# Patient Record
Sex: FEMALE | Race: WHITE | HISPANIC OR LATINO | Employment: FULL TIME | URBAN - METROPOLITAN AREA
[De-identification: names, ages, dates, MRNs, and addresses within clinical notes are randomized per-mention and may not be internally consistent; named-entity substitution may affect disease eponyms.]

---

## 2019-05-29 ENCOUNTER — HOSPITAL ENCOUNTER (EMERGENCY)
Facility: HOSPITAL | Age: 34
Discharge: HOME | End: 2019-05-29
Attending: EMERGENCY MEDICINE
Payer: COMMERCIAL

## 2019-05-29 VITALS
RESPIRATION RATE: 16 BRPM | HEIGHT: 62 IN | TEMPERATURE: 98.3 F | SYSTOLIC BLOOD PRESSURE: 133 MMHG | OXYGEN SATURATION: 97 % | WEIGHT: 144 LBS | BODY MASS INDEX: 26.5 KG/M2 | DIASTOLIC BLOOD PRESSURE: 88 MMHG | HEART RATE: 98 BPM

## 2019-05-29 DIAGNOSIS — N39.0 UTI (URINARY TRACT INFECTION): Primary | ICD-10-CM

## 2019-05-29 LAB
BACTERIA UR QL AUTO: ABNORMAL /HPF
BILIRUB UR QL STRIP: NEGATIVE
CLARITY UR: ABNORMAL
COLOR UR: ABNORMAL
GLUCOSE UR STRIP-MCNC: NEGATIVE MG/DL
HGB UR QL STRIP.AUTO: 25
KETONES UR STRIP-MCNC: ABNORMAL MG/DL
LEUKOCYTE ESTERASE UR QL STRIP: 500
MUCOUS THREADS UR QL AUTO: ABNORMAL
NITRITE UR QL STRIP: POSITIVE
NON-SQ EPI CELLS URNS QL MICRO: ABNORMAL /HPF
PH UR STRIP.AUTO: 6 [PH]
PROT UR STRIP-MCNC: ABNORMAL MG/DL
RBC #/AREA URNS AUTO: ABNORMAL /HPF
SP GR UR STRIP.AUTO: 1.02 (ref 1–1.04)
UROBILINOGEN UA: 1 MG/DL
WBC #/AREA URNS AUTO: ABNORMAL /HPF

## 2019-05-29 PROCEDURE — 81001 URINALYSIS AUTO W/SCOPE: CPT | Performed by: PHYSICIAN ASSISTANT

## 2019-05-29 PROCEDURE — 87186 SC STD MICRODIL/AGAR DIL: CPT | Performed by: PHYSICIAN ASSISTANT

## 2019-05-29 PROCEDURE — 81003 URINALYSIS AUTO W/O SCOPE: CPT | Performed by: PHYSICIAN ASSISTANT

## 2019-05-29 PROCEDURE — 99283 EMERGENCY DEPT VISIT LOW MDM: CPT | Performed by: PHYSICIAN ASSISTANT

## 2019-05-29 PROCEDURE — 87086 URINE CULTURE/COLONY COUNT: CPT | Performed by: PHYSICIAN ASSISTANT

## 2019-05-29 PROCEDURE — 99284 EMERGENCY DEPT VISIT MOD MDM: CPT

## 2019-05-29 PROCEDURE — 87077 CULTURE AEROBIC IDENTIFY: CPT | Performed by: PHYSICIAN ASSISTANT

## 2019-05-29 RX ORDER — SULFAMETHOXAZOLE AND TRIMETHOPRIM 800; 160 MG/1; MG/1
1 TABLET ORAL 2 TIMES DAILY
Qty: 14 TABLET | Refills: 0 | Status: SHIPPED | OUTPATIENT
Start: 2019-05-29 | End: 2019-06-05

## 2019-05-31 LAB
BACTERIA UR CULT: ABNORMAL
BACTERIA UR CULT: ABNORMAL

## 2019-06-07 ENCOUNTER — OFFICE VISIT (OUTPATIENT)
Dept: OBGYN CLINIC | Facility: CLINIC | Age: 34
End: 2019-06-07

## 2019-06-07 VITALS
BODY MASS INDEX: 27.42 KG/M2 | DIASTOLIC BLOOD PRESSURE: 60 MMHG | HEIGHT: 62 IN | SYSTOLIC BLOOD PRESSURE: 110 MMHG | WEIGHT: 149 LBS

## 2019-06-07 DIAGNOSIS — N89.8 VAGINA ITCHING: Primary | ICD-10-CM

## 2019-06-07 DIAGNOSIS — N89.8 VAGINAL DISCHARGE: ICD-10-CM

## 2019-06-07 DIAGNOSIS — B37.3 VAGINA, CANDIDIASIS: ICD-10-CM

## 2019-06-07 LAB — SL AMB POCT WET MOUNT: ABNORMAL

## 2019-06-07 PROCEDURE — 99203 OFFICE O/P NEW LOW 30 MIN: CPT | Performed by: NURSE PRACTITIONER

## 2019-06-07 PROCEDURE — 87210 SMEAR WET MOUNT SALINE/INK: CPT | Performed by: NURSE PRACTITIONER

## 2019-06-07 RX ORDER — AMITRIPTYLINE HYDROCHLORIDE 10 MG/1
25 TABLET, FILM COATED ORAL
COMMUNITY
End: 2020-02-06 | Stop reason: SDUPTHER

## 2019-06-07 RX ORDER — FLUCONAZOLE 150 MG/1
150 TABLET ORAL ONCE
Qty: 1 TABLET | Refills: 0 | Status: SHIPPED | OUTPATIENT
Start: 2019-06-07 | End: 2019-06-07

## 2019-06-21 ENCOUNTER — ANNUAL EXAM (OUTPATIENT)
Dept: OBGYN CLINIC | Facility: CLINIC | Age: 34
End: 2019-06-21

## 2019-06-21 VITALS
BODY MASS INDEX: 27.23 KG/M2 | DIASTOLIC BLOOD PRESSURE: 80 MMHG | HEIGHT: 62 IN | WEIGHT: 148 LBS | SYSTOLIC BLOOD PRESSURE: 120 MMHG

## 2019-06-21 DIAGNOSIS — Z11.3 SCREENING EXAMINATION FOR SEXUALLY TRANSMITTED DISEASE: ICD-10-CM

## 2019-06-21 DIAGNOSIS — Z12.4 SCREENING FOR CERVICAL CANCER: ICD-10-CM

## 2019-06-21 DIAGNOSIS — Z01.411 ENCOUNTER FOR GYNECOLOGICAL EXAMINATION WITH ABNORMAL FINDING: Primary | ICD-10-CM

## 2019-06-21 DIAGNOSIS — R10.2 PELVIC PAIN: ICD-10-CM

## 2019-06-21 DIAGNOSIS — Z12.39 SCREENING FOR BREAST CANCER: ICD-10-CM

## 2019-06-21 PROCEDURE — 99395 PREV VISIT EST AGE 18-39: CPT | Performed by: NURSE PRACTITIONER

## 2019-06-21 PROCEDURE — 87491 CHLMYD TRACH DNA AMP PROBE: CPT | Performed by: NURSE PRACTITIONER

## 2019-06-21 PROCEDURE — 87591 N.GONORRHOEAE DNA AMP PROB: CPT | Performed by: NURSE PRACTITIONER

## 2019-06-21 PROCEDURE — G0145 SCR C/V CYTO,THINLAYER,RESCR: HCPCS | Performed by: NURSE PRACTITIONER

## 2019-06-21 PROCEDURE — 87624 HPV HI-RISK TYP POOLED RSLT: CPT | Performed by: NURSE PRACTITIONER

## 2019-06-25 ENCOUNTER — TRANSCRIBE ORDERS (OUTPATIENT)
Dept: ADMINISTRATIVE | Facility: HOSPITAL | Age: 34
End: 2019-06-25

## 2019-06-25 DIAGNOSIS — R10.2 PELVIC PAIN: Primary | ICD-10-CM

## 2019-06-25 LAB
HPV HR 12 DNA CVX QL NAA+PROBE: NEGATIVE
HPV16 DNA CVX QL NAA+PROBE: NEGATIVE
HPV18 DNA CVX QL NAA+PROBE: NEGATIVE

## 2019-06-26 ENCOUNTER — HOSPITAL ENCOUNTER (OUTPATIENT)
Dept: ULTRASOUND IMAGING | Facility: HOSPITAL | Age: 34
Discharge: HOME | End: 2019-06-26
Attending: NURSE PRACTITIONER
Payer: COMMERCIAL

## 2019-06-26 DIAGNOSIS — R10.2 PELVIC PAIN: ICD-10-CM

## 2019-06-26 LAB
C TRACH DNA SPEC QL NAA+PROBE: NEGATIVE
LAB AP GYN PRIMARY INTERPRETATION: NORMAL
Lab: NORMAL
N GONORRHOEA DNA SPEC QL NAA+PROBE: NEGATIVE

## 2019-06-26 PROCEDURE — 76856 US EXAM PELVIC COMPLETE: CPT

## 2019-06-26 PROCEDURE — 76830 TRANSVAGINAL US NON-OB: CPT

## 2019-07-15 ENCOUNTER — OFFICE VISIT (OUTPATIENT)
Dept: OBGYN CLINIC | Facility: CLINIC | Age: 34
End: 2019-07-15

## 2019-07-15 VITALS
BODY MASS INDEX: 27.05 KG/M2 | SYSTOLIC BLOOD PRESSURE: 110 MMHG | WEIGHT: 147 LBS | HEIGHT: 62 IN | DIASTOLIC BLOOD PRESSURE: 60 MMHG

## 2019-07-15 DIAGNOSIS — R10.2 PELVIC PAIN: ICD-10-CM

## 2019-07-15 DIAGNOSIS — N70.11 HYDROSALPINX: Primary | ICD-10-CM

## 2019-07-15 PROCEDURE — 99212 OFFICE O/P EST SF 10 MIN: CPT | Performed by: NURSE PRACTITIONER

## 2019-07-15 RX ORDER — KETOROLAC TROMETHAMINE 10 MG/1
10 TABLET, FILM COATED ORAL EVERY 6 HOURS PRN
Qty: 20 TABLET | Refills: 0 | Status: SHIPPED | OUTPATIENT
Start: 2019-07-15 | End: 2020-03-05

## 2019-07-15 NOTE — PROGRESS NOTES
Eliza Jovel presents today to review results of pelvic ultrasound performed 6/26/19  Ultrasound report notes L hydrosalpinx  Patient reports that she continues with L sided pelvic pain off/on  Will refer her to Dr Victor M Valverde to discuss surgical options  Given Rx Toradol for pain prn  She agrees with plan

## 2019-07-20 ENCOUNTER — HOSPITAL ENCOUNTER (EMERGENCY)
Facility: HOSPITAL | Age: 34
Discharge: HOME | End: 2019-07-20
Attending: EMERGENCY MEDICINE | Admitting: EMERGENCY MEDICINE
Payer: COMMERCIAL

## 2019-07-20 ENCOUNTER — APPOINTMENT (EMERGENCY)
Dept: CT IMAGING | Facility: HOSPITAL | Age: 34
End: 2019-07-20
Payer: COMMERCIAL

## 2019-07-20 VITALS
SYSTOLIC BLOOD PRESSURE: 119 MMHG | HEIGHT: 62 IN | OXYGEN SATURATION: 99 % | BODY MASS INDEX: 27.42 KG/M2 | DIASTOLIC BLOOD PRESSURE: 81 MMHG | RESPIRATION RATE: 18 BRPM | TEMPERATURE: 98.3 F | WEIGHT: 149 LBS | HEART RATE: 85 BPM

## 2019-07-20 DIAGNOSIS — N70.11 HYDROSALPINX: Primary | ICD-10-CM

## 2019-07-20 DIAGNOSIS — N39.0 URINARY TRACT INFECTION: ICD-10-CM

## 2019-07-20 LAB
ALBUMIN SERPL BCP-MCNC: 4.3 G/DL (ref 3–5.2)
ALP SERPL-CCNC: 57 U/L (ref 43–122)
ALT SERPL W P-5'-P-CCNC: 28 U/L (ref 9–52)
ANION GAP SERPL CALCULATED.3IONS-SCNC: 10 MMOL/L (ref 5–14)
AST SERPL W P-5'-P-CCNC: 17 U/L (ref 14–36)
BACTERIA UR QL AUTO: ABNORMAL /HPF
BASOPHILS # BLD AUTO: 0.1 THOUSANDS/ΜL (ref 0–0.1)
BASOPHILS NFR BLD AUTO: 1 % (ref 0–1)
BILIRUB SERPL-MCNC: 0.3 MG/DL
BILIRUB UR QL STRIP: NEGATIVE
BUN SERPL-MCNC: 18 MG/DL (ref 5–25)
CALCIUM SERPL-MCNC: 9.3 MG/DL (ref 8.4–10.2)
CHLORIDE SERPL-SCNC: 106 MMOL/L (ref 97–108)
CLARITY UR: ABNORMAL
CO2 SERPL-SCNC: 24 MMOL/L (ref 22–30)
COLOR UR: ABNORMAL
CREAT SERPL-MCNC: 0.72 MG/DL (ref 0.6–1.2)
EOSINOPHIL # BLD AUTO: 0.2 THOUSAND/ΜL (ref 0–0.4)
EOSINOPHIL NFR BLD AUTO: 2 % (ref 0–6)
ERYTHROCYTE [DISTWIDTH] IN BLOOD BY AUTOMATED COUNT: 12.9 %
EXT PREG TEST URINE: NEGATIVE
EXT. CONTROL ED NAV: NORMAL
GFR SERPL CREATININE-BSD FRML MDRD: 110 ML/MIN/1.73SQ M
GLUCOSE SERPL-MCNC: 103 MG/DL (ref 70–99)
GLUCOSE UR STRIP-MCNC: NEGATIVE MG/DL
HCT VFR BLD AUTO: 38.3 % (ref 36–46)
HGB BLD-MCNC: 12.7 G/DL (ref 12–16)
HGB UR QL STRIP.AUTO: 25
HOLD SPECIMEN: NORMAL
KETONES UR STRIP-MCNC: NEGATIVE MG/DL
LEUKOCYTE ESTERASE UR QL STRIP: 25
LYMPHOCYTES # BLD AUTO: 2.8 THOUSANDS/ΜL (ref 0.5–4)
LYMPHOCYTES NFR BLD AUTO: 22 % (ref 25–45)
MCH RBC QN AUTO: 30.2 PG (ref 26–34)
MCHC RBC AUTO-ENTMCNC: 33.2 G/DL (ref 31–36)
MCV RBC AUTO: 91 FL (ref 80–100)
MONOCYTES # BLD AUTO: 0.8 THOUSAND/ΜL (ref 0.2–0.9)
MONOCYTES NFR BLD AUTO: 7 % (ref 1–10)
NEUTROPHILS # BLD AUTO: 8.8 THOUSANDS/ΜL (ref 1.8–7.8)
NEUTS SEG NFR BLD AUTO: 70 % (ref 45–65)
NITRITE UR QL STRIP: NEGATIVE
NON-SQ EPI CELLS URNS QL MICRO: ABNORMAL /HPF
PH UR STRIP.AUTO: 5 [PH]
PLATELET # BLD AUTO: 250 THOUSANDS/UL (ref 150–450)
PLATELET BLD QL SMEAR: ADEQUATE
PMV BLD AUTO: 10 FL (ref 8.9–12.7)
POTASSIUM SERPL-SCNC: 4 MMOL/L (ref 3.6–5)
PROT SERPL-MCNC: 7.5 G/DL (ref 5.9–8.4)
PROT UR STRIP-MCNC: ABNORMAL MG/DL
RBC # BLD AUTO: 4.22 MILLION/UL (ref 4–5.2)
RBC #/AREA URNS AUTO: ABNORMAL /HPF
RBC MORPH BLD: NORMAL
SODIUM SERPL-SCNC: 140 MMOL/L (ref 137–147)
SP GR UR STRIP.AUTO: 1.03 (ref 1–1.04)
UROBILINOGEN UA: 1 MG/DL
WBC # BLD AUTO: 12.7 THOUSAND/UL (ref 4.5–11)
WBC #/AREA URNS AUTO: ABNORMAL /HPF

## 2019-07-20 PROCEDURE — 99284 EMERGENCY DEPT VISIT MOD MDM: CPT

## 2019-07-20 PROCEDURE — 80053 COMPREHEN METABOLIC PANEL: CPT | Performed by: EMERGENCY MEDICINE

## 2019-07-20 PROCEDURE — 85025 COMPLETE CBC W/AUTO DIFF WBC: CPT | Performed by: EMERGENCY MEDICINE

## 2019-07-20 PROCEDURE — 36415 COLL VENOUS BLD VENIPUNCTURE: CPT | Performed by: EMERGENCY MEDICINE

## 2019-07-20 PROCEDURE — 74177 CT ABD & PELVIS W/CONTRAST: CPT

## 2019-07-20 PROCEDURE — 99284 EMERGENCY DEPT VISIT MOD MDM: CPT | Performed by: EMERGENCY MEDICINE

## 2019-07-20 PROCEDURE — 81003 URINALYSIS AUTO W/O SCOPE: CPT | Performed by: EMERGENCY MEDICINE

## 2019-07-20 PROCEDURE — 81001 URINALYSIS AUTO W/SCOPE: CPT | Performed by: EMERGENCY MEDICINE

## 2019-07-20 PROCEDURE — 96361 HYDRATE IV INFUSION ADD-ON: CPT

## 2019-07-20 PROCEDURE — 81025 URINE PREGNANCY TEST: CPT | Performed by: EMERGENCY MEDICINE

## 2019-07-20 PROCEDURE — 96374 THER/PROPH/DIAG INJ IV PUSH: CPT

## 2019-07-20 RX ORDER — CEPHALEXIN 500 MG/1
500 CAPSULE ORAL ONCE
Status: DISCONTINUED | OUTPATIENT
Start: 2019-07-20 | End: 2019-07-20

## 2019-07-20 RX ORDER — KETOROLAC TROMETHAMINE 30 MG/ML
15 INJECTION, SOLUTION INTRAMUSCULAR; INTRAVENOUS ONCE
Status: COMPLETED | OUTPATIENT
Start: 2019-07-20 | End: 2019-07-20

## 2019-07-20 RX ORDER — CEPHALEXIN 500 MG/1
500 CAPSULE ORAL EVERY 12 HOURS SCHEDULED
Qty: 14 CAPSULE | Refills: 0 | Status: SHIPPED | OUTPATIENT
Start: 2019-07-20 | End: 2019-07-27

## 2019-07-20 RX ORDER — ACETAMINOPHEN 325 MG/1
975 TABLET ORAL ONCE
Status: COMPLETED | OUTPATIENT
Start: 2019-07-20 | End: 2019-07-20

## 2019-07-20 RX ADMIN — IOHEXOL 100 ML: 350 INJECTION, SOLUTION INTRAVENOUS at 03:25

## 2019-07-20 RX ADMIN — KETOROLAC TROMETHAMINE 15 MG: 30 INJECTION, SOLUTION INTRAMUSCULAR; INTRAVENOUS at 02:33

## 2019-07-20 RX ADMIN — SODIUM CHLORIDE 1000 ML: 9 INJECTION, SOLUTION INTRAVENOUS at 02:33

## 2019-07-20 RX ADMIN — ACETAMINOPHEN 975 MG: 325 TABLET ORAL at 02:36

## 2019-07-20 NOTE — DISCHARGE INSTRUCTIONS
Please follow-up with your OBGYN as scheduled on Monday for further care, if symptoms worsen please return to the emergency department

## 2019-07-20 NOTE — ED NOTES
Seen by Dr Randolph Benson with results of testing reviewed and discharge instructions and plan of care discussed with patient  Patient is pain free         Rosa Orozco RN  07/20/19 0538

## 2019-07-20 NOTE — ED PROVIDER NOTES
History  Chief Complaint   Patient presents with    Abdominal Pain     left     44-year-old female past medical history who migraine, left-sided hydrosalpinx and intermittent left sided pelvic pain presents for evaluation of continued left-sided pelvic pain  Patient is currently being seen by her OBGYN, recently had a ultrasound of the pelvis for this problem which showed left-sided hydrosalpinx, she was seen on 07/15 by her OBGYN and referred to Dr Andres Franklin for surgical evaluation     Patient comes today because of continued pain despite taking her prescribed Toradol  She states that the pain has been going on for approximately 2 years but over last 3 weeks has been worsening, last 3 days she has been unable to sleep secondary to the pain  No vaginal discharge or bleeding no frequency urgency or dysuria, no nausea vomiting  No fevers or chills  Of note patient has an appointment with her surgeon on Monday  Prior to Admission Medications   Prescriptions Last Dose Informant Patient Reported? Taking?   amitriptyline (ELAVIL) 10 mg tablet 7/19/2019 at Unknown time Self Yes Yes   Sig: Take 25 mg by mouth daily at bedtime   ketorolac (TORADOL) 10 mg tablet 7/19/2019 at Unknown time  No Yes   Sig: Take 1 tablet (10 mg total) by mouth every 6 (six) hours as needed for moderate pain      Facility-Administered Medications: None       Past Medical History:   Diagnosis Date    Abnormal Pap smear of cervix     Migraine headache with aura     managed with Elavil       Past Surgical History:   Procedure Laterality Date    GYNECOLOGIC CRYOSURGERY  2015    HYSTERECTOMY  2016    LAPAROSCOPY  2018       Family History   Problem Relation Age of Onset    Cancer Paternal Grandmother         uterine    Cancer Paternal Aunt         uterine     Breast cancer Neg Hx      I have reviewed and agree with the history as documented      Social History     Tobacco Use    Smoking status: Current Every Day Smoker Packs/day: 0 25     Years: 15 00     Pack years: 3 75     Types: Cigarettes    Smokeless tobacco: Never Used   Substance Use Topics    Alcohol use: Not Currently    Drug use: Never        Review of Systems   Constitutional: Negative for appetite change and fever  HENT: Negative for rhinorrhea and sore throat  Eyes: Negative for photophobia and visual disturbance  Respiratory: Negative for cough, chest tightness and wheezing  Cardiovascular: Negative for chest pain, palpitations and leg swelling  Gastrointestinal: Positive for abdominal pain  Negative for abdominal distention, blood in stool, constipation and diarrhea  Genitourinary: Negative for dysuria, flank pain, frequency, hematuria and urgency  Pelvic pain   Musculoskeletal: Negative for back pain  Skin: Negative for rash  Neurological: Negative for dizziness, weakness and headaches  All other systems reviewed and are negative  Physical Exam  Physical Exam   Constitutional: She is oriented to person, place, and time  She appears well-developed and well-nourished  HENT:   Head: Normocephalic and atraumatic  Eyes: Pupils are equal, round, and reactive to light  EOM are normal    Neck: Normal range of motion  Neck supple  Cardiovascular: Normal rate and regular rhythm  Exam reveals no gallop and no friction rub  No murmur heard  Pulmonary/Chest: Effort normal  She has no wheezes  She has no rales  She exhibits no tenderness  Abdominal: Soft  She exhibits no distension and no mass  There is no rebound and no guarding  Left lower quadrant tenderness to palpation without rebound or guarding   Neurological: She is alert and oriented to person, place, and time  Skin: Skin is warm and dry  Psychiatric: She has a normal mood and affect  Nursing note and vitals reviewed        Vital Signs  ED Triage Vitals   Temperature Pulse Respirations Blood Pressure SpO2   07/20/19 0208 07/20/19 0208 07/20/19 0208 07/20/19 4965 07/20/19 0208   98 3 °F (36 8 °C) 91 20 118/72 99 %      Temp Source Heart Rate Source Patient Position - Orthostatic VS BP Location FiO2 (%)   07/20/19 0208 07/20/19 0208 07/20/19 0208 07/20/19 0208 --   Tympanic Monitor Sitting Left arm       Pain Score       07/20/19 0208       7           Vitals:    07/20/19 0208 07/20/19 0209 07/20/19 0331 07/20/19 0419   BP:  118/72 120/72 119/81   Pulse: 91  88 85   Patient Position - Orthostatic VS: Sitting  Sitting Sitting         Visual Acuity      ED Medications  Medications   sodium chloride 0 9 % bolus 1,000 mL (0 mL Intravenous Stopped 7/20/19 0419)   ketorolac (TORADOL) injection 15 mg (15 mg Intravenous Given 7/20/19 0233)   acetaminophen (TYLENOL) tablet 975 mg (975 mg Oral Given 7/20/19 0236)   iohexol (OMNIPAQUE) 350 MG/ML injection (SINGLE-DOSE) 100 mL (100 mL Intravenous Given 7/20/19 0325)       Diagnostic Studies  Results Reviewed     Procedure Component Value Units Date/Time    [place] draw [[id number]] Collected:  07/20/19 0225    Lab Status:  Final result Specimen:  Blood from Arm, Right Updated:  07/20/19 0401    Narrative: The following orders were created for panel order [place] draw  Procedure                               Abnormality         Status                     ---------                               -----------         ------                     Warnell Bare Top on RDFJ[[id number]]                           Final result               Gold top on GLUS[[id number]]                                 Final result               Green / Black tube on hold[[id number]]                       Final result                 Please view results for these tests on the individual orders      Urine Microscopic [[id number]]  (Abnormal) Collected:  07/20/19 0225    Lab Status:  Final result Specimen:  Urine, Clean Catch Updated:  07/20/19 0249     RBC, UA 1-2 /hpf      WBC, UA 2-4 /hpf      Epithelial Cells Occasional /hpf      Bacteria, UA Innumerable /hpf Comprehensive metabolic panel [[id number]]  (Abnormal) Collected:  07/20/19 0225    Lab Status:  Final result Specimen:  Blood from Arm, Right Updated:  07/20/19 0247     Sodium 140 mmol/L      Potassium 4 0 mmol/L      Chloride 106 mmol/L      CO2 24 mmol/L      ANION GAP 10 mmol/L      BUN 18 mg/dL      Creatinine 0 72 mg/dL      Glucose 103 mg/dL      Calcium 9 3 mg/dL      AST 17 U/L      ALT 28 U/L      Alkaline Phosphatase 57 U/L      Total Protein 7 5 g/dL      Albumin 4 3 g/dL      Total Bilirubin 0 30 mg/dL      eGFR 110 ml/min/1 73sq m     Narrative:       National Kidney Disease Foundation guidelines for Chronic Kidney Disease (CKD):     Stage 1 with normal or high GFR (GFR > 90 mL/min/1 73 square meters)    Stage 2 Mild CKD (GFR = 60-89 mL/min/1 73 square meters)    Stage 3A Moderate CKD (GFR = 45-59 mL/min/1 73 square meters)    Stage 3B Moderate CKD (GFR = 30-44 mL/min/1 73 square meters)    Stage 4 Severe CKD (GFR = 15-29 mL/min/1 73 square meters)    Stage 5 End Stage CKD (GFR <15 mL/min/1 73 square meters)  Note: GFR calculation is accurate only with a steady state creatinine    CBC and differential [[id number]]  (Abnormal) Collected:  07/20/19 0225    Lab Status:  Final result Specimen:  Blood from Arm, Right Updated:  07/20/19 0244     WBC 12 70 Thousand/uL      RBC 4 22 Million/uL      Hemoglobin 12 7 g/dL      Hematocrit 38 3 %      MCV 91 fL      MCH 30 2 pg      MCHC 33 2 g/dL      RDW 12 9 %      MPV 10 0 fL      Platelets 797 Thousands/uL      Neutrophils Relative 70 %      Lymphocytes Relative 22 %      Monocytes Relative 7 %      Eosinophils Relative 2 %      Basophils Relative 1 %      Neutrophils Absolute 8 80 Thousands/µL      Lymphocytes Absolute 2 80 Thousands/µL      Monocytes Absolute 0 80 Thousand/µL      Eosinophils Absolute 0 20 Thousand/µL      Basophils Absolute 0 10 Thousands/µL     UA w Reflex to Microscopic w Reflex to Culture [[id number]]  (Abnormal) Collected:  07/20/19 0225    Lab Status:  Final result Specimen:  Urine, Clean Catch Updated:  07/20/19 0243     Color, UA Mary     Clarity, UA Slightly Cloudy     Specific [place], UA 1 030     pH, UA 5 0     Leukocytes, UA 25 0     Nitrite, UA Negative     Protein, UA 15 (Trace) mg/dl      Glucose, UA Negative mg/dl      Ketones, UA Negative mg/dl      Bilirubin, UA Negative     Blood, UA 25 0     UROBILINOGEN UA 1 0 mg/dL     POCT pregnancy, urine [[id number]]  (Normal) Resulted:  07/20/19 0230    Lab Status:  Final result Updated:  07/20/19 0230     EXT PREG TEST UR (Ref: Negative) Negative     Control Valid                 CT abdomen pelvis with contrast   Final Result by Aimee Cleaning MD (07/20 0350)      There is a somewhat tortuous fluid-filled tubular shaped structure in the left adnexal region  The appearance suggests hydrosalpinx  OB/GYN consultation is recommended  No evidence of bowel obstruction  Normal appendix              Workstation performed: [id number]                    Procedures  Procedures       ED Course  ED Course as of Jul 20 0700   Sat Jul 20, 2019   9069 Patient has redemonstration of hydrosalpinx on CT scan, she has an appointment with      0410 Patient feeling better, will follow up with Ob/gyn on Monday                                   MDM  Number of Diagnoses or Management Options  Diagnosis management comments: 81-year-old female continued left-sided pelvic pain, will obtain lab work, urinalysis, urine pregnancy and CT of the abdomen, treat symptomatically and re-evaluate      Disposition  Final diagnoses:   Hydrosalpinx   Urinary tract infection     Time reflects when diagnosis was documented in both MDM as applicable and the Disposition within this note     Time User Action Codes Description Comment    7/20/2019  3:55 AM Ethelyn Dart Add [N70 11] Hydrosalpinx     7/20/2019  3:55 AM Ethelyn Dart Add [N39 0] Urinary tract infection       ED Disposition     ED Disposition Condition Date/Time Comment    Discharge Stable Sat Jul 20, 2019  3:55 AM Lorrie TrotterRiyascotty discharge to home/self care  Follow-up Information     Follow up With Specialties Details Why [facility] Emergency Department Emergency Medicine  If symptoms worsen [address] [address]  [address] [facility] [facility] [address], MD Family Medicine Schedule an appointment as soon as possible for a visit  As needed [address] [facility] [address]       Aneta Diaz MD Obstetrics and Gynecology, Obstetrics, Gynecology Go to  Your appointment on Monday as scheduled [address] [facility] [address]  [phone]            Discharge Medication List as of 7/20/2019  3:56 AM      START taking these medications    Details   cephalexin (KEFLEX) 500 mg capsule Take 1 capsule (500 mg total) by mouth every 12 (twelve) hours for 7 days, Starting Sat 7/20/2019, Until Sat 7/27/2019, Print         CONTINUE these medications which have NOT CHANGED    Details   amitriptyline (ELAVIL) 10 mg tablet Take 25 mg by mouth daily at bedtime, Historical Med      ketorolac (TORADOL) 10 mg tablet Take 1 tablet (10 mg total) by mouth every 6 (six) hours as needed for moderate pain, Starting Mon 7/15/2019, Normal           No discharge procedures on file      ED Provider  Electronically Signed by           Angie Arciniega MD  07/20/19 8716       Angie Arciniega MD  07/20/19 1516

## 2019-07-23 ENCOUNTER — OFFICE VISIT (OUTPATIENT)
Dept: OBGYN CLINIC | Facility: CLINIC | Age: 34
End: 2019-07-23

## 2019-07-23 VITALS
HEART RATE: 101 BPM | DIASTOLIC BLOOD PRESSURE: 72 MMHG | WEIGHT: 149.8 LBS | SYSTOLIC BLOOD PRESSURE: 105 MMHG | BODY MASS INDEX: 27.4 KG/M2

## 2019-07-23 DIAGNOSIS — N70.11 HYDROSALPINX: Primary | ICD-10-CM

## 2019-07-23 PROCEDURE — 99213 OFFICE O/P EST LOW 20 MIN: CPT | Performed by: OBSTETRICS & GYNECOLOGY

## 2019-07-23 NOTE — H&P (VIEW-ONLY)
Subjective:     Eyal Yang is a 36 y o   female who presents for surgical consultation from [facility]  She was initially evaluated on 19 for annual exam  Due to complaint of left sided pelvic pain, an ultrasound was obtained  US results significant for tortuous anechoic tubular structure extending from midline into the left adnexa to the left ovary compatible with hydrosalpinx  She returned to [facility] on 7/15 for review of ultrasound report and directed to [facility] for further surgical evaluation  Of note, she was evaluated in the ED on  for abdominal pain  CT unremarkable aside from left hydrosalpinx  She was discharged on keflex 500mg q 12hr x 7 days  Patient has an extensive but somewhat confusing past surgical history  She states that after 2 SVDs, she consented to permanent sterilization via bilateral tubal ligation in  in [facility]  Around 4 years ago, there was concern for cervical dysplasia  She underwent a hysterectomy via Pfannenstiel incision in [facility]  Bilateral fallopian tubes and ovaries were not removed  She states pathology returned as benign with no sign of dysplasia or malignancy  She then moved to [facility] where she received care at [facility]  There, she had a right salpingectomy due to a "broken tube " She is unsure exactly why she had her right fallopian tube removed  She states that they did not complete a left salpingectomy at the time  Today, patient still reports left sided lower pelvic pain  She reports use of motrin to help with her pelvic pain  She denies headache, nausea, vomiting, subjective fevers, chills, shortness of breath, chest pain, lower extremity tenderness  She is very anxious about having left fallopian tube removed  Objective:    Vitals: Blood pressure 105/72, pulse 101, weight 67 9 kg (149 lb 12 8 oz), not currently breastfeeding  Body mass index is 27 4 kg/m²      Physical Exam Constitutional: She is oriented to person, place, and time  She appears well-developed and well-nourished  Cardiovascular: Normal rate, regular rhythm and normal heart sounds  Exam reveals no gallop and no friction rub  No murmur heard  Pulmonary/Chest: Effort normal and breath sounds normal  No stridor  No respiratory distress  She has no wheezes  Abdominal: Soft  Bowel sounds are normal  She exhibits no distension and no mass  There is no tenderness  There is no rebound and no guarding  No hernia  Abdominal scars: Pfannenstiel (hysterectomy), suprapubic (BTL), umbilical, left lower quadrant, right lower quadrant (right salpingectomy)   Neurological: She is alert and oriented to person, place, and time  Assessment/Plan:    Patient has a 7 x 2 4cm tortuous left hydrosalpinx  Images were reviewed with Dr Jovani Betts  I discussed recommendations for left salpingectomy and patient would like to proceed due to her persistent left sided pelvic pain  Discussed with patient that this may not be the cause of her pelvic pain and that she may continue to have pelvic pain after surgery  Patient understands and would still like to proceed  Risks of procedure include bleeding, infection, injury to surrounding organs including bowel, bladder, nerves, vessels, need for transfusion, inability to complete the procedure due to presence of adhesions from prior surgery, problems with anesthesia, continued pain after surgery  Patient voiced understanding  Consent form signed with patient  Will jennifer Bourgeois to assist with scheduling surgery  [facility] in [place], [place]   They will fax over patient's prior OB/GYN records and operative report for her prior right salpingectomy that was completed at [facility]      Discussed with Dr Tiffany Cleaning MD  7/23/2019  3:16 PM

## 2019-07-23 NOTE — PROGRESS NOTES
Subjective:     Maik Myles is a 36 y o   female who presents for surgical consultation from [facility]  She was initially evaluated on 19 for annual exam  Due to complaint of left sided pelvic pain, an ultrasound was obtained  US results significant for tortuous anechoic tubular structure extending from midline into the left adnexa to the left ovary compatible with hydrosalpinx  She returned to [facility] on 7/15 for review of ultrasound report and directed to [facility] for further surgical evaluation  Of note, she was evaluated in the ED on  for abdominal pain  CT unremarkable aside from left hydrosalpinx  She was discharged on keflex 500mg q 12hr x 7 days  Patient has an extensive but somewhat confusing past surgical history  She states that after 2 SVDs, she consented to permanent sterilization via bilateral tubal ligation in  in [facility]  Around 4 years ago, there was concern for cervical dysplasia  She underwent a hysterectomy via Pfannenstiel incision in [facility]  Bilateral fallopian tubes and ovaries were not removed  She states pathology returned as benign with no sign of dysplasia or malignancy  She then moved to [facility] where she received care at [facility]  There, she had a right salpingectomy due to a "broken tube " She is unsure exactly why she had her right fallopian tube removed  She states that they did not complete a left salpingectomy at the time  Today, patient still reports left sided lower pelvic pain  She reports use of motrin to help with her pelvic pain  She denies headache, nausea, vomiting, subjective fevers, chills, shortness of breath, chest pain, lower extremity tenderness  She is very anxious about having left fallopian tube removed  Objective:    Vitals: Blood pressure 105/72, pulse 101, weight 67 9 kg (149 lb 12 8 oz), not currently breastfeeding  Body mass index is 27 4 kg/m²      Physical Exam Constitutional: She is oriented to person, place, and time  She appears well-developed and well-nourished  Cardiovascular: Normal rate, regular rhythm and normal heart sounds  Exam reveals no gallop and no friction rub  No murmur heard  Pulmonary/Chest: Effort normal and breath sounds normal  No stridor  No respiratory distress  She has no wheezes  Abdominal: Soft  Bowel sounds are normal  She exhibits no distension and no mass  There is no tenderness  There is no rebound and no guarding  No hernia  Abdominal scars: Pfannenstiel (hysterectomy), suprapubic (BTL), umbilical, left lower quadrant, right lower quadrant (right salpingectomy)   Neurological: She is alert and oriented to person, place, and time  Assessment/Plan:    Patient has a 7 x 2 4cm tortuous left hydrosalpinx  Images were reviewed with Dr Yoon Knott  I discussed recommendations for left salpingectomy and patient would like to proceed due to her persistent left sided pelvic pain  Discussed with patient that this may not be the cause of her pelvic pain and that she may continue to have pelvic pain after surgery  Patient understands and would still like to proceed  Risks of procedure include bleeding, infection, injury to surrounding organs including bowel, bladder, nerves, vessels, need for transfusion, inability to complete the procedure due to presence of adhesions from prior surgery, problems with anesthesia, continued pain after surgery  Patient voiced understanding  Consent form signed with patient  Will jennifer Bourgeois to assist with scheduling surgery  [facility] called [facility] in [place], [place]   They will fax over patient's prior OB/GYN records and operative report for her prior right salpingectomy that was completed at [facility]      Discussed with Dr Blessing Treviño MD  7/23/2019  3:16 PM

## 2019-08-02 ENCOUNTER — APPOINTMENT (OUTPATIENT)
Dept: LAB | Facility: CLINIC | Age: 34
End: 2019-08-02
Payer: COMMERCIAL

## 2019-08-02 ENCOUNTER — OFFICE VISIT (OUTPATIENT)
Dept: FAMILY MEDICINE CLINIC | Facility: CLINIC | Age: 34
End: 2019-08-02

## 2019-08-02 VITALS
RESPIRATION RATE: 17 BRPM | HEART RATE: 94 BPM | BODY MASS INDEX: 27.79 KG/M2 | TEMPERATURE: 97.8 F | WEIGHT: 151 LBS | OXYGEN SATURATION: 99 % | DIASTOLIC BLOOD PRESSURE: 70 MMHG | HEIGHT: 62 IN | SYSTOLIC BLOOD PRESSURE: 100 MMHG

## 2019-08-02 DIAGNOSIS — G43.109 MIGRAINE WITH AURA AND WITHOUT STATUS MIGRAINOSUS, NOT INTRACTABLE: ICD-10-CM

## 2019-08-02 DIAGNOSIS — Z00.00 HEALTHCARE MAINTENANCE: ICD-10-CM

## 2019-08-02 DIAGNOSIS — N70.11 HYDROSALPINX: ICD-10-CM

## 2019-08-02 DIAGNOSIS — K08.89 PAIN IN A TOOTH OR TEETH: ICD-10-CM

## 2019-08-02 DIAGNOSIS — H61.23 BILATERAL IMPACTED CERUMEN: ICD-10-CM

## 2019-08-02 DIAGNOSIS — Z00.00 ANNUAL PHYSICAL EXAM: Primary | ICD-10-CM

## 2019-08-02 LAB
ALBUMIN SERPL BCP-MCNC: 4 G/DL (ref 3.5–5)
ALP SERPL-CCNC: 71 U/L (ref 46–116)
ALT SERPL W P-5'-P-CCNC: 28 U/L (ref 12–78)
ANION GAP SERPL CALCULATED.3IONS-SCNC: 4 MMOL/L (ref 4–13)
AST SERPL W P-5'-P-CCNC: 13 U/L (ref 5–45)
BASOPHILS # BLD AUTO: 0.05 THOUSANDS/ΜL (ref 0–0.1)
BASOPHILS NFR BLD AUTO: 1 % (ref 0–1)
BILIRUB SERPL-MCNC: 0.23 MG/DL (ref 0.2–1)
BUN SERPL-MCNC: 15 MG/DL (ref 5–25)
CALCIUM SERPL-MCNC: 9.2 MG/DL (ref 8.3–10.1)
CHLORIDE SERPL-SCNC: 109 MMOL/L (ref 100–108)
CHOLEST SERPL-MCNC: 206 MG/DL (ref 50–200)
CO2 SERPL-SCNC: 27 MMOL/L (ref 21–32)
CREAT SERPL-MCNC: 0.87 MG/DL (ref 0.6–1.3)
EOSINOPHIL # BLD AUTO: 0.17 THOUSAND/ΜL (ref 0–0.61)
EOSINOPHIL NFR BLD AUTO: 2 % (ref 0–6)
ERYTHROCYTE [DISTWIDTH] IN BLOOD BY AUTOMATED COUNT: 12.8 % (ref 11.6–15.1)
EST. AVERAGE GLUCOSE BLD GHB EST-MCNC: 108 MG/DL
FERRITIN SERPL-MCNC: 87 NG/ML (ref 8–388)
GFR SERPL CREATININE-BSD FRML MDRD: 88 ML/MIN/1.73SQ M
GLUCOSE P FAST SERPL-MCNC: 88 MG/DL (ref 65–99)
HBA1C MFR BLD: 5.4 % (ref 4.2–6.3)
HCT VFR BLD AUTO: 40.6 % (ref 34.8–46.1)
HDLC SERPL-MCNC: 31 MG/DL (ref 40–60)
HGB BLD-MCNC: 12.9 G/DL (ref 11.5–15.4)
IMM GRANULOCYTES # BLD AUTO: 0.06 THOUSAND/UL (ref 0–0.2)
IMM GRANULOCYTES NFR BLD AUTO: 1 % (ref 0–2)
IRON SATN MFR SERPL: 27 %
IRON SERPL-MCNC: 84 UG/DL (ref 50–170)
LDLC SERPL CALC-MCNC: 142 MG/DL (ref 0–100)
LYMPHOCYTES # BLD AUTO: 2.12 THOUSANDS/ΜL (ref 0.6–4.47)
LYMPHOCYTES NFR BLD AUTO: 25 % (ref 14–44)
MCH RBC QN AUTO: 30.3 PG (ref 26.8–34.3)
MCHC RBC AUTO-ENTMCNC: 31.8 G/DL (ref 31.4–37.4)
MCV RBC AUTO: 95 FL (ref 82–98)
MONOCYTES # BLD AUTO: 0.65 THOUSAND/ΜL (ref 0.17–1.22)
MONOCYTES NFR BLD AUTO: 8 % (ref 4–12)
NEUTROPHILS # BLD AUTO: 5.61 THOUSANDS/ΜL (ref 1.85–7.62)
NEUTS SEG NFR BLD AUTO: 63 % (ref 43–75)
NONHDLC SERPL-MCNC: 175 MG/DL
NRBC BLD AUTO-RTO: 0 /100 WBCS
PLATELET # BLD AUTO: 233 THOUSANDS/UL (ref 149–390)
PMV BLD AUTO: 12.3 FL (ref 8.9–12.7)
POTASSIUM SERPL-SCNC: 4.7 MMOL/L (ref 3.5–5.3)
PROT SERPL-MCNC: 7.7 G/DL (ref 6.4–8.2)
RBC # BLD AUTO: 4.26 MILLION/UL (ref 3.81–5.12)
SODIUM SERPL-SCNC: 140 MMOL/L (ref 136–145)
TIBC SERPL-MCNC: 310 UG/DL (ref 250–450)
TRIGL SERPL-MCNC: 167 MG/DL
TSH SERPL DL<=0.05 MIU/L-ACNC: 2.03 UIU/ML (ref 0.36–3.74)
WBC # BLD AUTO: 8.66 THOUSAND/UL (ref 4.31–10.16)

## 2019-08-02 PROCEDURE — 87591 N.GONORRHOEAE DNA AMP PROB: CPT | Performed by: PHYSICIAN ASSISTANT

## 2019-08-02 PROCEDURE — 83540 ASSAY OF IRON: CPT

## 2019-08-02 PROCEDURE — 87491 CHLMYD TRACH DNA AMP PROBE: CPT | Performed by: PHYSICIAN ASSISTANT

## 2019-08-02 PROCEDURE — 82728 ASSAY OF FERRITIN: CPT

## 2019-08-02 PROCEDURE — 80053 COMPREHEN METABOLIC PANEL: CPT

## 2019-08-02 PROCEDURE — 85025 COMPLETE CBC W/AUTO DIFF WBC: CPT

## 2019-08-02 PROCEDURE — 36415 COLL VENOUS BLD VENIPUNCTURE: CPT

## 2019-08-02 PROCEDURE — 3725F SCREEN DEPRESSION PERFORMED: CPT | Performed by: PHYSICIAN ASSISTANT

## 2019-08-02 PROCEDURE — 80061 LIPID PANEL: CPT

## 2019-08-02 PROCEDURE — 84443 ASSAY THYROID STIM HORMONE: CPT

## 2019-08-02 PROCEDURE — 83036 HEMOGLOBIN GLYCOSYLATED A1C: CPT

## 2019-08-02 PROCEDURE — 99385 PREV VISIT NEW AGE 18-39: CPT | Performed by: PHYSICIAN ASSISTANT

## 2019-08-02 PROCEDURE — 83550 IRON BINDING TEST: CPT

## 2019-08-02 NOTE — PROGRESS NOTES
Subjective:     Maik Myles is a 36 y o  female who presented to the office today to establish care  - Patient is a 41-year-old female who presents today to establish care  Patient is originally from [facility]   Patient has now moved to [facility]  Patient is currently taking amitriptyline 10 mg once daily for migraines  - Per chart review, patient has had an extensive but somewhat confusing past surgical history  After having 2 SVDs, patient had bilateral tubal ligation performed in  in [facility]   Then, around 4 years ago, there was some concern for cervical dysplasia  Patient then underwent hysterectomy in [facility]   Bilateral fallopian tubes and ovaries were not removed  Patient notes the pathology returned as benign with no sign of dysplasia or malignancy  Patient then moved to [facility] where she received care  She then had a right salpingectomy due to a "broken tube"  She does not really know why she had her right fallopian tube removed  Patient has had persistent left-sided lower pelvic pain  Pelvic ultrasound revealed torturous anechoic tubular structure extending from midline into the left adnexa to the left ovary compatible with hydrosalpinx  Patient is scheduled for surgery on 2019 for left salpingectomy  Dental Regular Visits: No; would like to establish with one  Patient notes she is having some pain of her one molar  Vision Problems: Wears glasses     Hearing Loss: No; her ears often are filled with a lot of ear wax  She notes she has used drops in the past to help clean them out  She is requesting some of them today  Life Style  Healthy Diet: Moderate  Regular Exercise: No  Weight Concerns: No  Tobacco Use: yes; 1 pack lasts patient 3-4 days    Alcohol Use: No  Drug Use: No    Reproductive Health  Sexually Active: Yes  Contraception: Hysterectomy  LMP: Hysterectomy   OB History:       Breast Cancer Screening:  - Risks and benefits discussed  Patient does not yet meet the requirements to complete this screening  Osteoporosis Screening:  - Risks and benefits discussed  Patient does not yet meet the requirements to complete this screening  Colorectal Cancer Screening:   - Risks and benefits discussed  Patient does not yet meet the requirements to complete this screening  Cervical Cancer Screening:  - Risks and benefits discussed  Follows regularly with OB/GYN  Last PAP was 6/21/19 and results were normal and HPV negative  STD Testing:  - Risks and benefits discussed  Current Outpatient Medications on File Prior to Visit   Medication Sig Dispense Refill    amitriptyline (ELAVIL) 10 mg tablet Take 25 mg by mouth daily at bedtime      ketorolac (TORADOL) 10 mg tablet Take 1 tablet (10 mg total) by mouth every 6 (six) hours as needed for moderate pain (Patient not taking: Reported on 8/2/2019) 20 tablet 0     No current facility-administered medications on file prior to visit        Past Medical History:   Diagnosis Date    Abnormal Pap smear of cervix     Migraine headache with aura     managed with Elavil     Past Surgical History:   Procedure Laterality Date    GYNECOLOGIC CRYOSURGERY  2015    HYSTERECTOMY  2016    LAPAROSCOPY  2018     Social History     Socioeconomic History    Marital status: Single     Spouse name: None    Number of children: None    Years of education: None    Highest education level: None   Occupational History    None   Social Needs    Financial resource strain: None    Food insecurity:     Worry: None     Inability: None    Transportation needs:     Medical: None     Non-medical: None   Tobacco Use    Smoking status: Current Every Day Smoker     Packs/day: 0 25     Years: 15 00     Pack years: 3 75     Types: Cigarettes    Smokeless tobacco: Never Used   Substance and Sexual Activity    Alcohol use: Not Currently    Drug use: Never    Sexual activity: Yes Partners: Male     Birth control/protection: Female Sterilization     Comment: hysterectomy (2016)   Lifestyle    Physical activity:     Days per week: None     Minutes per session: None    Stress: None   Relationships    Social connections:     Talks on phone: None     Gets together: None     Attends Church service: None     Active member of club or organization: None     Attends meetings of clubs or organizations: None     Relationship status: None    Intimate partner violence:     Fear of current or ex partner: None     Emotionally abused: None     Physically abused: None     Forced sexual activity: None   Other Topics Concern    None   Social History Narrative    None     Family History   Problem Relation Age of Onset    Cancer Paternal Grandmother         uterine    Cancer Paternal Aunt         uterine     Breast cancer Neg Hx          Review of Systems   Constitutional: Negative for fatigue and fever  HENT: Negative for congestion, ear pain and sore throat  Eyes: Negative for pain  Respiratory: Negative for cough, chest tightness and shortness of breath  Cardiovascular: Negative for chest pain, palpitations and leg swelling  Gastrointestinal: Negative for constipation, diarrhea, nausea and vomiting  Genitourinary: Positive for pelvic pain (Left-sided lower pelvic pain)  Negative for difficulty urinating and dysuria  Musculoskeletal: Negative for arthralgias  Skin: Negative for rash  Neurological: Negative for dizziness and headaches  Psychiatric/Behavioral: Negative for behavioral problems  The patient is not nervous/anxious  Objective:  /70 (BP Location: Left arm, Patient Position: Sitting, Cuff Size: Adult)   Pulse 94   Temp 97 8 °F (36 6 °C) (Temporal)   Resp 17   Ht 5' 2" (1 575 m)   Wt 68 5 kg (151 lb)   SpO2 99%   BMI 27 62 kg/m²     Physical Exam   Constitutional: She is oriented to person, place, and time   She appears well-developed and well-nourished  HENT:   Head: Normocephalic and atraumatic  Right Ear: External ear normal    Left Ear: External ear normal    Nose: Nose normal    Mouth/Throat: Uvula is midline, oropharynx is clear and moist and mucous membranes are normal    Eyes: Pupils are equal, round, and reactive to light  Conjunctivae and EOM are normal    Neck: Normal range of motion  Neck supple  Cardiovascular: Normal rate, regular rhythm, normal heart sounds and intact distal pulses  No murmur heard  Pulmonary/Chest: Effort normal and breath sounds normal  She has no wheezes  Abdominal: Soft  Bowel sounds are normal  There is no tenderness  Musculoskeletal: Normal range of motion  She exhibits no edema  Neurological: She is alert and oriented to person, place, and time  Skin: Skin is warm and dry  Psychiatric: She has a normal mood and affect  Her speech is normal and behavior is normal    Nursing note and vitals reviewed  - Utilized Sponduu for translation  Assessment/Plan:   - Will order blood work - CBC, CMP, HgbA1c, lipid panel, TSH, iron studies  Bilateral impacted cerumen  - Will prescribe debrox to be used as needed  Advised patient if necessary, she can always come into office to have her ears flushed  Patient understands, but denies wanting the irrigation today  Migraine  - Stable  Continue amitriptyline 10 mg once daily at bedtime  Tooth Pain  - Provided patient with referral to dentistry  Advised to set up an appointment in the near future  Hydrosalpinx  - Continue follow-up with OB/GYN as scheduled  Patient has surgery for left salpingectomy scheduled for August 9, 2019  Return in about 1 year (around 8/2/2020), or as bneeded, for Annual physical       Diagnoses and all orders for this visit:    Annual physical exam    Pain in a tooth or teeth  -     Ambulatory referral to Dentistry;  Future    Healthcare maintenance  -     CBC and differential; Future  - Comprehensive metabolic panel; Future  -     Lipid panel; Future  -     TSH, 3rd generation with Free T4 reflex; Future  -     Hemoglobin A1C; Future  -     Ferritin; Future  -     Iron Saturation %; Future    Bilateral impacted cerumen  -     carbamide peroxide (DEBROX) 6 5 % otic solution; Administer 5 drops into both ears 2 (two) times a day    Hydrosalpinx    Migraine with aura and without status migrainosus, not intractable        All of patients questions were answered  Patient understands and agrees with the above plan       Rohit Miguel PA-C  08/02/19  [facility]

## 2019-08-05 ENCOUNTER — TELEPHONE (OUTPATIENT)
Dept: FAMILY MEDICINE CLINIC | Facility: CLINIC | Age: 34
End: 2019-08-05

## 2019-08-05 LAB
C TRACH DNA SPEC QL NAA+PROBE: NEGATIVE
N GONORRHOEA DNA SPEC QL NAA+PROBE: NEGATIVE

## 2019-08-05 RX ORDER — ACETAMINOPHEN 500 MG
500 TABLET ORAL EVERY 6 HOURS PRN
COMMUNITY
End: 2020-03-28

## 2019-08-05 NOTE — TELEPHONE ENCOUNTER
Please inform patient that her blood counts, electrolytes, kidney function, liver function, thyroid function, iron levels are all within normal limits  Her cholesterol level is mildly elevated  Please advised patient to follow low-fat diet  Hemoglobin A1c is within normal limits  Patient does not have diabetes  Thanks!

## 2019-08-05 NOTE — PRE-PROCEDURE INSTRUCTIONS
Pre-Surgery Instructions:   Medication Instructions    acetaminophen (TYLENOL) 500 mg tablet Patient was instructed by Physician and understands   amitriptyline (ELAVIL) 10 mg tablet Patient was instructed by Physician and understands   carbamide peroxide (DEBROX) 6 5 % otic solution Patient was instructed by Physician and understands   ketorolac (TORADOL) 10 mg tablet Patient was instructed by Physician and understands  Per anesthesia patient was told to stop Ketorolac and all NSAIDS and supplements one week preop and no medications are needed on morning of surgery

## 2019-08-08 ENCOUNTER — ANESTHESIA EVENT (OUTPATIENT)
Dept: PERIOP | Facility: HOSPITAL | Age: 34
End: 2019-08-08
Payer: COMMERCIAL

## 2019-08-09 ENCOUNTER — ANESTHESIA (OUTPATIENT)
Dept: PERIOP | Facility: HOSPITAL | Age: 34
End: 2019-08-09
Payer: COMMERCIAL

## 2019-08-09 ENCOUNTER — HOSPITAL ENCOUNTER (OUTPATIENT)
Facility: HOSPITAL | Age: 34
Setting detail: OUTPATIENT SURGERY
Discharge: HOME | End: 2019-08-09
Attending: OBSTETRICS & GYNECOLOGY | Admitting: OBSTETRICS & GYNECOLOGY
Payer: COMMERCIAL

## 2019-08-09 VITALS
HEART RATE: 62 BPM | SYSTOLIC BLOOD PRESSURE: 118 MMHG | BODY MASS INDEX: 27.6 KG/M2 | WEIGHT: 150 LBS | TEMPERATURE: 97.3 F | RESPIRATION RATE: 18 BRPM | DIASTOLIC BLOOD PRESSURE: 57 MMHG | HEIGHT: 62 IN | OXYGEN SATURATION: 96 %

## 2019-08-09 DIAGNOSIS — G89.18 POSTOPERATIVE PAIN: Primary | ICD-10-CM

## 2019-08-09 PROBLEM — Z98.890 STATUS POST LAPAROSCOPY: Status: ACTIVE | Noted: 2019-08-09

## 2019-08-09 PROCEDURE — 44180 LAP ENTEROLYSIS: CPT | Performed by: OBSTETRICS & GYNECOLOGY

## 2019-08-09 RX ORDER — OXYCODONE HYDROCHLORIDE 5 MG/1
5 TABLET ORAL EVERY 4 HOURS PRN
Status: DISCONTINUED | OUTPATIENT
Start: 2019-08-09 | End: 2019-08-09 | Stop reason: HOSPADM

## 2019-08-09 RX ORDER — HYDROMORPHONE HCL/PF 1 MG/ML
SYRINGE (ML) INJECTION AS NEEDED
Status: DISCONTINUED | OUTPATIENT
Start: 2019-08-09 | End: 2019-08-09 | Stop reason: SURG

## 2019-08-09 RX ORDER — SODIUM CHLORIDE 9 MG/ML
125 INJECTION, SOLUTION INTRAVENOUS CONTINUOUS
Status: DISCONTINUED | OUTPATIENT
Start: 2019-08-09 | End: 2019-08-09 | Stop reason: HOSPADM

## 2019-08-09 RX ORDER — MEPERIDINE HYDROCHLORIDE 50 MG/ML
12.5 INJECTION INTRAMUSCULAR; INTRAVENOUS; SUBCUTANEOUS ONCE AS NEEDED
Status: DISCONTINUED | OUTPATIENT
Start: 2019-08-09 | End: 2019-08-09 | Stop reason: HOSPADM

## 2019-08-09 RX ORDER — GLYCOPYRROLATE 0.2 MG/ML
INJECTION INTRAMUSCULAR; INTRAVENOUS AS NEEDED
Status: DISCONTINUED | OUTPATIENT
Start: 2019-08-09 | End: 2019-08-09 | Stop reason: SURG

## 2019-08-09 RX ORDER — DEXAMETHASONE SODIUM PHOSPHATE 4 MG/ML
INJECTION, SOLUTION INTRA-ARTICULAR; INTRALESIONAL; INTRAMUSCULAR; INTRAVENOUS; SOFT TISSUE AS NEEDED
Status: DISCONTINUED | OUTPATIENT
Start: 2019-08-09 | End: 2019-08-09 | Stop reason: SURG

## 2019-08-09 RX ORDER — PROPOFOL 10 MG/ML
INJECTION, EMULSION INTRAVENOUS AS NEEDED
Status: DISCONTINUED | OUTPATIENT
Start: 2019-08-09 | End: 2019-08-09 | Stop reason: SURG

## 2019-08-09 RX ORDER — FENTANYL CITRATE 50 UG/ML
INJECTION, SOLUTION INTRAMUSCULAR; INTRAVENOUS AS NEEDED
Status: DISCONTINUED | OUTPATIENT
Start: 2019-08-09 | End: 2019-08-09 | Stop reason: SURG

## 2019-08-09 RX ORDER — ONDANSETRON 2 MG/ML
INJECTION INTRAMUSCULAR; INTRAVENOUS AS NEEDED
Status: DISCONTINUED | OUTPATIENT
Start: 2019-08-09 | End: 2019-08-09 | Stop reason: SURG

## 2019-08-09 RX ORDER — ONDANSETRON 2 MG/ML
4 INJECTION INTRAMUSCULAR; INTRAVENOUS EVERY 6 HOURS PRN
Status: DISCONTINUED | OUTPATIENT
Start: 2019-08-09 | End: 2019-08-09 | Stop reason: HOSPADM

## 2019-08-09 RX ORDER — ACETAMINOPHEN 325 MG/1
650 TABLET ORAL EVERY 6 HOURS PRN
Status: DISCONTINUED | OUTPATIENT
Start: 2019-08-09 | End: 2019-08-09 | Stop reason: HOSPADM

## 2019-08-09 RX ORDER — MAGNESIUM HYDROXIDE 1200 MG/15ML
LIQUID ORAL AS NEEDED
Status: DISCONTINUED | OUTPATIENT
Start: 2019-08-09 | End: 2019-08-09 | Stop reason: HOSPADM

## 2019-08-09 RX ORDER — BUPIVACAINE HYDROCHLORIDE 2.5 MG/ML
INJECTION, SOLUTION EPIDURAL; INFILTRATION; INTRACAUDAL AS NEEDED
Status: DISCONTINUED | OUTPATIENT
Start: 2019-08-09 | End: 2019-08-09 | Stop reason: HOSPADM

## 2019-08-09 RX ORDER — ONDANSETRON 2 MG/ML
4 INJECTION INTRAMUSCULAR; INTRAVENOUS ONCE AS NEEDED
Status: DISCONTINUED | OUTPATIENT
Start: 2019-08-09 | End: 2019-08-09 | Stop reason: HOSPADM

## 2019-08-09 RX ORDER — MIDAZOLAM HYDROCHLORIDE 1 MG/ML
INJECTION INTRAMUSCULAR; INTRAVENOUS AS NEEDED
Status: DISCONTINUED | OUTPATIENT
Start: 2019-08-09 | End: 2019-08-09 | Stop reason: SURG

## 2019-08-09 RX ORDER — HYDROMORPHONE HCL/PF 1 MG/ML
0.5 SYRINGE (ML) INJECTION
Status: DISCONTINUED | OUTPATIENT
Start: 2019-08-09 | End: 2019-08-09 | Stop reason: HOSPADM

## 2019-08-09 RX ORDER — FENTANYL CITRATE/PF 50 MCG/ML
50 SYRINGE (ML) INJECTION
Status: DISCONTINUED | OUTPATIENT
Start: 2019-08-09 | End: 2019-08-09 | Stop reason: HOSPADM

## 2019-08-09 RX ORDER — IBUPROFEN 600 MG/1
600 TABLET ORAL EVERY 6 HOURS PRN
Status: DISCONTINUED | OUTPATIENT
Start: 2019-08-09 | End: 2019-08-09 | Stop reason: HOSPADM

## 2019-08-09 RX ORDER — SODIUM CHLORIDE 9 MG/ML
INJECTION, SOLUTION INTRAVENOUS AS NEEDED
Status: DISCONTINUED | OUTPATIENT
Start: 2019-08-09 | End: 2019-08-09 | Stop reason: HOSPADM

## 2019-08-09 RX ORDER — NEOSTIGMINE METHYLSULFATE 1 MG/ML
INJECTION INTRAVENOUS AS NEEDED
Status: DISCONTINUED | OUTPATIENT
Start: 2019-08-09 | End: 2019-08-09 | Stop reason: SURG

## 2019-08-09 RX ORDER — KETOROLAC TROMETHAMINE 30 MG/ML
INJECTION, SOLUTION INTRAMUSCULAR; INTRAVENOUS AS NEEDED
Status: DISCONTINUED | OUTPATIENT
Start: 2019-08-09 | End: 2019-08-09 | Stop reason: SURG

## 2019-08-09 RX ORDER — OXYCODONE HYDROCHLORIDE AND ACETAMINOPHEN 5; 325 MG/1; MG/1
1 TABLET ORAL EVERY 4 HOURS PRN
Qty: 10 TABLET | Refills: 0 | Status: SHIPPED | OUTPATIENT
Start: 2019-08-09 | End: 2019-08-09 | Stop reason: SDUPTHER

## 2019-08-09 RX ORDER — OXYCODONE HYDROCHLORIDE AND ACETAMINOPHEN 5; 325 MG/1; MG/1
1 TABLET ORAL EVERY 4 HOURS PRN
Qty: 10 TABLET | Refills: 0 | Status: SHIPPED | OUTPATIENT
Start: 2019-08-09 | End: 2019-08-19

## 2019-08-09 RX ADMIN — MIDAZOLAM 3 MG: 1 INJECTION INTRAMUSCULAR; INTRAVENOUS at 08:08

## 2019-08-09 RX ADMIN — NEOSTIGMINE METHYLSULFATE 5 MG: 1 INJECTION, SOLUTION INTRAVENOUS at 09:19

## 2019-08-09 RX ADMIN — FENTANYL CITRATE 50 MCG: 50 INJECTION, SOLUTION INTRAMUSCULAR; INTRAVENOUS at 08:14

## 2019-08-09 RX ADMIN — HYDROMORPHONE HYDROCHLORIDE 0.5 MG: 1 INJECTION, SOLUTION INTRAMUSCULAR; INTRAVENOUS; SUBCUTANEOUS at 08:56

## 2019-08-09 RX ADMIN — KETOROLAC TROMETHAMINE 30 MG: 30 INJECTION, SOLUTION INTRAMUSCULAR at 09:20

## 2019-08-09 RX ADMIN — FENTANYL CITRATE 50 MCG: 50 INJECTION, SOLUTION INTRAMUSCULAR; INTRAVENOUS at 10:23

## 2019-08-09 RX ADMIN — FENTANYL CITRATE 100 MCG: 50 INJECTION, SOLUTION INTRAMUSCULAR; INTRAVENOUS at 08:29

## 2019-08-09 RX ADMIN — SODIUM CHLORIDE: 0.9 INJECTION, SOLUTION INTRAVENOUS at 08:40

## 2019-08-09 RX ADMIN — SODIUM CHLORIDE 125 ML/HR: 0.9 INJECTION, SOLUTION INTRAVENOUS at 10:01

## 2019-08-09 RX ADMIN — SODIUM CHLORIDE 125 ML/HR: 0.9 INJECTION, SOLUTION INTRAVENOUS at 07:40

## 2019-08-09 RX ADMIN — PROPOFOL 200 MG: 10 INJECTION, EMULSION INTRAVENOUS at 08:14

## 2019-08-09 RX ADMIN — LIDOCAINE HYDROCHLORIDE 100 MG: 20 INJECTION, SOLUTION INTRAVENOUS at 08:14

## 2019-08-09 RX ADMIN — MIDAZOLAM 1 MG: 1 INJECTION INTRAMUSCULAR; INTRAVENOUS at 08:14

## 2019-08-09 RX ADMIN — ONDANSETRON 4 MG: 2 INJECTION INTRAMUSCULAR; INTRAVENOUS at 07:59

## 2019-08-09 RX ADMIN — GLYCOPYRROLATE 0.6 MG: 0.2 INJECTION INTRAMUSCULAR; INTRAVENOUS at 09:19

## 2019-08-09 RX ADMIN — DEXAMETHASONE SODIUM PHOSPHATE 8 MG: 4 INJECTION, SOLUTION INTRAMUSCULAR; INTRAVENOUS at 08:20

## 2019-08-09 RX ADMIN — FENTANYL CITRATE 50 MCG: 50 INJECTION, SOLUTION INTRAMUSCULAR; INTRAVENOUS at 08:20

## 2019-08-09 NOTE — INTERVAL H&P NOTE
H&P reviewed  After examining the patient I find no changes in the patients condition since the H&P had been written  Blood pressure 108/60, pulse 80, temperature (!) 97 4 °F (36 3 °C), temperature source Tympanic, resp  rate 16, height 5' 2" (1 575 m), weight 68 kg (150 lb), SpO2 98 %, not currently breastfeeding

## 2019-08-09 NOTE — DISCHARGE INSTRUCTIONS
Gynecology Discharge Instructions  1  No heavy lifting more than one full gallon of milk (about 8 lbs)  2  Nothing in the vagina for 1 week   3  No tub baths or swimming  4  You may take stairs one at a time, touching each step with both feet for the first few days, then as tolerated  5  Call the office for fever greater than 100 4, heavy vaginal bleeding or increasing pain  6  Take Colace twice daily to keep your stool soft  7  No Driving until pain free and no longer requiring narcotic pain medications  8  Activity as tolerated     Post Op Prescriptions  You were given Rx for Roxicodone 5mg  You may take over the counter Ibuprofen every 6 hours to relieve pain, especially cramping and mild pain  If you have additional moderate to severe pain you may take 1-2 tabs of roxicodone every 4-6 hours       You may also take Colace (Docusate Sodium) 100mg 2x daily  This is available over the counter and is recommended to help keep your stool soft in order to prevent straining, especially if you are taking narcotic pain medication       If you have any questions regarding your prescriptions please call your doctor        Oxicodona/Acetaminofén (Por la boca)   Se [place] para tratar el dolor de moderado a moderadamente intenso  Beka medicamento es un narcótico para el dolor  Nena(s) : Endocet, Percocet, Primlev, Xartemis XR   Existen muchas otras marcas de [place]  Beka medicamento no debe ser usado cuando:   Beka medicamento no es adecuado para todas las personas  No lo use si usted alguna vez tuvo lisy reacción alérgica al acetaminofeno o la oxicodona, o si tiene problemas respiratorios graves o [address] usar beka medicamento:   Anneliesetuan Vergara Christmorro Fudge de liberación prolongada  · Murphy médico le indicara cuanto medicamento necesita usar  No use más medicamento de lo indicado  · Lisy sobredosis puede ser George Eagles   Siga las instrucciones con cuidado para no jamilah demasiada cantidad del medicamento de [employer]  · Solución oral: Mida el líquido oral con [place], jeringa para uso oral o taza especialmente marcadas para medir medicamentos  · Trague la tableta de liberación prolongada entera  No triture, rompa o mastique  No chupe o moje la tableta antes de colocarla en raymundo boca  No administre beka medicamento a través de un tubo alimenticio  · FRM Study Course debe venir con Goldy Baker Guía del medicamento  Solicite jonathan copia con raymundo farmacéutico en emily de no tener la guía  · Si olvida jonathan dosis:  Si se le olvida jamilah jonathan dosis de FRM Study Course, omita la dosis [place] y vuelva al horario regular de dosificación  No duplique la dosis  · Guarde el medicamento en un recipiente cerrado a temperatura ambiente y alejado del calor, la humedad y la keyanna directa  Consulte con raymundo farmacéutico sobre la mejor forma de botar el medicamento que no haya usado  Medicamentos y [employer] que debe evitar:   Consulte con raymundo médico o farmacéutico antes de usar cualquier medicamento, incluyendo los que compra sin receta médica, las vitaminas y los productos herbales  · No utilice Xartemis XR si usted [place] un inhibidor de la monoaminooxidasa (IMAO) o si lo ha [employer] últimos 14 días  · Algunos medicamentos pueden afectar la función de FRM Study Course  Informe a raymundo médico si está usando cualquiera de los siguientes:   ¨ Liberal, eritromicina, ketoconazol, lamotrigina, mirtazapina, naltrexona, fenitoína, propranolol, rifampina, ritonavir, tramadol, trazodona o zidovudina  ¨ Las pastillas anticonceptivas  ¨ Diurético  ¨ Medicamento para tratar la depresión  ¨ Medicamentos con fenotiazina  ¨ Medicamento de triptán para tratar la migraña  · No consuma alcohol mientras esté   El acetaminofeno puede dañar raymundo hígado y el alcohol puede aumentar beka riesgo   Si usted consume 3 bebidas alcohólicas o más cada día, no tome acetaminofén sin consultar a raymundo médico   · Informe a raymundo médico si usted [place] cualquier cosa que le provoca sueño  Oracio Antis son medicamentos para alergia o medicamentos narcóticos para el dolor y el alcohol  Informe a raymundo médico si usted está usando buprenorfina, butorfanol, nalbufina, pentazocina, jonathan benzodiazepina o un relajante muscular  Precauciones veronica el uso de beka medicamento:   · Informe a raymundo médico si usted está embarazada o amamantando, o si tiene enfermedad renal, enfermedad hepática, enfermedad cardíaca, presión arterial baja, problemas pulmonares o respiratorios (divina asma, EPOC), problemas de tiroides, enfermedad de [place], problemas en el páncreas o la vesícula, problemas prostáticos, problemas para orinar o trastornos estomacales, o antecedentes de lesión en la hal o daño cerebral, convulsiones o adicción a las drogas o el alcohol  Infórmele al médico si usted es alérgico a la codeína  · Beka medicamento puede causar los siguientes problemas:  ¨ Mayor riesgo de sobredosis, que puede conducir a la muerte  ¨ La depresión respiratoria (problema respiratorio grave que puede ser mortal)  ¨ Problemas hepáticos  ¨ Reacciones graves en la piel  ¨ Síndrome de la serotonina (cuando se [place] con otros medicamentos)  · Beka medicamento podría causarle a usted mareos o somnolencia  No maneje ni eddie otra tarea que pueda ser peligrosa hasta que sepa cómo le afecta beka medicamento  Siéntese o acuéstese si se siente mareado  Póngase de pie con cuidado  · Beka medicamento contiene acetaminofén  Maggi las etiquetas de todos los demás medicamentos que esté usando para saber si también contienen acetaminofén, o pregunte a raymundo médico o farmacéutico  No use más de 4 gramos (4000 miligramos) en total de acetaminofeno en un día  · Beka medicamento puede convertirse en un hábito  No use más de la dosis prescrita  Llame a raymundo médico si usted siente que el medicamento no le está funcionando  · No suspenda el uso de beka medicamento súbitamente   Raymundo médico necesitará disminuir raymundo dosis poco a poco antes de suspender el medicamento por completo  · Puede que mary medicamento cause infertilidad  Hable con raymundo médico antes de usar mary medicamento si usted planea tener hijos  · Mary medicamento puede causar el estreñimiente, especialmente si usted lo [place] veronica [employer]  Pregúntele a raymundo médico si usted también debería usar un laxante para prevenir y tratar el estreñimiento  · Guarde todos los medicamentos fuera del alcance de los niños  Nunca comparta queta medicamentos con [employer]  Efectos secundarios que pueden presentarse veronica el uso de mary medicamento:   Consulte inmediatamente con el médico si nota cualquiera de estos efectos secundarios:  · Reacción alérgica: Comezón o ronchas, hinchazón del troy o las ml, hinchazón u hormigueo en la boca o garganta, opresión en el pecho, dificultad para respirar  · Ansiedad, inquietud, ritmo cardíaco rápido, fiebre, espasmos musculares, contracciones involuntarias, diarrea, david o escuchar cosas que no existen  · Ampollas, despellejamiento, sarpullido quintero en la piel  · Piel, labios o uñas azuladas  · Vincentian [place] oscura o heces pálidas, pérdida de apetito, dolor de BJURHOLM, piel u ojos amarillos  · Debilidad excesiva, respiración superficial, ritmo cardíaco irregular, convulsiones, transpiración, o piel húmeda o fría  · Confusión grave, desvanecimientos, mareos o desmayos  · Estreñimiento grave, náuseas o vómitos  · Dificultad para respirar o respiración lenta  Consulte con el médico si nota los siguientes efectos secundarios menos graves:   · Dolor de hal  · Estreñimiento leve, náuseas o vómitos  · Sueño o somnolencia leves  Consulte con el médico si nota otros efectos secundarios que luis son causados por mary medicamento  Llame a raymundo médico para consultarle Anton Gagnon puede notificar queta efectos secundarios al FDA al [phone]    © 2017 [address] Information is for End User's use only and may not be sold, redistributed or otherwise used for commercial purposes  Esta información es sólo para uso en educación  Raymundo intención no es darle un consejo médico sobre enfermedades o tratamientos  Colsulte con raymundo Triny Cables farmacéutico antes de seguir cualquier régimen médico para saber si es seguro y efectivo para usted

## 2019-08-09 NOTE — OP NOTE
OPERATIVE REPORT  PATIENT NAME: Abraham Shah    :  1985  MRN: [id number]  Pt Location: [facility] OR ROOM 03    SURGERY DATE: 2019    Surgeon(s) and Role:     * Elizabeth Bejarano MD - Primary     * Arpan Lmaas MD - [address] [place] [address] MD Aysha - Assisting    Preop Diagnosis:  Hydrosalpinx [N70 11]    Post-Op Diagnosis Codes: * Hydrosalpinx [N70 11]    Procedure(s) (LRB):  LAPAROSCOPY DIAGNOSTIC; LYSIS OF ADHESIONS (N/A)    Specimen(s):  None    Estimated Blood Loss:   10 mL    Drains:  [REMOVED] Urethral Catheter Non-latex 16 Fr  (Removed)   Number of days: 0       Anesthesia Type:   General     Operative Indications:  Hydrosalpinx [N70 11]    Operative Findings:  Normal appearing external genitalia  Surgically absent uterus, cervix, right fallopian tube  Right ovary unremarkable  Normal appearing liver and gallbladder  Extensive adhesive disease from descending colon to left pelvic side wall obscuring view of left ovary and fallopian tube  Segment of bowel adherent to anterior cul de sac from midline to right side, measuring 8cm longitudinally    Complications:   None    Procedure and Technique:    Description of Procedure    Patient is a 33yo [id number] female here for diagnostic laparoscopy, left salpingectomy, possible left oophorectomy, and all other indicated procedures  She was evaluated in office for left sided pelvic pain with ultrasound results significant for tortuous anechoic tubular structure extending from midline into left adnexa compatible with hydrosalpinx  She has an extensive past surgical history that includes bilateral tubal ligation, hysterectomy, and right salpingectomy all as separate procedures  Patient was taken to the operating room  General endotracheal anesthesia (GET) was administered and the patient was positioned on the OR table in the dorsal lithotomy position   All pressure points were padded and a ashvin hugger was placed to maintain control of core body temperature  A bimanual exam was performed  No palpable adnexal masses or fullness could be palpated  The patient was prepped and draped in the usual sterile fashion with chloroprep on the abdomen, vagina and perineum  Operative Technique    A time out was performed to confirm correct patient and correct procedure  A loaiza catheter was introduced into the bladder  Sterile gloves were then exchanged and attention was turned to the abdomen  Due to her extensive past surgical history, decision was made to enter at [facility]  Stomach was previously decompressed  A 5mm incision was made at [facility] for introduction of a 5mm trocar  Trocar was introduced under direct visualization  Pneumoperitoneum was then established to a maximum of 15mmHg  The entire abdomen and pelvis was inspected and there was no evidence of injury to bowel, bladder, vasculature, or other structures  Attention was then turned to the pelvis  Patient was placed in Trendelenburg  One additional port site was selected in the right lower abdomen approximately 2cm superior and medial to the iliac crests  A 5mm incision was made for introduction of a 5mm trocar under direct visualization at each site  A pelvic survey was completed and findings are as noted above  There was a segment of bowel adherent to the anterior cul de sac from midline to the right side, measuring 8cm longitudinally  Filmy adhesions were removed using laparoscopic scissors in a horizontal fashion  [place] graspers were then used to dissect along the plane that was created with the laparoscopic scissors  This allowed for adhesiolysis of the bowel to the lower pelvis  All areas were hemostatic  After this, another pelvic survey was performed  Due to the extensive adhesive disease from the descending colon to the left pelvic side wall, view of the left ovary and fallopian tube was obscured   At this point, we decided that the risks of injury to surrounding organs, lengthened procedure time and time under anesthesia, and likely creation of new adhesions from intraoperative adhesiolysis outweighed the benefits of doing so  Pneumoperitoneum was allowed to escape  Adequate hemostasis was visualized  The inferior trocar was removed under direct visualization  The laparoscope was withdrawn from the abdomen, followed by its trocar sleeve  Skin incisions were closed with 4-0 monocryl with histocryl  Attention was turned to the vagina  The loaiza was removed  It was noted to contain 50cc clear urine  At the conclusion of the procedure, all needle, sponge, and instrument counts were noted to be correct x2  Patient tolerated the procedure well and was transferred to PACU in stable condition prior to discharge with follow up in 1-2 weeks  Dr Saloni Gomez was present and participated in all key portions of the case        Patient Disposition:  PACU     SIGNATURE: Ellen Cabrera MD  DATE: August 9, 2019  TIME: 9:46 AM

## 2019-08-09 NOTE — ANESTHESIA POSTPROCEDURE EVALUATION
Post-Op Assessment Note    CV Status:  Stable    Pain management: adequate     Mental Status:  Alert and awake   Hydration Status:  Euvolemic   PONV Controlled:  Controlled   Airway Patency:  Patent   Post Op Vitals Reviewed: Yes      Staff: Anesthesiologist           BP      Temp     Pulse     Resp     SpO2 94 % (08/09/19 1019)

## 2019-08-09 NOTE — ANESTHESIA PREPROCEDURE EVALUATION
Review of Systems/Medical History  Patient summary reviewed  Chart reviewed  No history of anesthetic complications     Cardiovascular  Negative cardio ROS    Pulmonary  Smoker cigarette smoker  , Tobacco cessation counseling given ,        GI/Hepatic  Negative GI/hepatic ROS          Negative  ROS        Endo/Other  Negative endo/other ROS      GYN  Negative gynecology ROS          Hematology  Negative hematology ROS      Musculoskeletal  Negative musculoskeletal ROS        Neurology    Headaches,    Psychology   Negative psychology ROS              Physical Exam    Airway    Mallampati score: II  TM Distance: >3 FB  Neck ROM: full     Dental   No notable dental hx     Cardiovascular  Comment: Negative ROS, Rhythm: regular, Rate: normal,     Pulmonary  Breath sounds clear to auscultation,     Other Findings        Anesthesia Plan  ASA Score- 2     Anesthesia Type-     Plan Factors-  Patient smoked on day of surgery      Induction-     Postoperative Plan-     Informed Consent-

## 2019-08-22 ENCOUNTER — OFFICE VISIT (OUTPATIENT)
Dept: OBGYN CLINIC | Facility: CLINIC | Age: 34
End: 2019-08-22

## 2019-08-22 VITALS
HEART RATE: 100 BPM | WEIGHT: 152 LBS | BODY MASS INDEX: 27.97 KG/M2 | SYSTOLIC BLOOD PRESSURE: 113 MMHG | DIASTOLIC BLOOD PRESSURE: 73 MMHG | HEIGHT: 62 IN

## 2019-08-22 DIAGNOSIS — Z09 POSTOP CHECK: Primary | ICD-10-CM

## 2019-08-22 PROCEDURE — 99213 OFFICE O/P EST LOW 20 MIN: CPT | Performed by: OBSTETRICS & GYNECOLOGY

## 2019-08-22 NOTE — PROGRESS NOTES
Status post laparoscopy  Reviewed operative findings, including pelvic adhesive disease  Can continue OTC pain medications for ongoing pelvic pain  For routine gynecologic follow-up at [facility]  26year old P2 for postoperative follow-up  S/P diagnostic laparoscopy, lysis of adhesions  Planned salpingectomy not performed secondary to pelvic adhesive disease  Doing well postoperatively  Reports intermitent lower left pelvic pain, though states it has improves since the surgery  Reviewed operative findings, including adhesive disease likely due to prior hysterectomy  Vitals:    08/22/19 1039   BP: 113/73   Pulse: 100       Physical Exam   Constitutional: She is oriented to person, place, and time  She appears well-developed and well-nourished  No distress  Cardiovascular: Normal rate, regular rhythm, normal heart sounds and intact distal pulses  Pulmonary/Chest: Effort normal and breath sounds normal  No stridor  No respiratory distress  Abdominal: Soft  Bowel sounds are normal  She exhibits no distension  There is no tenderness  Trocar incisions well healed    Neurological: She is alert and oriented to person, place, and time  Skin: Skin is warm and dry  She is not diaphoretic  Psychiatric: She has a normal mood and affect   Her behavior is normal

## 2019-08-22 NOTE — ASSESSMENT & PLAN NOTE
Reviewed operative findings, including pelvic adhesive disease  Can continue OTC pain medications for ongoing pelvic pain  For routine gynecologic follow-up at [facility]

## 2019-08-22 NOTE — PATIENT INSTRUCTIONS
Dolor en la pelvis   LO QUE NECESITA SABER:   El dolor pélvico puede ser causado por ciertas condiciones, divina el síndrome del intestino irritable, apendicitis o estreñimiento  Jonathan infección del tracto urinario, inflamación de la próstata, calambres menstruales o piedras en el riñón también pueden provocar dolor pélvico  Usted puede tener dolor en dana o ambos lados de la pelvis  El dolor pélvico puede desarrollase si usted tiene trauma en raymundo pelvis o si está de pie por [employer]  Derrell Katy [facility]:   Medicamentos:  Es posible que usted necesite alguno de los siguientes:  · AINEs (Analgésicos antiinflamatorios no esteroides) divina el ibuprofeno, ayudan a disminuir la inflamación, el dolor y la fiebre  Mary medicamento esta disponible con o sin jonathan receta médica  Los AINEs pueden causar sangrado estomacal o problemas renales en ciertas personas  Si usted villa un medicamento anticoagulante, siempre pregúntele a raymundo médico si los DARELL son seguros para usted  Siempre ciera la etiqueta de mary medicamento y [place] instrucciones  · Un medicamento con receta para el dolor  podrían ser Aniya Markell  Pregunte cómo jamilah estos medicamentos de jonathan forma kat  · Los medicamentos anticonceptivos  podrían ayudar a disminuir el dolor en las mujeres  · [place] queta medicamentos divina se le haya indicado  Consulte con raymundo médico si usted luis que raymundo medicamento no le está ayudando o si presenta efectos secundarios  Infórmele si es alérgico a cualquier medicamento  Mantenga jonathan lista actualizada de los [employer], las vitaminas y los productos herbales que villa  Incluya los siguientes datos de los medicamentos: cantidad, frecuencia y motivo de administración  Traiga con usted la lista o los envases de la píldoras a queta citas de seguimiento  Lleve la lista de los medicamentos con usted en emily de jonathan emergencia    Llame al 911 en emily de presentar lo siguiente:   · Usted siente dolor claire en el pecho y dificultad repentina para respirar  Pregúntele a raymundo [place] vitaminas y minerales son adecuados para usted  · Usted tiene fiebre  · Usted tiene náuseas, vómitos o diarrea  · Raymundo dolor no desaparece, o empeora, aún después del [address]  · Usted tiene entumecimiento en queta piernas o dedos de los pies  · Usted tiene sangrando vaginal inusual o abundante  · Usted tiene preguntas o inquietudes acerca de raymundo condición o cuidado  Controle raymundo dolor pélvico:   · Mantenga un registro del dolor  Anote cuando tiene dolor y cuál es la intensidad  Incluya cualquier otro síntoma que sienta además del dolor  Un registro sirve para ayudar a mantener un seguimiento de los ciclos del dolor  Svetlana Plattgy registro con usted a queta citas de seguimiento  También podría ayudarle a raymundo médico a encontrar la causa del dolor  · Aprenda métodos de relajación  La respiración profunda, la meditación y las técnicas de relajación pueden ayudarlo a disminuir el dolor  Es posible que sienta más dolor si está tenso  · Trate o evite el estreñimiento  [place] líquidos divina se le haya indicado  Es probable que usted tenga que jamilah más liquido que de [facility]  Pregúntele a raymundo médico cuál es la cantidad de líquido que necesita ingerir a diario  Coma alimentos altos en fibras  Alimentos altos en [address] frutas, vegetales, panes y cereales integrales, y frijoles  Es posible que usted necesite cambiar los alimentos que consume si padece del síndrome del intestino irritable  Acuda a queta consultas de control con raymundo médico según le indicaron  Usted podría necesitar fisioterapia  Es posible que necesite david a un especialista ortopédico  Anote queta preguntas para que se acuerde de hacerlas veronica queta visitas  © 2017 [address] Information is for End User's use only and may not be sold, redistributed or otherwise used for commercial purposes   All illustrations and images included in CareNotes® are the copyrighted property of A D A M , Inc  or Fermin Guardado  Esta información es sólo para uso en educación  Raymundo intención no es darle un consejo médico sobre enfermedades o tratamientos  Colsulte con raymundo [place] farmacéutico antes de seguir cualquier régimen médico para saber si es seguro y efectivo para usted

## 2019-12-12 ENCOUNTER — OFFICE VISIT (OUTPATIENT)
Dept: OBGYN CLINIC | Facility: CLINIC | Age: 34
End: 2019-12-12

## 2019-12-12 VITALS — SYSTOLIC BLOOD PRESSURE: 114 MMHG | DIASTOLIC BLOOD PRESSURE: 78 MMHG | BODY MASS INDEX: 26.26 KG/M2 | WEIGHT: 143.6 LBS

## 2019-12-12 DIAGNOSIS — Z98.890 STATUS POST LAPAROSCOPY: ICD-10-CM

## 2019-12-12 DIAGNOSIS — R10.32 LLQ PAIN: Primary | ICD-10-CM

## 2019-12-12 PROCEDURE — 99214 OFFICE O/P EST MOD 30 MIN: CPT | Performed by: OBSTETRICS & GYNECOLOGY

## 2019-12-12 NOTE — PROGRESS NOTES
Assessment/Plan:      Diagnoses and all orders for this visit:    LLQ pain    Status post laparoscopy  - Pt with extensive surgical history as noted in HPI    - Dx lap with ANA on 8/9/19: aborted due to extensive adhesive disease that prevented visualization of left fallopian tube/ovary  Given that an attempt was already made by GYN, we explained to patient that the best course of action at this time would be for her to be evaluated by our [facility] fellows or surgical consult  Pt agreeable to plan  Case briefly discussed with Dr Mana Josue  Subjective:     Patient ID: Khoa Penn is a 36 y o  female  HPI  Pt is a 33yo [id number] with hx of hysterectomy, BTL, right salpingectomy s/p diagnostic laparoscopy with lysis of adhesions 8/9/19 for left hydrosalpinx  Pt presents today with continued complaint of LLQ  Upon chart review, it appears her surgery had to be aborted due to extensive adhesive disease that prevented visualization of left fallopian tube and ovary  Pt's case was reviewed with [facility] fellow and patient subsequently recommended to schedule follow-up with their clinic for surgical consult since surgery could not be safely completely by general GYN  Review of Systems      Objective:  Vitals:    12/12/19 1616   BP: 114/78        Physical Exam   Constitutional: She is oriented to person, place, and time  She appears well-developed and well-nourished  No distress  HENT:   Head: Normocephalic and atraumatic  Neurological: She is alert and oriented to person, place, and time  Skin: She is not diaphoretic  Psychiatric: She has a normal mood and affect  Her behavior is normal  Judgment and thought content normal    Vitals reviewed          Pt seen with Dr Ez Walls MD  OBGYN, PGY-3  12/12/2019 4:56 PM

## 2020-02-06 ENCOUNTER — OFFICE VISIT (OUTPATIENT)
Dept: FAMILY MEDICINE CLINIC | Facility: CLINIC | Age: 35
End: 2020-02-06

## 2020-02-06 ENCOUNTER — OFFICE VISIT (OUTPATIENT)
Dept: OBGYN CLINIC | Facility: CLINIC | Age: 35
End: 2020-02-06

## 2020-02-06 VITALS
SYSTOLIC BLOOD PRESSURE: 110 MMHG | OXYGEN SATURATION: 97 % | TEMPERATURE: 97 F | DIASTOLIC BLOOD PRESSURE: 70 MMHG | HEIGHT: 62 IN | HEART RATE: 100 BPM | RESPIRATION RATE: 18 BRPM | WEIGHT: 142 LBS | BODY MASS INDEX: 26.13 KG/M2

## 2020-02-06 VITALS
WEIGHT: 141.6 LBS | DIASTOLIC BLOOD PRESSURE: 60 MMHG | HEIGHT: 62 IN | SYSTOLIC BLOOD PRESSURE: 110 MMHG | BODY MASS INDEX: 26.06 KG/M2

## 2020-02-06 DIAGNOSIS — R10.2 PELVIC PAIN IN FEMALE: ICD-10-CM

## 2020-02-06 DIAGNOSIS — Z02.4 DRIVER'S PERMIT PE (PHYSICAL EXAMINATION): Primary | ICD-10-CM

## 2020-02-06 DIAGNOSIS — N70.11 HYDROSALPINX: ICD-10-CM

## 2020-02-06 DIAGNOSIS — Z23 NEED FOR INFLUENZA VACCINATION: ICD-10-CM

## 2020-02-06 DIAGNOSIS — G43.109 MIGRAINE WITH AURA AND WITHOUT STATUS MIGRAINOSUS, NOT INTRACTABLE: ICD-10-CM

## 2020-02-06 DIAGNOSIS — E66.3 OVERWEIGHT (BMI 25.0-29.9): ICD-10-CM

## 2020-02-06 DIAGNOSIS — R10.2 PELVIC PAIN: ICD-10-CM

## 2020-02-06 DIAGNOSIS — Z01.818 PREOP EXAMINATION: Primary | ICD-10-CM

## 2020-02-06 PROCEDURE — 4004F PT TOBACCO SCREEN RCVD TLK: CPT | Performed by: OBSTETRICS & GYNECOLOGY

## 2020-02-06 PROCEDURE — 90686 IIV4 VACC NO PRSV 0.5 ML IM: CPT | Performed by: PHYSICIAN ASSISTANT

## 2020-02-06 PROCEDURE — T1015 CLINIC SERVICE: HCPCS | Performed by: OBSTETRICS & GYNECOLOGY

## 2020-02-06 PROCEDURE — 99213 OFFICE O/P EST LOW 20 MIN: CPT | Performed by: PHYSICIAN ASSISTANT

## 2020-02-06 PROCEDURE — 90471 IMMUNIZATION ADMIN: CPT | Performed by: PHYSICIAN ASSISTANT

## 2020-02-06 PROCEDURE — 3008F BODY MASS INDEX DOCD: CPT | Performed by: OBSTETRICS & GYNECOLOGY

## 2020-02-06 PROCEDURE — 99204 OFFICE O/P NEW MOD 45 MIN: CPT | Performed by: OBSTETRICS & GYNECOLOGY

## 2020-02-06 PROCEDURE — 4004F PT TOBACCO SCREEN RCVD TLK: CPT | Performed by: PHYSICIAN ASSISTANT

## 2020-02-06 RX ORDER — AMITRIPTYLINE HYDROCHLORIDE 10 MG/1
25 TABLET, FILM COATED ORAL
Qty: 90 TABLET | Refills: 1 | Status: SHIPPED | OUTPATIENT
Start: 2020-02-06 | End: 2020-03-28

## 2020-02-06 NOTE — H&P (VIEW-ONLY)
New Patient and Surgical H&P- [facility]   Mable Luke 36 y o  female MRN: [id number]   Encounter: [id number]    Assessment/Plan     Assessment:  36 y o  [id number] with persistent, daily LLQ pain and L hydrosalpinx vs L ovarian cyst(s)    Plan:  1  Preop examination     2  Pelvic pain     3  Hydrosalpinx     1  Persistent LLQ pain that occurs daily  TVUS performed in the office today  [place] fluid filled structure in left hemipelvis with layering possibly c/w blood products/debris within the tube  Approx 5 x 3 cm in size  Discussed that could possibly represent ovarian cysts  Reviewed intra op photos from prior Dx lap in August, appears structure is retroperitoneal  Unable to tell if it is tube or ovary in these pictures  2  Discussed all management options including, observation with repeat sono in 3 months versus surgical removal  Patient strongly desires removal  We discussed that this may not be the only cause of her pain, and that her pain may persist following surgery  We also discussed possible removal of her left ovary if abnormal      Plan for diagnostic laparoscopy, lysis of adhesions, left salpingectomy with possible left oopherectomy  The risks, benefits, alternatives, and indications of the procedure were discussed with the patient, including but not limited to: bleeding, infection, injury to the adjacent organs such as bladder, bowel  blood vesels, nerves, and ureters  She understands that if another organ is injured, it might require another procedure to repair it and an injury may not be recognized on the date of her surgery  We discussed that other problems such as the formation of blood clots, bowel obstruction, abscess, hematoma, lymphedema, or a fistula (abnormal connection between two organs) could develop later after surgery  We discussed the risk of bowel resection and/or colostomy    We discussed that unforeseen events can occur during or after surgery such as anesthesia complications, heart attack, stroke or excessive blood loss that could lead to permanent disability or death  We reviewed risks of possible blood tranfusions, including allergic reaction, chest pain, shortness of breath, and infectious risk (HIV/hepatitis)  Alternatives to recommended procedure were also discussed along with their risks and benefits  She was given an opportunity to ask questions which were answered to her satisfaction  She understood the information presented to her and wished to proceed with the recommended procedure  Written informed consent was obtained  Plan for surgery in early March  No pre-op blood work or imaging needed  Pre-op phone call fine  History of Present Illness     HPI:  Christine Bear is a 36 y o  [id number] (s/p hysterectomy in 2016)  who presents as a referral from Dr Marie Churchill for management of her LLQ pain  She was dx with a suspected L hydrosalpinx and underwent dx 100 [facility] on 8/9/19, however the procedure was aborted due to adhesive disease  Op note reviewed, indicates surgically absent uterus, cervix, and right fallopian tube  Right ovary was normal appearing at the time  Decending colon was adhered to the left pelvic side wall and left ovary and tube was obscured due to this  Patient reports continued, daily LLQ pain that does not radiate  She uses an open hand over her left pelvis to indicate where the pain is (likely indicated visceral pain)  She reports that it comes and goes randomly, nothing making it better or worse  She reports that she is unable to be intimate with her partner b/c of the pain  No assoc n/v  No dizziness, CP, or SOB  No bladder or bowel complaints  She reports she has had a trigger point injection in her left lower abdominal wall by another provider with no improvement  TVUS (by me) - Absent uterus, normal appearing right ovary   Left hypoechoic tubular structure approx 5 cm (L) x 3 cm (W) with echogenic layering suggesting possible blood clots        Review of Systems  Constitutional: No Complaints  Eyes: No ophthalmologic complaints  Ears, Nose and Throat: No ears, nose or throat complaints  Pulmonary: No pulmonary complaints  Cardiovascular: No cardiovascular complaints  Gastrointestinal:+ LLQ pain  Genitourinary: As stated on HPI  Musculoskeletal: No musculoskeletal complaints  Neurological: No neurological complaints  Psychiatric: No psychiatric complaints  Hematologic: No hematologic complaints  Endocrine: No endocrinologic complaints    Historical Information   Past Medical History:   Diagnosis Date    Abdominal pain, left lateral     Abnormal Pap smear of cervix     Constipation     Migraine headache with aura     managed with Elavil    Wears glasses      Past Surgical History:   Procedure Laterality Date    GYNECOLOGIC CRYOSURGERY  2015    HYSTERECTOMY  2016    LAPAROSCOPY  2018    [place] LAP,DIAGNOSTIC ABDOMEN N/A 8/9/2019    Procedure: LAPAROSCOPY DIAGNOSTIC; LYSIS OF ADHESIONS;  Surgeon: Brittany Amezquita MD;  Location: [facility];  Service: Gynecology     OB/GYN History: CS x 2  Family History   Problem Relation Age of Onset    Cancer Paternal Grandmother         uterine    Cancer Paternal Aunt         uterine     Breast cancer Neg Hx      Social History   Social History     Substance and Sexual Activity   Alcohol Use Not Currently     Social History     Substance and Sexual Activity   Drug Use Never     Social History     Tobacco Use   Smoking Status Current Every Day Smoker    Years: 15 00    Types: Cigarettes   Smokeless Tobacco Never Used   Tobacco Comment    1 pack every 3 or 4 days       Meds/Allergies     Current Outpatient Medications:     acetaminophen (TYLENOL) 500 mg tablet, Take 500 mg by mouth every 6 (six) hours as needed for mild pain, Disp: , Rfl:     amitriptyline (ELAVIL) 10 mg tablet, Take 25 mg by mouth daily at bedtime, Disp: , Rfl:     carbamide peroxide (DEBROX) 6 5 % otic solution, Administer 5 drops into both ears 2 (two) times a day, Disp: 15 mL, Rfl: 0    ketorolac (TORADOL) 10 mg tablet, Take 1 tablet (10 mg total) by mouth every 6 (six) hours as needed for moderate pain, Disp: 20 tablet, Rfl: 0    Allergies   Allergen Reactions    Shellfish-Derived Products Itching and Swelling    Ibuprofen Other (See Comments)     Patient states she gets hematoma       Objective   Vitals: Blood pressure 110/60, height 5' 2" (1 575 m), weight 64 2 kg (141 lb 9 6 oz), not currently breastfeeding  Physical Exam  General: No acute distress, oriented x 3  Neck: Supple, no lymphadenopathy   Lungs: Clear to auscultation bilaterally, no wheezing, no crackles  Heart: Regular rate and rhythm, no murmurs  Abdomen: Soft,  non-distended  Prior trochar incisions well healed  + guarding in LLQ no rebound  Peristalsis present  Pelvis:Bimanual reveals absent uterus, due to voluntaryt gaurding, exam of left adnexa limited  Extremities: No edema, no Danny sign on lower extremities      Lab Results: I have personally reviewed pertinent reports  Imaging: I have personally reviewed pertinent reports          Over 50% of time spent on counseling and coordination of care

## 2020-02-06 NOTE — LETTER
February 6, 2020     Patient: Keira Springer   YOB: 1985   Date of Visit: 2/6/2020       To Whom it May Concern:    Keira Springer is under my professional care  She was seen in my office on 2/6/2020  She may return to work on 2/7/2020  If you have any questions or concerns, please don't hesitate to call           Sincerely,          Margarita Walker PA-C        CC: No Recipients

## 2020-02-06 NOTE — PROGRESS NOTES
New Patient and Surgical H&P- [facility] Wilson 36 y o  female MRN: [id number]   Encounter: [id number]    Assessment/Plan     Assessment:  36 y o  [id number] with persistent, daily LLQ pain and L hydrosalpinx vs L ovarian cyst(s)    Plan:  1  Preop examination     2  Pelvic pain     3  Hydrosalpinx     1  Persistent LLQ pain that occurs daily  TVUS performed in the office today  [place] fluid filled structure in left hemipelvis with layering possibly c/w blood products/debris within the tube  Approx 5 x 3 cm in size  Discussed that could possibly represent ovarian cysts  Reviewed intra op photos from prior Dx lap in August, appears structure is retroperitoneal  Unable to tell if it is tube or ovary in these pictures  2  Discussed all management options including, observation with repeat sono in 3 months versus surgical removal  Patient strongly desires removal  We discussed that this may not be the only cause of her pain, and that her pain may persist following surgery  We also discussed possible removal of her left ovary if abnormal      Plan for diagnostic laparoscopy, lysis of adhesions, left salpingectomy with possible left oopherectomy  The risks, benefits, alternatives, and indications of the procedure were discussed with the patient, including but not limited to: bleeding, infection, injury to the adjacent organs such as bladder, bowel  blood vesels, nerves, and ureters  She understands that if another organ is injured, it might require another procedure to repair it and an injury may not be recognized on the date of her surgery  We discussed that other problems such as the formation of blood clots, bowel obstruction, abscess, hematoma, lymphedema, or a fistula (abnormal connection between two organs) could develop later after surgery  We discussed the risk of bowel resection and/or colostomy    We discussed that unforeseen events can occur during or after surgery such as anesthesia complications, heart attack, stroke or excessive blood loss that could lead to permanent disability or death  We reviewed risks of possible blood tranfusions, including allergic reaction, chest pain, shortness of breath, and infectious risk (HIV/hepatitis)  Alternatives to recommended procedure were also discussed along with their risks and benefits  She was given an opportunity to ask questions which were answered to her satisfaction  She understood the information presented to her and wished to proceed with the recommended procedure  Written informed consent was obtained  Plan for surgery in early March  No pre-op blood work or imaging needed  Pre-op phone call fine  History of Present Illness     HPI:  Johnson Abel is a 36 y o  [id number] (s/p hysterectomy in 2016)  who presents as a referral from Dr Asia Faust for management of her LLQ pain  She was dx with a suspected L hydrosalpinx and underwent dx 100 [facility] on 8/9/19, however the procedure was aborted due to adhesive disease  Op note reviewed, indicates surgically absent uterus, cervix, and right fallopian tube  Right ovary was normal appearing at the time  Decending colon was adhered to the left pelvic side wall and left ovary and tube was obscured due to this  Patient reports continued, daily LLQ pain that does not radiate  She uses an open hand over her left pelvis to indicate where the pain is (likely indicated visceral pain)  She reports that it comes and goes randomly, nothing making it better or worse  She reports that she is unable to be intimate with her partner b/c of the pain  No assoc n/v  No dizziness, CP, or SOB  No bladder or bowel complaints  She reports she has had a trigger point injection in her left lower abdominal wall by another provider with no improvement  TVUS (by me) - Absent uterus, normal appearing right ovary   Left hypoechoic tubular structure approx 5 cm (L) x 3 cm (W) with echogenic layering suggesting possible blood clots        Review of Systems  Constitutional: No Complaints  Eyes: No ophthalmologic complaints  Ears, Nose and Throat: No ears, nose or throat complaints  Pulmonary: No pulmonary complaints  Cardiovascular: No cardiovascular complaints  Gastrointestinal:+ LLQ pain  Genitourinary: As stated on HPI  Musculoskeletal: No musculoskeletal complaints  Neurological: No neurological complaints  Psychiatric: No psychiatric complaints  Hematologic: No hematologic complaints  Endocrine: No endocrinologic complaints    Historical Information   Past Medical History:   Diagnosis Date    Abdominal pain, left lateral     Abnormal Pap smear of cervix     Constipation     Migraine headache with aura     managed with Elavil    Wears glasses      Past Surgical History:   Procedure Laterality Date    GYNECOLOGIC CRYOSURGERY  2015    HYSTERECTOMY  2016    LAPAROSCOPY  2018    [place] LAP,DIAGNOSTIC ABDOMEN N/A 8/9/2019    Procedure: LAPAROSCOPY DIAGNOSTIC; LYSIS OF ADHESIONS;  Surgeon: Monica Larson MD;  Location: [facility];  Service: Gynecology     OB/GYN History: CS x 2  Family History   Problem Relation Age of Onset    Cancer Paternal Grandmother         uterine    Cancer Paternal Aunt         uterine     Breast cancer Neg Hx      Social History   Social History     Substance and Sexual Activity   Alcohol Use Not Currently     Social History     Substance and Sexual Activity   Drug Use Never     Social History     Tobacco Use   Smoking Status Current Every Day Smoker    Years: 15 00    Types: Cigarettes   Smokeless Tobacco Never Used   Tobacco Comment    1 pack every 3 or 4 days       Meds/Allergies     Current Outpatient Medications:     acetaminophen (TYLENOL) 500 mg tablet, Take 500 mg by mouth every 6 (six) hours as needed for mild pain, Disp: , Rfl:     amitriptyline (ELAVIL) 10 mg tablet, Take 25 mg by mouth daily at bedtime, Disp: , Rfl:     carbamide peroxide (DEBROX) 6 5 % otic solution, Administer 5 drops into both ears 2 (two) times a day, Disp: 15 mL, Rfl: 0    ketorolac (TORADOL) 10 mg tablet, Take 1 tablet (10 mg total) by mouth every 6 (six) hours as needed for moderate pain, Disp: 20 tablet, Rfl: 0    Allergies   Allergen Reactions    Shellfish-Derived Products Itching and Swelling    Ibuprofen Other (See Comments)     Patient states she gets hematoma       Objective   Vitals: Blood pressure 110/60, height 5' 2" (1 575 m), weight 64 2 kg (141 lb 9 6 oz), not currently breastfeeding  Physical Exam  General: No acute distress, oriented x 3  Neck: Supple, no lymphadenopathy   Lungs: Clear to auscultation bilaterally, no wheezing, no crackles  Heart: Regular rate and rhythm, no murmurs  Abdomen: Soft,  non-distended  Prior trochar incisions well healed  + guarding in LLQ no rebound  Peristalsis present  Pelvis:Bimanual reveals absent uterus, due to voluntaryt gaurding, exam of left adnexa limited  Extremities: No edema, no Danny sign on lower extremities      Lab Results: I have personally reviewed pertinent reports  Imaging: I have personally reviewed pertinent reports          Over 50% of time spent on counseling and coordination of care

## 2020-02-06 NOTE — PROGRESS NOTES
Assessment/Plan:    Need for vaccination  - Patient is due for yearly influenza vaccination  Patient is aware and would like to have it  completed today  Will administer today  's permit physical  - Form completed, signed, copied, and will be scanned into patient's chart  Migraine  - Stable  Continue amitriptyline 25 mg once daily at bedtime  Pelvic Pain/Hydrosalpinx  - Continue follow-up with OBGYN as scheduled  Diagnoses and all orders for this visit:    's permit PE (physical examination)    Need for influenza vaccination  -     influenza vaccine, [id number], quadrivalent, 0 5 mL, preservative-free, for adult and pediatric patients 6 mos+ (AFLURIA, FLUARIX, FLULAVAL, FLUZONE)    Migraine with aura and without status migrainosus, not intractable  -     amitriptyline (ELAVIL) 10 mg tablet; Take 2 5 tablets (25 mg total) by mouth daily at bedtime    Pelvic pain in female    Hydrosalpinx    Overweight (BMI 25 0-29  9)      All of patients questions were answered  Patient understands and agrees with the above plan  Return in about 1 year (around 2/6/2021) for Annual physical     Magen Frost PA-C  02/06/20  [facility] [place]           Subjective:     Patient ID: Wali Check  is a 36 y o  female with known PMH of migraine who presents today in office for 's permit physical      - Patient is a 36 y o  female who presents today for 's dotSyntax physical     Patient is currently following with OBGYN due to persistent left lower quadrant abdominal pain  Patient has history of hysterectomy, bilateral tubal ligation, right salpingectomy status post diagnostic laparoscopy with lysis of adhesions on 08/09/2019 for left hydrosalpinx  However, the surgery had to be aborted due to extensive adhesive disease that prevented visualization of left fallopian tube and ovary    Patient is now scheduled for additional surgery in March     - Of note, patient is requesting a refill amitriptyline 25 mg once daily at bedtime for her migraines  The following portions of the patient's history were reviewed and updated as appropriate: allergies, current medications, past family history, past medical history, past social history, past surgical history and problem list         Review of Systems   Constitutional: Negative for appetite change, fatigue and fever  HENT: Negative for congestion, ear pain, rhinorrhea and sore throat  Eyes: Negative for pain  Respiratory: Negative for cough, chest tightness and shortness of breath  Cardiovascular: Negative for chest pain and palpitations  Gastrointestinal: Negative for constipation, diarrhea, nausea and vomiting  Genitourinary: Positive for pelvic pain  Negative for difficulty urinating and dysuria  Musculoskeletal: Negative for arthralgias  Skin: Negative for rash  Neurological: Negative for dizziness  Psychiatric/Behavioral: Negative for behavioral problems  The patient is not nervous/anxious  BMI Counseling: Body mass index is 25 97 kg/m²  The BMI is above normal  Nutrition recommendations include decreasing portion sizes, encouraging healthy choices of fruits and vegetables, consuming healthier snacks and increasing intake of lean protein  Exercise recommendations include moderate physical activity 150 minutes/week  No pharmacotherapy was ordered  Objective:   Vitals:    02/06/20 1446   BP: 110/70   BP Location: Left arm   Patient Position: Sitting   Cuff Size: Standard   Pulse: 100   Resp: 18   Temp: (!) 97 °F (36 1 °C)   TempSrc: Temporal   SpO2: 97%   Weight: 64 4 kg (142 lb)   Height: 5' 2" (1 575 m)         Physical Exam   Constitutional: She is oriented to person, place, and time  She appears well-developed and well-nourished  No distress  HENT:   Head: Normocephalic and atraumatic     Right Ear: External ear normal    Left Ear: External ear normal    Nose: Nose normal    Mouth/Throat: Uvula is midline, oropharynx is clear and moist and mucous membranes are normal    Eyes: Pupils are equal, round, and reactive to light  Conjunctivae and EOM are normal    Neck: Normal range of motion  Neck supple  Cardiovascular: Normal rate, regular rhythm, normal heart sounds and intact distal pulses  No murmur heard  Pulmonary/Chest: Effort normal and breath sounds normal  She has no wheezes  Abdominal: Soft  Bowel sounds are normal  There is no tenderness  Neurological: She is alert and oriented to person, place, and time  Skin: Skin is warm and dry  Psychiatric: She has a normal mood and affect  Her speech is normal and behavior is normal    Nursing note and vitals reviewed  - At this time no medical conditions which affect ability to operate a vehicle  Report back to [facility] immediately if any new conditions or limitations develop  Discussed importance of seat belt safety for  and all passengers in the car  Discussed importance of patients knowledge of car seat and booster seat use when applicable  Do not use alcohol, drugs, or substances which inhibit your ability to operate a vehicle  Do not use cell phone or other devices which can distract you while operating a vehicle    Reviewed importance of familiarizing ones self with the rules and regulations outlined in drivers manual

## 2020-02-07 PROBLEM — R10.2 PELVIC PAIN IN FEMALE: Status: ACTIVE | Noted: 2020-02-07

## 2020-02-07 PROBLEM — N83.202 LEFT OVARIAN CYST: Status: ACTIVE | Noted: 2020-02-07

## 2020-02-24 ENCOUNTER — ANESTHESIA EVENT (OUTPATIENT)
Dept: PERIOP | Facility: HOSPITAL | Age: 35
End: 2020-02-24
Payer: COMMERCIAL

## 2020-02-25 NOTE — PRE-PROCEDURE INSTRUCTIONS
Pre-Surgery Instructions:   Medication Instructions    acetaminophen (TYLENOL) 500 mg tablet Instructed patient per Anesthesia Guidelines   amitriptyline (ELAVIL) 10 mg tablet Instructed patient per Anesthesia Guidelines   carbamide peroxide (DEBROX) 6 5 % otic solution Instructed patient per Anesthesia Guidelines  Romanian preferred language/cyracom  Patient given/ instructed on use of chlorhexidine soap per hospital protocol    Patient instructed to stop all ASA, NSAIDS, vitamins and herbal supplements one week prior to surgery or per Dr Richie Peabody

## 2020-03-03 ENCOUNTER — HOSPITAL ENCOUNTER (OUTPATIENT)
Facility: HOSPITAL | Age: 35
Setting detail: OUTPATIENT SURGERY
Discharge: HOME | End: 2020-03-03
Attending: OBSTETRICS & GYNECOLOGY | Admitting: OBSTETRICS & GYNECOLOGY
Payer: COMMERCIAL

## 2020-03-03 ENCOUNTER — ANESTHESIA (OUTPATIENT)
Dept: PERIOP | Facility: HOSPITAL | Age: 35
End: 2020-03-03
Payer: COMMERCIAL

## 2020-03-03 VITALS
HEART RATE: 80 BPM | HEIGHT: 62 IN | OXYGEN SATURATION: 98 % | DIASTOLIC BLOOD PRESSURE: 65 MMHG | BODY MASS INDEX: 27.42 KG/M2 | RESPIRATION RATE: 16 BRPM | WEIGHT: 149 LBS | SYSTOLIC BLOOD PRESSURE: 116 MMHG | TEMPERATURE: 98.2 F

## 2020-03-03 LAB — GLUCOSE SERPL-MCNC: 82 MG/DL (ref 65–140)

## 2020-03-03 PROCEDURE — 49320 DIAG LAPARO SEPARATE PROC: CPT | Performed by: OBSTETRICS & GYNECOLOGY

## 2020-03-03 PROCEDURE — 82948 REAGENT STRIP/BLOOD GLUCOSE: CPT

## 2020-03-03 PROCEDURE — 99024 POSTOP FOLLOW-UP VISIT: CPT | Performed by: OBSTETRICS & GYNECOLOGY

## 2020-03-03 RX ORDER — SODIUM CHLORIDE, SODIUM LACTATE, POTASSIUM CHLORIDE, CALCIUM CHLORIDE 600; 310; 30; 20 MG/100ML; MG/100ML; MG/100ML; MG/100ML
125 INJECTION, SOLUTION INTRAVENOUS CONTINUOUS
Status: CANCELLED | OUTPATIENT
Start: 2020-03-03

## 2020-03-03 RX ORDER — MIDAZOLAM HYDROCHLORIDE 2 MG/2ML
INJECTION, SOLUTION INTRAMUSCULAR; INTRAVENOUS AS NEEDED
Status: DISCONTINUED | OUTPATIENT
Start: 2020-03-03 | End: 2020-03-03 | Stop reason: SURG

## 2020-03-03 RX ORDER — FENTANYL CITRATE/PF 50 MCG/ML
50 SYRINGE (ML) INJECTION
Status: DISCONTINUED | OUTPATIENT
Start: 2020-03-03 | End: 2020-03-03 | Stop reason: HOSPADM

## 2020-03-03 RX ORDER — OXYCODONE HYDROCHLORIDE 5 MG/1
10 TABLET ORAL EVERY 4 HOURS PRN
Status: DISCONTINUED | OUTPATIENT
Start: 2020-03-03 | End: 2020-03-03 | Stop reason: HOSPADM

## 2020-03-03 RX ORDER — ONDANSETRON 2 MG/ML
4 INJECTION INTRAMUSCULAR; INTRAVENOUS ONCE AS NEEDED
Status: COMPLETED | OUTPATIENT
Start: 2020-03-03 | End: 2020-03-03

## 2020-03-03 RX ORDER — LIDOCAINE HYDROCHLORIDE 10 MG/ML
INJECTION, SOLUTION EPIDURAL; INFILTRATION; INTRACAUDAL; PERINEURAL AS NEEDED
Status: DISCONTINUED | OUTPATIENT
Start: 2020-03-03 | End: 2020-03-03 | Stop reason: SURG

## 2020-03-03 RX ORDER — PROPOFOL 10 MG/ML
INJECTION, EMULSION INTRAVENOUS AS NEEDED
Status: DISCONTINUED | OUTPATIENT
Start: 2020-03-03 | End: 2020-03-03 | Stop reason: SURG

## 2020-03-03 RX ORDER — ACETAMINOPHEN 325 MG/1
650 TABLET ORAL EVERY 4 HOURS PRN
Status: DISCONTINUED | OUTPATIENT
Start: 2020-03-03 | End: 2020-03-03 | Stop reason: HOSPADM

## 2020-03-03 RX ORDER — HYDROMORPHONE HCL/PF 1 MG/ML
0.5 SYRINGE (ML) INJECTION
Status: DISCONTINUED | OUTPATIENT
Start: 2020-03-03 | End: 2020-03-03 | Stop reason: HOSPADM

## 2020-03-03 RX ORDER — KETOROLAC TROMETHAMINE 30 MG/ML
INJECTION, SOLUTION INTRAMUSCULAR; INTRAVENOUS AS NEEDED
Status: DISCONTINUED | OUTPATIENT
Start: 2020-03-03 | End: 2020-03-03 | Stop reason: SURG

## 2020-03-03 RX ORDER — NEOSTIGMINE METHYLSULFATE 1 MG/ML
INJECTION INTRAVENOUS AS NEEDED
Status: DISCONTINUED | OUTPATIENT
Start: 2020-03-03 | End: 2020-03-03 | Stop reason: SURG

## 2020-03-03 RX ORDER — ROCURONIUM BROMIDE 10 MG/ML
INJECTION, SOLUTION INTRAVENOUS AS NEEDED
Status: DISCONTINUED | OUTPATIENT
Start: 2020-03-03 | End: 2020-03-03 | Stop reason: SURG

## 2020-03-03 RX ORDER — MEPERIDINE HYDROCHLORIDE 50 MG/ML
12.5 INJECTION INTRAMUSCULAR; INTRAVENOUS; SUBCUTANEOUS ONCE AS NEEDED
Status: DISCONTINUED | OUTPATIENT
Start: 2020-03-03 | End: 2020-03-03 | Stop reason: HOSPADM

## 2020-03-03 RX ORDER — FENTANYL CITRATE 50 UG/ML
INJECTION, SOLUTION INTRAMUSCULAR; INTRAVENOUS AS NEEDED
Status: DISCONTINUED | OUTPATIENT
Start: 2020-03-03 | End: 2020-03-03 | Stop reason: SURG

## 2020-03-03 RX ORDER — SODIUM CHLORIDE 9 MG/ML
125 INJECTION, SOLUTION INTRAVENOUS CONTINUOUS
Status: DISCONTINUED | OUTPATIENT
Start: 2020-03-03 | End: 2020-03-03 | Stop reason: HOSPADM

## 2020-03-03 RX ORDER — DEXAMETHASONE SODIUM PHOSPHATE 10 MG/ML
INJECTION, SOLUTION INTRAMUSCULAR; INTRAVENOUS AS NEEDED
Status: DISCONTINUED | OUTPATIENT
Start: 2020-03-03 | End: 2020-03-03 | Stop reason: SURG

## 2020-03-03 RX ORDER — OXYCODONE HYDROCHLORIDE 5 MG/1
5 TABLET ORAL EVERY 4 HOURS PRN
Status: DISCONTINUED | OUTPATIENT
Start: 2020-03-03 | End: 2020-03-03 | Stop reason: HOSPADM

## 2020-03-03 RX ORDER — ONDANSETRON 2 MG/ML
4 INJECTION INTRAMUSCULAR; INTRAVENOUS EVERY 6 HOURS PRN
Status: CANCELLED | OUTPATIENT
Start: 2020-03-03

## 2020-03-03 RX ORDER — GLYCOPYRROLATE 0.2 MG/ML
INJECTION INTRAMUSCULAR; INTRAVENOUS AS NEEDED
Status: DISCONTINUED | OUTPATIENT
Start: 2020-03-03 | End: 2020-03-03 | Stop reason: SURG

## 2020-03-03 RX ORDER — ONDANSETRON 2 MG/ML
INJECTION INTRAMUSCULAR; INTRAVENOUS AS NEEDED
Status: DISCONTINUED | OUTPATIENT
Start: 2020-03-03 | End: 2020-03-03 | Stop reason: SURG

## 2020-03-03 RX ORDER — MAGNESIUM HYDROXIDE 1200 MG/15ML
LIQUID ORAL AS NEEDED
Status: DISCONTINUED | OUTPATIENT
Start: 2020-03-03 | End: 2020-03-03 | Stop reason: HOSPADM

## 2020-03-03 RX ADMIN — FENTANYL CITRATE 50 MCG: 50 INJECTION, SOLUTION INTRAMUSCULAR; INTRAVENOUS at 13:02

## 2020-03-03 RX ADMIN — KETOROLAC TROMETHAMINE 30 MG: 30 INJECTION, SOLUTION INTRAMUSCULAR at 14:11

## 2020-03-03 RX ADMIN — FENTANYL CITRATE 50 MCG: 50 INJECTION, SOLUTION INTRAMUSCULAR; INTRAVENOUS at 12:41

## 2020-03-03 RX ADMIN — FENTANYL CITRATE 50 MCG: 50 INJECTION INTRAMUSCULAR; INTRAVENOUS at 15:08

## 2020-03-03 RX ADMIN — FENTANYL CITRATE 50 MCG: 50 INJECTION, SOLUTION INTRAMUSCULAR; INTRAVENOUS at 12:59

## 2020-03-03 RX ADMIN — ONDANSETRON 4 MG: 2 INJECTION INTRAMUSCULAR; INTRAVENOUS at 15:13

## 2020-03-03 RX ADMIN — FENTANYL CITRATE 50 MCG: 50 INJECTION INTRAMUSCULAR; INTRAVENOUS at 14:53

## 2020-03-03 RX ADMIN — ONDANSETRON 4 MG: 2 INJECTION INTRAMUSCULAR; INTRAVENOUS at 14:11

## 2020-03-03 RX ADMIN — SODIUM CHLORIDE: 0.9 INJECTION, SOLUTION INTRAVENOUS at 13:15

## 2020-03-03 RX ADMIN — LIDOCAINE HYDROCHLORIDE 80 MG: 10 INJECTION, SOLUTION EPIDURAL; INFILTRATION; INTRACAUDAL; PERINEURAL at 12:41

## 2020-03-03 RX ADMIN — MIDAZOLAM 2 MG: 1 INJECTION INTRAMUSCULAR; INTRAVENOUS at 12:31

## 2020-03-03 RX ADMIN — GLYCOPYRROLATE 0.4 MG: 0.2 INJECTION INTRAMUSCULAR; INTRAVENOUS at 14:11

## 2020-03-03 RX ADMIN — ROCURONIUM BROMIDE 50 MG: 50 INJECTION, SOLUTION INTRAVENOUS at 12:41

## 2020-03-03 RX ADMIN — PROPOFOL 180 MG: 10 INJECTION, EMULSION INTRAVENOUS at 12:41

## 2020-03-03 RX ADMIN — NEOSTIGMINE METHYLSULFATE 3 MG: 1 INJECTION, SOLUTION INTRAVENOUS at 14:11

## 2020-03-03 RX ADMIN — DEXAMETHASONE SODIUM PHOSPHATE 4 MG: 10 INJECTION, SOLUTION INTRAMUSCULAR; INTRAVENOUS at 12:48

## 2020-03-03 RX ADMIN — SODIUM CHLORIDE 125 ML/HR: 0.9 INJECTION, SOLUTION INTRAVENOUS at 11:05

## 2020-03-03 NOTE — INTERVAL H&P NOTE
H&P reviewed  After examining the patient I find no changes in the patients condition since the H&P had been written      Vitals:    03/03/20 1054   BP: 120/70   Pulse: 85   Resp: 16   Temp: 98 4 °F (36 9 °C)   SpO2: 100%

## 2020-03-03 NOTE — DISCHARGE INSTRUCTIONS
Lysis of Abdominal Adhesions   WHAT YOU NEED TO KNOW:   Lysis of abdominal adhesions is surgery to remove adhesions in your abdomen  Adhesions are bands of scar tissue  Adhesions can cause organs and surrounding tissues to be twisted, pulled out of place, or stuck together  DISCHARGE INSTRUCTIONS:   Medicines:   · Pain medicine: You may be given medicine to take away or decrease pain  Do not wait until the pain is severe before you take your medicine  · Antibiotics: This medicine will help fight or prevent an infection  Take your antibiotics until they are gone, even if you feel better  · Bowel movement softeners: This medicine makes it easier for you to have a bowel movement  You may need this medicine to prevent constipation  · Take your medicine as directed  Contact your healthcare provider if you think your medicine is not helping or if you have side effects  Tell him or her if you are allergic to any medicine  Keep a list of the medicines, vitamins, and herbs you take  Include the amounts, and when and why you take them  Bring the list or the pill bottles to follow-up visits  Carry your medicine list with you in case of an emergency  Follow up with your healthcare provider as directed: You will need to return to have your wound checked and stitches or staples removed  Write down your questions so you remember to ask them during your visits  Rest:  Rest when you feel it is needed  Slowly start to do more each day  Return to your daily activities as directed  Wound care: You may need to keep a bandage on your incisions until your follow-up visit  Keep your wound clean and dry  When you are allowed to bathe, carefully wash the wound with soap and water  Dry the area and put on new, clean bandages as directed  Change your bandages when they get wet or dirty    Prevent constipation:   · Eat healthy foods:  Healthy foods include fruits, vegetables, whole-grain breads, low-fat dairy products, beans, lean meats, and fish  Ask if you need to be on a special diet  · Drink liquids as directed:  Ask your healthcare provider how much liquid to drink each day and which liquids are best for you  · Exercise:  Ask your healthcare provider about the best exercise plan for you  Contact your healthcare provider if:   · You have a fever or chills  · You have a cough, or feel weak and achy  · Your incision is red, swollen, or draining pus  · You have pain in your abdomen or shoulder area that does not go away, or gets worse  · You have questions or concerns about your condition or care  Seek care immediately or call 911 if:   · Your incision comes apart  · Blood soaks through your bandage  · Your arm or leg feels warm, tender, and painful  It may look swollen and red  · You suddenly feel lightheaded and short of breath  · You have chest pain when you take a deep breath or cough  · You cough up blood  © 2017 [facility] Information is for End User's use only and may not be sold, redistributed or otherwise used for commercial purposes  All illustrations and images included in CareNotes® are the copyrighted property of [employer]  or Fermin Guardado  The above information is an  only  It is not intended as medical advice for individual conditions or treatments  Talk to your doctor, nurse or pharmacist before following any medical regimen to see if it is safe and effective for you

## 2020-03-03 NOTE — OP NOTE
OPERATIVE REPORT  PATIENT NAME: Darling Cooper    :  1985  MRN: [id number]  Pt Location: [facility] OR ROOM 05    SURGERY DATE: 3/3/2020    Surgeon(s) and Role:     * Brittany Bonner MD - Primary     * Daren Washburn DO - Raheel Pugh MD - Assisting    Preop Diagnosis:  Pelvic pain in female [R10 2]  Left ovarian cyst [N83 202]    Post-Op Diagnosis Codes:     * Pelvic pain in female [R10 2]     * Left ovarian cyst [N83 202]    Procedure(s) (LRB):  DIAG LAP (N/A)  L  O A  (N/A)    Specimen(s):  * No specimens in log *    Estimated Blood Loss:   Minimal    Drains:  Urethral Catheter Non-latex (Active)   Number of days: 0       Anesthesia Type:   General    Operative Indications:  Pelvic pain in female [R10 2]  Left ovarian cyst [N83 202]    Operative Findings:  1  Significant pelvic adhesive disease involving left hemipelvis  Colon densely adhered to left pelvic side wall and to bladder and vaginal cuff  2  Normal appearing right ovary  No visible or palpable left ovary or fallopian tube following enterolysis and and full dissection of left pelvic side wall including all retroperitoneal structures  Presumably surgically absent  3  Cystoscopy: robust efflux noted from bilateral ureteral orifices at completion of case  No injury noted to bladder lumen  4  Normal appearing liver edge  Complications:   None    Procedure and Technique:    Appropriate preoperative antibiotics chosen per ACOG guidelines were given  Bilateral SCDs were placed in the lower extremities for DVT prevention prior to the institution of anesthesia  No bladder, ureteral, viscus, or solid organ injury were noted at the end of the procedure  The patient was identified in the holding area by the operating room staff and attending physician  She was taken to the operating room where anesthesia was instituted without complications   She was placed in the dorsal lithotomy position with the legs in [address] [place] [address] with care taken to avoid excessive flexion or extension of her lower extremities  The patient was prepped and draped in the usual sterile fashion  A Murdock catheter was inserted  Attention was turned to the umbilical incision which was infiltrated with 0 5% Marcaine without epinephrine at the base  A 5 mm skin incision was made at this location, and a 5 mm Fios optical trocar was placed under direct visualization  Intraperitoneal entry was confirmed visually as well as with a low opening pressure  No visceral or vascular injury was noted  Abdominal and pelvic survey noted the above-mentioned findings  The skin was then similarly infiltrated with local at the lower port sites bilaterally, approximately 2 cm superior and 2 cm medial to the ASIS  5 mm skin incisions were made at the sites and 5 mm trocars were placed under direct visualization without evidence of visceral or vascular injury  We then performed careful lysis of adhesions using the ligasure bipolar device to mobilize the colon off of the left pelvic sidewall  This allowed us to gain entry into the left retroperitoneum  Careful dissection was undertaken to identify retroperitoneal structures  No ovary or fallopian tube could be identified in the left pelvis, neither behind the mobilized colon, nor within the retroperitoneals space  We continued to carefully take adhesions down between the colon and the bladder and vaginal cuff  Freeing these structures and restoring normal anatomy  The Murdock catheter was then used to drain the bladder and then it was removed and a diagnostic cystoscopy was performed by instilling 200 mL of fluid into the bladder  Both ureters were effluxing normally and vigorously and there were no lacerations in the lower urinary tract  The bladder was drained  The sponge, needle and instrument count were correct x 2  The patient tolerated the procedure well   She was awakened from anesthesia and transferred to the recovery room in stable condition      Patient Disposition:  PACU     SIGNATURE: Yulisa Aguirre MD  DATE: March 3, 2020  TIME: 2:13 PM

## 2020-03-03 NOTE — H&P
Danish Beckett presents for scheduled procedure  No interval changes to her medical history  Vitals:    03/03/20 1054   BP: 120/70   Pulse: 85   Resp: 16   Temp: 98 4 °F (36 9 °C)   SpO2: 100%     Physical Exam   Constitutional: She appears well-developed and well-nourished  No distress  Cardiovascular: Normal rate, regular rhythm, normal heart sounds and intact distal pulses  Pulmonary/Chest: Effort normal and breath sounds normal  No stridor  No respiratory distress  Abdominal: Soft  Bowel sounds are normal  She exhibits no distension  There is no tenderness  Skin: She is not diaphoretic  Will proceed to operating room for scheduled surgery      Radha Cornejo DO

## 2020-03-03 NOTE — ANESTHESIA POSTPROCEDURE EVALUATION
Post-Op Assessment Note    CV Status:  Stable    Pain management: adequate     Mental Status:  Alert and awake   Hydration Status:  Euvolemic   PONV Controlled:  Controlled   Airway Patency:  Patent   Post Op Vitals Reviewed: Yes      Staff: Anesthesiologist           /62 (03/03/20 1509)    Temp 98 4 °F (36 9 °C) (03/03/20 1509)    Pulse 84 (03/03/20 1500)   Resp 18 (03/03/20 1500)    SpO2 95 % (03/03/20 1500)

## 2020-03-03 NOTE — ANESTHESIA PREPROCEDURE EVALUATION
Review of Systems/Medical History  Patient summary reviewed  Chart reviewed  No history of anesthetic complications     Cardiovascular  Negative cardio ROS    Pulmonary  Smoker cigarette smoker  , Tobacco cessation counseling given ,        GI/Hepatic  Negative GI/hepatic ROS          Negative  ROS        Endo/Other  Negative endo/other ROS      GYN  Negative gynecology ROS          Hematology  Negative hematology ROS      Musculoskeletal  Negative musculoskeletal ROS        Neurology    Headaches,    Psychology   Negative psychology ROS              Physical Exam    Airway    Mallampati score: II  TM Distance: >3 FB  Neck ROM: full     Dental   No notable dental hx     Cardiovascular  Comment: Negative ROS, Rhythm: regular, Rate: normal,     Pulmonary  Breath sounds clear to auscultation,     Other Findings        Anesthesia Plan  ASA Score- 2     Anesthesia Type- general with ASA Monitors  Additional Monitors:   Airway Plan: ETT  Plan Factors-  Patient smoked on day of surgery  Induction- intravenous  Postoperative Plan-     Informed Consent- Anesthetic plan and risks discussed with patient

## 2020-03-04 ENCOUNTER — TELEPHONE (OUTPATIENT)
Dept: OBGYN CLINIC | Facility: CLINIC | Age: 35
End: 2020-03-04

## 2020-03-04 NOTE — TELEPHONE ENCOUNTER
Patient called and reports that she had surgery on 3/3/2020  Patient is requesting pain medication  Patient reports that she has not tried Motrin for pain  Patient instructed to take motrin and to call office if no relief  Patient verbalized understanding

## 2020-03-05 ENCOUNTER — TELEPHONE (OUTPATIENT)
Dept: OBGYN CLINIC | Facility: CLINIC | Age: 35
End: 2020-03-05

## 2020-03-05 DIAGNOSIS — G89.18 POSTOPERATIVE PAIN: Primary | ICD-10-CM

## 2020-03-05 RX ORDER — KETOROLAC TROMETHAMINE 10 MG/1
10 TABLET, FILM COATED ORAL EVERY 6 HOURS PRN
Qty: 20 TABLET | Refills: 0 | Status: SHIPPED | OUTPATIENT
Start: 2020-03-05 | End: 2020-03-28

## 2020-03-05 NOTE — TELEPHONE ENCOUNTER
Patient called reporting discomfort in her left lower abdomen, noting increased pain, minimally relieved with motrin and tylenol  Reports having appetite, no nausea/vomiting, able to empty her bladder and has had a bowel movement  Rx toradol PO sent to pharmacy  Counseled to use toradol instead of motrin, can alternate with tylenol  To call if pain not improved with toradol      Lupillo Lima, DO

## 2020-03-12 ENCOUNTER — OFFICE VISIT (OUTPATIENT)
Dept: OBGYN CLINIC | Facility: CLINIC | Age: 35
End: 2020-03-12

## 2020-03-12 VITALS
SYSTOLIC BLOOD PRESSURE: 110 MMHG | DIASTOLIC BLOOD PRESSURE: 60 MMHG | BODY MASS INDEX: 25.95 KG/M2 | HEIGHT: 62 IN | WEIGHT: 141 LBS

## 2020-03-12 DIAGNOSIS — Z98.890 S/P LAPAROSCOPY: Primary | ICD-10-CM

## 2020-03-12 PROBLEM — N70.11 HYDROSALPINX: Status: RESOLVED | Noted: 2019-07-23 | Resolved: 2020-03-12

## 2020-03-12 PROBLEM — N83.202 LEFT OVARIAN CYST: Status: RESOLVED | Noted: 2020-02-07 | Resolved: 2020-03-12

## 2020-03-12 PROCEDURE — T1015 CLINIC SERVICE: HCPCS | Performed by: OBSTETRICS & GYNECOLOGY

## 2020-03-12 PROCEDURE — 4004F PT TOBACCO SCREEN RCVD TLK: CPT | Performed by: OBSTETRICS & GYNECOLOGY

## 2020-03-12 PROCEDURE — 3008F BODY MASS INDEX DOCD: CPT | Performed by: OBSTETRICS & GYNECOLOGY

## 2020-03-12 PROCEDURE — 99213 OFFICE O/P EST LOW 20 MIN: CPT | Performed by: OBSTETRICS & GYNECOLOGY

## 2020-03-12 NOTE — PATIENT INSTRUCTIONS
Lisis de adherencias abdominales   LO QUE NECESITA SABER:   La lisis de adherencias abdominales es un cirugía para extraer las adherencias del abdomen  Las adhesiones son bandas de tejido cicatricial  Las adherencias pueden hacer que los [place] y los tejidos que los rodean se retuerzan, se salgan de raymundo sitio o se peguen [id number]  INSTRUCCIONES SOBRE EL PHUONG HOSPITALARIA:   Medicamentos:   · Analgésicos:  Usted podría recibir medicamento para quitarle o reducir el dolor  No espere a que el dolor sea muy intenso para jamilah el medicamento  · Antibióticos:  Mary medicamento va a ayudar a combatir o prevenir jonathan infección  [place] queta antibióticos hasta que se los termine, aunque se sienta mejor  · Ablandadores de evacuación intestinal:  Mary medicamento le hace más fácil tener jonathan evacuación intestinal  Es posible que deba jamilah mary medicamento para prevenir el estreñimiento  · [place] queta medicamentos divina se le haya indicado  Consulte con raymundo médico si usted luis que raymundo medicamento no le está ayudando o si presenta efectos secundarios  Infórmele si es alérgico a cualquier medicamento  Mantenga jonathan lista actualizada de los [place], las vitaminas y los productos herbales que villa  Incluya los siguientes datos de los medicamentos: cantidad, frecuencia y motivo de administración  Traiga con usted la lista o los envases de la píldoras a queta citas de seguimiento  Lleve la lista de los medicamentos con usted en emily de jonathan emergencia  Acuda a queta consultas de control con raymundo médico según le indicaron  Tendrá que regresar para que le revisen la herida y [place] puntos de sutura o las grapas  Anote queta preguntas para que se acuerde de hacerlas veronica queta visitas  Descanse:  Descanse cuando usted sienta que es necesario  Empiece a hacer un poco más día a día  Regrese a queta actividades diarias divina se le haya indicado     Cuidado de la herida:  Es posible que deba cubrirse las incisiones con un vendaje hasta que concurra a murphy consulta de seguimiento  Mantenga la herida limpia y seca  Cuando le permitan bañarse, lave la herida cuidadosamente con agua y [place]  Seque el área y póngale vendajes nuevos y limpios divina le indicaron  [place] queta vendajes cuando se mojen o ensucien  Prevenga estreñimiento:   · Consuma alimentos saludables:  Los alimentos saludables incluyen frutas, verduras, pan integral, productos lácteos bajos en grasa, frijoles, tony magras y pescado  Pregunte si necesita seguir jonathan dieta especial     · [place] líquidos según queta indicaciones:  Pregunte a murphy médico sobre la cantidad de líquido que necesita jamilah todos los días y cuáles le recomienda  · Ejercicio:  Pregunte a murphy médico acerca del mejor plan de ejercicio para usted  Pregúntele a murphy [place] vitaminas y minerales son adecuados para usted  · Usted tiene fiebre o escalofríos  · Usted tiene tos o se siente débil y adolorido  · Murphy incisión está juliann, inflamada o supurando pus  · Siente dolor en el abdomen o en el área del hombro que no se va o Londell Masters  · Usted tiene preguntas o inquietudes acerca de murphy condición o cuidado  [place] atención médica de inmediato o llame al 911 si:   · La incisión se abre  · La juanito empapa el vendaje  · Murphy brazo o pierna se siente caliente, sensible y [place]  Se podría david inflamado y quintero  · De repente se siente mareado y sin aire  · Le duele el pecho cuando respira hondo o tose  · Usted expectora juanito  © 2017 [address] [employer] Information is for End User's use only and may not be sold, redistributed or otherwise used for commercial purposes  All illustrations and images included in CareNotes® are the copyrighted property of A D A M , Inc  or Fermin Guardado  Esta información es sólo para uso en educación  Murphy intención no es darle un consejo médico sobre enfermedades o tratamientos   Colsulte con murphy Kimberly Seats farmacéutico antes de seguir cualquier régimen médico para saber si es seguro y efectivo para usted

## 2020-03-12 NOTE — LETTER
March 12, 2020     Patient: Sonido Augustine   YOB: 1985   Date of Visit: 3/12/2020       To Whom it May Concern:    Sonido Augustine is under my professional care  She was seen in my office on 3/12/2020  She may return to work on Monday, 03/16/2020  If you have any questions or concerns, please don't hesitate to call           Sincerely,          [place], DO        CC: No Recipients

## 2020-03-12 NOTE — PROGRESS NOTES
Status post laparoscopy  Reviewed operative findings, including pelvic adhesive disease and no evidence of left tube or ovary  Reviewed that any new abdominal pain that were to occur would not be of gynecologic origin, encouraged to seek GI consultation at that point  Encourage hydrating cream/aquaphor for trocar site itching   Return to work note given today  RT annual exam 2020     DO Marni Garner presents for postoperative follow-up  S/p diagnostic laparoscopy, lysis of pelvic and abdominal adhesions  Doing well postoperatively  Reported severe postoperative pain for 2 days following surgery, which has now subsided  No longer taking any pain medications  Reports itching at trocar incision sites  Vitals:    03/12/20 1324   BP: 110/60       Physical Exam   Constitutional: She is oriented to person, place, and time  She appears well-developed and well-nourished  No distress  Cardiovascular: Normal rate, regular rhythm, normal heart sounds and intact distal pulses  Pulmonary/Chest: Effort normal and breath sounds normal  No stridor  No respiratory distress  Abdominal: Soft  Bowel sounds are normal  She exhibits no distension  There is no tenderness  Trocar incisions clean, dry, and intact    Neurological: She is alert and oriented to person, place, and time  Skin: Skin is warm and dry  She is not diaphoretic  Psychiatric: She has a normal mood and affect   Her behavior is normal

## 2020-03-12 NOTE — ASSESSMENT & PLAN NOTE
Reviewed operative findings, including pelvic adhesive disease and no evidence of left tube or ovary  Reviewed that any new abdominal pain that were to occur would not be of gynecologic origin, encouraged to seek GI consultation at that point  Encourage hydrating cream/aquaphor for trocar site itching   Return to work note given today  RTC annual exam 2020

## 2020-03-28 ENCOUNTER — HOSPITAL ENCOUNTER (EMERGENCY)
Facility: HOSPITAL | Age: 35
Discharge: LEFT BEFORE DISCHARGE | End: 2020-03-28
Attending: EMERGENCY MEDICINE | Admitting: EMERGENCY MEDICINE
Payer: COMMERCIAL

## 2020-03-28 VITALS
RESPIRATION RATE: 18 BRPM | OXYGEN SATURATION: 99 % | SYSTOLIC BLOOD PRESSURE: 119 MMHG | HEART RATE: 91 BPM | BODY MASS INDEX: 25.56 KG/M2 | WEIGHT: 139.77 LBS | DIASTOLIC BLOOD PRESSURE: 59 MMHG | TEMPERATURE: 98.2 F

## 2020-03-28 DIAGNOSIS — R10.9 ABDOMINAL PAIN: Primary | ICD-10-CM

## 2020-03-28 PROCEDURE — 99284 EMERGENCY DEPT VISIT MOD MDM: CPT | Performed by: EMERGENCY MEDICINE

## 2020-03-28 PROCEDURE — 99283 EMERGENCY DEPT VISIT LOW MDM: CPT

## 2020-03-28 RX ORDER — DICYCLOMINE HCL 20 MG
20 TABLET ORAL EVERY 6 HOURS PRN
Qty: 20 TABLET | Refills: 0 | Status: SHIPPED | OUTPATIENT
Start: 2020-03-28 | End: 2020-06-22

## 2020-03-28 RX ORDER — DICYCLOMINE HCL 20 MG
20 TABLET ORAL ONCE
Status: COMPLETED | OUTPATIENT
Start: 2020-03-28 | End: 2020-03-28

## 2020-03-28 RX ADMIN — DICYCLOMINE HYDROCHLORIDE 20 MG: 20 TABLET ORAL at 10:46

## 2020-03-31 ENCOUNTER — OFFICE VISIT (OUTPATIENT)
Dept: OBGYN CLINIC | Facility: CLINIC | Age: 35
End: 2020-03-31

## 2020-03-31 VITALS
DIASTOLIC BLOOD PRESSURE: 60 MMHG | HEIGHT: 62 IN | WEIGHT: 141.8 LBS | SYSTOLIC BLOOD PRESSURE: 120 MMHG | BODY MASS INDEX: 26.09 KG/M2 | HEART RATE: 89 BPM

## 2020-03-31 DIAGNOSIS — N76.0 BACTERIAL VAGINOSIS: Primary | ICD-10-CM

## 2020-03-31 DIAGNOSIS — B96.89 BACTERIAL VAGINOSIS: Primary | ICD-10-CM

## 2020-03-31 PROCEDURE — T1015 CLINIC SERVICE: HCPCS | Performed by: OBSTETRICS & GYNECOLOGY

## 2020-03-31 PROCEDURE — 99213 OFFICE O/P EST LOW 20 MIN: CPT | Performed by: OBSTETRICS & GYNECOLOGY

## 2020-03-31 PROCEDURE — 3008F BODY MASS INDEX DOCD: CPT | Performed by: OBSTETRICS & GYNECOLOGY

## 2020-03-31 PROCEDURE — 4004F PT TOBACCO SCREEN RCVD TLK: CPT | Performed by: OBSTETRICS & GYNECOLOGY

## 2020-03-31 RX ORDER — METRONIDAZOLE 500 MG/1
500 TABLET ORAL EVERY 12 HOURS SCHEDULED
Qty: 14 TABLET | Refills: 0 | Status: SHIPPED | OUTPATIENT
Start: 2020-03-31 | End: 2020-04-07

## 2020-03-31 NOTE — PROGRESS NOTES
Status post laparoscopy  Incisions well healed  No sign of hernia, reassurance provided   RTC as needed     Bacterial vaginosis  Treating empirically based on foul smelling discharge during intercourse  Rx flagyl 500 mg BID x 7 days  Advised against ETOH during treatment        DO Louann Whitley presents for postoperative problem, reporting umbilical pain at site of incision  Seen in ED 3/28, with Louann Mercer stating ED doc was concerned for hernia  Louann Mercer reports some clear discharge from umbilical port site, which has now resolved  Reports regular bowel movement, no nausea or vomiting  Also concerned about foul smelling discharge after intercourse  Vitals:    03/31/20 0923   BP: 120/60   Pulse: 89       Physical Exam   Constitutional: She appears well-developed and well-nourished  No distress  Cardiovascular: Normal rate, regular rhythm, normal heart sounds and intact distal pulses  Pulmonary/Chest: Effort normal and breath sounds normal  No stridor  No respiratory distress  Abdominal: Soft  Bowel sounds are normal  She exhibits no distension and no mass  There is no tenderness  There is no guarding  No hernia  Trocar incisions well healed    Skin: She is not diaphoretic

## 2020-03-31 NOTE — PATIENT INSTRUCTIONS
Vaginosis bacteriana   LO QUE NECESITA SABER:   La vaginosis bacteriana es jonathan infección en la vagina  Se puede desarrollar vaginitis, la cual es jonathan irritación e inflamación de la vagina  INSTRUCCIONES SOBRE EL [facility]:   Medicamentos:   · Antibióticos:  Le recetan éstos para matar la bacteria que causa la vaginosis bacteriana  Se los pueden recetar divina jonathan píldora o crema para introducir en la vagina  [employer] o use el medicamento divina se lo indicaron  · [employer]ell queta medicamentos divina se le haya indicado  Consulte con raymundo médico si usted luis que raymundo medicamento no le está ayudando o si presenta efectos secundarios  Infórmele si es alérgico a cualquier medicamento  Mantenga jonathan lista actualizada de los [place], las vitaminas y los productos herbales que villa  Incluya los siguientes datos de los medicamentos: cantidad, frecuencia y motivo de administración  Traiga con usted la lista o los envases de la píldoras a queta citas de seguimiento  Lleve la lista de los medicamentos con usted en emily de jonathan emergencia  Prevenir la vaginosis bacteriana:   · Mantenga el área vaginal limpio y seco:  Lleve ropa interior y pantimedias hechas de algodón en el área de la entrepierna  Límpiese de adelante hacia atrás después de orinar o de tener jonathan evacuación intestinal  Después de bañarse, enjuague el jabón de la ha vaginal para disminuir el riesgo de jonathan irritación  · No utilice productos que provocan irritación:  Siempre use tampones y toallas sanitarias sin aroma  No use atomizadores, ni talcos, ni tampones con aroma debido a que pueden causar irritación y aumentar raymundo riesgo de presentar vaginosis bacteriana  Los detergentes y suavizantes para la ropa también pueden causar irritación  · No se eddie duchas vaginales:  Estas pueden provocar un desequilibrio de la bacteria saludable de la vagina      · Utilice preservativos o condones de látex:  Estos ayudan a prevenir contra otras infecciones y galdino que raymundo maximiliano contraiga la infección  Pregúntele a murphy Carloyn  vitaminas y minerales son adecuados para usted  · Loretta síntomas regresan o no mejoran con el tratamiento  · Usted tiene sangrado vaginal que no es de murphy [place]  · Usted tiene preguntas o inquietudes acerca de murphy condición o cuidado  © 2017 [address] Information is for End User's use only and may not be sold, redistributed or otherwise used for commercial purposes  All illustrations and images included in CareNotes® are the copyrighted property of A D A M , Inc  or Fermin Guardado  Esta información es sólo para uso en educación  Murphy intención no es darle un consejo médico sobre enfermedades o tratamientos  Colsulte con murphy Sinclair Shelter farmacéutico antes de seguir cualquier régimen médico para saber si es seguro y efectivo para usted

## 2020-03-31 NOTE — ASSESSMENT & PLAN NOTE
Incisions well healed  Possible small draining seroma at umbilical incision  No sign of hernia, reassurance provided   RTC as needed

## 2020-03-31 NOTE — ASSESSMENT & PLAN NOTE
Treating empirically based on foul smelling discharge during intercourse  Rx flagyl 500 mg BID x 7 days  Advised against ETOH during treatment

## 2020-04-24 ENCOUNTER — TELEMEDICINE (OUTPATIENT)
Dept: OBGYN CLINIC | Facility: CLINIC | Age: 35
End: 2020-04-24

## 2020-04-24 DIAGNOSIS — N89.8 VAGINAL DISCHARGE: Primary | ICD-10-CM

## 2020-04-24 DIAGNOSIS — L29.2 VULVAR ITCHING: ICD-10-CM

## 2020-04-24 PROCEDURE — T1015 CLINIC SERVICE: HCPCS | Performed by: NURSE PRACTITIONER

## 2020-04-24 PROCEDURE — 99213 OFFICE O/P EST LOW 20 MIN: CPT | Performed by: NURSE PRACTITIONER

## 2020-04-24 RX ORDER — FLUCONAZOLE 150 MG/1
150 TABLET ORAL ONCE
Qty: 1 TABLET | Refills: 1 | Status: SHIPPED | OUTPATIENT
Start: 2020-04-24 | End: 2020-04-24

## 2020-04-29 ENCOUNTER — TELEMEDICINE (OUTPATIENT)
Dept: OBGYN CLINIC | Facility: CLINIC | Age: 35
End: 2020-04-29

## 2020-04-29 DIAGNOSIS — N89.8 VAGINA ITCHING: Primary | ICD-10-CM

## 2020-04-29 PROCEDURE — T1015 CLINIC SERVICE: HCPCS | Performed by: OBSTETRICS & GYNECOLOGY

## 2020-04-29 PROCEDURE — G2012 BRIEF CHECK IN BY MD/QHP: HCPCS | Performed by: OBSTETRICS & GYNECOLOGY

## 2020-04-29 RX ORDER — FLUCONAZOLE 150 MG/1
150 TABLET ORAL ONCE
Qty: 1 TABLET | Refills: 0 | Status: SHIPPED | OUTPATIENT
Start: 2020-04-29 | End: 2020-04-29

## 2020-04-29 RX ORDER — NYSTATIN AND TRIAMCINOLONE ACETONIDE 100000; 1 [USP'U]/G; MG/G
OINTMENT TOPICAL 2 TIMES DAILY
Qty: 30 G | Refills: 0 | Status: SHIPPED | OUTPATIENT
Start: 2020-04-29 | End: 2020-05-04 | Stop reason: ALTCHOICE

## 2020-05-04 DIAGNOSIS — N89.8 VAGINA ITCHING: Primary | ICD-10-CM

## 2020-05-04 RX ORDER — NYSTATIN 100000 U/G
OINTMENT TOPICAL 2 TIMES DAILY
Qty: 30 G | Refills: 0 | Status: SHIPPED | OUTPATIENT
Start: 2020-05-04 | End: 2020-06-22

## 2020-06-19 ENCOUNTER — TELEPHONE (OUTPATIENT)
Dept: OBGYN CLINIC | Facility: CLINIC | Age: 35
End: 2020-06-19

## 2020-06-22 ENCOUNTER — APPOINTMENT (OUTPATIENT)
Dept: LAB | Facility: CLINIC | Age: 35
End: 2020-06-22
Payer: COMMERCIAL

## 2020-06-22 ENCOUNTER — ANNUAL EXAM (OUTPATIENT)
Dept: OBGYN CLINIC | Facility: CLINIC | Age: 35
End: 2020-06-22

## 2020-06-22 VITALS
SYSTOLIC BLOOD PRESSURE: 107 MMHG | WEIGHT: 147.6 LBS | BODY MASS INDEX: 27 KG/M2 | TEMPERATURE: 96.6 F | DIASTOLIC BLOOD PRESSURE: 74 MMHG | HEART RATE: 88 BPM

## 2020-06-22 DIAGNOSIS — Z20.2 POSSIBLE EXPOSURE TO STD: ICD-10-CM

## 2020-06-22 DIAGNOSIS — F17.200 SMOKER: ICD-10-CM

## 2020-06-22 DIAGNOSIS — Z01.419 ENCOUNTER FOR GYNECOLOGICAL EXAMINATION (GENERAL) (ROUTINE) WITHOUT ABNORMAL FINDINGS: Primary | ICD-10-CM

## 2020-06-22 PROBLEM — N89.8 VAGINAL DISCHARGE: Status: RESOLVED | Noted: 2020-04-24 | Resolved: 2020-06-22

## 2020-06-22 PROBLEM — L29.2 VULVAR ITCHING: Status: RESOLVED | Noted: 2020-04-24 | Resolved: 2020-06-22

## 2020-06-22 PROBLEM — R10.2 PELVIC PAIN IN FEMALE: Status: RESOLVED | Noted: 2020-02-07 | Resolved: 2020-06-22

## 2020-06-22 LAB — HCV AB SER QL: NORMAL

## 2020-06-22 PROCEDURE — 99395 PREV VISIT EST AGE 18-39: CPT | Performed by: OBSTETRICS & GYNECOLOGY

## 2020-06-22 PROCEDURE — 87389 HIV-1 AG W/HIV-1&-2 AB AG IA: CPT

## 2020-06-22 PROCEDURE — 36415 COLL VENOUS BLD VENIPUNCTURE: CPT

## 2020-06-22 PROCEDURE — 86592 SYPHILIS TEST NON-TREP QUAL: CPT

## 2020-06-22 PROCEDURE — 86803 HEPATITIS C AB TEST: CPT

## 2020-06-22 PROCEDURE — 87491 CHLMYD TRACH DNA AMP PROBE: CPT | Performed by: OBSTETRICS & GYNECOLOGY

## 2020-06-22 PROCEDURE — 87591 N.GONORRHOEAE DNA AMP PROB: CPT | Performed by: OBSTETRICS & GYNECOLOGY

## 2020-06-23 LAB
HIV 1+2 AB+HIV1 P24 AG SERPL QL IA: NORMAL
RPR SER QL: NORMAL

## 2020-06-24 ENCOUNTER — TELEPHONE (OUTPATIENT)
Dept: OBGYN CLINIC | Facility: CLINIC | Age: 35
End: 2020-06-24

## 2020-06-24 LAB
C TRACH DNA SPEC QL NAA+PROBE: NEGATIVE
N GONORRHOEA DNA SPEC QL NAA+PROBE: NEGATIVE

## 2020-09-04 ENCOUNTER — HOSPITAL ENCOUNTER (EMERGENCY)
Facility: HOSPITAL | Age: 35
Discharge: HOME | End: 2020-09-04
Attending: EMERGENCY MEDICINE
Payer: COMMERCIAL

## 2020-09-04 ENCOUNTER — APPOINTMENT (EMERGENCY)
Dept: CT IMAGING | Facility: HOSPITAL | Age: 35
End: 2020-09-04
Payer: COMMERCIAL

## 2020-09-04 VITALS
RESPIRATION RATE: 18 BRPM | DIASTOLIC BLOOD PRESSURE: 78 MMHG | BODY MASS INDEX: 26.91 KG/M2 | TEMPERATURE: 96.5 F | OXYGEN SATURATION: 96 % | HEART RATE: 65 BPM | SYSTOLIC BLOOD PRESSURE: 110 MMHG | WEIGHT: 147.13 LBS

## 2020-09-04 DIAGNOSIS — K29.70 GASTRITIS: Primary | ICD-10-CM

## 2020-09-04 DIAGNOSIS — K80.20 GALLSTONES: ICD-10-CM

## 2020-09-04 LAB
ALBUMIN SERPL BCP-MCNC: 4.3 G/DL (ref 3–5.2)
ALP SERPL-CCNC: 53 U/L (ref 43–122)
ALT SERPL W P-5'-P-CCNC: 25 U/L (ref 9–52)
ANION GAP SERPL CALCULATED.3IONS-SCNC: 8 MMOL/L (ref 5–14)
AST SERPL W P-5'-P-CCNC: 17 U/L (ref 14–36)
ATRIAL RATE: 62 BPM
BASOPHILS # BLD AUTO: 0.1 THOUSANDS/ΜL (ref 0–0.1)
BASOPHILS NFR BLD AUTO: 1 % (ref 0–1)
BILIRUB SERPL-MCNC: 0.3 MG/DL
BILIRUB UR QL STRIP: NEGATIVE
BUN SERPL-MCNC: 14 MG/DL (ref 5–25)
CALCIUM SERPL-MCNC: 9.2 MG/DL (ref 8.4–10.2)
CHLORIDE SERPL-SCNC: 104 MMOL/L (ref 97–108)
CLARITY UR: CLEAR
CO2 SERPL-SCNC: 23 MMOL/L (ref 22–30)
COLOR UR: NORMAL
CREAT SERPL-MCNC: 0.79 MG/DL (ref 0.6–1.2)
EOSINOPHIL # BLD AUTO: 0.2 THOUSAND/ΜL (ref 0–0.4)
EOSINOPHIL NFR BLD AUTO: 2 % (ref 0–6)
ERYTHROCYTE [DISTWIDTH] IN BLOOD BY AUTOMATED COUNT: 12.6 %
GFR SERPL CREATININE-BSD FRML MDRD: 98 ML/MIN/1.73SQ M
GLUCOSE SERPL-MCNC: 102 MG/DL (ref 70–99)
GLUCOSE UR STRIP-MCNC: NEGATIVE MG/DL
HCT VFR BLD AUTO: 39 % (ref 36–46)
HGB BLD-MCNC: 13.5 G/DL (ref 12–16)
HGB UR QL STRIP.AUTO: NEGATIVE
KETONES UR STRIP-MCNC: NEGATIVE MG/DL
LEUKOCYTE ESTERASE UR QL STRIP: NEGATIVE
LIPASE SERPL-CCNC: 59 U/L (ref 23–300)
LYMPHOCYTES # BLD AUTO: 2.4 THOUSANDS/ΜL (ref 0.5–4)
LYMPHOCYTES NFR BLD AUTO: 27 % (ref 25–45)
MCH RBC QN AUTO: 31.2 PG (ref 26–34)
MCHC RBC AUTO-ENTMCNC: 34.5 G/DL (ref 31–36)
MCV RBC AUTO: 90 FL (ref 80–100)
MONOCYTES # BLD AUTO: 0.7 THOUSAND/ΜL (ref 0.2–0.9)
MONOCYTES NFR BLD AUTO: 7 % (ref 1–10)
NEUTROPHILS # BLD AUTO: 5.7 THOUSANDS/ΜL (ref 1.8–7.8)
NEUTS SEG NFR BLD AUTO: 63 % (ref 45–65)
NITRITE UR QL STRIP: NEGATIVE
P AXIS: 61 DEGREES
PH UR STRIP.AUTO: 6 [PH]
PLATELET # BLD AUTO: 230 THOUSANDS/UL (ref 150–450)
PMV BLD AUTO: 9.7 FL (ref 8.9–12.7)
POTASSIUM SERPL-SCNC: 3.9 MMOL/L (ref 3.6–5)
PR INTERVAL: 164 MS
PROT SERPL-MCNC: 7.4 G/DL (ref 5.9–8.4)
PROT UR STRIP-MCNC: NEGATIVE MG/DL
QRS AXIS: 45 DEGREES
QRSD INTERVAL: 90 MS
QT INTERVAL: 388 MS
QTC INTERVAL: 393 MS
RBC # BLD AUTO: 4.32 MILLION/UL (ref 4–5.2)
SODIUM SERPL-SCNC: 135 MMOL/L (ref 137–147)
SP GR UR STRIP.AUTO: 1.02 (ref 1–1.04)
T WAVE AXIS: 37 DEGREES
TROPONIN I SERPL-MCNC: <0.01 NG/ML (ref 0–0.03)
UROBILINOGEN UA: NEGATIVE MG/DL
VENTRICULAR RATE: 62 BPM
WBC # BLD AUTO: 9 THOUSAND/UL (ref 4.5–11)

## 2020-09-04 PROCEDURE — G1004 CDSM NDSC: HCPCS

## 2020-09-04 PROCEDURE — 93005 ELECTROCARDIOGRAM TRACING: CPT

## 2020-09-04 PROCEDURE — 96375 TX/PRO/DX INJ NEW DRUG ADDON: CPT

## 2020-09-04 PROCEDURE — 96374 THER/PROPH/DIAG INJ IV PUSH: CPT

## 2020-09-04 PROCEDURE — 99284 EMERGENCY DEPT VISIT MOD MDM: CPT

## 2020-09-04 PROCEDURE — 99285 EMERGENCY DEPT VISIT HI MDM: CPT | Performed by: PHYSICIAN ASSISTANT

## 2020-09-04 PROCEDURE — 85025 COMPLETE CBC W/AUTO DIFF WBC: CPT | Performed by: PHYSICIAN ASSISTANT

## 2020-09-04 PROCEDURE — 84484 ASSAY OF TROPONIN QUANT: CPT | Performed by: PHYSICIAN ASSISTANT

## 2020-09-04 PROCEDURE — 80053 COMPREHEN METABOLIC PANEL: CPT | Performed by: PHYSICIAN ASSISTANT

## 2020-09-04 PROCEDURE — 83690 ASSAY OF LIPASE: CPT | Performed by: PHYSICIAN ASSISTANT

## 2020-09-04 PROCEDURE — 93010 ELECTROCARDIOGRAM REPORT: CPT | Performed by: INTERNAL MEDICINE

## 2020-09-04 PROCEDURE — 36415 COLL VENOUS BLD VENIPUNCTURE: CPT | Performed by: PHYSICIAN ASSISTANT

## 2020-09-04 PROCEDURE — 74177 CT ABD & PELVIS W/CONTRAST: CPT

## 2020-09-04 RX ORDER — ONDANSETRON 2 MG/ML
4 INJECTION INTRAMUSCULAR; INTRAVENOUS ONCE
Status: COMPLETED | OUTPATIENT
Start: 2020-09-04 | End: 2020-09-04

## 2020-09-04 RX ORDER — LIDOCAINE HYDROCHLORIDE 20 MG/ML
15 SOLUTION OROPHARYNGEAL ONCE
Status: COMPLETED | OUTPATIENT
Start: 2020-09-04 | End: 2020-09-04

## 2020-09-04 RX ORDER — SUCRALFATE ORAL 1 G/10ML
1 SUSPENSION ORAL 4 TIMES DAILY
Qty: 420 ML | Refills: 0 | Status: ON HOLD | OUTPATIENT
Start: 2020-09-04 | End: 2021-04-05 | Stop reason: CLARIF

## 2020-09-04 RX ORDER — PANTOPRAZOLE SODIUM 20 MG/1
20 TABLET, DELAYED RELEASE ORAL DAILY
Qty: 20 TABLET | Refills: 0 | Status: SHIPPED | OUTPATIENT
Start: 2020-09-04 | End: 2020-11-24 | Stop reason: SDUPTHER

## 2020-09-04 RX ORDER — MAGNESIUM HYDROXIDE/ALUMINUM HYDROXICE/SIMETHICONE 120; 1200; 1200 MG/30ML; MG/30ML; MG/30ML
30 SUSPENSION ORAL ONCE
Status: COMPLETED | OUTPATIENT
Start: 2020-09-04 | End: 2020-09-04

## 2020-09-04 RX ADMIN — FAMOTIDINE 20 MG: 10 INJECTION INTRAVENOUS at 05:29

## 2020-09-04 RX ADMIN — ONDANSETRON 4 MG: 2 INJECTION INTRAMUSCULAR; INTRAVENOUS at 05:29

## 2020-09-04 RX ADMIN — LIDOCAINE HYDROCHLORIDE 15 ML: 20 SOLUTION ORAL; TOPICAL at 05:33

## 2020-09-04 RX ADMIN — IOHEXOL 100 ML: 350 INJECTION, SOLUTION INTRAVENOUS at 06:17

## 2020-09-04 RX ADMIN — ALUMINUM HYDROXIDE, MAGNESIUM HYDROXIDE, AND SIMETHICONE 30 ML: 200; 200; 20 SUSPENSION ORAL at 05:33

## 2020-09-04 NOTE — ED PROVIDER NOTES
History  Chief Complaint   Patient presents with    Heartburn     Stomach pain and heartburn, took something for it, did not help, started at 0200 this morning   Vomiting     [de-identified] old female without significant past medical history presents complaining of sudden onset epigastric pain nausea vomiting and heartburn that began at 2:00 a m  This morning  States she was unable to sleep due to the pain  Denies history of similar  Took omeprazole at home without relief  Denies any new or spicy food ingestion  Denies recent travel, antibiotic use, fevers sick contacts  States that the pain as a 9/10 and radiates around to her back  Denies any other complaints        History provided by:  Patient   used: No    Heartburn   Location:  Epigastric   Severity:  Moderate  Onset quality:  Sudden  Duration:  3 hours  Timing:  Constant  Progression:  Worsening  Chronicity:  New  Associated symptoms: abdominal pain (Epigastric ), nausea and vomiting    Associated symptoms: no chest pain, no cough, no ear pain, no fatigue, no rash, no shortness of breath and no sore throat    Vomiting   Associated symptoms: abdominal pain (Epigastric )    Associated symptoms: no arthralgias, no chills, no cough and no sore throat        None       Past Medical History:   Diagnosis Date    Abdominal pain, left lateral     Abnormal Pap smear of cervix     Anemia     "bruises easily when anemic"    Constipation     Migraine headache with aura     managed with Elavil    Uses Welsh as primary spoken language     Wears glasses        Past Surgical History:   Procedure Laterality Date    GYNECOLOGIC CRYOSURGERY  2015    HYSTERECTOMY  2016    LAPAROSCOPY  2018    ND LAP,DIAGNOSTIC ABDOMEN N/A 8/9/2019    Procedure: LAPAROSCOPY DIAGNOSTIC; LYSIS OF ADHESIONS;  Surgeon: Luis Carlos Bernal MD;  [facility];  Service: Gynecology    ND LAP,LYSIS OF ADHESIONS N/A 3/3/2020    Procedure: L O A ;  Surgeon: Rowena Orosco MD;  Location: [facility] OR;  Service: Gynecology    TUBAL LIGATION         Family History   Problem Relation Age of Onset    Cancer Paternal Grandmother         uterine    Cancer Paternal Aunt         uterine     Breast cancer Neg Hx      I have reviewed and agree with the history as documented  E-Cigarette/Vaping    E-Cigarette Use Never User      E-Cigarette/Vaping Substances     Social History     Tobacco Use    Smoking status: Current Every Day Smoker     Packs/day: 0 25     Years: 15 00     Pack years: 3 75     Types: Cigarettes    Smokeless tobacco: Never Used    Tobacco comment: 1 pack every 3 or 4 days   Substance Use Topics    Alcohol use: Not Currently    Drug use: Never       Review of Systems   Constitutional: Negative  Negative for chills and fatigue  HENT: Negative for ear pain and sore throat  Eyes: Negative for photophobia and redness  Respiratory: Negative for apnea, cough and shortness of breath  Cardiovascular: Negative for chest pain  Gastrointestinal: Positive for abdominal pain (Epigastric ), nausea and vomiting  Genitourinary: Negative for dysuria  Musculoskeletal: Negative for arthralgias, neck pain and neck stiffness  Skin: Negative for rash  Neurological: Negative for dizziness, tremors, syncope and weakness  Psychiatric/Behavioral: Negative for suicidal ideas  Physical Exam  Physical Exam  Constitutional:       General: She is not in acute distress  Appearance: She is well-developed  She is not diaphoretic  Eyes:      Pupils: Pupils are equal, round, and reactive to light  Neck:      Musculoskeletal: Normal range of motion and neck supple  Cardiovascular:      Rate and Rhythm: Normal rate and regular rhythm  Pulmonary:      Effort: Pulmonary effort is normal  No respiratory distress  Breath sounds: Normal breath sounds  Abdominal:      General: Bowel sounds are normal  There is no distension        Palpations: Abdomen is soft  Comments: Soft nondistended  Tenderness with minor guarding in the epigastrium remaining areas nontender no rebound or rigidity negative CVA tenderness bilaterally  Musculoskeletal: Normal range of motion  Skin:     General: Skin is warm and dry  Neurological:      Mental Status: She is alert and oriented to person, place, and time           Vital Signs  ED Triage Vitals [09/04/20 0515]   Temperature Pulse Respirations Blood Pressure SpO2   (!) 96 5 °F (35 8 °C) 73 16 125/79 99 %      Temp Source Heart Rate Source Patient Position - Orthostatic VS BP Location FiO2 (%)   Tympanic Monitor Sitting Left arm --      Pain Score       9           Vitals:    09/04/20 0515 09/04/20 0619   BP: 125/79 127/79   Pulse: 73 85   Patient Position - Orthostatic VS: Sitting Lying         Visual Acuity      ED Medications  Medications   famotidine (PEPCID) injection 20 mg (20 mg Intravenous Given 9/4/20 0529)   Lidocaine Viscous HCl (XYLOCAINE) 2 % mucosal solution 15 mL (15 mL Swish & Spit Given 9/4/20 0533)   aluminum-magnesium hydroxide-simethicone (MYLANTA) 200-200-20 mg/5 mL oral suspension 30 mL (30 mL Oral Given 9/4/20 0533)   ondansetron (ZOFRAN) injection 4 mg (4 mg Intravenous Given 9/4/20 0529)   iohexol (OMNIPAQUE) 350 MG/ML injection (SINGLE-DOSE) 100 mL (100 mL Intravenous Given 9/4/20 0617)       Diagnostic Studies  Results Reviewed     Procedure Component Value Units Date/Time    UA (URINE) with reflex to Scope [[id number]]  (Normal) Collected:  09/04/20 0548    Lab Status:  Final result Specimen:  Urine, Clean Catch Updated:  09/04/20 0604     Color, UA Straw     Clarity, UA Clear     Specific [place], UA 1 020     pH, UA 6 0     Leukocytes, UA Negative     Nitrite, UA Negative     Protein, UA Negative mg/dl      Glucose, UA Negative mg/dl      Ketones, UA Negative mg/dl      Bilirubin, UA Negative     Blood, UA Negative     UROBILINOGEN UA Negative mg/dL     Troponin I [[id number]]  (Normal) Collected:  09/04/20 0528    Lab Status:  Final result Specimen:  Blood from Arm, Right Updated:  09/04/20 0557     Troponin I <0 01 ng/mL     Comprehensive metabolic panel [[id number]]  (Abnormal) Collected:  09/04/20 0528    Lab Status:  Final result Specimen:  Blood from Arm, Right Updated:  09/04/20 0547     Sodium 135 mmol/L      Potassium 3 9 mmol/L      Chloride 104 mmol/L      CO2 23 mmol/L      ANION GAP 8 mmol/L      BUN 14 mg/dL      Creatinine 0 79 mg/dL      Glucose 102 mg/dL      Calcium 9 2 mg/dL      AST 17 U/L      ALT 25 U/L      Alkaline Phosphatase 53 U/L      Total Protein 7 4 g/dL      Albumin 4 3 g/dL      Total Bilirubin 0 30 mg/dL      eGFR 98 ml/min/1 73sq m     Narrative:       National Kidney Disease Foundation guidelines for Chronic Kidney Disease (CKD):     Stage 1 with normal or high GFR (GFR > 90 mL/min/1 73 square meters)    Stage 2 Mild CKD (GFR = 60-89 mL/min/1 73 square meters)    Stage 3A Moderate CKD (GFR = 45-59 mL/min/1 73 square meters)    Stage 3B Moderate CKD (GFR = 30-44 mL/min/1 73 square meters)    Stage 4 Severe CKD (GFR = 15-29 mL/min/1 73 square meters)    Stage 5 End Stage CKD (GFR <15 mL/min/1 73 square meters)  Note: GFR calculation is accurate only with a steady state creatinine    Lipase [[id number]]  (Normal) Collected:  09/04/20 0528    Lab Status:  Final result Specimen:  Blood from Arm, Right Updated:  09/04/20 0547     Lipase 59 u/L     CBC and differential [[id number]]  (Normal) Collected:  09/04/20 0528    Lab Status:  Final result Specimen:  Blood from Arm, Right Updated:  09/04/20 0536     WBC 9 00 Thousand/uL      RBC 4 32 Million/uL      Hemoglobin 13 5 g/dL      Hematocrit 39 0 %      MCV 90 fL      MCH 31 2 pg      MCHC 34 5 g/dL      RDW 12 6 %      MPV 9 7 fL      Platelets 226 Thousands/uL      Neutrophils Relative 63 %      Lymphocytes Relative 27 %      Monocytes Relative 7 %      Eosinophils Relative 2 %      Basophils Relative 1 % Neutrophils Absolute 5 70 Thousands/µL      Lymphocytes Absolute 2 40 Thousands/µL      Monocytes Absolute 0 70 Thousand/µL      Eosinophils Absolute 0 20 Thousand/µL      Basophils Absolute 0 10 Thousands/µL                  CT abdomen pelvis with contrast   Final Result by Marcos Coker MD (09/04 3274)      New tiny gallstones in slightly distended gallbladder  No pericholecystic inflammatory changes  Trace free fluid in the dependent pelvis within physiologic limits  Otherwise, no evidence of acute abdominopelvic process  Stable 2 mm nonobstructing right renal calculus  Stable left adnexal hydrosalpinx  Negative significant      Workstation performed: [id number]                    Procedures  Procedures         ED Course  ED Course as of Sep 04 0648   Fri Sep 04, 2020   0518 EKG performed at 5:26 a m  Interpreted by myself and attending at 5:31 a m  Shows very sinus rhythm with sinus arrhythmia rate 62 normal axis no ectopy T-wave inversions noted in V1 no ST segment elevation or depression     ECG 12 lead   0636 On re evaluation pt states that she is feeling much better and that pain is now a 3/10          US AUDIT      Most Recent Value   Initial Alcohol Screen: US AUDIT-C    1  How often do you have a drink containing alcohol?  0 Filed at: 09/04/2020 0512   2  How many drinks containing alcohol do you have on a typical day you are drinking? 0 Filed at: 09/04/2020 0512   3a  Male UNDER 65: How often do you have five or more drinks on one occasion? 0 Filed at: 09/04/2020 0512   3b  FEMALE Any Age, or MALE 65+: How often do you have 4 or more drinks on one occassion? 0 Filed at: 09/04/2020 4640   Audit-C Score  0 Filed at: 09/04/2020 3399                  LISBETH/DAST-10      Most Recent Value   How many times in the past year have you    Used an illegal drug or used a prescription medication for non-medical reasons?   Never Filed at: 09/04/2020 2480 MDM  Number of Diagnoses or Management Options  Gallstones: new and does not require workup  Gastritis: new and does not require workup  Diagnosis management comments: Case discussed with attending  Patient mild evidence of tiny gallstones with gallbladder distention however no cholecystitis or obstructive pattern noted  Pain relieved with oral antacids here  Symptoms thought to be related to gastritis versus biliary sludge  Patient was educated on supportive care given strict return precautions  Demonstrates understanding  Advised follow-up with her primary care doctor  Educated on diet control  Stable for discharge home  Amount and/or Complexity of Data Reviewed  Clinical lab tests: ordered and reviewed  Tests in the radiology section of CPT®: ordered and reviewed    Risk of Complications, Morbidity, and/or Mortality  Presenting problems: moderate  Diagnostic procedures: moderate  Management options: moderate    Patient Progress  Patient progress: stable        Disposition  Final diagnoses:   Gastritis   Gallstones     Time reflects when diagnosis was documented in both MDM as applicable and the Disposition within this note     Time User Action Codes Description Comment    9/4/2020  6:37 AM Iola Bloch Add [K29 70] Gastritis     9/4/2020  6:44 AM Iola Bloch Add [K80 20] Gallstones       ED Disposition     ED Disposition Condition Date/Time Comment    Discharge Stable Fri Sep 4, 2020  6:44 AM Freida Bowser discharge to home/self care              Follow-up Information     Follow up With Specialties Details Why [address] [facility] Call  As needed [address]  49  [phone]      [facility] Emergency Department Emergency Medicine Go to  If symptoms worsen [address] [address] [phone]          Patient's Medications   Discharge Prescriptions    PANTOPRAZOLE (PROTONIX) 20 MG TABLET    Take 1 tablet (20 mg total) by mouth daily       Start Date: 9/4/2020  End Date: --       Order Dose: 20 mg       Quantity: 20 tablet    Refills: 0    SUCRALFATE (CARAFATE) 1 G/10 ML SUSPENSION    Take 10 mL (1 g total) by mouth 4 (four) times a day       Start Date: 9/4/2020  End Date: --       Order Dose: 1 g       Quantity: 420 mL    Refills: 0     No discharge procedures on file      PDMP Review     None          ED Provider  Electronically Signed by           Nu Mcgregor PA-C  09/04/20 6532

## 2020-09-04 NOTE — DISCHARGE INSTRUCTIONS
Take medications as directed  Avoid spicy greasy or acidic food  Return for  new or worsening complaints   Follow up with primary care

## 2020-09-08 NOTE — ED ATTENDING ATTESTATION
9/4/2020  IGuero DO, saw and evaluated the patient  I have discussed the patient with the resident/non-physician practitioner and agree with the resident's/non-physician practitioner's findings, Plan of Care, and MDM as documented in the resident's/non-physician practitioner's note, except where noted  All available labs and Radiology studies were reviewed  I was present for key portions of any procedure(s) performed by the resident/non-physician practitioner and I was immediately available to provide assistance  At this point I agree with the current assessment done in the Emergency Department        ED Course         Critical Care Time  Procedures

## 2020-10-26 ENCOUNTER — ANESTHESIA EVENT (OUTPATIENT)
Dept: PERIOP | Facility: HOSPITAL | Age: 35
End: 2020-10-26
Payer: COMMERCIAL

## 2020-10-26 ENCOUNTER — APPOINTMENT (EMERGENCY)
Dept: ULTRASOUND IMAGING | Facility: HOSPITAL | Age: 35
End: 2020-10-26
Payer: COMMERCIAL

## 2020-10-26 ENCOUNTER — HOSPITAL ENCOUNTER (OUTPATIENT)
Facility: HOSPITAL | Age: 35
Setting detail: OBSERVATION
Discharge: HOME | End: 2020-10-27
Attending: EMERGENCY MEDICINE | Admitting: SURGERY
Payer: COMMERCIAL

## 2020-10-26 DIAGNOSIS — K81.9 CHOLECYSTITIS: Primary | ICD-10-CM

## 2020-10-26 PROBLEM — IMO0001 SMOKING: Status: ACTIVE | Noted: 2020-10-26

## 2020-10-26 PROBLEM — Z90.710 HISTORY OF HYSTERECTOMY: Status: ACTIVE | Noted: 2020-10-26

## 2020-10-26 PROBLEM — F17.200 SMOKING: Status: ACTIVE | Noted: 2020-10-26

## 2020-10-26 LAB
ALBUMIN SERPL BCP-MCNC: 4.2 G/DL (ref 3–5.2)
ALP SERPL-CCNC: 67 U/L (ref 43–122)
ALT SERPL W P-5'-P-CCNC: 52 U/L (ref 9–52)
ANION GAP SERPL CALCULATED.3IONS-SCNC: 6 MMOL/L (ref 5–14)
APTT PPP: 30 SECONDS (ref 23–37)
AST SERPL W P-5'-P-CCNC: 38 U/L (ref 14–36)
BACTERIA UR QL AUTO: ABNORMAL /HPF
BASOPHILS # BLD AUTO: 0 THOUSANDS/ΜL (ref 0–0.1)
BASOPHILS NFR BLD AUTO: 0 % (ref 0–1)
BILIRUB SERPL-MCNC: 0.5 MG/DL
BILIRUB UR QL STRIP: NEGATIVE
BUN SERPL-MCNC: 14 MG/DL (ref 5–25)
CALCIUM SERPL-MCNC: 9.4 MG/DL (ref 8.4–10.2)
CHLORIDE SERPL-SCNC: 106 MMOL/L (ref 97–108)
CLARITY UR: ABNORMAL
CO2 SERPL-SCNC: 25 MMOL/L (ref 22–30)
COLOR UR: ABNORMAL
CREAT SERPL-MCNC: 0.7 MG/DL (ref 0.6–1.2)
EOSINOPHIL # BLD AUTO: 0.2 THOUSAND/ΜL (ref 0–0.4)
EOSINOPHIL NFR BLD AUTO: 2 % (ref 0–6)
ERYTHROCYTE [DISTWIDTH] IN BLOOD BY AUTOMATED COUNT: 13.2 %
GFR SERPL CREATININE-BSD FRML MDRD: 113 ML/MIN/1.73SQ M
GLUCOSE SERPL-MCNC: 100 MG/DL (ref 65–140)
GLUCOSE SERPL-MCNC: 101 MG/DL (ref 70–99)
GLUCOSE UR STRIP-MCNC: NEGATIVE MG/DL
HCT VFR BLD AUTO: 41.3 % (ref 36–46)
HGB BLD-MCNC: 13.6 G/DL (ref 12–16)
HGB UR QL STRIP.AUTO: 25
INR PPP: 1.03 (ref 0.84–1.19)
KETONES UR STRIP-MCNC: NEGATIVE MG/DL
LEUKOCYTE ESTERASE UR QL STRIP: 25
LIPASE SERPL-CCNC: 77 U/L (ref 23–300)
LYMPHOCYTES # BLD AUTO: 3 THOUSANDS/ΜL (ref 0.5–4)
LYMPHOCYTES NFR BLD AUTO: 31 % (ref 25–45)
MCH RBC QN AUTO: 29.8 PG (ref 26–34)
MCHC RBC AUTO-ENTMCNC: 33 G/DL (ref 31–36)
MCV RBC AUTO: 90 FL (ref 80–100)
MONOCYTES # BLD AUTO: 0.8 THOUSAND/ΜL (ref 0.2–0.9)
MONOCYTES NFR BLD AUTO: 8 % (ref 1–10)
NEUTROPHILS # BLD AUTO: 5.7 THOUSANDS/ΜL (ref 1.8–7.8)
NEUTS SEG NFR BLD AUTO: 59 % (ref 45–65)
NITRITE UR QL STRIP: NEGATIVE
NON-SQ EPI CELLS URNS QL MICRO: ABNORMAL /HPF
PH UR STRIP.AUTO: 6 [PH]
PLATELET # BLD AUTO: 287 THOUSANDS/UL (ref 150–450)
PMV BLD AUTO: 10.3 FL (ref 8.9–12.7)
POTASSIUM SERPL-SCNC: 3.6 MMOL/L (ref 3.6–5)
PROT SERPL-MCNC: 7.5 G/DL (ref 5.9–8.4)
PROT UR STRIP-MCNC: NEGATIVE MG/DL
PROTHROMBIN TIME: 13.6 SECONDS (ref 11.6–14.5)
RBC # BLD AUTO: 4.58 MILLION/UL (ref 4–5.2)
RBC #/AREA URNS AUTO: ABNORMAL /HPF
SARS-COV-2 RNA RESP QL NAA+PROBE: NEGATIVE
SODIUM SERPL-SCNC: 137 MMOL/L (ref 137–147)
SP GR UR STRIP.AUTO: 1.02 (ref 1–1.04)
UROBILINOGEN UA: 1 MG/DL
WBC # BLD AUTO: 9.7 THOUSAND/UL (ref 4.5–11)
WBC #/AREA URNS AUTO: ABNORMAL /HPF

## 2020-10-26 PROCEDURE — C9113 INJ PANTOPRAZOLE SODIUM, VIA: HCPCS | Performed by: EMERGENCY MEDICINE

## 2020-10-26 PROCEDURE — 81001 URINALYSIS AUTO W/SCOPE: CPT | Performed by: EMERGENCY MEDICINE

## 2020-10-26 PROCEDURE — 96365 THER/PROPH/DIAG IV INF INIT: CPT

## 2020-10-26 PROCEDURE — 87635 SARS-COV-2 COVID-19 AMP PRB: CPT | Performed by: SURGERY

## 2020-10-26 PROCEDURE — 99285 EMERGENCY DEPT VISIT HI MDM: CPT

## 2020-10-26 PROCEDURE — 80053 COMPREHEN METABOLIC PANEL: CPT | Performed by: EMERGENCY MEDICINE

## 2020-10-26 PROCEDURE — 85730 THROMBOPLASTIN TIME PARTIAL: CPT | Performed by: EMERGENCY MEDICINE

## 2020-10-26 PROCEDURE — 76705 ECHO EXAM OF ABDOMEN: CPT | Performed by: EMERGENCY MEDICINE

## 2020-10-26 PROCEDURE — 82948 REAGENT STRIP/BLOOD GLUCOSE: CPT

## 2020-10-26 PROCEDURE — 96376 TX/PRO/DX INJ SAME DRUG ADON: CPT

## 2020-10-26 PROCEDURE — 85610 PROTHROMBIN TIME: CPT | Performed by: EMERGENCY MEDICINE

## 2020-10-26 PROCEDURE — 85025 COMPLETE CBC W/AUTO DIFF WBC: CPT | Performed by: EMERGENCY MEDICINE

## 2020-10-26 PROCEDURE — 76705 ECHO EXAM OF ABDOMEN: CPT

## 2020-10-26 PROCEDURE — 36415 COLL VENOUS BLD VENIPUNCTURE: CPT | Performed by: EMERGENCY MEDICINE

## 2020-10-26 PROCEDURE — 87077 CULTURE AEROBIC IDENTIFY: CPT | Performed by: EMERGENCY MEDICINE

## 2020-10-26 PROCEDURE — 87186 SC STD MICRODIL/AGAR DIL: CPT | Performed by: EMERGENCY MEDICINE

## 2020-10-26 PROCEDURE — 96361 HYDRATE IV INFUSION ADD-ON: CPT

## 2020-10-26 PROCEDURE — 96375 TX/PRO/DX INJ NEW DRUG ADDON: CPT

## 2020-10-26 PROCEDURE — 81003 URINALYSIS AUTO W/O SCOPE: CPT | Performed by: EMERGENCY MEDICINE

## 2020-10-26 PROCEDURE — 99285 EMERGENCY DEPT VISIT HI MDM: CPT | Performed by: EMERGENCY MEDICINE

## 2020-10-26 PROCEDURE — 87086 URINE CULTURE/COLONY COUNT: CPT | Performed by: EMERGENCY MEDICINE

## 2020-10-26 PROCEDURE — 83690 ASSAY OF LIPASE: CPT | Performed by: EMERGENCY MEDICINE

## 2020-10-26 RX ORDER — ONDANSETRON 2 MG/ML
4 INJECTION INTRAMUSCULAR; INTRAVENOUS EVERY 4 HOURS PRN
Status: DISCONTINUED | OUTPATIENT
Start: 2020-10-26 | End: 2020-10-27 | Stop reason: HOSPADM

## 2020-10-26 RX ORDER — FENTANYL CITRATE 50 UG/ML
50 INJECTION, SOLUTION INTRAMUSCULAR; INTRAVENOUS ONCE
Status: COMPLETED | OUTPATIENT
Start: 2020-10-26 | End: 2020-10-26

## 2020-10-26 RX ORDER — MAGNESIUM HYDROXIDE/ALUMINUM HYDROXICE/SIMETHICONE 120; 1200; 1200 MG/30ML; MG/30ML; MG/30ML
30 SUSPENSION ORAL ONCE
Status: COMPLETED | OUTPATIENT
Start: 2020-10-26 | End: 2020-10-26

## 2020-10-26 RX ORDER — CEFAZOLIN SODIUM 2 G/50ML
2000 SOLUTION INTRAVENOUS EVERY 8 HOURS
Status: DISCONTINUED | OUTPATIENT
Start: 2020-10-26 | End: 2020-10-27

## 2020-10-26 RX ORDER — PANTOPRAZOLE SODIUM 40 MG/1
40 INJECTION, POWDER, FOR SOLUTION INTRAVENOUS ONCE
Status: COMPLETED | OUTPATIENT
Start: 2020-10-26 | End: 2020-10-26

## 2020-10-26 RX ORDER — HEPARIN SODIUM 5000 [USP'U]/ML
5000 INJECTION, SOLUTION INTRAVENOUS; SUBCUTANEOUS EVERY 8 HOURS SCHEDULED
Status: DISCONTINUED | OUTPATIENT
Start: 2020-10-26 | End: 2020-10-27 | Stop reason: HOSPADM

## 2020-10-26 RX ORDER — SODIUM CHLORIDE, SODIUM LACTATE, POTASSIUM CHLORIDE, CALCIUM CHLORIDE 600; 310; 30; 20 MG/100ML; MG/100ML; MG/100ML; MG/100ML
125 INJECTION, SOLUTION INTRAVENOUS CONTINUOUS
Status: DISCONTINUED | OUTPATIENT
Start: 2020-10-26 | End: 2020-10-27

## 2020-10-26 RX ORDER — HYDROMORPHONE HCL/PF 1 MG/ML
0.2 SYRINGE (ML) INJECTION
Status: DISCONTINUED | OUTPATIENT
Start: 2020-10-26 | End: 2020-10-27

## 2020-10-26 RX ORDER — LIDOCAINE HYDROCHLORIDE 20 MG/ML
15 SOLUTION OROPHARYNGEAL ONCE
Status: COMPLETED | OUTPATIENT
Start: 2020-10-26 | End: 2020-10-26

## 2020-10-26 RX ORDER — HYDROMORPHONE HCL/PF 1 MG/ML
0.5 SYRINGE (ML) INJECTION
Status: DISCONTINUED | OUTPATIENT
Start: 2020-10-26 | End: 2020-10-27

## 2020-10-26 RX ORDER — ACETAMINOPHEN 325 MG/1
650 TABLET ORAL EVERY 6 HOURS PRN
Status: DISCONTINUED | OUTPATIENT
Start: 2020-10-26 | End: 2020-10-27 | Stop reason: HOSPADM

## 2020-10-26 RX ADMIN — ALUMINUM HYDROXIDE, MAGNESIUM HYDROXIDE, AND SIMETHICONE 30 ML: 200; 200; 20 SUSPENSION ORAL at 07:19

## 2020-10-26 RX ADMIN — ONDANSETRON 8 MG: 2 INJECTION INTRAMUSCULAR; INTRAVENOUS at 07:35

## 2020-10-26 RX ADMIN — FENTANYL CITRATE 50 MCG: 50 INJECTION INTRAMUSCULAR; INTRAVENOUS at 08:23

## 2020-10-26 RX ADMIN — PANTOPRAZOLE SODIUM 40 MG: 40 INJECTION, POWDER, LYOPHILIZED, FOR SOLUTION INTRAVENOUS at 07:19

## 2020-10-26 RX ADMIN — HYDROMORPHONE HYDROCHLORIDE 0.5 MG: 1 INJECTION, SOLUTION INTRAMUSCULAR; INTRAVENOUS; SUBCUTANEOUS at 14:48

## 2020-10-26 RX ADMIN — HEPARIN SODIUM 5000 UNITS: 5000 INJECTION INTRAVENOUS; SUBCUTANEOUS at 22:10

## 2020-10-26 RX ADMIN — CEFAZOLIN SODIUM 2000 MG: 2 SOLUTION INTRAVENOUS at 20:37

## 2020-10-26 RX ADMIN — HYDROMORPHONE HYDROCHLORIDE 0.2 MG: 1 INJECTION, SOLUTION INTRAMUSCULAR; INTRAVENOUS; SUBCUTANEOUS at 11:34

## 2020-10-26 RX ADMIN — HEPARIN SODIUM 5000 UNITS: 5000 INJECTION INTRAVENOUS; SUBCUTANEOUS at 14:48

## 2020-10-26 RX ADMIN — DIPHENHYDRAMINE HYDROCHLORIDE 25 MG: 25 LIQUID ORAL at 07:20

## 2020-10-26 RX ADMIN — HYDROMORPHONE HYDROCHLORIDE 0.2 MG: 1 INJECTION, SOLUTION INTRAMUSCULAR; INTRAVENOUS; SUBCUTANEOUS at 20:37

## 2020-10-26 RX ADMIN — LIDOCAINE HYDROCHLORIDE 15 ML: 20 SOLUTION ORAL; TOPICAL at 07:19

## 2020-10-26 RX ADMIN — SODIUM CHLORIDE 1000 ML: 0.9 INJECTION, SOLUTION INTRAVENOUS at 07:15

## 2020-10-26 RX ADMIN — SODIUM CHLORIDE, SODIUM LACTATE, POTASSIUM CHLORIDE, AND CALCIUM CHLORIDE 125 ML/HR: .6; .31; .03; .02 INJECTION, SOLUTION INTRAVENOUS at 20:37

## 2020-10-26 RX ADMIN — FENTANYL CITRATE 50 MCG: 50 INJECTION INTRAMUSCULAR; INTRAVENOUS at 10:44

## 2020-10-26 RX ADMIN — SODIUM CHLORIDE, SODIUM LACTATE, POTASSIUM CHLORIDE, AND CALCIUM CHLORIDE 125 ML/HR: .6; .31; .03; .02 INJECTION, SOLUTION INTRAVENOUS at 11:00

## 2020-10-27 ENCOUNTER — ANESTHESIA (OUTPATIENT)
Dept: PERIOP | Facility: HOSPITAL | Age: 35
End: 2020-10-27
Payer: COMMERCIAL

## 2020-10-27 VITALS
TEMPERATURE: 97.4 F | HEIGHT: 62 IN | RESPIRATION RATE: 18 BRPM | WEIGHT: 150.79 LBS | BODY MASS INDEX: 27.75 KG/M2 | SYSTOLIC BLOOD PRESSURE: 114 MMHG | HEART RATE: 71 BPM | OXYGEN SATURATION: 97 % | DIASTOLIC BLOOD PRESSURE: 84 MMHG

## 2020-10-27 VITALS — HEART RATE: 85 BPM

## 2020-10-27 PROBLEM — K80.20 CALCULUS OF GALLBLADDER WITHOUT CHOLECYSTITIS WITHOUT OBSTRUCTION: Status: ACTIVE | Noted: 2020-10-27

## 2020-10-27 PROCEDURE — 47562 LAPAROSCOPIC CHOLECYSTECTOMY: CPT | Performed by: PHYSICIAN ASSISTANT

## 2020-10-27 PROCEDURE — 47562 LAPAROSCOPIC CHOLECYSTECTOMY: CPT | Performed by: SURGERY

## 2020-10-27 PROCEDURE — 99218 PR INITIAL OBSERVATION CARE/DAY 30 MINUTES: CPT | Performed by: SURGERY

## 2020-10-27 PROCEDURE — 88304 TISSUE EXAM BY PATHOLOGIST: CPT | Performed by: PATHOLOGY

## 2020-10-27 PROCEDURE — NC001 PR NO CHARGE: Performed by: SURGERY

## 2020-10-27 RX ORDER — CEFAZOLIN SODIUM 2 G/50ML
SOLUTION INTRAVENOUS AS NEEDED
Status: DISCONTINUED | OUTPATIENT
Start: 2020-10-27 | End: 2020-10-27

## 2020-10-27 RX ORDER — SODIUM CHLORIDE, SODIUM LACTATE, POTASSIUM CHLORIDE, CALCIUM CHLORIDE 600; 310; 30; 20 MG/100ML; MG/100ML; MG/100ML; MG/100ML
INJECTION, SOLUTION INTRAVENOUS CONTINUOUS PRN
Status: DISCONTINUED | OUTPATIENT
Start: 2020-10-27 | End: 2020-10-27

## 2020-10-27 RX ORDER — MAGNESIUM HYDROXIDE 1200 MG/15ML
LIQUID ORAL AS NEEDED
Status: DISCONTINUED | OUTPATIENT
Start: 2020-10-27 | End: 2020-10-27 | Stop reason: HOSPADM

## 2020-10-27 RX ORDER — PROPOFOL 10 MG/ML
INJECTION, EMULSION INTRAVENOUS AS NEEDED
Status: DISCONTINUED | OUTPATIENT
Start: 2020-10-27 | End: 2020-10-27

## 2020-10-27 RX ORDER — HYDROMORPHONE HCL/PF 1 MG/ML
0.5 SYRINGE (ML) INJECTION
Status: COMPLETED | OUTPATIENT
Start: 2020-10-27 | End: 2020-10-27

## 2020-10-27 RX ORDER — ACETAMINOPHEN 325 MG/1
650 TABLET ORAL EVERY 6 HOURS PRN
Qty: 30 TABLET | Refills: 0 | Status: SHIPPED | OUTPATIENT
Start: 2020-10-27 | End: 2021-01-13

## 2020-10-27 RX ORDER — ONDANSETRON 2 MG/ML
4 INJECTION INTRAMUSCULAR; INTRAVENOUS ONCE AS NEEDED
Status: DISCONTINUED | OUTPATIENT
Start: 2020-10-27 | End: 2020-10-27 | Stop reason: HOSPADM

## 2020-10-27 RX ORDER — ROCURONIUM BROMIDE 10 MG/ML
INJECTION, SOLUTION INTRAVENOUS AS NEEDED
Status: DISCONTINUED | OUTPATIENT
Start: 2020-10-27 | End: 2020-10-27

## 2020-10-27 RX ORDER — MIDAZOLAM HYDROCHLORIDE 2 MG/2ML
INJECTION, SOLUTION INTRAMUSCULAR; INTRAVENOUS AS NEEDED
Status: DISCONTINUED | OUTPATIENT
Start: 2020-10-27 | End: 2020-10-27

## 2020-10-27 RX ORDER — OXYCODONE HYDROCHLORIDE 5 MG/1
5 TABLET ORAL EVERY 4 HOURS PRN
Status: DISCONTINUED | OUTPATIENT
Start: 2020-10-27 | End: 2020-10-27 | Stop reason: HOSPADM

## 2020-10-27 RX ORDER — DEXAMETHASONE SODIUM PHOSPHATE 4 MG/ML
INJECTION, SOLUTION INTRA-ARTICULAR; INTRALESIONAL; INTRAMUSCULAR; INTRAVENOUS; SOFT TISSUE AS NEEDED
Status: DISCONTINUED | OUTPATIENT
Start: 2020-10-27 | End: 2020-10-27

## 2020-10-27 RX ORDER — PROMETHAZINE HYDROCHLORIDE 25 MG/ML
12.5 INJECTION, SOLUTION INTRAMUSCULAR; INTRAVENOUS ONCE AS NEEDED
Status: DISCONTINUED | OUTPATIENT
Start: 2020-10-27 | End: 2020-10-27 | Stop reason: HOSPADM

## 2020-10-27 RX ORDER — OXYCODONE HYDROCHLORIDE 5 MG/1
5 TABLET ORAL EVERY 4 HOURS PRN
Qty: 20 TABLET | Refills: 0 | Status: SHIPPED | OUTPATIENT
Start: 2020-10-27 | End: 2020-11-06

## 2020-10-27 RX ORDER — FENTANYL CITRATE 50 UG/ML
INJECTION, SOLUTION INTRAMUSCULAR; INTRAVENOUS AS NEEDED
Status: DISCONTINUED | OUTPATIENT
Start: 2020-10-27 | End: 2020-10-27

## 2020-10-27 RX ORDER — BUPIVACAINE HYDROCHLORIDE 5 MG/ML
INJECTION, SOLUTION PERINEURAL AS NEEDED
Status: DISCONTINUED | OUTPATIENT
Start: 2020-10-27 | End: 2020-10-27 | Stop reason: HOSPADM

## 2020-10-27 RX ORDER — OXYCODONE HYDROCHLORIDE 10 MG/1
10 TABLET ORAL EVERY 4 HOURS PRN
Status: DISCONTINUED | OUTPATIENT
Start: 2020-10-27 | End: 2020-10-27 | Stop reason: HOSPADM

## 2020-10-27 RX ORDER — SODIUM CHLORIDE 9 MG/ML
INJECTION, SOLUTION INTRAVENOUS AS NEEDED
Status: DISCONTINUED | OUTPATIENT
Start: 2020-10-27 | End: 2020-10-27 | Stop reason: HOSPADM

## 2020-10-27 RX ORDER — ONDANSETRON 2 MG/ML
INJECTION INTRAMUSCULAR; INTRAVENOUS AS NEEDED
Status: DISCONTINUED | OUTPATIENT
Start: 2020-10-27 | End: 2020-10-27

## 2020-10-27 RX ORDER — NEOSTIGMINE METHYLSULFATE 1 MG/ML
INJECTION INTRAVENOUS AS NEEDED
Status: DISCONTINUED | OUTPATIENT
Start: 2020-10-27 | End: 2020-10-27

## 2020-10-27 RX ORDER — GLYCOPYRROLATE 0.2 MG/ML
INJECTION INTRAMUSCULAR; INTRAVENOUS AS NEEDED
Status: DISCONTINUED | OUTPATIENT
Start: 2020-10-27 | End: 2020-10-27

## 2020-10-27 RX ORDER — LIDOCAINE HYDROCHLORIDE 10 MG/ML
INJECTION, SOLUTION EPIDURAL; INFILTRATION; INTRACAUDAL; PERINEURAL AS NEEDED
Status: DISCONTINUED | OUTPATIENT
Start: 2020-10-27 | End: 2020-10-27

## 2020-10-27 RX ADMIN — HYDROMORPHONE HYDROCHLORIDE 0.5 MG: 1 INJECTION, SOLUTION INTRAMUSCULAR; INTRAVENOUS; SUBCUTANEOUS at 14:23

## 2020-10-27 RX ADMIN — HYDROMORPHONE HYDROCHLORIDE 0.5 MG: 1 INJECTION, SOLUTION INTRAMUSCULAR; INTRAVENOUS; SUBCUTANEOUS at 14:43

## 2020-10-27 RX ADMIN — FENTANYL CITRATE 50 MCG: 50 INJECTION INTRAMUSCULAR; INTRAVENOUS at 12:48

## 2020-10-27 RX ADMIN — HYDROMORPHONE HYDROCHLORIDE 0.5 MG: 1 INJECTION, SOLUTION INTRAMUSCULAR; INTRAVENOUS; SUBCUTANEOUS at 14:33

## 2020-10-27 RX ADMIN — FENTANYL CITRATE 100 MCG: 50 INJECTION INTRAMUSCULAR; INTRAVENOUS at 13:54

## 2020-10-27 RX ADMIN — SODIUM CHLORIDE, SODIUM LACTATE, POTASSIUM CHLORIDE, AND CALCIUM CHLORIDE 125 ML/HR: .6; .31; .03; .02 INJECTION, SOLUTION INTRAVENOUS at 04:13

## 2020-10-27 RX ADMIN — ACETAMINOPHEN 650 MG: 325 TABLET ORAL at 07:11

## 2020-10-27 RX ADMIN — PROPOFOL 150 MG: 10 INJECTION, EMULSION INTRAVENOUS at 12:53

## 2020-10-27 RX ADMIN — HYDROMORPHONE HYDROCHLORIDE 0.5 MG: 1 INJECTION, SOLUTION INTRAMUSCULAR; INTRAVENOUS; SUBCUTANEOUS at 14:13

## 2020-10-27 RX ADMIN — ROCURONIUM BROMIDE 40 MG: 10 INJECTION, SOLUTION INTRAVENOUS at 12:53

## 2020-10-27 RX ADMIN — HYDROMORPHONE HYDROCHLORIDE 0.2 MG: 1 INJECTION, SOLUTION INTRAMUSCULAR; INTRAVENOUS; SUBCUTANEOUS at 05:19

## 2020-10-27 RX ADMIN — CEFAZOLIN SODIUM 2000 MG: 2 SOLUTION INTRAVENOUS at 12:45

## 2020-10-27 RX ADMIN — MIDAZOLAM HYDROCHLORIDE 2 MG: 1 INJECTION, SOLUTION INTRAMUSCULAR; INTRAVENOUS at 12:48

## 2020-10-27 RX ADMIN — CEFAZOLIN SODIUM 2000 MG: 2 SOLUTION INTRAVENOUS at 04:08

## 2020-10-27 RX ADMIN — GLYCOPYRROLATE 0.4 MG: 0.2 INJECTION, SOLUTION INTRAMUSCULAR; INTRAVENOUS at 13:46

## 2020-10-27 RX ADMIN — ONDANSETRON HYDROCHLORIDE 4 MG: 2 INJECTION, SOLUTION INTRAMUSCULAR; INTRAVENOUS at 13:38

## 2020-10-27 RX ADMIN — LIDOCAINE HYDROCHLORIDE 50 MG: 10 INJECTION, SOLUTION EPIDURAL; INFILTRATION; INTRACAUDAL; PERINEURAL at 12:53

## 2020-10-27 RX ADMIN — DEXAMETHASONE SODIUM PHOSPHATE 4 MG: 4 INJECTION, SOLUTION INTRA-ARTICULAR; INTRALESIONAL; INTRAMUSCULAR; INTRAVENOUS; SOFT TISSUE at 13:00

## 2020-10-27 RX ADMIN — FENTANYL CITRATE 50 MCG: 50 INJECTION INTRAMUSCULAR; INTRAVENOUS at 12:53

## 2020-10-27 RX ADMIN — SODIUM CHLORIDE, SODIUM LACTATE, POTASSIUM CHLORIDE, AND CALCIUM CHLORIDE: .6; .31; .03; .02 INJECTION, SOLUTION INTRAVENOUS at 12:40

## 2020-10-27 RX ADMIN — NEOSTIGMINE METHYLSULFATE 3 MG: 1 INJECTION INTRAVENOUS at 13:46

## 2020-10-28 LAB — BACTERIA UR CULT: ABNORMAL

## 2020-10-29 ENCOUNTER — TRANSITIONAL CARE MANAGEMENT (OUTPATIENT)
Dept: FAMILY MEDICINE CLINIC | Facility: CLINIC | Age: 35
End: 2020-10-29

## 2020-11-10 ENCOUNTER — OFFICE VISIT (OUTPATIENT)
Dept: SURGERY | Facility: CLINIC | Age: 35
End: 2020-11-10

## 2020-11-10 VITALS — TEMPERATURE: 98.3 F

## 2020-11-10 DIAGNOSIS — Z98.890 POST-OPERATIVE STATE: Primary | ICD-10-CM

## 2020-11-10 PROCEDURE — 99024 POSTOP FOLLOW-UP VISIT: CPT | Performed by: SURGERY

## 2020-11-24 ENCOUNTER — OFFICE VISIT (OUTPATIENT)
Dept: FAMILY MEDICINE CLINIC | Facility: CLINIC | Age: 35
End: 2020-11-24

## 2020-11-24 VITALS
RESPIRATION RATE: 18 BRPM | DIASTOLIC BLOOD PRESSURE: 64 MMHG | HEART RATE: 88 BPM | SYSTOLIC BLOOD PRESSURE: 118 MMHG | TEMPERATURE: 97.5 F | OXYGEN SATURATION: 97 % | WEIGHT: 150.1 LBS | HEIGHT: 62 IN | BODY MASS INDEX: 27.62 KG/M2

## 2020-11-24 DIAGNOSIS — Z23 ENCOUNTER FOR IMMUNIZATION: ICD-10-CM

## 2020-11-24 DIAGNOSIS — K29.70 GASTRITIS: Primary | ICD-10-CM

## 2020-11-24 DIAGNOSIS — H57.13 EYE PAIN, BILATERAL: ICD-10-CM

## 2020-11-24 DIAGNOSIS — G43.109 MIGRAINE WITH AURA AND WITHOUT STATUS MIGRAINOSUS, NOT INTRACTABLE: ICD-10-CM

## 2020-11-24 PROCEDURE — 90686 IIV4 VACC NO PRSV 0.5 ML IM: CPT | Performed by: PHYSICIAN ASSISTANT

## 2020-11-24 PROCEDURE — 3008F BODY MASS INDEX DOCD: CPT | Performed by: PHYSICIAN ASSISTANT

## 2020-11-24 PROCEDURE — 4004F PT TOBACCO SCREEN RCVD TLK: CPT | Performed by: PHYSICIAN ASSISTANT

## 2020-11-24 PROCEDURE — 99213 OFFICE O/P EST LOW 20 MIN: CPT | Performed by: PHYSICIAN ASSISTANT

## 2020-11-24 PROCEDURE — 90471 IMMUNIZATION ADMIN: CPT | Performed by: PHYSICIAN ASSISTANT

## 2020-11-24 PROCEDURE — 1111F DSCHRG MED/CURRENT MED MERGE: CPT | Performed by: PHYSICIAN ASSISTANT

## 2020-11-24 RX ORDER — PANTOPRAZOLE SODIUM 20 MG/1
20 TABLET, DELAYED RELEASE ORAL DAILY
Qty: 90 TABLET | Refills: 1 | Status: SHIPPED | OUTPATIENT
Start: 2020-11-24 | End: 2021-04-23

## 2020-11-24 RX ORDER — AMITRIPTYLINE HYDROCHLORIDE 25 MG/1
25 TABLET, FILM COATED ORAL
Qty: 90 TABLET | Refills: 1 | Status: SHIPPED | OUTPATIENT
Start: 2020-11-24 | End: 2022-02-25

## 2021-01-12 ENCOUNTER — TELEPHONE (OUTPATIENT)
Dept: OBGYN CLINIC | Facility: CLINIC | Age: 36
End: 2021-01-12

## 2021-01-13 ENCOUNTER — OFFICE VISIT (OUTPATIENT)
Dept: OBGYN CLINIC | Facility: CLINIC | Age: 36
End: 2021-01-13

## 2021-01-13 VITALS
HEART RATE: 97 BPM | WEIGHT: 151.8 LBS | DIASTOLIC BLOOD PRESSURE: 83 MMHG | SYSTOLIC BLOOD PRESSURE: 136 MMHG | BODY MASS INDEX: 27.76 KG/M2

## 2021-01-13 DIAGNOSIS — B37.3 VAGINA, CANDIDIASIS: ICD-10-CM

## 2021-01-13 DIAGNOSIS — N89.8 VAGINAL DISCHARGE: ICD-10-CM

## 2021-01-13 DIAGNOSIS — N89.8 VAGINA ITCHING: Primary | ICD-10-CM

## 2021-01-13 PROBLEM — Z98.890 STATUS POST LAPAROSCOPY: Status: RESOLVED | Noted: 2019-08-09 | Resolved: 2021-01-13

## 2021-01-13 PROBLEM — IMO0001 SMOKING: Status: RESOLVED | Noted: 2020-10-26 | Resolved: 2021-01-13

## 2021-01-13 PROBLEM — N76.0 BACTERIAL VAGINOSIS: Status: RESOLVED | Noted: 2020-03-31 | Resolved: 2021-01-13

## 2021-01-13 PROBLEM — F17.200 SMOKING: Status: RESOLVED | Noted: 2020-10-26 | Resolved: 2021-01-13

## 2021-01-13 PROBLEM — Z90.710 HISTORY OF HYSTERECTOMY: Status: RESOLVED | Noted: 2020-10-26 | Resolved: 2021-01-13

## 2021-01-13 PROBLEM — B96.89 BACTERIAL VAGINOSIS: Status: RESOLVED | Noted: 2020-03-31 | Resolved: 2021-01-13

## 2021-01-13 PROBLEM — K80.20 CALCULUS OF GALLBLADDER WITHOUT CHOLECYSTITIS WITHOUT OBSTRUCTION: Status: RESOLVED | Noted: 2020-10-27 | Resolved: 2021-01-13

## 2021-01-13 LAB
BV WHIFF TEST VAG QL: NEGATIVE
CLUE CELLS SPEC QL WET PREP: NEGATIVE
SL AMB POCT WET MOUNT: ABNORMAL
T VAGINALIS VAG QL WET PREP: NEGATIVE
YEAST VAG QL WET PREP: POSITIVE

## 2021-01-13 PROCEDURE — 4004F PT TOBACCO SCREEN RCVD TLK: CPT | Performed by: NURSE PRACTITIONER

## 2021-01-13 PROCEDURE — 99213 OFFICE O/P EST LOW 20 MIN: CPT | Performed by: NURSE PRACTITIONER

## 2021-01-13 PROCEDURE — 87210 SMEAR WET MOUNT SALINE/INK: CPT | Performed by: NURSE PRACTITIONER

## 2021-01-13 RX ORDER — FLUCONAZOLE 150 MG/1
150 TABLET ORAL ONCE
Qty: 1 TABLET | Refills: 0 | Status: SHIPPED | OUTPATIENT
Start: 2021-01-13 | End: 2021-01-13

## 2021-01-13 NOTE — PROGRESS NOTES
PROBLEM GYNECOLOGICAL VISIT    Mable Luke is a 30 y o  female who presents today with complaint of vaginal discharge and itching  Her general medical history has been reviewed and she reports it as follows:    Past Medical History:   Diagnosis Date    Abnormal Pap smear of cervix     cryosurgery    Migraine headache with aura     managed with Elavil     Past Surgical History:   Procedure Laterality Date    CHOLECYSTECTOMY LAPAROSCOPIC N/A 10/27/2020    Procedure: CHOLECYSTECTOMY LAPAROSCOPIC;  Surgeon: Jennifer Marti MD;  Location: [address] [place] [address] MAIN OR;  Service: General    GYNECOLOGIC CRYOSURGERY      HYSTERECTOMY      LAPAROSCOPY  2018    UT LAP,DIAGNOSTIC ABDOMEN N/A 2019    Procedure: LAPAROSCOPY DIAGNOSTIC; LYSIS OF ADHESIONS;  Surgeon: Grisel Amaya MD;  [facility];  Service: Gynecology    UT LAP,LYSIS OF ADHESIONS N/A 3/3/2020    Procedure: L O A ;  Surgeon: Juan Bear MD;  [facility];  Service: Gynecology    TUBAL LIGATION       OB History        2    Para   2    Term   1       1    AB   0    Living   2       SAB   0    TAB   0    Ectopic   0    Multiple   0    Live Births   2               Social History     Tobacco Use    Smoking status: Current Every Day Smoker     Packs/day: 0 25     Years: 15 00     Pack years: 3 75     Types: Cigarettes    Smokeless tobacco: Never Used   Substance Use Topics    Alcohol use: Yes     Frequency: Monthly or less    Drug use: Never       Current Outpatient Medications   Medication Instructions    amitriptyline (ELAVIL) 25 mg, Oral, Daily at bedtime    fluconazole (DIFLUCAN) 150 mg, Oral, Once    pantoprazole (PROTONIX) 20 mg, Oral, Daily    sucralfate (CARAFATE) 1 g, Oral, 4 times daily       History of Present Illness:   Reports vaginal itching and discharge for past 3-5 days  Denies vaginal odor or pelvic pain  Review of Systems:  Review of Systems   Constitutional: Negative  Gastrointestinal: Negative  Genitourinary: Positive for vaginal discharge  Negative for pelvic pain  Vaginal itching       Physical Exam:  /83   Pulse 97   Wt 68 9 kg (151 lb 12 8 oz)   BMI 27 76 kg/m²   Physical Exam  Genitourinary:      Vulva normal       Vaginal discharge and erythema present  Abdominal:      Palpations: Abdomen is soft  Tenderness: There is no abdominal tenderness  Neurological:      Mental Status: She is alert  Skin:     General: Skin is warm and dry  Vitals signs reviewed  Point of Care Testing:   -Wet mount: no clue cells, no trichomonads, rare WBC   -KOH mount: + hyphae   -Whiff: negative    Assessment:   1  Vaginal candidiasis  Plan:   1  Given Rx Diflucan     2  Return to office in 6/2021 as previously scheduled for annual GYN exam

## 2021-03-04 ENCOUNTER — OFFICE VISIT (OUTPATIENT)
Dept: OBGYN CLINIC | Facility: CLINIC | Age: 36
End: 2021-03-04

## 2021-03-04 VITALS
WEIGHT: 152 LBS | HEIGHT: 62 IN | BODY MASS INDEX: 27.97 KG/M2 | DIASTOLIC BLOOD PRESSURE: 77 MMHG | HEART RATE: 85 BPM | SYSTOLIC BLOOD PRESSURE: 116 MMHG

## 2021-03-04 DIAGNOSIS — Z11.3 SCREENING FOR STD (SEXUALLY TRANSMITTED DISEASE): ICD-10-CM

## 2021-03-04 DIAGNOSIS — R30.0 BURNING WITH URINATION: ICD-10-CM

## 2021-03-04 DIAGNOSIS — R35.0 FREQUENT URINATION: Primary | ICD-10-CM

## 2021-03-04 LAB
SL AMB  POCT GLUCOSE, UA: NEGATIVE
SL AMB LEUKOCYTE ESTERASE,UA: NEGATIVE
SL AMB POCT BILIRUBIN,UA: NEGATIVE
SL AMB POCT BLOOD,UA: NEGATIVE
SL AMB POCT CLARITY,UA: NEGATIVE
SL AMB POCT COLOR,UA: NEGATIVE
SL AMB POCT KETONES,UA: NEGATIVE
SL AMB POCT NITRITE,UA: NEGATIVE
SL AMB POCT PH,UA: NEGATIVE
SL AMB POCT SPECIFIC GRAVITY,UA: 1.01
SL AMB POCT URINE PROTEIN: NEGATIVE
SL AMB POCT UROBILINOGEN: NEGATIVE

## 2021-03-04 PROCEDURE — 81002 URINALYSIS NONAUTO W/O SCOPE: CPT | Performed by: NURSE PRACTITIONER

## 2021-03-04 PROCEDURE — 99213 OFFICE O/P EST LOW 20 MIN: CPT | Performed by: NURSE PRACTITIONER

## 2021-03-04 PROCEDURE — 87086 URINE CULTURE/COLONY COUNT: CPT | Performed by: NURSE PRACTITIONER

## 2021-03-04 PROCEDURE — 87186 SC STD MICRODIL/AGAR DIL: CPT | Performed by: NURSE PRACTITIONER

## 2021-03-04 PROCEDURE — 87077 CULTURE AEROBIC IDENTIFY: CPT | Performed by: NURSE PRACTITIONER

## 2021-03-04 NOTE — PROGRESS NOTES
PROBLEM GYNECOLOGICAL VISIT    Lacy Beck is a 30 y o  female who presents today with complaint of urinary frequency and burning with urination at urethral meatus  Her general medical history has been reviewed and she reports it as follows:    Past Medical History:   Diagnosis Date    Abnormal Pap smear of cervix     cryosurgery    Migraine headache with aura     managed with Elavil     Past Surgical History:   Procedure Laterality Date    CHOLECYSTECTOMY LAPAROSCOPIC N/A 10/27/2020    Procedure: CHOLECYSTECTOMY LAPAROSCOPIC;  Surgeon: Tiara Arriaga MD;  Location: [address] [place] [address] MAIN OR;  Service: General    GYNECOLOGIC CRYOSURGERY      HYSTERECTOMY      LAPAROSCOPY      AR LAP,DIAGNOSTIC ABDOMEN N/A 2019    Procedure: LAPAROSCOPY DIAGNOSTIC; LYSIS OF ADHESIONS;  Surgeon: Rimma Naranjo MD;  [facility];  Service: Gynecology    AR LAP,LYSIS OF ADHESIONS N/A 3/3/2020    Procedure: L O A ;  Surgeon: Ronen Bennett MD;  [facility];  Service: Gynecology    TUBAL LIGATION       OB History        2    Para   2    Term   1       1    AB   0    Living   2       SAB   0    TAB   0    Ectopic   0    Multiple   0    Live Births   2               Social History     Tobacco Use    Smoking status: Current Every Day Smoker     Packs/day: 0 25     Years: 15 00     Pack years: 3 75     Types: Cigarettes    Smokeless tobacco: Never Used   Substance Use Topics    Alcohol use: Yes     Frequency: Monthly or less    Drug use: Never       Current Outpatient Medications   Medication Instructions    amitriptyline (ELAVIL) 25 mg, Oral, Daily at bedtime    pantoprazole (PROTONIX) 20 mg, Oral, Daily    sucralfate (CARAFATE) 1 g, Oral, 4 times daily       History of Present Illness:   Reports started this morning with urinary frequency and burning at urethral meatus with urination  Denies vulvar/vaginal itching/burning  Denies vaginal discharge    Denies fever or backache  She also desires screening for GC/CT STD's  Review of Systems:  Review of Systems   Constitutional: Negative  Gastrointestinal: Negative  Genitourinary: Positive for frequency  Negative for dysuria, menstrual problem, pelvic pain, urgency, vaginal discharge and vaginal pain  Physical Exam:  /77 (BP Location: Left arm, Patient Position: Sitting, Cuff Size: Standard)   Pulse 85   Ht 5' 2" (1 575 m)   Wt 68 9 kg (152 lb)   BMI 27 80 kg/m²   Physical Exam  Constitutional:       General: She is not in acute distress  Genitourinary:      Vulva, urethra, bladder and vagina normal    Abdominal:      Palpations: Abdomen is soft  Tenderness: There is no abdominal tenderness  Neurological:      Mental Status: She is alert  Skin:     General: Skin is warm and dry  Vitals signs reviewed  Point of Care Testing:   -urinalysis: negative    Assessment:   1  Burning with urination  2  Urinary frequency  Plan:   1  Cultures ordered: urine, GC/CT  2  Advised push PO fluids and cranberry juice for next few days  3  Return to office prn worsening of symptoms  Reviewed with patient that test results are available [facility] immediately, but that they will not necessarily be reviewed by me immediately  Explained that I will review results at my earliest opportunity and contact patient appropriately

## 2021-03-05 PROCEDURE — 87491 CHLMYD TRACH DNA AMP PROBE: CPT | Performed by: NURSE PRACTITIONER

## 2021-03-05 PROCEDURE — 87591 N.GONORRHOEAE DNA AMP PROB: CPT | Performed by: NURSE PRACTITIONER

## 2021-03-06 LAB
C TRACH DNA SPEC QL NAA+PROBE: NEGATIVE
N GONORRHOEA DNA SPEC QL NAA+PROBE: NEGATIVE

## 2021-03-07 LAB — BACTERIA UR CULT: ABNORMAL

## 2021-03-08 ENCOUNTER — TELEPHONE (OUTPATIENT)
Dept: OBGYN CLINIC | Facility: CLINIC | Age: 36
End: 2021-03-08

## 2021-03-08 DIAGNOSIS — N39.0 URINARY TRACT INFECTION WITHOUT HEMATURIA, SITE UNSPECIFIED: Primary | ICD-10-CM

## 2021-03-08 RX ORDER — NITROFURANTOIN 25; 75 MG/1; MG/1
100 CAPSULE ORAL 2 TIMES DAILY
Qty: 10 CAPSULE | Refills: 0 | Status: SHIPPED | OUTPATIENT
Start: 2021-03-08 | End: 2021-03-13

## 2021-03-08 NOTE — TELEPHONE ENCOUNTER
Please advise patient that her urine culture showed some bacteria  Not enough to be called an infection, but since she is symptomatic, I am going to treat her with an antibiotic  Rx has been sent to her pharmacy for Su Sawant

## 2021-03-24 ENCOUNTER — HOSPITAL ENCOUNTER (EMERGENCY)
Facility: HOSPITAL | Age: 36
Discharge: HOME | End: 2021-03-25
Attending: EMERGENCY MEDICINE
Payer: COMMERCIAL

## 2021-03-24 VITALS
OXYGEN SATURATION: 99 % | SYSTOLIC BLOOD PRESSURE: 117 MMHG | DIASTOLIC BLOOD PRESSURE: 79 MMHG | HEART RATE: 115 BPM | TEMPERATURE: 99.5 F | WEIGHT: 154.32 LBS | RESPIRATION RATE: 19 BRPM | BODY MASS INDEX: 28.23 KG/M2

## 2021-03-24 DIAGNOSIS — K29.70 GASTRITIS: Primary | ICD-10-CM

## 2021-03-24 PROCEDURE — 96374 THER/PROPH/DIAG INJ IV PUSH: CPT

## 2021-03-24 PROCEDURE — 99284 EMERGENCY DEPT VISIT MOD MDM: CPT | Performed by: PHYSICIAN ASSISTANT

## 2021-03-24 PROCEDURE — 85025 COMPLETE CBC W/AUTO DIFF WBC: CPT | Performed by: PHYSICIAN ASSISTANT

## 2021-03-24 PROCEDURE — 83690 ASSAY OF LIPASE: CPT | Performed by: PHYSICIAN ASSISTANT

## 2021-03-24 PROCEDURE — 96361 HYDRATE IV INFUSION ADD-ON: CPT

## 2021-03-24 PROCEDURE — 81025 URINE PREGNANCY TEST: CPT | Performed by: PHYSICIAN ASSISTANT

## 2021-03-24 PROCEDURE — 99284 EMERGENCY DEPT VISIT MOD MDM: CPT

## 2021-03-24 PROCEDURE — 80053 COMPREHEN METABOLIC PANEL: CPT | Performed by: PHYSICIAN ASSISTANT

## 2021-03-24 PROCEDURE — 36415 COLL VENOUS BLD VENIPUNCTURE: CPT | Performed by: PHYSICIAN ASSISTANT

## 2021-03-24 RX ORDER — MAGNESIUM HYDROXIDE/ALUMINUM HYDROXICE/SIMETHICONE 120; 1200; 1200 MG/30ML; MG/30ML; MG/30ML
30 SUSPENSION ORAL ONCE
Status: COMPLETED | OUTPATIENT
Start: 2021-03-24 | End: 2021-03-24

## 2021-03-24 RX ORDER — LIDOCAINE HYDROCHLORIDE 20 MG/ML
15 SOLUTION OROPHARYNGEAL ONCE
Status: COMPLETED | OUTPATIENT
Start: 2021-03-24 | End: 2021-03-24

## 2021-03-24 RX ORDER — ONDANSETRON 2 MG/ML
4 INJECTION INTRAMUSCULAR; INTRAVENOUS ONCE
Status: COMPLETED | OUTPATIENT
Start: 2021-03-24 | End: 2021-03-24

## 2021-03-24 RX ADMIN — ONDANSETRON 4 MG: 2 INJECTION INTRAMUSCULAR; INTRAVENOUS at 23:42

## 2021-03-24 RX ADMIN — ALUMINUM HYDROXIDE, MAGNESIUM HYDROXIDE, AND SIMETHICONE 30 ML: 200; 200; 20 SUSPENSION ORAL at 23:42

## 2021-03-24 RX ADMIN — SODIUM CHLORIDE 1000 ML: 0.9 INJECTION, SOLUTION INTRAVENOUS at 23:42

## 2021-03-24 RX ADMIN — LIDOCAINE HYDROCHLORIDE 15 ML: 20 SOLUTION ORAL; TOPICAL at 23:42

## 2021-03-25 LAB
ALBUMIN SERPL BCP-MCNC: 4.5 G/DL (ref 3–5.2)
ALP SERPL-CCNC: 64 U/L (ref 43–122)
ALT SERPL W P-5'-P-CCNC: 31 U/L
ANION GAP SERPL CALCULATED.3IONS-SCNC: 8 MMOL/L (ref 5–14)
AST SERPL W P-5'-P-CCNC: 31 U/L (ref 14–36)
BACTERIA UR QL AUTO: ABNORMAL /HPF
BASOPHILS # BLD AUTO: 0.1 THOUSANDS/ΜL (ref 0–0.1)
BASOPHILS NFR BLD AUTO: 0 % (ref 0–1)
BILIRUB SERPL-MCNC: 0.5 MG/DL
BILIRUB UR QL STRIP: NEGATIVE
BUN SERPL-MCNC: 15 MG/DL (ref 5–25)
CALCIUM SERPL-MCNC: 9.2 MG/DL (ref 8.4–10.2)
CHLORIDE SERPL-SCNC: 104 MMOL/L (ref 97–108)
CLARITY UR: CLEAR
CO2 SERPL-SCNC: 27 MMOL/L (ref 22–30)
COLOR UR: ABNORMAL
CREAT SERPL-MCNC: 0.8 MG/DL (ref 0.6–1.2)
EOSINOPHIL # BLD AUTO: 0.2 THOUSAND/ΜL (ref 0–0.4)
EOSINOPHIL NFR BLD AUTO: 2 % (ref 0–6)
ERYTHROCYTE [DISTWIDTH] IN BLOOD BY AUTOMATED COUNT: 12.9 %
EXT PREG TEST URINE: NEGATIVE
EXT. CONTROL ED NAV: NORMAL
GFR SERPL CREATININE-BSD FRML MDRD: 96 ML/MIN/1.73SQ M
GLUCOSE SERPL-MCNC: 118 MG/DL (ref 70–99)
GLUCOSE UR STRIP-MCNC: NEGATIVE MG/DL
HCT VFR BLD AUTO: 40.9 % (ref 36–46)
HGB BLD-MCNC: 13.3 G/DL (ref 12–16)
HGB UR QL STRIP.AUTO: 10
KETONES UR STRIP-MCNC: NEGATIVE MG/DL
LEUKOCYTE ESTERASE UR QL STRIP: 25
LIPASE SERPL-CCNC: 82 U/L (ref 23–300)
LYMPHOCYTES # BLD AUTO: 2.2 THOUSANDS/ΜL (ref 0.5–4)
LYMPHOCYTES NFR BLD AUTO: 19 % (ref 25–45)
MCH RBC QN AUTO: 29.7 PG (ref 26–34)
MCHC RBC AUTO-ENTMCNC: 32.6 G/DL (ref 31–36)
MCV RBC AUTO: 91 FL (ref 80–100)
MONOCYTES # BLD AUTO: 0.8 THOUSAND/ΜL (ref 0.2–0.9)
MONOCYTES NFR BLD AUTO: 7 % (ref 1–10)
MUCOUS THREADS UR QL AUTO: ABNORMAL
NEUTROPHILS # BLD AUTO: 8.3 THOUSANDS/ΜL (ref 1.8–7.8)
NEUTS SEG NFR BLD AUTO: 72 % (ref 45–65)
NITRITE UR QL STRIP: NEGATIVE
NON-SQ EPI CELLS URNS QL MICRO: ABNORMAL /HPF
PH UR STRIP.AUTO: 6.5 [PH]
PLATELET # BLD AUTO: 250 THOUSANDS/UL (ref 150–450)
PMV BLD AUTO: 10.2 FL (ref 8.9–12.7)
POTASSIUM SERPL-SCNC: 4 MMOL/L (ref 3.6–5)
PROT SERPL-MCNC: 7.9 G/DL (ref 5.9–8.4)
PROT UR STRIP-MCNC: NEGATIVE MG/DL
RBC # BLD AUTO: 4.48 MILLION/UL (ref 4–5.2)
RBC #/AREA URNS AUTO: ABNORMAL /HPF
SODIUM SERPL-SCNC: 139 MMOL/L (ref 137–147)
SP GR UR STRIP.AUTO: 1.01 (ref 1–1.04)
UROBILINOGEN UA: 1 MG/DL
WBC # BLD AUTO: 11.6 THOUSAND/UL (ref 4.5–11)
WBC #/AREA URNS AUTO: ABNORMAL /HPF

## 2021-03-25 PROCEDURE — 81003 URINALYSIS AUTO W/O SCOPE: CPT | Performed by: PHYSICIAN ASSISTANT

## 2021-03-25 PROCEDURE — 81001 URINALYSIS AUTO W/SCOPE: CPT | Performed by: PHYSICIAN ASSISTANT

## 2021-03-25 RX ORDER — SUCRALFATE 1 G/1
1 TABLET ORAL 4 TIMES DAILY
Qty: 21 TABLET | Refills: 0 | Status: SHIPPED | OUTPATIENT
Start: 2021-03-25 | End: 2021-04-23

## 2021-03-25 RX ORDER — PANTOPRAZOLE SODIUM 20 MG/1
40 TABLET, DELAYED RELEASE ORAL DAILY
Qty: 14 TABLET | Refills: 0 | Status: ON HOLD | OUTPATIENT
Start: 2021-03-25 | End: 2021-04-05 | Stop reason: CLARIF

## 2021-03-25 NOTE — ED PROVIDER NOTES
History  Chief Complaint   Patient presents with    Abdominal Pain     pt c/o epigastric pain with nausea x 3 days  Patient presents for an evaluation of burning epigastric pain ongoing for the last 3 days  She does have a history of gastritis and cholecystectomy in October of 2020  Denies any vomiting but does report ongoing nausea  Denies any chest pain, shortness of breath, urinary symptoms, diarrhea  She has been taking her Protonix with little relief  Pain does not radiate from epigastric area  Pain worse with lying down  No fevers, chills, cough  No other symptoms or complaints  Prior to Admission Medications   Prescriptions Last Dose Informant Patient Reported?  Taking?   amitriptyline (ELAVIL) 25 mg tablet 3/24/2021 at Unknown time  No Yes   Sig: Take 1 tablet (25 mg total) by mouth daily at bedtime   pantoprazole (PROTONIX) 20 mg tablet 3/24/2021 at Unknown time  No Yes   Sig: Take 1 tablet (20 mg total) by mouth daily   sucralfate (CARAFATE) 1 g/10 mL suspension 3/24/2021 at Unknown time  No Yes   Sig: Take 10 mL (1 g total) by mouth 4 (four) times a day      Facility-Administered Medications: None       Past Medical History:   Diagnosis Date    Abnormal Pap smear of cervix 2015    cryosurgery    Migraine headache with aura     managed with Elavil       Past Surgical History:   Procedure Laterality Date    CHOLECYSTECTOMY LAPAROSCOPIC N/A 10/27/2020    Procedure: CHOLECYSTECTOMY LAPAROSCOPIC;  Surgeon: Ollie Banda MD;  Location: [facility] MAIN OR;  Service: General    GYNECOLOGIC CRYOSURGERY  2015    HYSTERECTOMY  2016    LAPAROSCOPY  2018    MA LAP,DIAGNOSTIC ABDOMEN N/A 8/9/2019    Procedure: LAPAROSCOPY DIAGNOSTIC; LYSIS OF ADHESIONS;  Surgeon: Anya Melendez MD;  [facility];  Service: Gynecology    MA LAP,LYSIS OF ADHESIONS N/A 3/3/2020    Procedure: L O A ;  Surgeon: Margarita Santos MD;  [facility];  Service: Gynecology    TUBAL LIGATION Family History   Problem Relation Age of Onset    Cancer Paternal Grandmother         uterine    Cancer Paternal Aunt         uterine     Breast cancer Neg Hx      I have reviewed and agree with the history as documented  E-Cigarette/Vaping    E-Cigarette Use Never User      E-Cigarette/Vaping Substances     Social History     Tobacco Use    Smoking status: Current Every Day Smoker     Packs/day: 0 25     Years: 15 00     Pack years: 3 75     Types: Cigarettes    Smokeless tobacco: Never Used   Substance Use Topics    Alcohol use: Yes     Frequency: Monthly or less    Drug use: Never       Review of Systems   Constitutional: Negative for chills and fever  HENT: Negative for congestion, ear pain and sore throat  Eyes: Negative for pain  Respiratory: Negative for cough and shortness of breath  Cardiovascular: Negative for chest pain  Gastrointestinal: Positive for abdominal pain and nausea  Negative for vomiting  Genitourinary: Negative for dysuria  Musculoskeletal: Negative for back pain  Skin: Negative for rash  Neurological: Negative for dizziness and numbness  Psychiatric/Behavioral: Negative for suicidal ideas  All other systems reviewed and are negative  Physical Exam  Physical Exam  Vitals signs reviewed  Constitutional:       Appearance: She is well-developed  HENT:      Head: Normocephalic and atraumatic  Right Ear: External ear normal       Left Ear: External ear normal       Nose: Nose normal    Neck:      Musculoskeletal: Normal range of motion and neck supple  Cardiovascular:      Rate and Rhythm: Normal rate and regular rhythm  Heart sounds: Normal heart sounds  Pulmonary:      Effort: Pulmonary effort is normal       Breath sounds: Normal breath sounds  Abdominal:      General: A surgical scar is present  Bowel sounds are normal       Palpations: Abdomen is soft  Tenderness: There is abdominal tenderness in the epigastric area   There is no right CVA tenderness, left CVA tenderness or guarding  Musculoskeletal: Normal range of motion  Skin:     General: Skin is warm and dry  Capillary Refill: Capillary refill takes less than 2 seconds  Neurological:      Mental Status: She is alert and oriented to person, place, and time     Psychiatric:         Behavior: Behavior normal          Vital Signs  ED Triage Vitals [03/24/21 2321]   Temperature Pulse Respirations Blood Pressure SpO2   99 5 °F (37 5 °C) (!) 115 19 117/79 99 %      Temp Source Heart Rate Source Patient Position - Orthostatic VS BP Location FiO2 (%)   Tympanic Monitor Sitting Left arm --      Pain Score       7           Vitals:    03/24/21 2321   BP: 117/79   Pulse: (!) 115   Patient Position - Orthostatic VS: Sitting         Visual Acuity      ED Medications  Medications   sodium chloride 0 9 % bolus 1,000 mL (0 mL Intravenous Stopped 3/25/21 0042)   ondansetron (ZOFRAN) injection 4 mg (4 mg Intravenous Given 3/24/21 2342)   aluminum-magnesium hydroxide-simethicone (MYLANTA) oral suspension 30 mL (30 mL Oral Given 3/24/21 2342)   Lidocaine Viscous HCl (XYLOCAINE) 2 % mucosal solution 15 mL (15 mL Swish & Spit Given 3/24/21 2342)       Diagnostic Studies  Results Reviewed     Procedure Component Value Units Date/Time    POCT pregnancy, urine [[id number]]  (Normal) Resulted: 03/25/21 0000    Lab Status: Edited Result - FINAL Updated: 03/25/21 0044     EXT PREG TEST UR (Ref: Negative) negative     Control valid    Urine Microscopic [[id number]]  (Abnormal) Collected: 03/25/21 0004    Lab Status: Final result Specimen: Urine, Clean Catch Updated: 03/25/21 0015     RBC, UA 1-2 /hpf      WBC, UA 2-4 /hpf      Epithelial Cells Moderate /hpf      Bacteria, UA Moderate /hpf      MUCUS THREADS Occasional    UA w Reflex to Microscopic w Reflex to Culture [[id number]]  (Abnormal) Collected: 03/25/21 0004    Lab Status: Final result Specimen: Urine, Clean Catch Updated: 03/25/21 0013 Color, UA Mary     Clarity, UA Clear     Specific Gravity, UA 1 015     pH, UA 6 5     Leukocytes, UA 25 0     Nitrite, UA Negative     Protein, UA Negative mg/dl      Glucose, UA Negative mg/dl      Ketones, UA Negative mg/dl      Bilirubin, UA Negative     Blood, UA 10 0     UROBILINOGEN UA 1 0 mg/dL     Lipase [[id number]]  (Normal) Collected: 03/24/21 2342    Lab Status: Final result Specimen: Blood from Arm, Left Updated: 03/25/21 0012     Lipase 82 u/L     Comprehensive metabolic panel [[id number]]  (Abnormal) Collected: 03/24/21 2342    Lab Status: Final result Specimen: Blood from Arm, Left Updated: 03/25/21 0002     Sodium 139 mmol/L      Potassium 4 0 mmol/L      Chloride 104 mmol/L      CO2 27 mmol/L      ANION GAP 8 mmol/L      BUN 15 mg/dL      Creatinine 0 80 mg/dL      Glucose 118 mg/dL      Calcium 9 2 mg/dL      AST 31 U/L      ALT 31 U/L      Alkaline Phosphatase 64 U/L      Total Protein 7 9 g/dL      Albumin 4 5 g/dL      Total Bilirubin 0 50 mg/dL      eGFR 96 ml/min/1 73sq m     Narrative:      [facility] guidelines for Chronic Kidney Disease (CKD):     Stage 1 with normal or high GFR (GFR > 90 mL/min/1 73 square meters)    Stage 2 Mild CKD (GFR = 60-89 mL/min/1 73 square meters)    Stage 3A Moderate CKD (GFR = 45-59 mL/min/1 73 square meters)    Stage 3B Moderate CKD (GFR = 30-44 mL/min/1 73 square meters)    Stage 4 Severe CKD (GFR = 15-29 mL/min/1 73 square meters)    Stage 5 End Stage CKD (GFR <15 mL/min/1 73 square meters)  Note: GFR calculation is accurate only with a steady state creatinine    CBC and differential [[id number]]  (Abnormal) Collected: 03/24/21 2342    Lab Status: Final result Specimen: Blood from Arm, Left Updated: 03/25/21 0002     WBC 11 60 Thousand/uL      RBC 4 48 Million/uL      Hemoglobin 13 3 g/dL      Hematocrit 40 9 %      MCV 91 fL      MCH 29 7 pg      MCHC 32 6 g/dL      RDW 12 9 %      MPV 10 2 fL      Platelets 945 Thousands/uL Neutrophils Relative 72 %      Lymphocytes Relative 19 %      Monocytes Relative 7 %      Eosinophils Relative 2 %      Basophils Relative 0 %      Neutrophils Absolute 8 30 Thousands/µL      Lymphocytes Absolute 2 20 Thousands/µL      Monocytes Absolute 0 80 Thousand/µL      Eosinophils Absolute 0 20 Thousand/µL      Basophils Absolute 0 10 Thousands/µL                  No orders to display              Procedures  Procedures         ED Course                             SBIRT 22yo+      Most Recent Value   SBIRT (24 yo +)   In order to provide better care to our patients, we are screening all of our patients for alcohol and drug use  Would it be okay to ask you these screening questions? Yes Filed at: 03/24/2021 2331   Initial Alcohol Screen: US AUDIT-C    1  How often do you have a drink containing alcohol?  0 Filed at: 03/24/2021 2331   2  How many drinks containing alcohol do you have on a typical day you are drinking? 0 Filed at: 03/24/2021 2331   3b  FEMALE Any Age, or MALE 65+: How often do you have 4 or more drinks on one occassion? 0 Filed at: 03/24/2021 2331   Audit-C Score  0 Filed at: 03/24/2021 2331   LISBETH: How many times in the past year have you    Used an illegal drug or used a prescription medication for non-medical reasons? Never Filed at: 03/24/2021 2331                    MDM  Number of Diagnoses or Management Options  Gastritis:   Diagnosis management comments: Symptoms now resolved  Likely gastritis  Will add carafate to her protonix regiment  Instructed to follow up with PCP  Also given referral for GI to follow up with  Patient agreeable with plan  Patient verbalizes understanding and agrees with plan  The management plan was discussed in detail with the patient at bedside and all questions were answered  Prior to discharge, I provided both verbal and written instructions  I discussed with the patient the signs and symptoms for which to return to the emergency department    All questions were answered and patient was comfortable with the plan of care and discharged to home  The patient agrees to return to the Emergency Department for concerns and/or progression of illness  Disposition  Final diagnoses:   Gastritis     Time reflects when diagnosis was documented in both MDM as applicable and the Disposition within this note     Time User Action Codes Description Comment    3/25/2021 12:27 AM Sara Rothman Add [K29 70] Gastritis       ED Disposition     ED Disposition Condition Date/Time Comment    Discharge Stable Thu Mar 25, 2021 12:27 AM Kaseyjorge Lujan discharge to home/self care  Follow-up Information     Follow up With Specialties Details Why Contact Info Additional Information    [facility]   [address]  [address] [facility] [id number] [facility] Call in 2 days  [address] [facility] [address]  Ruby Fleming [address] [facility], [address], [address] [place] [address], [place], [place], [address] [phone]          Discharge Medication List as of 3/25/2021 12:28 AM      START taking these medications    Details   ! ! pantoprazole (PROTONIX) 20 mg tablet Take 2 tablets (40 mg total) by mouth daily, Starting Thu 3/25/2021, Normal      sucralfate (CARAFATE) 1 g tablet Take 1 tablet (1 g total) by mouth 4 (four) times a day, Starting Thu 3/25/2021, Normal       !! - Potential duplicate medications found  Please discuss with provider  CONTINUE these medications which have NOT CHANGED    Details   amitriptyline (ELAVIL) 25 mg tablet Take 1 tablet (25 mg total) by mouth daily at bedtime, Starting Tue 11/24/2020, Normal      !!  pantoprazole (PROTONIX) 20 mg tablet Take 1 tablet (20 mg total) by mouth daily, Starting Tue 11/24/2020, Normal      sucralfate (CARAFATE) 1 g/10 mL suspension Take 10 mL (1 g total) by mouth 4 (four) times a day, Starting Fri 9/4/2020, Normal       !! - Potential duplicate medications found  Please discuss with provider  No discharge procedures on file      PDMP Review     None          ED Provider  Electronically Signed by           Tiera Ferrer PA-C  03/25/21 0045

## 2021-03-25 NOTE — DISCHARGE INSTRUCTIONS
Please follow up with your family doctor  If you do not have one, I have provided you with a referral  I have also provided you with a referral for GI  Use medication as prescribed  Please return to the ER with any worsening symptoms

## 2021-03-26 ENCOUNTER — APPOINTMENT (EMERGENCY)
Dept: RADIOLOGY | Facility: HOSPITAL | Age: 36
End: 2021-03-26
Payer: COMMERCIAL

## 2021-03-26 ENCOUNTER — HOSPITAL ENCOUNTER (EMERGENCY)
Facility: HOSPITAL | Age: 36
Discharge: HOME | End: 2021-03-27
Attending: EMERGENCY MEDICINE | Admitting: EMERGENCY MEDICINE
Payer: COMMERCIAL

## 2021-03-26 DIAGNOSIS — K29.70 GASTRITIS: ICD-10-CM

## 2021-03-26 DIAGNOSIS — R10.13 EPIGASTRIC PAIN: Primary | ICD-10-CM

## 2021-03-26 LAB
ALBUMIN SERPL BCP-MCNC: 3.6 G/DL (ref 3.5–5)
ALP SERPL-CCNC: 69 U/L (ref 46–116)
ALT SERPL W P-5'-P-CCNC: 38 U/L (ref 12–78)
ANION GAP SERPL CALCULATED.3IONS-SCNC: 8 MMOL/L (ref 4–13)
AST SERPL W P-5'-P-CCNC: 14 U/L (ref 5–45)
BASOPHILS # BLD AUTO: 0.05 THOUSANDS/ΜL (ref 0–0.1)
BASOPHILS NFR BLD AUTO: 1 % (ref 0–1)
BILIRUB SERPL-MCNC: 0.21 MG/DL (ref 0.2–1)
BUN SERPL-MCNC: 12 MG/DL (ref 5–25)
CALCIUM SERPL-MCNC: 8.5 MG/DL (ref 8.3–10.1)
CHLORIDE SERPL-SCNC: 106 MMOL/L (ref 100–108)
CO2 SERPL-SCNC: 26 MMOL/L (ref 21–32)
CREAT SERPL-MCNC: 0.91 MG/DL (ref 0.6–1.3)
EOSINOPHIL # BLD AUTO: 0.14 THOUSAND/ΜL (ref 0–0.61)
EOSINOPHIL NFR BLD AUTO: 2 % (ref 0–6)
ERYTHROCYTE [DISTWIDTH] IN BLOOD BY AUTOMATED COUNT: 12.3 % (ref 11.6–15.1)
GFR SERPL CREATININE-BSD FRML MDRD: 83 ML/MIN/1.73SQ M
GLUCOSE SERPL-MCNC: 115 MG/DL (ref 65–140)
HCT VFR BLD AUTO: 38.3 % (ref 34.8–46.1)
HGB BLD-MCNC: 12.7 G/DL (ref 11.5–15.4)
IMM GRANULOCYTES # BLD AUTO: 0.05 THOUSAND/UL (ref 0–0.2)
IMM GRANULOCYTES NFR BLD AUTO: 1 % (ref 0–2)
LIPASE SERPL-CCNC: 87 U/L (ref 73–393)
LYMPHOCYTES # BLD AUTO: 2.21 THOUSANDS/ΜL (ref 0.6–4.47)
LYMPHOCYTES NFR BLD AUTO: 24 % (ref 14–44)
MCH RBC QN AUTO: 30.5 PG (ref 26.8–34.3)
MCHC RBC AUTO-ENTMCNC: 33.2 G/DL (ref 31.4–37.4)
MCV RBC AUTO: 92 FL (ref 82–98)
MONOCYTES # BLD AUTO: 0.63 THOUSAND/ΜL (ref 0.17–1.22)
MONOCYTES NFR BLD AUTO: 7 % (ref 4–12)
NEUTROPHILS # BLD AUTO: 6 THOUSANDS/ΜL (ref 1.85–7.62)
NEUTS SEG NFR BLD AUTO: 65 % (ref 43–75)
NRBC BLD AUTO-RTO: 0 /100 WBCS
PLATELET # BLD AUTO: 231 THOUSANDS/UL (ref 149–390)
PMV BLD AUTO: 11.4 FL (ref 8.9–12.7)
POTASSIUM SERPL-SCNC: 3.8 MMOL/L (ref 3.5–5.3)
PROT SERPL-MCNC: 7 G/DL (ref 6.4–8.2)
RBC # BLD AUTO: 4.16 MILLION/UL (ref 3.81–5.12)
SODIUM SERPL-SCNC: 140 MMOL/L (ref 136–145)
WBC # BLD AUTO: 9.08 THOUSAND/UL (ref 4.31–10.16)

## 2021-03-26 PROCEDURE — 96361 HYDRATE IV INFUSION ADD-ON: CPT

## 2021-03-26 PROCEDURE — 83690 ASSAY OF LIPASE: CPT | Performed by: EMERGENCY MEDICINE

## 2021-03-26 PROCEDURE — 36415 COLL VENOUS BLD VENIPUNCTURE: CPT | Performed by: EMERGENCY MEDICINE

## 2021-03-26 PROCEDURE — C9113 INJ PANTOPRAZOLE SODIUM, VIA: HCPCS | Performed by: EMERGENCY MEDICINE

## 2021-03-26 PROCEDURE — 96375 TX/PRO/DX INJ NEW DRUG ADDON: CPT

## 2021-03-26 PROCEDURE — 96374 THER/PROPH/DIAG INJ IV PUSH: CPT

## 2021-03-26 PROCEDURE — 85025 COMPLETE CBC W/AUTO DIFF WBC: CPT | Performed by: EMERGENCY MEDICINE

## 2021-03-26 PROCEDURE — 80053 COMPREHEN METABOLIC PANEL: CPT | Performed by: EMERGENCY MEDICINE

## 2021-03-26 PROCEDURE — 99285 EMERGENCY DEPT VISIT HI MDM: CPT | Performed by: EMERGENCY MEDICINE

## 2021-03-26 PROCEDURE — 99284 EMERGENCY DEPT VISIT MOD MDM: CPT

## 2021-03-26 PROCEDURE — 71045 X-RAY EXAM CHEST 1 VIEW: CPT

## 2021-03-26 RX ORDER — PANTOPRAZOLE SODIUM 40 MG/1
40 INJECTION, POWDER, FOR SOLUTION INTRAVENOUS ONCE
Status: COMPLETED | OUTPATIENT
Start: 2021-03-26 | End: 2021-03-26

## 2021-03-26 RX ORDER — DICYCLOMINE HCL 20 MG
20 TABLET ORAL 2 TIMES DAILY
Qty: 10 TABLET | Refills: 0 | Status: ON HOLD | OUTPATIENT
Start: 2021-03-26 | End: 2021-04-05 | Stop reason: CLARIF

## 2021-03-26 RX ORDER — METOCLOPRAMIDE 10 MG/1
10 TABLET ORAL 3 TIMES DAILY PRN
Qty: 15 TABLET | Refills: 0 | Status: SHIPPED | OUTPATIENT
Start: 2021-03-26 | End: 2021-06-23

## 2021-03-26 RX ORDER — MAGNESIUM HYDROXIDE/ALUMINUM HYDROXICE/SIMETHICONE 120; 1200; 1200 MG/30ML; MG/30ML; MG/30ML
30 SUSPENSION ORAL ONCE
Status: COMPLETED | OUTPATIENT
Start: 2021-03-26 | End: 2021-03-26

## 2021-03-26 RX ORDER — LIDOCAINE HYDROCHLORIDE 20 MG/ML
15 SOLUTION OROPHARYNGEAL ONCE
Status: COMPLETED | OUTPATIENT
Start: 2021-03-26 | End: 2021-03-26

## 2021-03-26 RX ADMIN — SODIUM CHLORIDE 1000 ML: 0.9 INJECTION, SOLUTION INTRAVENOUS at 23:20

## 2021-03-26 RX ADMIN — MORPHINE SULFATE 2 MG: 2 INJECTION, SOLUTION INTRAMUSCULAR; INTRAVENOUS at 23:22

## 2021-03-26 RX ADMIN — PANTOPRAZOLE SODIUM 40 MG: 40 INJECTION, POWDER, FOR SOLUTION INTRAVENOUS at 23:20

## 2021-03-26 RX ADMIN — ALUMINUM HYDROXIDE, MAGNESIUM HYDROXIDE, AND SIMETHICONE 30 ML: 200; 200; 20 SUSPENSION ORAL at 23:22

## 2021-03-26 RX ADMIN — LIDOCAINE HYDROCHLORIDE 15 ML: 20 SOLUTION ORAL; TOPICAL at 23:23

## 2021-03-27 VITALS
BODY MASS INDEX: 28.06 KG/M2 | HEART RATE: 90 BPM | DIASTOLIC BLOOD PRESSURE: 80 MMHG | OXYGEN SATURATION: 99 % | TEMPERATURE: 99 F | RESPIRATION RATE: 18 BRPM | SYSTOLIC BLOOD PRESSURE: 128 MMHG | WEIGHT: 153.44 LBS

## 2021-03-27 PROCEDURE — 99284 EMERGENCY DEPT VISIT MOD MDM: CPT | Performed by: EMERGENCY MEDICINE

## 2021-03-27 NOTE — ED PROVIDER NOTES
12:32 AM  Patient signed out to me earlier  Labs are normal   Symptoms controlled  Will discharge home  Prescriptions and instructions already done by previous physician       Es Singh DO  03/27/21 0044

## 2021-03-27 NOTE — ED PROVIDER NOTES
History  Chief Complaint   Patient presents with    Abdominal Pain     abdominal pain and reflux x5 days  +nausea +vomiting  states was given medications but not working  denies urinary symptoms  History provided by:  Patient   used: No    Abdominal Pain  Pain location:  Epigastric  Pain quality: aching    Pain radiates to:  Does not radiate  Pain severity:  Moderate  Onset quality:  Gradual  Duration:  3 days  Timing:  Intermittent  Progression:  Waxing and waning  Chronicity:  New  Context: not suspicious food intake    Context comment:  Epigastric pain for past 3 days, seen at [facility] ER, treated for Gastritis, comes with recurrent sympotms  Relieved by:  Nothing  Worsened by:  Nothing  Ineffective treatments:  None tried  Associated symptoms: no chest pain, no chills, no cough, no diarrhea, no dysuria, no fever, no nausea, no shortness of breath, no sore throat and no vomiting    Risk factors comment:  Hx of Hysterectomy, Cholecystectomy      Prior to Admission Medications   Prescriptions Last Dose Informant Patient Reported?  Taking?   amitriptyline (ELAVIL) 25 mg tablet   No No   Sig: Take 1 tablet (25 mg total) by mouth daily at bedtime   pantoprazole (PROTONIX) 20 mg tablet   No No   Sig: Take 1 tablet (20 mg total) by mouth daily   pantoprazole (PROTONIX) 20 mg tablet   No No   Sig: Take 2 tablets (40 mg total) by mouth daily   sucralfate (CARAFATE) 1 g tablet   No No   Sig: Take 1 tablet (1 g total) by mouth 4 (four) times a day   sucralfate (CARAFATE) 1 g/10 mL suspension   No No   Sig: Take 10 mL (1 g total) by mouth 4 (four) times a day      Facility-Administered Medications: None       Past Medical History:   Diagnosis Date    Abnormal Pap smear of cervix 2015    cryosurgery    Migraine headache with aura     managed with Elavil       Past Surgical History:   Procedure Laterality Date    CHOLECYSTECTOMY LAPAROSCOPIC N/A 10/27/2020    Procedure: CHOLECYSTECTOMY LAPAROSCOPIC; Surgeon: Gordon Arevalo MD;  Location: [facility] MAIN OR;  Service: General    GYNECOLOGIC CRYOSURGERY  2015    HYSTERECTOMY  2016    LAPAROSCOPY  2018    FL LAP,DIAGNOSTIC ABDOMEN N/A 8/9/2019    Procedure: LAPAROSCOPY DIAGNOSTIC; LYSIS OF ADHESIONS;  Surgeon: Nadeem Miranda MD;  [facility];  Service: Gynecology    FL LAP,LYSIS OF ADHESIONS N/A 3/3/2020    Procedure: L O A ;  Surgeon: Micheal Camp MD;  [facility];  Service: Gynecology    TUBAL LIGATION         Family History   Problem Relation Age of Onset    Cancer Paternal Grandmother         uterine    Cancer Paternal Aunt         uterine     Breast cancer Neg Hx      I have reviewed and agree with the history as documented  E-Cigarette/Vaping    E-Cigarette Use Never User      E-Cigarette/Vaping Substances     Social History     Tobacco Use    Smoking status: Current Every Day Smoker     Packs/day: 0 25     Years: 15 00     Pack years: 3 75     Types: Cigarettes    Smokeless tobacco: Never Used   Substance Use Topics    Alcohol use: Yes     Frequency: Monthly or less    Drug use: Never       Review of Systems   Constitutional: Negative for chills and fever  HENT: Negative for facial swelling, sore throat and trouble swallowing  Eyes: Negative for pain and visual disturbance  Respiratory: Negative for cough and shortness of breath  Cardiovascular: Negative for chest pain and leg swelling  Gastrointestinal: Positive for abdominal pain  Negative for diarrhea, nausea and vomiting  Genitourinary: Negative for dysuria and flank pain  Musculoskeletal: Negative for back pain, neck pain and neck stiffness  Skin: Negative for pallor and rash  Allergic/Immunologic: Negative for environmental allergies and immunocompromised state  Neurological: Negative for dizziness and headaches  Hematological: Negative for adenopathy  Does not bruise/bleed easily     Psychiatric/Behavioral: Negative for agitation and behavioral problems  All other systems reviewed and are negative  Physical Exam  Physical Exam  Vitals signs and nursing note reviewed  Constitutional:       General: She is not in acute distress  Appearance: She is well-developed  HENT:      Head: Normocephalic and atraumatic  Mouth/Throat:      Mouth: Mucous membranes are moist    Eyes:      Extraocular Movements: Extraocular movements intact  Neck:      Musculoskeletal: Normal range of motion and neck supple  Cardiovascular:      Rate and Rhythm: Normal rate and regular rhythm  Pulmonary:      Effort: Pulmonary effort is normal    Abdominal:      General: There is no distension  Palpations: Abdomen is soft  Tenderness: There is abdominal tenderness in the epigastric area  There is no guarding or rebound  Musculoskeletal: Normal range of motion  Skin:     General: Skin is warm and dry  Neurological:      General: No focal deficit present  Mental Status: She is alert and oriented to person, place, and time     Psychiatric:         Mood and Affect: Mood normal          Behavior: Behavior normal          Vital Signs  ED Triage Vitals   Temperature Pulse Respirations Blood Pressure SpO2   03/26/21 2302 03/26/21 2302 03/26/21 2302 03/26/21 2302 03/26/21 2302   99 °F (37 2 °C) 91 18 133/70 99 %      Temp Source Heart Rate Source Patient Position - Orthostatic VS BP Location FiO2 (%)   03/26/21 2302 03/26/21 2302 03/26/21 2302 03/26/21 2302 --   Oral Monitor Lying Right arm       Pain Score       03/26/21 2322       9           Vitals:    03/26/21 2302   BP: 133/70   Pulse: 91   Patient Position - Orthostatic VS: Lying         Visual Acuity      ED Medications  Medications   sodium chloride 0 9 % bolus 1,000 mL (1,000 mL Intravenous New Bag 3/26/21 2320)   morphine injection 2 mg (2 mg Intravenous Given 3/26/21 2322)   pantoprazole (PROTONIX) injection 40 mg (40 mg Intravenous Given 3/26/21 2320)   aluminum-magnesium hydroxide-simethicone (MYLANTA) oral suspension 30 mL (30 mL Oral Given 3/26/21 2322)   Lidocaine Viscous HCl (XYLOCAINE) 2 % mucosal solution 15 mL (15 mL Swish & Swallow Given 3/26/21 2323)       Diagnostic Studies  Results Reviewed     Procedure Component Value Units Date/Time    CBC and differential [[id number]] Collected: 03/26/21 2324    Lab Status: Final result Specimen: Blood from Arm, Right Updated: 03/26/21 2339     WBC 9 08 Thousand/uL      RBC 4 16 Million/uL      Hemoglobin 12 7 g/dL      Hematocrit 38 3 %      MCV 92 fL      MCH 30 5 pg      MCHC 33 2 g/dL      RDW 12 3 %      MPV 11 4 fL      Platelets 241 Thousands/uL      nRBC 0 /100 WBCs      Neutrophils Relative 65 %      Immat GRANS % 1 %      Lymphocytes Relative 24 %      Monocytes Relative 7 %      Eosinophils Relative 2 %      Basophils Relative 1 %      Neutrophils Absolute 6 00 Thousands/µL      Immature Grans Absolute 0 05 Thousand/uL      Lymphocytes Absolute 2 21 Thousands/µL      Monocytes Absolute 0 63 Thousand/µL      Eosinophils Absolute 0 14 Thousand/µL      Basophils Absolute 0 05 Thousands/µL     Comprehensive metabolic panel [[id number]] Collected: 03/26/21 2324    Lab Status: In process Specimen: Blood from Arm, Right Updated: 03/26/21 2336    Lipase [[id number]] Collected: 03/26/21 2324    Lab Status: In process Specimen: Blood from Arm, Right Updated: 03/26/21 2336                 XR chest 1 view portable    (Results Pending)              Procedures  Procedures         ED Course  ED Course as of Mar 26 2348   Fri Mar 26, 2021   2327 XR reviewed, no air under diaphragm noted  2344 Patient signed out to Dr Devante Peterson for follow-up of lab results and re-evaluation  If patient's condition is improved, patient would be considered stable for discharge, to continue her meds prescribed from last ER visit plus Reglan and Bentyl  Follow-up for GI appointment as scheduled                                  SBIRT 20yo+      Most Recent Value   SBIRT (24 yo +)   In order to provide better care to our patients, we are screening all of our patients for alcohol and drug use  Would it be okay to ask you these screening questions? Yes Filed at: 03/26/2021 2330   Initial Alcohol Screen: US AUDIT-C    1  How often do you have a drink containing alcohol?  0 Filed at: 03/26/2021 2330   2  How many drinks containing alcohol do you have on a typical day you are drinking? 0 Filed at: 03/26/2021 2330   3a  Male UNDER 65: How often do you have five or more drinks on one occasion? 0 Filed at: 03/26/2021 2330   3b  FEMALE Any Age, or MALE 65+: How often do you have 4 or more drinks on one occassion? 0 Filed at: 03/26/2021 2330   Audit-C Score  0 Filed at: 03/26/2021 2330   LISBETH: How many times in the past year have you    Used an illegal drug or used a prescription medication for non-medical reasons? Never Filed at: 03/26/2021 2330                    MDM  Number of Diagnoses or Management Options  Epigastric pain: new and requires workup  Gastritis:   Diagnosis management comments: Patient is a 36-year-old female, comes in with persistent, recurrent chest pain, patient was seen at [facility] ER 3 days back for similar symptoms, was treated for gastritis, given prescription for Carafate along with PPI, patient states she did fill the prescription, however she continues to have the similar symptoms, denies fever, vomiting, constipation, chest pain or dyspnea; denies marijuana use  On exam, patient appears uncomfortable due to pain, abdomen is soft, nondistended, tender in epigastrium, no guarding or rigidity  Impression:  Persistent gastritis, will check labs, give IV fluids, pain meds, Protonix IV, Maalox/lidocaine; will check upright chest x-ray to rule out air under diaphragm; doubt bowel obstruction as no active N/V, no distension, moved bowels today         Amount and/or Complexity of Data Reviewed  Clinical lab tests: ordered and reviewed  Tests in the radiology section of CPT®: ordered and reviewed  Tests in the medicine section of CPT®: reviewed and ordered  Independent visualization of images, tracings, or specimens: yes        Disposition  Final diagnoses:   Epigastric pain   Gastritis - probable     Time reflects when diagnosis was documented in both MDM as applicable and the Disposition within this note     Time User Action Codes Description Comment    3/26/2021 11:18 PM Elray Moles Add [R10 13] Epigastric pain     3/26/2021 11:39 PM Elray Moles Add [K29 70] Gastritis     3/26/2021 11:39 PM Alam, [address] [K29 70] Gastritis probable      ED Disposition     None      Follow-up Information     Follow up With Specialties Details Why Contact Info    [facility]   [address]  [facility]          PLEASE TAKE MEDICATIONS AS PRESCRIBED TO YOU FROM TODAY'S AND PREVIOUS ER VISIT  FOLLOW UP YOUR GI APPOINTMENT  Patient's Medications   Discharge Prescriptions    DICYCLOMINE (BENTYL) 20 MG TABLET    Take 1 tablet (20 mg total) by mouth 2 (two) times a day for 5 days       Start Date: 3/26/2021 End Date: 3/31/2021       Order Dose: 20 mg       Quantity: 10 tablet    Refills: 0    METOCLOPRAMIDE (REGLAN) 10 MG TABLET    Take 1 tablet (10 mg total) by mouth 3 (three) times a day as needed (Nausea/Vomiting)       Start Date: 3/26/2021 End Date: --       Order Dose: 10 mg       Quantity: 15 tablet    Refills: 0     No discharge procedures on file      PDMP Review     None          ED Provider  Electronically Signed by           Andres Cerda MD  03/26/21 9433

## 2021-04-04 ENCOUNTER — APPOINTMENT (EMERGENCY)
Dept: CT IMAGING | Facility: HOSPITAL | Age: 36
End: 2021-04-04
Payer: COMMERCIAL

## 2021-04-04 ENCOUNTER — HOSPITAL ENCOUNTER (EMERGENCY)
Facility: HOSPITAL | Age: 36
Discharge: HOME | End: 2021-04-05
Attending: EMERGENCY MEDICINE
Payer: COMMERCIAL

## 2021-04-04 VITALS
HEART RATE: 73 BPM | DIASTOLIC BLOOD PRESSURE: 78 MMHG | OXYGEN SATURATION: 100 % | WEIGHT: 157.31 LBS | SYSTOLIC BLOOD PRESSURE: 122 MMHG | TEMPERATURE: 97.5 F | RESPIRATION RATE: 18 BRPM | BODY MASS INDEX: 28.77 KG/M2

## 2021-04-04 DIAGNOSIS — R10.11 RIGHT UPPER QUADRANT ABDOMINAL PAIN: Primary | ICD-10-CM

## 2021-04-04 LAB
ALBUMIN SERPL BCP-MCNC: 4.4 G/DL (ref 3–5.2)
ALP SERPL-CCNC: 78 U/L (ref 43–122)
ALT SERPL W P-5'-P-CCNC: 32 U/L
ANION GAP SERPL CALCULATED.3IONS-SCNC: 9 MMOL/L (ref 5–14)
AST SERPL W P-5'-P-CCNC: 31 U/L (ref 14–36)
BACTERIA UR QL AUTO: ABNORMAL /HPF
BASOPHILS # BLD AUTO: 0.1 THOUSANDS/ΜL (ref 0–0.1)
BASOPHILS NFR BLD AUTO: 1 % (ref 0–1)
BILIRUB SERPL-MCNC: 0.3 MG/DL
BILIRUB UR QL STRIP: NEGATIVE
BUN SERPL-MCNC: 15 MG/DL (ref 5–25)
CALCIUM SERPL-MCNC: 9.5 MG/DL (ref 8.4–10.2)
CHLORIDE SERPL-SCNC: 105 MMOL/L (ref 97–108)
CLARITY UR: CLEAR
CO2 SERPL-SCNC: 26 MMOL/L (ref 22–30)
COLOR UR: ABNORMAL
CREAT SERPL-MCNC: 0.89 MG/DL (ref 0.6–1.2)
D DIMER PPP FEU-MCNC: 0.4 UG/ML FEU
EOSINOPHIL # BLD AUTO: 0.1 THOUSAND/ΜL (ref 0–0.4)
EOSINOPHIL NFR BLD AUTO: 1 % (ref 0–6)
ERYTHROCYTE [DISTWIDTH] IN BLOOD BY AUTOMATED COUNT: 12.5 %
GFR SERPL CREATININE-BSD FRML MDRD: 85 ML/MIN/1.73SQ M
GLUCOSE SERPL-MCNC: 99 MG/DL (ref 70–99)
GLUCOSE UR STRIP-MCNC: NEGATIVE MG/DL
HCT VFR BLD AUTO: 39.3 % (ref 36–46)
HGB BLD-MCNC: 13.3 G/DL (ref 12–16)
HGB UR QL STRIP.AUTO: 25
INR PPP: 1.06 (ref 0.84–1.19)
KETONES UR STRIP-MCNC: NEGATIVE MG/DL
LACTATE SERPL-SCNC: 1.9 MMOL/L (ref 0.7–2)
LACTATE SERPL-SCNC: 2.5 MMOL/L (ref 0.7–2)
LEUKOCYTE ESTERASE UR QL STRIP: NEGATIVE
LIPASE SERPL-CCNC: 67 U/L (ref 23–300)
LYMPHOCYTES # BLD AUTO: 2.1 THOUSANDS/ΜL (ref 0.5–4)
LYMPHOCYTES NFR BLD AUTO: 17 % (ref 25–45)
MCH RBC QN AUTO: 30.3 PG (ref 26–34)
MCHC RBC AUTO-ENTMCNC: 33.8 G/DL (ref 31–36)
MCV RBC AUTO: 90 FL (ref 80–100)
MONOCYTES # BLD AUTO: 0.7 THOUSAND/ΜL (ref 0.2–0.9)
MONOCYTES NFR BLD AUTO: 6 % (ref 1–10)
MUCOUS THREADS UR QL AUTO: ABNORMAL
NEUTROPHILS # BLD AUTO: 9.4 THOUSANDS/ΜL (ref 1.8–7.8)
NEUTS SEG NFR BLD AUTO: 76 % (ref 45–65)
NITRITE UR QL STRIP: NEGATIVE
NON-SQ EPI CELLS URNS QL MICRO: ABNORMAL /HPF
PH UR STRIP.AUTO: 5 [PH]
PLATELET # BLD AUTO: 317 THOUSANDS/UL (ref 150–450)
PMV BLD AUTO: 10 FL (ref 8.9–12.7)
POTASSIUM SERPL-SCNC: 3.6 MMOL/L (ref 3.6–5)
PROT SERPL-MCNC: 8 G/DL (ref 5.9–8.4)
PROT UR STRIP-MCNC: ABNORMAL MG/DL
PROTHROMBIN TIME: 13.9 SECONDS (ref 11.6–14.5)
RBC # BLD AUTO: 4.37 MILLION/UL (ref 4–5.2)
RBC #/AREA URNS AUTO: ABNORMAL /HPF
SODIUM SERPL-SCNC: 140 MMOL/L (ref 137–147)
SP GR UR STRIP.AUTO: 1.02 (ref 1–1.04)
TROPONIN I SERPL-MCNC: <0.01 NG/ML (ref 0–0.03)
UROBILINOGEN UA: NEGATIVE MG/DL
WBC # BLD AUTO: 12.4 THOUSAND/UL (ref 4.5–11)
WBC #/AREA URNS AUTO: ABNORMAL /HPF

## 2021-04-04 PROCEDURE — 36415 COLL VENOUS BLD VENIPUNCTURE: CPT | Performed by: PHYSICIAN ASSISTANT

## 2021-04-04 PROCEDURE — 83605 ASSAY OF LACTIC ACID: CPT | Performed by: PHYSICIAN ASSISTANT

## 2021-04-04 PROCEDURE — 96361 HYDRATE IV INFUSION ADD-ON: CPT

## 2021-04-04 PROCEDURE — 93005 ELECTROCARDIOGRAM TRACING: CPT

## 2021-04-04 PROCEDURE — 80053 COMPREHEN METABOLIC PANEL: CPT | Performed by: PHYSICIAN ASSISTANT

## 2021-04-04 PROCEDURE — 85379 FIBRIN DEGRADATION QUANT: CPT | Performed by: PHYSICIAN ASSISTANT

## 2021-04-04 PROCEDURE — 96374 THER/PROPH/DIAG INJ IV PUSH: CPT

## 2021-04-04 PROCEDURE — 85610 PROTHROMBIN TIME: CPT | Performed by: PHYSICIAN ASSISTANT

## 2021-04-04 PROCEDURE — 81001 URINALYSIS AUTO W/SCOPE: CPT | Performed by: PHYSICIAN ASSISTANT

## 2021-04-04 PROCEDURE — 99284 EMERGENCY DEPT VISIT MOD MDM: CPT | Performed by: PHYSICIAN ASSISTANT

## 2021-04-04 PROCEDURE — 84484 ASSAY OF TROPONIN QUANT: CPT | Performed by: PHYSICIAN ASSISTANT

## 2021-04-04 PROCEDURE — 85025 COMPLETE CBC W/AUTO DIFF WBC: CPT | Performed by: PHYSICIAN ASSISTANT

## 2021-04-04 PROCEDURE — 83690 ASSAY OF LIPASE: CPT | Performed by: PHYSICIAN ASSISTANT

## 2021-04-04 PROCEDURE — 99285 EMERGENCY DEPT VISIT HI MDM: CPT

## 2021-04-04 PROCEDURE — 74177 CT ABD & PELVIS W/CONTRAST: CPT

## 2021-04-04 PROCEDURE — 96375 TX/PRO/DX INJ NEW DRUG ADDON: CPT

## 2021-04-04 RX ORDER — KETOROLAC TROMETHAMINE 30 MG/ML
15 INJECTION, SOLUTION INTRAMUSCULAR; INTRAVENOUS ONCE
Status: COMPLETED | OUTPATIENT
Start: 2021-04-04 | End: 2021-04-04

## 2021-04-04 RX ORDER — ONDANSETRON 2 MG/ML
4 INJECTION INTRAMUSCULAR; INTRAVENOUS ONCE
Status: COMPLETED | OUTPATIENT
Start: 2021-04-04 | End: 2021-04-04

## 2021-04-04 RX ORDER — MORPHINE SULFATE 4 MG/ML
4 INJECTION, SOLUTION INTRAMUSCULAR; INTRAVENOUS ONCE
Status: COMPLETED | OUTPATIENT
Start: 2021-04-04 | End: 2021-04-04

## 2021-04-04 RX ADMIN — IOHEXOL 100 ML: 350 INJECTION, SOLUTION INTRAVENOUS at 22:10

## 2021-04-04 RX ADMIN — ONDANSETRON 4 MG: 2 INJECTION INTRAMUSCULAR; INTRAVENOUS at 21:03

## 2021-04-04 RX ADMIN — MORPHINE SULFATE 4 MG: 4 INJECTION INTRAVENOUS at 22:44

## 2021-04-04 RX ADMIN — SODIUM CHLORIDE 1000 ML: 0.9 INJECTION, SOLUTION INTRAVENOUS at 21:33

## 2021-04-04 RX ADMIN — KETOROLAC TROMETHAMINE 15 MG: 30 INJECTION, SOLUTION INTRAMUSCULAR at 21:03

## 2021-04-05 ENCOUNTER — APPOINTMENT (EMERGENCY)
Dept: ULTRASOUND IMAGING | Facility: HOSPITAL | Age: 36
End: 2021-04-05
Payer: COMMERCIAL

## 2021-04-05 ENCOUNTER — APPOINTMENT (EMERGENCY)
Dept: CT IMAGING | Facility: HOSPITAL | Age: 36
End: 2021-04-05
Payer: COMMERCIAL

## 2021-04-05 ENCOUNTER — HOSPITAL ENCOUNTER (INPATIENT)
Facility: HOSPITAL | Age: 36
LOS: 2 days | Discharge: HOME | DRG: 284 | End: 2021-04-07
Attending: FAMILY MEDICINE | Admitting: FAMILY MEDICINE
Payer: COMMERCIAL

## 2021-04-05 ENCOUNTER — HOSPITAL ENCOUNTER (EMERGENCY)
Facility: HOSPITAL | Age: 36
End: 2021-04-05
Attending: EMERGENCY MEDICINE
Payer: COMMERCIAL

## 2021-04-05 VITALS
HEART RATE: 67 BPM | SYSTOLIC BLOOD PRESSURE: 104 MMHG | BODY MASS INDEX: 27.78 KG/M2 | OXYGEN SATURATION: 100 % | TEMPERATURE: 97.7 F | WEIGHT: 151.9 LBS | RESPIRATION RATE: 16 BRPM | DIASTOLIC BLOOD PRESSURE: 66 MMHG

## 2021-04-05 DIAGNOSIS — R11.2 INTRACTABLE NAUSEA AND VOMITING: ICD-10-CM

## 2021-04-05 DIAGNOSIS — R11.2 NAUSEA AND VOMITING: ICD-10-CM

## 2021-04-05 DIAGNOSIS — R17 TOTAL BILIRUBIN, ELEVATED: ICD-10-CM

## 2021-04-05 DIAGNOSIS — R10.11 RIGHT UPPER QUADRANT PAIN: Primary | ICD-10-CM

## 2021-04-05 DIAGNOSIS — R79.89 ELEVATED LFTS: Primary | ICD-10-CM

## 2021-04-05 DIAGNOSIS — K83.8 COMMON BILE DUCT DILATATION: ICD-10-CM

## 2021-04-05 DIAGNOSIS — K83.8 COMMON BILE DUCT DILATION: ICD-10-CM

## 2021-04-05 DIAGNOSIS — R74.8 ELEVATED LIVER ENZYMES: ICD-10-CM

## 2021-04-05 PROBLEM — Z72.0 TOBACCO ABUSE: Status: ACTIVE | Noted: 2020-10-26

## 2021-04-05 LAB
ALBUMIN SERPL BCP-MCNC: 4.1 G/DL (ref 3–5.2)
ALP SERPL-CCNC: 110 U/L (ref 43–122)
ALT SERPL W P-5'-P-CCNC: 584 U/L
ANION GAP SERPL CALCULATED.3IONS-SCNC: 6 MMOL/L (ref 5–14)
APAP SERPL-MCNC: <10 UG/ML (ref 10–20)
AST SERPL W P-5'-P-CCNC: 766 U/L (ref 14–36)
ATRIAL RATE: 57 BPM
ATRIAL RATE: 75 BPM
BASOPHILS # BLD AUTO: 0 THOUSANDS/ΜL (ref 0–0.1)
BASOPHILS NFR BLD AUTO: 0 % (ref 0–1)
BILIRUB SERPL-MCNC: 2.8 MG/DL
BUN SERPL-MCNC: 11 MG/DL (ref 5–25)
CALCIUM SERPL-MCNC: 9 MG/DL (ref 8.4–10.2)
CHLORIDE SERPL-SCNC: 105 MMOL/L (ref 97–108)
CO2 SERPL-SCNC: 29 MMOL/L (ref 22–30)
CREAT SERPL-MCNC: 0.85 MG/DL (ref 0.6–1.2)
EOSINOPHIL # BLD AUTO: 0 THOUSAND/ΜL (ref 0–0.4)
EOSINOPHIL NFR BLD AUTO: 0 % (ref 0–6)
ERYTHROCYTE [DISTWIDTH] IN BLOOD BY AUTOMATED COUNT: 12.4 %
GFR SERPL CREATININE-BSD FRML MDRD: 89 ML/MIN/1.73SQ M
GLUCOSE SERPL-MCNC: 127 MG/DL (ref 70–99)
HAV IGM SER QL: NORMAL
HBV CORE IGM SER QL: NORMAL
HBV SURFACE AG SER QL: NORMAL
HCT VFR BLD AUTO: 37.5 % (ref 36–46)
HCV AB SER QL: NORMAL
HGB BLD-MCNC: 12.6 G/DL (ref 12–16)
LIPASE SERPL-CCNC: 59 U/L (ref 23–300)
LYMPHOCYTES # BLD AUTO: 0.6 THOUSANDS/ΜL (ref 0.5–4)
LYMPHOCYTES NFR BLD AUTO: 6 % (ref 25–45)
MCH RBC QN AUTO: 30 PG (ref 26–34)
MCHC RBC AUTO-ENTMCNC: 33.6 G/DL (ref 31–36)
MCV RBC AUTO: 90 FL (ref 80–100)
MONOCYTES # BLD AUTO: 0.7 THOUSAND/ΜL (ref 0.2–0.9)
MONOCYTES NFR BLD AUTO: 7 % (ref 1–10)
NEUTROPHILS # BLD AUTO: 9.1 THOUSANDS/ΜL (ref 1.8–7.8)
NEUTS SEG NFR BLD AUTO: 87 % (ref 45–65)
P AXIS: 57 DEGREES
P AXIS: 63 DEGREES
PLATELET # BLD AUTO: 251 THOUSANDS/UL (ref 150–450)
PMV BLD AUTO: 10 FL (ref 8.9–12.7)
POTASSIUM SERPL-SCNC: 3.8 MMOL/L (ref 3.6–5)
PR INTERVAL: 164 MS
PR INTERVAL: 166 MS
PROT SERPL-MCNC: 7.6 G/DL (ref 5.9–8.4)
QRS AXIS: 37 DEGREES
QRS AXIS: 43 DEGREES
QRSD INTERVAL: 88 MS
QRSD INTERVAL: 92 MS
QT INTERVAL: 380 MS
QT INTERVAL: 420 MS
QTC INTERVAL: 408 MS
QTC INTERVAL: 424 MS
RBC # BLD AUTO: 4.19 MILLION/UL (ref 4–5.2)
SODIUM SERPL-SCNC: 140 MMOL/L (ref 137–147)
T WAVE AXIS: 35 DEGREES
T WAVE AXIS: 38 DEGREES
TROPONIN I SERPL-MCNC: <0.01 NG/ML (ref 0–0.03)
VENTRICULAR RATE: 57 BPM
VENTRICULAR RATE: 75 BPM
WBC # BLD AUTO: 10.6 THOUSAND/UL (ref 4.5–11)

## 2021-04-05 PROCEDURE — 83690 ASSAY OF LIPASE: CPT | Performed by: PHYSICIAN ASSISTANT

## 2021-04-05 PROCEDURE — 80143 DRUG ASSAY ACETAMINOPHEN: CPT | Performed by: PHYSICIAN ASSISTANT

## 2021-04-05 PROCEDURE — 93010 ELECTROCARDIOGRAM REPORT: CPT | Performed by: INTERNAL MEDICINE

## 2021-04-05 PROCEDURE — 80074 ACUTE HEPATITIS PANEL: CPT | Performed by: PHYSICIAN ASSISTANT

## 2021-04-05 PROCEDURE — 84484 ASSAY OF TROPONIN QUANT: CPT | Performed by: PHYSICIAN ASSISTANT

## 2021-04-05 PROCEDURE — 85025 COMPLETE CBC W/AUTO DIFF WBC: CPT | Performed by: PHYSICIAN ASSISTANT

## 2021-04-05 PROCEDURE — 99285 EMERGENCY DEPT VISIT HI MDM: CPT

## 2021-04-05 PROCEDURE — G1004 CDSM NDSC: HCPCS

## 2021-04-05 PROCEDURE — 80053 COMPREHEN METABOLIC PANEL: CPT | Performed by: PHYSICIAN ASSISTANT

## 2021-04-05 PROCEDURE — 99223 1ST HOSP IP/OBS HIGH 75: CPT | Performed by: HOSPITALIST

## 2021-04-05 PROCEDURE — 93005 ELECTROCARDIOGRAM TRACING: CPT

## 2021-04-05 PROCEDURE — 99285 EMERGENCY DEPT VISIT HI MDM: CPT | Performed by: PHYSICIAN ASSISTANT

## 2021-04-05 PROCEDURE — 74177 CT ABD & PELVIS W/CONTRAST: CPT

## 2021-04-05 PROCEDURE — 96376 TX/PRO/DX INJ SAME DRUG ADON: CPT

## 2021-04-05 PROCEDURE — 36415 COLL VENOUS BLD VENIPUNCTURE: CPT | Performed by: PHYSICIAN ASSISTANT

## 2021-04-05 PROCEDURE — 96375 TX/PRO/DX INJ NEW DRUG ADDON: CPT

## 2021-04-05 PROCEDURE — 96374 THER/PROPH/DIAG INJ IV PUSH: CPT

## 2021-04-05 RX ORDER — SUCRALFATE 1 G/1
1 TABLET ORAL 4 TIMES DAILY
Qty: 20 TABLET | Refills: 0 | Status: ON HOLD | OUTPATIENT
Start: 2021-04-05 | End: 2021-04-05 | Stop reason: CLARIF

## 2021-04-05 RX ORDER — DICYCLOMINE HCL 20 MG
20 TABLET ORAL 2 TIMES DAILY
Status: DISCONTINUED | OUTPATIENT
Start: 2021-04-05 | End: 2021-04-07 | Stop reason: HOSPADM

## 2021-04-05 RX ORDER — ONDANSETRON 2 MG/ML
4 INJECTION INTRAMUSCULAR; INTRAVENOUS EVERY 6 HOURS PRN
Status: DISCONTINUED | OUTPATIENT
Start: 2021-04-05 | End: 2021-04-07 | Stop reason: HOSPADM

## 2021-04-05 RX ORDER — METOCLOPRAMIDE 10 MG/1
10 TABLET ORAL EVERY 6 HOURS
Qty: 30 TABLET | Refills: 0 | Status: ON HOLD | OUTPATIENT
Start: 2021-04-05 | End: 2021-04-05 | Stop reason: CLARIF

## 2021-04-05 RX ORDER — SODIUM CHLORIDE 9 MG/ML
100 INJECTION, SOLUTION INTRAVENOUS CONTINUOUS
Status: DISCONTINUED | OUTPATIENT
Start: 2021-04-05 | End: 2021-04-07 | Stop reason: HOSPADM

## 2021-04-05 RX ORDER — SENNOSIDES 8.6 MG
1 TABLET ORAL
Status: DISCONTINUED | OUTPATIENT
Start: 2021-04-05 | End: 2021-04-07 | Stop reason: HOSPADM

## 2021-04-05 RX ORDER — PANTOPRAZOLE SODIUM 20 MG/1
20 TABLET, DELAYED RELEASE ORAL
Status: DISCONTINUED | OUTPATIENT
Start: 2021-04-06 | End: 2021-04-07 | Stop reason: HOSPADM

## 2021-04-05 RX ORDER — ACETAMINOPHEN 325 MG/1
650 TABLET ORAL EVERY 6 HOURS PRN
Status: DISCONTINUED | OUTPATIENT
Start: 2021-04-05 | End: 2021-04-07 | Stop reason: HOSPADM

## 2021-04-05 RX ORDER — PANTOPRAZOLE SODIUM 20 MG/1
20 TABLET, DELAYED RELEASE ORAL DAILY
Qty: 20 TABLET | Refills: 0 | Status: ON HOLD | OUTPATIENT
Start: 2021-04-05 | End: 2021-04-05 | Stop reason: CLARIF

## 2021-04-05 RX ORDER — MORPHINE SULFATE 4 MG/ML
4 INJECTION, SOLUTION INTRAMUSCULAR; INTRAVENOUS ONCE
Status: COMPLETED | OUTPATIENT
Start: 2021-04-05 | End: 2021-04-05

## 2021-04-05 RX ORDER — ONDANSETRON 2 MG/ML
4 INJECTION INTRAMUSCULAR; INTRAVENOUS ONCE
Status: COMPLETED | OUTPATIENT
Start: 2021-04-05 | End: 2021-04-05

## 2021-04-05 RX ORDER — CALCIUM CARBONATE 200(500)MG
1000 TABLET,CHEWABLE ORAL 3 TIMES DAILY PRN
Status: DISCONTINUED | OUTPATIENT
Start: 2021-04-05 | End: 2021-04-07 | Stop reason: HOSPADM

## 2021-04-05 RX ORDER — OXYCODONE HYDROCHLORIDE 5 MG/1
5 TABLET ORAL EVERY 4 HOURS PRN
Status: DISCONTINUED | OUTPATIENT
Start: 2021-04-05 | End: 2021-04-07 | Stop reason: HOSPADM

## 2021-04-05 RX ORDER — NICOTINE 21 MG/24HR
1 PATCH, TRANSDERMAL 24 HOURS TRANSDERMAL DAILY
Status: DISCONTINUED | OUTPATIENT
Start: 2021-04-06 | End: 2021-04-07 | Stop reason: HOSPADM

## 2021-04-05 RX ORDER — DICYCLOMINE HCL 20 MG
20 TABLET ORAL 2 TIMES DAILY
Qty: 20 TABLET | Refills: 0 | Status: SHIPPED | OUTPATIENT
Start: 2021-04-05 | End: 2021-06-23

## 2021-04-05 RX ORDER — AMITRIPTYLINE HYDROCHLORIDE 25 MG/1
25 TABLET, FILM COATED ORAL
Status: DISCONTINUED | OUTPATIENT
Start: 2021-04-05 | End: 2021-04-07 | Stop reason: HOSPADM

## 2021-04-05 RX ADMIN — IOHEXOL 100 ML: 350 INJECTION, SOLUTION INTRAVENOUS at 14:51

## 2021-04-05 RX ADMIN — ACETAMINOPHEN 650 MG: 325 TABLET, FILM COATED ORAL at 22:07

## 2021-04-05 RX ADMIN — AMITRIPTYLINE HYDROCHLORIDE 25 MG: 25 TABLET, FILM COATED ORAL at 21:57

## 2021-04-05 RX ADMIN — MORPHINE SULFATE 4 MG: 4 INJECTION INTRAVENOUS at 15:59

## 2021-04-05 RX ADMIN — DICYCLOMINE HYDROCHLORIDE 20 MG: 20 TABLET ORAL at 21:57

## 2021-04-05 RX ADMIN — SODIUM CHLORIDE 100 ML/HR: 0.9 INJECTION, SOLUTION INTRAVENOUS at 22:11

## 2021-04-05 RX ADMIN — ONDANSETRON 4 MG: 2 INJECTION INTRAMUSCULAR; INTRAVENOUS at 13:27

## 2021-04-05 RX ADMIN — MORPHINE SULFATE 4 MG: 4 INJECTION INTRAVENOUS at 13:27

## 2021-04-05 NOTE — H&P
[address]  H&P- Army Naas 1985, 29 y o  female MRN: [id number]  Unit/Bed#: E5 -01 Encounter: [id number]  Primary Care Provider: Aleksandra Estes PA-C   Date and time admitted to hospital: 4/5/2021  6:32 PM    * Common bile duct dilation  Assessment & Plan  · Patient presented to ED with complaint of abdominal pain  · Initially had normal CT scan and normal LFTs  · Upon repeat evaluation patient was found to have elevated LFTs and repeat CT scan showing prominence of biliary tree with questionable mild inflammation of wall of extrahepatic biliary duct which could indicate mild cholangitis, no visible obstructing stone or mass, ERCP may be helpful for more thorough diagnosis  · Transfer was requested to [facility] for GI consultation  · Does have history of cholecystectomy in October 2020  · Will make NPO after midnight  · IV fluid hydration    Intractable nausea and vomiting  Assessment & Plan  · Patient notes intractable nausea and vomiting with abdominal pain for the past few days  · IV fluid hydration  · P r n  Antiemetics  · P r n  Analgesics  · GI consult  · NPO after midnight    Elevated LFTs  Assessment & Plan  Recent Labs     04/04/21  2103 04/05/21  1325   AST 31 766*   ALT 32 584*   ALKPHOS 78 110   TBILI 0 30 2 80*   · Patient found to have significant increase in LFTs from yesterday to today  · GI consultation  · Repeat CMP in a m  Tobacco abuse  Assessment & Plan  · Nicotine patch while inpatient  · Tobacco cessation education    Migraine headache with aura  Assessment & Plan  · Continue Elavil    VTE Prophylaxis: low risk     Code Status:  Full code  POLST: POLST form is not discussed and not completed at this time  Discussion with family:  None    Anticipated Length of Stay:  Patient will be admitted on an Inpatient basis with an anticipated length of stay of  more than 2 midnights     Justification for Hospital Stay:  Intractable nausea and vomiting with increased LFTs and biliary duct dilation    Total Time for Visit, including Counseling / Coordination of Care: 1 hour  Greater than 50% of this total time spent on direct patient counseling and coordination of care  Chief Complaint:   Abdominal pain    History of Present Illness:    Jan Kraus is a 29 y o  female with past medical history of migraines who presents with abdominal pain  Patient notes 2 weeks of intractable abdominal pain  Notes the pain is intermittent  Currently rates her pain a 6/10  Patient describes pain as sharp   Patient notes associated bloating  Does report associated nausea and vomiting  Notes she has not been able to tolerate normal diet for the past 2 days  Does report history of cholecystectomy in October 2020  Denies any associated fevers, chills, chest pain or shortness of breath  Review of Systems:    Review of Systems   Constitutional: Negative for chills and fever  HENT: Negative for trouble swallowing  Eyes: Negative for visual disturbance  Respiratory: Negative for cough and shortness of breath  Cardiovascular: Negative for chest pain and leg swelling  Gastrointestinal: Positive for abdominal pain, nausea and vomiting  Genitourinary: Negative for difficulty urinating  Musculoskeletal: Positive for back pain  Negative for arthralgias  Skin: Negative for rash  Allergic/Immunologic: Negative for immunocompromised state  Neurological: Negative for dizziness and weakness  Psychiatric/Behavioral: Negative for confusion         Past Medical and Surgical History:     Past Medical History:   Diagnosis Date    Abnormal Pap smear of cervix 2015    cryosurgery    Migraine headache with aura     managed with Elavil       Past Surgical History:   Procedure Laterality Date    CHOLECYSTECTOMY LAPAROSCOPIC N/A 10/27/2020    Procedure: CHOLECYSTECTOMY LAPAROSCOPIC;  Surgeon: Jono Santizo MD;  Location: [facility] OR;  Service: General    GYNECOLOGIC CRYOSURGERY  2015    HYSTERECTOMY  2016    LAPAROSCOPY  2018    MT LAP,DIAGNOSTIC ABDOMEN N/A 8/9/2019    Procedure: LAPAROSCOPY DIAGNOSTIC; LYSIS OF ADHESIONS;  Surgeon: Ben Peña MD;  [facility];  Service: Gynecology    MT LAP,LYSIS OF ADHESIONS N/A 3/3/2020    Procedure: L O A ;  Surgeon: Mateo Smith MD;  [facility];  Service: Gynecology    TUBAL LIGATION         Meds/Allergies:    Prior to Admission medications    Medication Sig Start Date End Date Taking?  Authorizing Provider   amitriptyline (ELAVIL) 25 mg tablet Take 1 tablet (25 mg total) by mouth daily at bedtime 11/24/20   Margarita Walker PA-C   dicyclomine (BENTYL) 20 mg tablet Take 1 tablet (20 mg total) by mouth 2 (two) times a day for 5 days 3/26/21 3/31/21  Diamante Jain MD   dicyclomine (BENTYL) 20 mg tablet Take 1 tablet (20 mg total) by mouth 2 (two) times a day 4/5/21   Yandy Pizano PA-C   metoclopramide (REGLAN) 10 mg tablet Take 1 tablet (10 mg total) by mouth 3 (three) times a day as needed (Nausea/Vomiting) 3/26/21   Diamante Jain MD   metoclopramide (REGLAN) 10 mg tablet Take 1 tablet (10 mg total) by mouth every 6 (six) hours 4/5/21   Yandy Pizano PA-C   pantoprazole (PROTONIX) 20 mg tablet Take 1 tablet (20 mg total) by mouth daily 11/24/20   Margarita Walker PA-C   pantoprazole (PROTONIX) 20 mg tablet Take 2 tablets (40 mg total) by mouth daily 3/25/21   Leah Rothman PA-C   pantoprazole (PROTONIX) 20 mg tablet Take 1 tablet (20 mg total) by mouth daily 4/5/21   Yandy Pizano PA-C   sucralfate (CARAFATE) 1 g tablet Take 1 tablet (1 g total) by mouth 4 (four) times a day 3/25/21   Leah Rothman PA-C   sucralfate (CARAFATE) 1 g tablet Take 1 tablet (1 g total) by mouth 4 (four) times a day 4/5/21   Yandy Pizano PA-C   sucralfate (CARAFATE) 1 g/10 mL suspension Take 10 mL (1 g total) by mouth 4 (four) times a day 9/4/20   Yandy Pizano PA-C I have reviewed home medications with patient personally  Allergies: Allergies   Allergen Reactions    Shellfish-Derived Products - Food Allergy Itching and Swelling     "seafood "    Ibuprofen Other (See Comments)     Patient states she gets hematoma       Social History:     Marital Status: Single   Occupation:  Unknown  Patient Pre-hospital Living Situation:  Home  Patient Pre-hospital Level of Mobility:  Ambulatory  Patient Pre-hospital Diet Restrictions:  None  Substance Use History:   Social History     Substance and Sexual Activity   Alcohol Use Not Currently    Frequency: Monthly or less     Social History     Tobacco Use   Smoking Status Current Every Day Smoker    Packs/day: 0 50    Years: 15 00    Pack years: 7 50    Types: Cigarettes   Smokeless Tobacco Never Used     Social History     Substance and Sexual Activity   Drug Use Never       Family History:    Family History   Problem Relation Age of Onset    Cancer Paternal Grandmother         uterine    Cancer Paternal Aunt         uterine     Breast cancer Neg Hx        Physical Exam:     Vitals:        Physical Exam  Vitals signs reviewed  Constitutional:       General: She is not in acute distress  HENT:      Head: Normocephalic and atraumatic  Eyes:      General: No scleral icterus  Conjunctiva/sclera: Conjunctivae normal    Neck:      Musculoskeletal: Neck supple  Cardiovascular:      Rate and Rhythm: Normal rate and regular rhythm  Heart sounds: No murmur  Pulmonary:      Effort: Pulmonary effort is normal  No respiratory distress  Breath sounds: Normal breath sounds  No wheezing  Abdominal:      General: Bowel sounds are normal  There is no distension  Palpations: Abdomen is soft  Tenderness: There is abdominal tenderness (RUQ)  Musculoskeletal:      Right lower leg: No edema  Left lower leg: No edema  Skin:     General: Skin is warm and dry     Neurological:      Mental Status: She is alert and oriented to person, place, and time  Psychiatric:         Mood and Affect: Mood normal          Behavior: Behavior normal            Additional Data:     Lab Results: I have personally reviewed pertinent reports  Results from last 7 days   Lab Units 04/05/21  1325   WBC Thousand/uL 10 60   HEMOGLOBIN g/dL 12 6   HEMATOCRIT % 37 5   PLATELETS Thousands/uL 251   NEUTROS PCT % 87*   LYMPHS PCT % 6*   MONOS PCT % 7   EOS PCT % 0     Results from last 7 days   Lab Units 04/05/21  1325   SODIUM mmol/L 140   POTASSIUM mmol/L 3 8   CHLORIDE mmol/L 105   CO2 mmol/L 29   BUN mg/dL 11   CREATININE mg/dL 0 85   ANION GAP mmol/L 6   CALCIUM mg/dL 9 0   ALBUMIN g/dL 4 1   TOTAL BILIRUBIN mg/dL 2 80*   ALK PHOS U/L 110   ALT U/L 584*   AST U/L 766*   GLUCOSE RANDOM mg/dL 127*     Results from last 7 days   Lab Units 04/04/21  2103   INR  1 06             Results from last 7 days   Lab Units 04/04/21  2249 04/04/21  2108   LACTIC ACID mmol/L 1 9 2 5*       Imaging: I have personally reviewed pertinent reports  No orders to display       EKG, Pathology, and Other Studies Reviewed on Admission:   · EKG:  Sinus bradycardia    Allscripts / Epic Records Reviewed: Yes     ** Please Note: This note has been constructed using a voice recognition system   **

## 2021-04-05 NOTE — ED PROVIDER NOTES
History  Chief Complaint   Patient presents with    Abdominal Pain     seen for the same yesterday     28-year-old female with past medical history migraines, status post cholecystectomy presenting for evaluation of right upper quadrant epigastric pain  Patient was seen last night in the emergency department for the same complaint  At that time patient had mildly elevated lactic acid which was resolved with IVF  CT abd/pelvis completed which showed 'Mild biliary ductal dilatation, probably secondary to postcholecystectomy state  Thickening of the cystic duct is noted which could represent infectious or inflammatory process (axial image 22, series 2 and coronal image 52, series 3)  Partially distended bladder  Mild to moderate circumferential bladder wall thickening noted, probably exaggerated by underdistention  Superimposed cystitis is considered in the appropriate clinical setting ' pt reports at the time she was d/c yesterday she was pain free but pain started again upon waking this morning  No meds taken prior to arrival  Unable to eat and drink today without vomiting  No diarrhea  Prior to Admission Medications   Prescriptions Last Dose Informant Patient Reported?  Taking?   amitriptyline (ELAVIL) 25 mg tablet   No No   Sig: Take 1 tablet (25 mg total) by mouth daily at bedtime   dicyclomine (BENTYL) 20 mg tablet   No No   Sig: Take 1 tablet (20 mg total) by mouth 2 (two) times a day for 5 days   dicyclomine (BENTYL) 20 mg tablet   No No   Sig: Take 1 tablet (20 mg total) by mouth 2 (two) times a day   metoclopramide (REGLAN) 10 mg tablet   No No   Sig: Take 1 tablet (10 mg total) by mouth 3 (three) times a day as needed (Nausea/Vomiting)   metoclopramide (REGLAN) 10 mg tablet   No No   Sig: Take 1 tablet (10 mg total) by mouth every 6 (six) hours   pantoprazole (PROTONIX) 20 mg tablet   No No   Sig: Take 1 tablet (20 mg total) by mouth daily   pantoprazole (PROTONIX) 20 mg tablet   No No Sig: Take 2 tablets (40 mg total) by mouth daily   pantoprazole (PROTONIX) 20 mg tablet   No No   Sig: Take 1 tablet (20 mg total) by mouth daily   sucralfate (CARAFATE) 1 g tablet   No No   Sig: Take 1 tablet (1 g total) by mouth 4 (four) times a day   sucralfate (CARAFATE) 1 g tablet   No No   Sig: Take 1 tablet (1 g total) by mouth 4 (four) times a day   sucralfate (CARAFATE) 1 g/10 mL suspension   No No   Sig: Take 10 mL (1 g total) by mouth 4 (four) times a day      Facility-Administered Medications: None       Past Medical History:   Diagnosis Date    Abnormal Pap smear of cervix 2015    cryosurgery    Migraine headache with aura     managed with Elavil       Past Surgical History:   Procedure Laterality Date    CHOLECYSTECTOMY LAPAROSCOPIC N/A 10/27/2020    Procedure: CHOLECYSTECTOMY LAPAROSCOPIC;  Surgeon: Maykel Thomas MD;  Location: [address] [place] [address] MAIN OR;  Service: General    GYNECOLOGIC CRYOSURGERY  2015    HYSTERECTOMY  2016    LAPAROSCOPY  2018    MI LAP,DIAGNOSTIC ABDOMEN N/A 8/9/2019    Procedure: LAPAROSCOPY DIAGNOSTIC; LYSIS OF ADHESIONS;  Surgeon: Bianca eLvy MD;  [facility];  Service: Gynecology    MI LAP,LYSIS OF ADHESIONS N/A 3/3/2020    Procedure: L O A ;  Surgeon: Avila Berger MD;  [facility];  Service: Gynecology    TUBAL LIGATION         Family History   Problem Relation Age of Onset    Cancer Paternal Grandmother         uterine    Cancer Paternal Aunt         uterine     Breast cancer Neg Hx      I have reviewed and agree with the history as documented      E-Cigarette/Vaping    E-Cigarette Use Never User      E-Cigarette/Vaping Substances     Social History     Tobacco Use    Smoking status: Current Every Day Smoker     Packs/day: 0 50     Years: 15 00     Pack years: 7 50     Types: Cigarettes    Smokeless tobacco: Never Used   Substance Use Topics    Alcohol use: Not Currently     Frequency: Monthly or less    Drug use: Never Review of Systems   All other systems reviewed and are negative  Physical Exam  Physical Exam  Vitals signs and nursing note reviewed  Constitutional:       General: She is not in acute distress  Appearance: She is well-developed  She is not ill-appearing, toxic-appearing or diaphoretic  Comments: Appears uncomfortable   HENT:      Head: Normocephalic and atraumatic  Eyes:      Conjunctiva/sclera: Conjunctivae normal       Comments: EOM grossly intact   Neck:      Musculoskeletal: Normal range of motion and neck supple  Vascular: No JVD  Cardiovascular:      Rate and Rhythm: Normal rate  Pulmonary:      Effort: Pulmonary effort is normal    Abdominal:      General: Abdomen is protuberant  There is no distension  There are no signs of injury  Palpations: Abdomen is soft  Tenderness: There is abdominal tenderness in the right upper quadrant, epigastric area and left upper quadrant  Musculoskeletal:      Comments: FROM, steady gait, cap refill brisk, strength and sensation grossly intact throughout   Skin:     General: Skin is warm and dry  Capillary Refill: Capillary refill takes less than 2 seconds  Neurological:      Mental Status: She is alert and oriented to person, place, and time     Psychiatric:         Behavior: Behavior normal          Vital Signs  ED Triage Vitals [04/05/21 1237]   Temperature Pulse Respirations Blood Pressure SpO2   97 7 °F (36 5 °C) 70 14 121/67 100 %      Temp Source Heart Rate Source Patient Position - Orthostatic VS BP Location FiO2 (%)   Tympanic Monitor Sitting Left arm --      Pain Score       Worst Possible Pain           Vitals:    04/05/21 1237 04/05/21 1537   BP: 121/67 104/66   Pulse: 70 67   Patient Position - Orthostatic VS: Sitting Lying         Visual Acuity      ED Medications  Medications   morphine (PF) 4 mg/mL injection 4 mg (4 mg Intravenous Given 4/5/21 1327)   ondansetron (ZOFRAN) injection 4 mg (4 mg Intravenous Given 4/5/21 1327)   iohexol (OMNIPAQUE) 350 MG/ML injection (SINGLE-DOSE) 100 mL (100 mL Intravenous Given 4/5/21 1451)   morphine (PF) 4 mg/mL injection 4 mg (4 mg Intravenous Given 4/5/21 1839)       Diagnostic Studies  Results Reviewed     Procedure Component Value Units Date/Time    Hepatitis panel, acute [[id number]] Collected: 04/05/21 1420    Lab Status:  In process Specimen: Blood from Arm, Left Updated: 04/05/21 1432    Acetaminophen level-"If concentration is detectable, please discuss with medical  on call " [[id number]]  (Abnormal) Collected: 04/05/21 1325    Lab Status: Final result Specimen: Blood from Arm, Left Updated: 04/05/21 1427     Acetaminophen Level <10 ug/mL     Troponin I [[id number]]  (Normal) Collected: 04/05/21 1325    Lab Status: Final result Specimen: Blood from Arm, Left Updated: 04/05/21 1409     Troponin I <0 01 ng/mL     Comprehensive metabolic panel [[id number]]  (Abnormal) Collected: 04/05/21 1325    Lab Status: Final result Specimen: Blood from Arm, Left Updated: 04/05/21 1409     Sodium 140 mmol/L      Potassium 3 8 mmol/L      Chloride 105 mmol/L      CO2 29 mmol/L      ANION GAP 6 mmol/L      BUN 11 mg/dL      Creatinine 0 85 mg/dL      Glucose 127 mg/dL      Calcium 9 0 mg/dL       U/L       U/L      Alkaline Phosphatase 110 U/L      Total Protein 7 6 g/dL      Albumin 4 1 g/dL      Total Bilirubin 2 80 mg/dL      eGFR 89 ml/min/1 73sq m     Narrative:      Jesus guidelines for Chronic Kidney Disease (CKD):     Stage 1 with normal or high GFR (GFR > 90 mL/min/1 73 square meters)    Stage 2 Mild CKD (GFR = 60-89 mL/min/1 73 square meters)    Stage 3A Moderate CKD (GFR = 45-59 mL/min/1 73 square meters)    Stage 3B Moderate CKD (GFR = 30-44 mL/min/1 73 square meters)    Stage 4 Severe CKD (GFR = 15-29 mL/min/1 73 square meters)    Stage 5 End Stage CKD (GFR <15 mL/min/1 73 square meters)  Note: GFR calculation is accurate only with a steady state creatinine    Lipase [[id number]]  (Normal) Collected: 04/05/21 1325    Lab Status: Final result Specimen: Blood from Arm, Left Updated: 04/05/21 1355     Lipase 59 u/L     CBC and differential [[id number]]  (Abnormal) Collected: 04/05/21 1325    Lab Status: Final result Specimen: Blood from Arm, Left Updated: 04/05/21 1351     WBC 10 60 Thousand/uL      RBC 4 19 Million/uL      Hemoglobin 12 6 g/dL      Hematocrit 37 5 %      MCV 90 fL      MCH 30 0 pg      MCHC 33 6 g/dL      RDW 12 4 %      MPV 10 0 fL      Platelets 308 Thousands/uL      Neutrophils Relative 87 %      Lymphocytes Relative 6 %      Monocytes Relative 7 %      Eosinophils Relative 0 %      Basophils Relative 0 %      Neutrophils Absolute 9 10 Thousands/µL      Lymphocytes Absolute 0 60 Thousands/µL      Monocytes Absolute 0 70 Thousand/µL      Eosinophils Absolute 0 00 Thousand/µL      Basophils Absolute 0 00 Thousands/µL     UA w Reflex to Microscopic w Reflex to Culture [[id number]]     Lab Status: No result Specimen: Urine, Other     POCT pregnancy, urine [[id number]]     Lab Status: No result                  CT abdomen pelvis with contrast   Final Result by Justine Monteiro MD (04/05 1511)      Overall, no significant change from yesterday's study  There remains some prominence of the biliary tree with questionable mild inflammation of the wall of the extrahepatic bile ducts which could indicate a mild cholangitis  No visible obstructing    stone or mass to the level of the ampulla    If there is clinical suspicion of stricture and/or other pathology, ERCP might be helpful for more thorough diagnosis and perhaps treatment            Workstation performed: [id number]                    Procedures  ECG 12 Lead Documentation Only    Date/Time: 4/5/2021 2:17 PM  Performed by: Marito Martinez PA-C  Authorized by: Marito Martinez PA-C     Indications / Diagnosis:  Epigastric pain  ECG reviewed by me, the ED Provider: yes    Patient location:  ED  Interpretation:     Interpretation: normal    Rate:     ECG rate:  57    ECG rate assessment: bradycardic    Rhythm:     Rhythm: sinus bradycardia    Ectopy:     Ectopy: none    QRS:     QRS axis:  Normal  Conduction:     Conduction: normal    ST segments:     ST segments:  Normal  T waves:     T waves: normal    Comments:                   ED Course  ED Course as of Apr 05 1623   Mon Apr 05, 2021   1415 Tbili was 0 3 last night at 9pm   TOTAL BILIRUBIN(!): 2 80   1415 AST was 31 9pm last night   AST(!): 766   1416 ALT 32 last night at 9pm   ALT(!): 584   1518 Will reach out to gen surg        1526 Gen surg advised to contact GI for ERCP      [facility] from PACS      4810 Spoke with pt regarding need for transfer, she is agreeable      [address] [place] with [facility] at [address] accepted pt                                                MDM  Number of Diagnoses or Management Options  Common bile duct dilatation:   Elevated liver enzymes:   Nausea and vomiting:   Right upper quadrant pain:   Total bilirubin, elevated:   Diagnosis management comments: 30 yo F presenting for evaluation of continued RUQ and epigastric pain ongoing from ED visit last night at 9pm, pt had markedly elevated LFTs and t bili, repeat ct scan was then completed, showed continued inflammation and dilatation of CBD to 9mm, spoke with GI for need for ERCP, will transfer to [facility], pt agreeable    Portions of the record may have been created with voice recognition software  Occasional wrong word or "sound a like" substitutions may have occurred due to the inherent limitations of voice recognition software  Read the chart carefully and recognize, using context, where substitutions have occurred          Disposition  Final diagnoses:   Right upper quadrant pain   Common bile duct dilatation   Nausea and vomiting   Elevated liver enzymes   Total bilirubin, elevated     Time reflects when diagnosis was documented in both MDM as applicable and the Disposition within this note     Time User Action Codes Description Comment    4/5/2021  3:51 PM Bhavya Congress Add [R10 11] Right upper quadrant pain     4/5/2021  3:51 PM Bhavya Congress Add [K83 8] Common bile duct dilatation     4/5/2021  3:51 PM Christina Foots C Add [R11 2] Nausea and vomiting     4/5/2021  3:56 PM Bhavya Congress Add [R74 8] Elevated liver enzymes     4/5/2021  3:56 PM Bhavya Congress Add [R17] Total bilirubin, elevated       ED Disposition     ED Disposition Condition Date/Time Comment    Transfer to Another Facility-In Network  Mon Apr 5, 2021  3:51 PM Maria C Brown should be transferred out to [facility]          MD Documentation      Most Recent Value   Patient Condition  The patient has been stabilized such that within reasonable medical probability, no material deterioration of the patient condition or the condition of the unborn child(stephan) is likely to result from the transfer   Reason for Transfer  Level of Care needed not available at this facility   Benefits of Transfer  Specialized equipment and/or services available at the receiving facility (Include comment)________________________   Risks of Transfer  Potential for delay in receiving treatment, Potential deterioration of medical condition, Loss of IV, Increased discomfort during transfer, Possible worsening of condition or death during transfer   Accepting Physician  [address] [place] [address] Name, [address]   [facility]    (Name & Tel number)  Augie Rolle MD   Provider Certification  General risk, such as traffic hazards, adverse weather conditions, rough terrain or turbulence, possible failure of equipment (including vehicle or aircraft), or consequences of actions of persons outside the control of the transport personnel, Unanticipated needs of medical equipment and personnel during transport, Risk of worsening condition, The possibility of a transport vehicle being unavailable, Consent was not obtained as patient is committed to psychiatric facility and transfer is mandated      RN Documentation      Most [address] [facility] [address] Name, [address]   [facility]    (Name & Tel number)  [place]      Follow-up Information    None         Patient's Medications   Discharge Prescriptions    No medications on file     No discharge procedures on file      PDMP Review     None          ED Provider  Electronically Signed by           Reva Fall PA-C  04/05/21 8441

## 2021-04-05 NOTE — ASSESSMENT & PLAN NOTE
· Patient notes intractable nausea and vomiting with abdominal pain for the past few days  · IV fluid hydration  · P r n  Antiemetics  · P r n   Analgesics  · GI consult  · NPO after midnight

## 2021-04-05 NOTE — ASSESSMENT & PLAN NOTE
· Patient presented to ED with complaint of abdominal pain  · Initially had normal CT scan and normal LFTs  · Upon repeat evaluation patient was found to have elevated LFTs and repeat CT scan showing prominence of biliary tree with questionable mild inflammation of wall of extrahepatic biliary duct which could indicate mild cholangitis, no visible obstructing stone or mass, ERCP may be helpful for more thorough diagnosis  · Transfer was requested to [facility] for GI consultation  · Does have history of cholecystectomy in October 2020  · Will make NPO after midnight  · IV fluid hydration

## 2021-04-05 NOTE — DISCHARGE INSTRUCTIONS
Take medications as directed  Avoid spicy greasy acidic foods  Follow a bland diet  Follow-up with your gastroenterologist your appointment    Return for worsening complaints

## 2021-04-05 NOTE — ED PROVIDER NOTES
History  Chief Complaint   Patient presents with    Abdominal Pain     Pt reports having weeks of RUQ abd pain that she has been seen for several times  Pt has a history of gall stones  Pt pain is reportedly getting worse  71-year-old female with history of prior cholecystectomy in October 2020 presents today complaining of 2 weeks of worsening right upper quadrant/R lower chest pain  Patient does not feel short of breath but states that pain is worse when she breathes  Tried taking medication without relief  Complains of nausea multiple episodes of vomiting  Tells me that her pain got worse today and is now a 20/10 on the pain scale  Denies changes in bowel or bladder habits  Has not been able to tolerate liquid or solid p o  Intake in 24 hours  Denies any other complaints      History provided by:  Patient   used: No    Abdominal Pain  Pain location:  RUQ  Pain quality: sharp and stabbing    Pain radiates to:  Does not radiate  Pain severity:  Severe  Onset quality:  Gradual  Duration:  2 weeks  Timing:  Constant  Progression:  Worsening  Associated symptoms: nausea and vomiting    Associated symptoms: no chest pain, no chills, no cough, no dysuria, no fatigue, no shortness of breath and no sore throat        Prior to Admission Medications   Prescriptions Last Dose Informant Patient Reported?  Taking?   amitriptyline (ELAVIL) 25 mg tablet   No No   Sig: Take 1 tablet (25 mg total) by mouth daily at bedtime   dicyclomine (BENTYL) 20 mg tablet   No No   Sig: Take 1 tablet (20 mg total) by mouth 2 (two) times a day for 5 days   metoclopramide (REGLAN) 10 mg tablet   No No   Sig: Take 1 tablet (10 mg total) by mouth 3 (three) times a day as needed (Nausea/Vomiting)   pantoprazole (PROTONIX) 20 mg tablet   No No   Sig: Take 1 tablet (20 mg total) by mouth daily   pantoprazole (PROTONIX) 20 mg tablet   No No   Sig: Take 2 tablets (40 mg total) by mouth daily   sucralfate (CARAFATE) 1 g tablet   No No   Sig: Take 1 tablet (1 g total) by mouth 4 (four) times a day   sucralfate (CARAFATE) 1 g/10 mL suspension   No No   Sig: Take 10 mL (1 g total) by mouth 4 (four) times a day      Facility-Administered Medications: None       Past Medical History:   Diagnosis Date    Abnormal Pap smear of cervix 2015    cryosurgery    Migraine headache with aura     managed with Elavil       Past Surgical History:   Procedure Laterality Date    CHOLECYSTECTOMY LAPAROSCOPIC N/A 10/27/2020    Procedure: CHOLECYSTECTOMY LAPAROSCOPIC;  Surgeon: Gorge Mills MD;  Location: [address] [place] [address] MAIN OR;  Service: General    GYNECOLOGIC CRYOSURGERY  2015    HYSTERECTOMY  2016    LAPAROSCOPY  2018    MA LAP,DIAGNOSTIC ABDOMEN N/A 8/9/2019    Procedure: LAPAROSCOPY DIAGNOSTIC; LYSIS OF ADHESIONS;  Surgeon: Shakir Hodgson MD;  Location: [facility];  Service: Gynecology    MA LAP,LYSIS OF ADHESIONS N/A 3/3/2020    Procedure: L O A ;  Surgeon: Karen Verdin MD;  Location: [facility];  Service: Gynecology    TUBAL LIGATION         Family History   Problem Relation Age of Onset    Cancer Paternal Grandmother         uterine    Cancer Paternal Aunt         uterine     Breast cancer Neg Hx      I have reviewed and agree with the history as documented  E-Cigarette/Vaping    E-Cigarette Use Never User      E-Cigarette/Vaping Substances     Social History     Tobacco Use    Smoking status: Current Every Day Smoker     Packs/day: 0 25     Years: 15 00     Pack years: 3 75     Types: Cigarettes    Smokeless tobacco: Never Used   Substance Use Topics    Alcohol use: Yes     Frequency: Monthly or less    Drug use: Never       Review of Systems   Constitutional: Negative  Negative for chills and fatigue  HENT: Negative for ear pain and sore throat  Eyes: Negative for photophobia and redness  Respiratory: Negative for apnea, cough and shortness of breath  Cardiovascular: Negative for chest pain  Gastrointestinal: Positive for abdominal pain (RUQ ), nausea and vomiting  Genitourinary: Negative for dysuria  Musculoskeletal: Negative for arthralgias, neck pain and neck stiffness  Skin: Negative for rash  Neurological: Negative for dizziness, tremors, syncope and weakness  Psychiatric/Behavioral: Negative for suicidal ideas  Physical Exam  Physical Exam  Constitutional:       General: She is not in acute distress  Appearance: She is well-developed  She is not diaphoretic  Eyes:      Pupils: Pupils are equal, round, and reactive to light  Neck:      Musculoskeletal: Normal range of motion and neck supple  Cardiovascular:      Rate and Rhythm: Normal rate and regular rhythm  Pulmonary:      Effort: Pulmonary effort is normal  No respiratory distress  Breath sounds: Normal breath sounds  Abdominal:      General: Bowel sounds are normal  There is no distension  Palpations: Abdomen is soft  Comments: Soft nondistended, no ecchymosis  Significant right upper quadrant tenderness with some guarding rebound or rigidity no peritoneal signs  Negative CVA tenderness bilaterally  Prior surgical scars noted  Musculoskeletal: Normal range of motion  Skin:     General: Skin is warm and dry  Neurological:      Mental Status: She is alert and oriented to person, place, and time           Vital Signs  ED Triage Vitals [04/04/21 2049]   Temperature Pulse Respirations Blood Pressure SpO2   97 5 °F (36 4 °C) 79 18 126/72 100 %      Temp Source Heart Rate Source Patient Position - Orthostatic VS BP Location FiO2 (%)   Tympanic Monitor Sitting Left arm --      Pain Score       Worst Possible Pain           Vitals:    04/04/21 2049 04/04/21 2320   BP: 126/72 122/78   Pulse: 79 73   Patient Position - Orthostatic VS: Sitting Lying         Visual Acuity      ED Medications  Medications   ondansetron (ZOFRAN) injection 4 mg (4 mg Intravenous Given 4/4/21 2103)   ketorolac (TORADOL) injection 15 mg (15 mg Intravenous Given 4/4/21 2103)   sodium chloride 0 9 % bolus 1,000 mL (0 mL Intravenous Stopped 4/4/21 2233)   iohexol (OMNIPAQUE) 350 MG/ML injection (SINGLE-DOSE) 100 mL (100 mL Intravenous Given 4/4/21 2210)   morphine (PF) 4 mg/mL injection 4 mg (4 mg Intravenous Given 4/4/21 2244)       Diagnostic Studies  Results Reviewed     Procedure Component Value Units Date/Time    Lactic acid 2 Hours [[id number]]  (Normal) Collected: 04/04/21 2249    Lab Status: Final result Specimen: Blood from Arm, Right Updated: 04/04/21 2310     LACTIC ACID 1 9 mmol/L     Narrative:      Result may be elevated if tourniquet was used during collection  D-dimer, quantitative [[id number]]  (Normal) Collected: 04/04/21 2103    Lab Status: Final result Specimen: Blood from Arm, Right Updated: 04/04/21 2140     D-Dimer, Quant 0 40 ug/ml FEU     Urine Microscopic [[id number]]  (Abnormal) Collected: 04/04/21 2112    Lab Status: Final result Specimen: Urine, Clean Catch Updated: 04/04/21 2139     RBC, UA 2-4 /hpf      WBC, UA 0-1 /hpf      Epithelial Cells Moderate /hpf      Bacteria, UA Moderate /hpf      MUCUS THREADS Moderate    Protime-INR [[id number]]  (Normal) Collected: 04/04/21 2103    Lab Status: Final result Specimen: Blood from Arm, Right Updated: 04/04/21 2138     Protime 13 9 seconds      INR 1 06    Troponin I [[id number]]  (Normal) Collected: 04/04/21 2103    Lab Status: Final result Specimen: Blood from Arm, Right Updated: 04/04/21 2138     Troponin I <0 01 ng/mL     Lactic acid, plasma [[id number]]  (Abnormal) Collected: 04/04/21 2108    Lab Status: Final result Specimen: Blood from Arm, Right Updated: 04/04/21 2133     LACTIC ACID 2 5 mmol/L     Narrative:      Result may be elevated if tourniquet was used during collection      Lipase [[id number]]  (Normal) Collected: 04/04/21 2103    Lab Status: Final result Specimen: Blood from Arm, Right Updated: 04/04/21 2127     Lipase 67 u/L     Comprehensive metabolic panel [[id number]]  (Normal) Collected: 04/04/21 2103    Lab Status: Final result Specimen: Blood from Arm, Right Updated: 04/04/21 2126     Sodium 140 mmol/L      Potassium 3 6 mmol/L      Chloride 105 mmol/L      CO2 26 mmol/L      ANION GAP 9 mmol/L      BUN 15 mg/dL      Creatinine 0 89 mg/dL      Glucose 99 mg/dL      Calcium 9 5 mg/dL      AST 31 U/L      ALT 32 U/L      Alkaline Phosphatase 78 U/L      Total Protein 8 0 g/dL      Albumin 4 4 g/dL      Total Bilirubin 0 30 mg/dL      eGFR 85 ml/min/1 73sq m     Narrative:      [place] guidelines for Chronic Kidney Disease (CKD):     Stage 1 with normal or high GFR (GFR > 90 mL/min/1 73 square meters)    Stage 2 Mild CKD (GFR = 60-89 mL/min/1 73 square meters)    Stage 3A Moderate CKD (GFR = 45-59 mL/min/1 73 square meters)    Stage 3B Moderate CKD (GFR = 30-44 mL/min/1 73 square meters)    Stage 4 Severe CKD (GFR = 15-29 mL/min/1 73 square meters)    Stage 5 End Stage CKD (GFR <15 mL/min/1 73 square meters)  Note: GFR calculation is accurate only with a steady state creatinine    UA (URINE) with reflex to Scope [[id number]]  (Abnormal) Collected: 04/04/21 2112    Lab Status: Final result Specimen: Urine, Clean Catch Updated: 04/04/21 2125     Color, UA Mary     Clarity, UA Clear     Specific Gravity, UA 1 025     pH, UA 5 0     Leukocytes, UA Negative     Nitrite, UA Negative     Protein, UA 15 (Trace) mg/dl      Glucose, UA Negative mg/dl      Ketones, UA Negative mg/dl      Bilirubin, UA Negative     Blood, UA 25 0     UROBILINOGEN UA Negative mg/dL     CBC and differential [[id number]]  (Abnormal) Collected: 04/04/21 2103    Lab Status: Final result Specimen: Blood from Arm, Right Updated: 04/04/21 2119     WBC 12 40 Thousand/uL      RBC 4 37 Million/uL      Hemoglobin 13 3 g/dL      Hematocrit 39 3 %      MCV 90 fL      MCH 30 3 pg      MCHC 33 8 g/dL      RDW 12 5 %      MPV 10 0 fL      Platelets 941 Thousands/uL Neutrophils Relative 76 %      Lymphocytes Relative 17 %      Monocytes Relative 6 %      Eosinophils Relative 1 %      Basophils Relative 1 %      Neutrophils Absolute 9 40 Thousands/µL      Lymphocytes Absolute 2 10 Thousands/µL      Monocytes Absolute 0 70 Thousand/µL      Eosinophils Absolute 0 10 Thousand/µL      Basophils Absolute 0 10 Thousands/µL                  CT abdomen pelvis with contrast   Final Result by Ronald Conley DO (04/04 4021)      Mild biliary ductal dilatation, probably secondary to postcholecystectomy state  Thickening of the cystic duct is noted which could represent infectious or inflammatory process (axial image 22, series 2 and coronal image 52, series 3)  Partially distended bladder  Mild to moderate circumferential bladder wall thickening noted, probably exaggerated by underdistention  Superimposed cystitis is considered in the appropriate clinical setting  Uterus not well seen, suspect prior hysterectomy  Complex cystic lesion in the left adnexa redemonstrated, suspect left-sided hydrosalpinx, similar to the prior  Other findings as above        Workstation performed: [id number]                    Procedures  ECG 12 Lead Documentation Only    Date/Time: 4/4/2021 9:06 PM  Performed by: Bernard Sanders PA-C  Authorized by: Bernard Sanders PA-C     Indications / Diagnosis:  Epigastric pain   ECG reviewed by me, the ED Provider: yes    Patient location:  ED  Interpretation:     Interpretation: normal    Rate:     ECG rate:  75    ECG rate assessment: normal    Rhythm:     Rhythm: sinus rhythm    Ectopy:     Ectopy: none    QRS:     QRS axis:  Normal    QRS intervals:  Normal  Conduction:     Conduction: normal    ST segments:     ST segments:  Normal  T waves:     T waves: normal               ED Course                             SBIRT 22yo+      Most Recent Value   SBIRT (22 yo +)   In order to provide better care to our patients, we are screening all of our patients for alcohol and drug use  Would it be okay to ask you these screening questions? Unable to answer at this time Filed at: 04/04/2021 2111                    MDM  Number of Diagnoses or Management Options  Right upper quadrant abdominal pain: new and does not require workup  Diagnosis management comments: Patient significant symptomatic improvement medications given in the emergency department  Benign laboratory evaluation  CT findings discussed with surgery on call who states that patient can go home if pain is able be controlled or can be admitted for pain management needed  Long shared decision making discussion had  between myself patient who is requesting outpatient management at this time  Has an appointment with her gastroenterologist week  Was given strict return precautions and dietary management and demonstrates understanding  Stable for discharge  Amount and/or Complexity of Data Reviewed  Clinical lab tests: ordered and reviewed  Tests in the radiology section of CPT®: ordered and reviewed  Discuss the patient with other providers: yes (Dr Abeba Evans )    Risk of Complications, Morbidity, and/or Mortality  Presenting problems: moderate  Diagnostic procedures: moderate  Management options: moderate    Patient Progress  Patient progress: stable      Disposition  Final diagnoses:   Right upper quadrant abdominal pain     Time reflects when diagnosis was documented in both MDM as applicable and the Disposition within this note     Time User Action Codes Description Comment    4/5/2021 12:08 AM Karo Martinez Add [R10 11] Right upper quadrant abdominal pain       ED Disposition     ED Disposition Condition Date/Time Comment    Discharge Stable Mon Apr 5, 2021 12:07 AM Yolie Marsh discharge to home/self care              Follow-up Information     [facility]  As needed [phone] [facility]            Patient's Medications   Discharge Prescriptions    DICYCLOMINE (BENTYL) 20 MG TABLET    Take 1 tablet (20 mg total) by mouth 2 (two) times a day       Start Date: 4/5/2021  End Date: --       Order Dose: 20 mg       Quantity: 20 tablet    Refills: 0    METOCLOPRAMIDE (REGLAN) 10 MG TABLET    Take 1 tablet (10 mg total) by mouth every 6 (six) hours       Start Date: 4/5/2021  End Date: --       Order Dose: 10 mg       Quantity: 30 tablet    Refills: 0    PANTOPRAZOLE (PROTONIX) 20 MG TABLET    Take 1 tablet (20 mg total) by mouth daily       Start Date: 4/5/2021  End Date: --       Order Dose: 20 mg       Quantity: 20 tablet    Refills: 0    SUCRALFATE (CARAFATE) 1 G TABLET    Take 1 tablet (1 g total) by mouth 4 (four) times a day       Start Date: 4/5/2021  End Date: --       Order Dose: 1 g       Quantity: 20 tablet    Refills: 0     No discharge procedures on file      PDMP Review     None          ED Provider  Electronically Signed by           Gamaliel Obrien PA-C  04/05/21 3767

## 2021-04-05 NOTE — EMTALA/ACUTE CARE TRANSFER
[facility] EMERGENCY DEPARTMENT  [address] [facility] [address]  [phone]  Dept: [phone]      EMTALA TRANSFER CONSENT    NAME Andrade Patterson                                         1985                              MRN [id number]    I have been informed of my rights regarding examination, treatment, and transfer   by Dr Sheree Blanchard DO    Benefits: Specialized equipment and/or services available at the receiving facility (Include comment)________________________    Risks: Potential for delay in receiving treatment, Potential deterioration of medical condition, Loss of IV, Increased discomfort during transfer, Possible worsening of condition or death during transfer      Consent for Transfer:  I acknowledge that my medical condition has been evaluated and explained to me by the emergency department physician or other qualified medical person and/or my attending physician, who has recommended that I be transferred to the service of  Accepting Physician: Enmanuel Loyola at [address] [place] [address] Name, [address] : [address]  The above potential benefits of such transfer, the potential risks associated with such transfer, and the probable risks of not being transferred have been explained to me, and I fully understand them  The doctor has explained that, in my case, the benefits of transfer outweigh the risks  I agree to be transferred  I authorize the performance of emergency medical procedures and treatments upon me in both transit and upon arrival at the receiving facility  Additionally, I authorize the release of any and all medical records to the receiving facility and request they be transported with me, if possible  I understand that the safest mode of transportation during a medical emergency is an ambulance and that the Hospital advocates the use of this mode of transport   Risks of traveling to the receiving facility by car, including absence of medical control, life sustaining equipment, such as oxygen, and medical personnel has been explained to me and I fully understand them  (ROMELIA CORRECT BOX BELOW)  [  ]  I consent to the stated transfer and to be transported by ambulance/helicopter  [  ]  I consent to the stated transfer, but refuse transportation by ambulance and accept full responsibility for my transportation by car  I understand the risks of non-ambulance transfers and I exonerate the Hospital and its staff from any deterioration in my condition that results from this refusal     X___________________________________________    DATE  21  TIME________  Signature of patient or legally responsible individual signing on patient behalf           RELATIONSHIP TO PATIENT_________________________          Provider Certification    NAME tSas Holguin                                        [facility] 1985                              MRN [id number]    A medical screening exam was performed on the above named patient  Based on the examination:    Condition Necessitating Transfer The primary encounter diagnosis was Right upper quadrant pain  Diagnoses of Common bile duct dilatation and Nausea and vomiting were also pertinent to this visit      Patient Condition: The patient has been stabilized such that within reasonable medical probability, no material deterioration of the patient condition or the condition of the unborn child(stephan) is likely to result from the transfer    Reason for Transfer: Level of Care needed not available at this facility    Transfer Requirements: [facility] [address]   · Space available and qualified personnel available for treatment as acknowledged by Elmira Parrish  · Agreed to accept transfer and to provide appropriate medical treatment as acknowledged by       [place]  · Appropriate medical records of the examination and treatment of the patient are provided at the time of transfer   [address] _______  · Transfer will be performed by qualified personnel from    and appropriate transfer equipment as required, including the use of necessary and appropriate life support measures  Provider Certification: I have examined the patient and explained the following risks and benefits of being transferred/refusing transfer to the patient/family:  General risk, such as traffic hazards, adverse weather conditions, rough terrain or turbulence, possible failure of equipment (including vehicle or aircraft), or consequences of actions of persons outside the control of the transport personnel, Unanticipated needs of medical equipment and personnel during transport, Risk of worsening condition, The possibility of a transport vehicle being unavailable, Consent was not obtained as patient is committed to psychiatric facility and transfer is mandated      Based on these reasonable risks and benefits to the patient and/or the unborn child(stephan), and based upon the information available at the time of the patients examination, I certify that the medical benefits reasonably to be expected from the provision of appropriate medical treatments at another medical facility outweigh the increasing risks, if any, to the individuals medical condition, and in the case of labor to the unborn child, from effecting the transfer      X____________________________________________ DATE 04/05/21        TIME_______      ORIGINAL - SEND TO MEDICAL RECORDS   COPY - SEND WITH PATIENT DURING TRANSFER

## 2021-04-06 ENCOUNTER — APPOINTMENT (INPATIENT)
Dept: RADIOLOGY | Facility: HOSPITAL | Age: 36
DRG: 284 | End: 2021-04-06
Payer: COMMERCIAL

## 2021-04-06 ENCOUNTER — APPOINTMENT (INPATIENT)
Dept: GASTROENTEROLOGY | Facility: HOSPITAL | Age: 36
DRG: 284 | End: 2021-04-06
Payer: COMMERCIAL

## 2021-04-06 ENCOUNTER — ANESTHESIA EVENT (INPATIENT)
Dept: GASTROENTEROLOGY | Facility: HOSPITAL | Age: 36
DRG: 284 | End: 2021-04-06
Payer: COMMERCIAL

## 2021-04-06 ENCOUNTER — ANESTHESIA (INPATIENT)
Dept: GASTROENTEROLOGY | Facility: HOSPITAL | Age: 36
DRG: 284 | End: 2021-04-06
Payer: COMMERCIAL

## 2021-04-06 PROBLEM — K21.9 GASTROESOPHAGEAL REFLUX DISEASE: Status: ACTIVE | Noted: 2021-04-06

## 2021-04-06 LAB
ALBUMIN SERPL BCP-MCNC: 3.1 G/DL (ref 3.5–5)
ALP SERPL-CCNC: 148 U/L (ref 46–116)
ALT SERPL W P-5'-P-CCNC: 567 U/L (ref 12–78)
ANION GAP SERPL CALCULATED.3IONS-SCNC: 8 MMOL/L (ref 4–13)
AST SERPL W P-5'-P-CCNC: 351 U/L (ref 5–45)
BASOPHILS # BLD AUTO: 0.04 THOUSANDS/ΜL (ref 0–0.1)
BASOPHILS NFR BLD AUTO: 1 % (ref 0–1)
BILIRUB SERPL-MCNC: 4.25 MG/DL (ref 0.2–1)
BUN SERPL-MCNC: 10 MG/DL (ref 5–25)
CALCIUM ALBUM COR SERPL-MCNC: 9 MG/DL (ref 8.3–10.1)
CALCIUM SERPL-MCNC: 8.3 MG/DL (ref 8.3–10.1)
CHLORIDE SERPL-SCNC: 107 MMOL/L (ref 100–108)
CO2 SERPL-SCNC: 26 MMOL/L (ref 21–32)
CREAT SERPL-MCNC: 0.84 MG/DL (ref 0.6–1.3)
EOSINOPHIL # BLD AUTO: 0.17 THOUSAND/ΜL (ref 0–0.61)
EOSINOPHIL NFR BLD AUTO: 2 % (ref 0–6)
ERYTHROCYTE [DISTWIDTH] IN BLOOD BY AUTOMATED COUNT: 12.2 % (ref 11.6–15.1)
EXT PREGNANCY TEST URINE: NEGATIVE
EXT. CONTROL: NORMAL
GFR SERPL CREATININE-BSD FRML MDRD: 90 ML/MIN/1.73SQ M
GLUCOSE SERPL-MCNC: 88 MG/DL (ref 65–140)
GLUCOSE SERPL-MCNC: 97 MG/DL (ref 65–140)
HCT VFR BLD AUTO: 35.2 % (ref 34.8–46.1)
HGB BLD-MCNC: 11.6 G/DL (ref 11.5–15.4)
IMM GRANULOCYTES # BLD AUTO: 0.03 THOUSAND/UL (ref 0–0.2)
IMM GRANULOCYTES NFR BLD AUTO: 0 % (ref 0–2)
LYMPHOCYTES # BLD AUTO: 0.93 THOUSANDS/ΜL (ref 0.6–4.47)
LYMPHOCYTES NFR BLD AUTO: 11 % (ref 14–44)
MCH RBC QN AUTO: 30.6 PG (ref 26.8–34.3)
MCHC RBC AUTO-ENTMCNC: 33 G/DL (ref 31.4–37.4)
MCV RBC AUTO: 93 FL (ref 82–98)
MONOCYTES # BLD AUTO: 0.76 THOUSAND/ΜL (ref 0.17–1.22)
MONOCYTES NFR BLD AUTO: 9 % (ref 4–12)
NEUTROPHILS # BLD AUTO: 6.86 THOUSANDS/ΜL (ref 1.85–7.62)
NEUTS SEG NFR BLD AUTO: 77 % (ref 43–75)
NRBC BLD AUTO-RTO: 0 /100 WBCS
PLATELET # BLD AUTO: 207 THOUSANDS/UL (ref 149–390)
PMV BLD AUTO: 12.3 FL (ref 8.9–12.7)
POTASSIUM SERPL-SCNC: 3.5 MMOL/L (ref 3.5–5.3)
PROT SERPL-MCNC: 6.2 G/DL (ref 6.4–8.2)
RBC # BLD AUTO: 3.79 MILLION/UL (ref 3.81–5.12)
SODIUM SERPL-SCNC: 141 MMOL/L (ref 136–145)
WBC # BLD AUTO: 8.79 THOUSAND/UL (ref 4.31–10.16)

## 2021-04-06 PROCEDURE — 82948 REAGENT STRIP/BLOOD GLUCOSE: CPT

## 2021-04-06 PROCEDURE — 0FC98ZZ EXTIRPATION OF MATTER FROM COMMON BILE DUCT, VIA NATURAL OR ARTIFICIAL OPENING ENDOSCOPIC: ICD-10-PCS | Performed by: INTERNAL MEDICINE

## 2021-04-06 PROCEDURE — 0F798ZZ DILATION OF COMMON BILE DUCT, VIA NATURAL OR ARTIFICIAL OPENING ENDOSCOPIC: ICD-10-PCS | Performed by: INTERNAL MEDICINE

## 2021-04-06 PROCEDURE — 43262 ENDO CHOLANGIOPANCREATOGRAPH: CPT | Performed by: INTERNAL MEDICINE

## 2021-04-06 PROCEDURE — 81025 URINE PREGNANCY TEST: CPT | Performed by: ANESTHESIOLOGY

## 2021-04-06 PROCEDURE — BF101ZZ FLUOROSCOPY OF BILE DUCTS USING LOW OSMOLAR CONTRAST: ICD-10-PCS | Performed by: INTERNAL MEDICINE

## 2021-04-06 PROCEDURE — 99232 SBSQ HOSP IP/OBS MODERATE 35: CPT | Performed by: INTERNAL MEDICINE

## 2021-04-06 PROCEDURE — 80053 COMPREHEN METABOLIC PANEL: CPT | Performed by: PHYSICIAN ASSISTANT

## 2021-04-06 PROCEDURE — 99223 1ST HOSP IP/OBS HIGH 75: CPT | Performed by: INTERNAL MEDICINE

## 2021-04-06 PROCEDURE — 85025 COMPLETE CBC W/AUTO DIFF WBC: CPT | Performed by: PHYSICIAN ASSISTANT

## 2021-04-06 PROCEDURE — C1769 GUIDE WIRE: HCPCS

## 2021-04-06 PROCEDURE — 74330 X-RAY BILE/PANC ENDOSCOPY: CPT

## 2021-04-06 PROCEDURE — C1726 CATH, BAL DIL, NON-VASCULAR: HCPCS

## 2021-04-06 PROCEDURE — 43264 ERCP REMOVE DUCT CALCULI: CPT | Performed by: INTERNAL MEDICINE

## 2021-04-06 RX ORDER — MIDAZOLAM HYDROCHLORIDE 2 MG/2ML
INJECTION, SOLUTION INTRAMUSCULAR; INTRAVENOUS AS NEEDED
Status: DISCONTINUED | OUTPATIENT
Start: 2021-04-06 | End: 2021-04-06

## 2021-04-06 RX ORDER — ONDANSETRON 2 MG/ML
INJECTION INTRAMUSCULAR; INTRAVENOUS AS NEEDED
Status: DISCONTINUED | OUTPATIENT
Start: 2021-04-06 | End: 2021-04-06

## 2021-04-06 RX ORDER — PROPOFOL 10 MG/ML
INJECTION, EMULSION INTRAVENOUS AS NEEDED
Status: DISCONTINUED | OUTPATIENT
Start: 2021-04-06 | End: 2021-04-06

## 2021-04-06 RX ORDER — METOCLOPRAMIDE HYDROCHLORIDE 5 MG/ML
10 INJECTION INTRAMUSCULAR; INTRAVENOUS ONCE
Status: DISCONTINUED | OUTPATIENT
Start: 2021-04-06 | End: 2021-04-07 | Stop reason: HOSPADM

## 2021-04-06 RX ORDER — LIDOCAINE HYDROCHLORIDE 10 MG/ML
INJECTION, SOLUTION EPIDURAL; INFILTRATION; INTRACAUDAL; PERINEURAL AS NEEDED
Status: DISCONTINUED | OUTPATIENT
Start: 2021-04-06 | End: 2021-04-06

## 2021-04-06 RX ORDER — SUCCINYLCHOLINE/SOD CL,ISO/PF 100 MG/5ML
SYRINGE (ML) INTRAVENOUS AS NEEDED
Status: DISCONTINUED | OUTPATIENT
Start: 2021-04-06 | End: 2021-04-06

## 2021-04-06 RX ORDER — EPHEDRINE SULFATE 50 MG/ML
INJECTION INTRAVENOUS AS NEEDED
Status: DISCONTINUED | OUTPATIENT
Start: 2021-04-06 | End: 2021-04-06

## 2021-04-06 RX ORDER — FENTANYL CITRATE 50 UG/ML
INJECTION, SOLUTION INTRAMUSCULAR; INTRAVENOUS AS NEEDED
Status: DISCONTINUED | OUTPATIENT
Start: 2021-04-06 | End: 2021-04-06

## 2021-04-06 RX ADMIN — ACETAMINOPHEN 650 MG: 325 TABLET, FILM COATED ORAL at 15:52

## 2021-04-06 RX ADMIN — PROPOFOL 200 MG: 10 INJECTION, EMULSION INTRAVENOUS at 18:37

## 2021-04-06 RX ADMIN — OXYCODONE HYDROCHLORIDE 5 MG: 5 TABLET ORAL at 22:41

## 2021-04-06 RX ADMIN — PANTOPRAZOLE SODIUM 20 MG: 20 TABLET, DELAYED RELEASE ORAL at 06:58

## 2021-04-06 RX ADMIN — EPHEDRINE SULFATE 5 MG: 50 INJECTION, SOLUTION INTRAVENOUS at 19:00

## 2021-04-06 RX ADMIN — SODIUM CHLORIDE 100 ML/HR: 0.9 INJECTION, SOLUTION INTRAVENOUS at 08:48

## 2021-04-06 RX ADMIN — DICYCLOMINE HYDROCHLORIDE 20 MG: 20 TABLET ORAL at 08:44

## 2021-04-06 RX ADMIN — INDOMETHACIN 100 MG: 50 SUPPOSITORY RECTAL at 18:45

## 2021-04-06 RX ADMIN — Medication 100 MG: at 18:37

## 2021-04-06 RX ADMIN — MIDAZOLAM 2 MG: 1 INJECTION INTRAMUSCULAR; INTRAVENOUS at 18:34

## 2021-04-06 RX ADMIN — IOHEXOL 50 ML: 240 INJECTION, SOLUTION INTRATHECAL; INTRAVASCULAR; INTRAVENOUS; ORAL at 18:50

## 2021-04-06 RX ADMIN — FENTANYL CITRATE 100 MCG: 50 INJECTION INTRAMUSCULAR; INTRAVENOUS at 18:34

## 2021-04-06 RX ADMIN — LIDOCAINE HYDROCHLORIDE 50 MG: 10 INJECTION, SOLUTION EPIDURAL; INFILTRATION; INTRACAUDAL; PERINEURAL at 18:37

## 2021-04-06 RX ADMIN — ONDANSETRON 4 MG: 2 INJECTION INTRAMUSCULAR; INTRAVENOUS at 18:50

## 2021-04-06 RX ADMIN — DICYCLOMINE HYDROCHLORIDE 20 MG: 20 TABLET ORAL at 22:41

## 2021-04-06 RX ADMIN — AMITRIPTYLINE HYDROCHLORIDE 25 MG: 25 TABLET, FILM COATED ORAL at 22:42

## 2021-04-06 RX ADMIN — OXYCODONE HYDROCHLORIDE 5 MG: 5 TABLET ORAL at 05:47

## 2021-04-06 NOTE — ANESTHESIA PREPROCEDURE EVALUATION
Procedure:  ERCP    Relevant Problems   GI/HEPATIC   (+) Gastroesophageal reflux disease      PULMONARY   (+) Smoking        Physical Exam    Airway    Mallampati score: II  TM Distance: >3 FB  Neck ROM: full     Dental       Cardiovascular  Rhythm: regular, Rate: normal, Cardiovascular exam normal    Pulmonary  Pulmonary exam normal Breath sounds clear to auscultation,     Other Findings        Anesthesia Plan  ASA Score- 2     Anesthesia Type- general with ASA Monitors  Additional Monitors:   Airway Plan: ETT  Plan Factors-Exercise tolerance (METS): >4 METS  Chart reviewed  Existing labs reviewed  Patient is a current smoker  Patient not instructed to abstain from smoking on day of procedure  Patient did not smoke on day of surgery  Obstructive sleep apnea risk education given perioperatively  Induction- intravenous  Postoperative Plan-     Informed Consent- Anesthetic plan and risks discussed with patient

## 2021-04-06 NOTE — PROGRESS NOTES
[address]  Progress Note - [place] 1985, 29 y o  female MRN: [id number]  Unit/Bed#: E5 -01 Encounter: [id number]  Primary Care Provider: Bonnee Favre, PA-C   Date and time admitted to hospital: 4/5/2021  6:32 PM    * Common bile duct dilation  Assessment & Plan  This is a 43-year-old female with history of cholecystectomy in October 2020 presented with severe right upper quadrant pain found to have elevated LFTs and bilirubinemia  ·  CT scan showing prominence of biliary tree with questionable mild inflammation of wall of extrahepatic biliary duct which could indicate mild cholangitis, no visible obstructing stone or mass  · GI consultation appreciated plan for ERCP today  · Continue IV fluid  · Will hold on antibiotics at this point unless patient develops fever or white blood cell count elevates at which point blood cultures will also be sent    Elevated LFTs  Assessment & Plan  Recent Labs     04/04/21  2103 04/05/21  1325 04/06/21  0537   AST 31 766* 351*   ALT 32 584* 567*   ALKPHOS 78 110 148*   TBILI 0 30 2 80* 4 25*   · Patient found to have significant increase in LFTs from yesterday to today  · GI consultation appreciated plan for ERCP  · Repeat CMP in a m  Smoking  Assessment & Plan  · Nicotine patch while inpatient  · Tobacco cessation education    Migraine headache with aura  Assessment & Plan  · Continue Elavil            VTE Pharmacologic Prophylaxis:   Pharmacologic:  Low risk, early ambulation    Patient Centered Rounds: I have performed bedside rounds with nursing staff today  Time Spent for Care: 20 minutes  More than 50% of total time spent on counseling and coordination of care as described above      Current Length of Stay: 1 day(s)    Current Patient Status: Inpatient   Certification Statement: The patient will continue to require additional inpatient hospital stay due to Elevated LFTs, right upper quadrant abdominal pain    Discharge Plan / Estimated Discharge Date: TBD    Code Status: Level 1 - Full Code      Subjective:   Patient seen and examined at bedside, denies any abdominal pain, complaining of a headache    Objective:     Vitals:   Temp (24hrs), Av 7 °F (36 5 °C), Min:97 4 °F (36 3 °C), Max:98 2 °F (36 8 °C)    Temp:  [97 4 °F (36 3 °C)-98 2 °F (36 8 °C)] 98 2 °F (36 8 °C)  HR:  [59-72] 72  Resp:  [16-18] 16  BP: ()/(55-74) 117/74  SpO2:  [97 %-100 %] 100 %  There is no height or weight on file to calculate BMI  Input and Output Summary (last 24 hours): Intake/Output Summary (Last 24 hours) at 2021 1851  Last data filed at 2021 1401  Gross per 24 hour   Intake 1800 ml   Output --   Net 1800 ml       Physical Exam:    Constitutional: Patient is oriented to person, place and time, no acute distress  HEENT:  Normocephalic, atraumatic, mild scleral icterus  Cardiovascular: Normal S1S2, RRR, No murmurs/rubs/gallops appreciated  Pulmonary:  Bilateral air entry, No rhonchi/rales/wheezing appreciated  Abdominal: Soft, Bowel sounds present, Non-tender, Non-distended  Extremities:  No cyanosis, clubbing or edema  Neurological: Cranial nerves II-XII grossly intact, sensation intact, otherwise no focal neurological symptoms  Skin:  Jaundice    Additional Data:     Labs:    Results from last 7 days   Lab Units 21  0513   WBC Thousand/uL 8 79   HEMOGLOBIN g/dL 11 6   HEMATOCRIT % 35 2   PLATELETS Thousands/uL 207   NEUTROS PCT % 77*   LYMPHS PCT % 11*   MONOS PCT % 9   EOS PCT % 2     Results from last 7 days   Lab Units 21  0537   POTASSIUM mmol/L 3 5   CHLORIDE mmol/L 107   CO2 mmol/L 26   BUN mg/dL 10   CREATININE mg/dL 0 84   CALCIUM mg/dL 8 3   ALK PHOS U/L 148*   ALT U/L 567*   AST U/L 351*     Results from last 7 days   Lab Units 21  2103   INR  1 06        I Have Reviewed All Lab Data Listed Above          Recent Cultures (last 7 days):           Last 24 Hours Medication List:   Current Facility-Administered Medications   Medication Dose Route Frequency Provider Last Rate    acetaminophen  650 mg Oral Q6H PRN Marques Sapp PA-C      amitriptyline  25 mg Oral HS Dang Magaña PA-C      calcium carbonate  1,000 mg Oral TID PRN Marques Sapp PA-C      dicyclomine  20 mg Oral BID Marques Sapp PA-C      indomethacin   Rectal Code/Trauma/Sedation Yayo Mtz MD      morphine injection  2 mg Intravenous Q3H PRN Marques Sapp PA-C      nicotine  1 patch Transdermal Daily Dang Magaña PA-C      ondansetron  4 mg Intravenous Q6H PRN Marques Sapp PA-C      oxyCODONE  5 mg Oral Q4H PRN Marques Sapp PA-C      pantoprazole  20 mg Oral Daily Before Breakfast Dang Magaña PA-C      senna  1 tablet Oral HS PRN Marques Sapp PA-C      sodium chloride  100 mL/hr Intravenous Continuous Dang Magaña PA-C 100 mL/hr (04/06/21 0848)     Facility-Administered Medications Ordered in Other Encounters   Medication Dose Route Frequency Provider Last Rate    fentanyl citrate (PF)   Intravenous PRN Linell Solum, CRNA      lidocaine (PF)    PRN Linell Solum, CRNA      midazolam   Intravenous PRN Linell Solum, CRNA      ondansetron    PRN Linell Solum, CRNA      propofol   Intravenous PRN Linell Solum, CRNA      Succinylcholine Chloride   Intravenous PRN Linell Solum, CRNA          Today, Patient Was Seen By: Kassandra Pickett MD

## 2021-04-06 NOTE — PLAN OF CARE
Problem: PAIN - ADULT  Goal: Verbalizes/displays adequate comfort level or baseline comfort level  Description: Interventions:  - Encourage patient to monitor pain and request assistance  - Assess pain using appropriate pain scale  - Administer analgesics based on type and severity of pain and evaluate response  - Implement non-pharmacological measures as appropriate and evaluate response  - Consider cultural and social influences on pain and pain management  - Notify physician/advanced practitioner if interventions unsuccessful or patient reports new pain  Outcome: Progressing     Problem: INFECTION - ADULT  Goal: Absence or prevention of progression during hospitalization  Description: INTERVENTIONS:  - Assess and monitor for signs and symptoms of infection  - Monitor lab/diagnostic results  - Monitor all insertion sites, i e  indwelling lines, tubes, and drains  - Monitor endotracheal if appropriate and nasal secretions for changes in amount and color  - [place] appropriate cooling/warming therapies per order  - Administer medications as ordered  - Instruct and encourage patient and family to use good hand hygiene technique  - Identify and instruct in appropriate isolation precautions for identified infection/condition  Outcome: Progressing  Goal: Absence of fever/infection during neutropenic period  Description: INTERVENTIONS:  - Monitor WBC    Outcome: Progressing     Problem: SAFETY ADULT  Goal: Patient will remain free of falls  Description: INTERVENTIONS:  - Assess patient frequently for physical needs  -  Identify cognitive and physical deficits and behaviors that affect risk of falls    -  [place] fall precautions as indicated by assessment   - Educate patient/family on patient safety including physical limitations  - Instruct patient to call for assistance with activity based on assessment  - Modify environment to reduce risk of injury  - Consider OT/PT consult to assist with strengthening/mobility  Outcome: Progressing  Goal: Maintain or return to baseline ADL function  Description: INTERVENTIONS:  -  Assess patient's ability to carry out ADLs; assess patient's baseline for ADL function and identify physical deficits which impact ability to perform ADLs (bathing, care of mouth/teeth, toileting, grooming, dressing, etc )  - Assess/evaluate cause of self-care deficits   - Assess range of motion  - Assess patient's mobility; develop plan if impaired  - Assess patient's need for assistive devices and provide as appropriate  - Encourage maximum independence but intervene and supervise when necessary  - Involve family in performance of ADLs  - Assess for home care needs following discharge   - Consider OT consult to assist with ADL evaluation and planning for discharge  - Provide patient education as appropriate  Outcome: Progressing  Goal: Maintain or return mobility status to optimal level  Description: INTERVENTIONS:  - Assess patient's baseline mobility status (ambulation, transfers, stairs, etc )    - Identify cognitive and physical deficits and behaviors that affect mobility  - Identify mobility aids required to assist with transfers and/or ambulation (gait belt, sit-to-stand, lift, walker, cane, etc )  - [place] fall precautions as indicated by assessment  - Record patient progress and toleration of activity level on Mobility SBAR; progress patient to next Phase/Stage  - Instruct patient to call for assistance with activity based on assessment  - Consider rehabilitation consult to assist with strengthening/weightbearing, etc   Outcome: Progressing     Problem: DISCHARGE PLANNING  Goal: Discharge to home or other facility with appropriate resources  Description: INTERVENTIONS:  - Identify barriers to discharge w/patient and caregiver  - Arrange for needed discharge resources and transportation as appropriate  - Identify discharge learning needs (meds, wound care, etc )  - Arrange for interpretive services to assist at discharge as needed  - Refer to Case Management Department for coordinating discharge planning if the patient needs post-hospital services based on physician/advanced practitioner order or complex needs related to functional status, cognitive ability, or social support system  Outcome: Progressing     Problem: Knowledge Deficit  Goal: Patient/family/caregiver demonstrates understanding of disease process, treatment plan, medications, and discharge instructions  Description: Complete learning assessment and assess knowledge base    Interventions:  - Provide teaching at level of understanding  - Provide teaching via preferred learning methods  Outcome: Progressing     Problem: GASTROINTESTINAL - ADULT  Goal: Minimal or absence of nausea and/or vomiting  Description: INTERVENTIONS:  - Administer IV fluids if ordered to ensure adequate hydration  - Maintain NPO status until nausea and vomiting are resolved  - Nasogastric tube if ordered  - Administer ordered antiemetic medications as needed  - Provide nonpharmacologic comfort measures as appropriate  - Advance diet as tolerated, if ordered  - Consider nutrition services referral to assist patient with adequate nutrition and appropriate food choices  Outcome: Progressing  Goal: Maintains or returns to baseline bowel function  Description: INTERVENTIONS:  - Assess bowel function  - Encourage oral fluids to ensure adequate hydration  - Administer IV fluids if ordered to ensure adequate hydration  - Administer ordered medications as needed  - Encourage mobilization and activity  - Consider nutritional services referral to assist patient with adequate nutrition and appropriate food choices  Outcome: Progressing  Goal: Maintains adequate nutritional intake  Description: INTERVENTIONS:  - Monitor percentage of each meal consumed  - Identify factors contributing to decreased intake, treat as appropriate  - Assist with meals as needed  - Monitor I&O, weight, and lab values if indicated  - Obtain nutrition services referral as needed  Outcome: Progressing

## 2021-04-06 NOTE — ASSESSMENT & PLAN NOTE
Recent Labs     04/04/21  2103 04/05/21  1325 04/06/21  0537   AST 31 766* 351*   ALT 32 584* 567*   ALKPHOS 78 110 148*   TBILI 0 30 2 80* 4 25*   · Patient found to have significant increase in LFTs from yesterday to today  · GI consultation appreciated plan for ERCP  · Repeat CMP in a m

## 2021-04-06 NOTE — ASSESSMENT & PLAN NOTE
This is a 45-year-old female with history of cholecystectomy in October 2020 presented with severe right upper quadrant pain found to have elevated LFTs and bilirubinemia      ·  CT scan showing prominence of biliary tree with questionable mild inflammation of wall of extrahepatic biliary duct which could indicate mild cholangitis, no visible obstructing stone or mass  · GI consultation appreciated plan for ERCP today  · Continue IV fluid  · Will hold on antibiotics at this point unless patient develops fever or white blood cell count elevates at which point blood cultures will also be sent

## 2021-04-06 NOTE — CONSULTS
Consultation - [facility]  Jazmine Handley 29 y o  female MRN: [id number]  Unit/Bed#: E5 -01 Encounter: [id number]        Inpatient consult to gastroenterology  Consult performed by: Ary Paula PA-C  Consult ordered by: Marilynn Montanez PA-C          Reason for Consult / Principal Problem:  Elevated liver enzymes, nausea vomiting, dilated bile duct    HPI:  She is a 28-year-old female with a history of cholecystectomy in October 2020 presenting for evaluation of abdominal pain, nausea, and vomiting  She is a limited historian  She states the symptoms have been ongoing for the past 2 weeks  She has been to the emergency room 4 times  She reports acute right upper quadrant pain associated with intractable nausea and vomiting  Her last episode of vomiting was yesterday  The last time she ate a meal was on Sunday  She denies fevers and chills  She has never had these symptoms before  She drinks alcohol rarely  She is a current tobacco smoker  She has never had an EGD before  REVIEW OF SYSTEMS:    CONSTITUTIONAL: Denies any fever, chills  Good appetite, and no recent weight loss  HEENT: No earache or tinnitus  Denies hearing loss or visual disturbances  CARDIOVASCULAR: No chest pain or palpitations  RESPIRATORY: Denies any cough, hemoptysis, shortness of breath or dyspnea on exertion  GASTROINTESTINAL: As noted in the History of Present Illness  GENITOURINARY: No problems with urination  Denies any hematuria or dysuria  NEUROLOGIC: No dizziness or vertigo, denies headaches  MUSCULOSKELETAL: Denies any muscle or joint pain  SKIN: Denies skin rashes or itching  ENDOCRINE: Denies excessive thirst  Denies intolerance to heat or cold  PSYCHOSOCIAL: Denies depression or anxiety  Denies any recent memory loss         Historical Information   Past Medical History:   Diagnosis Date    Abnormal Pap smear of cervix 2015    cryosurgery    Migraine headache with aura     managed with Elavil     Past Surgical History:   Procedure Laterality Date    CHOLECYSTECTOMY LAPAROSCOPIC N/A 10/27/2020    Procedure: CHOLECYSTECTOMY LAPAROSCOPIC;  Surgeon: Nuha Renee MD;  Location: [address] [place] [address] MAIN OR;  Service: General    GYNECOLOGIC CRYOSURGERY  2015    HYSTERECTOMY  2016    LAPAROSCOPY  2018    [place] LAP,DIAGNOSTIC ABDOMEN N/A 8/9/2019    Procedure: LAPAROSCOPY DIAGNOSTIC; LYSIS OF ADHESIONS;  Surgeon: Mihaela Daigle MD;  Location: [facility];  Service: Gynecology    [place] LAP,LYSIS OF ADHESIONS N/A 3/3/2020    Procedure: L O A ;  Surgeon: Cecily Winter MD;  Location: [facility];  Service: Gynecology    TUBAL LIGATION       Social History   Social History     Substance and Sexual Activity   Alcohol Use Not Currently    Frequency: Monthly or less     Social History     Substance and Sexual Activity   Drug Use Never     Social History     Tobacco Use   Smoking Status Current Every Day Smoker    Packs/day: 0 50    Years: 15 00    Pack years: 7 50    Types: Cigarettes   Smokeless Tobacco Never Used     Family History   Problem Relation Age of Onset    Cancer Paternal Grandmother         uterine    Cancer Paternal Aunt         uterine     Breast cancer Neg Hx        Meds/Allergies     Medications Prior to Admission   Medication    amitriptyline (ELAVIL) 25 mg tablet    dicyclomine (BENTYL) 20 mg tablet    metoclopramide (REGLAN) 10 mg tablet    pantoprazole (PROTONIX) 20 mg tablet    sucralfate (CARAFATE) 1 g tablet     Current Facility-Administered Medications   Medication Dose Route Frequency    acetaminophen (TYLENOL) tablet 650 mg  650 mg Oral Q6H PRN    amitriptyline (ELAVIL) tablet 25 mg  25 mg Oral HS    calcium carbonate (TUMS) chewable tablet 1,000 mg  1,000 mg Oral TID PRN    dicyclomine (BENTYL) tablet 20 mg  20 mg Oral BID    morphine injection 2 mg  2 mg Intravenous Q3H PRN    nicotine (NICODERM CQ) 14 mg/24hr TD 24 hr patch 1 patch  1 patch Transdermal Daily    ondansetron (ZOFRAN) injection 4 mg  4 mg Intravenous Q6H PRN    oxyCODONE (ROXICODONE) IR tablet 5 mg  5 mg Oral Q4H PRN    pantoprazole (PROTONIX) EC tablet 20 mg  20 mg Oral Daily Before Breakfast    senna (SENOKOT) tablet 8 6 mg  1 tablet Oral HS PRN    sodium chloride 0 9 % infusion  100 mL/hr Intravenous Continuous       Allergies   Allergen Reactions    Shellfish-Derived Products - Food Allergy Itching and Swelling     "seafood "    Ibuprofen Other (See Comments)     Patient states she gets hematoma           Objective     Blood pressure 105/68, pulse 62, temperature 97 7 °F (36 5 °C), temperature source Oral, resp  rate 18, SpO2 97 %, not currently breastfeeding  Intake/Output Summary (Last 24 hours) at 4/6/2021 1127  Last data filed at 4/5/2021 2211  Gross per 24 hour   Intake 1000 ml   Output --   Net 1000 ml         PHYSICAL EXAM:      General Appearance:   Alert, cooperative, no distress, appears stated age    HEENT:   Normocephalic, atraumatic  + mild scleral icterus   Neck:  Supple, symmetrical, trachea midline, no adenopathy   Lungs:   Clear to auscultation bilaterally   Heart[de-identified]   S1 and S2 normal; regular rate and rhythm   Abdomen:   Nondistended  Normal bowel sounds  Soft  Moderate tenderness in right upper quadrant with guarding     Genitalia:   Deferred    Rectal:   Deferred    Extremities:  No cyanosis, clubbing or edema    Pulses:  2+ and symmetric all extremities    Skin:  Jaundice    Lymph nodes:  No palpable cervical lymphadenopathy        Lab Results:   Results from last 7 days   Lab Units 04/06/21  0513   WBC Thousand/uL 8 79   HEMOGLOBIN g/dL 11 6   HEMATOCRIT % 35 2   PLATELETS Thousands/uL 207   NEUTROS PCT % 77*   LYMPHS PCT % 11*   MONOS PCT % 9   EOS PCT % 2     Results from last 7 days   Lab Units 04/06/21  0537   POTASSIUM mmol/L 3 5   CHLORIDE mmol/L 107   CO2 mmol/L 26   BUN mg/dL 10   CREATININE mg/dL 0 84   CALCIUM mg/dL 8 3   ALK PHOS U/L 148* ALT U/L 567*   AST U/L 351*     Results from last 7 days   Lab Units 04/04/21  2103   INR  1 06     Results from last 7 days   Lab Units 04/05/21  1325   LIPASE u/L 59       Imaging Studies: I have personally reviewed pertinent imaging studies  Ct Abdomen Pelvis With Contrast    Result Date: 4/5/2021  Impression: Overall, no significant change from yesterday's study  There remains some prominence of the biliary tree with questionable mild inflammation of the wall of the extrahepatic bile ducts which could indicate a mild cholangitis  No visible obstructing stone or mass to the level of the ampulla  If there is clinical suspicion of stricture and/or other pathology, ERCP might be helpful for more thorough diagnosis and perhaps treatment Workstation performed: [id number]       ASSESSMENT and PLAN:      Discussed plan with SLIM    80-year-old female with a history of cholecystectomy in October 2020 presenting for evaluation of RUQ pain, nausea, and vomiting found to have elevated liver enzymes and dilated bile duct on CT scan concerning for choledocholithiasis  1) right upper quadrant pain  2) nausea and vomiting  3) elevated liver enzymes  4) dilated common bile duct    She reports persistent right upper quadrant pain, nausea, and vomiting for the past 2 weeks  She has been to the ER 4 times on 3/24, 3/26, 4/4, and 4/5  Initially, liver enzymes were within normal limits however yesterday they bumped , , alk-phos 110, total bili 2 80  Today alk-phos and bilirubin rising 148 and 4 25, respectively  Acute hepatitis panel negative  WBC count has improved from 12 40 to 8 79  CT abdomen pelvis with IV contrast x 2 shows common bile duct dilation up to 9 mm with thickening of the wall of the bile duct, possibly indicating mild cholangitis  No obstructing stone or mass identified   She is afebrile and nontoxic appearing on exam  She does have significant right upper quadrant tenderness with guarding     -Keep NPO and plan for ERCP today due to concern for choledocholithiasis  -Hold off on antibiotics for now  -Monitor liver enzymes  -Monitor WBC count and temperature    Patient was seen and examined by Dr Mely Nicholson  All orielly medical decisions were made by Dr Mely Nicholson  Thank you for allowing us to participate in the care of this present patient  We will follow-up with you closely

## 2021-04-06 NOTE — UTILIZATION REVIEW
Notification of Inpatient Admission/Inpatient Authorization Request   This is a Notification of Inpatient Admission for [address]  Be advised that this patient was admitted to our facility under Inpatient Status  Contact Carmen David at [phone] for additional admission information  [facility] UR DEPT  DEDICATED -[phone]  Patient Name:   Pardeep Forde   YOB: 1985       [address]   [address]:   [facility]  Tax ID: [id number]  NPI: [id number] Attending Provider/NPI:  Phone:  Address: Angie Soni, Zaida Villafuerte [[id number]]  [phone]  Same as the facility   Place of Service Code: 24 Place of Service Name:  [address] [place] [address]   Start Date: 4/5/21 1832 Discharge Date & Time: No discharge date for patient encounter  Type of Admission: Inpatient Status Discharge Disposition   (if discharged): [address] [place] [address]   Patient Diagnoses: Right upper quadrant pain [R10 11]     Orders: Admission Orders (From admission, onward)     Ordered        04/05/21 1900  Inpatient Admission  Once                    Assigned Utilization Review Contact: Merari Ruiz  Utilization   Network Utilization Review Department  Phone: [phone]; Fax [phone]  Email: [email or web address]   ATTENTION PAYERS: Please call the assigned Utilization  directly with any questions or concerns ALL voicemails in the department are confidential  Send all requests for admission clinical reviews, approved or denied determinations and any other requests to dedicated fax number belonging to the campus where the patient is receiving treatment

## 2021-04-06 NOTE — UTILIZATION REVIEW
Initial Clinical Review    Admission: Date/Time/Statement:   Admission Orders (From admission, onward)     Ordered        04/05/21 1900  Inpatient Admission  Once                   Orders Placed This Encounter   Procedures    Inpatient Admission     Standing Status:   Standing     Number of Occurrences:   1     Order Specific Question:   Level of Care     Answer:   Med Surg [16]     Order Specific Question:   Estimated length of stay     Answer:   More than 2 Midnights     Order Specific Question:   Certification     Answer:   I certify that inpatient services are medically necessary for this patient for a duration of greater than two midnights  See H&P and MD Progress Notes for additional information about the patient's course of treatment  Assessment/Plan:     71-year-old female,  Presented to [facility] ER on 4/5  H/o tello 10/2020   Reports 2 wks of worsening RUQ / R lower chest pain  Denies SOB  OTC Meds w/o relief  Has not been able to tolerate any po intake for past 24 hrs  Pt had presented to ER previous evening (4/4) for same: CTAP showed  "mild biliary ductal dilatation , probably 2/2 post tello state  Possible cystitis   "  Dc home  Reports she awoke w/same pain/nausea   this morning  Today in ER:  CTAP showed possible cholangitis ,  Elevated Bili, AST And ALT  TRANSFERRED TO [facility] for   IP  Admission,  Ms level of care for diagnostic workup and management of symptoms  (abd pain, N/V)    Will make NPO,  Consult GI,  Provide IVF, IV pain/nausea control  Repeat cmp in AM   May need ERCP     4/7  Successful ERCP with sphincterotomy, sphincteroplasty and stone extraction  Blayne Arroyo Performed balloon dilation in the common bile duct orifice  The dilator was expanded from 8 mm starting size  Dilation held for 90 secs     Completely removed stones with balloon in multiple sweeps in the common bile duct  A medium sized cholesterol stone was removed          Vital Signs:   04/06/21 0709  97 7 °F (36 5 °C)  62  18  105/68  97 %  --  Lying   04/05/21 2300  97 6 °F (36 4 °C)  59  18  90/55  97 %  None (Room air)  Lying     04/05/21 68 9 kg (151 lb 14 4 oz)       Pertinent Labs/Diagnostic Test Results:   4/5  EKG -  Sinus bradycardia   4/05  CTAP -    Overall, no significant change from yesterday's study  There remains some prominence of the biliary tree with questionable mild inflammation of the wall of the extrahepatic bile ducts which could indicate a mild cholangitis  No visible obstructing    stone or mass to the level of the ampulla  If there is clinical suspicion of stricture and/or other pathology, ERCP might be helpful for more thorough diagnosis and perhaps treatment         4/04  CTAP - Mild biliary ductal dilatation, probably secondary to postcholecystectomy state   Thickening of the cystic duct is noted which could represent infectious or inflammatory process (axial image 22, series 2 and coronal image 52, series 3)       Partially distended bladder   Mild to moderate circumferential bladder wall thickening noted, probably exaggerated by underdistention   Superimposed cystitis is considered in the appropriate clinical setting      Uterus not well seen, suspect prior hysterectomy   Complex cystic lesion in the left adnexa redemonstrated, suspect left-sided hydrosalpinx, similar to the prior        Results from last 7 days   Lab Units 04/06/21  0513 04/05/21  1325 04/04/21  2103   WBC Thousand/uL 8 79 10 60 12 40*   HEMOGLOBIN g/dL 11 6 12 6 13 3   HEMATOCRIT % 35 2 37 5 39 3   PLATELETS Thousands/uL 207 251 317   NEUTROS ABS Thousands/µL 6 86 9 10* 9 40*         Results from last 7 days   Lab Units 04/06/21  0537 04/05/21  1325 04/04/21  2103   SODIUM mmol/L 141 140 140   POTASSIUM mmol/L 3 5 3 8 3 6   CHLORIDE mmol/L 107 105 105   CO2 mmol/L 26 29 26   ANION GAP mmol/L 8 6 9   BUN mg/dL 10 11 15   CREATININE mg/dL 0 84 0 85 0 89   EGFR ml/min/1 73sq m 90 89 85   CALCIUM mg/dL 8 3 9 0 9 5     Results from last 7 days   Lab Units 04/06/21  0537 04/05/21  1325 04/04/21  2103   AST U/L 351* 766* 31   ALT U/L 567* 584* 32   ALK PHOS U/L 148* 110 78   TOTAL PROTEIN g/dL 6 2* 7 6 8 0   ALBUMIN g/dL 3 1* 4 1 4 4   TOTAL BILIRUBIN mg/dL 4 25* 2 80* 0 30     Results from last 7 days   Lab Units 04/06/21  0712   POC GLUCOSE mg/dl 88     Results from last 7 days   Lab Units 04/06/21  0537 04/05/21  1325 04/04/21  2103   GLUCOSE RANDOM mg/dL 97 127* 99     Results from last 7 days   Lab Units 04/05/21  1325 04/04/21  2103   TROPONIN I ng/mL <0 01 <0 01     Results from last 7 days   Lab Units 04/04/21  2103   D-DIMER QUANTITATIVE ug/ml FEU 0 40     Results from last 7 days   Lab Units 04/04/21  2103   PROTIME seconds 13 9   INR  1 06     Results from last 7 days   Lab Units 04/04/21  2249 04/04/21  2108   LACTIC ACID mmol/L 1 9 2 5*     Results from last 7 days   Lab Units 04/05/21  1420   HEP B S AG  Non-reactive   HEP C AB  Non-reactive   HEP B C IGM  Non-reactive     Results from last 7 days   Lab Units 04/05/21  1325 04/04/21  2103   LIPASE u/L 59 67     Results from last 7 days   Lab Units 04/04/21  2112   CLARITY UA  Clear   COLOR UA  Mary*   SPEC GRAV UA  1 025   PH UA  5 0   GLUCOSE UA mg/dl Negative   KETONES UA mg/dl Negative   BLOOD UA  25 0*   PROTEIN UA mg/dl 15 (Trace)*   NITRITE UA  Negative   BILIRUBIN UA  Negative   UROBILINOGEN UA mg/dL Negative   LEUKOCYTES UA  Negative   WBC UA /hpf 0-1   RBC UA /hpf 2-4   BACTERIA UA /hpf Moderate*   EPITHELIAL CELLS WET PREP /hpf Moderate*   MUCUS THREADS  Moderate*     Results from last 7 days   Lab Units 04/05/21  1325   ACETAMINOPHEN LVL ug/mL <10*     Past Medical History:   Diagnosis Date    Abnormal Pap smear of cervix 2015    cryosurgery    Migraine headache with aura     managed with Elavil     Present on Admission:   Migraine headache with aura      Admitting Diagnosis: Right upper quadrant pain [R10 11]  Age/Sex: 29 y o  female  Admission Orders:   Npo, consult GI,  Schedule ERCP    Scheduled Medications:  amitriptyline, 25 mg, Oral, HS  dicyclomine, 20 mg, Oral, BID  nicotine, 1 patch, Transdermal, Daily  pantoprazole, 20 mg, Oral, Daily Before Breakfast      Continuous IV Infusions:  sodium chloride, 100 mL/hr, Intravenous, Continuous      PRN Meds:  acetaminophen, 650 mg, Oral, Q6H PRN   Given 4/5  @  2200  calcium carbonate, 1,000 mg, Oral, TID PRN  morphine injection, 2 mg, Intravenous, Q3H PRN  ondansetron, 4 mg, Intravenous, Q6H PRN  oxyCODONE, 5 mg, Oral, Q4H PRN   Given 4/6  @  0600 and 2241   senna, 1 tablet, Oral, HS PRN      Network Utilization Review Department  ATTENTION: Please call with any questions or concerns to [phone] and carefully listen to the prompts so that you are directed to the right person  All voicemails are confidential   Mio Benitez all requests for admission clinical reviews, approved or denied determinations and any other requests to dedicated fax number below belonging to the campus where the patient is receiving treatment   List of dedicated fax numbers for the Facilities:  [address] DENIALS (Administrative/Medical Necessity) [phone]   [address] (Maternity/NICU/Pediatrics) [phone]   [address] [place] [address] ([place]) [id number] [place] [address] [address] [place] [address] [phone]

## 2021-04-07 VITALS
TEMPERATURE: 98.2 F | SYSTOLIC BLOOD PRESSURE: 119 MMHG | RESPIRATION RATE: 18 BRPM | DIASTOLIC BLOOD PRESSURE: 67 MMHG | HEART RATE: 75 BPM | OXYGEN SATURATION: 94 %

## 2021-04-07 LAB
ALBUMIN SERPL BCP-MCNC: 2.8 G/DL (ref 3.5–5)
ALP SERPL-CCNC: 166 U/L (ref 46–116)
ALT SERPL W P-5'-P-CCNC: 383 U/L (ref 12–78)
ANION GAP SERPL CALCULATED.3IONS-SCNC: 9 MMOL/L (ref 4–13)
AST SERPL W P-5'-P-CCNC: 141 U/L (ref 5–45)
BASOPHILS # BLD AUTO: 0.02 THOUSANDS/ΜL (ref 0–0.1)
BASOPHILS NFR BLD AUTO: 0 % (ref 0–1)
BILIRUB SERPL-MCNC: 3.02 MG/DL (ref 0.2–1)
BUN SERPL-MCNC: 7 MG/DL (ref 5–25)
CALCIUM ALBUM COR SERPL-MCNC: 9.6 MG/DL (ref 8.3–10.1)
CALCIUM SERPL-MCNC: 8.6 MG/DL (ref 8.3–10.1)
CHLORIDE SERPL-SCNC: 106 MMOL/L (ref 100–108)
CO2 SERPL-SCNC: 25 MMOL/L (ref 21–32)
CREAT SERPL-MCNC: 0.81 MG/DL (ref 0.6–1.3)
EOSINOPHIL # BLD AUTO: 0.15 THOUSAND/ΜL (ref 0–0.61)
EOSINOPHIL NFR BLD AUTO: 3 % (ref 0–6)
ERYTHROCYTE [DISTWIDTH] IN BLOOD BY AUTOMATED COUNT: 11.9 % (ref 11.6–15.1)
GFR SERPL CREATININE-BSD FRML MDRD: 94 ML/MIN/1.73SQ M
GLUCOSE SERPL-MCNC: 102 MG/DL (ref 65–140)
HCT VFR BLD AUTO: 33.5 % (ref 34.8–46.1)
HGB BLD-MCNC: 11.1 G/DL (ref 11.5–15.4)
IMM GRANULOCYTES # BLD AUTO: 0.01 THOUSAND/UL (ref 0–0.2)
IMM GRANULOCYTES NFR BLD AUTO: 0 % (ref 0–2)
LYMPHOCYTES # BLD AUTO: 1.23 THOUSANDS/ΜL (ref 0.6–4.47)
LYMPHOCYTES NFR BLD AUTO: 24 % (ref 14–44)
MCH RBC QN AUTO: 30.6 PG (ref 26.8–34.3)
MCHC RBC AUTO-ENTMCNC: 33.1 G/DL (ref 31.4–37.4)
MCV RBC AUTO: 92 FL (ref 82–98)
MONOCYTES # BLD AUTO: 0.43 THOUSAND/ΜL (ref 0.17–1.22)
MONOCYTES NFR BLD AUTO: 8 % (ref 4–12)
NEUTROPHILS # BLD AUTO: 3.34 THOUSANDS/ΜL (ref 1.85–7.62)
NEUTS SEG NFR BLD AUTO: 65 % (ref 43–75)
NRBC BLD AUTO-RTO: 0 /100 WBCS
PLATELET # BLD AUTO: 192 THOUSANDS/UL (ref 149–390)
PMV BLD AUTO: 11.8 FL (ref 8.9–12.7)
POTASSIUM SERPL-SCNC: 3.6 MMOL/L (ref 3.5–5.3)
PROT SERPL-MCNC: 5.9 G/DL (ref 6.4–8.2)
RBC # BLD AUTO: 3.63 MILLION/UL (ref 3.81–5.12)
SODIUM SERPL-SCNC: 140 MMOL/L (ref 136–145)
WBC # BLD AUTO: 5.18 THOUSAND/UL (ref 4.31–10.16)

## 2021-04-07 PROCEDURE — 99239 HOSP IP/OBS DSCHRG MGMT >30: CPT | Performed by: INTERNAL MEDICINE

## 2021-04-07 PROCEDURE — 99232 SBSQ HOSP IP/OBS MODERATE 35: CPT | Performed by: PHYSICIAN ASSISTANT

## 2021-04-07 PROCEDURE — 85025 COMPLETE CBC W/AUTO DIFF WBC: CPT | Performed by: INTERNAL MEDICINE

## 2021-04-07 PROCEDURE — 80053 COMPREHEN METABOLIC PANEL: CPT | Performed by: INTERNAL MEDICINE

## 2021-04-07 RX ADMIN — ONDANSETRON 4 MG: 2 INJECTION INTRAMUSCULAR; INTRAVENOUS at 10:36

## 2021-04-07 RX ADMIN — PANTOPRAZOLE SODIUM 20 MG: 20 TABLET, DELAYED RELEASE ORAL at 05:44

## 2021-04-07 NOTE — DISCHARGE SUMMARY
[facility]  Discharge- Karma Joseph 1985, 29 y o  female MRN: [id number]  Unit/Bed#: E5 [facility]6-01 Encounter: [id number]  Primary Care Provider: Norm To PA-C   Date and time admitted to hospital: 4/5/2021  6:32 PM    * Common bile duct dilation  Assessment & Plan  This is a 72-year-old female with history of cholecystectomy in October 2020 presented with severe right upper quadrant pain found to have elevated LFTs and bilirubinemia  ·  CT scan showing prominence of biliary tree with questionable mild inflammation of wall of extrahepatic biliary duct which could indicate mild cholangitis, no visible obstructing stone or mass  · Status post ERCP with choledocholithiasis and stone extraction  · Bilirubin trending down, patient does not have any more abdominal pain cleared from GI standpoint for discharge home today    Elevated LFTs  Assessment & Plan  Recent Labs     04/05/21  1325 04/06/21  0537 04/07/21  0502   * 351* 141*   * 567* 383*   ALKPHOS 110 148* 166*   TBILI 2 80* 4 25* 3 02*   · Trending down      Smoking  Assessment & Plan  · Nicotine patch while inpatient  · Tobacco cessation education    Migraine headache with aura  Assessment & Plan  · Continue Elavil        Transition of Care Discharge Summary - [place] [address] Internal Medicine    Patient Information: Karma Joseph 29 y o  female MRN: [id number]  Unit/Bed#: E5 -01 Encounter: [id number]    Discharging Physician / Practitioner: Nora Cormier MD  PCP: Norm To PA-C  Admission Date: 4/5/2021  Discharge Date: 04/07/21    Disposition:      Other: home      Reason for Admission:  Abdominal pain    Discharge Diagnoses:     Principal Problem:    Common bile duct dilation  Active Problems:    Migraine headache with aura    Smoking    Elevated LFTs    Intractable nausea and vomiting    Gastroesophageal reflux disease  Resolved Problems:    * No resolved hospital problems  *      Consultations During Hospital Stay:  · IP CONSULT TO GASTROENTEROLOGY      Procedures Performed:     · ERCP    Medication Adjustments and Discharge Medications:  · Medication Dosing Tapers - Please refer to Discharge Medication List for details on any medication dosing tapers (if applicable to patient)  · Discharge Medication List: See after visit summary for reconciled discharge medications  Wound Care Recommendations:  When applicable, please see wound care section of After Visit Summary  Diet Recommendations at Discharge:  Diet -        Diet Orders   (From admission, onward)             Start     Ordered    04/06/21 1952  Diet Regular; Regular House; Lo Fat  Diet effective now     Question Answer Comment   Diet Type Regular    Regular Regular House    Other Restriction(s): Lo Fat    RD to adjust diet per protocol? Yes        04/06/21 1951              Fluid Restriction - No Fluid Restriction at Discharge  Significant Findings / Test Results:     CT abdomen pelvis:Mild biliary ductal dilatation, probably secondary to postcholecystectomy state  Thickening of the cystic duct is noted which could represent infectious or inflammatory process (axial image 22, series 2 and coronal image 52, series 3)     Partially distended bladder  Mild to moderate circumferential bladder wall thickening noted, probably exaggerated by underdistention  Superimposed cystitis is considered in the appropriate clinical setting        Uterus not well seen, suspect prior hysterectomy  Complex cystic lesion in the left adnexa redemonstrated, suspect left-sided hydrosalpinx, similar to the prior      · Other findings as above  CT abdomen and pelvis:Overall, no significant change from yesterday's study  There remains some prominence of the biliary tree with questionable mild inflammation of the wall of the extrahepatic bile ducts which could indicate a mild cholangitis    No visible obstructing   stone or mass to the level of the ampulla  If there is clinical suspicion of stricture and/or other pathology, ERCP might be helpful for more thorough diagnosis and perhaps treatment         Hospital Course:     Jazmine Handley is a 29 y o  female patient who originally presented to the hospital on 4/5/2021 due to abdominal pain  Patient has history of migraines  Imaging revealed elevated LFTs biliary tree prominence, mild cholangitis, GI consulted patient underwent ERCP with stone extraction  Patient is medically stable, bilirubin trended down, no abdominal pain she is okay from GI standpoint for discharge home today with outpatient follow-up  Please see above problem list for further details  Condition at Discharge: good     Discharge Day Visit / Exam:     Subjective:  Patient seen and examined at bedside, states she feels better, tolerated lunch    Vitals: Blood Pressure: 119/67 (04/07/21 1500)  Pulse: 75 (04/07/21 1500)  Temperature: 98 2 °F (36 8 °C) (04/07/21 1500)  Temp Source: Temporal (04/07/21 1500)  Respirations: 18 (04/07/21 1500)  SpO2: 94 % (04/07/21 1500)    Physical Exam:    Constitutional: Patient is oriented to person, place and time, no acute distress  HEENT:  Normocephalic, atraumatic  Cardiovascular: Normal S1S2, RRR, No murmurs/rubs/gallops appreciated  Pulmonary:  Bilateral air entry, No rhonchi/rales/wheezing appreciated  Abdominal: Soft, Bowel sounds present, Non-tender, Non-distended  Extremities:  No cyanosis, clubbing or edema  Neurological: Cranial nerves II-XII grossly intact, sensation intact, otherwise no focal neurological symptoms  Discharge instructions/Information to patient and family:   See after visit summary section titled Discharge Instructions for information provided to patient and family  Planned Readmission: no     Discharge Statement:  I spent 35 minutes discharging the patient  This time was spent on the day of discharge   I had direct contact with the patient on the day of discharge  Greater than 50% of the total time was spent examining patient, answering all patient questions, arranging and discussing plan of care with patient as well as directly providing post-discharge instructions  Additional time then spent on discharge activities      ** Please Note: This note has been constructed using a voice recognition system **

## 2021-04-07 NOTE — PLAN OF CARE
Problem: PAIN - ADULT  Goal: Verbalizes/displays adequate comfort level or baseline comfort level  Description: Interventions:  - Encourage patient to monitor pain and request assistance  - Assess pain using appropriate pain scale  - Administer analgesics based on type and severity of pain and evaluate response  - Implement non-pharmacological measures as appropriate and evaluate response  - Consider cultural and social influences on pain and pain management  - Notify physician/advanced practitioner if interventions unsuccessful or patient reports new pain  Outcome: Progressing     Problem: INFECTION - ADULT  Goal: Absence or prevention of progression during hospitalization  Description: INTERVENTIONS:  - Assess and monitor for signs and symptoms of infection  - Monitor lab/diagnostic results  - Monitor all insertion sites, i e  indwelling lines, tubes, and drains  - Monitor endotracheal if appropriate and nasal secretions for changes in amount and color  - [place] appropriate cooling/warming therapies per order  - Administer medications as ordered  - Instruct and encourage patient and family to use good hand hygiene technique  - Identify and instruct in appropriate isolation precautions for identified infection/condition  Outcome: Progressing  Goal: Absence of fever/infection during neutropenic period  Description: INTERVENTIONS:  - Monitor WBC    Outcome: Progressing     Problem: SAFETY ADULT  Goal: Patient will remain free of falls  Description: INTERVENTIONS:  - Assess patient frequently for physical needs  -  Identify cognitive and physical deficits and behaviors that affect risk of falls    -  [place] fall precautions as indicated by assessment   - Educate patient/family on patient safety including physical limitations  - Instruct patient to call for assistance with activity based on assessment  - Modify environment to reduce risk of injury  - Consider OT/PT consult to assist with strengthening/mobility  Outcome: Progressing  Goal: Maintain or return to baseline ADL function  Description: INTERVENTIONS:  -  Assess patient's ability to carry out ADLs; assess patient's baseline for ADL function and identify physical deficits which impact ability to perform ADLs (bathing, care of mouth/teeth, toileting, grooming, dressing, etc )  - Assess/evaluate cause of self-care deficits   - Assess range of motion  - Assess patient's mobility; develop plan if impaired  - Assess patient's need for assistive devices and provide as appropriate  - Encourage maximum independence but intervene and supervise when necessary  - Involve family in performance of ADLs  - Assess for home care needs following discharge   - Consider OT consult to assist with ADL evaluation and planning for discharge  - Provide patient education as appropriate  Outcome: Progressing  Goal: Maintain or return mobility status to optimal level  Description: INTERVENTIONS:  - Assess patient's baseline mobility status (ambulation, transfers, stairs, etc )    - Identify cognitive and physical deficits and behaviors that affect mobility  - Identify mobility aids required to assist with transfers and/or ambulation (gait belt, sit-to-stand, lift, walker, cane, etc )  - [place] fall precautions as indicated by assessment  - Record patient progress and toleration of activity level on Mobility SBAR; progress patient to next Phase/Stage  - Instruct patient to call for assistance with activity based on assessment  - Consider rehabilitation consult to assist with strengthening/weightbearing, etc   Outcome: Progressing     Problem: DISCHARGE PLANNING  Goal: Discharge to home or other facility with appropriate resources  Description: INTERVENTIONS:  - Identify barriers to discharge w/patient and caregiver  - Arrange for needed discharge resources and transportation as appropriate  - Identify discharge learning needs (meds, wound care, etc )  - Arrange for interpretive services to assist at discharge as needed  - Refer to Case Management Department for coordinating discharge planning if the patient needs post-hospital services based on physician/advanced practitioner order or complex needs related to functional status, cognitive ability, or social support system  Outcome: Progressing     Problem: Knowledge Deficit  Goal: Patient/family/caregiver demonstrates understanding of disease process, treatment plan, medications, and discharge instructions  Description: Complete learning assessment and assess knowledge base    Interventions:  - Provide teaching at level of understanding  - Provide teaching via preferred learning methods  Outcome: Progressing     Problem: GASTROINTESTINAL - ADULT  Goal: Minimal or absence of nausea and/or vomiting  Description: INTERVENTIONS:  - Administer IV fluids if ordered to ensure adequate hydration  - Maintain NPO status until nausea and vomiting are resolved  - Nasogastric tube if ordered  - Administer ordered antiemetic medications as needed  - Provide nonpharmacologic comfort measures as appropriate  - Advance diet as tolerated, if ordered  - Consider nutrition services referral to assist patient with adequate nutrition and appropriate food choices  Outcome: Progressing  Goal: Maintains or returns to baseline bowel function  Description: INTERVENTIONS:  - Assess bowel function  - Encourage oral fluids to ensure adequate hydration  - Administer IV fluids if ordered to ensure adequate hydration  - Administer ordered medications as needed  - Encourage mobilization and activity  - Consider nutritional services referral to assist patient with adequate nutrition and appropriate food choices  Outcome: Progressing  Goal: Maintains adequate nutritional intake  Description: INTERVENTIONS:  - Monitor percentage of each meal consumed  - Identify factors contributing to decreased intake, treat as appropriate  - Assist with meals as needed  - Monitor I&O, weight, and lab values if indicated  - Obtain nutrition services referral as needed  Outcome: Progressing     Problem: Potential for Falls  Goal: Patient will remain free of falls  Description: INTERVENTIONS:  - Assess patient frequently for physical needs  -  Identify cognitive and physical deficits and behaviors that affect risk of falls    -  [place] fall precautions as indicated by assessment   - Educate patient/family on patient safety including physical limitations  - Instruct patient to call for assistance with activity based on assessment  - Modify environment to reduce risk of injury  - Consider OT/PT consult to assist with strengthening/mobility  Outcome: Progressing

## 2021-04-07 NOTE — ASSESSMENT & PLAN NOTE
This is a 58-year-old female with history of cholecystectomy in October 2020 presented with severe right upper quadrant pain found to have elevated LFTs and bilirubinemia      ·  CT scan showing prominence of biliary tree with questionable mild inflammation of wall of extrahepatic biliary duct which could indicate mild cholangitis, no visible obstructing stone or mass  · GI consultation appreciated plan for ERCP today  · Continue IV fluid  · Will hold on antibiotics at this point unless patient develops fever or white blood cell count elevates at which point blood cultures will also be sent

## 2021-04-07 NOTE — PROGRESS NOTES
Progress Note - Nohemi Lazcano 29 y o  female MRN: [id number]    Unit/Bed#: E5 -01 Encounter: [id number]    Subjective:     Patient seen and examined at bedside  She reports improvement in pain today  She is tolerating diet  No nausea or vomiting  Objective:     Vitals: Blood pressure 121/80, pulse 88, temperature 98 °F (36 7 °C), temperature source Temporal, resp  rate 18, SpO2 99 %, not currently breastfeeding  ,There is no height or weight on file to calculate BMI  Intake/Output Summary (Last 24 hours) at 4/7/2021 1443  Last data filed at 4/7/2021 1435  Gross per 24 hour   Intake 500 ml   Output --   Net 500 ml       Physical Exam:     General Appearance: Alert, appears stated age and cooperative  Lungs: Clear to auscultation bilaterally, no rales or rhonchi, no labored breathing/accessory muscle use  Heart: Regular rate and rhythm, S1, S2 normal, no murmur, click, rub or gallop  Abdomen: Non-distended  Normal bowel sounds  Soft  Mild epigastric tenderness to palpation  No rebound or guarding  Extremities: No cyanosis, edema    Invasive Devices     Peripheral Intravenous Line            Peripheral IV 04/05/21 Left Antecubital 2 days                Lab Results:  Results from last 7 days   Lab Units 04/07/21  0502   WBC Thousand/uL 5 18   HEMOGLOBIN g/dL 11 1*   HEMATOCRIT % 33 5*   PLATELETS Thousands/uL 192   NEUTROS PCT % 65   LYMPHS PCT % 24   MONOS PCT % 8   EOS PCT % 3     Results from last 7 days   Lab Units 04/07/21  0502   POTASSIUM mmol/L 3 6   CHLORIDE mmol/L 106   CO2 mmol/L 25   BUN mg/dL 7   CREATININE mg/dL 0 81   CALCIUM mg/dL 8 6   ALK PHOS U/L 166*   ALT U/L 383*   AST U/L 141*     Results from last 7 days   Lab Units 04/04/21  2103   INR  1 06     Results from last 7 days   Lab Units 04/05/21  1325   LIPASE u/L 59       Imaging Studies: I have personally reviewed pertinent imaging studies      Fl Ercp Biliary And Pancreatic    Result Date: 4/7/2021  Impression: Fluoroscopic guidance provided for procedure guidance  Please refer to the separate procedure notes for additional details  Workstation performed: [id number]     Ct Abdomen Pelvis With Contrast    Result Date: 4/5/2021  Impression: Overall, no significant change from yesterday's study  There remains some prominence of the biliary tree with questionable mild inflammation of the wall of the extrahepatic bile ducts which could indicate a mild cholangitis  No visible obstructing stone or mass to the level of the ampulla  If there is clinical suspicion of stricture and/or other pathology, ERCP might be helpful for more thorough diagnosis and perhaps treatment Workstation performed: [id number]       Assessment and Plan:    Discussed plan with SLIM     43-year-old female with a history of cholecystectomy in October 2020 presenting for evaluation of RUQ pain, nausea, and vomiting found to have choledocholithiasis s/p ERCP      1) Choledocholithiasis  2) Right upper quadrant pain  3) Nausea and vomiting    She was admitted with RUQ pain N/V for 2 weeks found to have elevated liver enzymes and dilated CBD on imaging  She underwent ERCP yesterday with sphincterotomy and stone removal  Liver enzymes improving total bilirubin 3 02 down from 4 25  No leukocytosis or fever   She has less tenderness on physical exam      -She is stable for discharge from GI standpoint  -Continue regular diet as tolerated  -I entered order for repeat liver enzymes in 1 week  -Follow-up with GI as needed

## 2021-04-07 NOTE — PLAN OF CARE
Problem: PAIN - ADULT  Goal: Verbalizes/displays adequate comfort level or baseline comfort level  Description: Interventions:  - Encourage patient to monitor pain and request assistance  - Assess pain using appropriate pain scale  - Administer analgesics based on type and severity of pain and evaluate response  - Implement non-pharmacological measures as appropriate and evaluate response  - Consider cultural and social influences on pain and pain management  - Notify physician/advanced practitioner if interventions unsuccessful or patient reports new pain  4/7/2021 0756 by June Vincent RN  Outcome: Progressing  4/7/2021 0756 by June Vincent RN  Outcome: Progressing     Problem: INFECTION - ADULT  Goal: Absence or prevention of progression during hospitalization  Description: INTERVENTIONS:  - Assess and monitor for signs and symptoms of infection  - Monitor lab/diagnostic results  - Monitor all insertion sites, i e  indwelling lines, tubes, and drains  - Monitor endotracheal if appropriate and nasal secretions for changes in amount and color  - [place] appropriate cooling/warming therapies per order  - Administer medications as ordered  - Instruct and encourage patient and family to use good hand hygiene technique  - Identify and instruct in appropriate isolation precautions for identified infection/condition  4/7/2021 0756 by June Vincent RN  Outcome: Progressing  4/7/2021 0756 by June Vincent RN  Outcome: Progressing  Goal: Absence of fever/infection during neutropenic period  Description: INTERVENTIONS:  - Monitor WBC    4/7/2021 0756 by June Vincent RN  Outcome: Progressing  4/7/2021 0756 by June Vincent RN  Outcome: Progressing     Problem: SAFETY ADULT  Goal: Patient will remain free of falls  Description: INTERVENTIONS:  - Assess patient frequently for physical needs  -  Identify cognitive and physical deficits and behaviors that affect risk of falls    -  [place] fall precautions as indicated by assessment   - Educate patient/family on patient safety including physical limitations  - Instruct patient to call for assistance with activity based on assessment  - Modify environment to reduce risk of injury  - Consider OT/PT consult to assist with strengthening/mobility  4/7/2021 0756 by Mark Oliva RN  Outcome: Progressing  4/7/2021 0756 by Mark Oliva RN  Outcome: Progressing  Goal: Maintain or return to baseline ADL function  Description: INTERVENTIONS:  -  Assess patient's ability to carry out ADLs; assess patient's baseline for ADL function and identify physical deficits which impact ability to perform ADLs (bathing, care of mouth/teeth, toileting, grooming, dressing, etc )  - Assess/evaluate cause of self-care deficits   - Assess range of motion  - Assess patient's mobility; develop plan if impaired  - Assess patient's need for assistive devices and provide as appropriate  - Encourage maximum independence but intervene and supervise when necessary  - Involve family in performance of ADLs  - Assess for home care needs following discharge   - Consider OT consult to assist with ADL evaluation and planning for discharge  - Provide patient education as appropriate  4/7/2021 0756 by Mark Oliva RN  Outcome: Progressing  4/7/2021 0756 by Mark Oliva RN  Outcome: Progressing  Goal: Maintain or return mobility status to optimal level  Description: INTERVENTIONS:  - Assess patient's baseline mobility status (ambulation, transfers, stairs, etc )    - Identify cognitive and physical deficits and behaviors that affect mobility  - Identify mobility aids required to assist with transfers and/or ambulation (gait belt, sit-to-stand, lift, walker, cane, etc )  - [place] fall precautions as indicated by assessment  - Record patient progress and toleration of activity level on Mobility SBAR; progress patient to next Phase/Stage  - Instruct patient to call for assistance with activity based on assessment  - Consider rehabilitation consult to assist with strengthening/weightbearing, etc   4/7/2021 0756 by Bryan Rodgers RN  Outcome: Progressing  4/7/2021 0756 by Bryan Rodgers RN  Outcome: Progressing     Problem: DISCHARGE PLANNING  Goal: Discharge to home or other facility with appropriate resources  Description: INTERVENTIONS:  - Identify barriers to discharge w/patient and caregiver  - Arrange for needed discharge resources and transportation as appropriate  - Identify discharge learning needs (meds, wound care, etc )  - Arrange for interpretive services to assist at discharge as needed  - Refer to Case Management Department for coordinating discharge planning if the patient needs post-hospital services based on physician/advanced practitioner order or complex needs related to functional status, cognitive ability, or social support system  4/7/2021 0756 by Bryan Rodgers RN  Outcome: Progressing  4/7/2021 0756 by Bryan Rodgers RN  Outcome: Progressing     Problem: Knowledge Deficit  Goal: Patient/family/caregiver demonstrates understanding of disease process, treatment plan, medications, and discharge instructions  Description: Complete learning assessment and assess knowledge base    Interventions:  - Provide teaching at level of understanding  - Provide teaching via preferred learning methods  4/7/2021 0756 by Bryan Rodgers RN  Outcome: Progressing  4/7/2021 0756 by Bryan Rodgers RN  Outcome: Progressing     Problem: GASTROINTESTINAL - ADULT  Goal: Minimal or absence of nausea and/or vomiting  Description: INTERVENTIONS:  - Administer IV fluids if ordered to ensure adequate hydration  - Maintain NPO status until nausea and vomiting are resolved  - Nasogastric tube if ordered  - Administer ordered antiemetic medications as needed  - Provide nonpharmacologic comfort measures as appropriate  - Advance diet as tolerated, if ordered  - Consider nutrition services referral to assist patient with adequate nutrition and appropriate food choices  4/7/2021 0756 by Evaristo Hart RN  Outcome: Progressing  4/7/2021 0756 by Evaristo Hart RN  Outcome: Progressing  Goal: Maintains or returns to baseline bowel function  Description: INTERVENTIONS:  - Assess bowel function  - Encourage oral fluids to ensure adequate hydration  - Administer IV fluids if ordered to ensure adequate hydration  - Administer ordered medications as needed  - Encourage mobilization and activity  - Consider nutritional services referral to assist patient with adequate nutrition and appropriate food choices  4/7/2021 0756 by Evaristo Hart RN  Outcome: Progressing  4/7/2021 0756 by Evaristo Hart RN  Outcome: Progressing  Goal: Maintains adequate nutritional intake  Description: INTERVENTIONS:  - Monitor percentage of each meal consumed  - Identify factors contributing to decreased intake, treat as appropriate  - Assist with meals as needed  - Monitor I&O, weight, and lab values if indicated  - Obtain nutrition services referral as needed  4/7/2021 0756 by Evaristo Hart RN  Outcome: Progressing  4/7/2021 0756 by Evaristo Hart RN  Outcome: Progressing     Problem: Potential for Falls  Goal: Patient will remain free of falls  Description: INTERVENTIONS:  - Assess patient frequently for physical needs  -  Identify cognitive and physical deficits and behaviors that affect risk of falls    -  [place] fall precautions as indicated by assessment   - Educate patient/family on patient safety including physical limitations  - Instruct patient to call for assistance with activity based on assessment  - Modify environment to reduce risk of injury  - Consider OT/PT consult to assist with strengthening/mobility  4/7/2021 0756 by Evaristo Hart RN  Outcome: Progressing  4/7/2021 0756 by Evaristo Hart RN  Outcome: Progressing

## 2021-04-07 NOTE — ASSESSMENT & PLAN NOTE
Recent Labs     04/05/21  1325 04/06/21  0537 04/07/21  0502   * 351* 141*   * 567* 383*   ALKPHOS 110 148* 166*   TBILI 2 80* 4 25* 3 02*   · Patient found to have significant increase in LFTs from yesterday to today  · GI consultation appreciated plan for ERCP  · Repeat CMP in a m

## 2021-04-14 ENCOUNTER — APPOINTMENT (OUTPATIENT)
Dept: LAB | Facility: CLINIC | Age: 36
End: 2021-04-14
Payer: COMMERCIAL

## 2021-04-14 DIAGNOSIS — K83.8 COMMON BILE DUCT DILATION: ICD-10-CM

## 2021-04-14 LAB
ALBUMIN SERPL BCP-MCNC: 4 G/DL (ref 3.5–5)
ALP SERPL-CCNC: 127 U/L (ref 46–116)
ALT SERPL W P-5'-P-CCNC: 208 U/L (ref 12–78)
AST SERPL W P-5'-P-CCNC: 29 U/L (ref 5–45)
BILIRUB DIRECT SERPL-MCNC: 0.33 MG/DL (ref 0–0.2)
BILIRUB SERPL-MCNC: 0.61 MG/DL (ref 0.2–1)
PROT SERPL-MCNC: 7.8 G/DL (ref 6.4–8.2)

## 2021-04-14 PROCEDURE — 80076 HEPATIC FUNCTION PANEL: CPT

## 2021-04-14 PROCEDURE — 36415 COLL VENOUS BLD VENIPUNCTURE: CPT

## 2021-04-23 ENCOUNTER — OFFICE VISIT (OUTPATIENT)
Dept: GASTROENTEROLOGY | Facility: CLINIC | Age: 36
End: 2021-04-23
Payer: COMMERCIAL

## 2021-04-23 ENCOUNTER — APPOINTMENT (OUTPATIENT)
Dept: LAB | Facility: CLINIC | Age: 36
End: 2021-04-23
Payer: COMMERCIAL

## 2021-04-23 VITALS
HEART RATE: 96 BPM | DIASTOLIC BLOOD PRESSURE: 60 MMHG | TEMPERATURE: 97.6 F | HEIGHT: 62 IN | WEIGHT: 151 LBS | BODY MASS INDEX: 27.79 KG/M2 | SYSTOLIC BLOOD PRESSURE: 120 MMHG

## 2021-04-23 DIAGNOSIS — K80.50 CHOLEDOCHOLITHIASIS: Primary | ICD-10-CM

## 2021-04-23 DIAGNOSIS — R11.2 INTRACTABLE NAUSEA AND VOMITING: ICD-10-CM

## 2021-04-23 DIAGNOSIS — K29.70 GASTRITIS: ICD-10-CM

## 2021-04-23 DIAGNOSIS — K21.9 GASTROESOPHAGEAL REFLUX DISEASE WITHOUT ESOPHAGITIS: ICD-10-CM

## 2021-04-23 DIAGNOSIS — K80.50 CHOLEDOCHOLITHIASIS: ICD-10-CM

## 2021-04-23 DIAGNOSIS — R79.89 ELEVATED LFTS: ICD-10-CM

## 2021-04-23 DIAGNOSIS — R10.13 ABDOMINAL PAIN, EPIGASTRIC: ICD-10-CM

## 2021-04-23 DIAGNOSIS — K83.8 COMMON BILE DUCT DILATION: ICD-10-CM

## 2021-04-23 LAB
ALBUMIN SERPL BCP-MCNC: 4 G/DL (ref 3.5–5)
ALP SERPL-CCNC: 92 U/L (ref 46–116)
ALT SERPL W P-5'-P-CCNC: 63 U/L (ref 12–78)
AST SERPL W P-5'-P-CCNC: 24 U/L (ref 5–45)
BILIRUB DIRECT SERPL-MCNC: 0.27 MG/DL (ref 0–0.2)
BILIRUB SERPL-MCNC: 0.55 MG/DL (ref 0.2–1)
HAV IGM SER QL: NORMAL
HBV CORE IGM SER QL: NORMAL
HBV SURFACE AG SER QL: NORMAL
HCV AB SER QL: NORMAL
LIPASE SERPL-CCNC: 113 U/L (ref 73–393)
PROT SERPL-MCNC: 7.5 G/DL (ref 6.4–8.2)

## 2021-04-23 PROCEDURE — 80074 ACUTE HEPATITIS PANEL: CPT

## 2021-04-23 PROCEDURE — 80076 HEPATIC FUNCTION PANEL: CPT

## 2021-04-23 PROCEDURE — 4004F PT TOBACCO SCREEN RCVD TLK: CPT | Performed by: INTERNAL MEDICINE

## 2021-04-23 PROCEDURE — 83690 ASSAY OF LIPASE: CPT

## 2021-04-23 PROCEDURE — 99214 OFFICE O/P EST MOD 30 MIN: CPT | Performed by: INTERNAL MEDICINE

## 2021-04-23 PROCEDURE — 36415 COLL VENOUS BLD VENIPUNCTURE: CPT

## 2021-04-23 PROCEDURE — 3008F BODY MASS INDEX DOCD: CPT | Performed by: INTERNAL MEDICINE

## 2021-04-23 RX ORDER — PANTOPRAZOLE SODIUM 20 MG/1
20 TABLET, DELAYED RELEASE ORAL 2 TIMES DAILY
Qty: 180 TABLET | Refills: 5 | Status: SHIPPED | OUTPATIENT
Start: 2021-04-23 | End: 2022-03-17 | Stop reason: SDUPTHER

## 2021-04-23 NOTE — PROGRESS NOTES
[facility] - Outpatient Consultation  Cuca Garcia 29 y o  female MRN: [id number]  Encounter: [id number]          ASSESSMENT AND PLAN:      1  Gastritis  - pantoprazole (PROTONIX) 20 mg tablet; Take 1 tablet (20 mg total) by mouth 2 (two) times a day  Dispense: 180 tablet; Refill: 5    2  Choledocholithiasis  - Hepatic function panel; Future    3  Elevated LFTs  - Hepatitis panel, acute; Future    4  Common bile duct dilation  5  Intractable nausea and vomiting  6  Gastroesophageal reflux disease without esophagitis  7  Abdominal pain, epigastric  - Lipase; Future    Her symptoms have completely resolved  She can continue pantoprazole for now  We can slowly wean off for the next several months when she follows up in 6 months  Will repeat LFTs to make sure that central down to normal   Will also check acute hepatitis panel     -follow-up 6 months to 1 year for management of history of gastritis and to wean off PPI therapy    ______________________________________________________________________    HPI:      She is a 27-year-old female with previous history of cholecystectomy 1 year ago was recently at admitted to the hospital for choledocholithiasis  She underwent emergent ERCP with sphincterotomy balloon dilation and balloon sweep  She presented at that time with elevation LFTs with mixed pattern  LFTs peaked to T bili of 4, alk-phos of 166, AST of 141, ALT of 383  She has had alleviation of abdominal discomfort after ERCP and removal of gallstones  Prior to hospitalization she had chronic abdominal pain which was thought to be secondary to gastritis  Symptoms of nausea, abdominal pain have completely resolved  LFTs on discharge where T bili 0 61, AST of 29, and ALT of 208  No previous history of hepatitis  Denies any family history of colorectal cancer  REVIEW OF SYSTEMS:    CONSTITUTIONAL: Denies any fever, chills, rigors, and weight loss    HEENT: No earache or tinnitus  Denies hearing loss or visual disturbances  CARDIOVASCULAR: No chest pain or palpitations  RESPIRATORY: Denies any cough, hemoptysis, shortness of breath or dyspnea on exertion  GASTROINTESTINAL: As noted in the History of Present Illness  GENITOURINARY: No problems with urination  Denies any hematuria or dysuria  NEUROLOGIC: No dizziness or vertigo, denies headaches  MUSCULOSKELETAL: Denies any muscle or joint pain  SKIN: Denies skin rashes or itching  ENDOCRINE: Denies excessive thirst  Denies intolerance to heat or cold  PSYCHOSOCIAL: Denies depression or anxiety  Denies any recent memory loss         Historical Information   Past Medical History:   Diagnosis Date    Abnormal Pap smear of cervix 2015    cryosurgery    Migraine headache with aura     managed with Elavil     Past Surgical History:   Procedure Laterality Date    CHOLECYSTECTOMY LAPAROSCOPIC N/A 10/27/2020    Procedure: CHOLECYSTECTOMY LAPAROSCOPIC;  Surgeon: Claudette Simmer, MD;  Location: [facility] MAIN OR;  Service: General    GYNECOLOGIC CRYOSURGERY  2015    HYSTERECTOMY  2016    LAPAROSCOPY  2018    WA LAP,DIAGNOSTIC ABDOMEN N/A 8/9/2019    Procedure: LAPAROSCOPY DIAGNOSTIC; LYSIS OF ADHESIONS;  Surgeon: Chantell Lebron MD;  Location: [facility];  Service: Gynecology    [place] LAP,LYSIS OF ADHESIONS N/A 3/3/2020    Procedure: L O A ;  Surgeon: Candi Carlin MD;  Location: [facility];  Service: Gynecology    TUBAL LIGATION       Social History   Social History     Substance and Sexual Activity   Alcohol Use Not Currently    Frequency: Monthly or less     Social History     Substance and Sexual Activity   Drug Use Never     Social History     Tobacco Use   Smoking Status Current Every Day Smoker    Packs/day: 0 50    Years: 15 00    Pack years: 7 50    Types: Cigarettes   Smokeless Tobacco Never Used     Family History   Problem Relation Age of Onset    Cancer Paternal Grandmother         uterine    Cancer Paternal Aunt         uterine     Breast cancer Neg Hx        Meds/Allergies       Current Outpatient Medications:     amitriptyline (ELAVIL) 25 mg tablet    dicyclomine (BENTYL) 20 mg tablet    metoclopramide (REGLAN) 10 mg tablet    pantoprazole (PROTONIX) 20 mg tablet    Allergies   Allergen Reactions    Shellfish-Derived Products - Food Allergy Itching and Swelling     "seafood "    Ibuprofen Other (See Comments)     Patient states she gets hematoma           Objective     Blood pressure 120/60, pulse 96, temperature 97 6 °F (36 4 °C), temperature source Tympanic, height 5' 2" (1 575 m), weight 68 5 kg (151 lb), not currently breastfeeding  Body mass index is 27 62 kg/m²  PHYSICAL EXAM:      General Appearance:   Alert, cooperative, no distress   HEENT:   Normocephalic, atraumatic, anicteric      Neck:  Supple, symmetrical, trachea midline   Lungs:   Clear to auscultation bilaterally; no rales, rhonchi or wheezing; respirations unlabored    Heart[de-identified]   Regular rate and rhythm; no murmur, rub, or gallop     Abdomen:   Soft, non-tender, non-distended; normal bowel sounds; no masses, no organomegaly    Genitalia:   Deferred    Rectal:   Deferred    Extremities:  No cyanosis, clubbing or edema    Pulses:  2+ and symmetric    Skin:  No jaundice, rashes, or lesions    Lymph nodes:  No palpable cervical lymphadenopathy        Lab Results:   Appointment on 04/23/2021   Component Date Value    Total Bilirubin 04/23/2021 0 55     Bilirubin, Direct 04/23/2021 0 27*    Alkaline Phosphatase 04/23/2021 92     AST 04/23/2021 24     ALT 04/23/2021 63     Total Protein 04/23/2021 7 5     Albumin 04/23/2021 4 0     Lipase 04/23/2021 113     Hepatitis B Surface Ag 04/23/2021 Non-reactive     Hep A IgM 04/23/2021 Non-reactive     Hepatitis C Ab 04/23/2021 Non-reactive     Hep B C IgM 04/23/2021 Non-reactive          Radiology Results:   Xr Chest 1 View Portable    Result Date: 3/27/2021  Narrative: CHEST INDICATION:   Epigastric pain, Upright view to r/o air under Diaphram  COMPARISON:  None EXAM PERFORMED/VIEWS:  XR CHEST PORTABLE FINDINGS: Cardiomediastinal silhouette appears unremarkable  The lungs are clear  No pneumothorax or pleural effusion  Osseous structures appear within normal limits for patient age  Impression: No acute cardiopulmonary disease  Workstation performed: [id number]     Fl Ercp Biliary And Pancreatic    Result Date: 4/7/2021  Narrative: C-ARM - INDICATION: ercp  Procedure guidance  COMPARISON:  None TECHNIQUE: FLUOROSCOPY TIME:   1 minute 22 seconds 11 FLUOROSCOPIC IMAGES FINDINGS: Fluoroscopic guidance provided for procedure guidance  Osseous and soft tissue detail limited by technique  Impression: Fluoroscopic guidance provided for procedure guidance  Please refer to the separate procedure notes for additional details  Workstation performed: [id number]     Ct Abdomen Pelvis With Contrast    Result Date: 4/5/2021  Narrative: CT ABDOMEN AND PELVIS WITH IV CONTRAST INDICATION:   Abdominal pain, acute, nonlocalized RUQ pain, elevated LFTs  COMPARISON:  Study from yesterday and from 9/4/2020 TECHNIQUE:  CT examination of the abdomen and pelvis was performed  Axial, sagittal, and coronal 2D reformatted images were created from the source data and submitted for interpretation  Radiation dose length product (DLP) for this visit:  576 mGy-cm   This examination, like all CT scans performed in the [facility], was performed utilizing techniques to minimize radiation dose exposure, including the use of iterative reconstruction and automated exposure control  IV Contrast:  100 mL of iohexol (OMNIPAQUE) Enteric Contrast:  Enteric contrast was not administered   FINDINGS: ABDOMEN LOWER CHEST:  No clinically significant abnormality identified in the visualized lower chest  LIVER/BILIARY TREE:  Again seen is some biliary prominence both within the liver and in the extrahepatic distribution as well  The common bile duct measures up to a maximum 9 mm diameter  Thickening of the wall which was seen on the prior study seems similar or perhaps slightly less prominent but there does seem to be possible mild enhancement  Again, this could indicate a mild cholangitis  No obstructing stone or mass identified  GALLBLADDER:  Gallbladder is surgically absent  SPLEEN:  Unremarkable  PANCREAS:  Unremarkable  ADRENAL GLANDS:  Unremarkable  KIDNEYS/URETERS:  Unremarkable  No hydronephrosis  STOMACH AND BOWEL:  Unremarkable  APPENDIX:  No findings to suggest appendicitis  ABDOMINOPELVIC CAVITY:  There is a wisp of pelvic free fluid  No free air  No lymphadenopathy VESSELS:  Unremarkable for patient's age  PELVIS REPRODUCTIVE ORGANS:  Stable appearance of fluid attenuation structure left adnexal area which again may represent hydrosalpinx  No change from yesterday's study  URINARY BLADDER:  The bladder is filled with contrast enhanced urine  No wall thickening or inflammation or filling defect seen ABDOMINAL WALL/INGUINAL REGIONS:  Unremarkable  OSSEOUS STRUCTURES:  No acute fracture or destructive osseous lesion  Impression: Overall, no significant change from yesterday's study  There remains some prominence of the biliary tree with questionable mild inflammation of the wall of the extrahepatic bile ducts which could indicate a mild cholangitis  No visible obstructing stone or mass to the level of the ampulla  If there is clinical suspicion of stricture and/or other pathology, ERCP might be helpful for more thorough diagnosis and perhaps treatment Workstation performed: [id number]     Ct Abdomen Pelvis With Contrast    Result Date: 4/4/2021  Narrative: CT ABDOMEN AND PELVIS WITH IV CONTRAST INDICATION:   RUQ pain  COMPARISON:  CT abdomen and pelvis dated 9/4/2020, gallbladder ultrasound dated 10/26/2020 TECHNIQUE:  CT examination of the abdomen and pelvis was performed   Axial, sagittal, and coronal 2D reformatted images were created from the source data and submitted for interpretation  Radiation dose length product (DLP) for this visit:  441 mGy-cm   This examination, like all CT scans performed in the [facility], was performed utilizing techniques to minimize radiation dose exposure, including the use of iterative reconstruction and automated exposure control  IV Contrast:  100 mL of iohexol (OMNIPAQUE) Enteric Contrast:  Enteric contrast was not administered  FINDINGS: ABDOMEN LOWER CHEST:  No clinically significant abnormality identified in the visualized lower chest  LIVER/BILIARY TREE:  Mild biliary ductal dilatation, probably secondary to postcholecystectomy state  Thickening of the cystic duct is noted which could represent infectious or inflammatory process (axial image 22, series 2 and coronal image 52, series 3)  Liver otherwise appears grossly unremarkable  GALLBLADDER:  Gallbladder is surgically absent  SPLEEN:  Unremarkable  PANCREAS:  Unremarkable  ADRENAL GLANDS:  Unremarkable  KIDNEYS/URETERS:  Unremarkable  No hydronephrosis  STOMACH AND BOWEL:  Grossly unremarkable APPENDIX:  Normal caliber ABDOMINOPELVIC CAVITY:  No ascites  No pneumoperitoneum  No lymphadenopathy  VESSELS:  Unremarkable for patient's age  PELVIS REPRODUCTIVE ORGANS:  Uterus not well seen, suspect prior hysterectomy  Complex cystic lesion in the left adnexa redemonstrated, suspect left-sided hydrosalpinx, similar to the prior  URINARY BLADDER:  Partially distended bladder  Mild to moderate circumferential bladder wall thickening noted, probably exaggerated by underdistention  Superimposed cystitis is considered in the appropriate clinical setting  ABDOMINAL WALL/INGUINAL REGIONS:  Unremarkable  OSSEOUS STRUCTURES:  No acute fracture or destructive osseous lesion  Impression: Mild biliary ductal dilatation, probably secondary to postcholecystectomy state    Thickening of the cystic duct is noted which could represent infectious or inflammatory process (axial image 22, series 2 and coronal image 52, series 3)  Partially distended bladder  Mild to moderate circumferential bladder wall thickening noted, probably exaggerated by underdistention  Superimposed cystitis is considered in the appropriate clinical setting  Uterus not well seen, suspect prior hysterectomy  Complex cystic lesion in the left adnexa redemonstrated, suspect left-sided hydrosalpinx, similar to the prior  Other findings as above   Workstation performed: [id number]

## 2021-06-23 ENCOUNTER — APPOINTMENT (OUTPATIENT)
Dept: LAB | Facility: CLINIC | Age: 36
End: 2021-06-23
Payer: COMMERCIAL

## 2021-06-23 ENCOUNTER — ANNUAL EXAM (OUTPATIENT)
Dept: OBGYN CLINIC | Facility: CLINIC | Age: 36
End: 2021-06-23

## 2021-06-23 VITALS
HEART RATE: 74 BPM | DIASTOLIC BLOOD PRESSURE: 72 MMHG | BODY MASS INDEX: 28.06 KG/M2 | WEIGHT: 153.4 LBS | SYSTOLIC BLOOD PRESSURE: 110 MMHG

## 2021-06-23 DIAGNOSIS — Z20.2 POSSIBLE EXPOSURE TO STD: ICD-10-CM

## 2021-06-23 DIAGNOSIS — Z01.419 ENCOUNTER FOR GYNECOLOGICAL EXAMINATION (GENERAL) (ROUTINE) WITHOUT ABNORMAL FINDINGS: Primary | ICD-10-CM

## 2021-06-23 LAB — HCV AB SER QL: NORMAL

## 2021-06-23 PROCEDURE — 36415 COLL VENOUS BLD VENIPUNCTURE: CPT

## 2021-06-23 PROCEDURE — 99395 PREV VISIT EST AGE 18-39: CPT | Performed by: OBSTETRICS & GYNECOLOGY

## 2021-06-23 PROCEDURE — 87389 HIV-1 AG W/HIV-1&-2 AB AG IA: CPT

## 2021-06-23 PROCEDURE — T1015 CLINIC SERVICE: HCPCS | Performed by: OBSTETRICS & GYNECOLOGY

## 2021-06-23 PROCEDURE — 87491 CHLMYD TRACH DNA AMP PROBE: CPT | Performed by: OBSTETRICS & GYNECOLOGY

## 2021-06-23 PROCEDURE — 86592 SYPHILIS TEST NON-TREP QUAL: CPT

## 2021-06-23 PROCEDURE — 86803 HEPATITIS C AB TEST: CPT

## 2021-06-23 PROCEDURE — 87591 N.GONORRHOEAE DNA AMP PROB: CPT | Performed by: OBSTETRICS & GYNECOLOGY

## 2021-06-23 NOTE — PROGRESS NOTES
Assessment/Plan:     No problem-specific Assessment & Plan notes found for this encounter  Diagnoses and all orders for this visit:    Encounter for gynecological examination (general) (routine) without abnormal findings    Possible exposure to STD  -     HIV 1/2 ANTIGEN/ANTIBODY (4TH GENERATION) W REFLEX [facility]; Future  -     RPR; Future  -     Hepatitis C antibody; Future  -     Chlamydia/GC amplified DNA by PCR    Depression Screening Follow-up Plan: Patient's depression screening was positive with a PHQ-2 score of 0  Their PHQ-9 score was 0  Clinically patient does not have depression  No treatment is required  RTO in one year or as needed  Subjective:      Patient ID: Jacquelyn Brownlee is a 29 y o  female who presents for annual exam   Her last pap was 06/21/19 and was negative with negative HPV  She desires STD testing  She is s/p hysterectomy and does not need a pap  She offers no complaints today  HPI    The following portions of the patient's history were reviewed and updated as appropriate: allergies, current medications, past family history, past medical history, past social history, past surgical history and problem list     Review of Systems      Objective:      /72   Pulse 74   Wt 69 6 kg (153 lb 6 4 oz)   BMI 28 06 kg/m²          Physical Exam  Vitals and nursing note reviewed  Exam conducted with a chaperone present  Constitutional:       General: She is not in acute distress  Appearance: She is well-developed  HENT:      Head: Normocephalic  Neck:      Thyroid: No thyromegaly  Cardiovascular:      Rate and Rhythm: Normal rate and regular rhythm  Heart sounds: Normal heart sounds  No murmur heard  Pulmonary:      Effort: Pulmonary effort is normal  No respiratory distress  Breath sounds: Normal breath sounds  No wheezing or rales  Chest:      Chest wall: No tenderness        Breasts:         Right: No swelling, bleeding, inverted nipple, mass, nipple discharge, skin change or tenderness  Left: No swelling, bleeding, inverted nipple, mass, nipple discharge, skin change or tenderness  Abdominal:      General: Bowel sounds are normal  There is no distension  Palpations: Abdomen is soft  There is no mass  Tenderness: There is no abdominal tenderness  There is no guarding or rebound  Genitourinary:     Labia:         Right: No rash, tenderness, lesion or injury  Left: No rash, tenderness, lesion or injury  Vagina: Normal       Cervix: Normal       Uterus: Normal        Adnexa:         Right: No mass, tenderness or fullness  Left: No mass, tenderness or fullness  Rectum: No external hemorrhoid  Skin:     General: Skin is warm and dry  Neurological:      Mental Status: She is alert and oriented to person, place, and time  Psychiatric:         Behavior: Behavior normal          Thought Content:  Thought content normal          Judgment: Judgment normal

## 2021-06-24 LAB
HIV 1+2 AB+HIV1 P24 AG SERPL QL IA: NORMAL
RPR SER QL: NORMAL

## 2021-06-25 ENCOUNTER — TELEPHONE (OUTPATIENT)
Dept: OBGYN CLINIC | Facility: CLINIC | Age: 36
End: 2021-06-25

## 2021-06-25 LAB
C TRACH DNA SPEC QL NAA+PROBE: NEGATIVE
N GONORRHOEA DNA SPEC QL NAA+PROBE: NEGATIVE

## 2021-06-25 NOTE — TELEPHONE ENCOUNTER
----- Message from Rajat Kramer MD sent at 6/25/2021  8:13 AM EDT -----  Please inform negative STD testing      TY

## 2021-07-30 ENCOUNTER — OFFICE VISIT (OUTPATIENT)
Dept: FAMILY MEDICINE CLINIC | Facility: CLINIC | Age: 36
End: 2021-07-30

## 2021-07-30 VITALS
BODY MASS INDEX: 27.98 KG/M2 | OXYGEN SATURATION: 97 % | SYSTOLIC BLOOD PRESSURE: 110 MMHG | RESPIRATION RATE: 17 BRPM | TEMPERATURE: 97.7 F | WEIGHT: 153 LBS | DIASTOLIC BLOOD PRESSURE: 80 MMHG | HEART RATE: 106 BPM

## 2021-07-30 DIAGNOSIS — Z72.0 TOBACCO USE: ICD-10-CM

## 2021-07-30 DIAGNOSIS — H60.391 OTHER INFECTIVE ACUTE OTITIS EXTERNA OF RIGHT EAR: ICD-10-CM

## 2021-07-30 DIAGNOSIS — E66.3 OVERWEIGHT: ICD-10-CM

## 2021-07-30 DIAGNOSIS — M25.562 CHRONIC PAIN OF LEFT KNEE: Primary | ICD-10-CM

## 2021-07-30 DIAGNOSIS — G89.29 CHRONIC PAIN OF LEFT KNEE: Primary | ICD-10-CM

## 2021-07-30 PROBLEM — IMO0001 SMOKING: Status: RESOLVED | Noted: 2020-10-26 | Resolved: 2021-07-30

## 2021-07-30 PROBLEM — F17.200 SMOKING: Status: RESOLVED | Noted: 2020-10-26 | Resolved: 2021-07-30

## 2021-07-30 PROCEDURE — T1015 CLINIC SERVICE: HCPCS | Performed by: PHYSICIAN ASSISTANT

## 2021-07-30 PROCEDURE — 99213 OFFICE O/P EST LOW 20 MIN: CPT | Performed by: PHYSICIAN ASSISTANT

## 2021-07-30 RX ORDER — NEOMYCIN SULFATE, POLYMYXIN B SULFATE AND HYDROCORTISONE 10; 3.5; 1 MG/ML; MG/ML; [USP'U]/ML
4 SUSPENSION/ DROPS AURICULAR (OTIC) EVERY 8 HOURS SCHEDULED
Qty: 10 ML | Refills: 0 | Status: SHIPPED | OUTPATIENT
Start: 2021-07-30 | End: 2022-02-25

## 2021-07-30 RX ORDER — NICOTINE 21 MG/24HR
1 PATCH, TRANSDERMAL 24 HOURS TRANSDERMAL EVERY 24 HOURS
Qty: 28 PATCH | Refills: 2 | Status: SHIPPED | OUTPATIENT
Start: 2021-07-30 | End: 2022-07-25 | Stop reason: SDUPTHER

## 2021-07-30 NOTE — PATIENT INSTRUCTIONS
Please complete left knee xray at your earliest convenience  Thanks! Ejercicios para la rodilla   CUIDADO AMBULATORIO:   Lo que necesita saber acerca de los ejercicios para la rodilla: Los ejercicios para la rodilla ayudan a fortalecer los músculos alrededor de raymundo rodilla  Unos músculos abel pueden ayudar a reducir el dolor y disminuir el riesgo de sufrir jonathan lesión en el futuro  Los ejercicios para la rodilla también pueden ayudarlo a recuperarse después de Catha Gabdavid Trisha Hero  · Empiece despacio  Estos son Marvelyn Quarto básicos  Pregúntele a raymundo médico si usted necesita acudir con un fisioterapeuta para que le indique ejercicios más avanzado  A medida que se sienta más claire, es posible que pueda realizar más series de cada ejercicio o añadir pesas  · Deténgase si siente dolor  Es normal que sienta cierta molestia al principio  Practicar los ejercicios con regularidad ayudará a disminuir raymundo incomodidad con el paso del [place]  · Realice los [address] jose  Jake esto para [address] se mantengan abel  · Entre en calor antes de hacer los ejercicios para la rodilla  Use jonathan bicicleta estacionaria o camine veronica 5 a 10 minutos para que queta músculos entren en calor  Cómo realizar estiramientos de la rodilla de forma kat: Siempre ejecute los ejercicios de calentamiento antes de hacer cualquier ejercicio de acondicionamiento  Jake estos ejercicios de estiramiento jonathan vez más después de hacer los ejercicios de fortalecimiento  Realice estos ejercicios de estiramiento entre 4 o 5 días a la semana o según se lo indicaron  · [employer], calentamiento para la pantorrilla: De frente a jonathan pared, coloque ambas ml abiertas contra la pared, o agárrese del espaldar de jonathan silla para mantener el equilibrio  Mantenga las rodillas ligeramente dobladas  Dé un gran paso para atrás con jonathan pierna  Deje raymundo otra pierna directamente debajo de uslemuel [address]  contra el piso y presione con queta caderas hacia adelante  Sostenga el estiramiento por 30 segundos  Cambie de pierna  Repita beka ejercicio 2 o 3 veces con cada pierna  · Estiramiento de los cuádriceps de pie: [employer], coloque jonathan mano contra jonathan pared, o agárrese del espaldar de jonathan silla para mantener el equilibrio  Cargue el peso de raymundo cuerpo en jonathan pierna, flexione la rodilla de la otra pierna y tómese del tobillo  Acerque el tobillo a queta nalgas  Sostenga el estiramiento de 30 a 60 segundos  Cambie de pierna  Repita beka ejercicio 2 o 3 veces con cada pierna  · Sentado, estiramiento del tendón de la corva, parte posterior del muslo: Siéntese en jonathan superficie plana en el piso con las dos piernas enfrente de usted  No flexione queta dedos de los pies ni los ponga en puntas  Coloque las grant de queta ml en el piso y deslice queta ml hacia adelante hasta que usted sienta jonathan resistencia o un estiramiento leve  Debe mantener la espalda derecha  Sostenga el estiramiento por 30 segundos  Repita 2 o 3 veces  Cómo realizar ejercicios de fortalecimiento de la rodilla de manera kat: Realice estos ejercicios 4 o 5 días a la semana o según le indicaron  · Medias sentadillas de pie: Párese con los pies a la distancia de queta hombros  Lleve raymundo espalda contra jonathan pared o agárrese del espaldar de jonathan silla para mantener el equilibrio, si es necesario  [id number] y baje unas 10 pulgadas lentamente, divina si fuera a sentarse en jonathan silla  [place] de raymundo cuerpo debería encontrarse mayormente sobre queta talones  Sostenga las sentadillas por 5 segundos, luego regresa a la posición inicial  Realice 3 series de 10 sentadillas para fortalecer los glúteos y los muslos  · De pie flexión de los músculos isquiotibiales: De frente a jonathan pared, coloque ambas ml abiertas contra la pared, o agárrese del espaldar de jonathan silla para mantener el equilibrio   Cargue el peso de raymundo cuerpo en jonathan pierna, levante otra pierna y lleve raymundo talón tan cerca de los glúteos divina pueda  Sostenga por 5 segundos y baje raymundo pierna  Realice 2 series de 10 flexiones con cada pierna  Mary ejercicio fortalece el músculo de la parte posterior de raymundo muslo  · De pie levantamiento de las pantorillas o gémelos: De frente a jonathan pared, coloque ambas ml abiertas contra la pared, o agárrese del espaldar de jonathan silla para mantener el equilibrio  Póngase de pie derecho, y no se eddie hacia adelante  Coloque todo raymundo peso sobre jonathan sha pierna levantando el otro pie del suelo  Levante el talón del pie que está en el piso tan alto divina pueda y [place]  Realice 2 series de 10 levantamientos de pantorilla con cada pierna para fortalecer los músculos de la pantorilla  · Enderezar la pierna y levantarla: Acuéstese boca abajo con las piernas estiradas  Cruce queta brazos enfrente de usted y descanse la frente sobre queta brazos cruzados  Apriete el músculo de raymundo pierna y levántela tanto divina pueda  Sostenga por 5 segundos, y luego baje raymundo pierna  Realice 2 series de 10 levantamientos con cada pierna para fortalecer queta glúteos  · Sentado, levantamiento de pierna: Siéntese en jonathan silla  Despacio enderece y levante jonathan pierna  Contraiga el músculo de raymundo muslo y sostenga por 5 segundos  Relaje y regrese raymundo pie al piso  Realice 2 series de 10 levantamientos con cada pierna  Cathay le ayuda a fortalecer el músculo anterior de raymundo muslo  Comuníquese con raymundo médico si:  · Usted tiene un nuevo dolor o el dolor TAMMY  · Usted tiene preguntas o inquietudes acerca de raymundo condición o cuidado  © [employer] 2021 Information is for End User's use only and may not be sold, redistributed or otherwise used for commercial purposes  All illustrations and images included in CareNotes® are the copyrighted property of Sapna MCKENZIE  or [address] [place] [address] es sólo para uso en educación   Raymundo intención no es darle un consejo médico sobre enfermedades o tratamientos  Colsulte con raymundo Agustin Praveen farmacéutico antes de seguir cualquier régimen médico para saber si es seguro y efectivo para usted  Dolor de rodilla   LO QUE NECESITA SABER:   El dolor de rodilla puede empezar de forma repentina, o ser un problema a Verdene Sequin  Puede sentir dolor en la parte lateral, delantera o trasera de la rodilla  Puede tener la rodilla rígida e hinchada  Puede oír sonidos de chasquido o sentir que la rodilla cede o se le bloquea al andar  Puede sentir dolor cuando se sienta, se pone de pie, camina o sube y baja escaleras  El dolor de rodilla puede estar provocado por condiciones divina obesidad, inflamación, esguinces o desgarros de los ligamentos o los tendones  INSTRUCCIONES SOBRE EL PHUONG HOSPITALARIA:   Regrese a la aleida de emergencias si:  · El dolor empeora, [address]  · No puede flexionar o enderezar la pierna completamente  · La hinchazón alrededor de la rodilla no disminuye a pesar del tratamiento  · La rodilla le duele y se nota caliente al tacto  Comuníquese con raymundo médico si:  · Usted tiene preguntas o inquietudes acerca de raymundo condición o cuidado  Medicamentos: Es posible que usted necesite alguno de los siguientes:  · [place], pueden disminuir la inflamación y el dolor o la fiebre  Mary medicamento está disponible con o sin jonathan receta médica  Los DARELL pueden causar sangrado estomacal o problemas renales en ciertas personas  Si usted villa un medicamento anticoagulante, siempre pregúntele a raymundo médico si los DARELL son seguros para usted  Siempre maggi la etiqueta de mary medicamento y [place] instrucciones  · Acetaminofén virgilio el dolor y baja la fiebre  Está disponible sin receta médica  Pregunte la cantidad y la frecuencia con que debe tomarlos  [facility]   Maggi las etiquetas de todos los demás medicamentos que esté usando para saber si también contienen acetaminofén, o pregunte a raymundo médico o farmacéutico  El acetaminofén puede causar daño en el hígado cuando no se villa de forma correcta  No use más de 4 gramos (4000 miligramos) en total de acetaminofeno en un día  · Puede administrarse podrían administrarse  Pregunte al médico cómo debe jamilah beka medicamento de forma kat  Algunos medicamentos recetados para el dolor contienen acetaminofén  No tome otros medicamentos que contengan acetaminofén sin consultarlo con raymundo médico  Demasiado acetaminofeno puede causar daño al hígado  Los medicamentos recetados para el dolor podrían causar estreñimiento  Pregunte a raymundo médico divina prevenir o tratar estreñimiento  · Herreid queta medicamentos divina se le haya indicado  Consulte con raymundo médico si usted luis que raymundo medicamento no le está ayudando o si presenta efectos secundarios  Infórmele si es alérgico a cualquier medicamento  Mantenga jonathan lista actualizada de los [place], las vitaminas y los productos herbales que villa  Incluya los siguientes datos de los medicamentos: cantidad, frecuencia y motivo de administración  Traiga con usted la lista o los envases de las píldoras a queta citas de seguimiento  Lleve la lista de los medicamentos con usted en emily de jonathan emergencia  Lo que puede hacer para Shama-Deepthi síntomas:  · Repose la rodilla para que se pueda curar  Limite las actividades que aumenten el dolor  Jake ejercicios de bajo impacto, divina caminar o nadar  · Aplique hielo para ayudar a disminuir la inflamación y el dolor  Use jonathan compresa de hielo o ponga hielo triturado en jonathan bolsa de plástico  Eula Tania con jonathan toalla antes de aplicarla sobre la herida  Aplique hielo veronica 15 a 20 minutos por hora o según indicaciones  · Aplique compresión para ayudar a reducir la inflamación  Use jonathan férula o venda solamente si sigue las instrucciones del emily  · Eleve la rodilla para ayudar a disminuir el dolor y la inflamación  Eleve la rodilla mientras esté sentado o acostado   Apoye la pierna sobre almohadones para mantener la rodilla por encima del corazón  · Evite que la rodilla se mueva, divina se le haya indicado  Raymundo médico podría ponerle un yeso o férula  Usted podría necesitar de un yeso en raymundo pierna para estabilizar raymundo rodilla  El yeso en la pierna puede ajustarse para aumentar raymundo rango de movimiento a medida que raymundo Leanor Ramon  Lo que puede hacer para prevenir el dolor de rodilla:  · Mantenga un peso saludable  El exceso de peso aumenta raymundo riesgo de sufrir dolor de [address]  Consulte con raymundo médico cuánto debería pesar  Él puede ayudarlo a crear un plan de pérdida de peso seguro si usted tiene sobrepeso  · Lajoyce Laura o entrene correctamente  Utilice los aparatos adecuados para los deportes  Use zapatos que brinden un buen soporte  Verifique raymundo postura a menudo mientras hace ejercicio, juega deportes o entrena para un evento  [place] puede ayudar a prevenir el estrés y la tensión en las rodillas  Descanse entre sesiones para no esforzar excesivamente las rodillas  Acuda en 24 horas a jonathan varsha de seguimiento con raymundo médico o divina se le indique: Simmie Higinio necesite tratamientos de seguimiento, divina inyecciones de esteroides para reducir el dolor  Anote queta preguntas para que se acuerde de hacerlas veronica queta visitas  © Copyright [employer] 2021 Information is for End User's use only and may not be sold, redistributed or otherwise used for commercial purposes  All illustrations and images included in CareNotes® are the copyrighted property of [employer]  or [address] [place] [address] es sólo para uso en educación  Raymundo intención no es darle un consejo médico sobre enfermedades o tratamientos  Colsulte con raymundo Dewain Racer farmacéutico antes de seguir cualquier régimen médico para saber si es seguro y efectivo para usted

## 2021-07-30 NOTE — PROGRESS NOTES
Assessment/Plan:    Tobacco use  - Currently smoking about 1 pack per day  Patient would like to try nicotine patches  Will provide prescription today  Chronic pain of left knee  - Patient has completed physical therapy multiple times, in [place], [place] and in [facility] but pain has been persistent  - Will obtain x-ray of left knee for further evaluation     - Will order MRI left knee without contrast to rule out meniscal tear  - Advised to take Tylenol or ibuprofen as needed for pain  - Advised to apply ice/ heating pad as needed to the affected area  Otitis externa of right ear   - Will prescribe Cortisporin otic drops, to use 3 times daily for 7 days     - Advised to contact the office if symptoms persist or worsen  Diagnoses and all orders for this visit:    Chronic pain of left knee  -     XR knee 3 vw left non injury; Future  -     MRI knee left  wo contrast; Future    Other infective acute otitis externa of right ear  -     neomycin-polymyxin-hydrocortisone (CORTISPORIN) 0 35%-10,000 units/mL-1% otic suspension; Administer 4 drops to the right ear every 8 (eight) hours    Tobacco use  -     nicotine (NICODERM CQ) 21 mg/24 hr TD 24 hr patch; Place 1 patch on the skin every 24 hours    Overweight          All of patients questions were answered  Patient understands and agrees with the above plan  Return for Next scheduled follow up in 2-3 weeks for annual physical/'s physical      Kathryn Hoover  07/30/21  Emily Anderson          Subjective:     Patient ID: Ashley Minors  is a 29 y o  female  has a past medical history of Abnormal Pap smear of cervix and Migraine headache with aura  who presents today in office for   Right ear pain and left knee pain  - Patient is a 29 y o  female who presents today for  Right ear pain and left knee pain  Patient notes she has been experiencing right ear pain for the past 3 weeks    Patient notes the pain comes and goes and is worse if she is touching her ear  Patient denies any discharge from ear or decreased hearing  Patient denies any fevers, chills, headache, shortness of breath, dizziness, nasal congestion, sore throat  - Patient notes she has history of chronic pain of her left knee, but recently it has been worse  Patient notes the pain started about 5 years ago  Patient notes she was told previously that she would require surgery due to torn meniscus, but patient did not wish to proceed with surgery  Patient notes she completed physical therapy and [facility] and and [place], [place]  Patient notes the knee will often get red, swollen, and very painful  Patient notes if she puts ice on the area it makes it worse but heat makes it better  Patient notes she has tried multiple creams and rubs but nothing helps  - Patient notes she is currently smoking about 1 pack per day and she would like to quit smoking  Patient notes she has tried nicotine patches in the past and they were helpful  She would like to try them again  The following portions of the patient's history were reviewed and updated as appropriate: allergies, current medications, past family history, past medical history, past social history, past surgical history and problem list         Review of Systems   Constitutional: Negative for chills and fever  HENT: Positive for ear pain  Negative for congestion, ear discharge, hearing loss, rhinorrhea and sore throat  Eyes: Negative for pain  Respiratory: Negative for cough and shortness of breath  Cardiovascular: Negative for chest pain and palpitations  Gastrointestinal: Negative for abdominal pain, nausea and vomiting  Genitourinary: Negative for difficulty urinating and dysuria  Musculoskeletal: Positive for arthralgias and gait problem  Skin: Negative for rash  Neurological: Negative for dizziness and headaches  Psychiatric/Behavioral: The patient is not nervous/anxious  BMI Counseling: Body mass index is 27 98 kg/m²  The BMI is above normal  Nutrition recommendations include decreasing portion sizes, encouraging healthy choices of fruits and vegetables and consuming healthier snacks  Exercise recommendations include moderate physical activity 150 minutes/week  No pharmacotherapy was ordered  Objective:   Vitals:    07/30/21 1515   BP: 110/80   BP Location: Left arm   Patient Position: Sitting   Cuff Size: Adult   Pulse: (!) 106   Resp: 17   Temp: 97 7 °F (36 5 °C)   TempSrc: Temporal   SpO2: 97%   Weight: 69 4 kg (153 lb)         Physical Exam  Vitals and nursing note reviewed  Constitutional:       Appearance: She is well-developed  HENT:      Head: Normocephalic and atraumatic  Right Ear: Tympanic membrane and external ear normal       Left Ear: Tympanic membrane and external ear normal       Ears:      Comments: Swelling and erythema noted of right ear canal  Tenderness elicited upon entering otoscope into ear  Nose: Nose normal       Mouth/Throat:      Pharynx: Uvula midline  Eyes:      Conjunctiva/sclera: Conjunctivae normal    Cardiovascular:      Rate and Rhythm: Normal rate and regular rhythm  Heart sounds: Normal heart sounds  Pulmonary:      Effort: Pulmonary effort is normal       Breath sounds: Normal breath sounds  No wheezing  Musculoskeletal:      Cervical back: Normal range of motion and neck supple  Left knee: Swelling present  No erythema  Decreased range of motion  Tenderness present  Skin:     General: Skin is warm and dry  Neurological:      Mental Status: She is alert and oriented to person, place, and time  Psychiatric:         Behavior: Behavior normal            - Utilized OSSIAN[employer] for translation

## 2021-07-31 NOTE — ASSESSMENT & PLAN NOTE
- Patient has completed physical therapy multiple times, in [place], [place] and in [facility] but pain has been persistent  - Will obtain x-ray of left knee for further evaluation     - Will order MRI left knee without contrast to rule out meniscal tear  - Advised to take Tylenol or ibuprofen as needed for pain  - Advised to apply ice/ heating pad as needed to the affected area

## 2021-07-31 NOTE — ASSESSMENT & PLAN NOTE
- Currently smoking about 1 pack per day  Patient would like to try nicotine patches  Will provide prescription today

## 2021-08-02 ENCOUNTER — HOSPITAL ENCOUNTER (OUTPATIENT)
Dept: RADIOLOGY | Facility: HOSPITAL | Age: 36
Discharge: HOME | End: 2021-08-02
Payer: COMMERCIAL

## 2021-08-02 DIAGNOSIS — G89.29 CHRONIC PAIN OF LEFT KNEE: ICD-10-CM

## 2021-08-02 DIAGNOSIS — M25.562 CHRONIC PAIN OF LEFT KNEE: ICD-10-CM

## 2021-08-02 PROCEDURE — 73562 X-RAY EXAM OF KNEE 3: CPT

## 2021-09-08 ENCOUNTER — APPOINTMENT (EMERGENCY)
Dept: RADIOLOGY | Facility: HOSPITAL | Age: 36
End: 2021-09-08
Payer: COMMERCIAL

## 2021-09-08 ENCOUNTER — HOSPITAL ENCOUNTER (EMERGENCY)
Facility: HOSPITAL | Age: 36
Discharge: HOME | End: 2021-09-08
Attending: EMERGENCY MEDICINE
Payer: COMMERCIAL

## 2021-09-08 VITALS
WEIGHT: 152.56 LBS | RESPIRATION RATE: 18 BRPM | BODY MASS INDEX: 27.9 KG/M2 | TEMPERATURE: 96 F | OXYGEN SATURATION: 98 % | HEART RATE: 88 BPM | DIASTOLIC BLOOD PRESSURE: 67 MMHG | SYSTOLIC BLOOD PRESSURE: 129 MMHG

## 2021-09-08 DIAGNOSIS — M72.2 PLANTAR FASCIITIS, LEFT: Primary | ICD-10-CM

## 2021-09-08 PROCEDURE — 73630 X-RAY EXAM OF FOOT: CPT

## 2021-09-08 PROCEDURE — 99283 EMERGENCY DEPT VISIT LOW MDM: CPT

## 2021-09-08 PROCEDURE — 99284 EMERGENCY DEPT VISIT MOD MDM: CPT | Performed by: PHYSICIAN ASSISTANT

## 2021-09-08 RX ORDER — METHYLPREDNISOLONE 4 MG/1
TABLET ORAL
Qty: 21 TABLET | Refills: 0 | Status: SHIPPED | OUTPATIENT
Start: 2021-09-08 | End: 2022-02-25

## 2021-09-08 NOTE — Clinical Note
Abraham Shah was seen and treated in our emergency department on 9/8/2021  Diagnosis: Plantar fasciitis left    Marni    She may return on this date: 09/11/2021         If you have any questions or concerns, please don't hesitate to call        Sandra Fernandez PA-C    ______________________________           _______________          _______________  Hospital Representative                              Date                                Time

## 2021-09-08 NOTE — ED PROVIDER NOTES
History  Chief Complaint   Patient presents with    Foot Pain     4-5 days of left foot pain  no injury known  This is a 28-year-old female patient presents with 4-5 day history of left foot pain without any known injury  She does walk a lot  Complains of pain right in the arch of her foot base of her heel  Worse when she gets up in the morning and walks seems improved as the day goes on but always has this sharp pain in the area  Has tried nothing over-the-counter is eating Doritos and drinking soda nontoxic in no acute distress  No fever chills headache blurred vision double vision cough congestion sore throat nausea vomiting diarrhea abdominal pain no chest pain or shortness of breath no urinary symptoms     Differential diagnosis includes not limited to plantar fasciitis, foot sprain, stress fracture less likely          Prior to Admission Medications   Prescriptions Last Dose Informant Patient Reported?  Taking?   amitriptyline (ELAVIL) 25 mg tablet  Self No No   Sig: Take 1 tablet (25 mg total) by mouth daily at bedtime   neomycin-polymyxin-hydrocortisone (CORTISPORIN) 0 35%-10,000 units/mL-1% otic suspension   No No   Sig: Administer 4 drops to the right ear every 8 (eight) hours   nicotine (NICODERM CQ) 21 mg/24 hr TD 24 hr patch   No No   Sig: Place 1 patch on the skin every 24 hours   pantoprazole (PROTONIX) 20 mg tablet   No No   Sig: Take 1 tablet (20 mg total) by mouth 2 (two) times a day      Facility-Administered Medications: None       Past Medical History:   Diagnosis Date    Abnormal Pap smear of cervix 2015    cryosurgery    Migraine headache with aura     managed with Elavil       Past Surgical History:   Procedure Laterality Date    CHOLECYSTECTOMY LAPAROSCOPIC N/A 10/27/2020    Procedure: CHOLECYSTECTOMY LAPAROSCOPIC;  Surgeon: Charito Day MD;  Location: [address] [place] [address];  Service: General    GYNECOLOGIC CRYOSURGERY  2015    HYSTERECTOMY  2016    LAPAROSCOPY  2018    PA LAP,DIAGNOSTIC ABDOMEN N/A 8/9/2019    Procedure: LAPAROSCOPY DIAGNOSTIC; LYSIS OF ADHESIONS;  Surgeon: Alverto Lujan MD;  [facility];  Service: Gynecology    MS LAP,LYSIS OF ADHESIONS N/A 3/3/2020    Procedure: L O A ;  Surgeon: Ariana Dacosta MD;  [facility];  Service: Gynecology    TUBAL LIGATION         Family History   Problem Relation Age of Onset    Cancer Paternal Grandmother         uterine    Cancer Paternal Aunt         uterine     Breast cancer Neg Hx      I have reviewed and agree with the history as documented  E-Cigarette/Vaping    E-Cigarette Use Never User      E-Cigarette/Vaping Substances     Social History     Tobacco Use    Smoking status: Current Every Day Smoker     Packs/day: 0 50     Years: 15 00     Pack years: 7 50     Types: Cigarettes    Smokeless tobacco: Never Used   Vaping Use    Vaping Use: Never used   Substance Use Topics    Alcohol use: Not Currently    Drug use: Never       Review of Systems   Constitutional: Negative for fatigue and fever  HENT: Negative for congestion and hearing loss  Eyes: Negative for photophobia and pain  Respiratory: Negative for cough and chest tightness  Cardiovascular: Negative for chest pain and leg swelling  Gastrointestinal: Negative for abdominal pain, diarrhea and nausea  Endocrine: Negative for polydipsia and polyphagia  Genitourinary: Negative for dysuria and frequency  Musculoskeletal: Positive for gait problem  Negative for arthralgias  Skin: Negative for pallor and rash  Allergic/Immunologic: Negative for environmental allergies and food allergies  Neurological: Negative for dizziness and headaches  Psychiatric/Behavioral: Negative for agitation and confusion  Physical Exam  Physical Exam  Vitals and nursing note reviewed  Constitutional:       General: She is not in acute distress  Appearance: She is not ill-appearing, toxic-appearing or diaphoretic  HENT:      Head: Normocephalic and atraumatic  Right Ear: Tympanic membrane, ear canal and external ear normal       Left Ear: Tympanic membrane, ear canal and external ear normal       Nose: Nose normal  No congestion or rhinorrhea  Mouth/Throat:      Mouth: Mucous membranes are dry  Pharynx: Oropharynx is clear  No oropharyngeal exudate or posterior oropharyngeal erythema  Eyes:      Extraocular Movements: Extraocular movements intact  Conjunctiva/sclera: Conjunctivae normal       Pupils: Pupils are equal, round, and reactive to light  Cardiovascular:      Rate and Rhythm: Normal rate and regular rhythm  Pulmonary:      Effort: Pulmonary effort is normal  No respiratory distress  Breath sounds: Normal breath sounds  Abdominal:      General: Bowel sounds are normal       Palpations: Abdomen is soft  Tenderness: There is no abdominal tenderness  Musculoskeletal:         General: Normal range of motion  Cervical back: Normal range of motion and neck supple  No rigidity or tenderness  Right lower leg: No edema  Left lower leg: No edema  Feet:    Lymphadenopathy:      Cervical: No cervical adenopathy  Skin:     General: Skin is warm and dry  Capillary Refill: Capillary refill takes less than 2 seconds  Findings: No rash  Neurological:      General: No focal deficit present  Mental Status: She is oriented to person, place, and time  Mental status is at baseline     Psychiatric:         Mood and Affect: Mood normal          Behavior: Behavior normal          Vital Signs  ED Triage Vitals [09/08/21 0856]   Temperature Pulse Respirations Blood Pressure SpO2   (!) 96 °F (35 6 °C) 88 18 129/67 98 %      Temp Source Heart Rate Source Patient Position - Orthostatic VS BP Location FiO2 (%)   Tympanic Monitor Sitting Left arm --      Pain Score       Worst Possible Pain           Vitals:    09/08/21 0856   BP: 129/67   Pulse: 88   Patient Position - Orthostatic VS: Sitting         Visual Acuity      ED Medications  Medications - No data to display    Diagnostic Studies  Results Reviewed     None                 XR foot 3+ views LEFT   Final Result by Abebe Wall MD (09/08 1007)      No acute osseous abnormality  Workstation performed: [id number]                    Procedures  Procedures         ED Course                             SBIRT 22yo+      Most Recent Value   SBIRT (22 yo +)   In order to provide better care to our patients, we are screening all of our patients for alcohol and drug use  Would it be okay to ask you these screening questions? No Filed at: 09/08/2021 0903                    MDM    Disposition  Final diagnoses:   Plantar fasciitis, left     Time reflects when diagnosis was documented in both MDM as applicable and the Disposition within this note     Time User Action Codes Description Comment    9/8/2021  9:59 AM Arnaldo Fernandes [M72 2] Plantar fasciitis, left       ED Disposition     ED Disposition Condition Date/Time Comment    Discharge Stable Wed Sep 8, 2021  9:59 AM Lorrie Jones discharge to home/self care              Follow-up Information     Follow up With Specialties Details Why Contact Info Additional Information    [address] [place] [address] Podiatry [facility] Schedule an appointment as soon as possible for a visit   [address] [address] [address]  [address] [place] [address] [address], [address], [place], [place], [address] [phone]          Discharge Medication List as of 9/8/2021 10:00 AM      START taking these medications    Details   methylPREDNISolone 4 MG tablet therapy pack Use as directed on package, Normal         CONTINUE these medications which have NOT CHANGED    Details   amitriptyline (ELAVIL) 25 mg tablet Take 1 tablet (25 mg total) by mouth daily at bedtime, Starting Tue 11/24/2020, Normal      neomycin-polymyxin-hydrocortisone (CORTISPORIN) 0 35%-10,000 units/mL-1% otic suspension Administer 4 drops to the right ear every 8 (eight) hours, Starting Fri 7/30/2021, Normal      nicotine (NICODERM CQ) 21 mg/24 hr TD 24 hr patch Place 1 patch on the skin every 24 hours, Starting Fri 7/30/2021, Normal      pantoprazole (PROTONIX) 20 mg tablet Take 1 tablet (20 mg total) by mouth 2 (two) times a day, Starting Fri 4/23/2021, Until Thu 7/22/2021, Normal           No discharge procedures on file      PDMP Review     None          ED Provider  Electronically Signed by           Luz Villegas PA-C  09/09/21 0037

## 2021-09-10 ENCOUNTER — OFFICE VISIT (OUTPATIENT)
Dept: FAMILY MEDICINE CLINIC | Facility: CLINIC | Age: 36
End: 2021-09-10

## 2021-09-10 VITALS
HEART RATE: 85 BPM | WEIGHT: 152 LBS | BODY MASS INDEX: 27.97 KG/M2 | OXYGEN SATURATION: 98 % | HEIGHT: 62 IN | SYSTOLIC BLOOD PRESSURE: 108 MMHG | RESPIRATION RATE: 18 BRPM | TEMPERATURE: 97.5 F | DIASTOLIC BLOOD PRESSURE: 70 MMHG

## 2021-09-10 DIAGNOSIS — G43.109 MIGRAINE WITH AURA AND WITHOUT STATUS MIGRAINOSUS, NOT INTRACTABLE: ICD-10-CM

## 2021-09-10 DIAGNOSIS — Z02.4 DRIVER'S PERMIT PE (PHYSICAL EXAMINATION): ICD-10-CM

## 2021-09-10 DIAGNOSIS — M72.2 PLANTAR FASCIITIS OF LEFT FOOT: ICD-10-CM

## 2021-09-10 DIAGNOSIS — Z00.00 ANNUAL PHYSICAL EXAM: Primary | ICD-10-CM

## 2021-09-10 PROCEDURE — 99395 PREV VISIT EST AGE 18-39: CPT | Performed by: PHYSICIAN ASSISTANT

## 2021-09-10 RX ORDER — SUMATRIPTAN 50 MG/1
50 TABLET, FILM COATED ORAL ONCE AS NEEDED
Qty: 9 TABLET | Refills: 0 | Status: SHIPPED | OUTPATIENT
Start: 2021-09-10 | End: 2022-01-10 | Stop reason: SDUPTHER

## 2021-09-10 NOTE — PATIENT INSTRUCTIONS
Migraña   LO QUE NECESITA SABER:   [place] es un dolor de hal intenso  El dolor puede ser tan severo que interfiere con loretta actividades cotidianas  [place] puede durar desde pocas horas hasta varios días  La causa exacta de la migraña no es conocida  INSTRUCCIONES SOBRE EL [facility]UONG HOSPITALARIA:   Llame al número local de emergencias (911 en [address] [facility]) o pídale a alguien que llame si:  · Usted siente que se va a desmayar, se siente confundido o sufre jonathan convulsión  Regrese a la aleida de emergencias si:  · Usted tiene dolor de hal que parece ser diferente o mucho peor que raymundo migraña habitual     · Usted tiene un dolor de hal severo con fiebre o rigidez en el dipak  · Usted tiene nuevos problemas con el habla, la visión, el equilibrio o el [address]  Llame a raymundo médico o neurólogo si:  · Raymundo migraña interfiere con loretta actividades cotidianas  · Loretta medicamentos o tratamientos shena de funcionar  · Usted tiene preguntas o inquietudes acerca de raymundo condición o cuidado  Medicamentos: Algunos medicamentos pueden administrarse solamente mientras está en el servicio de urgencias  También es posible que más adelante necesite medicamentos para controlar las migrañas u otros problemas de vivian que puedan causar  [place] el medicamento tan pronto divina sienta que le comienza [place], o según le hayan indicado  · Medicamentos para la migraña se utilizan para ayudar a evitar o detener jonathan [place]  · [place], pueden disminuir la inflamación y el dolor o la fiebre  Mary medicamento está disponible con o sin jonathan receta médica  Los DARELL pueden causar sangrado estomacal o problemas renales en ciertas personas  Si usted villa un medicamento anticoagulante, siempre pregúntele a raymundo médico si los DARELL son seguros para usted  Siempre ciera la etiqueta de mary medicamento y [place] instrucciones  · Acetaminofén virgilio el dolor y baja la fiebre  Está disponible sin receta médica   Pregunte la cantidad y la frecuencia con que debe tomarlos  [address]  Maggi las etiquetas de todos los demás medicamentos que esté usando para saber si también contienen acetaminofén, o pregunte a raymundo médico o farmacéutico  El acetaminofén puede causar daño en el hígado cuando no se villa de forma correcta  No use más de 4 gramos (4000 miligramos) en total de acetaminofeno en un día  · Puede administrarse podrían administrarse  Pregunte al médico cómo debe jamilah mary medicamento de forma kat  Algunos medicamentos recetados para el dolor contienen acetaminofén  No tome otros medicamentos que contengan acetaminofén sin consultarlo con raymundo médico  Demasiado acetaminofeno puede causar daño al hígado  Los medicamentos recetados para el dolor podrían causar estreñimiento  Pregunte a raymundo médico divina prevenir o tratar estreñimiento  · Los medicamentos contra las náuseas pueden darse para calmar raymundo estómago y ayudarle a prevenir los vómitos  Mary medicamento también puede aliviar el dolor  · Los esteroides pueden administrarse para evitar que la migraña vuelva a aparecer de inmediato  · [place] queta medicamentos divina se le haya indicado  Consulte con raymundo médico si usted luis que raymundo medicamento no le está ayudando o si presenta efectos secundarios  Infórmele si es alérgico a cualquier medicamento  Mantenga jonathan lista actualizada de los [place], las vitaminas y los productos herbales que villa  Incluya los siguientes datos de los medicamentos: cantidad, frecuencia y motivo de administración  Traiga con usted la lista o los envases de las píldoras a queta citas de seguimiento  Lleve la lista de los medicamentos con usted en emily de jonathan emergencia  El manejo de queta síntomas:  · Repose en jonathan habitación oscura y [place] ayudará a disminuir el dolor  El dormir también podría ayudarlo a aliviar raymundo dolor  · Aplique hielo para reducir el dolor   Use jonathan compresa de hielo o ponga hielo triturado en jonathan bolsa de plástico  Clayton Christofer el paquete de hielo con Geralynn Yang toalla y colóqueselo en la hal  Aplique hielo veronica 15 a 20 minutos cada hora  · Aplique calor para disminuir el dolor y los espasmos musculares  Utilice jonathan toalla pequeña empapada con [place], jonathan almohada térmica o tome un baño de lisette con agua tibia  Aplique la compresa caliente sobre el área por 20 a 30 minutos cada 2 horas  Usted puede alternar el calor y el hielo  · Mantenga un registro de las migrañas  Escriba cuándo comienzan y terminan queta migrañas  [place] y [place] haciendo cuando comenzó [place]  Registre lo que comió y lo que tomó las 24 horas antes de que comenzara raymundo migraña  Mantenga un registro de lo que hizo para tratar raymundo migraña y si funcionó  Dimitri Montilla registro de las migrañas con usted a las citas con raymundo médico     Los factores desencadenantes comunes de la migraña incluyen los siguientes:  · El estrés, fatiga de los ojos, dormir demasiado o no dormir lo suficiente    · Cambios hormonales en las mujeres debido a las píldoras anticonceptivas, embarazo, menopausia o veronica la menstruación    · Omitir comidas, pasar mucho tiempo sin comer o no jamilah suficientes líquidos    · Ciertos alimentos o bebidas, divina el chocolate, queso gale, alcohol o bebidas que contienen cafeína    · Alimentos que contienen gluten, nitratos, glutamato monosódico o endulzantes artificiales    · [employer], luces brillantes o luces parpadeantes, ruidos abel, humo u olores abel    · [employer], humedad o cambios en el clima    Evite otra migraña:  · Evite el dolor de hal por uso excesivo de medicamentos  [place] los analgésicos solamente según le hayan indicado  Un medicamento puede estar limitado a jonathan cierta cantidad cada mes  El médico puede ayudarlo a crear un plan para que reciba jonathan cantidad kat cada mes  · No fume  La nicotina y otras sustancias químicas en los cigarrillos y puros pueden desencadenar jonathan [place]  Pida información a raymundo médico si usted actualmente fuma y necesita ayuda para dejar de fumar  Los cigarrillos electrónicos o el tabaco sin humo igualmente contienen nicotina  Consulte con raymundo médico antes de [employer]  · No consuma alcohol  El alcohol puede provocar migraña  También puede impedir que [employer] medicamentos para la migraña  · Sea físicamente activo  La actividad física, divina el ejercicio, puede ayudar a "[employer]  Consulte con raymundo médico acerca del mejor plan de actividad para usted  Trate de hacer al menos 30 minutos de actividad física la mayoría de [place]  · Controle el estrés  El estrés podría provocar migraña  Aprenda nuevas maneras de relajarse divina la respiración profunda  · Establezca un horario para dormir  Acuéstese y levántese a la misma hora cada día  No melissa televisión inmediatamente antes de acostarse  · Consuma alimentos saludables y variados  Incluya alimentos saludables divina fruta, verduras, panes integrales, productos lácteos bajos en grasa, frijoles, carne magra y pescado  No consuma alimentos o bebidas que puedan desencadenar queta migrañas  · [place] más líquidos para evitar la deshidratación  Raymundo médico le indicará cuánto líquido debería beber a diario y qué líquidos son los mejores para usted  Acuda a queta consultas de control con raymundo médico o neurólogo según le indicaron: Lleve raymundo registro de migrañas con usted  Anote queta preguntas para que se acuerde de hacerlas veronica queta visitas  © Copyright [employer] 2021 Information is for End User's use only and may not be sold, redistributed or otherwise used for commercial purposes  All illustrations and images included in CareNotes® are the copyrighted property of [employer]  or [address] [place] [address] es sólo para uso en educación  Raymundo intención no es darle un consejo médico sobre enfermedades o tratamientos   Colsulte con raymundo Dannie Ahle farmacéutico antes de seguir cualquier régimen médico para saber si es seguro y efectivo para usted

## 2021-09-10 NOTE — ASSESSMENT & PLAN NOTE
- Continue Elavil 25 mg daily bedtime   - Will prescribe Imitrex 50 mg, to be used as needed at onset of migraine

## 2021-09-10 NOTE — PROGRESS NOTES
[address] [facility]    NAME: Jatin Singh  AGE: 29 y o  SEX: female  : 1985     DATE: 9/10/2021     Assessment and Plan:     Problem List Items Addressed This Visit        Cardiovascular and Mediastinum    Migraine headache with aura     - Continue Elavil 25 mg daily bedtime   - Will prescribe Imitrex 50 mg, to be used as needed at onset of migraine  Relevant Medications    SUMAtriptan (IMITREX) 50 mg tablet       Musculoskeletal and Integument    Plantar fasciitis of left foot     - Continue methylprednisolone therapy pack as prescribed  - Advised to follow-up with Podiatry as scheduled to establish care on 2021  Other Visit Diagnoses     Annual physical exam    -  Primary    's permit PE (physical examination)              Immunizations and preventive care screenings were discussed with patient today  Appropriate education was printed on patient's after visit summary  Counseling:  Alcohol/drug use: discussed moderation in alcohol intake, the recommendations for healthy alcohol use, and avoidance of illicit drug use  Dental Health: discussed importance of regular tooth brushing, flossing, and dental visits  Injury prevention: discussed safety/seat belts, safety helmets, smoke detectors, carbon dioxide detectors, and smoking near bedding or upholstery  Sexual health: discussed sexually transmitted diseases, partner selection, use of condoms, avoidance of unintended pregnancy, and contraceptive alternatives  · Exercise: the importance of regular exercise/physical activity was discussed  Recommend exercise 3-5 times per week for at least 30 minutes  Return as needed       Chief Complaint:     Chief Complaint   Patient presents with    Physical Exam     DMV      History of Present Illness:     Adult Annual Physical   Patient here for a comprehensive physical exam/ 's permit physical   Patient presented to [facility] ED on 09/08/2021 due to left foot pain for few days  Left foot x-ray revealed no acute osseous abnormality  Patient was diagnosed with plantar fasciitis and advised to follow-up with Podiatry  Patient was prescribed methylprednisolone Dosepak  Today, patient notes her pain has improved moderately since starting the medication  Patient notes she is scheduled with Podiatry on 09/17/2021  Migraine: Currently taking amitriptyline 25 mg daily at bedtime  Patient notes currently she is experiencing a migraine  Patient notes she sometimes will have associated nausea and photophobia  Patient notes it is often located in the left temporal area  Patient notes she has tried taking Tylenol with little relief  Patient notes she is experiencing migraines a few times a month  Diet and Physical Activity  · Diet/Nutrition: well balanced diet  · Exercise: no formal exercise  General Health  · Sleep: sleeps well  · Hearing: normal - bilateral   · Vision: Wears reading glasses  · Dental: regular dental visits  /GYN Health  · Contraceptive method: Hysterectomy  · LMP: Hysterectomy  · Last PAP in June 2019  Results were normal, HPV negative  Review of Systems:     Review of Systems   Constitutional: Negative for chills and fever  HENT: Negative for congestion, hearing loss, rhinorrhea and sore throat  Eyes: Positive for photophobia  Negative for pain  Respiratory: Negative for cough and shortness of breath  Cardiovascular: Negative for chest pain and palpitations  Gastrointestinal: Positive for nausea  Negative for abdominal pain and vomiting  Genitourinary: Negative for difficulty urinating and dysuria  Musculoskeletal: Positive for arthralgias (left foot pain)  Negative for back pain  Skin: Negative for rash  Neurological: Positive for headaches  Negative for dizziness     Psychiatric/Behavioral: The patient is not nervous/anxious         Past Medical History:     Past Medical History:   Diagnosis Date    Abnormal Pap smear of cervix 2015    cryosurgery    Migraine headache with aura     managed with Elavil      Past Surgical History:     Past Surgical History:   Procedure Laterality Date    CHOLECYSTECTOMY LAPAROSCOPIC N/A 10/27/2020    Procedure: CHOLECYSTECTOMY LAPAROSCOPIC;  Surgeon: Bing Thakur MD;  Location: [address] [place] [address] MAIN OR;  Service: General    GYNECOLOGIC CRYOSURGERY  2015    HYSTERECTOMY  2016    LAPAROSCOPY  2018    TN LAP,DIAGNOSTIC ABDOMEN N/A 8/9/2019    Procedure: LAPAROSCOPY DIAGNOSTIC; LYSIS OF ADHESIONS;  Surgeon: Blessing Berry MD;  [facility];  Service: Gynecology    TN LAP,LYSIS OF ADHESIONS N/A 3/3/2020    Procedure: L O A ;  Surgeon: Venice Mcmahon MD;  [facility];  Service: Gynecology    TUBAL LIGATION        Social History:     Social History     Socioeconomic History    Marital status: Single     Spouse name: None    Number of children: None    Years of education: None    Highest education level: None   Occupational History    None   Tobacco Use    Smoking status: Current Every Day Smoker     Packs/day: 0 50     Years: 15 00     Pack years: 7 50     Types: Cigarettes    Smokeless tobacco: Never Used   Vaping Use    Vaping Use: Never used   Substance and Sexual Activity    Alcohol use: Not Currently    Drug use: Never    Sexual activity: Yes     Partners: Male     Birth control/protection: Female Sterilization   Other Topics Concern    None   Social History Narrative    None     Social Determinants of Health     Financial Resource Strain: Low Risk     Difficulty of Paying Living Expenses: Not very hard   Food Insecurity: Food Insecurity Present    Worried About Running Out of Food in the Last Year: Sometimes true    Kwame of Food in the Last Year: Sometimes true   Transportation Needs: No Transportation Needs    Lack of Transportation (Medical): No    Lack of Transportation (Non-Medical): No   Physical Activity: Insufficiently Active    Days of Exercise per Week: 5 days    Minutes of Exercise per Session: 20 min   Stress: No Stress Concern Present    Feeling of Stress : Only a little   Social Connections: Moderately Isolated    Frequency of Communication with Friends and Family: More than three times a week    Frequency of Social Gatherings with Friends and Family: Twice a week    Attends Rastafarian Services: Never    Active Member of Clubs or Organizations: No    Attends Club or Organization Meetings: Never    Marital Status: Living with partner   Intimate Partner Violence:     Fear of Current or Ex-Partner:     Emotionally Abused:     Physically Abused:     Sexually Abused:       Family History:     Family History   Problem Relation Age of Onset    Cancer Paternal Grandmother         uterine    Cancer Paternal Aunt         uterine     Breast cancer Neg Hx       Current Medications:     Current Outpatient Medications   Medication Sig Dispense Refill    amitriptyline (ELAVIL) 25 mg tablet Take 1 tablet (25 mg total) by mouth daily at bedtime 90 tablet 1    nicotine (NICODERM CQ) 21 mg/24 hr TD 24 hr patch Place 1 patch on the skin every 24 hours 28 patch 2    methylPREDNISolone 4 MG tablet therapy pack Use as directed on package 21 tablet 0    neomycin-polymyxin-hydrocortisone (CORTISPORIN) 0 35%-10,000 units/mL-1% otic suspension Administer 4 drops to the right ear every 8 (eight) hours 10 mL 0    pantoprazole (PROTONIX) 20 mg tablet Take 1 tablet (20 mg total) by mouth 2 (two) times a day 180 tablet 5    SUMAtriptan (IMITREX) 50 mg tablet Take 1 tablet (50 mg total) by mouth once as needed for migraine for up to 1 dose 9 tablet 0     No current facility-administered medications for this visit  Allergies:      Allergies   Allergen Reactions    Shellfish-Derived Products - Food Allergy Itching and Swelling     "seafood "    Ibuprofen Other (See Comments)     Patient states she gets hematoma      Physical Exam:     /70 (BP Location: Left arm, Patient Position: Sitting, Cuff Size: Adult)   Pulse 85   Temp 97 5 °F (36 4 °C) (Temporal)   Resp 18   Ht 5' 2" (1 575 m)   Wt 68 9 kg (152 lb)   SpO2 98%   BMI 27 80 kg/m²     Physical Exam  Vitals and nursing note reviewed  Constitutional:       Appearance: She is well-developed  HENT:      Head: Normocephalic and atraumatic  Right Ear: Tympanic membrane and external ear normal       Left Ear: Tympanic membrane and external ear normal       Nose: Nose normal       Mouth/Throat:      Pharynx: Uvula midline  Eyes:      Extraocular Movements: Extraocular movements intact  Conjunctiva/sclera: Conjunctivae normal       Pupils: Pupils are equal, round, and reactive to light  Cardiovascular:      Rate and Rhythm: Normal rate and regular rhythm  Heart sounds: Normal heart sounds  Pulmonary:      Effort: Pulmonary effort is normal       Breath sounds: Normal breath sounds  No wheezing  Abdominal:      General: Bowel sounds are normal       Palpations: Abdomen is soft  Tenderness: There is no abdominal tenderness  Musculoskeletal:         General: Normal range of motion  Cervical back: Normal range of motion and neck supple  Skin:     General: Skin is warm and dry  Neurological:      Mental Status: She is alert and oriented to person, place, and time  Psychiatric:         Mood and Affect: Mood normal          Speech: Speech normal          Behavior: Behavior normal          - Utilized [employer] phones for translation  At this time no medical conditions which affect ability to operate a vehicle  Report back to [address] [place] [address] immediately if any new conditions or limitations develop  Discussed importance of seat belt safety for  and all passengers in the car    Discussed importance of patients knowledge of car seat and booster seat use when applicable  Do not use alcohol, drugs, or substances which inhibit your ability to operate a vehicle  Do not use cell phone or other devices which can distract you while operating a vehicle    Reviewed importance of familiarizing ones self with the rules and regulations outlined in drivers manual       MICHAEL Wray

## 2021-09-10 NOTE — ASSESSMENT & PLAN NOTE
- Continue methylprednisolone therapy pack as prescribed  - Advised to follow-up with Podiatry as scheduled to establish care on 09/17/2021

## 2021-09-17 ENCOUNTER — OFFICE VISIT (OUTPATIENT)
Dept: PODIATRY | Facility: CLINIC | Age: 36
End: 2021-09-17
Payer: COMMERCIAL

## 2021-09-17 VITALS
HEART RATE: 82 BPM | DIASTOLIC BLOOD PRESSURE: 67 MMHG | WEIGHT: 153.6 LBS | BODY MASS INDEX: 28.26 KG/M2 | SYSTOLIC BLOOD PRESSURE: 106 MMHG | HEIGHT: 62 IN

## 2021-09-17 DIAGNOSIS — G57.52 TARSAL TUNNEL SYNDROME, LEFT: ICD-10-CM

## 2021-09-17 DIAGNOSIS — M72.2 PLANTAR FASCIITIS: Primary | ICD-10-CM

## 2021-09-17 DIAGNOSIS — M67.00 SHORT ACHILLES TENDON, UNSPECIFIED LATERALITY: ICD-10-CM

## 2021-09-17 DIAGNOSIS — M79.672 LEFT FOOT PAIN: ICD-10-CM

## 2021-09-17 PROCEDURE — 99203 OFFICE O/P NEW LOW 30 MIN: CPT | Performed by: PODIATRIST

## 2021-09-17 NOTE — PROGRESS NOTES
PATIENT:  Piedad Rao  1985       ASSESSMENT:     1  Plantar fasciitis  Ambulatory referral to Physical Therapy   2  Tarsal tunnel syndrome, left  Ambulatory referral to Physical Therapy   3  Short Achilles tendon, unspecified laterality  Ambulatory referral to Physical Therapy   4  Left foot pain           PLAN:  1  Patient was counseled and educated on the condition and the diagnosis  2  Reviewed ED note  X-ray was personally reviewed  The radiological findings were discussed with the patient  3  The exam and symptoms are consistent with plantar fasciitis  The diagnosis, treatment options and prognosis were discussed with the patient  4  Referred her to PT/OT  Instructed supportive care, home exercise, icing, and proper footwear/ arch support  5  Possible injection depending on the progress  6  Monitor Tinel sign for possible Tarsal tunnel syndrome  It could be secondary to plantar fasciitis  7  Patient will return in 4 weeks for re-evaluation  Subjective:     HPI  The patient presents with chief complaint of left heel pain for 1-2 weeks  She went to ED last week  She was put on medication and her pain is little better  X-ray was also taken  The symptoms presents around left plantar heel with sharp sensation  It may radiates to her leg  Pain level is about 6-7 out of 10  The patient has more pain in the morning and after sitting for a while  Pain also increases with walking and standing  The patient does not recall any injury  The patient tried OTC meds, and different shoes without relieving the pain  Denied any swelling  No associated numbness or paresthesia  No significant weakness           The following portions of the patient's history were reviewed and updated as appropriate: allergies, current medications, past family history, past medical history, past social history, past surgical history and problem list   All pertinent labs and images were reviewed  Past Medical History  Past Medical History:   Diagnosis Date    Abnormal Pap smear of cervix 2015    cryosurgery    Migraine headache with aura     managed with Elavil       Past Surgical History  Past Surgical History:   Procedure Laterality Date    CHOLECYSTECTOMY LAPAROSCOPIC N/A 10/27/2020    Procedure: CHOLECYSTECTOMY LAPAROSCOPIC;  Surgeon: Ion Ames MD;  Location: [address] [place] [address] MAIN OR;  Service: General    GYNECOLOGIC CRYOSURGERY  2015    HYSTERECTOMY  2016    LAPAROSCOPY  2018    NE LAP,DIAGNOSTIC ABDOMEN N/A 8/9/2019    Procedure: LAPAROSCOPY DIAGNOSTIC; LYSIS OF ADHESIONS;  Surgeon: Elizabeth Bejarano MD;  [facility];  Service: Gynecology    NE LAP,LYSIS OF ADHESIONS N/A 3/3/2020    Procedure: L O A ;  Surgeon: Glendy Caputo MD;  [facility];  Service: Gynecology    TUBAL LIGATION          Allergies:  Shellfish-derived products - food allergy and Ibuprofen    Medications:  Current Outpatient Medications   Medication Sig Dispense Refill    methylPREDNISolone 4 MG tablet therapy pack Use as directed on package 21 tablet 0    nicotine (NICODERM CQ) 21 mg/24 hr TD 24 hr patch Place 1 patch on the skin every 24 hours 28 patch 2    pantoprazole (PROTONIX) 20 mg tablet Take 1 tablet (20 mg total) by mouth 2 (two) times a day 180 tablet 5    SUMAtriptan (IMITREX) 50 mg tablet Take 1 tablet (50 mg total) by mouth once as needed for migraine for up to 1 dose 9 tablet 0    amitriptyline (ELAVIL) 25 mg tablet Take 1 tablet (25 mg total) by mouth daily at bedtime (Patient not taking: Reported on 9/17/2021) 90 tablet 1    neomycin-polymyxin-hydrocortisone (CORTISPORIN) 0 35%-10,000 units/mL-1% otic suspension Administer 4 drops to the right ear every 8 (eight) hours (Patient not taking: Reported on 9/17/2021) 10 mL 0     No current facility-administered medications for this visit         Social History:  Social History     Socioeconomic History    Marital status: Single     Spouse name: None    Number of children: None    Years of education: None    Highest education level: None   Occupational History    None   Tobacco Use    Smoking status: Current Every Day Smoker     Packs/day: 0 50     Years: 15 00     Pack years: 7 50     Types: Cigarettes    Smokeless tobacco: Never Used   Vaping Use    Vaping Use: Never used   Substance and Sexual Activity    Alcohol use: Not Currently    Drug use: Never    Sexual activity: Yes     Partners: Male     Birth control/protection: Female Sterilization   Other Topics Concern    None   Social History Narrative    None     Social Determinants of Health     Financial Resource Strain: Low Risk     Difficulty of Paying Living Expenses: Not very hard   Food Insecurity: Food Insecurity Present    Worried About Running Out of Food in the Last Year: Sometimes true    Kwame of Food in the Last Year: Sometimes true   Transportation Needs: No Transportation Needs    Lack of Transportation (Medical): No    Lack of Transportation (Non-Medical): No   Physical Activity: Insufficiently Active    Days of Exercise per Week: 5 days    Minutes of Exercise per Session: 20 min   Stress: No Stress Concern Present    Feeling of Stress : Only a little   Social Connections: Moderately Isolated    Frequency of Communication with Friends and Family: More than three times a week    Frequency of Social Gatherings with Friends and Family: Twice a week    Attends Orthodox Services: Never    Active Member of Clubs or Organizations: No    Attends Club or Organization Meetings: Never    Marital Status: Living with partner   Intimate Partner Violence:     Fear of Current or Ex-Partner:     Emotionally Abused:     Physically Abused:     Sexually Abused:           Review of Systems   Constitutional: Negative for appetite change, chills and fever  HENT: Negative for sore throat  Respiratory: Negative for cough and shortness of breath  Cardiovascular: Negative for chest pain and leg swelling  Gastrointestinal: Negative for diarrhea, nausea and vomiting  Musculoskeletal: Negative for joint swelling  Skin: Negative for rash and wound  Allergic/Immunologic: Negative for immunocompromised state  Neurological: Negative for weakness and numbness  Hematological: Negative  Psychiatric/Behavioral: Negative for behavioral problems and confusion  Objective:      /67   Pulse 82   Ht 5' 2" (1 575 m) Comment: verbal  Wt 69 7 kg (153 lb 9 6 oz)   BMI 28 09 kg/m²          Physical Exam  Vitals reviewed  Constitutional:       General: She is not in acute distress  Appearance: Normal appearance  She is not ill-appearing or toxic-appearing  HENT:      Head: Normocephalic and atraumatic  Eyes:      Extraocular Movements: Extraocular movements intact  Cardiovascular:      Rate and Rhythm: Normal rate and regular rhythm  Pulses: Normal pulses  Dorsalis pedis pulses are 2+ on the right side and 2+ on the left side  Posterior tibial pulses are 2+ on the right side and 2+ on the left side  Pulmonary:      Effort: Pulmonary effort is normal  No respiratory distress  Musculoskeletal:         General: Tenderness present  No swelling, deformity or signs of injury  Cervical back: Normal range of motion and neck supple  Right lower leg: No edema  Left lower leg: No edema  Right foot: No foot drop  Left foot: No foot drop  Comments: Focal pain left plantar heel  Tight achilles tendon with equinus, left worse than right  No acute edema  No pain on lateral compression left heel  Tinel sign noted left tarsal tunnel  Skin:     General: Skin is warm  Capillary Refill: Capillary refill takes less than 2 seconds  Coloration: Skin is not cyanotic or mottled  Findings: No abscess, ecchymosis, erythema, rash or wound  Nails: There is no clubbing  Neurological:      General: No focal deficit present  Mental Status: She is alert and oriented to person, place, and time  Cranial Nerves: No cranial nerve deficit  Sensory: No sensory deficit  Motor: No weakness  Coordination: Coordination normal    Psychiatric:         Mood and Affect: Mood normal          Behavior: Behavior normal          Thought Content:  Thought content normal          Judgment: Judgment normal

## 2021-09-17 NOTE — PATIENT INSTRUCTIONS
Plantar Fasciitis Exercises   AMBULATORY CARE:   What you need to know about plantar fasciitis exercises:  Plantar fasciitis exercises help stretch your plantar fascia, calf muscles, and Achilles tendon  They also help strengthen the muscles that support your heel and foot  Exercises and stretching can help prevent plantar fasciitis from getting worse or coming back  Contact your healthcare provider if:   · Your pain and swelling increase  · You develop new knee, hip, or back pain  · You have questions or concerns about your condition or care  How to do plantar fasciitis exercises:  Ask your healthcare provider when to start these exercises and how often to do them  · Slant board stretch:  Stand on a slanted board with your toes higher than your heel  Press your heel into the board  Keep your knee slightly bent  Hold this position for 1 minute  Repeat 5 times  · Heel stretch:  Stand up straight with your hands on a wall  Place your injured leg slightly behind your other leg  Keep your heels flat on the floor, lean forward, and bend both knees  Hold for 30 seconds  · Calf stretch:  Stand up straight with your hands on a wall  Step forward so that your uninjured foot is in front of your injured foot  Keep your front leg bent and your back leg straight  Gently lean forward until you feel your calf stretch  Hold for 30 seconds and then relax  · Seated plantar fascia stretch:  Sit on a firm surface, such as the floor or a mat  Extend your legs out in front of you  Raise your injured foot a few inches off the ground  Keep your leg straight  Grab the toes of your injured foot and pull them toward you  With your other hand, feel your plantar fascia  You should feel it press outward  Hold for 30 seconds  If you cannot reach your toes, loop a towel or tie around your foot  Gently pull on the towel or tie and flex your toes toward you  · Heel raises:  Stand on the injured leg   Raise your other leg off the ground  Hold onto a railing or wall for balance  Slowly rise up on the toes of your injured leg  Hold for 5 seconds  Slowly lower your heel to the ground  · Toe curls:  Place a towel on the floor  Put your foot flat on the towel  Grab the towel with your toes by curling them around the towel  Lift the towel up with your toes  · Toe taps:  Sit down and place your foot flat on the floor  Keep your heel on the floor  Point all your toes up toward the ceiling  While the 4 smaller toes are pointed up, bend your big toe down and tap it on the ground  Do 10 to 50 taps  Point all 5 toes up toward the ceiling again  This time keep your big toe pointed up and tap the 4 smaller toes on the ground  Do 10 to 50 taps each time  Follow up with your healthcare provider as directed:  Write down your questions so you remember to ask them during your visits  © Copyright [employer] 2021 Information is for End User's use only and may not be sold, redistributed or otherwise used for commercial purposes  All illustrations and images included in CareNotes® are the copyrighted property of [employer]  or Alexey Agosto   The above information is an  only  It is not intended as medical advice for individual conditions or treatments  Talk to your doctor, nurse or pharmacist before following any medical regimen to see if it is safe and effective for you

## 2021-09-24 ENCOUNTER — TELEPHONE (OUTPATIENT)
Dept: FAMILY MEDICINE CLINIC | Facility: CLINIC | Age: 36
End: 2021-09-24

## 2021-09-28 ENCOUNTER — OFFICE VISIT (OUTPATIENT)
Dept: OBGYN CLINIC | Facility: CLINIC | Age: 36
End: 2021-09-28

## 2021-09-28 VITALS
DIASTOLIC BLOOD PRESSURE: 77 MMHG | BODY MASS INDEX: 28.34 KG/M2 | HEART RATE: 102 BPM | HEIGHT: 62 IN | SYSTOLIC BLOOD PRESSURE: 120 MMHG | WEIGHT: 154 LBS

## 2021-09-28 DIAGNOSIS — N89.8 VAGINA ITCHING: Primary | ICD-10-CM

## 2021-09-28 LAB
BV WHIFF TEST VAG QL: NORMAL
CLUE CELLS SPEC QL WET PREP: NORMAL
PH SMN: 4.5 [PH]
SL AMB POCT WET MOUNT: NORMAL
T VAGINALIS VAG QL WET PREP: NORMAL
YEAST VAG QL WET PREP: NORMAL

## 2021-09-28 PROCEDURE — 87210 SMEAR WET MOUNT SALINE/INK: CPT | Performed by: OBSTETRICS & GYNECOLOGY

## 2021-09-28 PROCEDURE — 99213 OFFICE O/P EST LOW 20 MIN: CPT | Performed by: OBSTETRICS & GYNECOLOGY

## 2021-09-28 RX ORDER — FLUCONAZOLE 150 MG/1
150 TABLET ORAL ONCE
Qty: 1 TABLET | Refills: 0 | Status: SHIPPED | OUTPATIENT
Start: 2021-09-28 | End: 2021-09-28

## 2021-09-28 NOTE — PROGRESS NOTES
Assessment/Plan:     No problem-specific Assessment & Plan notes found for this encounter  Diagnoses and all orders for this visit:    Vagina itching  -     fluconazole (DIFLUCAN) 150 mg tablet; Take 1 tablet (150 mg total) by mouth once for 1 dose  -     hydrocortisone 2 5 % ointment; Apply topically 2 (two) times a day  -     POCT wet mount    RTO prn or annually  Subjective:      Patient ID: Tyra Gold is a 29 y o  female who presents for external vaginal itching at the introitus  It started last night  She denies vaginal discharge or odor  HPI    The following portions of the patient's history were reviewed and updated as appropriate: allergies, current medications, past family history, past medical history, past social history, past surgical history and problem list     Review of Systems      Objective:      /77   Pulse 102   Ht 5' 2" (1 575 m)   Wt 69 9 kg (154 lb)   BMI 28 17 kg/m²          Physical Exam  Vitals and nursing note reviewed  Exam conducted with a chaperone present  Constitutional:       Appearance: Normal appearance  Pulmonary:      Effort: Pulmonary effort is normal    Genitourinary:     Labia:         Right: No rash, tenderness, lesion or injury  Left: No rash, tenderness, lesion or injury  Vagina: No signs of injury and foreign body  Erythema present  No vaginal discharge, tenderness, bleeding, lesions or prolapsed vaginal walls  Neurological:      General: No focal deficit present  Mental Status: She is alert and oriented to person, place, and time     Psychiatric:         Mood and Affect: Mood normal          Behavior: Behavior normal

## 2021-09-30 ENCOUNTER — TELEPHONE (OUTPATIENT)
Dept: FAMILY MEDICINE CLINIC | Facility: CLINIC | Age: 36
End: 2021-09-30

## 2021-11-09 ENCOUNTER — HOSPITAL ENCOUNTER (EMERGENCY)
Facility: HOSPITAL | Age: 36
Discharge: HOME | End: 2021-11-09
Attending: EMERGENCY MEDICINE | Admitting: EMERGENCY MEDICINE
Payer: COMMERCIAL

## 2021-11-09 VITALS
DIASTOLIC BLOOD PRESSURE: 74 MMHG | RESPIRATION RATE: 16 BRPM | OXYGEN SATURATION: 99 % | WEIGHT: 153.88 LBS | HEART RATE: 81 BPM | TEMPERATURE: 98.3 F | BODY MASS INDEX: 28.15 KG/M2 | SYSTOLIC BLOOD PRESSURE: 128 MMHG

## 2021-11-09 DIAGNOSIS — R51.9 HEADACHE: Primary | ICD-10-CM

## 2021-11-09 PROCEDURE — 99284 EMERGENCY DEPT VISIT MOD MDM: CPT | Performed by: EMERGENCY MEDICINE

## 2021-11-09 PROCEDURE — 96375 TX/PRO/DX INJ NEW DRUG ADDON: CPT

## 2021-11-09 PROCEDURE — 96374 THER/PROPH/DIAG INJ IV PUSH: CPT

## 2021-11-09 PROCEDURE — 96361 HYDRATE IV INFUSION ADD-ON: CPT

## 2021-11-09 PROCEDURE — 99283 EMERGENCY DEPT VISIT LOW MDM: CPT

## 2021-11-09 RX ORDER — DIPHENHYDRAMINE HYDROCHLORIDE 50 MG/ML
25 INJECTION INTRAMUSCULAR; INTRAVENOUS ONCE
Status: COMPLETED | OUTPATIENT
Start: 2021-11-09 | End: 2021-11-09

## 2021-11-09 RX ORDER — ACETAMINOPHEN 325 MG/1
975 TABLET ORAL ONCE
Status: COMPLETED | OUTPATIENT
Start: 2021-11-09 | End: 2021-11-09

## 2021-11-09 RX ORDER — METOCLOPRAMIDE HYDROCHLORIDE 5 MG/ML
10 INJECTION INTRAMUSCULAR; INTRAVENOUS ONCE
Status: COMPLETED | OUTPATIENT
Start: 2021-11-09 | End: 2021-11-09

## 2021-11-09 RX ADMIN — ACETAMINOPHEN 975 MG: 325 TABLET ORAL at 19:49

## 2021-11-09 RX ADMIN — DIPHENHYDRAMINE HYDROCHLORIDE 25 MG: 50 INJECTION, SOLUTION INTRAMUSCULAR; INTRAVENOUS at 19:50

## 2021-11-09 RX ADMIN — METOCLOPRAMIDE 10 MG: 5 INJECTION, SOLUTION INTRAMUSCULAR; INTRAVENOUS at 19:50

## 2021-11-09 RX ADMIN — SODIUM CHLORIDE 1000 ML: 0.9 INJECTION, SOLUTION INTRAVENOUS at 19:50

## 2022-01-10 DIAGNOSIS — G43.109 MIGRAINE WITH AURA AND WITHOUT STATUS MIGRAINOSUS, NOT INTRACTABLE: ICD-10-CM

## 2022-01-10 RX ORDER — SUMATRIPTAN 50 MG/1
50 TABLET, FILM COATED ORAL ONCE AS NEEDED
Qty: 9 TABLET | Refills: 0 | Status: SHIPPED | OUTPATIENT
Start: 2022-01-10 | End: 2022-03-17 | Stop reason: SDUPTHER

## 2022-02-25 ENCOUNTER — HOSPITAL ENCOUNTER (EMERGENCY)
Facility: HOSPITAL | Age: 37
Discharge: HOME | End: 2022-02-26
Attending: EMERGENCY MEDICINE | Admitting: EMERGENCY MEDICINE
Payer: COMMERCIAL

## 2022-02-25 VITALS
DIASTOLIC BLOOD PRESSURE: 78 MMHG | BODY MASS INDEX: 28.95 KG/M2 | RESPIRATION RATE: 17 BRPM | SYSTOLIC BLOOD PRESSURE: 127 MMHG | OXYGEN SATURATION: 99 % | WEIGHT: 158.3 LBS | HEART RATE: 88 BPM | TEMPERATURE: 98 F

## 2022-02-25 DIAGNOSIS — G43.909 MIGRAINE: Primary | ICD-10-CM

## 2022-02-25 PROCEDURE — 96361 HYDRATE IV INFUSION ADD-ON: CPT

## 2022-02-25 PROCEDURE — 96375 TX/PRO/DX INJ NEW DRUG ADDON: CPT

## 2022-02-25 PROCEDURE — 99283 EMERGENCY DEPT VISIT LOW MDM: CPT

## 2022-02-25 PROCEDURE — 96374 THER/PROPH/DIAG INJ IV PUSH: CPT

## 2022-02-25 PROCEDURE — 99284 EMERGENCY DEPT VISIT MOD MDM: CPT | Performed by: EMERGENCY MEDICINE

## 2022-02-25 RX ORDER — ACETAMINOPHEN 325 MG/1
975 TABLET ORAL ONCE
Status: COMPLETED | OUTPATIENT
Start: 2022-02-25 | End: 2022-02-25

## 2022-02-25 RX ORDER — DIPHENHYDRAMINE HYDROCHLORIDE 50 MG/ML
25 INJECTION INTRAMUSCULAR; INTRAVENOUS ONCE
Status: COMPLETED | OUTPATIENT
Start: 2022-02-25 | End: 2022-02-25

## 2022-02-25 RX ORDER — METOCLOPRAMIDE HYDROCHLORIDE 5 MG/ML
10 INJECTION INTRAMUSCULAR; INTRAVENOUS ONCE
Status: COMPLETED | OUTPATIENT
Start: 2022-02-25 | End: 2022-02-25

## 2022-02-25 RX ADMIN — SODIUM CHLORIDE 1000 ML: 9 INJECTION, SOLUTION INTRAVENOUS at 22:41

## 2022-02-25 RX ADMIN — ACETAMINOPHEN 975 MG: 325 TABLET ORAL at 22:37

## 2022-02-25 RX ADMIN — DIPHENHYDRAMINE HYDROCHLORIDE 25 MG: 50 INJECTION, SOLUTION INTRAMUSCULAR; INTRAVENOUS at 22:39

## 2022-02-25 RX ADMIN — METOCLOPRAMIDE 10 MG: 5 INJECTION, SOLUTION INTRAMUSCULAR; INTRAVENOUS at 22:41

## 2022-02-26 NOTE — ED PROVIDER NOTES
History  Chief Complaint   Patient presents with    Migraine     3 days with frontal and temporal migraine, taking rx meds and not working  Patient is a 73-year-old female with a history of migraines who presents with a 3 day history of frontal headache  Sharp constant  State will switch from side to side  No relief with meds at home  +Nausea  +photophobia  Not different from previous migraines  Last ER visit 11/21  Allergic to motrin, bruises  Prior to Admission Medications   Prescriptions Last Dose Informant Patient Reported? Taking?    SUMAtriptan (IMITREX) 50 mg tablet   No No   Sig: Take 1 tablet (50 mg total) by mouth once as needed for migraine for up to 1 dose   hydrocortisone 2 5 % ointment   No No   Sig: Apply topically 2 (two) times a day   nicotine (NICODERM CQ) 21 mg/24 hr TD 24 hr patch   No No   Sig: Place 1 patch on the skin every 24 hours   pantoprazole (PROTONIX) 20 mg tablet   No No   Sig: Take 1 tablet (20 mg total) by mouth 2 (two) times a day      Facility-Administered Medications: None       Past Medical History:   Diagnosis Date    Abnormal Pap smear of cervix 2015    cryosurgery    Migraine headache with aura     managed with Elavil       Past Surgical History:   Procedure Laterality Date    CHOLECYSTECTOMY LAPAROSCOPIC N/A 10/27/2020    Procedure: CHOLECYSTECTOMY LAPAROSCOPIC;  Surgeon: Evelia Barthel, MD;  Location: [address] [place] [address] MAIN OR;  Service: General    GYNECOLOGIC CRYOSURGERY  2015    HYSTERECTOMY  2016    LAPAROSCOPY  2018    NV LAP,DIAGNOSTIC ABDOMEN N/A 8/9/2019    Procedure: LAPAROSCOPY DIAGNOSTIC; LYSIS OF ADHESIONS;  Surgeon: Clinton Tinoco MD;  Location[facility];  Service: Gynecology    NV LAP,LYSIS OF ADHESIONS N/A 3/3/2020    Procedure: L O A ;  Surgeon: Miracle Trivedi MD;  Location[facility];  Service: Gynecology    TUBAL LIGATION         Family History   Problem Relation Age of Onset    Cancer Paternal Grandmother uterine    Cancer Paternal Aunt         uterine     Breast cancer Neg Hx      I have reviewed and agree with the history as documented  E-Cigarette/Vaping    E-Cigarette Use Never User      E-Cigarette/Vaping Substances     Social History     Tobacco Use    Smoking status: Current Every Day Smoker     Packs/day: 0 50     Years: 15 00     Pack years: 7 50     Types: Cigarettes    Smokeless tobacco: Never Used   Vaping Use    Vaping Use: Never used   Substance Use Topics    Alcohol use: Not Currently    Drug use: Never       Review of Systems   Constitutional: Negative  HENT: Negative  Eyes: Positive for photophobia  Respiratory: Negative  Cardiovascular: Negative  Gastrointestinal: Positive for nausea  Endocrine: Negative  Genitourinary: Negative  Musculoskeletal: Negative  Skin: Negative  Allergic/Immunologic: Negative  Neurological: Positive for headaches  Hematological: Negative  Psychiatric/Behavioral: Negative  All other systems reviewed and are negative  Physical Exam  Physical Exam  Vitals and nursing note reviewed  Constitutional:       Appearance: She is normal weight  HENT:      Head: Normocephalic and atraumatic  Right Ear: Tympanic membrane, ear canal and external ear normal       Left Ear: Tympanic membrane, ear canal and external ear normal       Nose: Nose normal       Mouth/Throat:      Mouth: Mucous membranes are moist       Pharynx: Oropharynx is clear  Eyes:      Extraocular Movements: Extraocular movements intact  Conjunctiva/sclera: Conjunctivae normal       Pupils: Pupils are equal, round, and reactive to light  Cardiovascular:      Rate and Rhythm: Normal rate and regular rhythm  Pulses: Normal pulses  Heart sounds: Normal heart sounds  Pulmonary:      Effort: Pulmonary effort is normal       Breath sounds: Normal breath sounds     Abdominal:      General: Bowel sounds are normal       Palpations: Abdomen is soft    Musculoskeletal:         General: Normal range of motion  Cervical back: Normal range of motion and neck supple  Skin:     General: Skin is warm and dry  Capillary Refill: Capillary refill takes less than 2 seconds  Neurological:      General: No focal deficit present  Mental Status: She is alert and oriented to person, place, and time  Cranial Nerves: No cranial nerve deficit  Sensory: No sensory deficit  Motor: No weakness  Coordination: Coordination normal       Gait: Gait normal    Psychiatric:         Mood and Affect: Mood normal          Behavior: Behavior normal          Vital Signs  ED Triage Vitals [02/25/22 2232]   Temperature Pulse Respirations Blood Pressure SpO2   98 °F (36 7 °C) 88 17 127/78 99 %      Temp Source Heart Rate Source Patient Position - Orthostatic VS BP Location FiO2 (%)   Oral Monitor Lying Left arm --      Pain Score       10 - Worst Possible Pain           Vitals:    02/25/22 2232   BP: 127/78   Pulse: 88   Patient Position - Orthostatic VS: Lying         Visual Acuity      ED Medications  Medications   sodium chloride 0 9 % bolus 1,000 mL (has no administration in time range)   metoclopramide (REGLAN) injection 10 mg (has no administration in time range)   diphenhydrAMINE (BENADRYL) injection 25 mg (has no administration in time range)   acetaminophen (TYLENOL) tablet 975 mg (has no administration in time range)       Diagnostic Studies  Results Reviewed     None                 No orders to display              Procedures  Procedures         ED Course                                             MDM    Disposition  Final diagnoses:   None     ED Disposition     None      Follow-up Information    None         Patient's Medications   Discharge Prescriptions    No medications on file       No discharge procedures on file      PDMP Review     None          ED Provider  Electronically Signed by           Brigid Catalan MD  02/27/22 7478

## 2022-03-17 ENCOUNTER — OFFICE VISIT (OUTPATIENT)
Dept: FAMILY MEDICINE CLINIC | Facility: CLINIC | Age: 37
End: 2022-03-17

## 2022-03-17 ENCOUNTER — APPOINTMENT (OUTPATIENT)
Dept: LAB | Facility: CLINIC | Age: 37
End: 2022-03-17
Payer: COMMERCIAL

## 2022-03-17 VITALS
DIASTOLIC BLOOD PRESSURE: 78 MMHG | RESPIRATION RATE: 16 BRPM | TEMPERATURE: 97.7 F | WEIGHT: 156.1 LBS | HEART RATE: 90 BPM | OXYGEN SATURATION: 98 % | SYSTOLIC BLOOD PRESSURE: 118 MMHG | HEIGHT: 62 IN | BODY MASS INDEX: 28.73 KG/M2

## 2022-03-17 DIAGNOSIS — R09.89 POOR CIRCULATION OF EXTREMITY: ICD-10-CM

## 2022-03-17 DIAGNOSIS — T14.8XXA BRUISING: ICD-10-CM

## 2022-03-17 DIAGNOSIS — G43.109 MIGRAINE WITH AURA AND WITHOUT STATUS MIGRAINOSUS, NOT INTRACTABLE: ICD-10-CM

## 2022-03-17 DIAGNOSIS — T14.8XXA BRUISING: Primary | ICD-10-CM

## 2022-03-17 DIAGNOSIS — K21.9 GASTROESOPHAGEAL REFLUX DISEASE WITHOUT ESOPHAGITIS: ICD-10-CM

## 2022-03-17 DIAGNOSIS — Z02.4 DRIVER'S PERMIT PE (PHYSICAL EXAMINATION): ICD-10-CM

## 2022-03-17 DIAGNOSIS — E66.3 OVERWEIGHT: ICD-10-CM

## 2022-03-17 LAB
ALBUMIN SERPL BCP-MCNC: 4 G/DL (ref 3.5–5)
ALP SERPL-CCNC: 64 U/L (ref 46–116)
ALT SERPL W P-5'-P-CCNC: 32 U/L (ref 12–78)
ANION GAP SERPL CALCULATED.3IONS-SCNC: 5 MMOL/L (ref 4–13)
AST SERPL W P-5'-P-CCNC: 19 U/L (ref 5–45)
BASOPHILS # BLD AUTO: 0.07 THOUSANDS/ΜL (ref 0–0.1)
BASOPHILS NFR BLD AUTO: 1 % (ref 0–1)
BILIRUB SERPL-MCNC: 0.37 MG/DL (ref 0.2–1)
BUN SERPL-MCNC: 13 MG/DL (ref 5–25)
CALCIUM SERPL-MCNC: 9.6 MG/DL (ref 8.3–10.1)
CHLORIDE SERPL-SCNC: 110 MMOL/L (ref 100–108)
CO2 SERPL-SCNC: 24 MMOL/L (ref 21–32)
CREAT SERPL-MCNC: 0.87 MG/DL (ref 0.6–1.3)
EOSINOPHIL # BLD AUTO: 0.12 THOUSAND/ΜL (ref 0–0.61)
EOSINOPHIL NFR BLD AUTO: 1 % (ref 0–6)
ERYTHROCYTE [DISTWIDTH] IN BLOOD BY AUTOMATED COUNT: 12.6 % (ref 11.6–15.1)
GFR SERPL CREATININE-BSD FRML MDRD: 86 ML/MIN/1.73SQ M
GLUCOSE P FAST SERPL-MCNC: 93 MG/DL (ref 65–99)
HCT VFR BLD AUTO: 39.2 % (ref 34.8–46.1)
HGB BLD-MCNC: 13.2 G/DL (ref 11.5–15.4)
IMM GRANULOCYTES # BLD AUTO: 0.05 THOUSAND/UL (ref 0–0.2)
IMM GRANULOCYTES NFR BLD AUTO: 1 % (ref 0–2)
LYMPHOCYTES # BLD AUTO: 2.36 THOUSANDS/ΜL (ref 0.6–4.47)
LYMPHOCYTES NFR BLD AUTO: 25 % (ref 14–44)
MCH RBC QN AUTO: 30.5 PG (ref 26.8–34.3)
MCHC RBC AUTO-ENTMCNC: 33.7 G/DL (ref 31.4–37.4)
MCV RBC AUTO: 91 FL (ref 82–98)
MONOCYTES # BLD AUTO: 0.83 THOUSAND/ΜL (ref 0.17–1.22)
MONOCYTES NFR BLD AUTO: 9 % (ref 4–12)
NEUTROPHILS # BLD AUTO: 5.99 THOUSANDS/ΜL (ref 1.85–7.62)
NEUTS SEG NFR BLD AUTO: 63 % (ref 43–75)
NRBC BLD AUTO-RTO: 0 /100 WBCS
PLATELET # BLD AUTO: 291 THOUSANDS/UL (ref 149–390)
PMV BLD AUTO: 11.6 FL (ref 8.9–12.7)
POTASSIUM SERPL-SCNC: 4.3 MMOL/L (ref 3.5–5.3)
PROT SERPL-MCNC: 7.4 G/DL (ref 6.4–8.2)
RBC # BLD AUTO: 4.33 MILLION/UL (ref 3.81–5.12)
SODIUM SERPL-SCNC: 139 MMOL/L (ref 136–145)
WBC # BLD AUTO: 9.42 THOUSAND/UL (ref 4.31–10.16)

## 2022-03-17 PROCEDURE — 85025 COMPLETE CBC W/AUTO DIFF WBC: CPT

## 2022-03-17 PROCEDURE — 36415 COLL VENOUS BLD VENIPUNCTURE: CPT

## 2022-03-17 PROCEDURE — 80053 COMPREHEN METABOLIC PANEL: CPT

## 2022-03-17 PROCEDURE — 99214 OFFICE O/P EST MOD 30 MIN: CPT | Performed by: PHYSICIAN ASSISTANT

## 2022-03-17 RX ORDER — SUMATRIPTAN 50 MG/1
50 TABLET, FILM COATED ORAL ONCE AS NEEDED
Qty: 9 TABLET | Refills: 3 | Status: SHIPPED | OUTPATIENT
Start: 2022-03-17 | End: 2022-04-13 | Stop reason: SDUPTHER

## 2022-03-17 RX ORDER — PANTOPRAZOLE SODIUM 20 MG/1
20 TABLET, DELAYED RELEASE ORAL DAILY
Qty: 90 TABLET | Refills: 1 | Status: SHIPPED | OUTPATIENT
Start: 2022-03-17 | End: 2022-05-16 | Stop reason: SDUPTHER

## 2022-03-17 NOTE — PROGRESS NOTES
Assessment/Plan:    Migraine headache with aura  - Much improved  - Continue imitrex 50 mg as needed at onset of migraine  Gastroesophageal reflux disease  - Stable  Continue Protonix 20 mg daily    Poor circulation of extremity   - Will order compression stockings, to be worn during working hours and to remove during sleep  - Advised to elevate legs as much as possible  Diagnoses and all orders for this visit:    Bruising  -     CBC and differential; Future  -     Comprehensive metabolic panel; Future    Poor circulation of extremity  -     Compression Stocking    Migraine with aura and without status migrainosus, not intractable  -     SUMAtriptan (IMITREX) 50 mg tablet; Take 1 tablet (50 mg total) by mouth once as needed for migraine for up to 1 dose    's permit PE (physical examination)    Gastroesophageal reflux disease without esophagitis  -     pantoprazole (PROTONIX) 20 mg tablet; Take 1 tablet (20 mg total) by mouth daily    Overweight          All of patients questions were answered  Patient understands and agrees with the above plan  Return as needed  Livan Poe PA-C  03/17/22  [facility]          Subjective:     Patient ID: Tony Muller  is a 29 y o  female with known PHM of migraines, GERD, tobacco use who presents today in office for poor circulation in feet  - Patient is a 29 y o  female who presents today for poor circulation in feet  Patient notes she is standing on her feet for work for about 10 hours straight  Patient notes by the end of her shift, which she takes off her shoes, her toes are white  Patient is requesting a prescription for compression stockings  Patient denies any lower leg swelling, pain, itchiness  Patient notes her mom has history of varicose veins and she believes she is starting to have some as well  Also, patient notes she has noticed some bruising of her upper body    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, but she would like to check her hemoglobin levels to make sure they are stable  Patient notes her migraines have been stable and have much improved since starting Imitrex as needed  Patient notes her migraines occur about 2-3 times a month,  Which is significantly less than previously  Patient notes her reflux has been stable as well and takes Protonix 20 mg daily  Patient is also requesting to have a 's physical completed today  The following portions of the patient's history were reviewed and updated as appropriate: allergies, current medications, past family history, past medical history, past social history, past surgical history and problem list         Review of Systems   Constitutional: Negative for chills and fever  HENT: Negative for congestion, ear pain and sore throat  Eyes: Negative for pain  Respiratory: Negative for cough, chest tightness and shortness of breath  Cardiovascular: Negative for chest pain, palpitations and leg swelling  Gastrointestinal: Negative for abdominal pain, constipation, diarrhea, nausea and vomiting  Genitourinary: Negative for difficulty urinating and dysuria  Musculoskeletal: Negative for arthralgias  Poor circulation of feet   Skin: Negative for rash  Neurological: Negative for dizziness, numbness and headaches  Psychiatric/Behavioral: The patient is not nervous/anxious  BMI Counseling: Body mass index is 28 55 kg/m²  The BMI is above normal  Nutrition recommendations include encouraging healthy choices of fruits and vegetables  Exercise recommendations include moderate physical activity 150 minutes/week  No pharmacotherapy was ordered  Rationale for BMI follow-up plan is due to patient being overweight or obese  Depression Screening and Follow-up Plan: Patient was screened for depression during today's encounter  They screened negative with a PHQ-2 score of 0            Objective:   Vitals:    03/17/22 0850 BP: 118/78   BP Location: Right arm   Patient Position: Sitting   Cuff Size: Standard   Pulse: 90   Resp: 16   Temp: 97 7 °F (36 5 °C)   TempSrc: Temporal   SpO2: 98%   Weight: 70 8 kg (156 lb 1 6 oz)   Height: 5' 2" (1 575 m)         Physical Exam  Vitals and nursing note reviewed  Constitutional:       General: She is not in acute distress  Appearance: She is well-developed  HENT:      Head: Normocephalic and atraumatic  Right Ear: External ear normal       Left Ear: External ear normal       Nose: Nose normal    Eyes:      Conjunctiva/sclera: Conjunctivae normal    Cardiovascular:      Rate and Rhythm: Normal rate and regular rhythm  Pulses: Normal pulses  Heart sounds: Normal heart sounds  Pulmonary:      Effort: Pulmonary effort is normal  No respiratory distress  Breath sounds: Normal breath sounds  No wheezing  Musculoskeletal:         General: No swelling  Cervical back: Normal range of motion and neck supple  Skin:     General: Skin is warm and dry  Capillary Refill: Capillary refill takes less than 2 seconds  Coloration: Skin is not pale  Findings: Bruising present  Neurological:      Mental Status: She is alert and oriented to person, place, and time  Psychiatric:         Behavior: Behavior normal            - Utilized [employer] services,  #[id number], [facility], for translation  Visual Acuity Screening    Right eye Left eye Both eyes   Without correction:      With correction: 20/20 20/20 20/20       At this time no medical conditions which affect ability to operate a vehicle  Report back to [address] [place] [address] immediately if any new conditions or limitations develop  Discussed importance of seat belt safety for  and all passengers in the car  Discussed importance of patients knowledge of car seat and booster seat use when applicable  Do not use alcohol, drugs, or substances which inhibit your ability to operate a vehicle    Do not use cell phone or other devices which can distract you while operating a vehicle    Reviewed importance of familiarizing ones self with the rules and regulations outlined in drivers manual       PHQ-2/9 Depression Screening    Little interest or pleasure in doing things: 0 - not at all  Feeling down, depressed, or hopeless: 0 - not at all  PHQ-2 Score: 0  PHQ-2 Interpretation: Negative depression screen

## 2022-03-17 NOTE — PATIENT INSTRUCTIONS
Várices   LO QUE NECESITA SABER:   Las várices son venas que se dilatan, tuercen e inflaman  Las venas varicosas comúnmente aparecen en la parte trasera de loretta pantorrillas, rodillas y muslos  Las várices se [place] [address] no funcionan adecuadamente  [place] causa que la juanito se acumule y aumente la presión en las venas de loretta piernas  El aumento de presión causa que loretta venas se estiren, engrandezcan, hinchen y se tuerzan  INSTRUCCIONES SOBRE EL [facility]ITALARIA:   Regrese a la aleida de emergencias si:   · Usted tiene jonathan herida que no flora o está infectada  · Usted tiene jonathan lesión que agrietó murphy piel y provocó que las venas varicosas sangraran  · [place] pierna está inflamada y dura  · Usted tiene dolor en murphy pierna que no desaparece o que empeora  · Usted nota que loretta piernas o pies se están poniendo azulados o ennegrecidos  · Murphy pierna se siente cálida, sensible y [place]  Se podría david inflamado y quintero  Pregúntele a murphy [place] vitaminas y minerales son adecuados para usted  · Loretta síntomas empeoran o le impiden hacer loretta actividades diarias  · Usted tiene jonathan lesión que ha provocado que loretta venas varicosas sangren por debajo de murphy piel  · Usted tiene sarpullido en murphy pierna  · Loretta síntomas le impiden realizar loretta actividades diarias  · Usted tiene preguntas o inquietudes acerca de murphy condición o cuidado  Medicamentos:   · Un medicamento con receta para el dolor  podrían ser Bibi Yanez  Pregunte cómo jamilah estos medicamentos de jonathan forma kat  · [place] loretta medicamentos divina se le haya indicado  Consulte con murphy médico si usted luis que murphy medicamento no le está ayudando o si presenta efectos secundarios  Infórmele si es alérgico a cualquier medicamento  Mantenga jonathan lista actualizada de los [place], las vitaminas y los productos herbales que villa   Incluya los siguientes datos de los medicamentos: cantidad, frecuencia y [place] de administración  Traiga con usted la lista o los envases de la píldoras a queta citas de seguimiento  Lleve la lista de los medicamentos con usted en emily de jonathan emergencia  Acuda a queta consultas de control con raymundo médico según le indicaron  Anote queta preguntas para que se acuerde de hacerlas veronica queta visitas  [address] várices:   · Use medias de compresión  Las medias son ajustadas y ejercen presión a queta piernas  Mejoran el flujo sanguíneo y [facility] a prevenir coágulos sanguíneos  · Eleve queta piernas  Manténgalas a un nivel por encima de raymundo corazón por 15 a 30 minutos varias veces al día  [place] ayuda a que la juanito fluya de regreso hacia raymundo corazón  · No se siente o esté de pie por largos periodos de tiempo  [place] puede causar que la juanito se acumule en queta piernas y que queta síntomas empeoren  [place] por varios minutos cada hora para que la juanito en queta piernas se [place]  · No use ropa o zapatos ajustados  No use zapatos de tacones altos  No use ropa que [place] alrededor de [facility]  · Jake suficiente ejercicio  Consulte con raymundo médico acerca de cuál es el mejor régimen de ejercicio para usted  El ejercicio podría reducir raymundo presión sanguínea y podría mejorar raymundo vivian  [place]oble o gire queta tobillos varias veces cada hora  [place] ayudará a que la juanito fluya hacia raymundo corazón  · Mantenga un peso saludable  Raymundo corazón funciona más forzosamente cuando usted tiene sobrepeso  [place] puede hacer que las venas varicosas empeoren  Consulte con raymundo médico cuánto debería pesar  Pida que le ayude a crear un plan para bajar de peso si usted tiene sobrepeso  · No fume  La nic[place] y otras sustancias químicas que contienen los cigarrillos y cigarros pueden dañar los vasos sanguíneos  Pida información a raymundo médico si usted actualmente fuma y necesita ayuda para dejar de fumar  Los cigarrillos electrónicos o tabaco sin humo todavía contienen nicotina   Consulte con raymundo médico antes de [employer] estos productos  © 2017 tayt [employer]  Information is for End User's use only and may not be sold, redistributed or otherwise used for commercial purposes  All illustrations and images included in CareNotes® are the copyrighted property of A D A M , Inc  or Fermin Guardado  Esta información es sólo para uso en educación  Murphy intención no es darle un consejo médico sobre enfermedades o tratamientos  Colsulte con murphy Chanel Singh farmacéutico antes de seguir cualquier régimen médico para saber si es seguro y efectivo para usted

## 2022-03-18 ENCOUNTER — TELEPHONE (OUTPATIENT)
Dept: FAMILY MEDICINE CLINIC | Facility: CLINIC | Age: 37
End: 2022-03-18

## 2022-03-18 NOTE — TELEPHONE ENCOUNTER
Please let pt know these are not covered by insurance  She can either buy them OTC at [facility], [employer], or [employer] is pretty cheap (5 pairs for about 10$ or so)  OR    I can fax the order to [facility]  She'll have to go in for a fitting and I am unsure of what the cost will be

## 2022-04-05 ENCOUNTER — HOSPITAL ENCOUNTER (EMERGENCY)
Facility: HOSPITAL | Age: 37
Discharge: HOME | End: 2022-04-05
Attending: EMERGENCY MEDICINE | Admitting: EMERGENCY MEDICINE
Payer: COMMERCIAL

## 2022-04-05 VITALS
DIASTOLIC BLOOD PRESSURE: 75 MMHG | WEIGHT: 158.95 LBS | BODY MASS INDEX: 29.07 KG/M2 | TEMPERATURE: 96.4 F | HEART RATE: 72 BPM | RESPIRATION RATE: 20 BRPM | SYSTOLIC BLOOD PRESSURE: 114 MMHG | OXYGEN SATURATION: 99 %

## 2022-04-05 DIAGNOSIS — R25.1 SHAKING: Primary | ICD-10-CM

## 2022-04-05 DIAGNOSIS — R07.89 ATYPICAL CHEST PAIN: ICD-10-CM

## 2022-04-05 DIAGNOSIS — F41.9 ANXIETY: ICD-10-CM

## 2022-04-05 DIAGNOSIS — R42 DIZZINESS: ICD-10-CM

## 2022-04-05 LAB
ALBUMIN SERPL BCP-MCNC: 2.9 G/DL (ref 3–5.2)
ALP SERPL-CCNC: 32 U/L (ref 43–122)
ALT SERPL W P-5'-P-CCNC: 35 U/L
ANION GAP SERPL CALCULATED.3IONS-SCNC: 2 MMOL/L (ref 5–14)
AST SERPL W P-5'-P-CCNC: 36 U/L (ref 14–36)
ATRIAL RATE: 85 BPM
BASOPHILS # BLD AUTO: 0.04 THOUSANDS/ΜL (ref 0–0.1)
BASOPHILS NFR BLD AUTO: 1 % (ref 0–1)
BILIRUB SERPL-MCNC: 1.08 MG/DL
BUN SERPL-MCNC: 11 MG/DL (ref 5–25)
CALCIUM ALBUM COR SERPL-MCNC: 7.3 MG/DL (ref 8.3–10.1)
CALCIUM SERPL-MCNC: 6.4 MG/DL (ref 8.4–10.2)
CARDIAC TROPONIN I PNL SERPL HS: <2 NG/L
CHLORIDE SERPL-SCNC: 116 MMOL/L (ref 97–108)
CO2 SERPL-SCNC: 19 MMOL/L (ref 22–30)
CREAT SERPL-MCNC: 0.52 MG/DL (ref 0.6–1.2)
D DIMER PPP FEU-MCNC: 0.34 UG/ML FEU
EOSINOPHIL # BLD AUTO: 0.12 THOUSAND/ΜL (ref 0–0.61)
EOSINOPHIL NFR BLD AUTO: 2 % (ref 0–6)
ERYTHROCYTE [DISTWIDTH] IN BLOOD BY AUTOMATED COUNT: 12.4 % (ref 11.6–15.1)
EXT PREG TEST URINE: NEGATIVE
EXT. CONTROL ED NAV: NORMAL
GFR SERPL CREATININE-BSD FRML MDRD: 123 ML/MIN/1.73SQ M
GLUCOSE SERPL-MCNC: 72 MG/DL (ref 70–99)
HCT VFR BLD AUTO: 41.4 % (ref 34.8–46.1)
HGB BLD-MCNC: 13.7 G/DL (ref 11.5–15.4)
IMM GRANULOCYTES # BLD AUTO: 0.02 THOUSAND/UL (ref 0–0.2)
IMM GRANULOCYTES NFR BLD AUTO: 0 % (ref 0–2)
LYMPHOCYTES # BLD AUTO: 1.68 THOUSANDS/ΜL (ref 0.6–4.47)
LYMPHOCYTES NFR BLD AUTO: 23 % (ref 14–44)
MCH RBC QN AUTO: 30.4 PG (ref 26.8–34.3)
MCHC RBC AUTO-ENTMCNC: 33.1 G/DL (ref 31.4–37.4)
MCV RBC AUTO: 92 FL (ref 82–98)
MONOCYTES # BLD AUTO: 0.58 THOUSAND/ΜL (ref 0.17–1.22)
MONOCYTES NFR BLD AUTO: 8 % (ref 4–12)
NEUTROPHILS # BLD AUTO: 4.99 THOUSANDS/ΜL (ref 1.85–7.62)
NEUTS SEG NFR BLD AUTO: 66 % (ref 43–75)
NRBC BLD AUTO-RTO: 0 /100 WBCS
P AXIS: 63 DEGREES
PLATELET # BLD AUTO: 259 THOUSANDS/UL (ref 149–390)
PMV BLD AUTO: 11.1 FL (ref 8.9–12.7)
POTASSIUM SERPL-SCNC: 4.7 MMOL/L (ref 3.6–5)
PR INTERVAL: 156 MS
PROT SERPL-MCNC: 5.7 G/DL (ref 5.9–8.4)
QRS AXIS: 51 DEGREES
QRSD INTERVAL: 80 MS
QT INTERVAL: 352 MS
QTC INTERVAL: 418 MS
RBC # BLD AUTO: 4.51 MILLION/UL (ref 3.81–5.12)
SODIUM SERPL-SCNC: 137 MMOL/L (ref 137–147)
T WAVE AXIS: 37 DEGREES
TSH SERPL DL<=0.05 MIU/L-ACNC: 0.09 UIU/ML (ref 0.45–4.5)
VENTRICULAR RATE: 85 BPM
WBC # BLD AUTO: 7.43 THOUSAND/UL (ref 4.31–10.16)

## 2022-04-05 PROCEDURE — 99285 EMERGENCY DEPT VISIT HI MDM: CPT | Performed by: EMERGENCY MEDICINE

## 2022-04-05 PROCEDURE — 96374 THER/PROPH/DIAG INJ IV PUSH: CPT

## 2022-04-05 PROCEDURE — 96361 HYDRATE IV INFUSION ADD-ON: CPT

## 2022-04-05 PROCEDURE — 80053 COMPREHEN METABOLIC PANEL: CPT | Performed by: EMERGENCY MEDICINE

## 2022-04-05 PROCEDURE — 93005 ELECTROCARDIOGRAM TRACING: CPT

## 2022-04-05 PROCEDURE — 85025 COMPLETE CBC W/AUTO DIFF WBC: CPT | Performed by: EMERGENCY MEDICINE

## 2022-04-05 PROCEDURE — 84443 ASSAY THYROID STIM HORMONE: CPT | Performed by: EMERGENCY MEDICINE

## 2022-04-05 PROCEDURE — 93010 ELECTROCARDIOGRAM REPORT: CPT | Performed by: INTERNAL MEDICINE

## 2022-04-05 PROCEDURE — 85379 FIBRIN DEGRADATION QUANT: CPT | Performed by: EMERGENCY MEDICINE

## 2022-04-05 PROCEDURE — 36415 COLL VENOUS BLD VENIPUNCTURE: CPT | Performed by: EMERGENCY MEDICINE

## 2022-04-05 PROCEDURE — 81025 URINE PREGNANCY TEST: CPT | Performed by: EMERGENCY MEDICINE

## 2022-04-05 PROCEDURE — 99284 EMERGENCY DEPT VISIT MOD MDM: CPT

## 2022-04-05 PROCEDURE — 84484 ASSAY OF TROPONIN QUANT: CPT | Performed by: EMERGENCY MEDICINE

## 2022-04-05 PROCEDURE — 96375 TX/PRO/DX INJ NEW DRUG ADDON: CPT

## 2022-04-05 RX ORDER — METOCLOPRAMIDE HYDROCHLORIDE 5 MG/ML
10 INJECTION INTRAMUSCULAR; INTRAVENOUS ONCE
Status: COMPLETED | OUTPATIENT
Start: 2022-04-05 | End: 2022-04-05

## 2022-04-05 RX ORDER — LORAZEPAM 2 MG/ML
1 INJECTION INTRAMUSCULAR ONCE
Status: COMPLETED | OUTPATIENT
Start: 2022-04-05 | End: 2022-04-05

## 2022-04-05 RX ORDER — ACETAMINOPHEN 325 MG/1
975 TABLET ORAL ONCE
Status: COMPLETED | OUTPATIENT
Start: 2022-04-05 | End: 2022-04-05

## 2022-04-05 RX ORDER — ONDANSETRON 2 MG/ML
4 INJECTION INTRAMUSCULAR; INTRAVENOUS ONCE
Status: DISCONTINUED | OUTPATIENT
Start: 2022-04-05 | End: 2022-04-05

## 2022-04-05 RX ORDER — DIPHENHYDRAMINE HYDROCHLORIDE 50 MG/ML
25 INJECTION INTRAMUSCULAR; INTRAVENOUS ONCE
Status: COMPLETED | OUTPATIENT
Start: 2022-04-05 | End: 2022-04-05

## 2022-04-05 RX ADMIN — ACETAMINOPHEN 325MG 975 MG: 325 TABLET ORAL at 18:16

## 2022-04-05 RX ADMIN — METOCLOPRAMIDE 10 MG: 5 INJECTION, SOLUTION INTRAMUSCULAR; INTRAVENOUS at 18:17

## 2022-04-05 RX ADMIN — DIPHENHYDRAMINE HYDROCHLORIDE 25 MG: 50 INJECTION, SOLUTION INTRAMUSCULAR; INTRAVENOUS at 18:17

## 2022-04-05 RX ADMIN — LORAZEPAM 1 MG: 2 INJECTION INTRAMUSCULAR; INTRAVENOUS at 18:17

## 2022-04-05 RX ADMIN — SODIUM CHLORIDE 1000 ML: 0.9 INJECTION, SOLUTION INTRAVENOUS at 17:59

## 2022-04-05 NOTE — ED PROVIDER NOTES
History  Chief Complaint   Patient presents with   Darek Bereket     states she has been feeling shaky, dizzy, nauseous, having palpitations and has been nervous since this morning     39 yo female with a history of migraine headaches and GERD presents to the ED with multiple vague complaints including "feeling shaky", dizziness, nausea, palpitations, anxiety, and chest pain since waking this morning  The patient says she felt "normal" yesterday but then woke up with all these symptoms  (+) Poorly localized anterior chest "tightness", nonradiating and nonexertional  No associated shortness of breath  She denies LE swelling/pain  No cough or hemoptysis  Dizziness is described as "like I might pass out"  No vomiting  No abdominal pain or diarrhea  (+) Vague frontal "throbbing" headache  No visual changes  No neck pain or stiffness  She denies fevers/chills  No identifiable sick contacts  No other specific complaints  Prior to Admission Medications   Prescriptions Last Dose Informant Patient Reported? Taking?    SUMAtriptan (IMITREX) 50 mg tablet   No Yes   Sig: Take 1 tablet (50 mg total) by mouth once as needed for migraine for up to 1 dose   nicotine (NICODERM CQ) 21 mg/24 hr TD 24 hr patch   No No   Sig: Place 1 patch on the skin every 24 hours   pantoprazole (PROTONIX) 20 mg tablet   No Yes   Sig: Take 1 tablet (20 mg total) by mouth daily      Facility-Administered Medications: None       Past Medical History:   Diagnosis Date    Abnormal Pap smear of cervix 2015    cryosurgery    Migraine headache with aura     managed with Elavil       Past Surgical History:   Procedure Laterality Date    CHOLECYSTECTOMY LAPAROSCOPIC N/A 10/27/2020    Procedure: CHOLECYSTECTOMY LAPAROSCOPIC;  Surgeon: Tiara Arriaga MD;  Location: [facility] MAIN OR;  Service: General    GYNECOLOGIC CRYOSURGERY  2015    HYSTERECTOMY  2016    LAPAROSCOPY  2018    CO LAP,DIAGNOSTIC ABDOMEN N/A 8/9/2019    Procedure: LAPAROSCOPY DIAGNOSTIC; LYSIS OF ADHESIONS;  Surgeon: Mustapha Manning MD;  Location: [facility];  Service: Gynecology    IL LAP,LYSIS OF ADHESIONS N/A 3/3/2020    Procedure: L O A ;  Surgeon: Dakota Jones MD;  Location: [facility];  Service: Gynecology    TUBAL LIGATION         Family History   Problem Relation Age of Onset    Cancer Paternal Grandmother         uterine    Cancer Paternal Aunt         uterine     Breast cancer Neg Hx      I have reviewed and agree with the history as documented  E-Cigarette/Vaping    E-Cigarette Use Never User      E-Cigarette/Vaping Substances     Social History     Tobacco Use    Smoking status: Current Every Day Smoker     Packs/day: 0 50     Years: 15 00     Pack years: 7 50     Types: Cigarettes    Smokeless tobacco: Never Used   Vaping Use    Vaping Use: Never used   Substance Use Topics    Alcohol use: Not Currently    Drug use: Never       Review of Systems   Constitutional: Negative for chills and fever  HENT: Negative for sore throat  Eyes: Negative for visual disturbance  Respiratory: Positive for chest tightness  Negative for shortness of breath and wheezing  Cardiovascular: Positive for chest pain and palpitations  Negative for leg swelling  Gastrointestinal: Negative for abdominal pain, diarrhea and vomiting  Endocrine: Negative for cold intolerance and heat intolerance  Genitourinary: Negative for dysuria and frequency  Musculoskeletal: Negative for back pain and neck pain  Skin: Negative for rash  Allergic/Immunologic: Negative for immunocompromised state  Neurological: Positive for dizziness, tremors, light-headedness and headaches  Negative for weakness and numbness  Hematological: Negative for adenopathy  Psychiatric/Behavioral: Negative for self-injury and suicidal ideas  The patient is nervous/anxious  Physical Exam  Physical Exam  Constitutional:       General: She is not in acute distress       Appearance: She is well-developed  HENT:      Head: Normocephalic and atraumatic  Eyes:      Pupils: Pupils are equal, round, and reactive to light  Cardiovascular:      Rate and Rhythm: Normal rate and regular rhythm  Pulmonary:      Effort: Pulmonary effort is normal       Breath sounds: Normal breath sounds  Abdominal:      General: There is no distension  Palpations: Abdomen is soft  Tenderness: There is no abdominal tenderness  Musculoskeletal:         General: Normal range of motion  Cervical back: Normal range of motion and neck supple  Skin:     General: Skin is warm and dry  Neurological:      Mental Status: She is alert and oriented to person, place, and time  Psychiatric:         Attention and Perception: Attention normal          Mood and Affect: Mood is anxious  Speech: Speech normal          Behavior: Behavior is cooperative           Vital Signs  ED Triage Vitals   Temperature Pulse Respirations Blood Pressure SpO2   04/05/22 1802 04/05/22 1802 04/05/22 1802 04/05/22 1802 04/05/22 1802   (!) 96 4 °F (35 8 °C) 98 20 125/74 99 %      Temp Source Heart Rate Source Patient Position - Orthostatic VS BP Location FiO2 (%)   04/05/22 1802 04/05/22 1802 04/05/22 1815 04/05/22 1815 --   Tympanic Monitor Lying Left arm       Pain Score       --                  Vitals:    04/05/22 1802 04/05/22 1815 04/05/22 1830   BP: 125/74 116/68 114/75   Pulse: 98 87 72   Patient Position - Orthostatic VS:  Lying          Visual Acuity      ED Medications  Medications   sodium chloride 0 9 % bolus 1,000 mL (0 mL Intravenous Stopped 4/5/22 2052)   LORazepam (ATIVAN) injection 1 mg (1 mg Intravenous Given 4/5/22 1817)   metoclopramide (REGLAN) injection 10 mg (10 mg Intravenous Given 4/5/22 1817)   diphenhydrAMINE (BENADRYL) injection 25 mg (25 mg Intravenous Given 4/5/22 1817)   acetaminophen (TYLENOL) tablet 975 mg (975 mg Oral Given 4/5/22 1816)       Diagnostic Studies  Results Reviewed     Procedure Component Value Units Date/Time    TSH [[id number]]  (Abnormal) Collected: 04/05/22 1853    Lab Status: Final result Specimen: Blood from Arm, Right Updated: 04/05/22 1947     TSH 3RD GENERATON 0 093 uIU/mL     Narrative:      Patients undergoing fluorescein dye angiography may retain small amounts of fluorescein in the body for 48-72 hours post procedure  Samples containing fluorescein can produce falsely depressed TSH values  If the patient had this procedure,a specimen should be resubmitted post fluorescein clearance        Comprehensive metabolic panel [[id number]]  (Abnormal) Collected: 04/05/22 1853    Lab Status: Final result Specimen: Blood from Arm, Right Updated: 04/05/22 1922     Sodium 137 mmol/L      Potassium 4 7 mmol/L      Chloride 116 mmol/L      CO2 19 mmol/L      ANION GAP 2 mmol/L      BUN 11 mg/dL      Creatinine 0 52 mg/dL      Glucose 72 mg/dL      Calcium 6 4 mg/dL      Corrected Calcium 7 3 mg/dL      AST 36 U/L      ALT 35 U/L      Alkaline Phosphatase 32 U/L      Total Protein 5 7 g/dL      Albumin 2 9 g/dL      Total Bilirubin 1 08 mg/dL      eGFR 123 ml/min/1 73sq m     Narrative:      Hemolysis  National Kidney Disease Foundation guidelines for Chronic Kidney Disease (CKD):     Stage 1 with normal or high GFR (GFR > 90 mL/min/1 73 square meters)    Stage 2 Mild CKD (GFR = 60-89 mL/min/1 73 square meters)    Stage 3A Moderate CKD (GFR = 45-59 mL/min/1 73 square meters)    Stage 3B Moderate CKD (GFR = 30-44 mL/min/1 73 square meters)    Stage 4 Severe CKD (GFR = 15-29 mL/min/1 73 square meters)    Stage 5 End Stage CKD (GFR <15 mL/min/1 73 square meters)  Note: GFR calculation is accurate only with a steady state creatinine    HS Troponin 0hr (reflex protocol) [[id number]]  (Normal) Collected: 04/05/22 1759    Lab Status: Final result Specimen: Blood from Arm, Right Updated: 04/05/22 1834     hs TnI 0hr <2 ng/L     D-Dimer [[id number]]  (Normal) Collected: 04/05/22 1759    Lab Status: Final result Specimen: Blood from Arm, Right Updated: 04/05/22 1820     D-Dimer, Quant 0 34 ug/ml FEU     POCT pregnancy, urine [[id number]]  (Normal) Resulted: 04/05/22 1815    Lab Status: Final result Updated: 04/05/22 1818     EXT PREG TEST UR (Ref: Negative) negative     Control valid    CBC and differential [[id number]] Collected: 04/05/22 1759    Lab Status: Final result Specimen: Blood from Arm, Right Updated: 04/05/22 1807     WBC 7 43 Thousand/uL      RBC 4 51 Million/uL      Hemoglobin 13 7 g/dL      Hematocrit 41 4 %      MCV 92 fL      MCH 30 4 pg      MCHC 33 1 g/dL      RDW 12 4 %      MPV 11 1 fL      Platelets 065 Thousands/uL      nRBC 0 /100 WBCs      Neutrophils Relative 66 %      Immat GRANS % 0 %      Lymphocytes Relative 23 %      Monocytes Relative 8 %      Eosinophils Relative 2 %      Basophils Relative 1 %      Neutrophils Absolute 4 99 Thousands/µL      Immature Grans Absolute 0 02 Thousand/uL      Lymphocytes Absolute 1 68 Thousands/µL      Monocytes Absolute 0 58 Thousand/µL      Eosinophils Absolute 0 12 Thousand/µL      Basophils Absolute 0 04 Thousands/µL                  No orders to display              Procedures  ECG 12 Lead Documentation Only    Date/Time: 4/5/2022 5:47 PM  Performed by: Shelby Lujan MD  Authorized by: Shelby Lujan MD     Indications / Diagnosis:  Palpitations  ECG reviewed by me, the ED Provider: yes    Patient location:  ED  Interpretation:     Interpretation: normal    Rate:     ECG rate:  85 bpm    ECG rate assessment: normal    Rhythm:     Rhythm: sinus rhythm    Ectopy:     Ectopy: none    QRS:     QRS axis:  Normal    QRS intervals:  Normal  Conduction:     Conduction: normal    ST segments:     ST segments:  Normal  T waves:     T waves: normal               ED Course                               SBIRT 22yo+      Most Recent Value   SBIRT (24 yo +)    In order to provide better care to our patients, we are screening all of our patients for alcohol and drug use  Would it be okay to ask you these screening questions? No Filed at: 04/05/2022 1744                    [facility]  Number of Diagnoses or Management Options  Anxiety  Atypical chest pain  Dizziness  Shaking  Diagnosis management comments: The patient is obviously anxious but otherwise well appearing with stable vital signs and a benign physical exam  Unclear etiology of her multiple complaints  Will check EKG, basic labs, troponin, d dimer, and hCG  IVFs, Reglan, and Ativan administered  Will continue to monitor in the ED     20:32 The patient says she feels "so much better" and is requesting discharge  Workup remarkable for a decreased TSH and several minor electrolyte disturbances  Results discussed with the patient  Plan for follow up with her family doctor later this week for reassessment and further workup  She is agreeable to this plan  Strict return precautions provided         Amount and/or Complexity of Data Reviewed  Clinical lab tests: ordered and reviewed  Tests in the radiology section of CPT®: ordered and reviewed  Tests in the medicine section of CPT®: ordered and reviewed    Risk of Complications, Morbidity, and/or Mortality  Presenting problems: moderate  Diagnostic procedures: moderate  Management options: moderate    Patient Progress  Patient progress: improved      Disposition  Final diagnoses:   Shaking   Anxiety   Dizziness   Atypical chest pain     Time reflects when diagnosis was documented in both MDM as applicable and the Disposition within this note     Time User Action Codes Description Comment    4/5/2022  8:34 PM Marletta Grates Add [R25 1] Shaking     4/5/2022  8:34 PM Marletta Grates Add [F41 9] Anxiety     4/5/2022  8:34 PM Marletta Grates Add [R42] Dizziness     4/5/2022  8:34 PM Marletta Grates Add [R07 89] Atypical chest pain       ED Disposition     ED Disposition Condition Date/Time Comment    Discharge Stable Tue Apr 5, 2022  8:32 PM Manuel Luther discharge to home/self care  Follow-up Information     Follow up With Specialties Details Why Contact [facility] Schedule an appointment as soon as possible for a visit   [address]  [address] [facility]  [place] [address]  [phone]            Discharge Medication List as of 4/5/2022  8:35 PM      CONTINUE these medications which have NOT CHANGED    Details   pantoprazole (PROTONIX) 20 mg tablet Take 1 tablet (20 mg total) by mouth daily, Starting Thu 3/17/2022, Until Wed 6/15/2022, Normal      SUMAtriptan (IMITREX) 50 mg tablet Take 1 tablet (50 mg total) by mouth once as needed for migraine for up to 1 dose, Starting Thu 3/17/2022, Normal      nicotine (NICODERM CQ) 21 mg/24 hr TD 24 hr patch Place 1 patch on the skin every 24 hours, Starting Fri 7/30/2021, Normal             No discharge procedures on file      PDMP Review     None          ED Provider  Electronically Signed by           Sabrina Fernandez MD  04/05/22 0274

## 2022-04-05 NOTE — Clinical Note
Denita Bello was seen and treated in our emergency department on 4/5/2022  Diagnosis:     Louann Mercer  may return to school on return date  She may return on this date: 04/06/2022         If you have any questions or concerns, please don't hesitate to call        Felipe Harrington RN    ______________________________           _______________          _______________  Hospital Representative                              Date                                Time

## 2022-04-06 NOTE — DISCHARGE INSTRUCTIONS
Your TSH (thyroid stimulating hormone) was low today  Please follow up with your doctor later this week to discuss this finding  You may need additional testing or medications

## 2022-04-08 ENCOUNTER — OFFICE VISIT (OUTPATIENT)
Dept: FAMILY MEDICINE CLINIC | Facility: CLINIC | Age: 37
End: 2022-04-08

## 2022-04-08 ENCOUNTER — APPOINTMENT (OUTPATIENT)
Dept: LAB | Facility: CLINIC | Age: 37
End: 2022-04-08
Payer: COMMERCIAL

## 2022-04-08 VITALS
HEART RATE: 95 BPM | BODY MASS INDEX: 28.58 KG/M2 | TEMPERATURE: 97.5 F | DIASTOLIC BLOOD PRESSURE: 70 MMHG | WEIGHT: 155.3 LBS | RESPIRATION RATE: 16 BRPM | OXYGEN SATURATION: 97 % | SYSTOLIC BLOOD PRESSURE: 98 MMHG | HEIGHT: 62 IN

## 2022-04-08 DIAGNOSIS — E05.90 HYPERTHYROIDISM: Primary | ICD-10-CM

## 2022-04-08 DIAGNOSIS — E83.51 HYPOCALCEMIA: ICD-10-CM

## 2022-04-08 DIAGNOSIS — E05.90 HYPERTHYROIDISM: ICD-10-CM

## 2022-04-08 LAB
25(OH)D3 SERPL-MCNC: 12.4 NG/ML (ref 30–100)
ANION GAP SERPL CALCULATED.3IONS-SCNC: 3 MMOL/L (ref 4–13)
BUN SERPL-MCNC: 14 MG/DL (ref 5–25)
CALCIUM SERPL-MCNC: 9.1 MG/DL (ref 8.3–10.1)
CHLORIDE SERPL-SCNC: 110 MMOL/L (ref 100–108)
CO2 SERPL-SCNC: 26 MMOL/L (ref 21–32)
CREAT SERPL-MCNC: 0.89 MG/DL (ref 0.6–1.3)
GFR SERPL CREATININE-BSD FRML MDRD: 83 ML/MIN/1.73SQ M
GLUCOSE P FAST SERPL-MCNC: 96 MG/DL (ref 65–99)
POTASSIUM SERPL-SCNC: 3.9 MMOL/L (ref 3.5–5.3)
SODIUM SERPL-SCNC: 139 MMOL/L (ref 136–145)
T3FREE SERPL-MCNC: 2.5 PG/ML (ref 2.3–4.2)
TSH SERPL DL<=0.05 MIU/L-ACNC: 1.47 UIU/ML (ref 0.45–4.5)

## 2022-04-08 PROCEDURE — 82306 VITAMIN D 25 HYDROXY: CPT

## 2022-04-08 PROCEDURE — 86376 MICROSOMAL ANTIBODY EACH: CPT

## 2022-04-08 PROCEDURE — 84443 ASSAY THYROID STIM HORMONE: CPT

## 2022-04-08 PROCEDURE — 36415 COLL VENOUS BLD VENIPUNCTURE: CPT

## 2022-04-08 PROCEDURE — 80048 BASIC METABOLIC PNL TOTAL CA: CPT

## 2022-04-08 PROCEDURE — 99214 OFFICE O/P EST MOD 30 MIN: CPT | Performed by: NURSE PRACTITIONER

## 2022-04-08 PROCEDURE — 84481 FREE ASSAY (FT-3): CPT

## 2022-04-08 NOTE — PATIENT INSTRUCTIONS
Hyperthyroidism   WHAT YOU NEED TO KNOW:   Hyperthyroidism is a condition that develops when your thyroid hormone levels are high  Thyroid hormones help control body temperature, heart rate, growth, and weight  DISCHARGE INSTRUCTIONS:   Call 911 for any of the following:   · You have sudden chest pain or shortness of breath  · You have a seizure  · Your heart is beating faster than usual     · You feel like you are going to faint  Contact your healthcare provider if:   · You have a fever  · You feel nervous and restless  · You have chills, a cough, or feel weak and achy  · You run out of medicine or have stopped taking it  · You have questions or concerns about your condition or care  Medicines:   · Antithyroid medicines  decrease thyroid hormone levels and your symptoms  · Take your medicine as directed  Contact your healthcare provider if you think your medicine is not helping or if you have side effects  Tell him or her if you are allergic to any medicine  Keep a list of the medicines, vitamins, and herbs you take  Include the amounts, and when and why you take them  Bring the list or the pill bottles to follow-up visits  Carry your medicine list with you in case of an emergency  Rest as directed: You many need to avoid stressful and heavy physical activities  Slowly start to do more each day  Return to your daily activities as directed  Nutrition:  You may need to eat more to give your body the extra energy it needs  Foods high in protein will help prevent weight loss  Ask your healthcare provider which foods are best for you  Follow up with your healthcare provider as directed: You may need to return for more blood tests to check your thyroid hormone level  This will show if you are getting the right amount of medicine   Do not stop taking your medicines without talking to your healthcare provider first  Write down your questions so you remember to ask them during your visits  © Copyright [employer] 2022 Information is for End User's use only and may not be sold, redistributed or otherwise used for commercial purposes  All illustrations and images included in CareNotes® are the copyrighted property of A D A M , Inc  or Alexey Jimenez  The above information is an  only  It is not intended as medical advice for individual conditions or treatments  Talk to your doctor, nurse or pharmacist before following any medical regimen to see if it is safe and effective for you

## 2022-04-08 NOTE — PROGRESS NOTES
Assessment/Plan:    Yair Cardenas was seen today for follow-up  Diagnoses and all orders for this visit:    Hyperthyroidism  -     TSH, 3rd generation with Free T4 reflex; Future  -     T3, free; Future  -     Anti-microsomal antibody; Future  -     Ambulatory Referral to Endocrinology; Future  -     Basic metabolic panel; Future  -     US thyroid; Future  -     Vitamin D 25 hydroxy; Future    Hypocalcemia  -     Vitamin D 25 hydroxy; Future    defer starting beta blocker for sx given soft BP     Return in about 4 weeks (around 5/6/2022) for hyperthyroid w/ PCP   Patient Instructions     Hyperthyroidism   WHAT YOU NEED TO KNOW:   Hyperthyroidism is a condition that develops when your thyroid hormone levels are high  Thyroid hormones help control body temperature, heart rate, growth, and weight  DISCHARGE INSTRUCTIONS:   Call 911 for any of the following:   · You have sudden chest pain or shortness of breath  · You have a seizure  · Your heart is beating faster than usual     · You feel like you are going to faint  Contact your healthcare provider if:   · You have a fever  · You feel nervous and restless  · You have chills, a cough, or feel weak and achy  · You run out of medicine or have stopped taking it  · You have questions or concerns about your condition or care  Medicines:   · Antithyroid medicines  decrease thyroid hormone levels and your symptoms  · Take your medicine as directed  Contact your healthcare provider if you think your medicine is not helping or if you have side effects  Tell him or her if you are allergic to any medicine  Keep a list of the medicines, vitamins, and herbs you take  Include the amounts, and when and why you take them  Bring the list or the pill bottles to follow-up visits  Carry your medicine list with you in case of an emergency  Rest as directed: You many need to avoid stressful and heavy physical activities   Slowly start to do more each day  Return to your daily activities as directed  Nutrition:  You may need to eat more to give your body the extra energy it needs  Foods high in protein will help prevent weight loss  Ask your healthcare provider which foods are best for you  Follow up with your healthcare provider as directed: You may need to return for more blood tests to check your thyroid hormone level  This will show if you are getting the right amount of medicine  Do not stop taking your medicines without talking to your healthcare provider first  Write down your questions so you remember to ask them during your visits  © Copyright [employer] 2022 Information is for End User's use only and may not be sold, redistributed or otherwise used for commercial purposes  All illustrations and images included in CareNotes® are the copyrighted property of A D A M , Inc  or [facility] Shen Agosto   The above information is an  only  It is not intended as medical advice for individual conditions or treatments  Talk to your doctor, nurse or pharmacist before following any medical regimen to see if it is safe and effective for you  Subjective:     Cat Merida is a 40 y o  female who  has a past medical history of Abnormal Pap smear of cervix and Migraine headache with aura  who presented to the office today for ED follow up  Was seen in the ED on 4/5/2022 due to feeling shaky, dizzy  Had labs that showed decreased TSH  Today reports shaky hands and sore throat       The following portions of the patient's history were reviewed and updated as appropriate: allergies, current medications, past family history, past medical history, past social history, past surgical history and problem list     Current Outpatient Medications on File Prior to Visit   Medication Sig Dispense Refill    nicotine (NICODERM CQ) 21 mg/24 hr TD 24 hr patch Place 1 patch on the skin every 24 hours 28 patch 2    pantoprazole (PROTONIX) 20 mg tablet Take 1 tablet (20 mg total) by mouth daily 90 tablet 1    SUMAtriptan (IMITREX) 50 mg tablet Take 1 tablet (50 mg total) by mouth once as needed for migraine for up to 1 dose 9 tablet 3     No current facility-administered medications on file prior to visit  Review of Systems   Constitutional: Negative for chills and fever  HENT: Positive for sore throat  Negative for ear pain  Eyes: Negative for pain and visual disturbance  Respiratory: Negative for cough and shortness of breath  Cardiovascular: Negative for chest pain and palpitations  Gastrointestinal: Negative for abdominal pain and vomiting  Genitourinary: Negative for dysuria and hematuria  Musculoskeletal: Negative for arthralgias and back pain  Skin: Negative for color change and rash  Neurological: Positive for tremors  Negative for seizures and syncope  All other systems reviewed and are negative  Objective:    BP 98/70 (BP Location: Left arm, Patient Position: Sitting, Cuff Size: Standard)   Pulse 95   Temp 97 5 °F (36 4 °C) (Temporal)   Resp 16   Ht 5' 2" (1 575 m)   Wt 70 4 kg (155 lb 4 8 oz)   SpO2 97%   BMI 28 40 kg/m²     Physical Exam  Vitals and nursing note reviewed  Constitutional:       General: She is not in acute distress  Appearance: She is well-developed  She is not diaphoretic  HENT:      Head: Normocephalic and atraumatic  Right Ear: External ear normal       Left Ear: External ear normal    Eyes:      General:         Right eye: No discharge  Left eye: No discharge  Conjunctiva/sclera: Conjunctivae normal       Pupils: Pupils are equal, round, and reactive to light  Cardiovascular:      Rate and Rhythm: Normal rate and regular rhythm  Heart sounds: Normal heart sounds  Pulmonary:      Effort: Pulmonary effort is normal  No respiratory distress  Breath sounds: Normal breath sounds  No wheezing     Abdominal:      General: Bowel sounds are normal  There is no distension  Palpations: Abdomen is soft  Tenderness: There is no abdominal tenderness  There is no guarding or rebound  Musculoskeletal:         General: No deformity  Normal range of motion  Cervical back: Normal range of motion and neck supple  Lymphadenopathy:      Cervical: No cervical adenopathy  Skin:     General: Skin is warm and dry  Capillary Refill: Capillary refill takes less than 2 seconds  Findings: No rash  Neurological:      Mental Status: She is alert and oriented to person, place, and time  Sensory: No sensory deficit        Coordination: Coordination normal       Deep Tendon Reflexes: Reflexes normal    Psychiatric:         Behavior: Behavior normal          UMER Patel  04/08/22  12:39 PM

## 2022-04-08 NOTE — LETTER
April 8, 2022     Patient: Cat Merida   YOB: 1985   Date of Visit: 4/8/2022       To Whom it May Concern:    Cat Merida was seen in my clinic on 4/8/2022  She may return to work on 4/11/2022  If you have any questions or concerns, please don't hesitate to call           Sincerely,          UMER Calderon        CC: No Recipients

## 2022-04-09 LAB — THYROPEROXIDASE AB SERPL-ACNC: 10 IU/ML (ref 0–34)

## 2022-04-10 ENCOUNTER — HOSPITAL ENCOUNTER (OUTPATIENT)
Dept: ULTRASOUND IMAGING | Facility: HOSPITAL | Age: 37
Discharge: HOME | End: 2022-04-10
Payer: COMMERCIAL

## 2022-04-10 DIAGNOSIS — E05.90 HYPERTHYROIDISM: ICD-10-CM

## 2022-04-10 PROCEDURE — 76536 US EXAM OF HEAD AND NECK: CPT

## 2022-04-11 ENCOUNTER — HOSPITAL ENCOUNTER (EMERGENCY)
Facility: HOSPITAL | Age: 37
Discharge: HOME | End: 2022-04-11
Attending: EMERGENCY MEDICINE | Admitting: EMERGENCY MEDICINE
Payer: COMMERCIAL

## 2022-04-11 VITALS
HEART RATE: 104 BPM | WEIGHT: 158.95 LBS | TEMPERATURE: 98.2 F | BODY MASS INDEX: 29.07 KG/M2 | DIASTOLIC BLOOD PRESSURE: 65 MMHG | OXYGEN SATURATION: 98 % | SYSTOLIC BLOOD PRESSURE: 106 MMHG | RESPIRATION RATE: 16 BRPM

## 2022-04-11 DIAGNOSIS — B34.9 VIRAL SYNDROME: Primary | ICD-10-CM

## 2022-04-11 LAB
ATRIAL RATE: 91 BPM
GLUCOSE SERPL-MCNC: 101 MG/DL (ref 65–140)
P AXIS: 56 DEGREES
PR INTERVAL: 164 MS
QRS AXIS: 51 DEGREES
QRSD INTERVAL: 80 MS
QT INTERVAL: 352 MS
QTC INTERVAL: 432 MS
T WAVE AXIS: 31 DEGREES
VENTRICULAR RATE: 91 BPM

## 2022-04-11 PROCEDURE — U0003 INFECTIOUS AGENT DETECTION BY NUCLEIC ACID (DNA OR RNA); SEVERE ACUTE RESPIRATORY SYNDROME CORONAVIRUS 2 (SARS-COV-2) (CORONAVIRUS DISEASE [COVID-19]), AMPLIFIED PROBE TECHNIQUE, MAKING USE OF HIGH THROUGHPUT TECHNOLOGIES AS DESCRIBED BY CMS-2020-01-R: HCPCS | Performed by: EMERGENCY MEDICINE

## 2022-04-11 PROCEDURE — 93010 ELECTROCARDIOGRAM REPORT: CPT | Performed by: INTERNAL MEDICINE

## 2022-04-11 PROCEDURE — U0005 INFEC AGEN DETEC AMPLI PROBE: HCPCS | Performed by: EMERGENCY MEDICINE

## 2022-04-11 PROCEDURE — 99283 EMERGENCY DEPT VISIT LOW MDM: CPT

## 2022-04-11 PROCEDURE — 99285 EMERGENCY DEPT VISIT HI MDM: CPT | Performed by: EMERGENCY MEDICINE

## 2022-04-11 PROCEDURE — 93005 ELECTROCARDIOGRAM TRACING: CPT

## 2022-04-11 PROCEDURE — 82948 REAGENT STRIP/BLOOD GLUCOSE: CPT

## 2022-04-11 RX ORDER — PSEUDOEPHEDRINE HYDROCHLORIDE 30 MG/1
30 TABLET ORAL EVERY 8 HOURS PRN
Qty: 20 TABLET | Refills: 0 | Status: SHIPPED | OUTPATIENT
Start: 2022-04-11

## 2022-04-11 NOTE — ED PROVIDER NOTES
History  Chief Complaint   Patient presents with    Headache     Sinsus congestion and sinsus headache since last Monday; states has been dizzy this whole week; has had cough, runny nose and ear pain with chills  Work is requesting hospital based covid testing  States took one at home that was negative  Patient is a 73-year-old female who presents with a 1 week history of URI/sinus issues  Took a home COVID test, negative  Work is not accepting results, requesting hospital based result  Patient with congestion, myalgia, headache and intermittent dizziness  accucheck in triage wnl  No n/v/d  No cough  Prior to Admission Medications   Prescriptions Last Dose Informant Patient Reported? Taking?    SUMAtriptan (IMITREX) 50 mg tablet   No No   Sig: Take 1 tablet (50 mg total) by mouth once as needed for migraine for up to 1 dose   ergocalciferol (VITAMIN D2) 50,000 units   No No   Sig: Take 1 capsule (50,000 Units total) by mouth once a week   nicotine (NICODERM CQ) 21 mg/24 hr TD 24 hr patch   No No   Sig: Place 1 patch on the skin every 24 hours   pantoprazole (PROTONIX) 20 mg tablet   No No   Sig: Take 1 tablet (20 mg total) by mouth daily      Facility-Administered Medications: None       Past Medical History:   Diagnosis Date    Abnormal Pap smear of cervix 2015    cryosurgery    Migraine headache with aura     managed with Elavil       Past Surgical History:   Procedure Laterality Date    CHOLECYSTECTOMY LAPAROSCOPIC N/A 10/27/2020    Procedure: CHOLECYSTECTOMY LAPAROSCOPIC;  Surgeon: Vickie Downey MD;  Location: [address] [place] [address] OR;  Service: General    GYNECOLOGIC CRYOSURGERY  2015    HYSTERECTOMY  2016    LAPAROSCOPY  2018    [place] LAP,DIAGNOSTIC ABDOMEN N/A 8/9/2019    Procedure: LAPAROSCOPY DIAGNOSTIC; LYSIS OF ADHESIONS;  Surgeon: Nany Benitez MD;  Location: [facility] OR;  Service: Gynecology    [place] LAP,LYSIS OF ADHESIONS N/A 3/3/2020    Procedure: L O A ;  Surgeon: Ritchie Benz MD Brandi;  Location: [facility] OR;  Service: Gynecology    TUBAL LIGATION         Family History   Problem Relation Age of Onset    Cancer Paternal Grandmother         uterine    Cancer Paternal Aunt         uterine     Breast cancer Neg Hx      I have reviewed and agree with the history as documented  E-Cigarette/Vaping    E-Cigarette Use Never User      E-Cigarette/Vaping Substances     Social History     Tobacco Use    Smoking status: Current Every Day Smoker     Packs/day: 0 50     Years: 15 00     Pack years: 7 50     Types: Cigarettes    Smokeless tobacco: Never Used   Vaping Use    Vaping Use: Never used   Substance Use Topics    Alcohol use: Not Currently    Drug use: Never       Review of Systems   Constitutional: Positive for appetite change  Negative for fever  HENT: Positive for congestion  Eyes: Negative  Respiratory: Negative  Cardiovascular: Negative  Negative for chest pain  Gastrointestinal: Negative  Endocrine: Negative  Genitourinary: Negative  Musculoskeletal: Positive for myalgias  Skin: Negative  Allergic/Immunologic: Negative  Neurological: Positive for dizziness  Hematological: Negative  Psychiatric/Behavioral: Negative  All other systems reviewed and are negative  Physical Exam  Physical Exam  Vitals and nursing note reviewed  Constitutional:       Appearance: Normal appearance  She is normal weight  HENT:      Head: Normocephalic and atraumatic  Right Ear: Tympanic membrane, ear canal and external ear normal       Left Ear: Tympanic membrane, ear canal and external ear normal       Nose: Congestion and rhinorrhea present  Mouth/Throat:      Mouth: Mucous membranes are moist    Eyes:      Conjunctiva/sclera: Conjunctivae normal       Pupils: Pupils are equal, round, and reactive to light  Cardiovascular:      Rate and Rhythm: Normal rate and regular rhythm  Pulses: Normal pulses        Heart sounds: Normal heart sounds  Pulmonary:      Effort: Pulmonary effort is normal       Breath sounds: Normal breath sounds  Abdominal:      General: Bowel sounds are normal       Palpations: Abdomen is soft  Musculoskeletal:         General: Normal range of motion  Cervical back: Normal range of motion  Skin:     General: Skin is warm and dry  Capillary Refill: Capillary refill takes less than 2 seconds  Neurological:      General: No focal deficit present  Mental Status: She is alert and oriented to person, place, and time  Psychiatric:         Mood and Affect: Mood normal          Behavior: Behavior normal          Vital Signs  ED Triage Vitals [04/11/22 1716]   Temperature Pulse Respirations Blood Pressure SpO2   98 2 °F (36 8 °C) 104 16 106/65 98 %      Temp Source Heart Rate Source Patient Position - Orthostatic VS BP Location FiO2 (%)   Oral Monitor Sitting Left arm --      Pain Score       6           Vitals:    04/11/22 1716   BP: 106/65   Pulse: 104   Patient Position - Orthostatic VS: Sitting         Visual Acuity      ED Medications  Medications - No data to display    Diagnostic Studies  Results Reviewed     Procedure Component Value Units Date/Time    COVID only [[id number]] Collected: 04/11/22 1740    Lab Status:  In process Specimen: Nares from Nasopharyngeal Swab Updated: 04/11/22 1743    Fingerstick Glucose (POCT) [[id number]]  (Normal) Collected: 04/11/22 1722    Lab Status: Final result Updated: 04/11/22 1723     POC Glucose 101 mg/dl                  No orders to display              Procedures  Procedures         ED Course                                             MDM  Number of Diagnoses or Management Options     Amount and/or Complexity of Data Reviewed  Clinical lab tests: ordered  Tests in the medicine section of CPT®: ordered and reviewed  Obtain history from someone other than the patient: yes  Review and summarize past medical records: yes        Disposition  Final diagnoses: Viral syndrome     Time reflects when diagnosis was documented in both MDM as applicable and the Disposition within this note     Time User Action Codes Description Comment    4/11/2022  5:44 PM Adrienne Ghotra Add [B34 9] Viral syndrome       ED Disposition     ED Disposition Condition Date/Time Comment    Discharge Stable Mon Apr 11, 2022  5:43 PM Swapnil Garcia discharge to home/self care  Follow-up Information     Follow up With Specialties Details Why [address] [place] [address]             Patient's Medications   Discharge Prescriptions    PSEUDOEPHEDRINE (SUDAFED) 30 MG TABLET    Take 1 tablet (30 mg total) by mouth every 8 (eight) hours as needed for congestion       Start Date: 4/11/2022 End Date: --       Order Dose: 30 mg       Quantity: 20 tablet    Refills: 0       No discharge procedures on file      PDMP Review     None          ED Provider  Electronically Signed by           Kp Orozco MD  04/11/22 6816

## 2022-04-11 NOTE — ED PROCEDURE NOTE
PROCEDURE  ECG 12 Lead Documentation Only    Date/Time: 4/11/2022 5:48 PM  Performed by: Dae Mcpherson MD  Authorized by: Dae Mcpherson MD     Indications / Diagnosis:  Dizziness  ECG reviewed by me, the ED Provider: yes    Patient location:  ED  Previous ECG:     Comparison to cardiac monitor: Yes    Interpretation:     Interpretation: normal    Rate:     ECG rate:  91    ECG rate assessment: normal    Rhythm:     Rhythm: sinus rhythm    Ectopy:     Ectopy: none    QRS:     QRS axis:  Normal    QRS intervals:  Normal  Conduction:     Conduction: normal    ST segments:     ST segments:  Normal  T waves:     T waves: normal           Dae Mcpherson MD  04/11/22 1748

## 2022-04-12 LAB — SARS-COV-2 RNA RESP QL NAA+PROBE: NEGATIVE

## 2022-04-13 DIAGNOSIS — G43.109 MIGRAINE WITH AURA AND WITHOUT STATUS MIGRAINOSUS, NOT INTRACTABLE: ICD-10-CM

## 2022-04-13 RX ORDER — SUMATRIPTAN 50 MG/1
50 TABLET, FILM COATED ORAL ONCE AS NEEDED
Qty: 9 TABLET | Refills: 0 | Status: SHIPPED | OUTPATIENT
Start: 2022-04-13 | End: 2022-06-05 | Stop reason: SDUPTHER

## 2022-04-22 ENCOUNTER — TELEPHONE (OUTPATIENT)
Dept: PSYCHIATRY | Facility: CLINIC | Age: 37
End: 2022-04-22

## 2022-04-27 DIAGNOSIS — E55.9 VITAMIN D DEFICIENCY: ICD-10-CM

## 2022-04-27 RX ORDER — ERGOCALCIFEROL 1.25 MG/1
50000 CAPSULE ORAL WEEKLY
Qty: 16 CAPSULE | Refills: 0 | Status: SHIPPED | OUTPATIENT
Start: 2022-04-27

## 2022-05-16 DIAGNOSIS — K21.9 GASTROESOPHAGEAL REFLUX DISEASE WITHOUT ESOPHAGITIS: ICD-10-CM

## 2022-05-18 RX ORDER — PANTOPRAZOLE SODIUM 20 MG/1
20 TABLET, DELAYED RELEASE ORAL DAILY
Qty: 90 TABLET | Refills: 0 | Status: SHIPPED | OUTPATIENT
Start: 2022-05-18 | End: 2022-08-16

## 2022-05-24 ENCOUNTER — OFFICE VISIT (OUTPATIENT)
Dept: OBGYN CLINIC | Facility: CLINIC | Age: 37
End: 2022-05-24

## 2022-05-24 VITALS
HEART RATE: 93 BPM | HEIGHT: 62 IN | BODY MASS INDEX: 28.63 KG/M2 | DIASTOLIC BLOOD PRESSURE: 76 MMHG | WEIGHT: 155.6 LBS | SYSTOLIC BLOOD PRESSURE: 111 MMHG

## 2022-05-24 DIAGNOSIS — Z20.2 POSSIBLE EXPOSURE TO STD: ICD-10-CM

## 2022-05-24 DIAGNOSIS — N89.8 VAGINA ITCHING: Primary | ICD-10-CM

## 2022-05-24 PROCEDURE — 87591 N.GONORRHOEAE DNA AMP PROB: CPT | Performed by: OBSTETRICS & GYNECOLOGY

## 2022-05-24 PROCEDURE — 99213 OFFICE O/P EST LOW 20 MIN: CPT | Performed by: OBSTETRICS & GYNECOLOGY

## 2022-05-24 PROCEDURE — 87491 CHLMYD TRACH DNA AMP PROBE: CPT | Performed by: OBSTETRICS & GYNECOLOGY

## 2022-05-24 RX ORDER — FLUCONAZOLE 150 MG/1
150 TABLET ORAL ONCE
Qty: 1 TABLET | Refills: 1 | Status: SHIPPED | OUTPATIENT
Start: 2022-05-24 | End: 2022-05-24

## 2022-05-24 NOTE — PROGRESS NOTES
Assessment/Plan:     No problem-specific Assessment & Plan notes found for this encounter  Diagnoses and all orders for this visit:    Vagina itching  -     fluconazole (DIFLUCAN) 150 mg tablet; Take 1 tablet (150 mg total) by mouth once for 1 dose    Possible exposure to STD  -     Chlamydia/GC amplified DNA by PCR    RTO prn or for annual           Subjective:      Patient ID: Jona Cabrera is a 40 y o  female with chief complaint of vaginal pruritis that started 2-3 days ago  She denies pruritis at the moment  She denies change in vaginal odor, discharge, and bleeding  STD testing last year was negative  Patient has not used new products, including lotions and detergents  She has not tried any topical treatments to alleviate the pruritis  Patient currently has one male sex partner who does not report genital itching and discharge  No protection is used during sexual activity  Patient is currently unable to have sex because of the pruritis  She is interested in STD testing  HPI    The following portions of the patient's history were reviewed and updated as appropriate: allergies, current medications, past family history, past medical history, past social history, past surgical history and problem list     Review of Systems      Objective:      /76   Pulse 93   Ht 5' 2" (1 575 m)   Wt 70 6 kg (155 lb 9 6 oz)   BMI 28 46 kg/m²          Physical Exam  Vitals and nursing note reviewed  Constitutional:       Appearance: Normal appearance  Pulmonary:      Effort: Pulmonary effort is normal    Genitourinary:     Vagina: Normal       Comments: Slight erythema  Neurological:      General: No focal deficit present  Mental Status: She is alert and oriented to person, place, and time     Psychiatric:         Mood and Affect: Mood normal          Behavior: Behavior normal

## 2022-05-27 LAB
C TRACH DNA SPEC QL NAA+PROBE: NEGATIVE
N GONORRHOEA DNA SPEC QL NAA+PROBE: NEGATIVE

## 2022-06-01 ENCOUNTER — HOSPITAL ENCOUNTER (EMERGENCY)
Facility: HOSPITAL | Age: 37
Discharge: HOME | End: 2022-06-01
Attending: EMERGENCY MEDICINE
Payer: COMMERCIAL

## 2022-06-01 VITALS
OXYGEN SATURATION: 98 % | RESPIRATION RATE: 18 BRPM | BODY MASS INDEX: 28.35 KG/M2 | SYSTOLIC BLOOD PRESSURE: 130 MMHG | DIASTOLIC BLOOD PRESSURE: 63 MMHG | TEMPERATURE: 99.7 F | HEART RATE: 124 BPM | WEIGHT: 154.98 LBS

## 2022-06-01 DIAGNOSIS — J06.9 VIRAL URI WITH COUGH: Primary | ICD-10-CM

## 2022-06-01 PROCEDURE — 96372 THER/PROPH/DIAG INJ SC/IM: CPT

## 2022-06-01 PROCEDURE — 87636 SARSCOV2 & INF A&B AMP PRB: CPT | Performed by: PHYSICIAN ASSISTANT

## 2022-06-01 PROCEDURE — 99283 EMERGENCY DEPT VISIT LOW MDM: CPT

## 2022-06-01 PROCEDURE — 99284 EMERGENCY DEPT VISIT MOD MDM: CPT | Performed by: PHYSICIAN ASSISTANT

## 2022-06-01 RX ORDER — ACETAMINOPHEN 500 MG
500 TABLET ORAL EVERY 6 HOURS PRN
Qty: 30 TABLET | Refills: 0 | Status: SHIPPED | OUTPATIENT
Start: 2022-06-01 | End: 2022-06-02 | Stop reason: SDUPTHER

## 2022-06-01 RX ORDER — KETOROLAC TROMETHAMINE 30 MG/ML
15 INJECTION, SOLUTION INTRAMUSCULAR; INTRAVENOUS ONCE
Status: COMPLETED | OUTPATIENT
Start: 2022-06-01 | End: 2022-06-01

## 2022-06-01 RX ORDER — FLUTICASONE PROPIONATE 50 MCG
1 SPRAY, SUSPENSION (ML) NASAL DAILY
Qty: 16 G | Refills: 0 | Status: SHIPPED | OUTPATIENT
Start: 2022-06-01

## 2022-06-01 RX ORDER — GUAIFENESIN/DEXTROMETHORPHAN 100-10MG/5
5 SYRUP ORAL 3 TIMES DAILY PRN
Qty: 118 ML | Refills: 0 | Status: SHIPPED | OUTPATIENT
Start: 2022-06-01 | End: 2022-06-11

## 2022-06-01 RX ORDER — ONDANSETRON 4 MG/1
4 TABLET, ORALLY DISINTEGRATING ORAL ONCE
Status: COMPLETED | OUTPATIENT
Start: 2022-06-01 | End: 2022-06-01

## 2022-06-01 RX ORDER — ONDANSETRON 4 MG/1
4 TABLET, ORALLY DISINTEGRATING ORAL EVERY 8 HOURS PRN
Qty: 20 TABLET | Refills: 0 | Status: SHIPPED | OUTPATIENT
Start: 2022-06-01 | End: 2022-06-11

## 2022-06-01 RX ORDER — ALBUTEROL SULFATE 90 UG/1
2 AEROSOL, METERED RESPIRATORY (INHALATION) EVERY 4 HOURS PRN
Qty: 6.7 G | Refills: 0 | Status: SHIPPED | OUTPATIENT
Start: 2022-06-01

## 2022-06-01 RX ADMIN — KETOROLAC TROMETHAMINE 15 MG: 30 INJECTION, SOLUTION INTRAMUSCULAR; INTRAVENOUS at 20:29

## 2022-06-01 RX ADMIN — ONDANSETRON 4 MG: 4 TABLET, ORALLY DISINTEGRATING ORAL at 20:29

## 2022-06-02 ENCOUNTER — TELEMEDICINE (OUTPATIENT)
Dept: FAMILY MEDICINE CLINIC | Facility: CLINIC | Age: 37
End: 2022-06-02

## 2022-06-02 VITALS — RESPIRATION RATE: 22 BRPM | HEART RATE: 89 BPM | TEMPERATURE: 97.6 F

## 2022-06-02 DIAGNOSIS — U07.1 COVID: Primary | ICD-10-CM

## 2022-06-02 LAB
FLUAV RNA RESP QL NAA+PROBE: NEGATIVE
FLUBV RNA RESP QL NAA+PROBE: NEGATIVE
SARS-COV-2 RNA RESP QL NAA+PROBE: POSITIVE

## 2022-06-02 PROCEDURE — 99213 OFFICE O/P EST LOW 20 MIN: CPT | Performed by: INTERNAL MEDICINE

## 2022-06-02 RX ORDER — MECLIZINE HCL 12.5 MG/1
12.5 TABLET ORAL 3 TIMES DAILY PRN
Qty: 30 TABLET | Refills: 0 | Status: SHIPPED | OUTPATIENT
Start: 2022-06-02

## 2022-06-02 RX ORDER — ACETAMINOPHEN 500 MG
1000 TABLET ORAL EVERY 8 HOURS PRN
Qty: 30 TABLET | Refills: 0 | Status: SHIPPED | OUTPATIENT
Start: 2022-06-02

## 2022-06-02 RX ORDER — ACETAMINOPHEN 500 MG
1000 TABLET ORAL EVERY 8 HOURS PRN
Qty: 30 TABLET | Refills: 0 | Status: SHIPPED | OUTPATIENT
Start: 2022-06-02 | End: 2022-06-02

## 2022-06-02 RX ORDER — MECLIZINE HCL 12.5 MG/1
12.5 TABLET ORAL 3 TIMES DAILY PRN
Qty: 30 TABLET | Refills: 0 | Status: SHIPPED | OUTPATIENT
Start: 2022-06-02 | End: 2022-06-02

## 2022-06-02 NOTE — ED PROVIDER NOTES
History  Chief Complaint   Patient presents with   Jennifer Latus Like Symptoms     Patient having fever, body aches,SOB, headache, vomiting since yesterday     Patient is a 78-year-old female reports no significant past medical conditions and presents today for evaluation of coughing, congestion, fevers, chills, body aches, nausea, vomiting  Patient denies any abdominal pain  Patient reports no dyspnea, palpitations  Patient reports she is vaccinated for COVID in the flu however would like a test today  Patient reports she has only taken Tylenol to alleviate her symptoms and reports no significant improvement home  History provided by:  Patient   used: No    Flu Symptoms  Presenting symptoms: cough, fatigue, fever, nausea, rhinorrhea and vomiting    Presenting symptoms: no shortness of breath and no sore throat    Fatigue:     Severity:  Moderate    Timing:  Constant    Progression:  Unchanged  Severity:  Mild  Onset quality:  Gradual  Progression:  Worsening  Chronicity:  New  Relieved by:  None tried  Worsened by:  Nothing  Associated symptoms: chills and nasal congestion    Associated symptoms: no ear pain        Prior to Admission Medications   Prescriptions Last Dose Informant Patient Reported? Taking? SUMAtriptan (IMITREX) 50 mg tablet   No No   Sig: Take 1 tablet (50 mg total) by mouth once as needed for migraine for up to 1 dose   ergocalciferol (VITAMIN D2) 50,000 units   No No   Sig: Take 1 capsule (50,000 Units total) by mouth once a week   Patient not taking: Reported on 5/24/2022   nicotine (NICODERM CQ) 21 mg/24 hr TD 24 hr patch   No No   Sig: Place 1 patch on the skin every 24 hours   pantoprazole (PROTONIX) 20 mg tablet   No No   Sig: Take 1 tablet (20 mg total) by mouth in the morning     pseudoephedrine (SUDAFED) 30 mg tablet   No No   Sig: Take 1 tablet (30 mg total) by mouth every 8 (eight) hours as needed for congestion   Patient not taking: Reported on 5/24/2022 Facility-Administered Medications: None       Past Medical History:   Diagnosis Date    Abnormal Pap smear of cervix 2015    cryosurgery    Migraine headache with aura     managed with Elavil       Past Surgical History:   Procedure Laterality Date    CHOLECYSTECTOMY LAPAROSCOPIC N/A 10/27/2020    Procedure: CHOLECYSTECTOMY LAPAROSCOPIC;  Surgeon: Tiara Arriaga MD;  Location: [address] [place] [address] MAIN OR;  Service: General    GYNECOLOGIC CRYOSURGERY  2015    HYSTERECTOMY  2016    LAPAROSCOPY  2018    [place] LAP,DIAGNOSTIC ABDOMEN N/A 8/9/2019    Procedure: LAPAROSCOPY DIAGNOSTIC; LYSIS OF ADHESIONS;  Surgeon: Rimma Naranjo MD;  [facility];  Service: Gynecology    [place] LAP,LYSIS OF ADHESIONS N/A 3/3/2020    Procedure: L O A ;  Surgeon: Rnoen Bennett MD;  [facility];  Service: Gynecology    TUBAL LIGATION         Family History   Problem Relation Age of Onset    Cancer Paternal Grandmother         uterine    Cancer Paternal Aunt         uterine     Breast cancer Neg Hx      I have reviewed and agree with the history as documented  E-Cigarette/Vaping    E-Cigarette Use Never User      E-Cigarette/Vaping Substances     Social History     Tobacco Use    Smoking status: Current Every Day Smoker     Packs/day: 0 50     Years: 15 00     Pack years: 7 50     Types: Cigarettes    Smokeless tobacco: Never Used   Vaping Use    Vaping Use: Never used   Substance Use Topics    Alcohol use: Not Currently    Drug use: Never       Review of Systems   Constitutional: Positive for chills, fatigue and fever  HENT: Positive for congestion and rhinorrhea  Negative for ear pain and sore throat  Eyes: Negative for redness  Respiratory: Positive for cough  Negative for chest tightness and shortness of breath  Cardiovascular: Negative for chest pain and palpitations  Gastrointestinal: Positive for nausea and vomiting  Negative for abdominal pain     Genitourinary: Negative for dysuria and hematuria  Musculoskeletal: Negative  Skin: Negative for rash  Neurological: Negative for dizziness, syncope, light-headedness and numbness  Physical Exam  Physical Exam  Vitals and nursing note reviewed  Constitutional:       Appearance: She is well-developed  HENT:      Head: Normocephalic  Eyes:      General: No scleral icterus  Cardiovascular:      Rate and Rhythm: Normal rate and regular rhythm  Pulmonary:      Effort: Pulmonary effort is normal       Breath sounds: Normal breath sounds  No stridor  Abdominal:      General: There is no distension  Palpations: Abdomen is soft  Tenderness: There is no abdominal tenderness  Musculoskeletal:         General: Normal range of motion  Skin:     General: Skin is warm and dry  Capillary Refill: Capillary refill takes less than 2 seconds  Neurological:      Mental Status: She is alert and oriented to person, place, and time  Vital Signs  ED Triage Vitals   Temperature Pulse Respirations Blood Pressure SpO2   06/01/22 1959 06/01/22 1959 06/01/22 1959 06/01/22 1959 06/01/22 1959   99 7 °F (37 6 °C) (!) 124 18 130/63 98 %      Temp Source Heart Rate Source Patient Position - Orthostatic VS BP Location FiO2 (%)   06/01/22 1959 06/01/22 1959 06/01/22 1959 06/01/22 1959 --   Tympanic Monitor Sitting Left arm       Pain Score       06/01/22 2029       10 - Worst Possible Pain           Vitals:    06/01/22 1959   BP: 130/63   Pulse: (!) 124   Patient Position - Orthostatic VS: Sitting         Visual Acuity      ED Medications  Medications   ketorolac (TORADOL) injection 15 mg (15 mg Intramuscular Given 6/1/22 2029)   ondansetron (ZOFRAN-ODT) dispersible tablet 4 mg (4 mg Oral Given 6/1/22 2029)       Diagnostic Studies  Results Reviewed     Procedure Component Value Units Date/Time    COVID/FLU [[id number]] Collected: 06/01/22 2029    Lab Status:  In process Specimen: Nares from Nose Updated: 06/01/22 2042                 No orders to display              Procedures  Procedures         ED Course                               SBIRT 20yo+    Flowsheet Row Most Recent Value   SBIRT (23 yo +)    In order to provide better care to our patients, we are screening all of our patients for alcohol and drug use  Would it be okay to ask you these screening questions? Yes Filed at: 06/01/2022 2014   Initial Alcohol Screen: US AUDIT-C     1  How often do you have a drink containing alcohol? 0 Filed at: 06/01/2022 2014   2  How many drinks containing alcohol do you have on a typical day you are drinking? 0 Filed at: 06/01/2022 2014   3b  FEMALE Any Age, or MALE 65+: How often do you have 4 or more drinks on one occassion? 0 Filed at: 06/01/2022 2014   Audit-C Score 0 Filed at: 06/01/2022 2014   LISBETH: How many times in the past year have you    Used an illegal drug or used a prescription medication for non-medical reasons? Never Filed at: 06/01/2022 2014                    [facility] [place]  Number of Diagnoses or Management Options  Diagnosis management comments: All imaging and/or lab testing discussed with patient, strict return to ED precautions discussed  Patient recommended to follow up promptly with appropriate outpatient provider  Patient and/or family members verbalizes understanding and agrees with plan  Patient is stable for discharge      Portions of the record may have been created with voice recognition software  Occasional wrong word or "sound a like" substitutions may have occurred due to the inherent limitations of voice recognition software  Read the chart carefully and recognize, using context, where substitutions have occurred          Disposition  Final diagnoses:   Viral URI with cough     Time reflects when diagnosis was documented in both MDM as applicable and the Disposition within this note     Time User Action Codes Description Comment    6/1/2022  8:45 PM Deonna Dowd Add [J06 9] Viral URI with cough       ED Disposition     ED Disposition   Discharge    Condition   Good    Date/Time   Wed Jun 1, 2022  8:45 PM    [address] discharge to home/self care  Follow-up Information     Follow up With Specialties Details Why Leigh Ann Holly PA-C Family Medicine Schedule an appointment as soon as possible for a visit in 2 days As needed [address]  [address] [place] [address] [phone]            Patient's Medications   Discharge Prescriptions    ACETAMINOPHEN (TYLENOL) 500 MG TABLET    Take 1 tablet (500 mg total) by mouth every 6 (six) hours as needed for mild pain       Start Date: 6/1/2022  End Date: --       Order Dose: 500 mg       Quantity: 30 tablet    Refills: 0    ALBUTEROL (PROVENTIL HFA,VENTOLIN HFA) 90 MCG/ACT INHALER    Inhale 2 puffs every 4 (four) hours as needed (coughing)       Start Date: 6/1/2022  End Date: --       Order Dose: 2 puffs       Quantity: 6 7 g    Refills: 0    DEXTROMETHORPHAN-GUAIFENESIN (ROBITUSSIN DM)  MG/5 ML SYRUP    Take 5 mL by mouth 3 (three) times a day as needed (cough) for up to 10 days       Start Date: 6/1/2022  End Date: 6/11/2022       Order Dose: 5 mL       Quantity: 118 mL    Refills: 0    FLUTICASONE (FLONASE) 50 MCG/ACT NASAL SPRAY    1 spray into each nostril daily       Start Date: 6/1/2022  End Date: --       Order Dose: 1 spray       Quantity: 16 g    Refills: 0    MENTHOL-CETYLPYRIDINIUM (CEPACOL) 3 MG LOZENGE    Take 1 lozenge (3 mg total) by mouth as needed for sore throat       Start Date: 6/1/2022  End Date: --       Order Dose: 3 mg       Quantity: 30 lozenge    Refills: 0    ONDANSETRON (ZOFRAN-ODT) 4 MG DISINTEGRATING TABLET    Take 1 tablet (4 mg total) by mouth every 8 (eight) hours as needed (nausea) for up to 10 days       Start Date: 6/1/2022  End Date: 6/11/2022       Order Dose: 4 mg       Quantity: 20 tablet    Refills: 0       No discharge procedures on file      PDMP Review     None          ED Provider  Electronically Signed by           Priya Kaur PA-C  06/01/22 2055

## 2022-06-02 NOTE — PATIENT INSTRUCTIONS
Tylenol 1000 mg every 8 hours as needed for body aches and/or fever     Síndrome viral   LO QUE NECESITA SABER:   El síndrome viral es un término usado para los síntomas de jonathan infección causada por un virus  Los virus son propagados fácilmente de [place] persona a otra a través del aire y [place] los objetos que se comparten  INSTRUCCIONES SOBRE EL PHUONG HOSPITALARIA:   Llame al [employer] de emergencias (911 en los [place]), o pídale a alguien que llame si:  Usted sufre jonathan convulsión  No es posible despertarlo  Usted tiene dolor torácico y dificultad para respirar  Regrese a la aleida de emergencias si:  Usted tiene rigidez en el dipak, dolor de hal intenso y sensibilidad a la keyanna  Usted se siente débil, mareado o confundido  Usted familia de orinar u orina menos de lo habitual     Usted tose juanito o jonathan mucosidad espesa amarilla o tres  Usted tiene dolor abdominal severo o raymundo abdomen está más buzz de lo habitual     Llame a raymundo médico si:  Loretta síntomas no mejoran con el tratamiento o empeoran después de 3 días  Usted tiene salpullido o dolor de oído  Usted siente ardor al [place]  Usted tiene preguntas o inquietudes acerca de raymundo condición o cuidado  Medicamentos: Es posible que usted necesite alguno de los siguientes:  Acetaminofén virgilio el dolor y baja la fiebre  Está disponible sin receta médica  Pregunte la cantidad y la frecuencia con que debe tomarlos  [address]  Maggi las etiquetas de todos los demás medicamentos que esté usando para saber si también contienen acetaminofén, o pregunte a raymundo médico o farmacéutico  El acetaminofén puede causar daño en el hígado cuando no se villa de forma correcta  No use más de 4 gramos (4000 miligramos) en total de acetaminofeno en un día  Los [place], divina el ibuprofeno, [facility] a disminuir la inflamación, el dolor y la [place]  Los DARELL pueden causar sangrado estomacal o problemas renales en ciertas personas   Si usted villa un medicamento anticoagulante, siempre pregúntele a raymundo médico si los DARELL son seguros para usted  Siempre ciera la etiqueta de beka medicamento y [place] instrucciones  El medicamento para el resfriado ayuda a disminuir la inflamación, a controlar la tos y a aliviar la congestión del pecho o nasal     El rociador nasal de agua salina ayuda a disminuir la congestión nasal     [place] queta medicamentos divina se le haya indicado  Consulte con raymundo médico si usted luis que raymundo medicamento no le está ayudando o si presenta efectos secundarios  Infórmele si es alérgico a algún medicamento  Mantenga jonathan lista actualizada de los [place], las vitaminas y los productos herbales que villa  Incluya los siguientes datos de los medicamentos: cantidad, frecuencia y motivo de administración  Traiga con usted la lista o los envases de las píldoras a queta citas de seguimiento  Lleve la lista de los medicamentos con usted en emily de jonathan emergencia  El manejo de queta síntomas: [place] líquidos divina se le indique para evitar jonathan deshidratación  Pregunte cuánto líquido debe jamilah cada día y cuáles líquidos son los más adecuados para usted  Pregunte si usted debería jamilah jonathan solución de rehidratación oral (SRO)  [address] cantidades exactas de agua, sal y azúcar que usted necesita para reemplazar los líquidos corporales  [place] podría ayudarlo a evitar la deshidratación a causa del vómito y de la diarrea  No tome líquidos con cafeína  Los líquidos con cafeína pueden empeorar la deshidratación  Descanse lo suficiente para ayudar a que raymundo cuerpo sane  [place] siestas veronica el día  Pregunte a raymundo médico cuándo puede regresar a raymundo trabajo y a queta actividades cotidianas  Use un humidificador de acrolina fría para respirar más fácilmente  Pregunte a raymundo médico cómo usar un humidificador de vapor frío  Coma miel o use pastillas para la tos para el dolor de garganta  Los caramelos para la tos están disponibles sin receta médica   [address] indicaciones para jamilah los caramelos para la tos  No fume ni esté cerca a alguien que esté fumando  La nicotina y otras sustancias químicas que contienen los cigarrillos y cigarros pueden dañar los pulmones  El fumar también puede retrasar la sanación  Pida información a raymundo médico si usted actualmente fuma y necesita ayuda para dejar de fumar  Los cigarrillos electrónicos o el tabaco sin humo igualmente contienen nicotina  Consulte con raymundo médico antes de [employer]  Prevenga la propagación de gérmenes:      Lávese las ml frecuentemente  Lávese las ml varias veces al día  Lávese después de usar el baño, después de cambiar pañales y antes de preparar la comida o comer  Use siempre agua y [place]  Frótese las ml enjabonadas, [facility] dedos  Lávese el frente y el dorso de las [place], y [place] dedos  Use los dedos de jonathan mano para restregar debajo de las uñas de la [place]  Lávese veronica al menos 20 segundos  Enjuague con agua corriente caliente veronica varios segundos  Luego séquese las ml con jonathan toalla limpia o jonathan toalla de papel  Puede usar un desinfectante para ml que contenga alcohol, si no hay agua y jabón disponibles  No se toque los ojos, la nariz o la boca sin antes [employer]  Cúbrase al toser o estornudar  Use un pañuelo que cubra la boca y la Ron  Arroje el pañuelo a la basura de inmediato  Use el ángulo del brazo si no tiene un pañuelo disponible  Lávese las ml con agua y [place] o use un desinfectante de ml  Manténgase alejado de los demás mientras esté enfermo  Evite las multitudes lo más que pueda  Pregunte sobre las vacunas que pudiera necesitar  Hable con raymundo médico sobre raymundo historial de vacunación  Raymundo médico le indicará qué vacunas necesita y cuándo recibirlas  Vacúnese contra la influenza (gripe) tan pronto divina se recomiende cada año  La vacuna antigripal se ofrece a partir de septiembre u octubre   Los virus de la gripe cambian, por lo que es importante vacunarse contra la gripe cada año  Vacúnese contra la neumonía si se recomienda  Esta vacuna generalmente se recomienda cada 5 años  Raymundo médico le indicará cuándo recibir esta vacuna, de ser necesaria  Acuda a la consulta de control con raymundo médico según las indicaciones: Anote queta preguntas para que se acuerde de hacerlas veronica queta visitas  © Copyright [employer] 2022 Information is for End User's use only and may not be sold, redistributed or otherwise used for commercial purposes  All illustrations and images included in CareNotes® are the copyrighted property of A D A M , [employer]  or [address] [place] [address] es sólo para uso en educación  Raymundo intención no es darle un consejo médico sobre enfermedades o tratamientos  Colsulte con raymundo Gin Kluver farmacéutico antes de seguir cualquier régimen médico para saber si es seguro y efectivo para usted

## 2022-06-02 NOTE — PROGRESS NOTES
COVID-19 Outpatient Progress Note    Assessment/Plan:    Problem List Items Addressed This Visit    None     Visit Diagnoses     COVID    -  Primary    Flu like symptoms, discussed supportive measures and ER precautions  Isolation through 6/6 and masking through 6/11    Relevant Medications    acetaminophen (TYLENOL) 500 mg tablet    meclizine (ANTIVERT) 12 5 MG tablet         Disposition:     Patient has COVID-19 infection  Based off CDC guidelines, they were recommended to isolate for 5 days from the date of the positive test  If they remain asymptomatic, isolation may be ended followed by 5 days of wearing a mask when around othes to minimize risk of infecting others  If they have a fever, continue to stay home until fever resolves for at least 24 hours  Discussed symptom directed medication options with patient  I have spent 15 minutes directly with the patient  Greater than 50% of this time was spent in counseling/coordination of care regarding: diagnostic results, prognosis, risks and benefits of treatment options, instructions for management, patient and family education, importance of treatment compliance, risk factor reductions and impressions  Encounter provider Manuel Harrington MD    Provider located at [address] [address] [phone]    Recent Visits  No visits were found meeting these conditions  Showing recent visits within past 7 days and meeting all other requirements  Today's Visits  Date Type Provider Dept   06/02/22 Ventura Menezes MD  Genna Monica   Showing today's visits and meeting all other requirements  Future Appointments  No visits were found meeting these conditions  Showing future appointments within next 150 days and meeting all other requirements     This virtual check-in was done via [address] [place] [address] Drive and patient was informed that this is a secure, HIPAA-compliant platform   She agrees to proceed  Patient agrees to participate in a virtual check in via telephone or video visit instead of presenting to the office to address urgent/immediate medical needs  Patient is aware this is a billable service  After connecting through [facility], the patient was identified by name and date of birth  Gretel Bamberger was informed that this was a telemedicine visit and that the exam was being conducted confidentially over secure lines  My office door was closed  No one else was in the room  Gretel Bamberger acknowledged consent and understanding of privacy and security of the telemedicine visit  I informed the patient that I have reviewed her record in Epic and presented the opportunity for her to ask any questions regarding the visit today  The patient agreed to participate  Verification of patient location:  Patient is located in the following state in which I hold an active license: [place]    Subjective:   Gretel Bamberger is a 40 y o  female who has been screened for COVID-19  Symptom change since last report: improving  Patient's symptoms include fever (101F yesterday), myalgias and headache  Patient denies chills, fatigue, malaise, congestion, rhinorrhea, sore throat, anosmia, loss of taste, cough, shortness of breath, chest tightness, abdominal pain, nausea, vomiting and diarrhea  - Date of symptom onset: 6/1/2022  - Date of positive COVID-19 test: 6/1/2022  Type of test: PCR  COVID-19 vaccination status: Fully vaccinated (primary series)    Abhilash Temple has been staying home and has isolated themselves in her home  She is taking care to not share personal items and is cleaning all surfaces that are touched often, like counters, tabletops, and doorknobs using household cleaning sprays or wipes  She is wearing a mask when she leaves her room       Lab Results   Component Value Date    SARSCOV2 Positive (A) 06/01/2022     Past Medical History:   Diagnosis Date    Abnormal Pap smear of cervix 2015    cryosurgery    Migraine headache with aura     managed with Elavil     Past Surgical History:   Procedure Laterality Date    CHOLECYSTECTOMY LAPAROSCOPIC N/A 10/27/2020    Procedure: CHOLECYSTECTOMY LAPAROSCOPIC;  Surgeon: Vickie Downey MD;  [facility] MA[facility];  Service: General    GYNECOLOGIC CRYOSURGERY  2015    HYSTERECTOMY  2016    LAPAROSCOPY  2018    NH LAP,DIAGNOSTIC ABDOMEN N/A 8/9/2019    Procedure: LAPAROSCOPY DIAGNOSTIC; LYSIS OF ADHESIONS;  Surgeon: Nany Benitez MD;  Location: [facility];  Service: Gynecology    NH LAP,LYSIS OF ADHESIONS N/A 3/3/2020    Procedure: L O A ;  Surgeon: Prudencio Feliciano MD;  Location: [facility];  Service: Gynecology    TUBAL LIGATION       Current Outpatient Medications   Medication Sig Dispense Refill    acetaminophen (TYLENOL) 500 mg tablet Take 2 tablets (1,000 mg total) by mouth every 8 (eight) hours as needed for mild pain or moderate pain 30 tablet 0    meclizine (ANTIVERT) 12 5 MG tablet Take 1 tablet (12 5 mg total) by mouth 3 (three) times a day as needed for dizziness 30 tablet 0    albuterol (PROVENTIL HFA,VENTOLIN HFA) 90 mcg/act inhaler Inhale 2 puffs every 4 (four) hours as needed (coughing) 6 7 g 0    dextromethorphan-guaiFENesin (ROBITUSSIN DM)  mg/5 mL syrup Take 5 mL by mouth 3 (three) times a day as needed (cough) for up to 10 days 118 mL 0    ergocalciferol (VITAMIN D2) 50,000 units Take 1 capsule (50,000 Units total) by mouth once a week (Patient not taking: No sig reported) 16 capsule 0    fluticasone (FLONASE) 50 mcg/act nasal spray 1 spray into each nostril daily 16 g 0    menthol-cetylpyridinium (CEPACOL) 3 MG lozenge Take 1 lozenge (3 mg total) by mouth as needed for sore throat 30 lozenge 0    nicotine (NICODERM CQ) 21 mg/24 hr TD 24 hr patch Place 1 patch on the skin every 24 hours 28 patch 2    ondansetron (ZOFRAN-ODT) 4 mg disintegrating tablet Take 1 tablet (4 mg total) by mouth every 8 (eight) hours as needed (nausea) for up to 10 days 20 tablet 0    pantoprazole (PROTONIX) 20 mg tablet Take 1 tablet (20 mg total) by mouth in the morning  90 tablet 0    pseudoephedrine (SUDAFED) 30 mg tablet Take 1 tablet (30 mg total) by mouth every 8 (eight) hours as needed for congestion (Patient not taking: No sig reported) 20 tablet 0    SUMAtriptan (IMITREX) 50 mg tablet Take 1 tablet (50 mg total) by mouth once as needed for migraine for up to 1 dose 9 tablet 0     No current facility-administered medications for this visit  Allergies   Allergen Reactions    Shellfish-Derived Products - Food Allergy Itching and Swelling     "seafood "    Ibuprofen Other (See Comments)     Patient states she gets hematoma     Review of Systems   Constitutional: Positive for fever (101F yesterday)  Negative for chills and fatigue  HENT: Negative for congestion, rhinorrhea and sore throat  Eyes: Negative for visual disturbance  Respiratory: Negative for cough, chest tightness and shortness of breath  Cardiovascular: Negative for chest pain and leg swelling  Gastrointestinal: Negative for abdominal pain, diarrhea, nausea and vomiting  Genitourinary: Negative for difficulty urinating  Musculoskeletal: Positive for myalgias  Neurological: Positive for headaches  Psychiatric/Behavioral: Negative for sleep disturbance  Objective:    Vitals:    06/02/22 1320   Pulse: 89   Resp: 22   Temp: 97 6 °F (36 4 °C)   TempSrc: Temporal     Physical Exam  Constitutional:       General: She is not in acute distress  Appearance: Normal appearance  She is obese  She is not ill-appearing  Comments: Laying comfortably in bed  Able to speak in full sentences  HENT:      Right Ear: External ear normal       Left Ear: External ear normal    Eyes:      Extraocular Movements: Extraocular movements intact  Pulmonary:      Effort: Pulmonary effort is normal  No respiratory distress  Skin:     Findings: No erythema or rash  Neurological:      Mental Status: She is alert  Psychiatric:         Mood and Affect: Mood normal          Behavior: Behavior normal        VIRTUAL VISIT DISCLAIMER    Anand Bojorquez verbally agrees to participate in [employer]  Pt is aware that [employer] could be limited without vital signs or the ability to perform a full hands-on physical Nidhi Thomas understands she or the provider may request at any time to terminate the video visit and request the patient to seek care or treatment in person      Moises Tejada MD  06/02/22  1:46 PM

## 2022-06-02 NOTE — RESULT ENCOUNTER NOTE
Positive for covid  Patient reviewed results on [place]    Sent message via [facility]

## 2022-06-05 DIAGNOSIS — G43.109 MIGRAINE WITH AURA AND WITHOUT STATUS MIGRAINOSUS, NOT INTRACTABLE: ICD-10-CM

## 2022-06-06 RX ORDER — SUMATRIPTAN 50 MG/1
50 TABLET, FILM COATED ORAL ONCE AS NEEDED
Qty: 9 TABLET | Refills: 0 | Status: SHIPPED | OUTPATIENT
Start: 2022-06-06 | End: 2022-07-11 | Stop reason: SDUPTHER

## 2022-06-07 ENCOUNTER — TELEPHONE (OUTPATIENT)
Dept: FAMILY MEDICINE CLINIC | Facility: CLINIC | Age: 37
End: 2022-06-07

## 2022-06-07 NOTE — TELEPHONE ENCOUNTER
Pt called the office with questions about the Covid 19, I called back the pt and states she still have headaches, and vomiting, Pt states she took the medication to  Migraines and still have headaches, pt wants to know its normal

## 2022-06-10 NOTE — TELEPHONE ENCOUNTER
Please advised patient headaches are a common symptom with COVID-19  Would recommend alternating between Tylenol and ibuprofen to help relieve migraines  Advised to stay well hydrated and to rest   Advised to contact the office or report ED if symptoms persist, worsen, or new symptoms arise such as shortness of breath  Thanks!

## 2022-06-27 ENCOUNTER — APPOINTMENT (OUTPATIENT)
Dept: LAB | Facility: CLINIC | Age: 37
End: 2022-06-27
Payer: COMMERCIAL

## 2022-06-27 ENCOUNTER — ANNUAL EXAM (OUTPATIENT)
Dept: OBGYN CLINIC | Facility: CLINIC | Age: 37
End: 2022-06-27

## 2022-06-27 VITALS
SYSTOLIC BLOOD PRESSURE: 107 MMHG | BODY MASS INDEX: 28.61 KG/M2 | HEART RATE: 93 BPM | WEIGHT: 156.4 LBS | DIASTOLIC BLOOD PRESSURE: 74 MMHG

## 2022-06-27 DIAGNOSIS — Z20.2 POSSIBLE EXPOSURE TO STD: ICD-10-CM

## 2022-06-27 DIAGNOSIS — Z01.419 ENCOUNTER FOR GYNECOLOGICAL EXAMINATION (GENERAL) (ROUTINE) WITHOUT ABNORMAL FINDINGS: Primary | ICD-10-CM

## 2022-06-27 PROCEDURE — 99395 PREV VISIT EST AGE 18-39: CPT | Performed by: OBSTETRICS & GYNECOLOGY

## 2022-06-27 PROCEDURE — 87491 CHLMYD TRACH DNA AMP PROBE: CPT | Performed by: OBSTETRICS & GYNECOLOGY

## 2022-06-27 PROCEDURE — 87389 HIV-1 AG W/HIV-1&-2 AB AG IA: CPT

## 2022-06-27 PROCEDURE — 86803 HEPATITIS C AB TEST: CPT

## 2022-06-27 PROCEDURE — 36415 COLL VENOUS BLD VENIPUNCTURE: CPT

## 2022-06-27 PROCEDURE — 86592 SYPHILIS TEST NON-TREP QUAL: CPT

## 2022-06-27 PROCEDURE — 87591 N.GONORRHOEAE DNA AMP PROB: CPT | Performed by: OBSTETRICS & GYNECOLOGY

## 2022-06-27 NOTE — PROGRESS NOTES
Assessment/Plan:     No problem-specific Assessment & Plan notes found for this encounter  Diagnoses and all orders for this visit:    Encounter for gynecological examination (general) (routine) without abnormal findings    Possible exposure to STD  -     HIV 1/2 ANTIGEN/ANTIBODY (4TH GENERATION) W REFLEX [facility]; Future  -     RPR; Future  -     Hepatitis C antibody; Future  -     Cancel: Chlamydia/GC amplified DNA by PCR  -     Cancel: Chlamydia/GC amplified DNA by PCR  -     Chlamydia/GC amplified DNA by PCR      Depression Screening Follow-up Plan: Patient's depression screening was positive with a PHQ-2 score of 0  Their PHQ-9 score was 0  Clinically patient does not have depression  No treatment is required  Subjective:  Upper sorbian interpretor Usha Gonzalez [id number]     Patient ID: Devon Padron is a 40 y o  female who presents for annual exam   Her last pap was 06/21/19 and was negative with negative  She is s/p hysterectomy in [facility]   She would like STD testing  HPI    The following portions of the patient's history were reviewed and updated as appropriate: allergies, current medications, past family history, past medical history, past social history, past surgical history and problem list     Review of Systems      Objective:      /74   Pulse 93   Wt 70 9 kg (156 lb 6 4 oz)   BMI 28 61 kg/m²          Physical Exam  Vitals and nursing note reviewed  Constitutional:       General: She is not in acute distress  Appearance: She is well-developed  HENT:      Head: Normocephalic  Neck:      Thyroid: No thyromegaly  Cardiovascular:      Rate and Rhythm: Normal rate and regular rhythm  Heart sounds: Normal heart sounds  No murmur heard  Pulmonary:      Effort: Pulmonary effort is normal  No respiratory distress  Breath sounds: Normal breath sounds  No wheezing or rales  Chest:      Chest wall: No tenderness     Breasts:      Right: No swelling, bleeding, inverted nipple, mass, nipple discharge, skin change or tenderness  Left: No swelling, bleeding, inverted nipple, mass, nipple discharge, skin change or tenderness  Abdominal:      General: Bowel sounds are normal  There is no distension  Palpations: Abdomen is soft  There is no mass  Tenderness: There is no abdominal tenderness  There is no guarding or rebound  Genitourinary:     Labia:         Right: No rash, tenderness, lesion or injury  Left: No rash, tenderness, lesion or injury  Vagina: Normal       Uterus: Absent  Adnexa:         Right: No mass, tenderness or fullness  Left: No mass, tenderness or fullness  Rectum: No external hemorrhoid  Skin:     General: Skin is warm and dry  Neurological:      Mental Status: She is alert and oriented to person, place, and time  Psychiatric:         Behavior: Behavior normal          Thought Content:  Thought content normal          Judgment: Judgment normal

## 2022-06-28 LAB
HCV AB SER QL: NORMAL
HIV 1+2 AB+HIV1 P24 AG SERPL QL IA: NORMAL
RPR SER QL: NORMAL

## 2022-06-29 LAB
C TRACH DNA SPEC QL NAA+PROBE: NEGATIVE
N GONORRHOEA DNA SPEC QL NAA+PROBE: NEGATIVE

## 2022-07-11 DIAGNOSIS — G43.109 MIGRAINE WITH AURA AND WITHOUT STATUS MIGRAINOSUS, NOT INTRACTABLE: ICD-10-CM

## 2022-07-11 RX ORDER — SUMATRIPTAN 50 MG/1
50 TABLET, FILM COATED ORAL ONCE AS NEEDED
Qty: 9 TABLET | Refills: 0 | Status: SHIPPED | OUTPATIENT
Start: 2022-07-11 | End: 2022-08-17 | Stop reason: SDUPTHER

## 2022-07-25 DIAGNOSIS — Z72.0 TOBACCO USE: ICD-10-CM

## 2022-07-28 RX ORDER — NICOTINE 21 MG/24HR
1 PATCH, TRANSDERMAL 24 HOURS TRANSDERMAL EVERY 24 HOURS
Qty: 28 PATCH | Refills: 0 | Status: SHIPPED | OUTPATIENT
Start: 2022-07-28

## 2022-08-17 DIAGNOSIS — G43.109 MIGRAINE WITH AURA AND WITHOUT STATUS MIGRAINOSUS, NOT INTRACTABLE: ICD-10-CM

## 2022-08-17 RX ORDER — SUMATRIPTAN 50 MG/1
50 TABLET, FILM COATED ORAL ONCE AS NEEDED
Qty: 9 TABLET | Refills: 0 | Status: SHIPPED | OUTPATIENT
Start: 2022-08-17 | End: 2022-09-15 | Stop reason: SDUPTHER

## 2022-09-15 DIAGNOSIS — G43.109 MIGRAINE WITH AURA AND WITHOUT STATUS MIGRAINOSUS, NOT INTRACTABLE: ICD-10-CM

## 2022-09-16 RX ORDER — SUMATRIPTAN 50 MG/1
50 TABLET, FILM COATED ORAL ONCE AS NEEDED
Qty: 9 TABLET | Refills: 0 | Status: SHIPPED | OUTPATIENT
Start: 2022-09-16 | End: 2022-10-04 | Stop reason: SDUPTHER

## 2022-10-04 DIAGNOSIS — G43.109 MIGRAINE WITH AURA AND WITHOUT STATUS MIGRAINOSUS, NOT INTRACTABLE: ICD-10-CM

## 2022-10-05 RX ORDER — SUMATRIPTAN 50 MG/1
50 TABLET, FILM COATED ORAL ONCE AS NEEDED
Qty: 9 TABLET | Refills: 0 | Status: SHIPPED | OUTPATIENT
Start: 2022-10-05

## 2022-10-05 NOTE — TELEPHONE ENCOUNTER
Please contact pt for a follow up visit for migraine med refills  Will defer request to PCP on file

## 2022-10-24 ENCOUNTER — HOSPITAL ENCOUNTER (EMERGENCY)
Facility: HOSPITAL | Age: 37
Discharge: HOME | End: 2022-10-24
Attending: EMERGENCY MEDICINE
Payer: COMMERCIAL

## 2022-10-24 VITALS
DIASTOLIC BLOOD PRESSURE: 80 MMHG | RESPIRATION RATE: 20 BRPM | WEIGHT: 150 LBS | BODY MASS INDEX: 27.44 KG/M2 | SYSTOLIC BLOOD PRESSURE: 128 MMHG | TEMPERATURE: 98.4 F | HEART RATE: 91 BPM | OXYGEN SATURATION: 97 %

## 2022-10-24 DIAGNOSIS — G43.909 MIGRAINE: Primary | ICD-10-CM

## 2022-10-24 RX ORDER — DIPHENHYDRAMINE HYDROCHLORIDE 50 MG/ML
25 INJECTION INTRAMUSCULAR; INTRAVENOUS ONCE
Status: COMPLETED | OUTPATIENT
Start: 2022-10-24 | End: 2022-10-24

## 2022-10-24 RX ORDER — KETOROLAC TROMETHAMINE 30 MG/ML
30 INJECTION, SOLUTION INTRAMUSCULAR; INTRAVENOUS ONCE
Status: COMPLETED | OUTPATIENT
Start: 2022-10-24 | End: 2022-10-24

## 2022-10-24 RX ORDER — METOCLOPRAMIDE HYDROCHLORIDE 5 MG/ML
10 INJECTION INTRAMUSCULAR; INTRAVENOUS ONCE
Status: COMPLETED | OUTPATIENT
Start: 2022-10-24 | End: 2022-10-24

## 2022-10-24 RX ADMIN — KETOROLAC TROMETHAMINE 30 MG: 30 INJECTION, SOLUTION INTRAMUSCULAR; INTRAVENOUS at 20:33

## 2022-10-24 RX ADMIN — METOCLOPRAMIDE 10 MG: 5 INJECTION, SOLUTION INTRAMUSCULAR; INTRAVENOUS at 20:33

## 2022-10-24 RX ADMIN — SODIUM CHLORIDE 1000 ML: 0.9 INJECTION, SOLUTION INTRAVENOUS at 20:34

## 2022-10-24 RX ADMIN — DIPHENHYDRAMINE HYDROCHLORIDE 25 MG: 50 INJECTION, SOLUTION INTRAMUSCULAR; INTRAVENOUS at 20:33

## 2022-10-25 NOTE — ED PROVIDER NOTES
Patient History  Chief Complaint   Patient presents with   • Headache     2 days, states nausea and vomiting  Took meds(unsure of name) with no relief  Patient is a 67-year-old female with a history of migraines who presents with a 2 day h/o right sided headache behind eye  Like similar migraines  Took meds at home without relief  Cannot remember name, but med list has imitrex  +photophobia  +nausea/vomiting  No numbness/tingling  No HT  Prior to Admission Medications   Prescriptions Last Dose Informant Patient Reported? Taking? SUMAtriptan (IMITREX) 50 mg tablet   No No   Sig: Take 1 tablet (50 mg total) by mouth once as needed for migraine for up to 1 dose   acetaminophen (TYLENOL) 500 mg tablet   No No   Sig: Take 2 tablets (1,000 mg total) by mouth every 8 (eight) hours as needed for mild pain or moderate pain   albuterol (PROVENTIL HFA,VENTOLIN HFA) 90 mcg/act inhaler   No No   Sig: Inhale 2 puffs every 4 (four) hours as needed (coughing)   Patient not taking: Reported on 2022   ergocalciferol (VITAMIN D2) 50,000 units   No No   Sig: Take 1 capsule (50,000 Units total) by mouth once a week   fluticasone (FLONASE) 50 mcg/act nasal spray   No No   Si spray into each nostril daily   meclizine (ANTIVERT) 12 5 MG tablet   No No   Sig: Take 1 tablet (12 5 mg total) by mouth 3 (three) times a day as needed for dizziness   Patient not taking: Reported on 2022   menthol-cetylpyridinium (CEPACOL) 3 MG lozenge   No No   Sig: Take 1 lozenge (3 mg total) by mouth as needed for sore throat   nicotine (NICODERM CQ) 21 mg/24 hr TD 24 hr patch   No No   Sig: Place 1 patch on the skin every 24 hours   ondansetron (ZOFRAN-ODT) 4 mg disintegrating tablet   No No   Sig: Take 1 tablet (4 mg total) by mouth every 8 (eight) hours as needed (nausea) for up to 10 days   pantoprazole (PROTONIX) 20 mg tablet   No No   Sig: Take 1 tablet (20 mg total) by mouth in the morning     pseudoephedrine (SUDAFED) 30 mg tablet   No No   Sig: Take 1 tablet (30 mg total) by mouth every 8 (eight) hours as needed for congestion   Patient not taking: No sig reported      Facility-Administered Medications: None       Past Medical History:   Diagnosis Date   • Abnormal Pap smear of cervix 2015    cryosurgery   • Migraine headache with aura     managed with Elavil       Past Surgical History:   Procedure Laterality Date   • CHOLECYSTECTOMY LAPAROSCOPIC N/A 10/27/2020    Procedure: CHOLECYSTECTOMY LAPAROSCOPIC;  Surgeon: Marysol Sanches MD;  Location: [address] [place] [address] MAIN OR;  Service: General   • GYNECOLOGIC CRYOSURGERY  2015   • HYSTERECTOMY  2016   • LAPAROSCOPY  2018   • [place] LAP,DIAGNOSTIC ABDOMEN N/A 8/9/2019    Procedure: LAPAROSCOPY DIAGNOSTIC; LYSIS OF ADHESIONS;  Surgeon: Tania Torres MD;  Location: [facility];  Service: Gynecology   • [place] LAP,LYSIS OF ADHESIONS N/A 3/3/2020    Procedure: L O A ;  Surgeon: Saravanan Perez MD;  Location: [facility];  Service: Gynecology   • TUBAL LIGATION         Family History   Problem Relation Age of Onset   • Cancer Paternal Grandmother         uterine   • Cancer Paternal Aunt         uterine    • Breast cancer Neg Hx    • Colon cancer Neg Hx    • Ovarian cancer Neg Hx      I have reviewed and agree with the history as documented  E-Cigarette/Vaping   • E-Cigarette Use Never User      E-Cigarette/Vaping Substances     Social History     Tobacco Use   • Smoking status: Current Every Day Smoker     Packs/day: 0 50     Years: 15 00     Pack years: 7 50     Types: Cigarettes   • Smokeless tobacco: Never Used   Vaping Use   • Vaping Use: Never used   Substance Use Topics   • Alcohol use: Not Currently   • Drug use: Never       Review of Systems   Constitutional: Negative  HENT: Negative  Eyes: Positive for photophobia  Respiratory: Negative  Cardiovascular: Negative  Gastrointestinal: Positive for nausea and vomiting  Endocrine: Negative      Genitourinary: Negative  Musculoskeletal: Negative  Skin: Negative  Allergic/Immunologic: Negative  Neurological: Positive for headaches  Hematological: Negative  Psychiatric/Behavioral: Negative  All other systems reviewed and are negative  Physical Exam  Physical Exam  Vitals and nursing note reviewed  Constitutional:       Appearance: Normal appearance  She is normal weight  HENT:      Head: Normocephalic and atraumatic  Nose: Nose normal       Mouth/Throat:      Mouth: Mucous membranes are moist       Pharynx: Oropharynx is clear  Eyes:      Extraocular Movements: Extraocular movements intact  Comments: Photophobia on exam   Cardiovascular:      Rate and Rhythm: Normal rate and regular rhythm  Pulses: Normal pulses  Heart sounds: Normal heart sounds  Pulmonary:      Effort: Pulmonary effort is normal       Breath sounds: Normal breath sounds  Abdominal:      General: Bowel sounds are normal       Palpations: Abdomen is soft  Musculoskeletal:         General: Normal range of motion  Cervical back: Normal range of motion and neck supple  Skin:     General: Skin is warm and dry  Capillary Refill: Capillary refill takes less than 2 seconds  Neurological:      General: No focal deficit present  Mental Status: She is alert and oriented to person, place, and time  Cranial Nerves: No cranial nerve deficit  Sensory: No sensory deficit  Motor: No weakness  Coordination: Coordination normal       Gait: Gait normal    Psychiatric:         Mood and Affect: Mood normal          Behavior: Behavior normal          Vital Signs  ED Triage Vitals   Temp Pulse Resp BP SpO2   -- -- -- -- --      Temp src Heart Rate Source Patient Position - Orthostatic VS BP Location FiO2 (%)   -- -- -- -- --      Pain Score       --           There were no vitals filed for this visit        Visual Acuity      ED Medications  Medications   diphenhydrAMINE (BENADRYL) injection 25 mg (has no administration in time range)   metoclopramide (REGLAN) injection 10 mg (has no administration in time range)   sodium chloride 0 9 % bolus 1,000 mL (has no administration in time range)   ketorolac (TORADOL) injection 30 mg (has no administration in time range)       Diagnostic Studies  Results Reviewed     None                 No orders to display              Procedures  Procedures         ED Course                                             MDM      Disposition  Final diagnoses:   None     ED Disposition     None      Follow-up Information    None         Patient's Medications   Discharge Prescriptions    No medications on file       No discharge procedures on file      PDMP Review     None          ED Provider  Electronically Signed by           Aishwarya Reyes MD  10/29/22 3518

## 2022-11-14 DIAGNOSIS — G43.109 MIGRAINE WITH AURA AND WITHOUT STATUS MIGRAINOSUS, NOT INTRACTABLE: ICD-10-CM

## 2022-11-15 RX ORDER — SUMATRIPTAN 50 MG/1
50 TABLET, FILM COATED ORAL ONCE AS NEEDED
Qty: 9 TABLET | Refills: 0 | Status: SHIPPED | OUTPATIENT
Start: 2022-11-15

## 2022-12-27 ENCOUNTER — OFFICE VISIT (OUTPATIENT)
Dept: FAMILY MEDICINE CLINIC | Facility: CLINIC | Age: 37
End: 2022-12-27

## 2022-12-27 ENCOUNTER — HOSPITAL ENCOUNTER (EMERGENCY)
Facility: HOSPITAL | Age: 37
Discharge: HOME | End: 2022-12-27
Attending: EMERGENCY MEDICINE

## 2022-12-27 VITALS
RESPIRATION RATE: 16 BRPM | WEIGHT: 157.3 LBS | TEMPERATURE: 98.6 F | SYSTOLIC BLOOD PRESSURE: 141 MMHG | BODY MASS INDEX: 28.77 KG/M2 | DIASTOLIC BLOOD PRESSURE: 74 MMHG | HEART RATE: 95 BPM | OXYGEN SATURATION: 100 %

## 2022-12-27 VITALS
SYSTOLIC BLOOD PRESSURE: 120 MMHG | BODY MASS INDEX: 28.35 KG/M2 | OXYGEN SATURATION: 97 % | RESPIRATION RATE: 16 BRPM | WEIGHT: 155 LBS | HEART RATE: 133 BPM | DIASTOLIC BLOOD PRESSURE: 76 MMHG | TEMPERATURE: 98.7 F

## 2022-12-27 DIAGNOSIS — T31.0 BURN (ANY DEGREE) INVOLVING LESS THAN 10% OF BODY SURFACE: Primary | ICD-10-CM

## 2022-12-27 DIAGNOSIS — T23.202A PARTIAL THICKNESS BURN OF LEFT HAND INCLUDING FINGERS, INITIAL ENCOUNTER: ICD-10-CM

## 2022-12-27 DIAGNOSIS — M54.42 CHRONIC BILATERAL LOW BACK PAIN WITH LEFT-SIDED SCIATICA: Primary | ICD-10-CM

## 2022-12-27 DIAGNOSIS — G43.109 MIGRAINE WITH AURA AND WITHOUT STATUS MIGRAINOSUS, NOT INTRACTABLE: ICD-10-CM

## 2022-12-27 DIAGNOSIS — G89.29 CHRONIC BILATERAL LOW BACK PAIN WITH LEFT-SIDED SCIATICA: Primary | ICD-10-CM

## 2022-12-27 DIAGNOSIS — T23.232A PARTIAL THICKNESS BURN OF LEFT HAND INCLUDING FINGERS, INITIAL ENCOUNTER: ICD-10-CM

## 2022-12-27 RX ORDER — TIZANIDINE 4 MG/1
4 TABLET ORAL 3 TIMES DAILY
Qty: 90 TABLET | Refills: 0 | Status: SHIPPED | OUTPATIENT
Start: 2022-12-27

## 2022-12-27 RX ORDER — MORPHINE SULFATE 4 MG/ML
4 INJECTION, SOLUTION INTRAMUSCULAR; INTRAVENOUS ONCE
Status: COMPLETED | OUTPATIENT
Start: 2022-12-27 | End: 2022-12-27

## 2022-12-27 RX ORDER — OXYCODONE HYDROCHLORIDE AND ACETAMINOPHEN 5; 325 MG/1; MG/1
1 TABLET ORAL ONCE
Status: COMPLETED | OUTPATIENT
Start: 2022-12-27 | End: 2022-12-27

## 2022-12-27 RX ORDER — SUMATRIPTAN 50 MG/1
50 TABLET, FILM COATED ORAL ONCE AS NEEDED
Qty: 9 TABLET | Refills: 0 | Status: SHIPPED | OUTPATIENT
Start: 2022-12-27 | End: 2023-01-05 | Stop reason: SDUPTHER

## 2022-12-27 RX ORDER — OXYCODONE HYDROCHLORIDE AND ACETAMINOPHEN 5; 325 MG/1; MG/1
1 TABLET ORAL EVERY 4 HOURS PRN
Qty: 12 TABLET | Refills: 0 | Status: SHIPPED | OUTPATIENT
Start: 2022-12-27 | End: 2023-01-06

## 2022-12-27 RX ADMIN — TETANUS TOXOID, REDUCED DIPHTHERIA TOXOID AND ACELLULAR PERTUSSIS VACCINE, ADSORBED 0.5 ML: 5; 2.5; 8; 8; 2.5 SUSPENSION INTRAMUSCULAR at 21:03

## 2022-12-27 RX ADMIN — MORPHINE SULFATE 4 MG: 4 INJECTION INTRAVENOUS at 21:57

## 2022-12-27 RX ADMIN — OXYCODONE HYDROCHLORIDE AND ACETAMINOPHEN 1 TABLET: 5; 325 TABLET ORAL at 21:03

## 2022-12-27 RX ADMIN — SILVER SULFADIAZINE 1 APPLICATION: 10 CREAM TOPICAL at 21:03

## 2022-12-27 NOTE — PROGRESS NOTES
Name: Chi Roldan      : 1985      MRN: [id number]  Encounter Provider: Ihsan Hawkins MD  Encounter Date: 2022   Encounter department: [id number] [place] [address]     1  Chronic bilateral low back pain with left-sided sciatica  -     tiZANidine (ZANAFLEX) 4 mg tablet; Take 1 tablet (4 mg total) by mouth 3 (three) times a day    2  Migraine with aura and without status migrainosus, not intractable  -     SUMAtriptan (IMITREX) 50 mg tablet; Take 1 tablet (50 mg total) by mouth once as needed for migraine for up to 1 dose         Subjective      Back Pain  The problem occurs constantly  The pain is present in the lumbar spine  The quality of the pain is described as aching  The pain is at a severity of 7/10  The symptoms are aggravated by bending  Pertinent negatives include no abdominal pain, bladder incontinence, bowel incontinence, chest pain, dysuria, fever, headaches, leg pain, numbness, paresis, paresthesias, pelvic pain, perianal numbness, tingling, weakness or weight loss  Risk factors include lack of exercise, obesity and sedentary lifestyle  She has tried analgesics for the symptoms  Review of Systems   Constitutional: Negative for fever and weight loss  Cardiovascular: Negative for chest pain  Gastrointestinal: Negative for abdominal pain and bowel incontinence  Genitourinary: Negative for bladder incontinence, dysuria and pelvic pain  Musculoskeletal: Positive for back pain  Neurological: Negative for tingling, weakness, numbness, headaches and paresthesias  All other systems reviewed and are negative        Current Outpatient Medications on File Prior to Visit   Medication Sig   • acetaminophen (TYLENOL) 500 mg tablet Take 2 tablets (1,000 mg total) by mouth every 8 (eight) hours as needed for mild pain or moderate pain   • albuterol (PROVENTIL HFA,VENTOLIN HFA) 90 mcg/act inhaler Inhale 2 puffs every 4 (four) hours as needed (coughing) (Patient not taking: Reported on 6/27/2022)   • ergocalciferol (VITAMIN D2) 50,000 units Take 1 capsule (50,000 Units total) by mouth once a week   • fluticasone (FLONASE) 50 mcg/act nasal spray 1 spray into each nostril daily   • meclizine (ANTIVERT) 12 5 MG tablet Take 1 tablet (12 5 mg total) by mouth 3 (three) times a day as needed for dizziness (Patient not taking: Reported on 6/27/2022)   • menthol-cetylpyridinium (CEPACOL) 3 MG lozenge Take 1 lozenge (3 mg total) by mouth as needed for sore throat   • nicotine (NICODERM CQ) 21 mg/24 hr TD 24 hr patch Place 1 patch on the skin every 24 hours   • ondansetron (ZOFRAN-ODT) 4 mg disintegrating tablet Take 1 tablet (4 mg total) by mouth every 8 (eight) hours as needed (nausea) for up to 10 days   • pantoprazole (PROTONIX) 20 mg tablet Take 1 tablet (20 mg total) by mouth in the morning  • pseudoephedrine (SUDAFED) 30 mg tablet Take 1 tablet (30 mg total) by mouth every 8 (eight) hours as needed for congestion (Patient not taking: No sig reported)   • [DISCONTINUED] SUMAtriptan (IMITREX) 50 mg tablet Take 1 tablet (50 mg total) by mouth once as needed for migraine for up to 1 dose       Objective     /76 (BP Location: Right arm, Patient Position: Sitting, Cuff Size: Standard)   Pulse (!) 133   Temp 98 7 °F (37 1 °C) (Temporal)   Resp 16   Wt 70 3 kg (155 lb)   SpO2 97%   Breastfeeding No   BMI 28 35 kg/m²     Physical Exam  Vitals and nursing note reviewed  Constitutional:       Appearance: She is well-developed  HENT:      Head: Normocephalic  Right Ear: External ear normal       Left Ear: External ear normal       Nose: Nose normal    Eyes:      Conjunctiva/sclera: Conjunctivae normal       Pupils: Pupils are equal, round, and reactive to light  Neck:      Thyroid: No thyromegaly  Cardiovascular:      Rate and Rhythm: Normal rate and regular rhythm  Heart sounds: Normal heart sounds     Pulmonary:      Effort: Pulmonary effort is normal       Breath sounds: Normal breath sounds  Abdominal:      Palpations: Abdomen is soft  Tenderness: There is no abdominal tenderness  There is no guarding or rebound  Musculoskeletal:         General: Tenderness present  Cervical back: Normal, normal range of motion and neck supple  Thoracic back: Normal       Lumbar back: Spasms and tenderness present  Decreased range of motion  Positive left straight leg raise test    Skin:     General: Skin is dry  Neurological:      Mental Status: She is alert and oriented to person, place, and time  Deep Tendon Reflexes: Reflexes are normal and symmetric         Lianet Arita MD

## 2022-12-28 NOTE — ED PROVIDER NOTES
History  Chief Complaint   Patient presents with   • Burn     Patient reports burning hand after left hand coming into contact with aluminum baking mcguire  Patient presents for an evaluation of a burn to mckeon aspect of L hand occurring just PTA  Patient accidentally grabbed a hot pan  Now with pain to first 3 fingers  No numbness/ weakness  Not UTD on tetanus  ROM decreased due to pain  Blistering is starting to appear most notably on 2nd finger  No tenderness to dorsal aspect of hand  No pain or injury elsewhere  Prior to Admission Medications   Prescriptions Last Dose Informant Patient Reported? Taking?    SUMAtriptan (IMITREX) 50 mg tablet   No No   Sig: Take 1 tablet (50 mg total) by mouth once as needed for migraine for up to 1 dose   acetaminophen (TYLENOL) 500 mg tablet   No No   Sig: Take 2 tablets (1,000 mg total) by mouth every 8 (eight) hours as needed for mild pain or moderate pain   albuterol (PROVENTIL HFA,VENTOLIN HFA) 90 mcg/act inhaler   No No   Sig: Inhale 2 puffs every 4 (four) hours as needed (coughing)   Patient not taking: Reported on 2022   ergocalciferol (VITAMIN D2) 50,000 units   No No   Sig: Take 1 capsule (50,000 Units total) by mouth once a week   fluticasone (FLONASE) 50 mcg/act nasal spray   No No   Si spray into each nostril daily   meclizine (ANTIVERT) 12 5 MG tablet   No No   Sig: Take 1 tablet (12 5 mg total) by mouth 3 (three) times a day as needed for dizziness   Patient not taking: Reported on 2022   menthol-cetylpyridinium (CEPACOL) 3 MG lozenge   No No   Sig: Take 1 lozenge (3 mg total) by mouth as needed for sore throat   nicotine (NICODERM CQ) 21 mg/24 hr TD 24 hr patch   No No   Sig: Place 1 patch on the skin every 24 hours   ondansetron (ZOFRAN-ODT) 4 mg disintegrating tablet   No No   Sig: Take 1 tablet (4 mg total) by mouth every 8 (eight) hours as needed (nausea) for up to 10 days   pantoprazole (PROTONIX) 20 mg tablet   No No   Sig: Take 1 tablet (20 mg total) by mouth in the morning  pseudoephedrine (SUDAFED) 30 mg tablet   No No   Sig: Take 1 tablet (30 mg total) by mouth every 8 (eight) hours as needed for congestion   Patient not taking: No sig reported   tiZANidine (ZANAFLEX) 4 mg tablet   No No   Sig: Take 1 tablet (4 mg total) by mouth 3 (three) times a day      Facility-Administered Medications: None       Past Medical History:   Diagnosis Date   • Abnormal Pap smear of cervix 2015    cryosurgery   • Migraine headache with aura     managed with Elavil       Past Surgical History:   Procedure Laterality Date   • CHOLECYSTECTOMY LAPAROSCOPIC N/A 10/27/2020    Procedure: CHOLECYSTECTOMY LAPAROSCOPIC;  Surgeon: Lynette Moritz, MD;  Location: [address] [place] [address];  Service: General   • GYNECOLOGIC CRYOSURGERY  2015   • HYSTERECTOMY  2016   • LAPAROSCOPY  2018   • [place] LAPAROSCOPY W/LYSIS OF ADHESIONS N/A 3/3/2020    Procedure: L O A ;  Surgeon: Miroslava Dejesus MD;  Location: [facility] OR;  Service: Gynecology   • [place] LAPS ABD PRTM&OMENTUM DX W/WO SPEC BR/WA SPX N/A 8/9/2019    Procedure: LAPAROSCOPY DIAGNOSTIC; LYSIS OF ADHESIONS;  Surgeon: Hermann Genao MD;  Location: [facility] OR;  Service: Gynecology   • TUBAL LIGATION         Family History   Problem Relation Age of Onset   • Cancer Paternal Grandmother         uterine   • Cancer Paternal Aunt         uterine    • Breast cancer Neg Hx    • Colon cancer Neg Hx    • Ovarian cancer Neg Hx      I have reviewed and agree with the history as documented  E-Cigarette/Vaping   • E-Cigarette Use Never User      E-Cigarette/Vaping Substances     Social History     Tobacco Use   • Smoking status: Every Day     Packs/day: 0 50     Years: 15 00     Pack years: 7 50     Types: Cigarettes   • Smokeless tobacco: Never   Vaping Use   • Vaping Use: Never used   Substance Use Topics   • Alcohol use: Not Currently   • Drug use: Never       Review of Systems   Constitutional: Negative for chills and fever  HENT: Negative for congestion, ear pain and sore throat  Eyes: Negative for pain  Respiratory: Negative for cough and shortness of breath  Cardiovascular: Negative for chest pain  Gastrointestinal: Negative for abdominal pain, nausea and vomiting  Genitourinary: Negative for dysuria  Musculoskeletal: Positive for arthralgias and myalgias  Negative for back pain  Skin: Positive for wound  Negative for rash  burn   Neurological: Negative for dizziness and numbness  Psychiatric/Behavioral: Negative for suicidal ideas  All other systems reviewed and are negative  Physical Exam  Physical Exam  Vitals reviewed  Constitutional:       General: She is in acute distress  Appearance: She is well-developed  HENT:      Head: Normocephalic and atraumatic  Right Ear: External ear normal       Left Ear: External ear normal       Nose: Nose normal    Cardiovascular:      Rate and Rhythm: Normal rate and regular rhythm  Heart sounds: Normal heart sounds  Pulmonary:      Effort: Pulmonary effort is normal       Breath sounds: Normal breath sounds  Abdominal:      General: Bowel sounds are normal       Palpations: Abdomen is soft  Tenderness: There is no abdominal tenderness  Musculoskeletal:         General: Normal range of motion  Cervical back: Normal range of motion and neck supple  Comments: Please see photo below  Tenderness extending from 1st to 3rd finger with blistering starting to appear most notably on 2nd and 3rd fingers  ROM decreased  Sensation intact  Skin:     General: Skin is warm and dry  Capillary Refill: Capillary refill takes less than 2 seconds  Neurological:      Mental Status: She is alert and oriented to person, place, and time     Psychiatric:         Behavior: Behavior normal                Vital Signs  ED Triage Vitals   Temperature Pulse Respirations Blood Pressure SpO2   12/27/22 2048 12/27/22 2046 12/27/22 2046 12/27/22 2046 12/27/22 2046   98 6 °F (37 °C) 95 16 141/74 100 %      Temp Source Heart Rate Source Patient Position - Orthostatic VS BP Location FiO2 (%)   12/27/22 2048 12/27/22 2046 12/27/22 2046 12/27/22 2046 --   Oral Monitor Sitting Right arm       Pain Score       12/27/22 2046       10 - Worst Possible Pain           Vitals:    12/27/22 2046   BP: 141/74   Pulse: 95   Patient Position - Orthostatic VS: Sitting         Visual Acuity      ED Medications  Medications   tetanus-diphtheria-acellular pertussis (BOOSTRIX) IM injection 0 5 mL (0 5 mL Intramuscular Given 12/27/22 2103)   silver sulfadiazine (SILVADENE,SSD) 1 % cream (1 application Topical Given 12/27/22 2103)   oxyCODONE-acetaminophen (PERCOCET) 5-325 mg per tablet 1 tablet (1 tablet Oral Given 12/27/22 2103)   morphine injection 4 mg (4 mg Intramuscular Given 12/27/22 2157)       Diagnostic Studies  Results Reviewed     None                 No orders to display              Procedures  Procedures         ED Course  ED Course as of 12/27/22 2304   Tue Dec 27, 2022   2139 Referral order placed for burn consult                                             MDM  Number of Diagnoses or Management Options  Burn (any degree) involving less than 10% of body surface  Partial thickness burn of left hand including fingers, initial encounter  Diagnosis management comments: Discussed case with Dr Miesha Fletcher, burn, who recommends wrapping hand in Xeroform, nonstick dressing, and gauze and f/u with burn unit tomorrow  Discussed with patient and  at bedside who understand and are in agreement with plan   Will DC      Disposition  Final diagnoses:   Burn (any degree) involving less than 10% of body surface   Partial thickness burn of left hand including fingers, initial encounter     Time reflects when diagnosis was documented in both MDM as applicable and the Disposition within this note     Time User Action Codes Description Comment    12/27/2022 10:55 PM Miguelito Rothman Add [T31 0] Burn (any degree) involving less than 10% of body surface     12/27/2022 10:55 PM Dylan Rothman Knee Add [T23 202A,  T23 232A] Partial thickness burn of left hand including fingers, initial encounter       ED Disposition     ED Disposition   Discharge    Condition   Stable    Date/Time   Tue Dec 27, 2022 10:55 PM    Comment   Eri Concepcion discharge to home/self care  Follow-up Information    None         Patient's Medications   Discharge Prescriptions    OXYCODONE-ACETAMINOPHEN (PERCOCET) 5-325 MG PER TABLET    Take 1 tablet by mouth every 4 (four) hours as needed for moderate pain for up to 10 days Max Daily Amount: 6 tablets       Start Date: 12/27/2022End Date: 1/6/2023       Order Dose: 1 tablet       Quantity: 12 tablet    Refills: 0    SILVER SULFADIAZINE (SILVADENE,SSD) 1 % CREAM    Apply topically daily       Start Date: 12/27/2022End Date: --       Order Dose: --       Quantity: 50 g    Refills: 0       No discharge procedures on file      PDMP Review     None          ED Provider  Electronically Signed by           Pam Guajardo PA-C  12/27/22 6579

## 2022-12-28 NOTE — DISCHARGE INSTRUCTIONS
Please schedule an appointment with [facility] tomorrow morning  They are aware of your case and will see you in the office  [phone]  Do not drive, operate heavy machinery, consume alcohol under the influence of Percocet

## 2023-01-05 DIAGNOSIS — K21.9 GASTROESOPHAGEAL REFLUX DISEASE WITHOUT ESOPHAGITIS: ICD-10-CM

## 2023-01-05 DIAGNOSIS — G43.109 MIGRAINE WITH AURA AND WITHOUT STATUS MIGRAINOSUS, NOT INTRACTABLE: ICD-10-CM

## 2023-01-05 DIAGNOSIS — Z72.0 TOBACCO USE: ICD-10-CM

## 2023-01-05 RX ORDER — SUMATRIPTAN 50 MG/1
50 TABLET, FILM COATED ORAL ONCE AS NEEDED
Qty: 9 TABLET | Refills: 0 | Status: SHIPPED | OUTPATIENT
Start: 2023-01-05

## 2023-01-05 RX ORDER — PANTOPRAZOLE SODIUM 20 MG/1
20 TABLET, DELAYED RELEASE ORAL DAILY
Qty: 90 TABLET | Refills: 0 | Status: SHIPPED | OUTPATIENT
Start: 2023-01-05 | End: 2023-04-05

## 2023-01-05 RX ORDER — NICOTINE 21 MG/24HR
1 PATCH, TRANSDERMAL 24 HOURS TRANSDERMAL EVERY 24 HOURS
Qty: 28 PATCH | Refills: 0 | Status: SHIPPED | OUTPATIENT
Start: 2023-01-05

## 2023-01-24 ENCOUNTER — OFFICE VISIT (OUTPATIENT)
Dept: FAMILY MEDICINE CLINIC | Facility: CLINIC | Age: 38
End: 2023-01-24

## 2023-01-24 ENCOUNTER — HOSPITAL ENCOUNTER (OUTPATIENT)
Dept: RADIOLOGY | Facility: HOSPITAL | Age: 38
Discharge: HOME | End: 2023-01-24

## 2023-01-24 VITALS
OXYGEN SATURATION: 99 % | BODY MASS INDEX: 28.35 KG/M2 | TEMPERATURE: 98.3 F | HEART RATE: 86 BPM | SYSTOLIC BLOOD PRESSURE: 108 MMHG | DIASTOLIC BLOOD PRESSURE: 60 MMHG | WEIGHT: 155 LBS | RESPIRATION RATE: 18 BRPM

## 2023-01-24 DIAGNOSIS — M54.42 CHRONIC BILATERAL LOW BACK PAIN WITH LEFT-SIDED SCIATICA: ICD-10-CM

## 2023-01-24 DIAGNOSIS — G89.29 CHRONIC PAIN OF LEFT KNEE: ICD-10-CM

## 2023-01-24 DIAGNOSIS — G89.29 CHRONIC BILATERAL LOW BACK PAIN WITH LEFT-SIDED SCIATICA: ICD-10-CM

## 2023-01-24 DIAGNOSIS — M25.562 CHRONIC PAIN OF LEFT KNEE: ICD-10-CM

## 2023-01-24 DIAGNOSIS — E66.3 OVERWEIGHT: ICD-10-CM

## 2023-01-24 DIAGNOSIS — Z13.31 DEPRESSION SCREEN: ICD-10-CM

## 2023-01-24 DIAGNOSIS — M25.562 CHRONIC PAIN OF LEFT KNEE: Primary | ICD-10-CM

## 2023-01-24 DIAGNOSIS — G89.29 CHRONIC PAIN OF LEFT KNEE: Primary | ICD-10-CM

## 2023-01-24 RX ORDER — TIZANIDINE 4 MG/1
4 TABLET ORAL 2 TIMES DAILY PRN
Qty: 60 TABLET | Refills: 1 | Status: SHIPPED | OUTPATIENT
Start: 2023-01-24
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
## 2023-01-26 ENCOUNTER — HOSPITAL ENCOUNTER (EMERGENCY)
Facility: HOSPITAL | Age: 38
Discharge: HOME | End: 2023-01-26
Attending: EMERGENCY MEDICINE

## 2023-01-26 VITALS
SYSTOLIC BLOOD PRESSURE: 122 MMHG | WEIGHT: 157 LBS | DIASTOLIC BLOOD PRESSURE: 81 MMHG | RESPIRATION RATE: 18 BRPM | HEART RATE: 73 BPM | TEMPERATURE: 97.9 F | BODY MASS INDEX: 28.72 KG/M2 | OXYGEN SATURATION: 97 %

## 2023-01-26 DIAGNOSIS — G43.909 MIGRAINE WITHOUT STATUS MIGRAINOSUS, NOT INTRACTABLE, UNSPECIFIED MIGRAINE TYPE: Primary | ICD-10-CM

## 2023-01-26 RX ORDER — DIPHENHYDRAMINE HYDROCHLORIDE 50 MG/ML
25 INJECTION INTRAMUSCULAR; INTRAVENOUS ONCE
Status: COMPLETED | OUTPATIENT
Start: 2023-01-26 | End: 2023-01-26

## 2023-01-26 RX ORDER — METOCLOPRAMIDE HYDROCHLORIDE 5 MG/ML
10 INJECTION INTRAMUSCULAR; INTRAVENOUS ONCE
Status: COMPLETED | OUTPATIENT
Start: 2023-01-26 | End: 2023-01-26

## 2023-01-26 RX ORDER — ACETAMINOPHEN, ASPIRIN AND CAFFEINE 250; 250; 65 MG/1; MG/1; MG/1
1 TABLET, FILM COATED ORAL DAILY
Qty: 30 TABLET | Refills: 0 | Status: SHIPPED | OUTPATIENT
Start: 2023-01-26

## 2023-01-26 RX ORDER — KETOROLAC TROMETHAMINE 30 MG/ML
15 INJECTION, SOLUTION INTRAMUSCULAR; INTRAVENOUS ONCE
Status: COMPLETED | OUTPATIENT
Start: 2023-01-26 | End: 2023-01-26

## 2023-01-26 RX ORDER — ONDANSETRON 4 MG/1
4 TABLET, ORALLY DISINTEGRATING ORAL EVERY 6 HOURS PRN
Qty: 20 TABLET | Refills: 0 | Status: SHIPPED | OUTPATIENT
Start: 2023-01-26

## 2023-01-26 RX ADMIN — KETOROLAC TROMETHAMINE 15 MG: 30 INJECTION, SOLUTION INTRAMUSCULAR; INTRAVENOUS at 03:22

## 2023-01-26 RX ADMIN — DIPHENHYDRAMINE HYDROCHLORIDE 25 MG: 50 INJECTION, SOLUTION INTRAMUSCULAR; INTRAVENOUS at 03:13

## 2023-01-26 RX ADMIN — METOCLOPRAMIDE 10 MG: 5 INJECTION, SOLUTION INTRAMUSCULAR; INTRAVENOUS at 03:24

## 2023-01-26 RX ADMIN — SODIUM CHLORIDE 1000 ML: 0.9 INJECTION, SOLUTION INTRAVENOUS at 03:11

## 2023-01-26 NOTE — Clinical Note
Kaitlin Jennings was seen and treated in our emergency department on 1/26/2023  No restrictions            Diagnosis:     Saad Méndez  may return to work on return date  She may return on this date: 01/27/2023         If you have any questions or concerns, please don't hesitate to call        Georgie Givens PA-C    ______________________________           _______________          _______________  Hospital Representative                              Date                                Time

## 2023-01-26 NOTE — ED PROVIDER NOTES
History  Chief Complaint   Patient presents with   • Headache - Recurrent or Known Dx Migraines     Reports nausea, vomiting, photosensitivity  62-year-old female with history of migraine headaches presents complaining of a migraine headache  Patient states it feels similar to prior migraines  Patient states that had gradual onset that began yesterday with her typical aura  Patient complaining of right-sided headache with nausea and photophobia  Denies any other complaints  History provided by:  Patient   used: No        Prior to Admission Medications   Prescriptions Last Dose Informant Patient Reported? Taking?    SUMAtriptan (IMITREX) 50 mg tablet   No No   Sig: Take 1 tablet (50 mg total) by mouth once as needed for migraine for up to 1 dose   acetaminophen (TYLENOL) 500 mg tablet   No No   Sig: Take 2 tablets (1,000 mg total) by mouth every 8 (eight) hours as needed for mild pain or moderate pain   albuterol (PROVENTIL HFA,VENTOLIN HFA) 90 mcg/act inhaler   No No   Sig: Inhale 2 puffs every 4 (four) hours as needed (coughing)   Patient not taking: Reported on 2022   ergocalciferol (VITAMIN D2) 50,000 units   No No   Sig: Take 1 capsule (50,000 Units total) by mouth once a week   fluticasone (FLONASE) 50 mcg/act nasal spray   No No   Si spray into each nostril daily   meclizine (ANTIVERT) 12 5 MG tablet   No No   Sig: Take 1 tablet (12 5 mg total) by mouth 3 (three) times a day as needed for dizziness   Patient not taking: Reported on 2022   menthol-cetylpyridinium (CEPACOL) 3 MG lozenge   No No   Sig: Take 1 lozenge (3 mg total) by mouth as needed for sore throat   nicotine (NICODERM CQ) 21 mg/24 hr TD 24 hr patch   No No   Sig: Place 1 patch on the skin every 24 hours   ondansetron (ZOFRAN-ODT) 4 mg disintegrating tablet   No No   Sig: Take 1 tablet (4 mg total) by mouth every 8 (eight) hours as needed (nausea) for up to 10 days   pantoprazole (PROTONIX) 20 mg tablet   No No   Sig: Take 1 tablet (20 mg total) by mouth daily   pseudoephedrine (SUDAFED) 30 mg tablet   No No   Sig: Take 1 tablet (30 mg total) by mouth every 8 (eight) hours as needed for congestion   Patient not taking: No sig reported   silver sulfadiazine (SILVADENE,SSD) 1 % cream   No No   Sig: Apply topically daily   tiZANidine (ZANAFLEX) 4 mg tablet   No No   Sig: Take 1 tablet (4 mg total) by mouth 2 (two) times a day as needed for muscle spasms      Facility-Administered Medications: None       Past Medical History:   Diagnosis Date   • Abnormal Pap smear of cervix 2015    cryosurgery   • Migraine headache with aura     managed with Elavil       Past Surgical History:   Procedure Laterality Date   • CHOLECYSTECTOMY LAPAROSCOPIC N/A 10/27/2020    Procedure: CHOLECYSTECTOMY LAPAROSCOPIC;  Surgeon: Prasanth Llanes MD;  Location: [address] [place] [address];  Service: General   • GYNECOLOGIC CRYOSURGERY  2015   • HYSTERECTOMY  2016   • LAPAROSCOPY  2018   • AK LAPAROSCOPY W/LYSIS OF ADHESIONS N/A 3/3/2020    Procedure: L O A ;  Surgeon: Rachid Sharma MD;  Location: [facility] OR;  Service: Gynecology   • AK LAPS ABD PRTM&OMENTUM DX W/WO SPEC BR/WA SPX N/A 8/9/2019    Procedure: LAPAROSCOPY DIAGNOSTIC; LYSIS OF ADHESIONS;  Surgeon: Carlota Oleary MD;  Location: [facility];  Service: Gynecology   • TUBAL LIGATION         Family History   Problem Relation Age of Onset   • Cancer Paternal Grandmother         uterine   • Cancer Paternal Aunt         uterine    • Breast cancer Neg Hx    • Colon cancer Neg Hx    • Ovarian cancer Neg Hx      I have reviewed and agree with the history as documented      E-Cigarette/Vaping   • E-Cigarette Use Never User      E-Cigarette/Vaping Substances     Social History     Tobacco Use   • Smoking status: Every Day     Packs/day: 0 50     Years: 15 00     Pack years: 7 50     Types: Cigarettes   • Smokeless tobacco: Never   Vaping Use   • Vaping Use: Never used   Substance Use Topics   • Alcohol use: Not Currently   • Drug use: Never       Review of Systems   Constitutional: Negative  Negative for chills and fatigue  HENT: Negative for ear pain and sore throat  Eyes: Positive for photophobia  Negative for redness  Respiratory: Negative for apnea, cough and shortness of breath  Cardiovascular: Negative for chest pain  Gastrointestinal: Positive for nausea  Negative for abdominal pain and vomiting  Genitourinary: Negative for dysuria  Musculoskeletal: Negative for arthralgias, neck pain and neck stiffness  Skin: Negative for rash  Neurological: Positive for headaches  Negative for dizziness, tremors, syncope and weakness  Psychiatric/Behavioral: Negative for suicidal ideas  Physical Exam  Physical Exam  Constitutional:       General: She is not in acute distress  Appearance: She is well-developed  She is not diaphoretic  Comments: Holding head   Eyes:      Pupils: Pupils are equal, round, and reactive to light  Cardiovascular:      Rate and Rhythm: Normal rate and regular rhythm  Pulmonary:      Effort: Pulmonary effort is normal  No respiratory distress  Breath sounds: Normal breath sounds  Abdominal:      General: Bowel sounds are normal  There is no distension  Palpations: Abdomen is soft  Musculoskeletal:         General: Normal range of motion  Cervical back: Normal range of motion and neck supple  Skin:     General: Skin is warm and dry  Neurological:      General: No focal deficit present  Mental Status: She is alert and oriented to person, place, and time  Cranial Nerves: No cranial nerve deficit  Sensory: No sensory deficit  Motor: No weakness        Gait: Gait normal          Vital Signs  ED Triage Vitals [01/26/23 0245]   Temperature Pulse Respirations Blood Pressure SpO2   97 9 °F (36 6 °C) 73 18 122/81 97 %      Temp Source Heart Rate Source Patient Position - Orthostatic VS BP Location FiO2 (%) Oral Monitor Lying Left arm --      Pain Score       10 - Worst Possible Pain           Vitals:    01/26/23 0245   BP: 122/81   Pulse: 73   Patient Position - Orthostatic VS: Lying         Visual Acuity      ED Medications  Medications   sodium chloride 0 9 % bolus 1,000 mL (1,000 mL Intravenous New Bag 1/26/23 0311)   ketorolac (TORADOL) injection 15 mg (15 mg Intravenous Given 1/26/23 0322)   metoclopramide (REGLAN) injection 10 mg (10 mg Intravenous Given 1/26/23 0324)   diphenhydrAMINE (BENADRYL) injection 25 mg (25 mg Intravenous Given 1/26/23 0313)       Diagnostic Studies  Results Reviewed     None                 No orders to display              Procedures  Procedures         ED Course                               SBIRT 20yo+    Flowsheet Row Most Recent Value   SBIRT (25 yo +)    In order to provide better care to our patients, we are screening all of our patients for alcohol and drug use  Would it be okay to ask you these screening questions? Yes Filed at: 01/26/2023 0310   Initial Alcohol Screen: US AUDIT-C     1  How often do you have a drink containing alcohol? 0 Filed at: 01/26/2023 0310   2  How many drinks containing alcohol do you have on a typical day you are drinking? 0 Filed at: 01/26/2023 0310   3b  FEMALE Any Age, or MALE 65+: How often do you have 4 or more drinks on one occassion? 0 Filed at: 01/26/2023 0310   Audit-C Score 0 Filed at: 01/26/2023 0491   LISBETH: How many times in the past year have you    Used an illegal drug or used a prescription medication for non-medical reasons? Never Filed at: 01/26/2023 0310                    Medical Decision Making  Patient presented with history and physical exam consistent with migraine headache  Patient had history of similar  Differential includes migraine headache versus tension headache  Patient had complete symptomatic resolution with medications given  No clear role for imaging at this time given history of similar episodes  Educated on supportive care, ambulate other department  Discharge home  Risk  Prescription drug management  Disposition  Final diagnoses:   Migraine without status migrainosus, not intractable, unspecified migraine type     Time reflects when diagnosis was documented in both MDM as applicable and the Disposition within this note     Time User Action Codes Description Comment    1/26/2023  3:56 AM Lakesha Valdez Add [G43 [id number]] Migraine without status migrainosus, not intractable, unspecified migraine type       ED Disposition     ED Disposition   Discharge    Condition   Stable    Date/Time   Thu Jan 26, 2023  3:56 AM    Comment   Lorrie Jones discharge to home/self care  Follow-up Information     Follow up With Specialties Details Why Contact Info Additional Information    [facility] Call  As needed [address]  [address] [facility]  Jozef Harley   [phone]       [facility] Emergency Department Emergency Medicine Go to  If symptoms worsen [address] [address]  [facility] Emergency Department          Patient's Medications   Discharge Prescriptions    ASPIRIN-ACETAMINOPHEN-CAFFEINE (EXCEDRIN MIGRAINE) 250-250-65 MG PER TABLET    Take 1 tablet by mouth in the morning       Start Date: 1/26/2023 End Date: --       Order Dose: 1 tablet       Quantity: 30 tablet    Refills: 0    ONDANSETRON (ZOFRAN ODT) 4 MG DISINTEGRATING TABLET    Take 1 tablet (4 mg total) by mouth every 6 (six) hours as needed for nausea or vomiting       Start Date: 1/26/2023 End Date: --       Order Dose: 4 mg       Quantity: 20 tablet    Refills: 0       No discharge procedures on file      PDMP Review     None          ED Provider  Electronically Signed by           Micheline Fuller PA-C  01/26/23 5248

## 2023-01-29 PROBLEM — G89.29 CHRONIC BILATERAL LOW BACK PAIN WITH LEFT-SIDED SCIATICA: Status: ACTIVE | Noted: 2023-01-29

## 2023-01-29 PROBLEM — M54.42 CHRONIC BILATERAL LOW BACK PAIN WITH LEFT-SIDED SCIATICA: Status: ACTIVE | Noted: 2023-01-29

## 2023-01-29 NOTE — ASSESSMENT & PLAN NOTE
- Will obtain x-ray lumbar spine for further evaluation  - Advised to apply ice/heating pad/topical therapies as needed to affected area  - Will prescribe tizanidine 4 mg, to be taken twice daily as needed  - Continue alternating ibuprofen/Tylenol as needed for pain  - If symptoms persist, would recommend referral to physical therapy

## 2023-01-29 NOTE — ASSESSMENT & PLAN NOTE
- Patient had previously completed physical therapy multiple times, in [place], [place] and in [facility] but pain has been persistent  - Will obtain x-ray of left knee for further evaluation     - Will order MRI left knee without contrast to rule out meniscal tear  - Advised to take Tylenol or ibuprofen as needed for pain  - Advised to apply ice/ heating pad as needed to the affected area  - Patient notes she has previously received injection of her knee  Patient would be interested in receiving this again

## 2023-02-02 ENCOUNTER — TELEPHONE (OUTPATIENT)
Dept: FAMILY MEDICINE CLINIC | Facility: CLINIC | Age: 38
End: 2023-02-02

## 2023-02-02 DIAGNOSIS — G89.29 CHRONIC PAIN OF LEFT KNEE: Primary | ICD-10-CM

## 2023-02-02 DIAGNOSIS — M25.562 CHRONIC PAIN OF LEFT KNEE: Primary | ICD-10-CM

## 2023-02-02 NOTE — TELEPHONE ENCOUNTER
PATIENT CAME IN OFFICE REQUESTING A REFERRAL FOR MRI FOR HER LEFT KNEE  SHE STATES PROVIDER WAS GOING TO GIVE HER ONE SHE CALLED [facility] AND THEY TOLD HER THERE WAS NOTHING ON HER CHART     PLEASE ADVISE

## 2023-02-16 DIAGNOSIS — K21.9 GASTROESOPHAGEAL REFLUX DISEASE WITHOUT ESOPHAGITIS: ICD-10-CM

## 2023-02-16 DIAGNOSIS — G43.109 MIGRAINE WITH AURA AND WITHOUT STATUS MIGRAINOSUS, NOT INTRACTABLE: ICD-10-CM

## 2023-02-16 RX ORDER — PANTOPRAZOLE SODIUM 20 MG/1
20 TABLET, DELAYED RELEASE ORAL DAILY
Qty: 90 TABLET | Refills: 0 | Status: SHIPPED | OUTPATIENT
Start: 2023-02-16 | End: 2023-05-17

## 2023-02-16 RX ORDER — SUMATRIPTAN 50 MG/1
50 TABLET, FILM COATED ORAL ONCE AS NEEDED
Qty: 9 TABLET | Refills: 0 | Status: SHIPPED | OUTPATIENT
Start: 2023-02-16

## 2023-02-21 ENCOUNTER — OFFICE VISIT (OUTPATIENT)
Dept: FAMILY MEDICINE CLINIC | Facility: CLINIC | Age: 38
End: 2023-02-21

## 2023-02-21 VITALS
RESPIRATION RATE: 18 BRPM | HEIGHT: 62 IN | BODY MASS INDEX: 28.71 KG/M2 | DIASTOLIC BLOOD PRESSURE: 68 MMHG | OXYGEN SATURATION: 97 % | TEMPERATURE: 97 F | WEIGHT: 156 LBS | HEART RATE: 86 BPM | SYSTOLIC BLOOD PRESSURE: 102 MMHG

## 2023-02-21 DIAGNOSIS — H60.543 ACUTE ECZEMATOID OTITIS EXTERNA OF BOTH EARS: ICD-10-CM

## 2023-02-21 DIAGNOSIS — G89.29 CHRONIC BILATERAL LOW BACK PAIN WITH LEFT-SIDED SCIATICA: ICD-10-CM

## 2023-02-21 DIAGNOSIS — Z72.0 TOBACCO USE: ICD-10-CM

## 2023-02-21 DIAGNOSIS — M25.562 CHRONIC PAIN OF LEFT KNEE: Primary | ICD-10-CM

## 2023-02-21 DIAGNOSIS — K59.00 CONSTIPATION, UNSPECIFIED CONSTIPATION TYPE: ICD-10-CM

## 2023-02-21 DIAGNOSIS — G89.29 CHRONIC PAIN OF LEFT KNEE: Primary | ICD-10-CM

## 2023-02-21 DIAGNOSIS — M54.42 CHRONIC BILATERAL LOW BACK PAIN WITH LEFT-SIDED SCIATICA: ICD-10-CM

## 2023-02-21 RX ORDER — TIZANIDINE 4 MG/1
4 TABLET ORAL 2 TIMES DAILY PRN
Qty: 60 TABLET | Refills: 1 | Status: SHIPPED | OUTPATIENT
Start: 2023-02-21

## 2023-02-21 RX ORDER — DOCUSATE SODIUM 100 MG/1
100 CAPSULE, LIQUID FILLED ORAL 2 TIMES DAILY PRN
Qty: 60 CAPSULE | Refills: 1 | Status: SHIPPED | OUTPATIENT
Start: 2023-02-21

## 2023-02-21 RX ORDER — NEOMYCIN SULFATE, POLYMYXIN B SULFATE AND HYDROCORTISONE 10; 3.5; 1 MG/ML; MG/ML; [USP'U]/ML
4 SUSPENSION/ DROPS AURICULAR (OTIC) 2 TIMES DAILY
Qty: 10 ML | Refills: 0 | Status: SHIPPED | OUTPATIENT
Start: 2023-02-21

## 2023-02-21 RX ORDER — NICOTINE 21 MG/24HR
1 PATCH, TRANSDERMAL 24 HOURS TRANSDERMAL EVERY 24 HOURS
Qty: 28 PATCH | Refills: 2 | Status: SHIPPED | OUTPATIENT
Start: 2023-02-21

## 2023-02-21 NOTE — ASSESSMENT & PLAN NOTE
- Currently smoking about half pack per day  - Patient notes nicotine patches 21 mg has been helping to decrease smoking, but is causing a lot of headaches  - Will decrease dose of nicotine patches from 21 mg to 14 mg

## 2023-02-21 NOTE — ASSESSMENT & PLAN NOTE
- Patient had previously completed physical therapy multiple times, in [place], [place] and in [facility] but pain has been persistent  -Reviewed recent left knee x-ray which was unremarkable  - MRI left knee without contrast was ordered to rule out meniscal tear, however it was not approved by insurance   -Highly recommended for patient to try physical therapy again, but she declines  - Will refer to orthopedics for further evaluation and management  - Advised to take Tylenol or ibuprofen as needed for pain  - Advised to apply ice/ heating pad as needed to the affected area  - Patient notes she has previously received injection of her knee  Patient would be interested in receiving this again

## 2023-02-21 NOTE — ASSESSMENT & PLAN NOTE
- Miralax and chuck have been effective, per patient  Will trial colace 100 mg BID PRN    - Discussed pathophysiology of constipation with patient  - Increase fluid intake, primarily water  Increase daily dietary fiber (dark leafy green vegetables, apples with skin, whole grain breads)  - May use OTC fiber source to supplement diet (Metamucil, Citrucel, FiberCon, Benefiber)  - Increase daily activity which can help stimulate bowel movements  - Avoid OTC laxatives and colon cleanses preparations which can worsen constipation

## 2023-02-21 NOTE — PROGRESS NOTES
Assessment/Plan:    Constipation  - Miralax and chuck have been effective, per patient  Will trial colace 100 mg BID PRN    - Discussed pathophysiology of constipation with patient  - Increase fluid intake, primarily water  Increase daily dietary fiber (dark leafy green vegetables, apples with skin, whole grain breads)  - May use OTC fiber source to supplement diet (Metamucil, Citrucel, FiberCon, Benefiber)  - Increase daily activity which can help stimulate bowel movements  - Avoid OTC laxatives and colon cleanses preparations which can worsen constipation  Tobacco use  - Currently smoking about half pack per day  - Patient notes nicotine patches 21 mg has been helping to decrease smoking, but is causing a lot of headaches  - Will decrease dose of nicotine patches from 21 mg to 14 mg  Chronic bilateral low back pain with left-sided sciatica  - Reviewed recent lumbar spine x-ray which shows mild scoliosis, otherwise unremarkable study   - Advised to apply ice/heating pad/topical therapies as needed to affected area  - Continue tizanidine 4 mg, to be taken twice daily as needed  - Continue alternating ibuprofen/Tylenol as needed for pain  - If symptoms persist, would recommend referral to physical therapy  Chronic pain of left knee  - Patient had previously completed physical therapy multiple times, in [place], [place] and in [facility] but pain has been persistent  -Reviewed recent left knee x-ray which was unremarkable  - MRI left knee without contrast was ordered to rule out meniscal tear, however it was not approved by insurance   -Highly recommended for patient to try physical therapy again, but she declines  - Will refer to orthopedics for further evaluation and management  - Advised to take Tylenol or ibuprofen as needed for pain  - Advised to apply ice/ heating pad as needed to the affected area  - Patient notes she has previously received injection of her knee    Patient would Fever      HPI:  77 y/o male with PMHx of HTN, BPH, DM, Hypothyroid, and OA was sent to the ED from [facility] due to recurrent fevers (T-max 102) and back pain of 1 day duration. Patient was recently discharge from [facility] for thoracic laminectomy and fusion.  Patient said he has been doing well since dc until yesterday when he started having recurrent fevers and back pain. Patient has no   trauma/fall, neck pain, HA, numbness, tingling, bowel/urinary incontinence, chills, chest pain, shortness of breath, palpitation, cough, nausea/vomiting, abdominal pain. (12 Dec 2018 05:20)      Interval History:  Patient was seen and examined at bedside around 8:30 am. Doing well. No episode of stool incontinence today. As per him, he feels urge to go but is unable to hold. Denies saddles anesthesia.    Denies chest pain, palpitations, shortness of breath, headache, dizziness, visual symptoms, nausea, vomiting or abdominal pain.    ROS:  As per interval history otherwise unremarkable.    PHYSICAL EXAM:  Vital Signs   T(C): 37.1 (09 Jan 2019 11:20), Max: 37.6 (08 Jan 2019 21:09)  T(F): 98.7 (09 Jan 2019 11:20), Max: 99.7 (08 Jan 2019 21:09)  HR: 89 (09 Jan 2019 11:20) (89 - 100)  BP: 104/63 (09 Jan 2019 11:20) (104/63 - 199/63)  RR: 20 (09 Jan 2019 11:20) (20 - 20)  SpO2: 94% (09 Jan 2019 11:20) (94% - 95%)  General: Elderly male lying in bed comfortably. No acute distress  HEENT: PERRLA. EOMI. Clear conjunctivae. Moist mucus membrane  Neck: Supple. No JVD. No Thyromegaly   Chest: CTA bilaterally - no wheezing, rales or rhonchi.   Heart: Normal S1 & S2. RRR. No murmur.   Abdomen: Soft. Non-tender. Non-distended. + BS  Back: Dressing on surgical wound - dry.  Surrounding area clear.   Ext: No pedal edema. No calf tenderness   Neuro: Active and alert. No focal deficit. No speech disorder  Skin: Warm and Dry  Psychiatry: Normal mood and affect    MEDICATIONS  (STANDING):  amLODIPine   Tablet 10 milliGRAM(s) Oral daily  atorvastatin 40 milliGRAM(s) Oral at bedtime  DAPTOmycin IVPB 600 milliGRAM(s) IV Intermittent every 24 hours  dextrose 50% Injectable 12.5 Gram(s) IV Push once  doxazosin 2 milliGRAM(s) Oral at bedtime  enoxaparin Injectable 40 milliGRAM(s) SubCutaneous every 24 hours  ertapenem  IVPB 1000 milliGRAM(s) IV Intermittent every 24 hours  gabapentin 300 milliGRAM(s) Oral three times a day  insulin lispro (HumaLOG) corrective regimen sliding scale   SubCutaneous three times a day before meals  levothyroxine 150 MICROGram(s) Oral daily  metoprolol tartrate 50 milliGRAM(s) Oral two times a day  pantoprazole    Tablet 40 milliGRAM(s) Oral before breakfast  saccharomyces boulardii 250 milliGRAM(s) Oral two times a day  sodium chloride 1 Gram(s) Oral three times a day  tamsulosin 0.8 milliGRAM(s) Oral at bedtime    MEDICATIONS  (PRN):  acetaminophen   Tablet .. 650 milliGRAM(s) Oral every 6 hours PRN Temp greater or equal to 38C (100.4F), Mild Pain (1 - 3)  glucagon  Injectable 1 milliGRAM(s) IntraMuscular once PRN Glucose LESS THAN 70 milligrams/deciliter  hydrALAZINE Injectable 10 milliGRAM(s) IV Push every 6 hours PRN If SBP > 160  methocarbamol 500 milliGRAM(s) Oral every 6 hours PRN Back Spasms  morphine  - Injectable 2 milliGRAM(s) IV Push every 6 hours PRN breakthrough pain not controlled with current medication  ondansetron Injectable 4 milliGRAM(s) IV Push every 6 hours PRN Nausea  oxyCODONE    IR 5 milliGRAM(s) Oral every 4 hours PRN Moderate Pain (4 - 6)  oxyCODONE    IR 10 milliGRAM(s) Oral every 4 hours PRN Severe Pain (7 - 10)    LABS:  Reviewed    RADIOLOGY & ADDITIONAL STUDIES:  Reviewed be interested in receiving this again  Diagnoses and all orders for this visit:    Chronic pain of left knee  -     Ambulatory Referral to Orthopedic Surgery; Future    Chronic bilateral low back pain with left-sided sciatica  -     tiZANidine (ZANAFLEX) 4 mg tablet; Take 1 tablet (4 mg total) by mouth 2 (two) times a day as needed for muscle spasms    Constipation, unspecified constipation type  -     docusate sodium (COLACE) 100 mg capsule; Take 1 capsule (100 mg total) by mouth 2 (two) times a day as needed for constipation    Tobacco use  -     nicotine (NICODERM CQ) 14 mg/24hr TD 24 hr patch; Place 1 patch on the skin over 24 hours every 24 hours    Acute eczematoid otitis externa of both ears  -     neomycin-polymyxin-hydrocortisone (CORTISPORIN) 0 35%-10,000 units/mL-1% otic suspension; Administer 4 drops into both ears 2 (two) times a day        All of patients questions were answered  Patient understands and agrees with the above plan  Return in about 4 weeks (around 3/21/2023) for Next scheduled follow up knee pain  Dot MICHAEL Reyes  02/21/23  [address] [place]          Subjective:     Patient ID: Maria C Perazaer  is a 40 y o  female with known PMH of migraines, diabetes, GERD who presents today in office for left knee pain follow up  - Patient is a 40 y o  female who presents today for left knee pain follow up  Patient notes she continues with left knee pain  Patient notes in the past she was told in [facility] that she has a meniscal tear and was recommended to undergo surgery, but she declined  -Patient notes recently she has been experiencing constipation  Patient notes she has tried MiraLAX and senna with little relief  Patient notes she does not drink much water  Patient notes she has been using the nicotine patches and they have been helping to decrease smoking, but she is having a lot of headaches so she stopped using them        The following portions of the patient's history were reviewed and updated as appropriate: allergies, current medications, past family history, past medical history, past social history, past surgical history and problem list         Review of Systems   Constitutional: Negative for chills and fever  HENT: Negative for congestion, ear pain and sore throat  Eyes: Negative for pain  Respiratory: Negative for cough, chest tightness and shortness of breath  Cardiovascular: Negative for chest pain and palpitations  Gastrointestinal: Negative for abdominal pain, constipation, diarrhea, nausea and vomiting  Genitourinary: Negative for difficulty urinating and dysuria  Musculoskeletal: Positive for arthralgias, back pain and gait problem  Skin: Negative for rash  Neurological: Negative for dizziness, numbness and headaches  Objective:   Vitals:    02/21/23 0800   BP: 102/68   BP Location: Left arm   Patient Position: Sitting   Cuff Size: Standard   Pulse: 86   Resp: 18   Temp: (!) 97 °F (36 1 °C)   TempSrc: Temporal   SpO2: 97%   Weight: 70 8 kg (156 lb)   Height: 5' 2" (1 575 m)         Physical Exam  Vitals and nursing note reviewed  Constitutional:       General: She is not in acute distress  Appearance: She is well-developed  HENT:      Head: Normocephalic and atraumatic  Right Ear: External ear normal       Left Ear: External ear normal       Nose: Nose normal    Eyes:      Conjunctiva/sclera: Conjunctivae normal    Cardiovascular:      Rate and Rhythm: Normal rate and regular rhythm  Pulses: Normal pulses  Heart sounds: Normal heart sounds  Pulmonary:      Effort: Pulmonary effort is normal  No respiratory distress  Breath sounds: Normal breath sounds  No wheezing  Musculoskeletal:         General: Normal range of motion  Cervical back: Normal range of motion and neck supple  Lumbar back: Tenderness present  No swelling  Normal range of motion   Negative right straight leg raise test and negative left straight leg raise test       Left knee: Swelling present  Normal range of motion  Tenderness present over the medial joint line  MCL laxity present  Abnormal meniscus  Skin:     General: Skin is warm and dry  Neurological:      Mental Status: She is alert and oriented to person, place, and time  Psychiatric:         Behavior: Behavior normal            - [facility] for translation

## 2023-02-21 NOTE — ASSESSMENT & PLAN NOTE
- Reviewed recent lumbar spine x-ray which shows mild scoliosis, otherwise unremarkable study   - Advised to apply ice/heating pad/topical therapies as needed to affected area  - Continue tizanidine 4 mg, to be taken twice daily as needed  - Continue alternating ibuprofen/Tylenol as needed for pain  - If symptoms persist, would recommend referral to physical therapy

## 2023-02-24 ENCOUNTER — HOSPITAL ENCOUNTER (EMERGENCY)
Facility: HOSPITAL | Age: 38
Discharge: HOME | End: 2023-02-24
Attending: EMERGENCY MEDICINE

## 2023-02-24 ENCOUNTER — APPOINTMENT (EMERGENCY)
Dept: CT IMAGING | Facility: HOSPITAL | Age: 38
End: 2023-02-24

## 2023-02-24 VITALS
BODY MASS INDEX: 28.57 KG/M2 | SYSTOLIC BLOOD PRESSURE: 157 MMHG | OXYGEN SATURATION: 100 % | HEART RATE: 90 BPM | TEMPERATURE: 98.7 F | DIASTOLIC BLOOD PRESSURE: 76 MMHG | RESPIRATION RATE: 19 BRPM | WEIGHT: 156.2 LBS

## 2023-02-24 DIAGNOSIS — G43.909 MIGRAINE HEADACHE: Primary | ICD-10-CM

## 2023-02-24 RX ORDER — DIPHENHYDRAMINE HYDROCHLORIDE 50 MG/ML
25 INJECTION INTRAMUSCULAR; INTRAVENOUS ONCE
Status: COMPLETED | OUTPATIENT
Start: 2023-02-24 | End: 2023-02-24

## 2023-02-24 RX ORDER — METOCLOPRAMIDE HYDROCHLORIDE 5 MG/ML
10 INJECTION INTRAMUSCULAR; INTRAVENOUS ONCE
Status: COMPLETED | OUTPATIENT
Start: 2023-02-24 | End: 2023-02-24

## 2023-02-24 RX ADMIN — METOCLOPRAMIDE 10 MG: 5 INJECTION, SOLUTION INTRAMUSCULAR; INTRAVENOUS at 18:48

## 2023-02-24 RX ADMIN — DIPHENHYDRAMINE HYDROCHLORIDE 25 MG: 50 INJECTION, SOLUTION INTRAMUSCULAR; INTRAVENOUS at 18:48

## 2023-02-24 NOTE — ED PROVIDER NOTES
History  Chief Complaint   Patient presents with   • Migraine     States history of migraines  Have been coming more frequently  States meds prescribed by pcp is not helping  C/O same symptoms of N/V headache behind right eye and dizziness  Pt with right sided migraine headache starting yesterday like past migraines but more severe gradual onset      Migraine  Location:  Frontal and right   Quality:  Sharp  Severity:  Moderate  Onset quality:  Gradual  Duration:  2 days  Timing:  Constant  Progression:  Unchanged  Chronicity:  Recurrent  Context:  Home  Relieved by:  None  Worsened by:  None  Ineffective treatments:  None  Associated symptoms: headaches, nausea and vomiting        Prior to Admission Medications   Prescriptions Last Dose Informant Patient Reported? Taking?    SUMAtriptan (IMITREX) 50 mg tablet 2023  No Yes   Sig: Take 1 tablet (50 mg total) by mouth once as needed for migraine for up to 1 dose   acetaminophen (TYLENOL) 500 mg tablet More than a month  No No   Sig: Take 2 tablets (1,000 mg total) by mouth every 8 (eight) hours as needed for mild pain or moderate pain   albuterol (PROVENTIL HFA,VENTOLIN HFA) 90 mcg/act inhaler Not Taking  No No   Sig: Inhale 2 puffs every 4 (four) hours as needed (coughing)   Patient not taking: Reported on 2022   aspirin-acetaminophen-caffeine (EXCEDRIN MIGRAINE) 250-250-65 MG per tablet 2023  No Yes   Sig: Take 1 tablet by mouth in the morning   docusate sodium (COLACE) 100 mg capsule 2023  No Yes   Sig: Take 1 capsule (100 mg total) by mouth 2 (two) times a day as needed for constipation   ergocalciferol (VITAMIN D2) 50,000 units Past Week  No Yes   Sig: Take 1 capsule (50,000 Units total) by mouth once a week   fluticasone (FLONASE) 50 mcg/act nasal spray Not Taking  No No   Si spray into each nostril daily   Patient not taking: Reported on 2023   meclizine (ANTIVERT) 12 5 MG tablet   No No   Sig: Take 1 tablet (12 5 mg total) by mouth 3 (three) times a day as needed for dizziness   Patient not taking: Reported on 6/27/2022   menthol-cetylpyridinium (CEPACOL) 3 MG lozenge Past Week  No Yes   Sig: Take 1 lozenge (3 mg total) by mouth as needed for sore throat   neomycin-polymyxin-hydrocortisone (CORTISPORIN) 0 35%-10,000 units/mL-1% otic suspension   No No   Sig: Administer 4 drops into both ears 2 (two) times a day   nicotine (NICODERM CQ) 14 mg/24hr TD 24 hr patch 2/23/2023  No Yes   Sig: Place 1 patch on the skin over 24 hours every 24 hours   ondansetron (ZOFRAN-ODT) 4 mg disintegrating tablet   No No   Sig: Take 1 tablet (4 mg total) by mouth every 8 (eight) hours as needed (nausea) for up to 10 days   ondansetron (Zofran ODT) 4 mg disintegrating tablet Past Week  No Yes   Sig: Take 1 tablet (4 mg total) by mouth every 6 (six) hours as needed for nausea or vomiting   pantoprazole (PROTONIX) 20 mg tablet 2/23/2023  No Yes   Sig: Take 1 tablet (20 mg total) by mouth daily   pseudoephedrine (SUDAFED) 30 mg tablet More than a month  No No   Sig: Take 1 tablet (30 mg total) by mouth every 8 (eight) hours as needed for congestion   Patient not taking: Reported on 5/24/2022   silver sulfadiazine (SILVADENE,SSD) 1 % cream Not Taking  No No   Sig: Apply topically daily   Patient not taking: Reported on 2/24/2023   tiZANidine (ZANAFLEX) 4 mg tablet 2/23/2023  No Yes   Sig: Take 1 tablet (4 mg total) by mouth 2 (two) times a day as needed for muscle spasms      Facility-Administered Medications: None       Past Medical History:   Diagnosis Date   • Abnormal Pap smear of cervix 2015    cryosurgery   • Migraine headache with aura     managed with Elavil       Past Surgical History:   Procedure Laterality Date   • CHOLECYSTECTOMY LAPAROSCOPIC N/A 10/27/2020    Procedure: CHOLECYSTECTOMY LAPAROSCOPIC;  Surgeon: Marly Clark MD;  Location: [facility] MAIN OR;  Service: General   • GYNECOLOGIC CRYOSURGERY  2015   • HYSTERECTOMY  2016   • LAPAROSCOPY  2018 • IN LAPAROSCOPY W/LYSIS OF ADHESIONS N/A 3/3/2020    Procedure: L O A ;  Surgeon: Daiana Mae MD;  Location: [facility] Main OR;  Service: Gynecology   • IN LAPS ABD PRTM&OMENTUM DX W/WO SPEC BR/WA SPX N/A 8/9/2019    Procedure: LAPAROSCOPY DIAGNOSTIC; LYSIS OF ADHESIONS;  Surgeon: Kalyn Minaya MD;  Location: [facility] Main OR;  Service: Gynecology   • TUBAL LIGATION         Family History   Problem Relation Age of Onset   • Cancer Paternal Grandmother         uterine   • Cancer Paternal Aunt         uterine    • Breast cancer Neg Hx    • Colon cancer Neg Hx    • Ovarian cancer Neg Hx      I have reviewed and agree with the history as documented  E-Cigarette/Vaping   • E-Cigarette Use Never User      E-Cigarette/Vaping Substances     Social History     Tobacco Use   • Smoking status: Every Day     Packs/day: 0 50     Years: 15 00     Pack years: 7 50     Types: Cigarettes   • Smokeless tobacco: Never   Vaping Use   • Vaping Use: Never used   Substance Use Topics   • Alcohol use: Not Currently   • Drug use: Never       Review of Systems   Constitutional: Negative  HENT: Negative  Eyes: Negative  Respiratory: Negative  Cardiovascular: Negative  Gastrointestinal: Positive for nausea and vomiting  Endocrine: Negative  Genitourinary: Negative  Musculoskeletal: Negative  Skin: Negative  Allergic/Immunologic: Negative  Neurological: Positive for headaches  Hematological: Negative  All other systems reviewed and are negative  Physical Exam  Physical Exam  Vitals and nursing note reviewed  Constitutional:       Appearance: Normal appearance  She is normal weight  Comments: Nausea and vomiting normal for migraines     750pm  Pt is pain free and wants to leave    HENT:      Head: Normocephalic and atraumatic        Right Ear: Tympanic membrane, ear canal and external ear normal       Left Ear: Tympanic membrane, ear canal and external ear normal       Nose: Nose normal       Mouth/Throat:      Mouth: Mucous membranes are moist       Pharynx: Oropharynx is clear  Eyes:      Extraocular Movements: Extraocular movements intact  Conjunctiva/sclera: Conjunctivae normal       Pupils: Pupils are equal, round, and reactive to light  Cardiovascular:      Rate and Rhythm: Normal rate and regular rhythm  Pulses: Normal pulses  Heart sounds: Normal heart sounds  Pulmonary:      Effort: Pulmonary effort is normal       Breath sounds: Normal breath sounds  Abdominal:      General: Abdomen is flat  Bowel sounds are normal       Palpations: Abdomen is soft  Musculoskeletal:         General: Normal range of motion  Cervical back: Normal range of motion and neck supple  Skin:     General: Skin is warm  Capillary Refill: Capillary refill takes less than 2 seconds  Neurological:      General: No focal deficit present  Mental Status: She is alert and oriented to person, place, and time  Psychiatric:         Mood and Affect: Mood normal          Behavior: Behavior normal          Vital Signs  ED Triage Vitals [02/24/23 1755]   Temperature Pulse Respirations Blood Pressure SpO2   98 7 °F (37 1 °C) 90 19 157/76 100 %      Temp Source Heart Rate Source Patient Position - Orthostatic VS BP Location FiO2 (%)   Oral Monitor Sitting Left arm --      Pain Score       10 - Worst Possible Pain           Vitals:    02/24/23 1755   BP: 157/76   Pulse: 90   Patient Position - Orthostatic VS: Sitting         Visual Acuity      ED Medications  Medications   metoclopramide (REGLAN) injection 10 mg (10 mg Intravenous Given 2/24/23 1848)   diphenhydrAMINE (BENADRYL) injection 25 mg (25 mg Intravenous Given 2/24/23 1848)       Diagnostic Studies  Results Reviewed     None                 CT head without contrast   Final Result by Jesenia Collins MD (02/24 1930)      No acute intracranial abnormality                    Workstation performed: [id number] Procedures  Procedures         ED Course                               SBIRT 20yo+    Flowsheet Row Most Recent Value   SBIRT (23 yo +)    In order to provide better care to our patients, we are screening all of our patients for alcohol and drug use  Would it be okay to ask you these screening questions? Yes Filed at: 02/24/2023 1815   Initial Alcohol Screen: US AUDIT-C     1  How often do you have a drink containing alcohol? 0 Filed at: 02/24/2023 1815   2  How many drinks containing alcohol do you have on a typical day you are drinking? 0 Filed at: 02/24/2023 1815   3b  FEMALE Any Age, or MALE 65+: How often do you have 4 or more drinks on one occassion? 0 Filed at: 02/24/2023 1815   Audit-C Score 0 Filed at: 02/24/2023 1815   LISBETH: How many times in the past year have you    Used an illegal drug or used a prescription medication for non-medical reasons? Never Filed at: 02/24/2023 1815                    MDM    Disposition  Final diagnoses:   Migraine headache     Time reflects when diagnosis was documented in both MDM as applicable and the Disposition within this note     Time User Action Codes Description Comment    2/24/2023  7:56 PM Trine Dancer  Add [G43 909] Migraine headache       ED Disposition     ED Disposition   Discharge    Condition   Stable    Date/Time   Fri Feb 24, 2023  7:56 PM    Comment   Lorrie Jones discharge to home/self care  Follow-up Information     Follow up With Specialties Details Why [address]  [phone]            Patient's Medications   Discharge Prescriptions    No medications on file       No discharge procedures on file      PDMP Review     None          ED Provider  Electronically Signed by           Desmond Yeh PA-C  02/24/23 2000

## 2023-02-25 NOTE — ED ATTENDING ATTESTATION
I was the attending physician on duty at the time the patient visited the emergency department  The patient was evaluated by the Advanced Practitioner  I was personally available for consultation; however the patient’s care, medical decisions and disposition fell within the Advanced Practitioner’s scope of practice to independently manage the patient, unless otherwise noted      Anatoly Montejo MD

## 2023-03-01 ENCOUNTER — HOSPITAL ENCOUNTER (OUTPATIENT)
Dept: MRI IMAGING | Facility: HOSPITAL | Age: 38
Discharge: HOME | End: 2023-03-01

## 2023-03-01 DIAGNOSIS — M25.562 CHRONIC PAIN OF LEFT KNEE: ICD-10-CM

## 2023-03-01 DIAGNOSIS — G89.29 CHRONIC PAIN OF LEFT KNEE: ICD-10-CM

## 2023-03-21 ENCOUNTER — OFFICE VISIT (OUTPATIENT)
Dept: FAMILY MEDICINE CLINIC | Facility: CLINIC | Age: 38
End: 2023-03-21

## 2023-03-21 VITALS
TEMPERATURE: 97.8 F | HEART RATE: 94 BPM | SYSTOLIC BLOOD PRESSURE: 116 MMHG | DIASTOLIC BLOOD PRESSURE: 78 MMHG | RESPIRATION RATE: 17 BRPM | OXYGEN SATURATION: 98 % | WEIGHT: 158 LBS | BODY MASS INDEX: 28.9 KG/M2

## 2023-03-21 DIAGNOSIS — G43.109 MIGRAINE WITH AURA AND WITHOUT STATUS MIGRAINOSUS, NOT INTRACTABLE: ICD-10-CM

## 2023-03-21 DIAGNOSIS — G89.29 CHRONIC PAIN OF LEFT KNEE: Primary | ICD-10-CM

## 2023-03-21 DIAGNOSIS — G89.29 CHRONIC BILATERAL LOW BACK PAIN WITH LEFT-SIDED SCIATICA: ICD-10-CM

## 2023-03-21 DIAGNOSIS — M54.42 CHRONIC BILATERAL LOW BACK PAIN WITH LEFT-SIDED SCIATICA: ICD-10-CM

## 2023-03-21 DIAGNOSIS — M25.562 CHRONIC PAIN OF LEFT KNEE: Primary | ICD-10-CM

## 2023-03-21 RX ORDER — SENNOSIDES 8.6 MG
1300 CAPSULE ORAL EVERY 8 HOURS PRN
Qty: 60 TABLET | Refills: 1 | Status: SHIPPED | OUTPATIENT
Start: 2023-03-21

## 2023-03-21 RX ORDER — SUMATRIPTAN 50 MG/1
50 TABLET, FILM COATED ORAL ONCE AS NEEDED
Qty: 9 TABLET | Refills: 1 | Status: SHIPPED | OUTPATIENT
Start: 2023-03-21

## 2023-03-21 RX ORDER — TIZANIDINE 4 MG/1
4 TABLET ORAL 2 TIMES DAILY PRN
Qty: 60 TABLET | Refills: 1 | Status: SHIPPED | OUTPATIENT
Start: 2023-03-21

## 2023-03-21 NOTE — PROGRESS NOTES
Assessment/Plan:    Chronic pain of left knee  - Reviewed MRI left knee without contrast (3/1/23) which showed: No meniscal tear or other internal derangement  -Reviewed recent left knee x-ray which was unremarkable  - Patient had previously completed physical therapy multiple times, in [place], [place] and in [facility] but pain has been persistent   -Highly recommended for patient to try physical therapy again, but she declines  - Will refer to orthopedics for further evaluation and management  - Advised to take Tylenol or ibuprofen as needed for pain  - Advised to apply ice/ heating pad as needed to the affected area  - Patient notes she has previously received injection of her knee  Patient would be interested in receiving this again  Chronic bilateral low back pain with left-sided sciatica  - Reviewed recent lumbar spine x-ray which shows mild scoliosis, otherwise unremarkable study   - Advised to apply ice/heating pad/topical therapies as needed to affected area  - Continue tizanidine 4 mg, to be taken twice daily as needed  - Continue alternating ibuprofen/Tylenol as needed for pain  - If symptoms persist, would recommend referral to physical therapy  Migraine headache with aura  - Reviewed CT head without contrast (2/24/23) which showed no acute intracranial abnormality    - Continue imitrex 50 mg as needed at onset of migraine    - If migraines become more frequent, will consider restarting elavil for migraine prevention  Diagnoses and all orders for this visit:    Chronic pain of left knee  -     acetaminophen (TYLENOL) 650 mg CR tablet; Take 2 tablets (1,300 mg total) by mouth every 8 (eight) hours as needed for mild pain    Chronic bilateral low back pain with left-sided sciatica  -     tiZANidine (ZANAFLEX) 4 mg tablet;  Take 1 tablet (4 mg total) by mouth 2 (two) times a day as needed for muscle spasms    Migraine with aura and without status migrainosus, not intractable  - SUMAtriptan (IMITREX) 50 mg tablet; Take 1 tablet (50 mg total) by mouth once as needed for migraine for up to 1 dose        All of patients questions were answered  Patient understands and agrees with the above plan  Return if symptoms worsen or fail to improve  Desmpool Patel, MICHAEL  03/21/23  [address] [place]          Subjective:     Patient ID: Ishan Acevedo  is a 40 y o  female with known PMH of migraines, diabetes, GERD who presents today in office for left knee pain follow up  - Patient is a 40 y o  female who presents today for left knee pain follow up  Patient notes her back and knee pain has been about the same  Patient denies any new concerns today  The following portions of the patient's history were reviewed and updated as appropriate: allergies, current medications, past family history, past medical history, past social history, past surgical history and problem list         Review of Systems   Constitutional: Negative for chills and fever  HENT: Negative for congestion, ear pain and sore throat  Eyes: Negative for pain  Respiratory: Negative for cough, chest tightness and shortness of breath  Cardiovascular: Negative for chest pain and palpitations  Gastrointestinal: Negative for abdominal pain, constipation, diarrhea, nausea and vomiting  Genitourinary: Negative for difficulty urinating and dysuria  Musculoskeletal: Positive for arthralgias, back pain and gait problem  Skin: Negative for rash  Neurological: Negative for dizziness, numbness and headaches  Objective:   Vitals:    03/21/23 0841   BP: 116/78   BP Location: Left arm   Patient Position: Sitting   Cuff Size: Standard   Pulse: 94   Resp: 17   Temp: 97 8 °F (36 6 °C)   TempSrc: Temporal   SpO2: 98%   Weight: 71 7 kg (158 lb)         Physical Exam  Vitals and nursing note reviewed  Constitutional:       General: She is not in acute distress  Appearance: She is well-developed  HENT:      Head: Normocephalic and atraumatic  Right Ear: External ear normal       Left Ear: External ear normal       Nose: Nose normal    Eyes:      Conjunctiva/sclera: Conjunctivae normal    Cardiovascular:      Rate and Rhythm: Normal rate and regular rhythm  Pulses: Normal pulses  Heart sounds: Normal heart sounds  Pulmonary:      Effort: Pulmonary effort is normal  No respiratory distress  Breath sounds: Normal breath sounds  No wheezing  Musculoskeletal:         General: Normal range of motion  Cervical back: Normal range of motion and neck supple  Lumbar back: Tenderness present  No swelling  Normal range of motion  Negative right straight leg raise test and negative left straight leg raise test       Left knee: Normal range of motion  Tenderness present over the medial joint line  Skin:     General: Skin is warm and dry  Neurological:      Mental Status: She is alert and oriented to person, place, and time  Psychiatric:         Behavior: Behavior normal            - Utilized [facility] services for translation

## 2023-03-21 NOTE — ASSESSMENT & PLAN NOTE
- Reviewed CT head without contrast (2/24/23) which showed no acute intracranial abnormality    - Continue imitrex 50 mg as needed at onset of migraine    - If migraines become more frequent, will consider restarting elavil for migraine prevention

## 2023-03-21 NOTE — ASSESSMENT & PLAN NOTE
- Reviewed MRI left knee without contrast (3/1/23) which showed: No meniscal tear or other internal derangement  -Reviewed recent left knee x-ray which was unremarkable  - Patient had previously completed physical therapy multiple times, in [place], [place] and in [facility] but pain has been persistent   -Highly recommended for patient to try physical therapy again, but she declines  - Will refer to orthopedics for further evaluation and management  - Advised to take Tylenol or ibuprofen as needed for pain  - Advised to apply ice/ heating pad as needed to the affected area  - Patient notes she has previously received injection of her knee  Patient would be interested in receiving this again

## 2023-03-23 ENCOUNTER — OFFICE VISIT (OUTPATIENT)
Dept: OBGYN CLINIC | Facility: CLINIC | Age: 38
End: 2023-03-23

## 2023-03-23 VITALS
SYSTOLIC BLOOD PRESSURE: 120 MMHG | BODY MASS INDEX: 29.08 KG/M2 | DIASTOLIC BLOOD PRESSURE: 77 MMHG | WEIGHT: 158 LBS | HEIGHT: 62 IN

## 2023-03-23 DIAGNOSIS — M22.2X2 PATELLOFEMORAL DISORDER OF LEFT KNEE: Primary | ICD-10-CM

## 2023-03-23 RX ORDER — METHYLPREDNISOLONE ACETATE 40 MG/ML
1 INJECTION, SUSPENSION INTRA-ARTICULAR; INTRALESIONAL; INTRAMUSCULAR; SOFT TISSUE
Status: COMPLETED | OUTPATIENT
Start: 2023-03-23 | End: 2023-03-23

## 2023-03-23 RX ORDER — MELOXICAM 15 MG/1
15 TABLET ORAL DAILY
Qty: 30 TABLET | Refills: 0 | Status: SHIPPED | OUTPATIENT
Start: 2023-03-23

## 2023-03-23 RX ORDER — BUPIVACAINE HYDROCHLORIDE 2.5 MG/ML
2 INJECTION, SOLUTION INFILTRATION; PERINEURAL
Status: COMPLETED | OUTPATIENT
Start: 2023-03-23 | End: 2023-03-23

## 2023-03-23 RX ADMIN — METHYLPREDNISOLONE ACETATE 1 ML: 40 INJECTION, SUSPENSION INTRA-ARTICULAR; INTRALESIONAL; INTRAMUSCULAR; SOFT TISSUE at 08:59

## 2023-03-23 RX ADMIN — BUPIVACAINE HYDROCHLORIDE 2 ML: 2.5 INJECTION, SOLUTION INFILTRATION; PERINEURAL at 08:59
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
## 2023-03-27 ENCOUNTER — OFFICE VISIT (OUTPATIENT)
Dept: OBGYN CLINIC | Facility: CLINIC | Age: 38
End: 2023-03-27

## 2023-03-27 VITALS
SYSTOLIC BLOOD PRESSURE: 120 MMHG | HEIGHT: 62 IN | BODY MASS INDEX: 29.08 KG/M2 | DIASTOLIC BLOOD PRESSURE: 80 MMHG | WEIGHT: 158 LBS

## 2023-03-27 DIAGNOSIS — M22.2X2 PATELLOFEMORAL DISORDER OF LEFT KNEE: Primary | ICD-10-CM

## 2023-03-27 NOTE — PROGRESS NOTES
"Patient Name:  Allyn Weldon  MRN:  [id number]    Assessment & Plan     Left knee patellofemoral dysfunction  1  Patient notes no improvement after undergoing corticosteroid injection into the left knee on 3/23/2023   2  Recommend formal physical therapy  Referral placed  3  Continue meloxicam   4  Bartolo pull knee sleeve provided today  5  Follow-up in 6 weeks with primary care sports medicine  Chief Complaint     Follow-up left knee pain    History of the Present Illness     Allyn Weldon is a 40 y o  female who returns to the office today for follow-up regarding her left knee  She was last seen on 3/23/2023  At that time she received a corticosteroid injection into the left knee  She noted no improvement after going the injection  She still notes persistent pain localized to the anterior aspect of the knee  Pain is worse with prolonged standing and walking as well as prolonged sitting  Pain is also worse with walking up and down steps  She denies any weakness or instability  No numbness or tingling  No fevers or chills  She has not performed any physical therapy for over 4 years  Physical Exam     /80   Ht 5' 2\" (1 575 m)   Wt 71 7 kg (158 lb)   BMI 28 90 kg/m²     Left knee: No gross deformity  Skin intact  No erythema ecchymosis or swelling  No effusion  No joint line tenderness  There is tenderness around the border of the patella and patellar tendon  Full range of motion with pain at terminal flexion  There is slight crepitation patellofemoral compartment with passive range of motion  Knee is stable to varus and valgus stress without pain  Stable Lachman test   Negative posterior drawer test   Negative Ramya's test   Positive patellar grind test     Eyes: Anicteric sclerae  ENT: Trachea midline  Lungs: Normal respiratory effort  CV: Capillary refill is less than 2 seconds  Skin: Intact without erythema  Lymph: No palpable lymphadenopathy    Neuro: " "Sensation is grossly intact to light touch  Psych: Mood and affect are appropriate  Data Review     I have personally reviewed pertinent films in PACS, and my interpretation follows:    MRI left knee 3/1/2023: No evidence of meniscal tear or internal derangement  No evidence of any significant degenerative changes  No fracture  Unremarkable MRI      Past Medical History:   Diagnosis Date   • Abnormal Pap smear of cervix 2015    cryosurgery   • Migraine headache with aura     managed with Elavil       Past Surgical History:   Procedure Laterality Date   • CHOLECYSTECTOMY LAPAROSCOPIC N/A 10/27/2020    Procedure: CHOLECYSTECTOMY LAPAROSCOPIC;  Surgeon: Lisandro Daniels MD;  Location: [address] [place] [address] OR;  Service: General   • GYNECOLOGIC CRYOSURGERY  2015   • HYSTERECTOMY  2016   • LAPAROSCOPY  2018   • [place] LAPAROSCOPY W/LYSIS OF ADHESIONS N/A 3/3/2020    Procedure: L O A ;  Surgeon: Marge Calles MD;  [facility];  Service: Gynecology   • [place] LAPS ABD PRTM&OMENTUM DX W/WO SPEC BR/[facility] N/A 8/9/2019    Procedure: LAPAROSCOPY DIAGNOSTIC; LYSIS OF ADHESIONS;  Surgeon: Lennox Mohair, MD;  Location[facility];  Service: Gynecology   • TUBAL LIGATION         Allergies   Allergen Reactions   • Shellfish-Derived Products - Food Allergy Itching and Swelling     \"seafood \"   • Ibuprofen Other (See Comments)     Patient states she gets hematoma       Current Outpatient Medications on File Prior to Visit   Medication Sig Dispense Refill   • acetaminophen (TYLENOL) 500 mg tablet Take 2 tablets (1,000 mg total) by mouth every 8 (eight) hours as needed for mild pain or moderate pain 30 tablet 0   • acetaminophen (TYLENOL) 650 mg CR tablet Take 2 tablets (1,300 mg total) by mouth every 8 (eight) hours as needed for mild pain 60 tablet 1   • albuterol (PROVENTIL HFA,VENTOLIN HFA) 90 mcg/act inhaler Inhale 2 puffs every 4 (four) hours as needed (coughing) (Patient not taking: Reported on 6/27/2022) 6 7 g 0   • " aspirin-acetaminophen-caffeine (EXCEDRIN MIGRAINE) 250-250-65 MG per tablet Take 1 tablet by mouth in the morning 30 tablet 0   • docusate sodium (COLACE) 100 mg capsule Take 1 capsule (100 mg total) by mouth 2 (two) times a day as needed for constipation 60 capsule 1   • ergocalciferol (VITAMIN D2) 50,000 units Take 1 capsule (50,000 Units total) by mouth once a week 16 capsule 0   • fluticasone (FLONASE) 50 mcg/act nasal spray 1 spray into each nostril daily (Patient not taking: Reported on 2/24/2023) 16 g 0   • meclizine (ANTIVERT) 12 5 MG tablet Take 1 tablet (12 5 mg total) by mouth 3 (three) times a day as needed for dizziness (Patient not taking: Reported on 6/27/2022) 30 tablet 0   • meloxicam (Mobic) 15 mg tablet Take 1 tablet (15 mg total) by mouth daily 30 tablet 0   • menthol-cetylpyridinium (CEPACOL) 3 MG lozenge Take 1 lozenge (3 mg total) by mouth as needed for sore throat 30 lozenge 0   • neomycin-polymyxin-hydrocortisone (CORTISPORIN) 0 35%-10,000 units/mL-1% otic suspension Administer 4 drops into both ears 2 (two) times a day 10 mL 0   • nicotine (NICODERM CQ) 14 mg/24hr TD 24 hr patch Place 1 patch on the skin over 24 hours every 24 hours 28 patch 2   • ondansetron (Zofran ODT) 4 mg disintegrating tablet Take 1 tablet (4 mg total) by mouth every 6 (six) hours as needed for nausea or vomiting 20 tablet 0   • ondansetron (ZOFRAN-ODT) 4 mg disintegrating tablet Take 1 tablet (4 mg total) by mouth every 8 (eight) hours as needed (nausea) for up to 10 days 20 tablet 0   • pantoprazole (PROTONIX) 20 mg tablet Take 1 tablet (20 mg total) by mouth daily 90 tablet 0   • pseudoephedrine (SUDAFED) 30 mg tablet Take 1 tablet (30 mg total) by mouth every 8 (eight) hours as needed for congestion (Patient not taking: Reported on 5/24/2022) 20 tablet 0   • silver sulfadiazine (SILVADENE,SSD) 1 % cream Apply topically daily (Patient not taking: Reported on 2/24/2023) 50 g 0   • SUMAtriptan (IMITREX) 50 mg tablet Take 1 tablet (50 mg total) by mouth once as needed for migraine for up to 1 dose 9 tablet 1   • tiZANidine (ZANAFLEX) 4 mg tablet Take 1 tablet (4 mg total) by mouth 2 (two) times a day as needed for muscle spasms 60 tablet 1     No current facility-administered medications on file prior to visit  Social History     Tobacco Use   • Smoking status: Every Day     Packs/day: 0 50     Years: 15 00     Pack years: 7 50     Types: Cigarettes   • Smokeless tobacco: Never   Vaping Use   • Vaping Use: Never used   Substance Use Topics   • Alcohol use: Not Currently   • Drug use: Never       Family History   Problem Relation Age of Onset   • Cancer Paternal Grandmother         uterine   • Cancer Paternal Aunt         uterine    • Breast cancer Neg Hx    • Colon cancer Neg Hx    • Ovarian cancer Neg Hx        Review of Systems     As stated in the HPI  All other systems reviewed and are negative

## 2023-03-31 ENCOUNTER — EVALUATION (OUTPATIENT)
Dept: PHYSICAL THERAPY | Facility: MEDICAL CENTER | Age: 38
End: 2023-03-31

## 2023-03-31 DIAGNOSIS — M22.2X2 PATELLOFEMORAL DISORDER OF LEFT KNEE: Primary | ICD-10-CM
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
## 2023-04-04 ENCOUNTER — OFFICE VISIT (OUTPATIENT)
Dept: PHYSICAL THERAPY | Facility: MEDICAL CENTER | Age: 38
End: 2023-04-04

## 2023-04-04 DIAGNOSIS — M22.2X2 PATELLOFEMORAL DISORDER OF LEFT KNEE: Primary | ICD-10-CM

## 2023-04-04 NOTE — PROGRESS NOTES
"Daily Note     Today's date: 2023  Patient name: Yordan Chaidez  : 1985  MRN: [id number]  Referring provider: Dyllan Vazquez*  Dx:   Encounter Diagnosis     ICD-10-CM    1  Patellofemoral disorder of left knee  M22 2X2                      Subjective: Patient reports that she is continuing to have pain in her patellar tendon region  Objective: See treatment diary below      Assessment: Initiated knee flexibility and quad/hip girdle strengthening program as outlined below  Mod cueing provided to prevent compensation from trunk during sidelying ABD/ADD and prone hip EXT  EXT lag reduced during SLR after addition of towel roll for tactile cueing  Patient was educated to perform all exercises in a pain-free range  Patient tolerated treatment well and would benefit from continued PT to address impairments to maximize function  Plan: Continue per plan of care        Precautions: hx of migraines, GERD    Yoruba only- needs       HEP: QS, strap gastroc stretch  Manuals 3/31 4/4                                                               Neuro Re-Ed                                                                                                        Ther Ex             Rec bike  5'           QS 5\"x20 HEP 5\"x30           Strap gastroc stretch 30\"x4 HEP 30\"x4           Long sit HS stretch NV 30\"x4           4-way SLR NV 2x5 FLX, x10 ABD/ADD/EXT           Supine clams NV 5\"x20 RTB           Supine hip ADD isometrics NV 5\"x20           Bridges NV x10           Prone quad stretch  30\"x3           Standing hip ABD             HEP education and instruction x8'            Ther Activity                                       Gait Training                                       Modalities                                            "

## 2023-04-14 ENCOUNTER — APPOINTMENT (OUTPATIENT)
Dept: PHYSICAL THERAPY | Facility: MEDICAL CENTER | Age: 38
End: 2023-04-14

## 2023-04-22 ENCOUNTER — HOSPITAL ENCOUNTER (EMERGENCY)
Facility: HOSPITAL | Age: 38
Discharge: HOME | End: 2023-04-22
Attending: EMERGENCY MEDICINE

## 2023-04-22 VITALS
RESPIRATION RATE: 20 BRPM | HEART RATE: 84 BPM | WEIGHT: 155.6 LBS | SYSTOLIC BLOOD PRESSURE: 138 MMHG | OXYGEN SATURATION: 100 % | BODY MASS INDEX: 28.46 KG/M2 | TEMPERATURE: 97.7 F | DIASTOLIC BLOOD PRESSURE: 80 MMHG

## 2023-04-22 DIAGNOSIS — G43.909 MIGRAINE: Primary | ICD-10-CM

## 2023-04-22 RX ORDER — DIPHENHYDRAMINE HYDROCHLORIDE 50 MG/ML
25 INJECTION INTRAMUSCULAR; INTRAVENOUS ONCE
Status: COMPLETED | OUTPATIENT
Start: 2023-04-22 | End: 2023-04-22

## 2023-04-22 RX ORDER — METOCLOPRAMIDE HYDROCHLORIDE 5 MG/ML
10 INJECTION INTRAMUSCULAR; INTRAVENOUS ONCE
Status: COMPLETED | OUTPATIENT
Start: 2023-04-22 | End: 2023-04-22

## 2023-04-22 RX ORDER — ACETAMINOPHEN 325 MG/1
650 TABLET ORAL ONCE
Status: COMPLETED | OUTPATIENT
Start: 2023-04-22 | End: 2023-04-22

## 2023-04-22 RX ADMIN — ACETAMINOPHEN 650 MG: 325 TABLET ORAL at 22:20

## 2023-04-22 RX ADMIN — METOCLOPRAMIDE 10 MG: 5 INJECTION, SOLUTION INTRAMUSCULAR; INTRAVENOUS at 22:21

## 2023-04-22 RX ADMIN — DIPHENHYDRAMINE HYDROCHLORIDE 25 MG: 50 INJECTION, SOLUTION INTRAMUSCULAR; INTRAVENOUS at 22:24

## 2023-04-22 NOTE — Clinical Note
Bayron Daily was seen and treated in our emergency department on 4/22/2023  Diagnosis:     Juancho Mims  may return to work on return date  She may return on this date: 04/24/2023         If you have any questions or concerns, please don't hesitate to call        Alondra Morejon MD    ______________________________           _______________          _______________  Hospital Representative                              Date                                Time

## 2023-04-22 NOTE — Clinical Note
Katja Shell was seen and treated in our emergency department on 4/22/2023  Diagnosis:     Glendy Iirzarry  may return to work on return date  She may return on this date: 04/24/2023         If you have any questions or concerns, please don't hesitate to call        Kelechi Wilkerson MD    ______________________________           _______________          _______________  Hospital Representative                              Date                                Time

## 2023-04-23 NOTE — ED PROVIDER NOTES
History  Chief Complaint   Patient presents with   • Headache - Recurrent or Known Dx Migraines     Pt presents to the ED with c/o a migraine for the past 7 hrs  Pt described the pain as burning and pressure and she feels like her ears are going to pop  States that she took meds 3 hrs ago for the pain  Endorses visual and audio sensitivity   41-year-old female with history of migraines presenting the emergency department with 7 hours of migraine  Took sumatriptan without relief  Has not taken any other medicines  Photophobia phonophobia  Nausea  No vomiting  No diarrhea  No chest pain or shortness of breath  Prior to Admission Medications   Prescriptions Last Dose Informant Patient Reported? Taking?    SUMAtriptan (IMITREX) 50 mg tablet   No No   Sig: Take 1 tablet (50 mg total) by mouth once as needed for migraine for up to 1 dose   acetaminophen (TYLENOL) 500 mg tablet   No No   Sig: Take 2 tablets (1,000 mg total) by mouth every 8 (eight) hours as needed for mild pain or moderate pain   Patient not taking: Reported on 2023   acetaminophen (TYLENOL) 650 mg CR tablet   No No   Sig: Take 2 tablets (1,300 mg total) by mouth every 8 (eight) hours as needed for mild pain   Patient not taking: Reported on 2023   albuterol (PROVENTIL HFA,VENTOLIN HFA) 90 mcg/act inhaler   No No   Sig: Inhale 2 puffs every 4 (four) hours as needed (coughing)   Patient not taking: Reported on 2022   aspirin-acetaminophen-caffeine (EXCEDRIN MIGRAINE) 250-250-65 MG per tablet   No No   Sig: Take 1 tablet by mouth in the morning   docusate sodium (COLACE) 100 mg capsule   No No   Sig: Take 1 capsule (100 mg total) by mouth 2 (two) times a day as needed for constipation   ergocalciferol (VITAMIN D2) 50,000 units   No No   Sig: Take 1 capsule (50,000 Units total) by mouth once a week   fluticasone (FLONASE) 50 mcg/act nasal spray   No No   Si spray into each nostril daily   Patient not taking: Reported on 2/24/2023   meclizine (ANTIVERT) 12 5 MG tablet   No No   Sig: Take 1 tablet (12 5 mg total) by mouth 3 (three) times a day as needed for dizziness   Patient not taking: Reported on 6/27/2022   meloxicam (Mobic) 15 mg tablet   No No   Sig: Take 1 tablet (15 mg total) by mouth daily   menthol-cetylpyridinium (CEPACOL) 3 MG lozenge   No No   Sig: Take 1 lozenge (3 mg total) by mouth as needed for sore throat   neomycin-polymyxin-hydrocortisone (CORTISPORIN) 0 35%-10,000 units/mL-1% otic suspension   No No   Sig: Administer 4 drops into both ears 2 (two) times a day   nicotine (NICODERM CQ) 14 mg/24hr TD 24 hr patch   No No   Sig: Place 1 patch on the skin over 24 hours every 24 hours   ondansetron (ZOFRAN-ODT) 4 mg disintegrating tablet   No No   Sig: Take 1 tablet (4 mg total) by mouth every 8 (eight) hours as needed (nausea) for up to 10 days   ondansetron (Zofran ODT) 4 mg disintegrating tablet   No No   Sig: Take 1 tablet (4 mg total) by mouth every 6 (six) hours as needed for nausea or vomiting   pantoprazole (PROTONIX) 20 mg tablet   No No   Sig: Take 1 tablet (20 mg total) by mouth daily   pseudoephedrine (SUDAFED) 30 mg tablet   No No   Sig: Take 1 tablet (30 mg total) by mouth every 8 (eight) hours as needed for congestion   Patient not taking: Reported on 5/24/2022   silver sulfadiazine (SILVADENE,SSD) 1 % cream   No No   Sig: Apply topically daily   Patient not taking: Reported on 2/24/2023   tiZANidine (ZANAFLEX) 4 mg tablet   No No   Sig: Take 1 tablet (4 mg total) by mouth 2 (two) times a day as needed for muscle spasms      Facility-Administered Medications: None       Past Medical History:   Diagnosis Date   • Abnormal Pap smear of cervix 2015    cryosurgery   • Migraine headache with aura     managed with Elavil       Past Surgical History:   Procedure Laterality Date   • CHOLECYSTECTOMY LAPAROSCOPIC N/A 10/27/2020    Procedure: CHOLECYSTECTOMY LAPAROSCOPIC;  Surgeon: Kait Torrez MD;  Location: [facility];  Service: General   • GYNECOLOGIC CRYOSURGERY  2015   • HYSTERECTOMY  2016   • LAPAROSCOPY  2018   • GA LAPAROSCOPY W/LYSIS OF ADHESIONS N/A 3/3/2020    Procedure: L O A ;  Surgeon: Sarah Banks MD;  Location: [facility];  Service: Gynecology   • GA LAPS ABD PRTM&OMENTUM DX W/WO SPEC BR/ [facility] N/A 8/9/2019    Procedure: LAPAROSCOPY DIAGNOSTIC; LYSIS OF ADHESIONS;  Surgeon: Paresh Cespedes MD;  Location: [facility];  Service: Gynecology   • TUBAL LIGATION         Family History   Problem Relation Age of Onset   • Cancer Paternal Grandmother         uterine   • Cancer Paternal Aunt         uterine    • Breast cancer Neg Hx    • Colon cancer Neg Hx    • Ovarian cancer Neg Hx      I have reviewed and agree with the history as documented  E-Cigarette/Vaping   • E-Cigarette Use Never User      E-Cigarette/Vaping Substances     Social History     Tobacco Use   • Smoking status: Every Day     Packs/day: 0 50     Years: 15 00     Pack years: 7 50     Types: Cigarettes   • Smokeless tobacco: Never   Vaping Use   • Vaping Use: Never used   Substance Use Topics   • Alcohol use: Not Currently   • Drug use: Never       Review of Systems   Constitutional: Negative for chills and fever  HENT: Negative for ear pain and sore throat  Eyes: Negative for pain and visual disturbance  Respiratory: Negative for cough and shortness of breath  Cardiovascular: Negative for chest pain and palpitations  Gastrointestinal: Negative for abdominal pain and vomiting  Genitourinary: Negative for dysuria and hematuria  Musculoskeletal: Negative for arthralgias and back pain  Skin: Negative for color change and rash  Neurological: Positive for headaches  Negative for seizures and syncope  All other systems reviewed and are negative  Physical Exam  Physical Exam  Vitals and nursing note reviewed  Constitutional:       General: She is not in acute distress  Appearance: She is well-developed  HENT:      Head: Normocephalic and atraumatic  Eyes:      Conjunctiva/sclera: Conjunctivae normal    Cardiovascular:      Rate and Rhythm: Normal rate and regular rhythm  Heart sounds: No murmur heard  Pulmonary:      Effort: Pulmonary effort is normal  No respiratory distress  Breath sounds: Normal breath sounds  Abdominal:      Palpations: Abdomen is soft  Tenderness: There is no abdominal tenderness  Musculoskeletal:         General: No swelling  Cervical back: Neck supple  Skin:     General: Skin is warm and dry  Capillary Refill: Capillary refill takes less than 2 seconds  Neurological:      Mental Status: She is alert  Psychiatric:         Mood and Affect: Mood normal          Vital Signs  ED Triage Vitals [04/22/23 2204]   Temperature Pulse Respirations Blood Pressure SpO2   97 7 °F (36 5 °C) 84 20 138/80 100 %      Temp Source Heart Rate Source Patient Position - Orthostatic VS BP Location FiO2 (%)   Oral Monitor Lying Left arm --      Pain Score       10 - Worst Possible Pain           Vitals:    04/22/23 2204   BP: 138/80   Pulse: 84   Patient Position - Orthostatic VS: Lying         Visual Acuity      ED Medications  Medications   metoclopramide (REGLAN) injection 10 mg (10 mg Intramuscular Given 4/22/23 2221)   diphenhydrAMINE (BENADRYL) injection 25 mg (25 mg Intramuscular Given 4/22/23 2224)   acetaminophen (TYLENOL) tablet 650 mg (650 mg Oral Given 4/22/23 2220)       Diagnostic Studies  Results Reviewed     None                 No orders to display              Procedures  Procedures         ED Course                                             Medical Decision Making  50-year-old female with history of migraines, having a headache gets similar to her previous headaches  Headache improved with Reglan Benadryl IM injection  Patient requesting discharge, neurology referral placed  Risk  OTC drugs  Prescription drug management            Disposition  Final diagnoses:   Migraine     Time reflects when diagnosis was documented in both MDM as applicable and the Disposition within this note     Time User Action Codes Description Comment    4/22/2023 10:36 PM Jasmine Espitia Add [G43 [id number]] Migraine       ED Disposition     ED Disposition   Discharge    Condition   Stable    Date/Time   Sat Apr 22, 2023 10:36 PM    Comment   Rahul Flores discharge to home/self care                 Follow-up Information     Follow up With Specialties Details Why [facility], [address] [place] [address]  [facility]  [place] [address]  [phone]            Discharge Medication List as of 4/22/2023 10:39 PM      CONTINUE these medications which have NOT CHANGED    Details   acetaminophen (TYLENOL) 500 mg tablet Take 2 tablets (1,000 mg total) by mouth every 8 (eight) hours as needed for mild pain or moderate pain, Starting Thu 6/2/2022, Normal      acetaminophen (TYLENOL) 650 mg CR tablet Take 2 tablets (1,300 mg total) by mouth every 8 (eight) hours as needed for mild pain, Starting Tue 3/21/2023, Normal      albuterol (PROVENTIL HFA,VENTOLIN HFA) 90 mcg/act inhaler Inhale 2 puffs every 4 (four) hours as needed (coughing), Starting Wed 6/1/2022, Normal      aspirin-acetaminophen-caffeine (EXCEDRIN MIGRAINE) 250-250-65 MG per tablet Take 1 tablet by mouth in the morning, Starting Thu 1/26/2023, Normal      docusate sodium (COLACE) 100 mg capsule Take 1 capsule (100 mg total) by mouth 2 (two) times a day as needed for constipation, Starting Tue 2/21/2023, Normal      ergocalciferol (VITAMIN D2) 50,000 units Take 1 capsule (50,000 Units total) by mouth once a week, Starting Wed 4/27/2022, Normal      fluticasone (FLONASE) 50 mcg/act nasal spray 1 spray into each nostril daily, Starting Wed 6/1/2022, Normal      meclizine (ANTIVERT) 12 5 MG tablet Take 1 tablet (12 5 mg total) by mouth 3 (three) times a day as needed for dizziness, Starting Thu 6/2/2022, Normal      meloxicam (Mobic) 15 mg tablet Take 1 tablet (15 mg total) by mouth daily, Starting Thu 3/23/2023, Normal      menthol-cetylpyridinium (CEPACOL) 3 MG lozenge Take 1 lozenge (3 mg total) by mouth as needed for sore throat, Starting Wed 6/1/2022, Normal      neomycin-polymyxin-hydrocortisone (CORTISPORIN) 0 35%-10,000 units/mL-1% otic suspension Administer 4 drops into both ears 2 (two) times a day, Starting Tue 2/21/2023, Normal      nicotine (NICODERM CQ) 14 mg/24hr TD 24 hr patch Place 1 patch on the skin over 24 hours every 24 hours, Starting Tue 2/21/2023, Normal      ondansetron (Zofran ODT) 4 mg disintegrating tablet Take 1 tablet (4 mg total) by mouth every 6 (six) hours as needed for nausea or vomiting, Starting Thu 1/26/2023, Normal      pantoprazole (PROTONIX) 20 mg tablet Take 1 tablet (20 mg total) by mouth daily, Starting Thu 2/16/2023, Until Wed 5/17/2023, Normal      pseudoephedrine (SUDAFED) 30 mg tablet Take 1 tablet (30 mg total) by mouth every 8 (eight) hours as needed for congestion, Starting Mon 4/11/2022, Normal      silver sulfadiazine (SILVADENE,SSD) 1 % cream Apply topically daily, Starting Tue 12/27/2022, Normal      SUMAtriptan (IMITREX) 50 mg tablet Take 1 tablet (50 mg total) by mouth once as needed for migraine for up to 1 dose, Starting Tue 3/21/2023, Normal      tiZANidine (ZANAFLEX) 4 mg tablet Take 1 tablet (4 mg total) by mouth 2 (two) times a day as needed for muscle spasms, Starting Tue 3/21/2023, Normal                 PDMP Review     None          ED Provider  Electronically Signed by           Lilli Hanson MD  04/23/23 4188

## 2023-04-24 ENCOUNTER — OFFICE VISIT (OUTPATIENT)
Dept: OBGYN CLINIC | Facility: CLINIC | Age: 38
End: 2023-04-24

## 2023-04-24 VITALS
HEIGHT: 62 IN | BODY MASS INDEX: 28.46 KG/M2 | HEART RATE: 91 BPM | DIASTOLIC BLOOD PRESSURE: 79 MMHG | SYSTOLIC BLOOD PRESSURE: 122 MMHG

## 2023-04-24 DIAGNOSIS — N70.11 HYDROSALPINX: Primary | ICD-10-CM

## 2023-04-24 DIAGNOSIS — N83.202 CYST OF LEFT OVARY: ICD-10-CM

## 2023-04-24 RX ORDER — OXYCODONE HYDROCHLORIDE 5 MG/1
5 TABLET ORAL EVERY 6 HOURS PRN
Qty: 20 TABLET | Refills: 0 | Status: SHIPPED | OUTPATIENT
Start: 2023-04-24 | End: 2023-04-29

## 2023-04-24 NOTE — ASSESSMENT & PLAN NOTE
Continues to have left lower quadrant abdominal pain  Will recommend for surgical evaluation at this time  In the interim, we will treat with oxy and acetaminophen  Red flag symptoms for ED visit given to patient

## 2023-04-24 NOTE — PROGRESS NOTES
Assessment/Plan:    1  Hydrosalpinx  Assessment & Plan:  Continues to have left lower quadrant abdominal pain  Will recommend for surgical evaluation at this time  In the interim, we will treat with oxy and acetaminophen  Red flag symptoms for ED visit given to patient  2  Cyst of left ovary  -     oxyCODONE (Roxicodone) 5 immediate release tablet; Take 1 tablet (5 mg total) by mouth every 6 (six) hours as needed for moderate pain for up to 5 days Max Daily Amount: 20 mg       Subjective:      Patient ID: Kalli Lyons is a 41 y o  female  Past medical history notable for left lower quadrant abdominal pain with been going on since 4/11  [facility] and it was recommended that she obtain a left lower quadrant abdominal ultrasound  The test illustrated:    1  Mildly complex cyst in the left ovary measuring 3 2 cm  Consider reassessment in 2-3 months  2   Tubular fluid appearing structure in the left adnexa could represent hydrosalpinx  She continues to have left lower quadrant abdominal pain  She states that it is not relieved with Tylenol  She is unable to take ibuprofen  She denies any vaginal discharge  She denies any blood in her poop  She denies any fevers or chills  The following portions of the patient's history were reviewed and updated as appropriate: allergies, current medications, past family history, past medical history, past social history, past surgical history, and problem list     Review of Systems   Constitutional: Negative for chills and fever  HENT: Negative for ear pain and sore throat  Eyes: Negative for pain and visual disturbance  Respiratory: Negative for cough and shortness of breath  Cardiovascular: Negative for chest pain and palpitations  Gastrointestinal: Positive for abdominal pain (Left lower)  Negative for vomiting  Genitourinary: Negative for dysuria and hematuria  Musculoskeletal: Negative for arthralgias and back pain  "  Skin: Negative for color change and rash  Neurological: Negative for seizures and syncope  All other systems reviewed and are negative  Objective:      /79   Pulse 91   Ht 5' 2\" (1 575 m)   BMI 28 46 kg/m²          Physical Exam  Constitutional:       General: She is not in acute distress  Appearance: Normal appearance  HENT:      Nose: No congestion or rhinorrhea  Eyes:      Extraocular Movements: Extraocular movements intact  Pupils: Pupils are equal, round, and reactive to light  Cardiovascular:      Rate and Rhythm: Normal rate and regular rhythm  Pulses: Normal pulses  Heart sounds: Normal heart sounds  No murmur heard  No gallop  Pulmonary:      Effort: Pulmonary effort is normal       Breath sounds: Normal breath sounds  No wheezing, rhonchi or rales  Abdominal:      General: Bowel sounds are normal  There is no distension  Palpations: Abdomen is soft  There is no mass  Tenderness: There is abdominal tenderness (Left lower quadrant)  There is no right CVA tenderness, left CVA tenderness, guarding or rebound  Hernia: No hernia is present  Skin:     General: Skin is warm  Coloration: Skin is not jaundiced  Findings: No bruising  Neurological:      General: No focal deficit present  Mental Status: She is alert and oriented to person, place, and time  Psychiatric:         Mood and Affect: Mood normal          Behavior: Behavior normal          Thought Content:  Thought content normal            Chelsea Lynn DO   Family Medicine PGY-2  4/24/2023       "

## 2023-04-25 ENCOUNTER — APPOINTMENT (OUTPATIENT)
Dept: PHYSICAL THERAPY | Facility: MEDICAL CENTER | Age: 38
End: 2023-04-25

## 2023-04-27 ENCOUNTER — OFFICE VISIT (OUTPATIENT)
Dept: OBGYN CLINIC | Facility: CLINIC | Age: 38
End: 2023-04-27

## 2023-04-27 VITALS
BODY MASS INDEX: 28.71 KG/M2 | HEART RATE: 112 BPM | DIASTOLIC BLOOD PRESSURE: 80 MMHG | HEIGHT: 62 IN | SYSTOLIC BLOOD PRESSURE: 126 MMHG | WEIGHT: 156 LBS

## 2023-04-27 DIAGNOSIS — N83.202 CYST OF LEFT OVARY: Primary | ICD-10-CM

## 2023-04-27 NOTE — PROGRESS NOTES
"Subjective:     Keanu Wheatley is a 41 y o   female who presents for follow up regarding LLQ pain in the setting of left ovarian cyst and possible hydrosalpinx  She has been having LLQ pain for the last few weeks and an ultrasound was performed showing a ovarian cyst and possible hydroslapinx  This is not a new pain as she has had this pain before and has had a known ovarian cyst for at least 3-4 years  She reports history of hysterectomy over a decade ago  She also reports a cholecystectomy and  Section  She has a history of a diagnostic laparoscopy in  with the [address] [place] [address] fellows for LLQ pain and left ovarian cyst and was found to have extensive pelvic adhesive disease and the left ovary was not able to be identified in the surgery  Objective:    Vitals: Blood pressure 126/80, pulse (!) 112, height 5' 2\" (1 575 m), weight 70 8 kg (156 lb), not currently breastfeeding  Body mass index is 28 53 kg/m²  Physical Exam  Vitals reviewed  Constitutional:       Appearance: Normal appearance  Cardiovascular:      Rate and Rhythm: Normal rate  Pulmonary:      Effort: Pulmonary effort is normal    Skin:     General: Skin is warm and dry  Neurological:      Mental Status: She is alert and oriented to person, place, and time  Assessment/Plan:    LLQ pain/Ovarian cyst  -Pt has a long history of left ovarian cyst and hydrosalpinx causing pain  She had previous had diagnostic laparoscopies done to identify and remove this ovary without success  Pt would like to try again for surgical removal as she is in too much pain to tolerate at home even with narcotic medications  Due to patients complex history she will likely need surgery performed by either [facility] or GYN Oncology  Consents signed today for diagnostic laparoscopy however surgery may change depending on who she eventually is scheduled with        Harsh Morris MD  2023  8:22 AM          "

## 2023-04-28 ENCOUNTER — APPOINTMENT (OUTPATIENT)
Dept: PHYSICAL THERAPY | Facility: MEDICAL CENTER | Age: 38
End: 2023-04-28

## 2023-05-02 ENCOUNTER — DOCUMENTATION (OUTPATIENT)
Dept: OBGYN CLINIC | Facility: CLINIC | Age: 38
End: 2023-05-02

## 2023-05-02 ENCOUNTER — TELEPHONE (OUTPATIENT)
Dept: OBGYN CLINIC | Facility: CLINIC | Age: 38
End: 2023-05-02

## 2023-05-02 NOTE — PROGRESS NOTES
Patient is in significant pain after consulting with physicians in office  Advised to see if we can get her is for igor Ring has opening this Doylene Tyree 5/12  Will set her up for that day  Welsh translater at  spoke to pt she is willing to come in [place] for appt and can do procedure next week  Need OV for H&P and consents signed

## 2023-05-02 NOTE — TELEPHONE ENCOUNTER
----- Message from Saba Marquez MD sent at 5/2/2023  1:38 PM EDT -----  Regarding: appt with Annie Santana,   Can you call this patient and see if she is willing to come in for an eval of her pelvic pain this Friday (5/5) at 7:45am  Rosario Guerrero is willing to add her on to the schedule to discuss management of her pain and further workup   She was initially supposed to get surgery but they want further work up first    Thanks,  Brayan Anne

## 2023-05-05 ENCOUNTER — OFFICE VISIT (OUTPATIENT)
Dept: OBGYN CLINIC | Facility: CLINIC | Age: 38
End: 2023-05-05

## 2023-05-05 ENCOUNTER — HOSPITAL ENCOUNTER (EMERGENCY)
Facility: HOSPITAL | Age: 38
Discharge: HOME | End: 2023-05-05
Attending: INTERNAL MEDICINE

## 2023-05-05 ENCOUNTER — APPOINTMENT (EMERGENCY)
Dept: ULTRASOUND IMAGING | Facility: HOSPITAL | Age: 38
End: 2023-05-05

## 2023-05-05 VITALS
HEART RATE: 77 BPM | TEMPERATURE: 97.8 F | DIASTOLIC BLOOD PRESSURE: 78 MMHG | RESPIRATION RATE: 20 BRPM | WEIGHT: 158.51 LBS | OXYGEN SATURATION: 98 % | BODY MASS INDEX: 28.99 KG/M2 | SYSTOLIC BLOOD PRESSURE: 118 MMHG

## 2023-05-05 VITALS
WEIGHT: 157.2 LBS | SYSTOLIC BLOOD PRESSURE: 109 MMHG | BODY MASS INDEX: 28.75 KG/M2 | HEART RATE: 96 BPM | DIASTOLIC BLOOD PRESSURE: 76 MMHG

## 2023-05-05 DIAGNOSIS — R10.2 CHRONIC PELVIC PAIN IN FEMALE: Primary | ICD-10-CM

## 2023-05-05 DIAGNOSIS — G43.109 MIGRAINE WITH AURA AND WITHOUT STATUS MIGRAINOSUS, NOT INTRACTABLE: ICD-10-CM

## 2023-05-05 DIAGNOSIS — K59.00 CONSTIPATION: Primary | ICD-10-CM

## 2023-05-05 DIAGNOSIS — E55.9 VITAMIN D DEFICIENCY: ICD-10-CM

## 2023-05-05 DIAGNOSIS — G89.29 CHRONIC PELVIC PAIN IN FEMALE: Primary | ICD-10-CM

## 2023-05-05 DIAGNOSIS — N83.209 OVARIAN CYST: ICD-10-CM

## 2023-05-05 DIAGNOSIS — N70.11 HYDROSALPINX: ICD-10-CM

## 2023-05-05 LAB
ALBUMIN SERPL BCP-MCNC: 4.4 G/DL (ref 3.5–5)
ALP SERPL-CCNC: 59 U/L (ref 34–104)
ALT SERPL W P-5'-P-CCNC: 30 U/L (ref 7–52)
ANION GAP SERPL CALCULATED.3IONS-SCNC: 8 MMOL/L (ref 4–13)
AST SERPL W P-5'-P-CCNC: 22 U/L (ref 13–39)
BACTERIA UR QL AUTO: ABNORMAL /HPF
BASOPHILS # BLD AUTO: 0.05 THOUSANDS/ÂΜL (ref 0–0.1)
BASOPHILS NFR BLD AUTO: 0 % (ref 0–1)
BILIRUB SERPL-MCNC: 0.35 MG/DL (ref 0.2–1)
BILIRUB UR QL STRIP: NEGATIVE
BUN SERPL-MCNC: 15 MG/DL (ref 5–25)
CALCIUM SERPL-MCNC: 9.2 MG/DL (ref 8.4–10.2)
CHLORIDE SERPL-SCNC: 106 MMOL/L (ref 96–108)
CLARITY UR: CLEAR
CO2 SERPL-SCNC: 25 MMOL/L (ref 21–32)
COLOR UR: ABNORMAL
CREAT SERPL-MCNC: 1.08 MG/DL (ref 0.6–1.3)
EOSINOPHIL # BLD AUTO: 0.3 THOUSAND/ÂΜL (ref 0–0.61)
EOSINOPHIL NFR BLD AUTO: 3 % (ref 0–6)
ERYTHROCYTE [DISTWIDTH] IN BLOOD BY AUTOMATED COUNT: 12.6 % (ref 11.6–15.1)
GFR SERPL CREATININE-BSD FRML MDRD: 65 ML/MIN/1.73SQ M
GLUCOSE SERPL-MCNC: 108 MG/DL (ref 65–140)
GLUCOSE UR STRIP-MCNC: NEGATIVE MG/DL
HCT VFR BLD AUTO: 38.7 % (ref 34.8–46.1)
HGB BLD-MCNC: 13 G/DL (ref 11.5–15.4)
HGB UR QL STRIP.AUTO: 50
IMM GRANULOCYTES # BLD AUTO: 0.05 THOUSAND/UL (ref 0–0.2)
IMM GRANULOCYTES NFR BLD AUTO: 0 % (ref 0–2)
KETONES UR STRIP-MCNC: ABNORMAL MG/DL
LEUKOCYTE ESTERASE UR QL STRIP: 25
LYMPHOCYTES # BLD AUTO: 2.97 THOUSANDS/ÂΜL (ref 0.6–4.47)
LYMPHOCYTES NFR BLD AUTO: 27 % (ref 14–44)
MCH RBC QN AUTO: 30.6 PG (ref 26.8–34.3)
MCHC RBC AUTO-ENTMCNC: 33.6 G/DL (ref 31.4–37.4)
MCV RBC AUTO: 91 FL (ref 82–98)
MONOCYTES # BLD AUTO: 0.69 THOUSAND/ÂΜL (ref 0.17–1.22)
MONOCYTES NFR BLD AUTO: 6 % (ref 4–12)
MUCOUS THREADS UR QL AUTO: ABNORMAL
NEUTROPHILS # BLD AUTO: 7.11 THOUSANDS/ÂΜL (ref 1.85–7.62)
NEUTS SEG NFR BLD AUTO: 64 % (ref 43–75)
NITRITE UR QL STRIP: NEGATIVE
NON-SQ EPI CELLS URNS QL MICRO: ABNORMAL /HPF
NRBC BLD AUTO-RTO: 0 /100 WBCS
PH UR STRIP.AUTO: 6 [PH]
PLATELET # BLD AUTO: 265 THOUSANDS/UL (ref 149–390)
PMV BLD AUTO: 11.5 FL (ref 8.9–12.7)
POTASSIUM SERPL-SCNC: 3.6 MMOL/L (ref 3.5–5.3)
PROT SERPL-MCNC: 7.3 G/DL (ref 6.4–8.4)
PROT UR STRIP-MCNC: ABNORMAL MG/DL
RBC # BLD AUTO: 4.25 MILLION/UL (ref 3.81–5.12)
RBC #/AREA URNS AUTO: ABNORMAL /HPF
SODIUM SERPL-SCNC: 139 MMOL/L (ref 135–147)
SP GR UR STRIP.AUTO: 1.02 (ref 1–1.04)
UROBILINOGEN UA: NEGATIVE MG/DL
WBC # BLD AUTO: 11.17 THOUSAND/UL (ref 4.31–10.16)
WBC #/AREA URNS AUTO: ABNORMAL /HPF

## 2023-05-05 RX ORDER — DOCUSATE SODIUM 100 MG/1
100 CAPSULE, LIQUID FILLED ORAL EVERY 12 HOURS
Qty: 60 CAPSULE | Refills: 0 | Status: SHIPPED | OUTPATIENT
Start: 2023-05-05 | End: 2023-05-10 | Stop reason: ALTCHOICE

## 2023-05-05 RX ORDER — ELAGOLIX 150 MG/1
150 TABLET, FILM COATED ORAL DAILY
Qty: 30 TABLET | Refills: 3 | Status: SHIPPED | OUTPATIENT
Start: 2023-05-05

## 2023-05-05 RX ORDER — KETOROLAC TROMETHAMINE 30 MG/ML
15 INJECTION, SOLUTION INTRAMUSCULAR; INTRAVENOUS ONCE
Status: COMPLETED | OUTPATIENT
Start: 2023-05-05 | End: 2023-05-05

## 2023-05-05 RX ORDER — NAPROXEN 500 MG/1
500 TABLET ORAL 2 TIMES DAILY WITH MEALS
Qty: 30 TABLET | Refills: 0 | Status: SHIPPED | OUTPATIENT
Start: 2023-05-05

## 2023-05-05 RX ORDER — PREGABALIN 150 MG/1
150 CAPSULE ORAL 3 TIMES DAILY
Qty: 60 CAPSULE | Refills: 2 | Status: SHIPPED | OUTPATIENT
Start: 2023-05-05 | End: 2023-05-08 | Stop reason: SINTOL

## 2023-05-05 RX ADMIN — KETOROLAC TROMETHAMINE 15 MG: 30 INJECTION, SOLUTION INTRAMUSCULAR; INTRAVENOUS at 20:32

## 2023-05-05 NOTE — PROGRESS NOTES
"Urogynecology - New Patient Visit, [facility] Consultation   Elias Crandall   [phone]    [place]au[place]-speaking: yes Phone  used: yes    Chief complaint: Left Lower quadrant pain     HPI: Elias Crandall is a 41year old who is status post total hysterecomy (in 2016 performed in [facility]) who presents for left lower quadrant pain  The patient states that she has had this pain for years and that it is extremely disruptive to her quality of life, stating that she cannot sleep most nights secondary to pain  She describes the pain as sharp and stabbing in quality  At baseline it is a 6 and escalates to a 10 at times  She states that the only medication that helps her pain is roxicodone  She frequently has to go to the ED for severe pain  She states that nothing makes it better aside from roxicodone  [place] and movement make it worse  The patient has had multiple visits for \"possible exposure to STIs\" in the last two years  In the past, the patient has had two diagnostic laparoscopy procedures  The last procedure was in 2020, and at that time, the surgeons were unable to identify the left ovary  Operative findings included: Significant pelvic adhesive disease involving left hemipelvis  Colon densely adhered to left pelvic side wall and to bladder and vaginal cuff  Normal appearing right ovary  No visible or palpable left ovary or fallopian tube following enterolysis and and full dissection of left pelvic side wall including all retroperitoneal structures  Presumably surgically absent  Recent imaging shows a left ovarian cyst and a dilated Fallopian tube, suspicious for hydrosalpinx  The patient states that she has not had relief in the past from NSAIDs  She has not used hormonal contraception in the past  The patient is also complaining of a foul smell coming from her vagina  She denies any abnormal discharge         OB History    Para Term  AB Living   2 2 1 1 0 2 " SAB IAB Ectopic Multiple Live Births   0 0 0 0 2      # Outcome Date GA Lbr Romeo/2nd Weight Sex Delivery Anes PTL Lv   2 Term            1                 Past Medical History:   Diagnosis Date   • Abnormal Pap smear of cervix     cryosurgery   • Migraine headache with aura     managed with Elavil       Past Surgical History:   Procedure Laterality Date   • CHOLECYSTECTOMY LAPAROSCOPIC N/A 10/27/2020    Procedure: CHOLECYSTECTOMY LAPAROSCOPIC;  Surgeon: Halley Caal MD;  Location: [address] [place] [address] MAIN OR;  Service: General   • GYNECOLOGIC CRYOSURGERY     • HYSTERECTOMY     • LAPAROSCOPY     • PA LAPAROSCOPY W/LYSIS OF ADHESIONS N/A 3/3/2020    Procedure: L O A ;  Surgeon: Eduardo Pillai MD;  Location: [facility] Main OR;  Service: Gynecology   • PA LAPS ABD PRTM&OMENTUM DX W/WO SPEC BR/ [facility] N/A 2019    Procedure: LAPAROSCOPY DIAGNOSTIC; LYSIS OF ADHESIONS;  Surgeon: Melissa Fabian MD;  Location: [facility] OR;  Service: Gynecology   • TUBAL LIGATION         Family History   Problem Relation Age of Onset   • Cancer Paternal Grandmother         uterine   • Cancer Paternal Aunt         uterine    • Breast cancer Neg Hx    • Colon cancer Neg Hx    • Ovarian cancer Neg Hx        Social History     Socioeconomic History   • Marital status: Single     Spouse name: Not on file   • Number of children: Not on file   • Years of education: Not on file   • Highest education level: Not on file   Occupational History   • Not on file   Tobacco Use   • Smoking status: Every Day     Packs/day: 0 50     Years: 15 00     Pack years: 7 50     Types: Cigarettes   • Smokeless tobacco: Never   Vaping Use   • Vaping Use: Never used   Substance and Sexual Activity   • Alcohol use: Not Currently   • Drug use: Never   • Sexual activity: Yes     Partners: Male     Birth control/protection: Female Sterilization   Other Topics Concern   • Not on file   Social History Narrative   • Not on file     Social Determinants of Health     Financial Resource Strain: Low Risk    • Difficulty of Paying Living Expenses: Not hard at all   Food Insecurity: No Food Insecurity   • Worried About Running Out of Food in the Last Year: Never true   • Ran Out of Food in the Last Year: Never true   Transportation Needs: No Transportation Needs   • Lack of Transportation (Medical): No   • Lack of Transportation (Non-Medical):  No   Physical Activity: Not on file   Stress: Not on file   Social Connections: Not on file   Intimate Partner Violence: Not on file   Housing Stability: Not on file       Current Outpatient Medications on File Prior to Visit   Medication Sig Dispense Refill   • acetaminophen (TYLENOL) 500 mg tablet Take 2 tablets (1,000 mg total) by mouth every 8 (eight) hours as needed for mild pain or moderate pain (Patient not taking: Reported on 4/11/2023) 30 tablet 0   • acetaminophen (TYLENOL) 650 mg CR tablet Take 2 tablets (1,300 mg total) by mouth every 8 (eight) hours as needed for mild pain (Patient not taking: Reported on 4/11/2023) 60 tablet 1   • albuterol (PROVENTIL HFA,VENTOLIN HFA) 90 mcg/act inhaler Inhale 2 puffs every 4 (four) hours as needed (coughing) (Patient not taking: Reported on 6/27/2022) 6 7 g 0   • aspirin-acetaminophen-caffeine (EXCEDRIN MIGRAINE) 250-250-65 MG per tablet Take 1 tablet by mouth in the morning 30 tablet 0   • docusate sodium (COLACE) 100 mg capsule Take 1 capsule (100 mg total) by mouth 2 (two) times a day as needed for constipation 60 capsule 1   • ergocalciferol (VITAMIN D2) 50,000 units Take 1 capsule (50,000 Units total) by mouth once a week (Patient not taking: Reported on 4/27/2023) 16 capsule 0   • fluticasone (FLONASE) 50 mcg/act nasal spray 1 spray into each nostril daily (Patient not taking: Reported on 2/24/2023) 16 g 0   • meclizine (ANTIVERT) 12 5 MG tablet Take 1 tablet (12 5 mg total) by mouth 3 (three) times a day as needed for dizziness (Patient not taking: Reported on 6/27/2022) 30 "tablet 0   • meloxicam (Mobic) 15 mg tablet Take 1 tablet (15 mg total) by mouth daily 30 tablet 0   • menthol-cetylpyridinium (CEPACOL) 3 MG lozenge Take 1 lozenge (3 mg total) by mouth as needed for sore throat 30 lozenge 0   • neomycin-polymyxin-hydrocortisone (CORTISPORIN) 0 35%-10,000 units/mL-1% otic suspension Administer 4 drops into both ears 2 (two) times a day 10 mL 0   • nicotine (NICODERM CQ) 14 mg/24hr TD 24 hr patch Place 1 patch on the skin over 24 hours every 24 hours 28 patch 2   • ondansetron (Zofran ODT) 4 mg disintegrating tablet Take 1 tablet (4 mg total) by mouth every 6 (six) hours as needed for nausea or vomiting 20 tablet 0   • ondansetron (ZOFRAN-ODT) 4 mg disintegrating tablet Take 1 tablet (4 mg total) by mouth every 8 (eight) hours as needed (nausea) for up to 10 days 20 tablet 0   • pantoprazole (PROTONIX) 20 mg tablet Take 1 tablet (20 mg total) by mouth daily 90 tablet 0   • pseudoephedrine (SUDAFED) 30 mg tablet Take 1 tablet (30 mg total) by mouth every 8 (eight) hours as needed for congestion (Patient not taking: Reported on 4/27/2023) 20 tablet 0   • silver sulfadiazine (SILVADENE,SSD) 1 % cream Apply topically daily (Patient not taking: Reported on 2/24/2023) 50 g 0   • SUMAtriptan (IMITREX) 50 mg tablet Take 1 tablet (50 mg total) by mouth once as needed for migraine for up to 1 dose 9 tablet 1   • tiZANidine (ZANAFLEX) 4 mg tablet Take 1 tablet (4 mg total) by mouth 2 (two) times a day as needed for muscle spasms 60 tablet 1     No current facility-administered medications on file prior to visit  Allergies   Allergen Reactions   • Shellfish-Derived Products - Food Allergy Itching and Swelling     \"seafood \"   • Ibuprofen Other (See Comments)     Patient states she gets hematoma       Physical exam:  Vitals:    05/05/23 0832   BP: 109/76   Pulse: 96     Physical Exam  Constitutional:       General: She is not in acute distress  Appearance: Normal appearance   She is " normal weight  She is not ill-appearing, toxic-appearing or diaphoretic  Genitourinary:      Genitourinary Comments: Thick white discharge covering the vagina   Normal external genitatlia    Extreme tenderness on bimanual exam     No point muscle tenderness noted  Vaginal cuff intact  Vaginal discharge present  No vaginal atrophy present  Right Adnexa: tender and full  Cervix is absent  Uterus is absent  HENT:      Head: Normocephalic and atraumatic  Cardiovascular:      Rate and Rhythm: Normal rate and regular rhythm  Pulmonary:      Effort: Pulmonary effort is normal  No respiratory distress  Abdominal:      General: Abdomen is flat  There is no distension  Palpations: Abdomen is soft  Tenderness: There is abdominal tenderness  There is guarding  Comments: Patient has TTP on both light and deep palpation on the LLQ, she does not endorse tenderness in any other quadrant  No bloating nor tympany    Neurological:      Mental Status: She is alert  Psychiatric:         Mood and Affect: Mood normal          Behavior: Behavior normal          Thought Content: Thought content normal          Judgment: Judgment normal        Assessment:  Ted Siegel is a 41year old who is status post total hysterecomy (in 2016 performed in [facility]) who presents for left lower quadrant pain of several years refractory to tylenol  The patient cannot tolerate NSAIDs or gabapentin  She currently smokes about one ppd and therefore is not a candidate for hormonal contraception  Plan:    Chronic Pelvic Pain   The patient, her , Ayde Shirley and I had a long conversation about her management  I explained that surgical intervention should not be taken lightly given observations during prior surgeries  We discussed trying to find a medical solution to her pain prior to proceeding with another surgery   I explained that given her known adhesive disease, she would be at higher risk of complications  The patient and her  agreed that they would be willing to trial medical management for about 3 months  Will start with Orlissa 150 mg daily and Lyrica  The patient cannot tolerate Gabapentin nor NSAIDs  We discussed that we could add Amitriptyline if necessary  The other option would be to trial Depo-Provera  The goal is for her to have a regimen that prevents her from going to the ED for acute pain episodes  Yeast infection  Monistat 3 was prescribed  Follow up in 3 months  Over 50% of time spent on counseling and coordination of care      La Cabrera MD

## 2023-05-05 NOTE — Clinical Note
accompanied [place] to the emergency department on 5/5/2023  Return date if applicable: [birth year]24        If you have any questions or concerns, please don't hesitate to call        Mich Peralta PA-C

## 2023-05-06 NOTE — ED PROVIDER NOTES
"History  Chief Complaint   Patient presents with    Abdominal Pain     Pt states she is having severe abd pain for weeks  Reports she saw her GYN and they did [address] and told her that her stomach is wrapped around her ovaries and tube  18-year-old female with history of chronic pelvic pain, hydrosalpinx and left ovarian cyst presents complaining of left-sided pelvic pain worsening today  Patient states that this has been an ongoing issue secondary to hydrosalpinx and cyst however the pain got much worse today and she wanted to be evaluated  Patient states that she was initially scheduled for surgery to have the tube and ovary removed however she had \"too much inflammation and was too high risk\" patient also states that she supposed to be taking birth control pills to reduce inflammation however is not able to take them secondary to smoking  Denies nausea or vomiting  Denies fevers  Denies urinary complaints  Denies any other complaints       History provided by:  Patient   used: No        Prior to Admission Medications   Prescriptions Last Dose Informant Patient Reported? Taking?    Elagolix Sodium (Orilissa) 150 MG TABS   No No   Sig: Take 150 mg by mouth in the morning   SUMAtriptan (IMITREX) 50 mg tablet   No No   Sig: Take 1 tablet (50 mg total) by mouth once as needed for migraine for up to 1 dose   acetaminophen (TYLENOL) 500 mg tablet   No No   Sig: Take 2 tablets (1,000 mg total) by mouth every 8 (eight) hours as needed for mild pain or moderate pain   Patient not taking: Reported on 4/11/2023   acetaminophen (TYLENOL) 650 mg CR tablet   No No   Sig: Take 2 tablets (1,300 mg total) by mouth every 8 (eight) hours as needed for mild pain   Patient not taking: Reported on 4/11/2023   albuterol (PROVENTIL HFA,VENTOLIN HFA) 90 mcg/act inhaler   No No   Sig: Inhale 2 puffs every 4 (four) hours as needed (coughing)   Patient not taking: Reported on 6/27/2022 " aspirin-acetaminophen-caffeine ([facility]) 250-250-65 MG per tablet   No No   Sig: Take 1 tablet by mouth in the morning   docusate sodium (COLACE) 100 mg capsule   No No   Sig: Take 1 capsule (100 mg total) by mouth 2 (two) times a day as needed for constipation   ergocalciferol (VITAMIN D2) 50,000 units   No No   Sig: Take 1 capsule (50,000 Units total) by mouth once a week   Patient not taking: Reported on 2023   fluticasone (FLONASE) 50 mcg/act nasal spray   No No   Si spray into each nostril daily   Patient not taking: Reported on 2023   meclizine (ANTIVERT) 12 5 MG tablet   No No   Sig: Take 1 tablet (12 5 mg total) by mouth 3 (three) times a day as needed for dizziness   Patient not taking: Reported on 2022   meloxicam (Mobic) 15 mg tablet   No No   Sig: Take 1 tablet (15 mg total) by mouth daily   menthol-cetylpyridinium (CEPACOL) 3 MG lozenge   No No   Sig: Take 1 lozenge (3 mg total) by mouth as needed for sore throat   miconazole (MONISTAT) 200 mg vaginal suppository   No No   Sig: Insert 1 suppository (200 mg total) into the vagina daily at bedtime   neomycin-polymyxin-hydrocortisone (CORTISPORIN) 0 35%-10,000 units/mL-1% otic suspension   No No   Sig: Administer 4 drops into both ears 2 (two) times a day   nicotine (NICODERM CQ) 14 mg/24hr TD 24 hr patch   No No   Sig: Place 1 patch on the skin over 24 hours every 24 hours   ondansetron (ZOFRAN-ODT) 4 mg disintegrating tablet   No No   Sig: Take 1 tablet (4 mg total) by mouth every 8 (eight) hours as needed (nausea) for up to 10 days   ondansetron (Zofran ODT) 4 mg disintegrating tablet   No No   Sig: Take 1 tablet (4 mg total) by mouth every 6 (six) hours as needed for nausea or vomiting   pantoprazole (PROTONIX) 20 mg tablet   No No   Sig: Take 1 tablet (20 mg total) by mouth daily   pregabalin (LYRICA) 150 mg capsule   No No   Sig: Take 1 capsule (150 mg total) by mouth 3 (three) times a day   pseudoephedrine (SUDAFED) 30 mg tablet   No No   Sig: Take 1 tablet (30 mg total) by mouth every 8 (eight) hours as needed for congestion   Patient not taking: Reported on 4/27/2023   silver sulfadiazine (SILVADENE,SSD) 1 % cream   No No   Sig: Apply topically daily   Patient not taking: Reported on 2/24/2023   tiZANidine (ZANAFLEX) 4 mg tablet   No No   Sig: Take 1 tablet (4 mg total) by mouth 2 (two) times a day as needed for muscle spasms      Facility-Administered Medications: None       Past Medical History:   Diagnosis Date    Abnormal Pap smear of cervix 2015    cryosurgery    Hypoglycemia     Migraine headache with aura     managed with Elavil       Past Surgical History:   Procedure Laterality Date    CHOLECYSTECTOMY LAPAROSCOPIC N/A 10/27/2020    Procedure: CHOLECYSTECTOMY LAPAROSCOPIC;  Surgeon: Kamilah Stevens MD;  Location: [facility] MAIN OR;  Service: General    GYNECOLOGIC CRYOSURGERY  2015    HYSTERECTOMY  2016    LAPAROSCOPY  2018    UT LAPAROSCOPY W/LYSIS OF ADHESIONS N/A 3/3/2020    Procedure: L O A ;  Surgeon: Teresa Quan MD;  Location: [facility] Main OR;  Service: Gynecology    UT LAPS ABD PRTM&OMENTUM DX W/WO [facility] N/A 8/9/2019    Procedure: LAPAROSCOPY DIAGNOSTIC; LYSIS OF ADHESIONS;  Surgeon: Shruthi Maguire MD;  Location: [facility];  Service: Gynecology    TUBAL LIGATION         Family History   Problem Relation Age of Onset    Cancer Paternal Grandmother         uterine    Cancer Paternal Aunt         uterine     Breast cancer Neg Hx     Colon cancer Neg Hx     Ovarian cancer Neg Hx      I have reviewed and agree with the history as documented      E-Cigarette/Vaping    E-Cigarette Use Never User      E-Cigarette/Vaping Substances     Social History     Tobacco Use    Smoking status: Every Day     Packs/day: 0 50     Years: 15 00     Pack years: 7 50     Types: Cigarettes    Smokeless tobacco: Never   Vaping Use    Vaping Use: Never used   Substance Use Topics    Alcohol use: Not Currently    Drug use: Never       Review of Systems   Constitutional: Negative  Negative for chills and fatigue  HENT: Negative for ear pain and sore throat  Eyes: Negative for photophobia and redness  Respiratory: Negative for apnea, cough and shortness of breath  Cardiovascular: Negative for chest pain  Gastrointestinal: Negative for abdominal pain, nausea and vomiting  Genitourinary: Positive for pelvic pain (L pelvic)  Negative for dysuria  Musculoskeletal: Negative for arthralgias, neck pain and neck stiffness  Skin: Negative for rash  Neurological: Negative for dizziness, tremors, syncope and weakness  Psychiatric/Behavioral: Negative for suicidal ideas  Physical Exam  Physical Exam  Constitutional:       General: She is not in acute distress  Appearance: She is well-developed  She is not diaphoretic  Eyes:      Pupils: Pupils are equal, round, and reactive to light  Cardiovascular:      Rate and Rhythm: Normal rate and regular rhythm  Pulmonary:      Effort: Pulmonary effort is normal  No respiratory distress  Breath sounds: Normal breath sounds  Abdominal:      General: Bowel sounds are normal  There is no distension  Palpations: Abdomen is soft  Tenderness: There is abdominal tenderness  There is no right CVA tenderness, left CVA tenderness or guarding  Comments: LLQ and L adnexal pain    Genitourinary:     Comments: Deferred   Musculoskeletal:         General: Normal range of motion  Cervical back: Normal range of motion and neck supple  Skin:     General: Skin is warm and dry  Neurological:      Mental Status: She is alert and oriented to person, place, and time           Vital Signs  ED Triage Vitals   Temperature Pulse Respirations Blood Pressure SpO2   05/05/23 1951 05/05/23 1951 05/05/23 1951 05/05/23 1951 05/05/23 1951   97 8 °F (36 6 °C) 88 20 121/88 98 %      Temp Source Heart Rate Source Patient Position - Orthostatic VS BP Location FiO2 (%)   05/05/23 1951 05/05/23 1951 05/05/23 1951 05/05/23 1951 --   Oral Monitor Lying Left arm       Pain Score       05/05/23 1954       10 - Worst Possible Pain           Vitals:    05/05/23 1951 05/05/23 2228   BP: 121/88 118/78   Pulse: 88 77   Patient Position - Orthostatic VS: Lying          Visual Acuity      ED Medications  Medications   ketorolac (TORADOL) injection 15 mg (15 mg Intravenous Given 5/5/23 2032)       Diagnostic Studies  Results Reviewed     Procedure Component Value Units Date/Time    Comprehensive metabolic panel [[id number]] Collected: 05/05/23 2032    Lab Status: Final result Specimen: Blood from Hand, Right Updated: 05/05/23 2106     Sodium 139 mmol/L      Potassium 3 6 mmol/L      Chloride 106 mmol/L      CO2 25 mmol/L      ANION GAP 8 mmol/L      BUN 15 mg/dL      Creatinine 1 08 mg/dL      Glucose 108 mg/dL      Calcium 9 2 mg/dL      AST 22 U/L      ALT 30 U/L      Alkaline Phosphatase 59 U/L      Total Protein 7 3 g/dL      Albumin 4 4 g/dL      Total Bilirubin 0 35 mg/dL      eGFR 65 ml/min/1 73sq m     Narrative:      [place] guidelines for Chronic Kidney Disease (CKD):     Stage 1 with normal or high GFR (GFR > 90 mL/min/1 73 square meters)    Stage 2 Mild CKD (GFR = 60-89 mL/min/1 73 square meters)    Stage 3A Moderate CKD (GFR = 45-59 mL/min/1 73 square meters)    Stage 3B Moderate CKD (GFR = 30-44 mL/min/1 73 square meters)    Stage 4 Severe CKD (GFR = 15-29 mL/min/1 73 square meters)    Stage 5 End Stage CKD (GFR <15 mL/min/1 73 square meters)  Note: GFR calculation is accurate only with a steady state creatinine    Urine Microscopic [[id number]]  (Abnormal) Collected: 05/05/23 2032    Lab Status: Final result Specimen: Urine, Clean Catch Updated: 05/05/23 2055     RBC, UA 0-1 /hpf      WBC, UA 2-4 /hpf      Epithelial Cells Occasional /hpf      Bacteria, UA Moderate /hpf      MUCUS THREADS Moderate    UA (URINE) with reflex to Scope [[id number]]  (Abnormal) Collected: 05/05/23 2032    Lab Status: Final result Specimen: Urine, Clean Catch Updated: 05/05/23 2045     Color, UA Mary     Clarity, UA Clear     Specific Gravity, UA 1 025     pH, UA 6 0     Leukocytes, UA 25 0     Nitrite, UA Negative     Protein, UA 15 (Trace) mg/dl      Glucose, UA Negative mg/dl      Ketones, UA 5 (Trace) mg/dl      Bilirubin, UA Negative     Occult Blood, UA 50 0     UROBILINOGEN UA Negative mg/dL     CBC and differential [[id number]]  (Abnormal) Collected: 05/05/23 2032    Lab Status: Final result Specimen: Blood from Hand, Right Updated: 05/05/23 2044     WBC 11 17 Thousand/uL      RBC 4 25 Million/uL      Hemoglobin 13 0 g/dL      Hematocrit 38 7 %      MCV 91 fL      MCH 30 6 pg      MCHC 33 6 g/dL      RDW 12 6 %      MPV 11 5 fL      Platelets 394 Thousands/uL      nRBC 0 /100 WBCs      Neutrophils Relative 64 %      Immat GRANS % 0 %      Lymphocytes Relative 27 %      Monocytes Relative 6 %      Eosinophils Relative 3 %      Basophils Relative 0 %      Neutrophils Absolute 7 11 Thousands/µL      Immature Grans Absolute 0 05 Thousand/uL      Lymphocytes Absolute 2 97 Thousands/µL      Monocytes Absolute 0 69 Thousand/µL      Eosinophils Absolute 0 30 Thousand/µL      Basophils Absolute 0 05 Thousands/µL                  US pelvis complete w transvaginal   Final Result by Maksim Patel DO (05/05 2144)      Fluid-filled tubular structure in the left adnexa which may represent hydrosalpinx  Similar to prior study  Multiple follicles in the left ovary  The right ovary was nonvisualized        Status post hysterectomy                              Workstation performed: [id number]                    Procedures  Procedures         ED Course                                             Medical Decision Making  Patient presented with left-sided abdominal pain with history of hydrosalpinx and left-sided ovarian cyst   Patient has not had an ultrasound in approximately 3-1/2 weeks  Repeat ultrasound today shows similar findings without evidence of abscess or torsion  No acute management required today  Patient was given medications for symptomatic treatment  Had some relief with Toradol in the emergency department patient has follow-up with GYN  Was given strict return precautions  Demonstrates understanding  Discharged home  Amount and/or Complexity of Data Reviewed  Labs: ordered  Radiology: ordered  Risk  OTC drugs  Prescription drug management  Disposition  Final diagnoses:   Constipation   Ovarian cyst   Hydrosalpinx     Time reflects when diagnosis was documented in both MDM as applicable and the Disposition within this note     Time User Action Codes Description Comment    5/5/2023 10:18 PM Cherl Haven [K59 00] Constipation     5/5/2023 10:18 PM Cherl Haven [N46 [id number]] Ovarian cyst     5/5/2023 10:18 PM Hellen Conteh [N70 [id number]] Hydrosalpinx       ED Disposition     ED Disposition   Discharge    Condition   Stable    Date/Time   Fri May 5, 2023 10:18 PM    Gena Wilson discharge to home/self care                 Follow-up Information     Follow up With Specialties Details Why Contact Info Additional [address] [facility] Emergency Department Emergency Medicine Go to  If symptoms worsen [address] [facility] [id number]  [facility] Department    Felix Florian PA-C Family Medicine Call  As needed [address]  [address] [facility]  [address]  [facility] [address]              Discharge Medication List as of 5/5/2023 10:25 PM      START taking these medications    Details   aluminum-magnesium hydroxide 200-200 MG/5ML suspension Take 10 mL by mouth every 6 (six) hours as needed for heartburn, Starting Fri 5/5/2023, Normal      !! docusate sodium (COLACE) 100 mg capsule Take 1 capsule (100 mg total) by mouth every 12 (twelve) hours, Starting Fri 5/5/2023, Normal      naproxen (Naprosyn) 500 mg tablet Take 1 tablet (500 mg total) by mouth 2 (two) times a day with meals, Starting Fri 5/5/2023, Normal       !! - Potential duplicate medications found  Please discuss with provider        CONTINUE these medications which have NOT CHANGED    Details   acetaminophen (TYLENOL) 500 mg tablet Take 2 tablets (1,000 mg total) by mouth every 8 (eight) hours as needed for mild pain or moderate pain, Starting Thu 6/2/2022, Normal      acetaminophen (TYLENOL) 650 mg CR tablet Take 2 tablets (1,300 mg total) by mouth every 8 (eight) hours as needed for mild pain, Starting Tue 3/21/2023, Normal      albuterol (PROVENTIL HFA,VENTOLIN HFA) 90 mcg/act inhaler Inhale 2 puffs every 4 (four) hours as needed (coughing), Starting Wed 6/1/2022, Normal      aspirin-acetaminophen-caffeine (EXCEDRIN MIGRAINE) 250-250-65 MG per tablet Take 1 tablet by mouth in the morning, Starting Thu 1/26/2023, Normal      !! docusate sodium (COLACE) 100 mg capsule Take 1 capsule (100 mg total) by mouth 2 (two) times a day as needed for constipation, Starting Tue 2/21/2023, Normal      Elagolix Sodium (Orilissa) 150 MG TABS Take 150 mg by mouth in the morning, Starting Fri 5/5/2023, Normal      ergocalciferol (VITAMIN D2) 50,000 units Take 1 capsule (50,000 Units total) by mouth once a week, Starting Wed 4/27/2022, Normal      fluticasone (FLONASE) 50 mcg/act nasal spray 1 spray into each nostril daily, Starting Wed 6/1/2022, Normal      meclizine (ANTIVERT) 12 5 MG tablet Take 1 tablet (12 5 mg total) by mouth 3 (three) times a day as needed for dizziness, Starting Thu 6/2/2022, Normal      meloxicam (Mobic) 15 mg tablet Take 1 tablet (15 mg total) by mouth daily, Starting Thu 3/23/2023, Normal      menthol-cetylpyridinium (CEPACOL) 3 MG lozenge Take 1 lozenge (3 mg total) by mouth as needed for sore throat, Starting Wed 6/1/2022, Normal      miconazole (MONISTAT) 200 mg vaginal suppository Insert 1 suppository (200 mg total) into the vagina daily at bedtime, Starting Fri 5/5/2023, Normal      neomycin-polymyxin-hydrocortisone (CORTISPORIN) 0 35%-10,000 units/mL-1% otic suspension Administer 4 drops into both ears 2 (two) times a day, Starting Tue 2/21/2023, Normal      nicotine (NICODERM CQ) 14 mg/24hr TD 24 hr patch Place 1 patch on the skin over 24 hours every 24 hours, Starting Tue 2/21/2023, Normal      ondansetron (Zofran ODT) 4 mg disintegrating tablet Take 1 tablet (4 mg total) by mouth every 6 (six) hours as needed for nausea or vomiting, Starting Thu 1/26/2023, Normal      pantoprazole (PROTONIX) 20 mg tablet Take 1 tablet (20 mg total) by mouth daily, Starting Thu 2/16/2023, Until Wed 5/17/2023, Normal      pregabalin (LYRICA) 150 mg capsule Take 1 capsule (150 mg total) by mouth 3 (three) times a day, Starting Fri 5/5/2023, Normal      pseudoephedrine (SUDAFED) 30 mg tablet Take 1 tablet (30 mg total) by mouth every 8 (eight) hours as needed for congestion, Starting Mon 4/11/2022, Normal      silver sulfadiazine (SILVADENE,SSD) 1 % cream Apply topically daily, Starting Tue 12/27/2022, Normal      SUMAtriptan (IMITREX) 50 mg tablet Take 1 tablet (50 mg total) by mouth once as needed for migraine for up to 1 dose, Starting Tue 3/21/2023, Normal      tiZANidine (ZANAFLEX) 4 mg tablet Take 1 tablet (4 mg total) by mouth 2 (two) times a day as needed for muscle spasms, Starting Tue 3/21/2023, Normal       !! - Potential duplicate medications found  Please discuss with provider  No discharge procedures on file      PDMP Review     None          ED Provider  Electronically Signed by           Neelima El PA-C  05/05/23 0452

## 2023-05-08 ENCOUNTER — TELEPHONE (OUTPATIENT)
Dept: OBGYN CLINIC | Facility: CLINIC | Age: 38
End: 2023-05-08

## 2023-05-08 RX ORDER — AMITRIPTYLINE HYDROCHLORIDE 10 MG/1
10 TABLET, FILM COATED ORAL
Qty: 30 TABLET | Refills: 3
Start: 2023-05-08

## 2023-05-08 RX ORDER — ERGOCALCIFEROL 1.25 MG/1
50000 CAPSULE ORAL WEEKLY
Qty: 16 CAPSULE | Refills: 0 | Status: SHIPPED | OUTPATIENT
Start: 2023-05-08

## 2023-05-08 RX ORDER — SUMATRIPTAN 50 MG/1
50 TABLET, FILM COATED ORAL ONCE AS NEEDED
Qty: 9 TABLET | Refills: 0 | Status: SHIPPED | OUTPATIENT
Start: 2023-05-08

## 2023-05-09 ENCOUNTER — TELEPHONE (OUTPATIENT)
Dept: OBGYN CLINIC | Facility: CLINIC | Age: 38
End: 2023-05-09

## 2023-05-09 ENCOUNTER — HOSPITAL ENCOUNTER (EMERGENCY)
Facility: HOSPITAL | Age: 38
Discharge: HOME | End: 2023-05-10
Attending: EMERGENCY MEDICINE

## 2023-05-09 VITALS
DIASTOLIC BLOOD PRESSURE: 73 MMHG | OXYGEN SATURATION: 100 % | RESPIRATION RATE: 18 BRPM | BODY MASS INDEX: 28.99 KG/M2 | WEIGHT: 158.51 LBS | SYSTOLIC BLOOD PRESSURE: 118 MMHG | TEMPERATURE: 97.9 F | HEART RATE: 95 BPM

## 2023-05-09 DIAGNOSIS — R10.2 CHRONIC PELVIC PAIN IN FEMALE: Primary | ICD-10-CM

## 2023-05-09 DIAGNOSIS — G89.29 CHRONIC PELVIC PAIN IN FEMALE: Primary | ICD-10-CM

## 2023-05-09 LAB
BILIRUB UR QL STRIP: NEGATIVE
CLARITY UR: CLEAR
COLOR UR: YELLOW
GLUCOSE UR STRIP-MCNC: NEGATIVE MG/DL
HGB UR QL STRIP.AUTO: ABNORMAL
KETONES UR STRIP-MCNC: NEGATIVE MG/DL
LEUKOCYTE ESTERASE UR QL STRIP: NEGATIVE
NITRITE UR QL STRIP: NEGATIVE
PH UR STRIP.AUTO: 5 [PH] (ref 4.5–8)
PROT UR STRIP-MCNC: NEGATIVE MG/DL
SP GR UR STRIP.AUTO: >=1.03 (ref 1–1.03)
UROBILINOGEN UR QL STRIP.AUTO: 0.2 E.U./DL

## 2023-05-09 RX ORDER — KETOROLAC TROMETHAMINE 30 MG/ML
30 INJECTION, SOLUTION INTRAMUSCULAR; INTRAVENOUS ONCE
Status: DISCONTINUED | OUTPATIENT
Start: 2023-05-09 | End: 2023-05-10 | Stop reason: HOSPADM

## 2023-05-09 RX ORDER — ACETAMINOPHEN 325 MG/1
650 TABLET ORAL ONCE
Status: COMPLETED | OUTPATIENT
Start: 2023-05-09 | End: 2023-05-09

## 2023-05-09 RX ORDER — SIMETHICONE 80 MG
80 TABLET,CHEWABLE ORAL ONCE
Status: COMPLETED | OUTPATIENT
Start: 2023-05-09 | End: 2023-05-09

## 2023-05-09 RX ADMIN — SIMETHICONE 80 MG: 80 TABLET, CHEWABLE ORAL at 23:47

## 2023-05-09 RX ADMIN — ACETAMINOPHEN 325MG 650 MG: 325 TABLET ORAL at 23:48

## 2023-05-10 ENCOUNTER — TELEPHONE (OUTPATIENT)
Dept: OBGYN CLINIC | Facility: CLINIC | Age: 38
End: 2023-05-10

## 2023-05-10 ENCOUNTER — OFFICE VISIT (OUTPATIENT)
Dept: FAMILY MEDICINE CLINIC | Facility: CLINIC | Age: 38
End: 2023-05-10

## 2023-05-10 VITALS
OXYGEN SATURATION: 99 % | RESPIRATION RATE: 17 BRPM | WEIGHT: 155.8 LBS | DIASTOLIC BLOOD PRESSURE: 92 MMHG | SYSTOLIC BLOOD PRESSURE: 148 MMHG | HEART RATE: 88 BPM | HEIGHT: 62 IN | BODY MASS INDEX: 28.67 KG/M2 | TEMPERATURE: 98 F

## 2023-05-10 DIAGNOSIS — R30.0 DYSURIA: ICD-10-CM

## 2023-05-10 DIAGNOSIS — R10.32 CONTINUOUS LLQ ABDOMINAL PAIN: Primary | ICD-10-CM

## 2023-05-10 DIAGNOSIS — K59.00 CONSTIPATION, UNSPECIFIED CONSTIPATION TYPE: ICD-10-CM

## 2023-05-10 LAB
BACTERIA UR QL AUTO: ABNORMAL /HPF
C TRACH DNA SPEC QL NAA+PROBE: NEGATIVE
CAOX CRY URNS QL MICRO: ABNORMAL /HPF
MUCOUS THREADS UR QL AUTO: ABNORMAL
N GONORRHOEA DNA SPEC QL NAA+PROBE: NEGATIVE
NON-SQ EPI CELLS URNS QL MICRO: ABNORMAL /HPF
RBC #/AREA URNS AUTO: ABNORMAL /HPF
WBC #/AREA URNS AUTO: ABNORMAL /HPF

## 2023-05-10 RX ORDER — CEPHALEXIN 500 MG/1
500 CAPSULE ORAL EVERY 12 HOURS SCHEDULED
Qty: 14 CAPSULE | Refills: 0 | Status: SHIPPED | OUTPATIENT
Start: 2023-05-10 | End: 2023-05-17

## 2023-05-10 RX ORDER — SIMETHICONE 125 MG
125 CAPSULE ORAL EVERY 6 HOURS PRN
Qty: 28 CAPSULE | Refills: 0 | Status: SHIPPED | OUTPATIENT
Start: 2023-05-10

## 2023-05-10 RX ORDER — DICYCLOMINE HCL 20 MG
20 TABLET ORAL EVERY 6 HOURS
Qty: 120 TABLET | Refills: 0 | Status: SHIPPED | OUTPATIENT
Start: 2023-05-10

## 2023-05-10 RX ORDER — DICYCLOMINE HCL 20 MG
20 TABLET ORAL EVERY 6 HOURS
Qty: 120 TABLET | Refills: 0 | Status: SHIPPED | OUTPATIENT
Start: 2023-05-10 | End: 2023-05-10

## 2023-05-10 RX ORDER — CEPHALEXIN 500 MG/1
500 CAPSULE ORAL EVERY 12 HOURS SCHEDULED
Qty: 14 CAPSULE | Refills: 0 | Status: SHIPPED | OUTPATIENT
Start: 2023-05-10 | End: 2023-05-10

## 2023-05-10 NOTE — ED PROVIDER NOTES
"History  Chief Complaint   Patient presents with   • Abdominal Pain     States LLQ abd pain for 3 weeks  Was seen for same, prescribed meds with no relief  Complaining of nausea and dizziness  Alexey Banda is a 41year old who is status post total hysterecomy (in 2016 performed in [facility]) who presents for left lower quadrant pain  The patient states that she has had this pain for years, but has been worse x 2 weeks  She describes the pain as sharp and stabbing in quality  The pain feels the same as it always has, but it worse  She was seen in the ED for this pain on 5/5, as well as for constipation  She took Miralax and an OTC laxative with success  She does report lightheadedness, and nausea secondary to pain  She also notes vaginal discharge x several days  The last procedure was in March of 2020, and at that time, the surgeons were unable to identify the left ovary  Operative findings included: \"Significant pelvic adhesive disease involving left hemipelvis  Colon densely adhered to left pelvic side wall and to bladder and vaginal cuff  Normal appearing right ovary  No visible or palpable left ovary or fallopian tube following enterolysis and and full dissection of left pelvic side wall including all retroperitoneal structures  \"     Recent imaging shows a left ovarian cyst and a dilated Fallopian tube, suspicious for hydrosalpinx  The patient states that she has not had relief in the past from NSAIDs  She reports that she was instructed to go on birth control for this pain, but was unstable due as she is a smoker  Prior to Admission Medications   Prescriptions Last Dose Informant Patient Reported? Taking?    Elagolix Sodium (Orilissa) 150 MG TABS   No No   Sig: Take 150 mg by mouth in the morning   SUMAtriptan (IMITREX) 50 mg tablet   No No   Sig: Take 1 tablet (50 mg total) by mouth once as needed for migraine for up to 1 dose   acetaminophen (TYLENOL) 500 mg tablet   No No   Sig: " Take 2 tablets (1,000 mg total) by mouth every 8 (eight) hours as needed for mild pain or moderate pain   Patient not taking: Reported on 2023   acetaminophen (TYLENOL) 650 mg CR tablet   No No   Sig: Take 2 tablets (1,300 mg total) by mouth every 8 (eight) hours as needed for mild pain   Patient not taking: Reported on 2023   albuterol (PROVENTIL HFA,VENTOLIN HFA) 90 mcg/act inhaler   No No   Sig: Inhale 2 puffs every 4 (four) hours as needed (coughing)   Patient not taking: Reported on 2022   aluminum-magnesium hydroxide 200-200 MG/5ML suspension   No No   Sig: Take 10 mL by mouth every 6 (six) hours as needed for heartburn   amitriptyline (ELAVIL) 10 mg tablet   No No   Sig: Take 1 tablet (10 mg total) by mouth daily at bedtime   aspirin-acetaminophen-caffeine (EXCEDRIN MIGRAINE) 250-250-65 MG per tablet   No No   Sig: Take 1 tablet by mouth in the morning   docusate sodium (COLACE) 100 mg capsule   No No   Sig: Take 1 capsule (100 mg total) by mouth 2 (two) times a day as needed for constipation   docusate sodium (COLACE) 100 mg capsule   No No   Sig: Take 1 capsule (100 mg total) by mouth every 12 (twelve) hours   ergocalciferol (VITAMIN D2) 50,000 units   No No   Sig: Take 1 capsule (50,000 Units total) by mouth once a week   fluticasone (FLONASE) 50 mcg/act nasal spray   No No   Si spray into each nostril daily   Patient not taking: Reported on 2023   meclizine (ANTIVERT) 12 5 MG tablet   No No   Sig: Take 1 tablet (12 5 mg total) by mouth 3 (three) times a day as needed for dizziness   Patient not taking: Reported on 2022   meloxicam (Mobic) 15 mg tablet   No No   Sig: Take 1 tablet (15 mg total) by mouth daily   menthol-cetylpyridinium (CEPACOL) 3 MG lozenge   No No   Sig: Take 1 lozenge (3 mg total) by mouth as needed for sore throat   miconazole (MONISTAT) 200 mg vaginal suppository   No No   Sig: Insert 1 suppository (200 mg total) into the vagina daily at bedtime   naproxen (Naprosyn) 500 mg tablet   No No   Sig: Take 1 tablet (500 mg total) by mouth 2 (two) times a day with meals   neomycin-polymyxin-hydrocortisone (CORTISPORIN) 0 35%-10,000 units/mL-1% otic suspension   No No   Sig: Administer 4 drops into both ears 2 (two) times a day   nicotine (NICODERM CQ) 14 mg/24hr TD 24 hr patch   No No   Sig: Place 1 patch on the skin over 24 hours every 24 hours   ondansetron (ZOFRAN-ODT) 4 mg disintegrating tablet   No No   Sig: Take 1 tablet (4 mg total) by mouth every 8 (eight) hours as needed (nausea) for up to 10 days   ondansetron (Zofran ODT) 4 mg disintegrating tablet   No No   Sig: Take 1 tablet (4 mg total) by mouth every 6 (six) hours as needed for nausea or vomiting   pantoprazole (PROTONIX) 20 mg tablet   No No   Sig: Take 1 tablet (20 mg total) by mouth daily   pseudoephedrine (SUDAFED) 30 mg tablet   No No   Sig: Take 1 tablet (30 mg total) by mouth every 8 (eight) hours as needed for congestion   Patient not taking: Reported on 4/27/2023   silver sulfadiazine (SILVADENE,SSD) 1 % cream   No No   Sig: Apply topically daily   Patient not taking: Reported on 2/24/2023   tiZANidine (ZANAFLEX) 4 mg tablet   No No   Sig: Take 1 tablet (4 mg total) by mouth 2 (two) times a day as needed for muscle spasms      Facility-Administered Medications: None       Past Medical History:   Diagnosis Date   • Abnormal Pap smear of cervix 2015    cryosurgery   • Hypoglycemia    • Migraine headache with aura     managed with Elavil       Past Surgical History:   Procedure Laterality Date   • CHOLECYSTECTOMY LAPAROSCOPIC N/A 10/27/2020    Procedure: CHOLECYSTECTOMY LAPAROSCOPIC;  Surgeon: Maile Melton MD;  Location: [facility] MAIN OR;  Service: General   • GYNECOLOGIC CRYOSURGERY  2015   • HYSTERECTOMY  2016   • LAPAROSCOPY  2018   • MI LAPAROSCOPY W/LYSIS OF ADHESIONS N/A 3/3/2020    Procedure: L O A ;  Surgeon: Luis Rico MD;  Location: [facility] Main OR;  Service: Gynecology   • MI LAPS ABD PRTM&OMENTUM DX W/WO SPEC BR/WA SPX N/A 8/9/2019    Procedure: LAPAROSCOPY DIAGNOSTIC; LYSIS OF ADHESIONS;  Surgeon: Melissa Fabian MD;  Location: [facility] OR;  Service: Gynecology   • TUBAL LIGATION         Family History   Problem Relation Age of Onset   • Cancer Paternal Grandmother         uterine   • Cancer Paternal Aunt         uterine    • Breast cancer Neg Hx    • Colon cancer Neg Hx    • Ovarian cancer Neg Hx      I have reviewed and agree with the history as documented  E-Cigarette/Vaping   • E-Cigarette Use Never User      E-Cigarette/Vaping Substances     Social History     Tobacco Use   • Smoking status: Every Day     Packs/day: 0 50     Years: 15 00     Pack years: 7 50     Types: Cigarettes   • Smokeless tobacco: Never   Vaping Use   • Vaping Use: Never used   Substance Use Topics   • Alcohol use: Not Currently   • Drug use: Never       Review of Systems   Constitutional: Negative for chills and fever  HENT: Negative for congestion and rhinorrhea  Respiratory: Negative for cough and shortness of breath  Cardiovascular: Negative for chest pain and leg swelling  Gastrointestinal: Positive for abdominal pain and nausea  Negative for constipation, diarrhea and vomiting  Genitourinary: Negative for dysuria and flank pain  Musculoskeletal: Negative for arthralgias and myalgias  Skin: Negative for rash and wound  Neurological: Positive for light-headedness  Negative for dizziness, weakness, numbness and headaches  Psychiatric/Behavioral: Negative for behavioral problems  Physical Exam  Physical Exam  Vitals and nursing note reviewed  Constitutional:       General: She is not in acute distress  Appearance: She is well-developed  She is not ill-appearing or toxic-appearing  HENT:      Head: Normocephalic and atraumatic        Mouth/Throat:      Mouth: Mucous membranes are moist    Eyes:      Conjunctiva/sclera: Conjunctivae normal    Cardiovascular:      Rate and Rhythm: Normal rate and regular rhythm  Heart sounds: No murmur heard  Pulmonary:      Effort: Pulmonary effort is normal  No respiratory distress  Breath sounds: Normal breath sounds  Abdominal:      Palpations: Abdomen is soft  Tenderness: There is abdominal tenderness in the left lower quadrant  There is no right CVA tenderness, left CVA tenderness, guarding or rebound  Musculoskeletal:         General: No swelling  Cervical back: Neck supple  Skin:     General: Skin is warm and dry  Capillary Refill: Capillary refill takes less than 2 seconds  Neurological:      Mental Status: She is alert  Psychiatric:         Mood and Affect: Mood normal          Vital Signs  ED Triage Vitals [05/09/23 2226]   Temperature Pulse Respirations Blood Pressure SpO2   97 9 °F (36 6 °C) 95 18 118/73 100 %      Temp Source Heart Rate Source Patient Position - Orthostatic VS BP Location FiO2 (%)   Oral Monitor Sitting Right arm --      Pain Score       10 - Worst Possible Pain           Vitals:    05/09/23 2226   BP: 118/73   Pulse: 95   Patient Position - Orthostatic VS: Sitting         Visual Acuity      ED Medications  Medications   ketorolac (TORADOL) injection 30 mg (30 mg Intramuscular Not Given 5/10/23 0000)   simethicone (MYLICON) chewable tablet 80 mg (80 mg Oral Given 5/9/23 2347)   acetaminophen (TYLENOL) tablet 650 mg (650 mg Oral Given 5/9/23 2348)       Diagnostic Studies  Results Reviewed     Procedure Component Value Units Date/Time    Urine Microscopic [[id number]]  (Abnormal) Collected: 05/09/23 2354    Lab Status: Final result Specimen: Urine, Clean Catch Updated: 05/10/23 0038     RBC, UA 0-1 /hpf      WBC, UA 2-4 /hpf      Epithelial Cells Innumerable /hpf      Bacteria, UA Innumerable /hpf      Ca Oxalate Priti, UA Occasional /hpf      MUCUS THREADS Innumerable    Molecular Vaginal Panel [[id number]] Collected: 05/09/23 2346    Lab Status:  In process Specimen: Genital from Vaginal Updated: 05/10/23 0001    Chlamydia/GC amplified DNA by PCR [[id number]] Collected: 05/09/23 2352    Lab Status: In process Specimen: Urine, Other Updated: 05/10/23 0001    Urine Macroscopic, POC [[id number]]  (Abnormal) Collected: 05/09/23 2354    Lab Status: Final result Specimen: Urine Updated: 05/09/23 2356     Color, UA Yellow     Clarity, UA Clear     pH, UA 5 0     Leukocytes, UA Negative     Nitrite, UA Negative     Protein, UA Negative mg/dl      Glucose, UA Negative mg/dl      Ketones, UA Negative mg/dl      Urobilinogen, UA 0 2 E U /dl      Bilirubin, UA Negative     Occult Blood, UA Trace     Specific Gravity, UA >=1 030    Narrative:      CLINITEK RESULT                 No orders to display              Procedures  Procedures       ED Course  ED Course as of 05/10/23 0206   Tue May 09, 2023   2356 Leukocytes, UA: Negative   2356 Nitrite, UA: Negative       SBIRT 22yo+    Flowsheet Row Most Recent Value   Initial Alcohol Screen: US AUDIT-C     1  How often do you have a drink containing alcohol? 0 Filed at: 05/10/2023 0000   2  How many drinks containing alcohol do you have on a typical day you are drinking? 0 Filed at: 05/10/2023 0000   3a  Male UNDER 65: How often do you have five or more drinks on one occasion? 0 Filed at: 05/10/2023 0000   3b  FEMALE Any Age, or MALE 65+: How often do you have 4 or more drinks on one occassion? 0 Filed at: 05/10/2023 0000   Audit-C Score 0 Filed at: 05/10/2023 0000   LISBETH: How many times in the past year have you    Used an illegal drug or used a prescription medication for non-medical reasons? Never Filed at: 05/10/2023 0000        Medical Decision Making  The patient is a 41 YOF with hx of chronic LLQ pain presenting for this pain  She states the pain is the same  DDx including but not limited to: constipation, pelvic pathology, renal colic, pyelonephritis, UTI      Discussed with patient option of obtaining CT abdomen and pelvis as additional workup if she feels as though this pain is at all different from baseline  Patient declines CT, is confident this is her chronic pain  She does admit to increased gas today  Will give Simethicone, Tylenol, and check UA/vaginosis swab  UA without evidence of UTI  Patient reports some improvement in pain  Will dc  Reviewed recent new medications prescribed by OB/GYN, patient reveals she has not taken Bruno  She is unsure if she obtained this from a pharmacy or not  She is also not tried amitriptyline  I encouraged the patient to obtain both of these prescriptions and try them for her pain as advised by OB  She states that she will, and will follow-up with OB  At the time of discharge, the patient is in no acute distress  I discussed with the patient the diagnosis, treatment plan, follow-up, return precautions, and discharge instructions; they were given the opportunity to ask questions and verbalized understanding  Chronic pelvic pain in female: acute illness or injury  Amount and/or Complexity of Data Reviewed  External Data Reviewed: labs, radiology and notes  Labs: ordered  Decision-making details documented in ED Course  Risk  OTC drugs  Prescription drug management  Disposition  Final diagnoses:   Chronic pelvic pain in female     Time reflects when diagnosis was documented in both MDM as applicable and the Disposition within this note     Time User Action Codes Description Comment    5/10/2023 12:24 AM Carney Dancer Add [R10 2,  G89 29] Chronic pelvic pain in female       ED Disposition     ED Disposition   Discharge    Condition   Stable    Date/Time   Wed May 10, 2023 178 [facility] discharge to home/self care                 Follow-up Information     Follow up With Specialties Details Why [address] [facility], [address]  [address] [facility]  [place] [address] [facility]  [phone]            Discharge Medication List as of 5/10/2023 12:25 AM START taking these medications    Details   simethicone (MYLICON,GAS-X) 409 MG CAPS Take 1 capsule (125 mg total) by mouth every 6 (six) hours as needed for flatulence, Starting Wed 5/10/2023, Normal         CONTINUE these medications which have NOT CHANGED    Details   acetaminophen (TYLENOL) 500 mg tablet Take 2 tablets (1,000 mg total) by mouth every 8 (eight) hours as needed for mild pain or moderate pain, Starting Thu 6/2/2022, Normal      acetaminophen (TYLENOL) 650 mg CR tablet Take 2 tablets (1,300 mg total) by mouth every 8 (eight) hours as needed for mild pain, Starting Tue 3/21/2023, Normal      albuterol (PROVENTIL HFA,VENTOLIN HFA) 90 mcg/act inhaler Inhale 2 puffs every 4 (four) hours as needed (coughing), Starting Wed 6/1/2022, Normal      aluminum-magnesium hydroxide 200-200 MG/5ML suspension Take 10 mL by mouth every 6 (six) hours as needed for heartburn, Starting Fri 5/5/2023, Normal      amitriptyline (ELAVIL) 10 mg tablet Take 1 tablet (10 mg total) by mouth daily at bedtime, Starting Mon 5/8/2023, No Print      aspirin-acetaminophen-caffeine (EXCEDRIN MIGRAINE) 250-250-65 MG per tablet Take 1 tablet by mouth in the morning, Starting Thu 1/26/2023, Normal      !! docusate sodium (COLACE) 100 mg capsule Take 1 capsule (100 mg total) by mouth 2 (two) times a day as needed for constipation, Starting Tue 2/21/2023, Normal      !! docusate sodium (COLACE) 100 mg capsule Take 1 capsule (100 mg total) by mouth every 12 (twelve) hours, Starting Fri 5/5/2023, Normal      Elagolix Sodium (Orilissa) 150 MG TABS Take 150 mg by mouth in the morning, Starting Fri 5/5/2023, Normal      ergocalciferol (VITAMIN D2) 50,000 units Take 1 capsule (50,000 Units total) by mouth once a week, Starting Mon 5/8/2023, Normal      fluticasone (FLONASE) 50 mcg/act nasal spray 1 spray into each nostril daily, Starting Wed 6/1/2022, Normal      meclizine (ANTIVERT) 12 5 MG tablet Take 1 tablet (12 5 mg total) by mouth 3 (three) times a day as needed for dizziness, Starting Thu 6/2/2022, Normal      meloxicam (Mobic) 15 mg tablet Take 1 tablet (15 mg total) by mouth daily, Starting Thu 3/23/2023, Normal      menthol-cetylpyridinium (CEPACOL) 3 MG lozenge Take 1 lozenge (3 mg total) by mouth as needed for sore throat, Starting Wed 6/1/2022, Normal      miconazole (MONISTAT) 200 mg vaginal suppository Insert 1 suppository (200 mg total) into the vagina daily at bedtime, Starting Fri 5/5/2023, Normal      naproxen (Naprosyn) 500 mg tablet Take 1 tablet (500 mg total) by mouth 2 (two) times a day with meals, Starting Fri 5/5/2023, Normal      neomycin-polymyxin-hydrocortisone (CORTISPORIN) 0 35%-10,000 units/mL-1% otic suspension Administer 4 drops into both ears 2 (two) times a day, Starting Tue 2/21/2023, Normal      nicotine (NICODERM CQ) 14 mg/24hr TD 24 hr patch Place 1 patch on the skin over 24 hours every 24 hours, Starting Tue 2/21/2023, Normal      ondansetron (Zofran ODT) 4 mg disintegrating tablet Take 1 tablet (4 mg total) by mouth every 6 (six) hours as needed for nausea or vomiting, Starting Thu 1/26/2023, Normal      pantoprazole (PROTONIX) 20 mg tablet Take 1 tablet (20 mg total) by mouth daily, Starting Thu 2/16/2023, Until Wed 5/17/2023, Normal      pseudoephedrine (SUDAFED) 30 mg tablet Take 1 tablet (30 mg total) by mouth every 8 (eight) hours as needed for congestion, Starting Mon 4/11/2022, Normal      silver sulfadiazine (SILVADENE,SSD) 1 % cream Apply topically daily, Starting Tue 12/27/2022, Normal      SUMAtriptan (IMITREX) 50 mg tablet Take 1 tablet (50 mg total) by mouth once as needed for migraine for up to 1 dose, Starting Mon 5/8/2023, Normal      tiZANidine (ZANAFLEX) 4 mg tablet Take 1 tablet (4 mg total) by mouth 2 (two) times a day as needed for muscle spasms, Starting Tue 3/21/2023, Normal       !! - Potential duplicate medications found  Please discuss with provider            No discharge procedures on file      PDMP Review     None          ED Provider  Electronically Signed by           Pk Perez PA-C  05/10/23 5324

## 2023-05-10 NOTE — PROGRESS NOTES
"Name: Sharla Felix      : 1985      MRN: [id number]  Encounter Provider: Gaye Berman MD  Encounter Date: 5/10/2023   Encounter department: [facility]     1  Continuous LLQ abdominal pain  Assessment & Plan:  Hx of chronic LLQ pain ongoing for > 2 years    Currently using muscle relaxer, NSAIDs, pregabalin, PPI which she states none of these help with her pain; she believes she needs constant IV-pain medications to fix the problem    Would discontinue NSAIDs and muscle relaxers to minimize polypharmacy  Discontinue colace due to spasmodic mechanism and abstain from opioids to minimize constipation  Recommend metamucil instead if needed for BMs and trial on bentyl 20 mg QID for cramping abdominal pain, while awaiting authorization of orlissa prescribed by GYN    Will place referral to GI also at this time  Will treat for UTI based on symptoms since patient is adamant about this  Start on keflex 500 mg BID for 7 days; awaiting test results for AFFIRM test  Follow up in 4 weeks  Follow up with OB-GYN    Orders:  -     Ambulatory Referral to Gastroenterology; Future  -     dicyclomine (BENTYL) 20 mg tablet; Take 1 tablet (20 mg total) by mouth every 6 (six) hours    2  Dysuria  -     Urine culture; Future  -     cephalexin (KEFLEX) 500 mg capsule; Take 1 capsule (500 mg total) by mouth every 12 (twelve) hours for 7 days    3  Constipation, unspecified constipation type  -     psyllium (METAMUCIL) 58 6 % powder; Take 1 packet by mouth 3 (three) times a day    Subjective     HPI    utilized    Patient presents for same day visit for ED follow up for chronic pelvic pain/LLQ pain  Had UA with innumerable bacteria but negative for leukocytes and nitrites and Chlamydia/GC were negative  U/S showed possible hydrosalpinx in the left adnexa    She reports cramping abdominal pain which is constantly present and \"excrutiating\"    She has " "a hx of constipation; is unclear regarding her bowel habits despite asking, but states she uses occasionally uses an OTC prunelax which helps her to move her bowels 2x/day  She does have pain worse in the LLQ everytime she has to have a bowel movement  Patient is insistent that the only thing that would fix her pain is hospitalization for  IV pain medications  Also reports that it burns vaginally per patient and urinating frequently with urgency, has urinated >10x today  Denies any fever or chills, blood in stools  She follows with OB-GYN frequently for same, with past investigations of diagnostic laparoscopies showing \"Significant pelvic adhesive disease involving left hemipelvis  Colon densely adhered to left pelvic side wall and to bladder and vaginal cuff\"  She had a planned surgery on 5/12 which was cancelled, appears due to agreement of patient and  to trial medical management for about 3 months with OBGYN  There is also the discussion of ?risks outweighing benefits due to marked adhesions noted on previous laparoscopy      Review of Systems  None otherwise stated in HPI    Past Medical History:   Diagnosis Date   • Abnormal Pap smear of cervix 2015    cryosurgery   • Hypoglycemia    • Migraine headache with aura     managed with Elavil     Past Surgical History:   Procedure Laterality Date   • CHOLECYSTECTOMY LAPAROSCOPIC N/A 10/27/2020    Procedure: CHOLECYSTECTOMY LAPAROSCOPIC;  Surgeon: Ana Paula Bess MD;  Location: [address] [place] [address] OR;  Service: General   • GYNECOLOGIC CRYOSURGERY  2015   • HYSTERECTOMY  2016   • LAPAROSCOPY  2018   • DC LAPAROSCOPY W/LYSIS OF ADHESIONS N/A 3/3/2020    Procedure: L O A ;  Surgeon: Shell Buenrostro MD;  [facility];  Service: Gynecology   • DC LAPS ABD PRTM&OMENTUM DX W/WO SPEC BR/WA SPX N/A 8/9/2019    Procedure: LAPAROSCOPY DIAGNOSTIC; LYSIS OF ADHESIONS;  Surgeon: Colleen Bernabe MD;  Location[facility];  Service: Gynecology   • TUBAL " LIGATION       Family History   Problem Relation Age of Onset   • Cancer Paternal Grandmother         uterine   • Cancer Paternal Aunt         uterine    • Breast cancer Neg Hx    • Colon cancer Neg Hx    • Ovarian cancer Neg Hx      Social History     Socioeconomic History   • Marital status: Single     Spouse name: None   • Number of children: None   • Years of education: None   • Highest education level: None   Occupational History   • None   Tobacco Use   • Smoking status: Every Day     Packs/day: 0 50     Years: 15 00     Pack years: 7 50     Types: Cigarettes   • Smokeless tobacco: Never   Vaping Use   • Vaping Use: Never used   Substance and Sexual Activity   • Alcohol use: Not Currently   • Drug use: Never   • Sexual activity: Yes     Partners: Male     Birth control/protection: Female Sterilization   Other Topics Concern   • None   Social History Narrative   • None     Social Determinants of Health     Financial Resource Strain: Low Risk    • Difficulty of Paying Living Expenses: Not hard at all   Food Insecurity: No Food Insecurity   • Worried About Running Out of Food in the Last Year: Never true   • Ran Out of Food in the Last Year: Never true   Transportation Needs: No Transportation Needs   • Lack of Transportation (Medical): No   • Lack of Transportation (Non-Medical):  No   Physical Activity: Not on file   Stress: Not on file   Social Connections: Not on file   Intimate Partner Violence: Not on file   Housing Stability: Not on file     Current Outpatient Medications on File Prior to Visit   Medication Sig   • aluminum-magnesium hydroxide 200-200 MG/5ML suspension Take 10 mL by mouth every 6 (six) hours as needed for heartburn   • amitriptyline (ELAVIL) 10 mg tablet Take 1 tablet (10 mg total) by mouth daily at bedtime   • aspirin-acetaminophen-caffeine (EXCEDRIN MIGRAINE) 250-250-65 MG per tablet Take 1 tablet by mouth in the morning   • Elagolix Sodium (Orilissa) 150 MG TABS Take 150 mg by mouth in the morning   • ergocalciferol (VITAMIN D2) 50,000 units Take 1 capsule (50,000 Units total) by mouth once a week   • miconazole (MONISTAT) 200 mg vaginal suppository Insert 1 suppository (200 mg total) into the vagina daily at bedtime   • naproxen (Naprosyn) 500 mg tablet Take 1 tablet (500 mg total) by mouth 2 (two) times a day with meals   • neomycin-polymyxin-hydrocortisone (CORTISPORIN) 0 35%-10,000 units/mL-1% otic suspension Administer 4 drops into both ears 2 (two) times a day   • nicotine (NICODERM CQ) 14 mg/24hr TD 24 hr patch Place 1 patch on the skin over 24 hours every 24 hours   • pantoprazole (PROTONIX) 20 mg tablet Take 1 tablet (20 mg total) by mouth daily   • simethicone (MYLICON,GAS-X) 017 MG CAPS Take 1 capsule (125 mg total) by mouth every 6 (six) hours as needed for flatulence   • SUMAtriptan (IMITREX) 50 mg tablet Take 1 tablet (50 mg total) by mouth once as needed for migraine for up to 1 dose   • tiZANidine (ZANAFLEX) 4 mg tablet Take 1 tablet (4 mg total) by mouth 2 (two) times a day as needed for muscle spasms   • [DISCONTINUED] acetaminophen (TYLENOL) 500 mg tablet Take 2 tablets (1,000 mg total) by mouth every 8 (eight) hours as needed for mild pain or moderate pain (Patient not taking: Reported on 4/11/2023)   • [DISCONTINUED] acetaminophen (TYLENOL) 650 mg CR tablet Take 2 tablets (1,300 mg total) by mouth every 8 (eight) hours as needed for mild pain (Patient not taking: Reported on 4/11/2023)   • [DISCONTINUED] albuterol (PROVENTIL HFA,VENTOLIN HFA) 90 mcg/act inhaler Inhale 2 puffs every 4 (four) hours as needed (coughing) (Patient not taking: Reported on 6/27/2022)   • [DISCONTINUED] fluticasone (FLONASE) 50 mcg/act nasal spray 1 spray into each nostril daily (Patient not taking: Reported on 2/24/2023)   • [DISCONTINUED] meclizine (ANTIVERT) 12 5 MG tablet Take 1 tablet (12 5 mg total) by mouth 3 (three) times a day as needed for dizziness (Patient not taking: Reported on "6/27/2022)   • [DISCONTINUED] menthol-cetylpyridinium (CEPACOL) 3 MG lozenge Take 1 lozenge (3 mg total) by mouth as needed for sore throat   • [DISCONTINUED] ondansetron (Zofran ODT) 4 mg disintegrating tablet Take 1 tablet (4 mg total) by mouth every 6 (six) hours as needed for nausea or vomiting   • [DISCONTINUED] pseudoephedrine (SUDAFED) 30 mg tablet Take 1 tablet (30 mg total) by mouth every 8 (eight) hours as needed for congestion (Patient not taking: Reported on 4/27/2023)   • [DISCONTINUED] silver sulfadiazine (SILVADENE,SSD) 1 % cream Apply topically daily (Patient not taking: Reported on 2/24/2023)     Allergies   Allergen Reactions   • Shellfish-Derived Products - Food Allergy Itching and Swelling     \"seafood \"   • Ibuprofen Other (See Comments)     Patient states she gets hematoma   • Pregabalin Anxiety     Immunization History   Administered Date(s) Administered   • Influenza, injectable, quadrivalent, preservative free 0 5 mL 02/06/2020, 11/24/2020   • Tdap 12/27/2022       Objective     /92 (BP Location: Left arm, Patient Position: Sitting, Cuff Size: Standard)   Pulse 88   Temp 98 °F (36 7 °C) (Temporal)   Resp 17   Ht 5' 2\" (1 575 m)   Wt 70 7 kg (155 lb 12 8 oz)   SpO2 99%   BMI 28 50 kg/m²     Physical Exam  Vitals reviewed  Constitutional:       Appearance: She is not ill-appearing or toxic-appearing  Eyes:      Conjunctiva/sclera: Conjunctivae normal    Cardiovascular:      Rate and Rhythm: Normal rate and regular rhythm  Heart sounds: Normal heart sounds  No murmur heard  Pulmonary:      Effort: Pulmonary effort is normal  No respiratory distress  Breath sounds: Normal breath sounds  Abdominal:      General: Bowel sounds are normal       Tenderness: There is abdominal tenderness (of the LLQ)  There is no right CVA tenderness, left CVA tenderness or guarding  Hernia: No hernia is present  Musculoskeletal:         General: No swelling or tenderness        " Right lower leg: No edema  Left lower leg: No edema  Skin:     General: Skin is warm  Findings: No erythema or rash  Neurological:      Mental Status: She is alert         Erum Child MD

## 2023-05-10 NOTE — DISCHARGE INSTRUCTIONS
Follow up with OB/GYN ASAP    Take amitriptyline and Orilissa as prescribed by OB/GYN-call your OB if [facility] does not have this medication and you have not already picked it up    Return for worsening pain, fever, chills, worsening constipation, or any other new or concerning symptoms    Take simethicone as prescribed as needed for rash

## 2023-05-11 ENCOUNTER — TELEPHONE (OUTPATIENT)
Dept: OBGYN CLINIC | Facility: CLINIC | Age: 38
End: 2023-05-11

## 2023-05-11 LAB
CANDIDA RRNA VAG QL PROBE: NEGATIVE
G VAGINALIS RRNA GENITAL QL PROBE: POSITIVE
T VAGINALIS RRNA GENITAL QL PROBE: NEGATIVE

## 2023-05-12 ENCOUNTER — TELEPHONE (OUTPATIENT)
Dept: EMERGENCY DEPT | Facility: HOSPITAL | Age: 38
End: 2023-05-12

## 2023-05-12 DIAGNOSIS — B96.89 BACTERIAL VAGINOSIS: Primary | ICD-10-CM

## 2023-05-12 DIAGNOSIS — N76.0 BACTERIAL VAGINOSIS: Primary | ICD-10-CM

## 2023-05-12 RX ORDER — METRONIDAZOLE 500 MG/1
500 TABLET ORAL EVERY 12 HOURS SCHEDULED
Qty: 14 TABLET | Refills: 0 | Status: SHIPPED | OUTPATIENT
Start: 2023-05-12 | End: 2023-05-19

## 2023-05-13 PROBLEM — R10.32 CONTINUOUS LLQ ABDOMINAL PAIN: Status: ACTIVE | Noted: 2023-05-13

## 2023-05-13 NOTE — ASSESSMENT & PLAN NOTE
Hx of chronic LLQ pain ongoing for > 2 years    Currently using muscle relaxer, NSAIDs, pregabalin, PPI which she states none of these help with her pain; she believes she needs constant IV-pain medications to fix the problem    Would discontinue NSAIDs and muscle relaxers to minimize polypharmacy  Discontinue colace due to spasmodic mechanism and abstain from opioids to minimize constipation  Recommend metamucil instead if needed for BMs and trial on bentyl 20 mg QID for cramping abdominal pain, while awaiting authorization of orlissa prescribed by GYN    Will place referral to GI also at this time  Will treat for UTI based on symptoms since patient is adamant about this  Start on keflex 500 mg BID for 7 days; awaiting test results for AFFIRM test  Follow up in 4 weeks  Follow up with OB-GYN

## 2023-05-15 ENCOUNTER — TELEPHONE (OUTPATIENT)
Dept: OBGYN CLINIC | Facility: CLINIC | Age: 38
End: 2023-05-15

## 2023-05-15 DIAGNOSIS — R10.32 CONTINUOUS LLQ ABDOMINAL PAIN: Primary | ICD-10-CM

## 2023-05-15 RX ORDER — MEDROXYPROGESTERONE ACETATE 150 MG/ML
150 INJECTION, SUSPENSION INTRAMUSCULAR
Qty: 1 ML | Refills: 0 | Status: SHIPPED | OUTPATIENT
Start: 2023-05-15 | End: 2023-05-17 | Stop reason: SDUPTHER

## 2023-05-15 NOTE — TELEPHONE ENCOUNTER
I called the patient to answer any question that she had about medicaitons  On 5/5/23, the patient was counseled about medical management of her pelvic pain  We agreed for her to try Orlissa (Elagolix) and Lyrica  The patient called the office after three days of these prescriptions and stated that she was unable to tolerate the Lyrica due to panic attacks  This prescription was canceled and switched to Amitriptyline 10mg  In the meantime, the authorization for Sofi Fent is delaying her ability to fulfil her prescription  I called the patient and stated that whilst waiting for the prior authorization, that Depo-provera could be an option  I answered all of her questions about the mediation  She would like to try it  Prescription was sent to her pharmacy  She is aware that she needs to collect the medication and bring it to the clinic for administration

## 2023-05-17 ENCOUNTER — CLINICAL SUPPORT (OUTPATIENT)
Dept: OBGYN CLINIC | Facility: CLINIC | Age: 38
End: 2023-05-17

## 2023-05-17 ENCOUNTER — TELEPHONE (OUTPATIENT)
Dept: OBGYN CLINIC | Facility: CLINIC | Age: 38
End: 2023-05-17

## 2023-05-17 ENCOUNTER — TELEPHONE (OUTPATIENT)
Dept: FAMILY MEDICINE CLINIC | Facility: CLINIC | Age: 38
End: 2023-05-17

## 2023-05-17 VITALS
SYSTOLIC BLOOD PRESSURE: 106 MMHG | WEIGHT: 157 LBS | BODY MASS INDEX: 28.72 KG/M2 | HEART RATE: 90 BPM | DIASTOLIC BLOOD PRESSURE: 73 MMHG

## 2023-05-17 DIAGNOSIS — R10.32 CONTINUOUS LLQ ABDOMINAL PAIN: ICD-10-CM

## 2023-05-17 DIAGNOSIS — K21.9 GASTROESOPHAGEAL REFLUX DISEASE WITHOUT ESOPHAGITIS: ICD-10-CM

## 2023-05-17 DIAGNOSIS — B96.89 BACTERIAL VAGINOSIS: ICD-10-CM

## 2023-05-17 DIAGNOSIS — N76.0 BACTERIAL VAGINOSIS: ICD-10-CM

## 2023-05-17 DIAGNOSIS — R10.32 LLQ PAIN: Primary | ICD-10-CM

## 2023-05-17 RX ORDER — MEDROXYPROGESTERONE ACETATE 150 MG/ML
150 INJECTION, SUSPENSION INTRAMUSCULAR ONCE
Status: COMPLETED | OUTPATIENT
Start: 2023-05-17 | End: 2023-05-17

## 2023-05-17 RX ORDER — MEDROXYPROGESTERONE ACETATE 150 MG/ML
150 INJECTION, SUSPENSION INTRAMUSCULAR
Qty: 1 ML | Refills: 0 | Status: SHIPPED | OUTPATIENT
Start: 2023-05-17

## 2023-05-17 RX ORDER — PANTOPRAZOLE SODIUM 20 MG/1
20 TABLET, DELAYED RELEASE ORAL DAILY
Qty: 90 TABLET | Refills: 1 | Status: SHIPPED | OUTPATIENT
Start: 2023-05-17 | End: 2023-08-15

## 2023-05-17 RX ADMIN — MEDROXYPROGESTERONE ACETATE 150 MG: 150 INJECTION, SUSPENSION INTRAMUSCULAR at 15:03

## 2023-05-17 NOTE — TELEPHONE ENCOUNTER
Patient came in and asked for refill of following medication      pantoprazole (PROTONIX) 20 mg tablet

## 2023-05-17 NOTE — PROGRESS NOTES
Pt got first depo in left deltoid, preg test was negative         Elmer Nugent 47: [id number]  EXP:   LOT: [id number]

## 2023-05-22 ENCOUNTER — TELEPHONE (OUTPATIENT)
Dept: OBGYN CLINIC | Facility: CLINIC | Age: 38
End: 2023-05-22

## 2023-05-22 DIAGNOSIS — N80.9 ENDOMETRIOSIS: Primary | ICD-10-CM

## 2023-05-22 DIAGNOSIS — R10.2 CHRONIC PELVIC PAIN IN FEMALE: ICD-10-CM

## 2023-05-22 DIAGNOSIS — G89.29 CHRONIC PELVIC PAIN IN FEMALE: ICD-10-CM

## 2023-05-22 RX ORDER — ELAGOLIX 150 MG/1
150 TABLET, FILM COATED ORAL DAILY
Qty: 30 TABLET | Refills: 0 | Status: SHIPPED | OUTPATIENT
Start: 2023-05-22 | End: 2023-09-08

## 2023-05-26 ENCOUNTER — TELEPHONE (OUTPATIENT)
Dept: OTHER | Facility: OTHER | Age: 38
End: 2023-05-26

## 2023-05-26 NOTE — TELEPHONE ENCOUNTER
Pt would like an update on the medication that was discussed today 05/26/23 regarding her insurance not accepting it due to an incorrect Code  Medication is Bruno       Please give her a call back

## 2023-06-02 ENCOUNTER — TELEPHONE (OUTPATIENT)
Dept: GASTROENTEROLOGY | Facility: CLINIC | Age: 38
End: 2023-06-02

## 2023-06-02 ENCOUNTER — CONSULT (OUTPATIENT)
Dept: GASTROENTEROLOGY | Facility: CLINIC | Age: 38
End: 2023-06-02

## 2023-06-02 ENCOUNTER — APPOINTMENT (OUTPATIENT)
Dept: LAB | Facility: HOSPITAL | Age: 38
End: 2023-06-02
Payer: COMMERCIAL

## 2023-06-02 VITALS
SYSTOLIC BLOOD PRESSURE: 110 MMHG | WEIGHT: 156 LBS | TEMPERATURE: 99.1 F | BODY MASS INDEX: 28.53 KG/M2 | DIASTOLIC BLOOD PRESSURE: 64 MMHG

## 2023-06-02 DIAGNOSIS — R11.0 NAUSEA: ICD-10-CM

## 2023-06-02 DIAGNOSIS — R14.0 BLOATING: ICD-10-CM

## 2023-06-02 DIAGNOSIS — R10.84 GENERALIZED ABDOMINAL PAIN: Primary | ICD-10-CM

## 2023-06-02 DIAGNOSIS — R10.32 CONTINUOUS LLQ ABDOMINAL PAIN: ICD-10-CM

## 2023-06-02 DIAGNOSIS — K21.9 GASTROESOPHAGEAL REFLUX DISEASE WITHOUT ESOPHAGITIS: ICD-10-CM

## 2023-06-02 DIAGNOSIS — K59.00 ACUTE CONSTIPATION: ICD-10-CM

## 2023-06-02 DIAGNOSIS — R10.84 GENERALIZED ABDOMINAL PAIN: ICD-10-CM

## 2023-06-02 DIAGNOSIS — R68.81 EARLY SATIETY: ICD-10-CM

## 2023-06-02 DIAGNOSIS — R30.0 DYSURIA: ICD-10-CM

## 2023-06-02 PROCEDURE — 87086 URINE CULTURE/COLONY COUNT: CPT

## 2023-06-02 PROCEDURE — 82784 ASSAY IGA/IGD/IGG/IGM EACH: CPT

## 2023-06-02 PROCEDURE — 86364 TISS TRNSGLTMNASE EA IG CLAS: CPT

## 2023-06-02 PROCEDURE — 36415 COLL VENOUS BLD VENIPUNCTURE: CPT

## 2023-06-02 RX ORDER — PANTOPRAZOLE SODIUM 20 MG/1
20 TABLET, DELAYED RELEASE ORAL DAILY
Qty: 90 TABLET | Refills: 1 | Status: SHIPPED | OUTPATIENT
Start: 2023-06-02 | End: 2023-08-31

## 2023-06-02 RX ORDER — POLYETHYLENE GLYCOL 3350 17 G/17G
17 POWDER, FOR SOLUTION ORAL DAILY
Qty: 510 G | Refills: 5 | Status: SHIPPED | OUTPATIENT
Start: 2023-06-02 | End: 2023-06-02

## 2023-06-02 RX ORDER — DICYCLOMINE HCL 20 MG
20 TABLET ORAL EVERY 6 HOURS
Qty: 120 TABLET | Refills: 0 | Status: SHIPPED | OUTPATIENT
Start: 2023-06-02

## 2023-06-02 RX ORDER — SENNA AND DOCUSATE SODIUM 50; 8.6 MG/1; MG/1
1 TABLET, FILM COATED ORAL DAILY
Qty: 7 TABLET | Refills: 0 | Status: SHIPPED | OUTPATIENT
Start: 2023-06-02

## 2023-06-02 RX ORDER — BISACODYL 5 MG/1
20 TABLET, DELAYED RELEASE ORAL ONCE
Qty: 4 TABLET | Refills: 0 | Status: SHIPPED | OUTPATIENT
Start: 2023-06-02 | End: 2023-06-02

## 2023-06-02 RX ORDER — SENNA AND DOCUSATE SODIUM 50; 8.6 MG/1; MG/1
1 TABLET, FILM COATED ORAL DAILY
Qty: 7 TABLET | Refills: 0 | Status: SHIPPED | OUTPATIENT
Start: 2023-06-02 | End: 2023-06-02

## 2023-06-02 RX ORDER — POLYETHYLENE GLYCOL 3350 17 G/17G
17 POWDER, FOR SOLUTION ORAL DAILY
Qty: 510 G | Refills: 5 | Status: SHIPPED | OUTPATIENT
Start: 2023-06-02 | End: 2023-11-29

## 2023-06-02 NOTE — PROGRESS NOTES
[facility] - Outpatient Follow-up Note  Triny Rollins 41 y o  female MRN: [id number]  Encounter: [id number]          ASSESSMENT AND PLAN:      1  Generalized abdominal pain  2  Nausea  3  Bloating  4  Early satiety  5  Low appetite  6  Acute constipation    Patient currently taking pantoprazole 40 mg and Bentyl  Told patient to continue that  Differential for current symptoms include peptic ulcer disease, celiac disease, IBD, tumors, IBS  Patient has cholecystectomy in the past   At this time we will evaluate with EGD, colonoscopy, CT scan, celiac serology  Patient agreeable to get investigations done  We will start her on MiraLAX and docusate/senna  Told patient to take laxative consistently and to titrate the dose of MiraLAX to have 2-3 soft loose bowel movements per day  Increase hydration to 10 to 12 cups of water a day  Increase fiber in diet  Further management plan based on investigation results  RTC 4 months  Mark León MD  Gastroenterology  [place] [id number]  Date: June 2, 2023      - bisacodyl (DULCOLAX) 5 mg EC tablet; Take 4 tablets (20 mg total) by mouth once for 1 dose Colonoscopy prep  Dispense: 4 tablet; Refill: 0  - IgA; Future  - Tissue transglutaminase, IgA; Future  - CT abdomen pelvis w contrast; Future  - Colonoscopy; Future  - EGD; Future  - senna-docusate sodium (SENOKOT-S) 8 6-50 mg per tablet; Take 1 tablet by mouth daily  Dispense: 7 tablet; Refill: 0  - polyethylene glycol (MIRALAX) 17 g packet; Take 17 g by mouth daily  Dispense: 510 g; Refill: 5  - polyethylene glycol (GOLYTELY) 4000 mL solution; Take 4,000 mL by mouth once for 1 dose Colonoscopy prep  Dispense: 4000 mL; Refill: 0  - dicyclomine (BENTYL) 20 mg tablet; Take 1 tablet (20 mg total) by mouth every 6 (six) hours  Dispense: 120 tablet; Refill: 0  - pantoprazole (PROTONIX) 20 mg tablet;  Take 1 tablet (20 mg total) by mouth daily  Dispense: 90 tablet; Refill: 1    ______________________________________________________________________    SUBJECTIVE: 36-year-old with history of choledocholithiasis, status postcholecystectomy, status post ERCP with stone extraction, presents for evaluation  Patient reports that she has been having nausea, bloating along with early satiety and low appetite for the last few weeks  She is also been having hard stools for the last few weeks and feeling of incomplete evacuation  She has tried laxatives but have not worked  No blood in stools  No family history of colon cancer  Current smoker  No marijuana or alcohol intake  No NSAID intake  She was told by her OB/GYN that her left ovary and fallopian tube are abutting the colon and she would like further evaluation for this  She had liver enzymes levels checked in May 2023 which were normal levels and report was reviewed by me today  REVIEW OF SYSTEMS IS OTHERWISE NEGATIVE        Historical Information   Past Medical History:   Diagnosis Date   • Abnormal Pap smear of cervix 2015    cryosurgery   • Hypoglycemia    • Migraine headache with aura     managed with Elavil     Past Surgical History:   Procedure Laterality Date   • CHOLECYSTECTOMY LAPAROSCOPIC N/A 10/27/2020    Procedure: CHOLECYSTECTOMY LAPAROSCOPIC;  Surgeon: Jairon Clay MD;  Location: [address] [place] [address] OR;  Service: General   • GYNECOLOGIC CRYOSURGERY  2015   • HYSTERECTOMY  2016   • LAPAROSCOPY  2018   • NC LAPAROSCOPY W/LYSIS OF ADHESIONS N/A 3/3/2020    Procedure: L O A ;  Surgeon: Anne Estrella MD;  [facility];  Service: Gynecology   • NC LAPS ABD PRTM&OMENTUM DX W/WO SPEC BR/WA SPX N/A 8/9/2019    Procedure: LAPAROSCOPY DIAGNOSTIC; LYSIS OF ADHESIONS;  Surgeon: Hailey Padron MD;  [facility];  Service: Gynecology   • TUBAL LIGATION       Social History   Social History     Substance and Sexual Activity   Alcohol Use Not Currently     Social History     Substance "and Sexual Activity   Drug Use Never     Social History     Tobacco Use   Smoking Status Every Day   • Packs/day: 0 50   • Years: 15 00   • Total pack years: 7 50   • Types: Cigarettes   Smokeless Tobacco Never     Family History   Problem Relation Age of Onset   • Cancer Paternal Grandmother         uterine   • Cancer Paternal Aunt         uterine    • Breast cancer Neg Hx    • Colon cancer Neg Hx    • Ovarian cancer Neg Hx        Meds/Allergies       Current Outpatient Medications:   •  bisacodyl (DULCOLAX) 5 mg EC tablet  •  dicyclomine (BENTYL) 20 mg tablet  •  Elagolix Sodium (Orilissa) 150 MG TABS  •  ergocalciferol (VITAMIN D2) 50,000 units  •  medroxyPROGESTERone (DEPO-PROVERA) 150 mg/mL injection  •  miconazole (MONISTAT) 200 mg vaginal suppository  •  neomycin-polymyxin-hydrocortisone (CORTISPORIN) 0 35%-10,000 units/mL-1% otic suspension  •  nicotine (NICODERM CQ) 14 mg/24hr TD 24 hr patch  •  pantoprazole (PROTONIX) 20 mg tablet  •  polyethylene glycol (GOLYTELY) 4000 mL solution  •  polyethylene glycol (MIRALAX) 17 g packet  •  senna-docusate sodium (SENOKOT-S) 8 6-50 mg per tablet  •  SUMAtriptan (IMITREX) 50 mg tablet  •  aluminum-magnesium hydroxide 200-200 MG/5ML suspension  •  amitriptyline (ELAVIL) 10 mg tablet  •  aspirin-acetaminophen-caffeine (EXCEDRIN MIGRAINE) 250-250-65 MG per tablet  •  naproxen (Naprosyn) 500 mg tablet  •  psyllium (METAMUCIL) 58 6 % powder  •  simethicone (MYLICON,GAS-X) 956 MG CAPS  •  tiZANidine (ZANAFLEX) 4 mg tablet    Allergies   Allergen Reactions   • Shellfish-Derived Products - Food Allergy Itching and Swelling     \"seafood \"   • Ibuprofen Other (See Comments)     Patient states she gets hematoma   • Pregabalin Anxiety           Objective     Blood pressure 110/64, temperature 99 1 °F (37 3 °C), weight 70 8 kg (156 lb), not currently breastfeeding  Body mass index is 28 53 kg/m²        PHYSICAL EXAM:      General Appearance:   Alert, cooperative, no distress " HEENT:   Normocephalic, atraumatic, anicteric      Neck:  Supple, symmetrical, trachea midline   Lungs:   Clear to auscultation bilaterally; no rales, rhonchi or wheezing; respirations unlabored    Heart[de-identified]   Regular rate and rhythm; no murmur, rub, or gallop  Abdomen:   Soft, non-tender, non-distended; normal bowel sounds; no masses, no organomegaly    Genitalia:   Deferred    Rectal:   Deferred    Extremities:  No cyanosis, clubbing or edema    Pulses:  2+ and symmetric    Skin:  No jaundice, rashes, or lesions    Lymph nodes:  No palpable cervical lymphadenopathy        Lab Results:   No visits with results within 1 Day(s) from this visit  Latest known visit with results is:   Admission on 05/09/2023, Discharged on 05/10/2023   Component Date Value   • N gonorrhoeae, DNA Probe 05/09/2023 Negative    • Chlamydia trachomatis, D* 05/09/2023 Negative    • Color, UA 05/09/2023 Yellow    • Clarity, UA 05/09/2023 Clear    • pH, UA 05/09/2023 5 0    • Leukocytes, UA 05/09/2023 Negative    • Nitrite, UA 05/09/2023 Negative    • Protein, UA 05/09/2023 Negative    • Glucose, UA 05/09/2023 Negative    • Ketones, UA 05/09/2023 Negative    • Urobilinogen, UA 05/09/2023 0 2    • Bilirubin, UA 05/09/2023 Negative    • Occult Blood, UA 05/09/2023 Trace (A)    • Specific Gravity, UA 05/09/2023 >=1 030    • RBC, UA 05/09/2023 0-1 (A)    • WBC, UA 05/09/2023 2-4    • Epithelial Cells 05/09/2023 Innumerable (A)    • Bacteria, UA 05/09/2023 Innumerable (A)    • Ca Oxalate Priti, UA 05/09/2023 Occasional (A)    • MUCUS THREADS 05/09/2023 Innumerable (A)    • Candida Species 05/09/2023 Negative    • Gardnerella vaginalis 05/09/2023 Positive (A)    • Trichomonas vaginalis 05/09/2023 Negative          Radiology Results:   US pelvis complete w transvaginal    Result Date: 5/5/2023  Narrative: PELVIC ULTRASOUND, COMPLETE INDICATION:  The patient is 41years old  L adnexa pain   COMPARISON: April 14, 2023 TECHNIQUE:   Transabdominal pelvic ultrasound was performed in sagittal and transverse planes with a curvilinear transducer  Additional transvaginal imaging was performed to better evaluate the endometrium and ovaries  Imaging included volumetric sweeps as well as traditional still imaging technique  FINDINGS: UTERUS: Status post hysterectomy  OVARIES/ADNEXA: Right ovary: Not visualized Left ovary:  3 8 x 1 9 x 2 0 cm  7 4 mL  Ovarian Doppler flow is within normal limits  Tubular structure in the left adnexa is again visualized  Multiple follicles in the left ovary OTHER: No free fluid or loculated fluid collections  Impression: Fluid-filled tubular structure in the left adnexa which may represent hydrosalpinx  Similar to prior study  Multiple follicles in the left ovary  The right ovary was nonvisualized   Status post hysterectomy Workstation performed: [id number]

## 2023-06-02 NOTE — PATIENT INSTRUCTIONS
Scheduled date of EGD/colonoscopy (as of today):07 27 23  Physician performing EGD/colonoscopy:DR VELEZ  Location of EGD/colonoscopy:[facility]  Desired bowel prep reviewed with patient:ANA MARÍA 2 DAY  Instructions reviewed with patient by:JOSE LUIS  Clearances:  N/A  Pt will call to schedule CT

## 2023-06-03 LAB
BACTERIA UR CULT: NORMAL
IGA SERPL-MCNC: 330 MG/DL (ref 70–400)

## 2023-06-05 LAB — TTG IGA SER-ACNC: <2 U/ML (ref 0–3)

## 2023-06-07 ENCOUNTER — OFFICE VISIT (OUTPATIENT)
Dept: FAMILY MEDICINE CLINIC | Facility: CLINIC | Age: 38
End: 2023-06-07

## 2023-06-07 VITALS
RESPIRATION RATE: 16 BRPM | HEART RATE: 100 BPM | BODY MASS INDEX: 28.89 KG/M2 | HEIGHT: 62 IN | SYSTOLIC BLOOD PRESSURE: 110 MMHG | OXYGEN SATURATION: 97 % | DIASTOLIC BLOOD PRESSURE: 60 MMHG | TEMPERATURE: 97 F | WEIGHT: 157 LBS

## 2023-06-07 DIAGNOSIS — M25.562 CHRONIC PAIN OF LEFT KNEE: ICD-10-CM

## 2023-06-07 DIAGNOSIS — G43.109 MIGRAINE WITH AURA AND WITHOUT STATUS MIGRAINOSUS, NOT INTRACTABLE: ICD-10-CM

## 2023-06-07 DIAGNOSIS — G89.29 CHRONIC PAIN OF LEFT KNEE: ICD-10-CM

## 2023-06-07 DIAGNOSIS — R10.32 CONTINUOUS LLQ ABDOMINAL PAIN: Primary | ICD-10-CM

## 2023-06-07 DIAGNOSIS — M54.42 CHRONIC BILATERAL LOW BACK PAIN WITH LEFT-SIDED SCIATICA: ICD-10-CM

## 2023-06-07 DIAGNOSIS — K59.00 CONSTIPATION, UNSPECIFIED CONSTIPATION TYPE: ICD-10-CM

## 2023-06-07 DIAGNOSIS — G89.29 CHRONIC BILATERAL LOW BACK PAIN WITH LEFT-SIDED SCIATICA: ICD-10-CM

## 2023-06-07 PROCEDURE — 99215 OFFICE O/P EST HI 40 MIN: CPT | Performed by: PHYSICIAN ASSISTANT

## 2023-06-07 RX ORDER — SUMATRIPTAN 100 MG/1
100 TABLET, FILM COATED ORAL ONCE AS NEEDED
Qty: 10 TABLET | Refills: 2 | Status: SHIPPED | OUTPATIENT
Start: 2023-06-07 | End: 2023-06-07

## 2023-06-07 RX ORDER — PROCHLORPERAZINE MALEATE 10 MG
10 TABLET ORAL EVERY 6 HOURS PRN
Qty: 30 TABLET | Refills: 1 | Status: SHIPPED | OUTPATIENT
Start: 2023-06-07

## 2023-06-07 RX ORDER — SUMATRIPTAN 100 MG/1
100 TABLET, FILM COATED ORAL ONCE AS NEEDED
Qty: 10 TABLET | Refills: 2 | Status: SHIPPED | OUTPATIENT
Start: 2023-06-07

## 2023-06-07 RX ORDER — PROCHLORPERAZINE MALEATE 10 MG
10 TABLET ORAL EVERY 6 HOURS PRN
Qty: 30 TABLET | Refills: 1 | Status: SHIPPED | OUTPATIENT
Start: 2023-06-07 | End: 2023-06-07

## 2023-06-07 NOTE — ASSESSMENT & PLAN NOTE
- Reviewed CT head without contrast (2/24/23) which showed no acute intracranial abnormality    -Patient was scheduled to establish with neurology, however she had to reschedule due to helping her father-in-law  Patient is now scheduled in September   - We will increase Imitrex to 100 mg, as needed at onset of migraine    -Per patient, she has tried Zofran and Reglan in the past for the nausea but they were both ineffective  Will trial Compazine 10 mg as needed for nausea/vomiting   - If migraines become more frequent, will consider restarting elavil for migraine prevention

## 2023-06-07 NOTE — PROGRESS NOTES
Assessment/Plan:    Migraine headache with aura  - Reviewed CT head without contrast (2/24/23) which showed no acute intracranial abnormality    -Patient was scheduled to establish with neurology, however she had to reschedule due to helping her father-in-law  Patient is now scheduled in September   - We will increase Imitrex to 100 mg, as needed at onset of migraine    -Per patient, she has tried Zofran and Reglan in the past for the nausea but they were both ineffective  Will trial Compazine 10 mg as needed for nausea/vomiting   - If migraines become more frequent, will consider restarting elavil for migraine prevention  Chronic bilateral low back pain with left-sided sciatica  - Reviewed recent lumbar spine x-ray which shows mild scoliosis, otherwise unremarkable study   - Advised to apply ice/heating pad/topical therapies as needed to affected area  - Continue tizanidine 4 mg, to be taken twice daily as needed  - Continue alternating ibuprofen/Tylenol as needed for pain  - If symptoms persist, would recommend referral to physical therapy  Chronic pain of left knee  - Reviewed MRI left knee without contrast (3/1/23) which showed: No meniscal tear or other internal derangement  -Reviewed recent left knee x-ray which was unremarkable  -Patient has established with orthopedics and underwent CSI on 3/23/2023 with no relief  Patient then completed physical therapy for about 3 sessions   - Advised to take Tylenol or ibuprofen as needed for pain  - Advised to apply ice/ heating pad as needed to the affected area  -Continue follow-up with orthopedics as scheduled  Constipation  - Recently established with gastroenterology and has been prescribed both MiraLAX and senna  However, patient reports they are ineffective  Patient reports only thing that has been helpful is OTC prunelax   -Patient is scheduled for colonoscopy in the near future    - Continue follow-up with gastroenterology as scheduled  Continuous LLQ abdominal pain  - Patient recently established with gastroenterology and is scheduled for CT abdomen pelvis with contrast, EGD, and colonoscopy for further evaluation  - OBGYN has prescribed amitriptyline and Depo injections for the pain, however, both have not been helpful  Madalyn Aletha has been prescribed but requires prior authorization so patient has yet to receive this    -Continue follow-up with OB/GYN and gastroenterology as scheduled  Diagnoses and all orders for this visit:    Continuous LLQ abdominal pain    Migraine with aura and without status migrainosus, not intractable  -     Discontinue: SUMAtriptan (IMITREX) 100 mg tablet; Take 1 tablet (100 mg total) by mouth once as needed for migraine for up to 1 dose may repeat in 2 hours if necessary  Max dose 200mg in 24 hour period   -     Discontinue: prochlorperazine (COMPAZINE) 10 mg tablet; Take 1 tablet (10 mg total) by mouth every 6 (six) hours as needed for nausea or vomiting  -     prochlorperazine (COMPAZINE) 10 mg tablet; Take 1 tablet (10 mg total) by mouth every 6 (six) hours as needed for nausea or vomiting  -     SUMAtriptan (IMITREX) 100 mg tablet; Take 1 tablet (100 mg total) by mouth once as needed for migraine for up to 1 dose may repeat in 2 hours if necessary  Max dose 200mg in 24 hour period  Chronic bilateral low back pain with left-sided sciatica    Chronic pain of left knee    Constipation, unspecified constipation type          All of patients questions were answered  Patient understands and agrees with the above plan  Return in about 1 month (around 7/7/2023) for Annual physical     Oriana Lepe  06/07/23  [address]          Subjective:     Patient ID: Yazan Elizondo  is a 41 y o  female with known PMH of migraines, GERD who presents today in office for abdominal pain follow up  - Patient is a 41 y o  female who presents today for abdominal pain follow up    Patient notes she continues with chronic left lower quadrant abdominal pain  Patient notes she has tried several different medications from OB/GYN including amitriptyline and Depo shots  Patient notes they have not helped  Patient notes she has been prescribed Orlissa by OB/GYN, however it requires prior authorization so she has not received it as of yet  Patient notes she was going to have surgery for further evaluation, but then her OB/GYN decided against it for now  Patient did recently establish with gastroenterology and is scheduled to undergo CT scan, EGD, and colonoscopy  Patient notes she has tried taking the MiraLAX and senna for the constipation, but it has not been helping  Patient notes the only thing that does help is prunelax, which she receives over-the-counter  However, patient notes she does not take it all the time because she does not want to become dependent on it     -Patient notes she does have a lot going on currently  Patient notes she is taking care of her own health, her 2 kids, but is also helping with her  and her father-in-law, both of whom may require surgery in the near future     -Patient notes she does continue with frequent headaches and sometimes they are very strong and cause nausea and vomiting  The following portions of the patient's history were reviewed and updated as appropriate: allergies, current medications, past family history, past medical history, past social history, past surgical history and problem list         Review of Systems   Constitutional: Negative for chills and fever  HENT: Negative for congestion, ear pain and sore throat  Eyes: Negative for pain  Respiratory: Negative for cough, chest tightness and shortness of breath  Cardiovascular: Negative for chest pain and palpitations  Gastrointestinal: Positive for abdominal pain and constipation  Negative for diarrhea, nausea and vomiting     Genitourinary: Negative for difficulty urinating and "dysuria  Musculoskeletal: Positive for arthralgias, back pain and gait problem  Skin: Negative for rash  Neurological: Positive for headaches  Negative for dizziness and numbness  Objective:   Vitals:    06/07/23 0901   BP: 110/60   BP Location: Left arm   Patient Position: Sitting   Cuff Size: Standard   Pulse: 100   Resp: 16   Temp: (!) 97 °F (36 1 °C)   TempSrc: Temporal   SpO2: 97%   Weight: 71 2 kg (157 lb)   Height: 5' 2\" (1 575 m)         Physical Exam  Vitals and nursing note reviewed  Constitutional:       General: She is not in acute distress  Appearance: She is well-developed  HENT:      Head: Normocephalic and atraumatic  Right Ear: External ear normal       Left Ear: External ear normal       Nose: Nose normal    Eyes:      Conjunctiva/sclera: Conjunctivae normal    Cardiovascular:      Rate and Rhythm: Normal rate and regular rhythm  Pulses: Normal pulses  Heart sounds: Normal heart sounds  Pulmonary:      Effort: Pulmonary effort is normal  No respiratory distress  Breath sounds: Normal breath sounds  No wheezing  Abdominal:      General: Bowel sounds are normal       Palpations: Abdomen is soft  Tenderness: There is abdominal tenderness (LLQ)  There is no guarding or rebound  Musculoskeletal:      Cervical back: Normal range of motion and neck supple  Lumbar back: Tenderness present  No swelling  Normal range of motion  Negative right straight leg raise test and negative left straight leg raise test       Left knee: Normal range of motion  Tenderness present over the medial joint line  Skin:     General: Skin is warm and dry  Neurological:      Mental Status: She is alert and oriented to person, place, and time  Psychiatric:         Behavior: Behavior normal            - Utilized [employer] services for translation      "

## 2023-06-07 NOTE — ASSESSMENT & PLAN NOTE
- Patient recently established with gastroenterology and is scheduled for CT abdomen pelvis with contrast, EGD, and colonoscopy for further evaluation  - OBSTEPHANIEN has prescribed amitriptyline and Depo injections for the pain, however, both have not been helpful  Dorisann Coast has been prescribed but requires prior authorization so patient has yet to receive this    -Continue follow-up with OB/GYN and gastroenterology as scheduled

## 2023-06-07 NOTE — ASSESSMENT & PLAN NOTE
- Recently established with gastroenterology and has been prescribed both MiraLAX and senna  However, patient reports they are ineffective  Patient reports only thing that has been helpful is OTC prunelax   -Patient is scheduled for colonoscopy in the near future  - Continue follow-up with gastroenterology as scheduled

## 2023-06-07 NOTE — ASSESSMENT & PLAN NOTE
- Reviewed MRI left knee without contrast (3/1/23) which showed: No meniscal tear or other internal derangement  -Reviewed recent left knee x-ray which was unremarkable  -Patient has established with orthopedics and underwent CSI on 3/23/2023 with no relief  Patient then completed physical therapy for about 3 sessions   - Advised to take Tylenol or ibuprofen as needed for pain  - Advised to apply ice/ heating pad as needed to the affected area  -Continue follow-up with orthopedics as scheduled

## 2023-06-10 ENCOUNTER — HOSPITAL ENCOUNTER (OUTPATIENT)
Dept: CT IMAGING | Facility: HOSPITAL | Age: 38
Discharge: HOME | End: 2023-06-10
Attending: INTERNAL MEDICINE
Payer: COMMERCIAL

## 2023-06-10 DIAGNOSIS — R11.0 NAUSEA: ICD-10-CM

## 2023-06-10 DIAGNOSIS — R10.84 GENERALIZED ABDOMINAL PAIN: ICD-10-CM

## 2023-06-10 DIAGNOSIS — K59.00 ACUTE CONSTIPATION: ICD-10-CM

## 2023-06-10 DIAGNOSIS — R68.81 EARLY SATIETY: ICD-10-CM

## 2023-06-10 DIAGNOSIS — R14.0 BLOATING: ICD-10-CM

## 2023-06-10 PROCEDURE — 74177 CT ABD & PELVIS W/CONTRAST: CPT

## 2023-06-10 PROCEDURE — G1004 CDSM NDSC: HCPCS

## 2023-06-10 RX ADMIN — IOHEXOL 100 ML: 350 INJECTION, SOLUTION INTRAVENOUS at 11:10

## 2023-06-18 ENCOUNTER — HOSPITAL ENCOUNTER (EMERGENCY)
Facility: HOSPITAL | Age: 38
Discharge: HOME | End: 2023-06-19
Attending: EMERGENCY MEDICINE
Payer: COMMERCIAL

## 2023-06-18 VITALS
DIASTOLIC BLOOD PRESSURE: 74 MMHG | BODY MASS INDEX: 28.59 KG/M2 | OXYGEN SATURATION: 99 % | RESPIRATION RATE: 18 BRPM | TEMPERATURE: 98.2 F | HEART RATE: 91 BPM | SYSTOLIC BLOOD PRESSURE: 139 MMHG | WEIGHT: 156.31 LBS

## 2023-06-18 DIAGNOSIS — G89.29 CHRONIC PELVIC PAIN IN FEMALE: Primary | ICD-10-CM

## 2023-06-18 DIAGNOSIS — G43.909 MIGRAINE: ICD-10-CM

## 2023-06-18 DIAGNOSIS — R10.2 CHRONIC PELVIC PAIN IN FEMALE: Primary | ICD-10-CM

## 2023-06-18 PROCEDURE — 99284 EMERGENCY DEPT VISIT MOD MDM: CPT

## 2023-06-19 ENCOUNTER — TELEPHONE (OUTPATIENT)
Dept: OBGYN CLINIC | Facility: CLINIC | Age: 38
End: 2023-06-19

## 2023-06-19 LAB
ALBUMIN SERPL BCP-MCNC: 4.2 G/DL (ref 3.5–5)
ALP SERPL-CCNC: 45 U/L (ref 34–104)
ALT SERPL W P-5'-P-CCNC: 20 U/L (ref 7–52)
ANION GAP SERPL CALCULATED.3IONS-SCNC: 8 MMOL/L (ref 4–13)
AST SERPL W P-5'-P-CCNC: 15 U/L (ref 13–39)
BACTERIA UR QL AUTO: ABNORMAL /HPF
BASOPHILS # BLD AUTO: 0.07 THOUSANDS/ÂΜL (ref 0–0.1)
BASOPHILS NFR BLD AUTO: 1 % (ref 0–1)
BILIRUB SERPL-MCNC: 0.32 MG/DL (ref 0.2–1)
BILIRUB UR QL STRIP: NEGATIVE
BUN SERPL-MCNC: 15 MG/DL (ref 5–25)
CALCIUM SERPL-MCNC: 8.8 MG/DL (ref 8.4–10.2)
CHLORIDE SERPL-SCNC: 112 MMOL/L (ref 96–108)
CLARITY UR: CLEAR
CO2 SERPL-SCNC: 22 MMOL/L (ref 21–32)
COLOR UR: YELLOW
CREAT SERPL-MCNC: 0.83 MG/DL (ref 0.6–1.3)
EOSINOPHIL # BLD AUTO: 0.26 THOUSAND/ÂΜL (ref 0–0.61)
EOSINOPHIL NFR BLD AUTO: 3 % (ref 0–6)
ERYTHROCYTE [DISTWIDTH] IN BLOOD BY AUTOMATED COUNT: 12.5 % (ref 11.6–15.1)
GFR SERPL CREATININE-BSD FRML MDRD: 90 ML/MIN/1.73SQ M
GLUCOSE SERPL-MCNC: 98 MG/DL (ref 65–140)
GLUCOSE UR STRIP-MCNC: NEGATIVE MG/DL
HCT VFR BLD AUTO: 37.2 % (ref 34.8–46.1)
HGB BLD-MCNC: 12.6 G/DL (ref 11.5–15.4)
HGB UR QL STRIP.AUTO: ABNORMAL
IMM GRANULOCYTES # BLD AUTO: 0.05 THOUSAND/UL (ref 0–0.2)
IMM GRANULOCYTES NFR BLD AUTO: 1 % (ref 0–2)
KETONES UR STRIP-MCNC: NEGATIVE MG/DL
LEUKOCYTE ESTERASE UR QL STRIP: NEGATIVE
LIPASE SERPL-CCNC: 19 U/L (ref 11–82)
LYMPHOCYTES # BLD AUTO: 3.33 THOUSANDS/ÂΜL (ref 0.6–4.47)
LYMPHOCYTES NFR BLD AUTO: 35 % (ref 14–44)
MCH RBC QN AUTO: 30.1 PG (ref 26.8–34.3)
MCHC RBC AUTO-ENTMCNC: 33.9 G/DL (ref 31.4–37.4)
MCV RBC AUTO: 89 FL (ref 82–98)
MONOCYTES # BLD AUTO: 0.7 THOUSAND/ÂΜL (ref 0.17–1.22)
MONOCYTES NFR BLD AUTO: 7 % (ref 4–12)
MUCOUS THREADS UR QL AUTO: ABNORMAL
NEUTROPHILS # BLD AUTO: 5.14 THOUSANDS/ÂΜL (ref 1.85–7.62)
NEUTS SEG NFR BLD AUTO: 53 % (ref 43–75)
NITRITE UR QL STRIP: NEGATIVE
NON-SQ EPI CELLS URNS QL MICRO: ABNORMAL /HPF
NRBC BLD AUTO-RTO: 0 /100 WBCS
PH UR STRIP.AUTO: 5.5 [PH] (ref 4.5–8)
PLATELET # BLD AUTO: 243 THOUSANDS/UL (ref 149–390)
PMV BLD AUTO: 11.9 FL (ref 8.9–12.7)
POTASSIUM SERPL-SCNC: 4 MMOL/L (ref 3.5–5.3)
PROT SERPL-MCNC: 6.7 G/DL (ref 6.4–8.4)
PROT UR STRIP-MCNC: ABNORMAL MG/DL
RBC # BLD AUTO: 4.19 MILLION/UL (ref 3.81–5.12)
RBC #/AREA URNS AUTO: ABNORMAL /HPF
SODIUM SERPL-SCNC: 142 MMOL/L (ref 135–147)
SP GR UR STRIP.AUTO: >=1.03 (ref 1–1.03)
UROBILINOGEN UR QL STRIP.AUTO: 1 E.U./DL
WBC # BLD AUTO: 9.55 THOUSAND/UL (ref 4.31–10.16)
WBC #/AREA URNS AUTO: ABNORMAL /HPF

## 2023-06-19 PROCEDURE — 36415 COLL VENOUS BLD VENIPUNCTURE: CPT

## 2023-06-19 PROCEDURE — 85025 COMPLETE CBC W/AUTO DIFF WBC: CPT

## 2023-06-19 PROCEDURE — 96372 THER/PROPH/DIAG INJ SC/IM: CPT

## 2023-06-19 PROCEDURE — 83690 ASSAY OF LIPASE: CPT

## 2023-06-19 PROCEDURE — 81001 URINALYSIS AUTO W/SCOPE: CPT

## 2023-06-19 PROCEDURE — 80053 COMPREHEN METABOLIC PANEL: CPT

## 2023-06-19 RX ORDER — ONDANSETRON 4 MG/1
4 TABLET, ORALLY DISINTEGRATING ORAL ONCE
Status: COMPLETED | OUTPATIENT
Start: 2023-06-19 | End: 2023-06-19

## 2023-06-19 RX ORDER — KETOROLAC TROMETHAMINE 30 MG/ML
15 INJECTION, SOLUTION INTRAMUSCULAR; INTRAVENOUS ONCE
Status: COMPLETED | OUTPATIENT
Start: 2023-06-19 | End: 2023-06-19

## 2023-06-19 RX ORDER — ACETAMINOPHEN 325 MG/1
650 TABLET ORAL ONCE
Status: COMPLETED | OUTPATIENT
Start: 2023-06-19 | End: 2023-06-19

## 2023-06-19 RX ADMIN — ACETAMINOPHEN 325MG 650 MG: 325 TABLET ORAL at 00:13

## 2023-06-19 RX ADMIN — ONDANSETRON 4 MG: 4 TABLET, ORALLY DISINTEGRATING ORAL at 00:12

## 2023-06-19 RX ADMIN — KETOROLAC TROMETHAMINE 15 MG: 30 INJECTION, SOLUTION INTRAMUSCULAR; INTRAVENOUS at 00:13

## 2023-06-19 NOTE — ED PROVIDER NOTES
History  Chief Complaint   Patient presents with   • Abdominal Pain     LLQ abd pain starting 4hrs pta  States some nausea  Also c/o headache  Mario Ac is a 41year old who is status post total hysterecomy (in 2016 performed in [facility]) who presents for left lower quadrant pain  The patient states that she has had this pain chronically, but worsened x 4 hours  She does report nausea secondary to pain  CT abdomen and pelvis was done on 6/10 which revealed No evidence of acute abdominopelvic process, chronic left hydrosalpinx  Patient recently established with gastroenterology and is scheduled for EGD, and colonoscopy  OBGYN has prescribed amitriptyline and Depo injections for the pain, however, both have not been helpful  Catherine Chaudhry has been prescribed but requires prior authorization so patient has yet to receive this  Today, reports she has not taken anything for pain  She also reports one day history of migraine  She has history of migraines, reports this is same pain as chronic migraine  She otherwise denies fever, chills, vomiting, diarrhea, melena, hematochezia, dysuria, hematuria, vaginal bleeding/discharge, vision changes, numbness, paresthesia, neck pain  Prior to Admission Medications   Prescriptions Last Dose Informant Patient Reported? Taking? Elagolix Sodium (Orilissa) 150 MG TABS  Self No No   Sig: Take 150 mg by mouth in the morning   SUMAtriptan (IMITREX) 100 mg tablet   No No   Sig: Take 1 tablet (100 mg total) by mouth once as needed for migraine for up to 1 dose may repeat in 2 hours if necessary  Max dose 200mg in 24 hour period     aluminum-magnesium hydroxide 200-200 MG/5ML suspension  Self No No   Sig: Take 10 mL by mouth every 6 (six) hours as needed for heartburn   Patient not taking: Reported on 5/17/2023   amitriptyline (ELAVIL) 10 mg tablet  Self No No   Sig: Take 1 tablet (10 mg total) by mouth daily at bedtime   Patient not taking: Reported on 5/17/2023 aspirin-acetaminophen-caffeine (EXCEDRIN MIGRAINE) 250-250-65 MG per tablet  Self No No   Sig: Take 1 tablet by mouth in the morning   Patient not taking: Reported on 5/17/2023   bisacodyl (DULCOLAX) 5 mg EC tablet   No No   Sig: Take 4 tablets (20 mg total) by mouth once for 1 dose Colonoscopy prep   dicyclomine (BENTYL) 20 mg tablet   No No   Sig: Take 1 tablet (20 mg total) by mouth every 6 (six) hours   ergocalciferol (VITAMIN D2) 50,000 units  Self No No   Sig: Take 1 capsule (50,000 Units total) by mouth once a week   medroxyPROGESTERone (DEPO-PROVERA) 150 mg/mL injection  Self No No   Sig: Inject 1 mL (150 mg total) into a muscle every 3 (three) months   miconazole (MONISTAT) 200 mg vaginal suppository  Self No No   Sig: Insert 1 suppository (200 mg total) into the vagina daily at bedtime   naproxen (Naprosyn) 500 mg tablet  Self No No   Sig: Take 1 tablet (500 mg total) by mouth 2 (two) times a day with meals   neomycin-polymyxin-hydrocortisone (CORTISPORIN) 0 35%-10,000 units/mL-1% otic suspension  Self No No   Sig: Administer 4 drops into both ears 2 (two) times a day   nicotine (NICODERM CQ) 14 mg/24hr TD 24 hr patch  Self No No   Sig: Place 1 patch on the skin over 24 hours every 24 hours   pantoprazole (PROTONIX) 20 mg tablet   No No   Sig: Take 1 tablet (20 mg total) by mouth daily   polyethylene glycol (GOLYTELY) 4000 mL solution   No No   Sig: Take 4,000 mL by mouth once for 1 dose Colonoscopy prep   polyethylene glycol (MIRALAX) 17 g packet   No No   Sig: Take 17 g by mouth daily   prochlorperazine (COMPAZINE) 10 mg tablet   No No   Sig: Take 1 tablet (10 mg total) by mouth every 6 (six) hours as needed for nausea or vomiting   psyllium (METAMUCIL) 58 6 % powder  Self No No   Sig: Take 1 packet by mouth 3 (three) times a day   Patient not taking: Reported on 5/17/2023   senna-docusate sodium (SENOKOT-S) 8 6-50 mg per tablet   No No   Sig: Take 1 tablet by mouth daily   simethicone (MYLICON,GAS-X) 125 MG CAPS  Self No No   Sig: Take 1 capsule (125 mg total) by mouth every 6 (six) hours as needed for flatulence   Patient not taking: Reported on 5/17/2023   tiZANidine (ZANAFLEX) 4 mg tablet  Self No No   Sig: Take 1 tablet (4 mg total) by mouth 2 (two) times a day as needed for muscle spasms   Patient not taking: Reported on 5/17/2023      Facility-Administered Medications: None       Past Medical History:   Diagnosis Date   • Abnormal Pap smear of cervix 2015    cryosurgery   • Hypoglycemia    • Migraine headache with aura     managed with Elavil       Past Surgical History:   Procedure Laterality Date   • CHOLECYSTECTOMY LAPAROSCOPIC N/A 10/27/2020    Procedure: CHOLECYSTECTOMY LAPAROSCOPIC;  Surgeon: Romayne Nettle, MD;  Location: [address] [place] [address] OR;  Service: General   • GYNECOLOGIC CRYOSURGERY  2015   • HYSTERECTOMY  2016   • LAPAROSCOPY  2018   • RI LAPAROSCOPY W/LYSIS OF ADHESIONS N/A 3/3/2020    Procedure: L O A ;  Surgeon: Aimee Fuentes MD;  Location: [facility] OR;  Service: Gynecology   • RI LAPS ABD PRTM&OMENTUM DX W/WO SPEC BR/ [facility] N/A 8/9/2019    Procedure: LAPAROSCOPY DIAGNOSTIC; LYSIS OF ADHESIONS;  Surgeon: Maribel Solis MD;  Location: [facility] OR;  Service: Gynecology   • TUBAL LIGATION         Family History   Problem Relation Age of Onset   • Cancer Paternal Grandmother         uterine   • Cancer Paternal Aunt         uterine    • Breast cancer Neg Hx    • Colon cancer Neg Hx    • Ovarian cancer Neg Hx      I have reviewed and agree with the history as documented      E-Cigarette/Vaping   • E-Cigarette Use Never User      E-Cigarette/Vaping Substances   • Nicotine No    • THC No    • CBD No    • Flavoring No    • Other No    • Unknown No      Social History     Tobacco Use   • Smoking status: Every Day     Packs/day: 0 50     Years: 15 00     Total pack years: 7 50     Types: Cigarettes   • Smokeless tobacco: Never   Vaping Use   • Vaping Use: Never used   Substance Use Topics   • Alcohol use: Not Currently   • Drug use: Never       Review of Systems   Constitutional: Negative for chills and fever  HENT: Negative for congestion and rhinorrhea  Respiratory: Negative for cough and shortness of breath  Cardiovascular: Negative for chest pain and leg swelling  Gastrointestinal: Positive for abdominal pain and nausea  Negative for constipation, diarrhea and vomiting  Genitourinary: Negative for dysuria and flank pain  Musculoskeletal: Negative for arthralgias and myalgias  Skin: Negative for rash and wound  Neurological: Positive for headaches  Negative for dizziness, weakness and numbness  Psychiatric/Behavioral: Negative for behavioral problems  Physical Exam  Physical Exam  Vitals and nursing note reviewed  Constitutional:       General: She is not in acute distress  Appearance: She is well-developed  She is not ill-appearing or toxic-appearing  HENT:      Head: Normocephalic and atraumatic  Mouth/Throat:      Mouth: Mucous membranes are moist    Eyes:      Extraocular Movements: Extraocular movements intact  Conjunctiva/sclera: Conjunctivae normal       Pupils: Pupils are equal, round, and reactive to light  Cardiovascular:      Rate and Rhythm: Normal rate and regular rhythm  Pulses: Normal pulses  Heart sounds: Normal heart sounds  No murmur heard  Pulmonary:      Effort: Pulmonary effort is normal  No respiratory distress  Breath sounds: Normal breath sounds  Abdominal:      Palpations: Abdomen is soft  Tenderness: There is no abdominal tenderness  There is no right CVA tenderness, left CVA tenderness, guarding or rebound  Musculoskeletal:         General: No swelling  Cervical back: Neck supple  Skin:     General: Skin is warm and dry  Capillary Refill: Capillary refill takes less than 2 seconds  Neurological:      Mental Status: She is alert and oriented to person, place, and time        GCS: GCS eye subscore is 4  GCS verbal subscore is 5  GCS motor subscore is 6  Cranial Nerves: No facial asymmetry  Sensory: Sensation is intact  Motor: Motor function is intact  No weakness  Gait: Gait is intact   Gait normal    Psychiatric:         Mood and Affect: Mood normal          Vital Signs  ED Triage Vitals [06/18/23 2328]   Temperature Pulse Respirations Blood Pressure SpO2   98 2 °F (36 8 °C) 91 18 139/74 99 %      Temp Source Heart Rate Source Patient Position - Orthostatic VS BP Location FiO2 (%)   Oral Monitor Sitting Right arm --      Pain Score       --           Vitals:    06/18/23 2328   BP: 139/74   Pulse: 91   Patient Position - Orthostatic VS: Sitting         Visual Acuity      ED Medications  Medications   acetaminophen (TYLENOL) tablet 650 mg (650 mg Oral Given 6/19/23 0013)   ketorolac (TORADOL) injection 15 mg (15 mg Intramuscular Given 6/19/23 0013)   ondansetron (ZOFRAN-ODT) dispersible tablet 4 mg (4 mg Oral Given 6/19/23 0012)       Diagnostic Studies  Results Reviewed     Procedure Component Value Units Date/Time    Urine Microscopic [[id number]]  (Abnormal) Collected: 06/19/23 0037    Lab Status: Final result Specimen: Urine, Clean Catch Updated: 06/19/23 0050     RBC, UA 4-10 /hpf      WBC, UA 1-2 /hpf      Epithelial Cells Occasional /hpf      Bacteria, UA None Seen /hpf      MUCUS THREADS Innumerable    Comprehensive metabolic panel [[id number]]  (Abnormal) Collected: 06/19/23 0016    Lab Status: Final result Specimen: Blood from Arm, Right Updated: 06/19/23 0048     Sodium 142 mmol/L      Potassium 4 0 mmol/L      Chloride 112 mmol/L      CO2 22 mmol/L      ANION GAP 8 mmol/L      BUN 15 mg/dL      Creatinine 0 83 mg/dL      Glucose 98 mg/dL      Calcium 8 8 mg/dL      AST 15 U/L      ALT 20 U/L      Alkaline Phosphatase 45 U/L      Total Protein 6 7 g/dL      Albumin 4 2 g/dL      Total Bilirubin 0 32 mg/dL      eGFR 90 ml/min/1 73sq m     Narrative:      National Kidney Disease Foundation guidelines for Chronic Kidney Disease (CKD):   •  Stage 1 with normal or high GFR (GFR > 90 mL/min/1 73 square meters)  •  Stage 2 Mild CKD (GFR = 60-89 mL/min/1 73 square meters)  •  Stage 3A Moderate CKD (GFR = 45-59 mL/min/1 73 square meters)  •  Stage 3B Moderate CKD (GFR = 30-44 mL/min/1 73 square meters)  •  Stage 4 Severe CKD (GFR = 15-29 mL/min/1 73 square meters)  •  Stage 5 End Stage CKD (GFR <15 mL/min/1 73 square meters)  Note: GFR calculation is accurate only with a steady state creatinine    Lipase [[id number]]  (Normal) Collected: 06/19/23 0016    Lab Status: Final result Specimen: Blood from Arm, Right Updated: 06/19/23 0048     Lipase 19 u/L     Urine Macroscopic, POC [[id number]]  (Abnormal) Collected: 06/19/23 0037    Lab Status: Final result Specimen: Urine Updated: 06/19/23 0038     Color, UA Yellow     Clarity, UA Clear     pH, UA 5 5     Leukocytes, UA Negative     Nitrite, UA Negative     Protein, UA Trace mg/dl      Glucose, UA Negative mg/dl      Ketones, UA Negative mg/dl      Urobilinogen, UA 1 0 E U /dl      Bilirubin, UA Negative     Occult Blood, UA Trace     Specific Gravity, UA >=1 030    Narrative:      CLINITEK RESULT    CBC and differential [[id number]] Collected: 06/19/23 0016    Lab Status: Final result Specimen: Blood from Arm, Right Updated: 06/19/23 0024     WBC 9 55 Thousand/uL      RBC 4 19 Million/uL      Hemoglobin 12 6 g/dL      Hematocrit 37 2 %      MCV 89 fL      MCH 30 1 pg      MCHC 33 9 g/dL      RDW 12 5 %      MPV 11 9 fL      Platelets 129 Thousands/uL      nRBC 0 /100 WBCs      Neutrophils Relative 53 %      Immat GRANS % 1 %      Lymphocytes Relative 35 %      Monocytes Relative 7 %      Eosinophils Relative 3 %      Basophils Relative 1 %      Neutrophils Absolute 5 14 Thousands/µL      Immature Grans Absolute 0 05 Thousand/uL      Lymphocytes Absolute 3 33 Thousands/µL      Monocytes Absolute 0 70 Thousand/µL      Eosinophils Absolute 0 26 Thousand/µL      Basophils Absolute 0 07 Thousands/µL                  No orders to display              Procedures  Procedures         ED Course         Medical Decision Making  The patient is a 41 YOF with hx of chronic LLQ pain presenting for migraine, LLQ pain  Will obtain CBC, CMP, UA, Lipase, and treat symptomatically      UA without evidence of UTI  Hematuria present  Labs unremarkable  Patient reports some improvement in pain  Will dc        At the time of discharge, the patient is in no acute distress  Findings discussed with patient  I discussed with the patient the diagnosis, treatment plan, follow-up, return precautions, and discharge instructions; they were given the opportunity to ask questions and verbalized understanding  Chronic pelvic pain in female: acute illness or injury  Migraine: acute illness or injury  Amount and/or Complexity of Data Reviewed  External Data Reviewed: labs, radiology and notes  Labs: ordered  Decision-making details documented in ED Course  Risk  OTC drugs  Prescription drug management  Disposition  Final diagnoses:   Chronic pelvic pain in female   Migraine     Time reflects when diagnosis was documented in both MDM as applicable and the Disposition within this note     Time User Action Codes Description Comment    6/19/2023  1:29 AM Reva Conteh [R10 2,  G89 29] Chronic pelvic pain in female     6/19/2023  1:29 AM Reva Conteh [D54 [id number]] Migraine       ED Disposition     ED Disposition   Discharge    Condition   Stable    Date/Time   Mon Jun 19, 2023  1:29 AM    Comment   Lorrie Jones discharge to home/self care                 Follow-up Information     Follow up With Specialties Details Why [facility], [address] [place] [address]  [address]  [phone]            Patient's Medications   Discharge Prescriptions    No medications on file       No discharge procedures on file     PDMP Review     None          ED Provider  Electronically Signed by           Rubina Clemente PA-C  06/19/23 9710

## 2023-06-19 NOTE — DISCHARGE INSTRUCTIONS
Follow up with your PCP and OB/GYN regarding your pain     Return for chest pain, trouble breathing, worsening pain, neck stiffness, vision changes, or any other new/concerning symptoms

## 2023-06-27 NOTE — PROGRESS NOTES
"Assessment/Plan:     No problem-specific Assessment & Plan notes found for this encounter  Diagnoses and all orders for this visit:    Encounter for gynecological examination (general) (routine) without abnormal findings    LLQ pain    Possible exposure to STD  -     Chlamydia/GC amplified DNA by PCR  -     HIV 1/2 AG/AB W REFLEX [facility] and [facility] only; Future  -     RPR-Syphilis Screening (Total Syphilis IGG/IGM); Future  -     Hepatitis C antibody; Future      Follow up with [facility] as scheduled  Subjective:      Patient ID: Lieutenant Bhat is a 41 y o  female who presents for annual exam    Her last pap was 06/21/19 and was negative with negative HPV  She is s/p hysterectomy  She has been having chronic LLQ pain  She had a consultation with Dr Jose Montilla but is still complaining of the same pain  She desires STD testing  HPI    The following portions of the patient's history were reviewed and updated as appropriate: allergies, current medications, past family history, past medical history, past social history, past surgical history and problem list     Review of Systems      Objective:      /72   Pulse 96   Ht 5' 2\" (1 575 m)   Wt 71 4 kg (157 lb 6 4 oz)   BMI 28 79 kg/m²          Physical Exam  Vitals and nursing note reviewed  Exam conducted with a chaperone present  Constitutional:       General: She is not in acute distress  Appearance: She is well-developed  HENT:      Head: Normocephalic  Neck:      Thyroid: No thyromegaly  Cardiovascular:      Rate and Rhythm: Normal rate and regular rhythm  Heart sounds: Normal heart sounds  No murmur heard  Pulmonary:      Effort: Pulmonary effort is normal  No respiratory distress  Breath sounds: Normal breath sounds  No wheezing or rales  Chest:      Chest wall: No tenderness  Breasts:     Right: No swelling, bleeding, inverted nipple, mass, nipple discharge, skin change or tenderness        Left: No " swelling, bleeding, inverted nipple, mass, nipple discharge, skin change or tenderness  Abdominal:      General: Bowel sounds are normal  There is no distension  Palpations: Abdomen is soft  There is no mass  Tenderness: There is no abdominal tenderness  There is no guarding or rebound  Genitourinary:     Labia:         Right: No rash, tenderness, lesion or injury  Left: No rash, tenderness, lesion or injury  Vagina: No signs of injury and foreign body  Tenderness present  No vaginal discharge, erythema, bleeding, lesions or prolapsed vaginal walls  Uterus: Absent  Adnexa:         Right: No mass, tenderness or fullness  Left: Tenderness present  No mass or fullness  Rectum: No external hemorrhoid  Skin:     General: Skin is warm and dry  Neurological:      Mental Status: She is alert and oriented to person, place, and time  Psychiatric:         Behavior: Behavior normal          Thought Content:  Thought content normal          Judgment: Judgment normal

## 2023-06-28 ENCOUNTER — APPOINTMENT (OUTPATIENT)
Dept: LAB | Facility: CLINIC | Age: 38
End: 2023-06-28
Payer: COMMERCIAL

## 2023-06-28 ENCOUNTER — ANNUAL EXAM (OUTPATIENT)
Dept: OBGYN CLINIC | Facility: CLINIC | Age: 38
End: 2023-06-28

## 2023-06-28 VITALS
HEART RATE: 96 BPM | WEIGHT: 157.4 LBS | BODY MASS INDEX: 28.97 KG/M2 | SYSTOLIC BLOOD PRESSURE: 105 MMHG | HEIGHT: 62 IN | DIASTOLIC BLOOD PRESSURE: 72 MMHG

## 2023-06-28 DIAGNOSIS — Z01.419 ENCOUNTER FOR GYNECOLOGICAL EXAMINATION (GENERAL) (ROUTINE) WITHOUT ABNORMAL FINDINGS: Primary | ICD-10-CM

## 2023-06-28 DIAGNOSIS — Z20.2 POSSIBLE EXPOSURE TO STD: ICD-10-CM

## 2023-06-28 DIAGNOSIS — R10.32 LLQ PAIN: ICD-10-CM

## 2023-06-28 PROCEDURE — 99395 PREV VISIT EST AGE 18-39: CPT | Performed by: OBSTETRICS & GYNECOLOGY

## 2023-06-28 PROCEDURE — 87389 HIV-1 AG W/HIV-1&-2 AB AG IA: CPT

## 2023-06-28 PROCEDURE — 87491 CHLMYD TRACH DNA AMP PROBE: CPT | Performed by: OBSTETRICS & GYNECOLOGY

## 2023-06-28 PROCEDURE — 86803 HEPATITIS C AB TEST: CPT

## 2023-06-28 PROCEDURE — 87591 N.GONORRHOEAE DNA AMP PROB: CPT | Performed by: OBSTETRICS & GYNECOLOGY

## 2023-06-28 PROCEDURE — 86780 TREPONEMA PALLIDUM: CPT

## 2023-06-28 PROCEDURE — 36415 COLL VENOUS BLD VENIPUNCTURE: CPT

## 2023-06-29 LAB
C TRACH DNA SPEC QL NAA+PROBE: NEGATIVE
HCV AB SER QL: NORMAL
HIV 1+2 AB+HIV1 P24 AG SERPL QL IA: NORMAL
HIV 2 AB SERPL QL IA: NORMAL
HIV1 AB SERPL QL IA: NORMAL
HIV1 P24 AG SERPL QL IA: NORMAL
N GONORRHOEA DNA SPEC QL NAA+PROBE: NEGATIVE
TREPONEMA PALLIDUM IGG+IGM AB [PRESENCE] IN SERUM OR PLASMA BY IMMUNOASSAY: NORMAL

## 2023-07-05 ENCOUNTER — TELEPHONE (OUTPATIENT)
Dept: OBGYN CLINIC | Facility: CLINIC | Age: 38
End: 2023-07-05

## 2023-07-06 ENCOUNTER — APPOINTMENT (EMERGENCY)
Dept: ULTRASOUND IMAGING | Facility: HOSPITAL | Age: 38
End: 2023-07-06
Payer: COMMERCIAL

## 2023-07-06 ENCOUNTER — HOSPITAL ENCOUNTER (EMERGENCY)
Facility: HOSPITAL | Age: 38
Discharge: HOME | End: 2023-07-07
Attending: EMERGENCY MEDICINE
Payer: COMMERCIAL

## 2023-07-06 DIAGNOSIS — N70.11 HYDROSALPINX: Primary | ICD-10-CM

## 2023-07-06 LAB
ALBUMIN SERPL BCP-MCNC: 4.3 G/DL (ref 3.5–5)
ALP SERPL-CCNC: 51 U/L (ref 34–104)
ALT SERPL W P-5'-P-CCNC: 18 U/L (ref 7–52)
ANION GAP SERPL CALCULATED.3IONS-SCNC: 9 MMOL/L
AST SERPL W P-5'-P-CCNC: 12 U/L (ref 13–39)
ATRIAL RATE: 106 BPM
BACTERIA UR QL AUTO: ABNORMAL /HPF
BASOPHILS # BLD AUTO: 0.05 THOUSANDS/ÂΜL (ref 0–0.1)
BASOPHILS NFR BLD AUTO: 1 % (ref 0–1)
BILIRUB SERPL-MCNC: 0.36 MG/DL (ref 0.2–1)
BILIRUB UR QL STRIP: NEGATIVE
BUN SERPL-MCNC: 14 MG/DL (ref 5–25)
CALCIUM SERPL-MCNC: 9.3 MG/DL (ref 8.4–10.2)
CARDIAC TROPONIN I PNL SERPL HS: <2 NG/L
CHLORIDE SERPL-SCNC: 110 MMOL/L (ref 96–108)
CLARITY UR: ABNORMAL
CO2 SERPL-SCNC: 22 MMOL/L (ref 21–32)
COLOR UR: ABNORMAL
CREAT SERPL-MCNC: 1.08 MG/DL (ref 0.6–1.3)
D DIMER PPP FEU-MCNC: <0.27 UG/ML FEU
EOSINOPHIL # BLD AUTO: 0.15 THOUSAND/ÂΜL (ref 0–0.61)
EOSINOPHIL NFR BLD AUTO: 2 % (ref 0–6)
ERYTHROCYTE [DISTWIDTH] IN BLOOD BY AUTOMATED COUNT: 12.4 % (ref 11.6–15.1)
GFR SERPL CREATININE-BSD FRML MDRD: 65 ML/MIN/1.73SQ M
GLUCOSE SERPL-MCNC: 97 MG/DL (ref 65–140)
GLUCOSE UR STRIP-MCNC: NEGATIVE MG/DL
HCT VFR BLD AUTO: 37.2 % (ref 34.8–46.1)
HGB BLD-MCNC: 12.6 G/DL (ref 11.5–15.4)
HGB UR QL STRIP.AUTO: 10
IMM GRANULOCYTES # BLD AUTO: 0.03 THOUSAND/UL (ref 0–0.2)
IMM GRANULOCYTES NFR BLD AUTO: 0 % (ref 0–2)
KETONES UR STRIP-MCNC: NEGATIVE MG/DL
LEUKOCYTE ESTERASE UR QL STRIP: NEGATIVE
LYMPHOCYTES # BLD AUTO: 2.9 THOUSANDS/ÂΜL (ref 0.6–4.47)
LYMPHOCYTES NFR BLD AUTO: 30 % (ref 14–44)
MCH RBC QN AUTO: 30.4 PG (ref 26.8–34.3)
MCHC RBC AUTO-ENTMCNC: 33.9 G/DL (ref 31.4–37.4)
MCV RBC AUTO: 90 FL (ref 82–98)
MONOCYTES # BLD AUTO: 0.72 THOUSAND/ÂΜL (ref 0.17–1.22)
MONOCYTES NFR BLD AUTO: 8 % (ref 4–12)
MUCOUS THREADS UR QL AUTO: ABNORMAL
NEUTROPHILS # BLD AUTO: 5.74 THOUSANDS/ÂΜL (ref 1.85–7.62)
NEUTS SEG NFR BLD AUTO: 59 % (ref 43–75)
NITRITE UR QL STRIP: NEGATIVE
NON-SQ EPI CELLS URNS QL MICRO: ABNORMAL /HPF
NRBC BLD AUTO-RTO: 0 /100 WBCS
P AXIS: 57 DEGREES
PH UR STRIP.AUTO: 6 [PH]
PLATELET # BLD AUTO: 271 THOUSANDS/UL (ref 149–390)
PMV BLD AUTO: 10.9 FL (ref 8.9–12.7)
POTASSIUM SERPL-SCNC: 3.4 MMOL/L (ref 3.5–5.3)
PR INTERVAL: 156 MS
PROT SERPL-MCNC: 7.2 G/DL (ref 6.4–8.4)
PROT UR STRIP-MCNC: ABNORMAL MG/DL
QRS AXIS: 44 DEGREES
QRSD INTERVAL: 80 MS
QT INTERVAL: 332 MS
QTC INTERVAL: 441 MS
RBC # BLD AUTO: 4.15 MILLION/UL (ref 3.81–5.12)
RBC #/AREA URNS AUTO: ABNORMAL /HPF
SODIUM SERPL-SCNC: 141 MMOL/L (ref 135–147)
SP GR UR STRIP.AUTO: 1.02 (ref 1–1.04)
T WAVE AXIS: 33 DEGREES
UROBILINOGEN UA: 4 MG/DL
VENTRICULAR RATE: 106 BPM
WBC # BLD AUTO: 9.59 THOUSAND/UL (ref 4.31–10.16)
WBC #/AREA URNS AUTO: ABNORMAL /HPF

## 2023-07-06 PROCEDURE — 93005 ELECTROCARDIOGRAM TRACING: CPT

## 2023-07-06 PROCEDURE — 80053 COMPREHEN METABOLIC PANEL: CPT | Performed by: PHYSICIAN ASSISTANT

## 2023-07-06 PROCEDURE — 85379 FIBRIN DEGRADATION QUANT: CPT | Performed by: PHYSICIAN ASSISTANT

## 2023-07-06 PROCEDURE — 93010 ELECTROCARDIOGRAM REPORT: CPT | Performed by: INTERNAL MEDICINE

## 2023-07-06 PROCEDURE — 76856 US EXAM PELVIC COMPLETE: CPT

## 2023-07-06 PROCEDURE — 81001 URINALYSIS AUTO W/SCOPE: CPT | Performed by: PHYSICIAN ASSISTANT

## 2023-07-06 PROCEDURE — 36415 COLL VENOUS BLD VENIPUNCTURE: CPT | Performed by: PHYSICIAN ASSISTANT

## 2023-07-06 PROCEDURE — 85025 COMPLETE CBC W/AUTO DIFF WBC: CPT | Performed by: PHYSICIAN ASSISTANT

## 2023-07-06 PROCEDURE — 76830 TRANSVAGINAL US NON-OB: CPT

## 2023-07-06 PROCEDURE — 84484 ASSAY OF TROPONIN QUANT: CPT | Performed by: PHYSICIAN ASSISTANT

## 2023-07-06 RX ORDER — KETOROLAC TROMETHAMINE 10 MG/1
10 TABLET, FILM COATED ORAL EVERY 6 HOURS PRN
Qty: 30 TABLET | Refills: 0 | Status: SHIPPED | OUTPATIENT
Start: 2023-07-06

## 2023-07-06 RX ORDER — ONDANSETRON 4 MG/1
4 TABLET, FILM COATED ORAL EVERY 6 HOURS
Qty: 12 TABLET | Refills: 0 | Status: SHIPPED | OUTPATIENT
Start: 2023-07-06

## 2023-07-06 RX ORDER — KETOROLAC TROMETHAMINE 30 MG/ML
15 INJECTION, SOLUTION INTRAMUSCULAR; INTRAVENOUS ONCE
Status: COMPLETED | OUTPATIENT
Start: 2023-07-06 | End: 2023-07-06

## 2023-07-06 RX ADMIN — KETOROLAC TROMETHAMINE 15 MG: 30 INJECTION, SOLUTION INTRAMUSCULAR; INTRAVENOUS at 22:00

## 2023-07-06 RX ADMIN — SODIUM CHLORIDE 1000 ML: 0.9 INJECTION, SOLUTION INTRAVENOUS at 21:55

## 2023-07-07 ENCOUNTER — OFFICE VISIT (OUTPATIENT)
Dept: FAMILY MEDICINE CLINIC | Facility: CLINIC | Age: 38
End: 2023-07-07

## 2023-07-07 VITALS
RESPIRATION RATE: 18 BRPM | WEIGHT: 155 LBS | HEART RATE: 81 BPM | TEMPERATURE: 98.1 F | BODY MASS INDEX: 28.35 KG/M2 | DIASTOLIC BLOOD PRESSURE: 76 MMHG | OXYGEN SATURATION: 98 % | SYSTOLIC BLOOD PRESSURE: 123 MMHG

## 2023-07-07 VITALS
TEMPERATURE: 97 F | OXYGEN SATURATION: 98 % | RESPIRATION RATE: 16 BRPM | HEIGHT: 62 IN | SYSTOLIC BLOOD PRESSURE: 110 MMHG | WEIGHT: 156 LBS | HEART RATE: 106 BPM | BODY MASS INDEX: 28.71 KG/M2 | DIASTOLIC BLOOD PRESSURE: 70 MMHG

## 2023-07-07 DIAGNOSIS — R10.32 CONTINUOUS LLQ ABDOMINAL PAIN: Chronic | ICD-10-CM

## 2023-07-07 DIAGNOSIS — Z00.00 ANNUAL PHYSICAL EXAM: Primary | ICD-10-CM

## 2023-07-07 DIAGNOSIS — G43.109 MIGRAINE WITH AURA AND WITHOUT STATUS MIGRAINOSUS, NOT INTRACTABLE: Chronic | ICD-10-CM

## 2023-07-07 DIAGNOSIS — M25.562 CHRONIC PAIN OF LEFT KNEE: Chronic | ICD-10-CM

## 2023-07-07 DIAGNOSIS — K59.00 CONSTIPATION, UNSPECIFIED CONSTIPATION TYPE: Chronic | ICD-10-CM

## 2023-07-07 DIAGNOSIS — N70.11 HYDROSALPINX: ICD-10-CM

## 2023-07-07 DIAGNOSIS — G89.29 CHRONIC BILATERAL LOW BACK PAIN WITH LEFT-SIDED SCIATICA: Chronic | ICD-10-CM

## 2023-07-07 DIAGNOSIS — G89.29 CHRONIC PAIN OF LEFT KNEE: Chronic | ICD-10-CM

## 2023-07-07 DIAGNOSIS — M54.42 CHRONIC BILATERAL LOW BACK PAIN WITH LEFT-SIDED SCIATICA: Chronic | ICD-10-CM

## 2023-07-07 PROCEDURE — 99214 OFFICE O/P EST MOD 30 MIN: CPT | Performed by: PHYSICIAN ASSISTANT

## 2023-07-07 PROCEDURE — 99395 PREV VISIT EST AGE 18-39: CPT | Performed by: PHYSICIAN ASSISTANT

## 2023-07-07 NOTE — PROGRESS NOTES
[address] [facility]    NAME: Federico Up  AGE: 41 y.o. SEX: female  : 1985     DATE: 2023     Assessment and Plan:     Problem List Items Addressed This Visit        Cardiovascular and Mediastinum    Migraine headache with aura (Chronic)     - Reviewed CT head without contrast (23) which showed no acute intracranial abnormality.   -Patient was scheduled to establish with neurology, however she had to reschedule due to helping her father-in-law. Patient is now scheduled in September.  - Continue Imitrex 100 mg, as needed at onset of migraine.   -Per patient, she has tried Zofran and Reglan in the past for the nausea but they were both ineffective. Continue Compazine 10 mg as needed for nausea/vomiting.  - If migraines become more frequent, will consider restarting elavil for migraine prevention. Nervous and Auditory    Chronic bilateral low back pain with left-sided sciatica (Chronic)     - Reviewed recent lumbar spine x-ray which shows mild scoliosis, otherwise unremarkable study.  - Advised to apply ice/heating pad/topical therapies as needed to affected area. - Continue tizanidine 4 mg, to be taken twice daily as needed. - Continue alternating ibuprofen/Tylenol as needed for pain. - If symptoms persist, would recommend referral to physical therapy. Genitourinary    Hydrosalpinx     - Per patient, she is scheduled to discuss possible surgery at her next visit with OBGYN. Other    Chronic pain of left knee (Chronic)     - Reviewed MRI left knee without contrast (3/1/23) which showed: No meniscal tear or other internal derangement. -Reviewed recent left knee x-ray which was unremarkable. -Patient has established with orthopedics and underwent CSI on 3/23/2023 with no relief.   Patient then completed physical therapy for about 3 sessions.  - Advised to take Tylenol or ibuprofen as needed for pain. - Advised to apply ice/ heating pad as needed to the affected area. -Continue follow-up with orthopedics as scheduled. Constipation (Chronic)     - Recently established with gastroenterology and has been prescribed both MiraLAX and senna. However, patient reports they are ineffective. Patient reports she has not tried multiple medications including miralax, senna, colace, metamucil nothing is helpful. - Will prescribe linzess 72 mcg daily.   -Patient is scheduled for colonoscopy/EGD on 7/27/23.   - Continue follow-up with gastroenterology as scheduled. Relevant Medications    linaCLOtide 72 MCG CAPS    Continuous LLQ abdominal pain (Chronic)     - Patient underwent CT abdomen pelvis with contrast (6/10/23). Results show no evidence of acute abdominopelvic process. Chronic left hydrosalpinx. - Pelvic US (7/6/23) revealed: Fluid-filled tubular structure in the left adnexa similar to prior study likely representing hydrosalpinx. Status post hysterectomy. Right ovary was not visualized. - Patient is scheduled for EGD and colonoscopy for further evaluation on 7/27/23.   - OBGYN has prescribed amitriptyline and Depo injections for the pain, however, both have not been helpful. Shermon Lukes had been prescribed but prior authorization was denied.   -Continue follow-up with OB/GYN and gastroenterology as scheduled. Other Visit Diagnoses     Annual physical exam    -  Primary          Immunizations and preventive care screenings were discussed with patient today. Appropriate education was printed on patient's after visit summary. Counseling:  Alcohol/drug use: discussed moderation in alcohol intake, the recommendations for healthy alcohol use, and avoidance of illicit drug use. Dental Health: discussed importance of regular tooth brushing, flossing, and dental visits.   Injury prevention: discussed safety/seat belts, safety helmets, smoke detectors, carbon dioxide detectors, and smoking near bedding or upholstery. Sexual health: discussed sexually transmitted diseases, partner selection, use of condoms, avoidance of unintended pregnancy, and contraceptive alternatives. Exercise: the importance of regular exercise/physical activity was discussed. Recommend exercise 3-5 times per week for at least 30 minutes. Return in about 4 weeks (around 8/4/2023) for Next scheduled follow up abd pain/constipation. Chief Complaint:     Chief Complaint   Patient presents with   • Physical Exam      History of Present Illness:     Adult Annual Physical   Patient here for a comprehensive physical exam.  Patient notes she continues with continuous left lower quadrant abdominal pain. Patient notes it often feels like " burning sensation" inside. Patient notes she has tried many different medications but nothing has been helpful. Also, patient notes she continues with chronic constipation. Patient notes she has tried many different medications but nothing is helped. Diet and Physical Activity  Diet/Nutrition: well balanced diet. Exercise: walking. General Health  Sleep: currently not sleeping well because air conditioner broke. Hearing: normal - bilateral.  Vision: goes for regular eye exams and wears glasses. Dental: regular dental visits. /GYN Health  Last menstrual period: s/p hysterectomy  Contraceptive method: s/p hysterectomy. Review of Systems:     Review of Systems   Constitutional: Negative for chills and fever. HENT: Negative for congestion, ear pain and sore throat. Eyes: Negative for pain. Respiratory: Negative for cough, chest tightness and shortness of breath. Cardiovascular: Negative for chest pain and palpitations. Gastrointestinal: Positive for abdominal pain and constipation. Negative for diarrhea, nausea and vomiting. Genitourinary: Negative for difficulty urinating and dysuria.    Musculoskeletal: Positive for arthralgias, back pain and gait problem. Skin: Negative for rash. Neurological: Positive for headaches. Negative for dizziness and numbness.       Past Medical History:     Past Medical History:   Diagnosis Date   • Abnormal Pap smear of cervix 2015    cryosurgery   • Hypoglycemia    • Migraine headache with aura     managed with Elavil      Past Surgical History:     Past Surgical History:   Procedure Laterality Date   • CHOLECYSTECTOMY LAPAROSCOPIC N/A 10/27/2020    Procedure: CHOLECYSTECTOMY LAPAROSCOPIC;  Surgeon: Ana Carlisle MD;  Location: [address] [place] [address] OR;  Service: General   • GYNECOLOGIC CRYOSURGERY  2015   • HYSTERECTOMY  2016   • LAPAROSCOPY  2018   • [place] LAPAROSCOPY W/LYSIS OF ADHESIONS N/A 3/3/2020    Procedure: L.O.A.;  Surgeon: Malika Kearns MD;  Location: [facility] OR;  Service: Gynecology   • [place] LAPS ABD PRTM&OMENTUM DX W/WO SPEC BR/WA 44 [facility] N/A 8/9/2019    Procedure: LAPAROSCOPY DIAGNOSTIC; LYSIS OF ADHESIONS;  Surgeon: Finn Ovalles MD;  Location: [facility];  Service: Gynecology   • TUBAL LIGATION        Social History:     Social History     Socioeconomic History   • Marital status: Single     Spouse name: None   • Number of children: None   • Years of education: None   • Highest education level: None   Occupational History   • None   Tobacco Use   • Smoking status: Every Day     Packs/day: 0.50     Years: 15.00     Total pack years: 7.50     Types: Cigarettes   • Smokeless tobacco: Never   Vaping Use   • Vaping Use: Never used   Substance and Sexual Activity   • Alcohol use: Not Currently   • Drug use: Never   • Sexual activity: Yes     Partners: Male     Birth control/protection: Female Sterilization   Other Topics Concern   • None   Social History Narrative   • None     Social Determinants of Health     Financial Resource Strain: Low Risk  (4/24/2023)    Overall Financial Resource Strain (CARDIA)    • Difficulty of Paying Living Expenses: Not hard at all   Food Insecurity: No Food Insecurity (4/24/2023)    Hunger Vital Sign    • Worried About Running Out of Food in the Last Year: Never true    • Ran Out of Food in the Last Year: Never true   Transportation Needs: No Transportation Needs (4/24/2023)    PRAPARE - Transportation    • Lack of Transportation (Medical): No    • Lack of Transportation (Non-Medical): No   Physical Activity: Insufficiently Active (6/23/2021)    Exercise Vital Sign    • Days of Exercise per Week: 5 days    • Minutes of Exercise per Session: 20 min   Stress: No Stress Concern Present (6/23/2021)    [facility]    • Feeling of Stress : Only a little   Social Connections:  Moderately Isolated (6/23/2021)    Social Connection and Isolation Panel [NHANES]    • Frequency of Communication with Friends and Family: More than three times a week    • Frequency of Social Gatherings with Friends and Family: Twice a week    • Attends Latter day Services: Never    • Active Member of Clubs or Organizations: No    • Attends Club or Organization Meetings: Never    • Marital Status: Living with partner   Intimate Partner Violence: Not on file   Housing Stability: [address]  (6/23/2021)    Housing Stability Vital Sign    • Unable to Pay for Housing in the Last Year: No    • Number of [address] in the Last Year: 2    • Unstable Housing in the Last Year: No       Social Determinants of Health     Tobacco Use: High Risk (7/7/2023)    Patient History    • Smoking Tobacco Use: Every Day    • Smokeless Tobacco Use: Never    • Passive Exposure: Not on file   Alcohol Use: Not At Risk (6/23/2021)    AUDIT-C    • Frequency of Alcohol Consumption: Never    • Average Number of Drinks: Patient refused    • Frequency of Binge Drinking: Never   Financial Resource Strain: Low Risk  (4/24/2023)    Overall Financial Resource Strain (CARDIA)    • Difficulty of Paying Living Expenses: Not hard at all   Food Insecurity: No Food Insecurity (4/24/2023)    Hunger Vital Sign    • Worried About Running Out of Food in the Last Year: Never true    • Ran Out of Food in the Last Year: Never true   Transportation Needs: No Transportation Needs (4/24/2023)    PRAPARE - Transportation    • Lack of Transportation (Medical): No    • Lack of Transportation (Non-Medical): No   Physical Activity: Insufficiently Active (6/23/2021)    Exercise Vital Sign    • Days of Exercise per Week: 5 days    • Minutes of Exercise per Session: 20 min   Stress: No Stress Concern Present (6/23/2021)    [address] [facility]    • Feeling of Stress : Only a little   Social Connections:  Moderately Isolated (6/23/2021)    Social Connection and Isolation Panel [NHANES]    • Frequency of Communication with Friends and Family: More than three times a week    • Frequency of Social Gatherings with Friends and Family: Twice a week    • Attends Scientologist Services: Never    • Active Member of Clubs or Organizations: No    • Attends Club or Organization Meetings: Never    • Marital Status: Living with partner   Intimate Partner Violence: Not on file   Depression: Not at risk (4/24/2023)    PHQ-2    • PHQ-2 Score: 0   Housing Stability: Low Risk  (6/23/2021)    Housing Stability Vital Sign    • Unable to Pay for Housing in the Last Year: No    • Number of Places Lived in the Last Year: 2    • Unstable Housing in the Last Year: No        Family History:     Family History   Problem Relation Age of Onset   • Cancer Paternal Grandmother         uterine   • Cancer Paternal Aunt         uterine    • Breast cancer Neg Hx    • Colon cancer Neg Hx    • Ovarian cancer Neg Hx       Current Medications:     Current Outpatient Medications   Medication Sig Dispense Refill   • linaCLOtide 72 MCG CAPS Take 72 mcg by mouth daily at least 30 minutes prior to the first meal of the day 30 capsule 1   • aluminum-magnesium hydroxide 200-200 MG/5ML suspension Take 10 mL by mouth every 6 (six) hours as needed for heartburn (Patient not taking: Reported on 5/17/2023) 355 mL 0   • amitriptyline (ELAVIL) 10 mg tablet Take 1 tablet (10 mg total) by mouth daily at bedtime (Patient not taking: Reported on 5/17/2023) 30 tablet 3   • aspirin-acetaminophen-caffeine (EXCEDRIN MIGRAINE) 250-250-65 MG per tablet Take 1 tablet by mouth in the morning (Patient not taking: Reported on 5/17/2023) 30 tablet 0   • bisacodyl (DULCOLAX) 5 mg EC tablet Take 4 tablets (20 mg total) by mouth once for 1 dose Colonoscopy prep 4 tablet 0   • dicyclomine (BENTYL) 20 mg tablet Take 1 tablet (20 mg total) by mouth every 6 (six) hours 120 tablet 0   • Elagolix Sodium (Orilissa) 150 MG TABS Take 150 mg by mouth in the morning (Patient not taking: Reported on 6/28/2023) 30 tablet 0   • ergocalciferol (VITAMIN D2) 50,000 units Take 1 capsule (50,000 Units total) by mouth once a week (Patient not taking: Reported on 6/28/2023) 16 capsule 0   • ketorolac (TORADOL) 10 mg tablet Take 1 tablet (10 mg total) by mouth every 6 (six) hours as needed for moderate pain 30 tablet 0   • medroxyPROGESTERone (DEPO-PROVERA) 150 mg/mL injection Inject 1 mL (150 mg total) into a muscle every 3 (three) months 1 mL 0   • miconazole (MONISTAT) 200 mg vaginal suppository Insert 1 suppository (200 mg total) into the vagina daily at bedtime (Patient not taking: Reported on 6/28/2023) 3 suppository 0   • naproxen (Naprosyn) 500 mg tablet Take 1 tablet (500 mg total) by mouth 2 (two) times a day with meals 30 tablet 0   • neomycin-polymyxin-hydrocortisone (CORTISPORIN) 0.35%-10,000 units/mL-1% otic suspension Administer 4 drops into both ears 2 (two) times a day 10 mL 0   • nicotine (NICODERM CQ) 14 mg/24hr TD 24 hr patch Place 1 patch on the skin over 24 hours every 24 hours 28 patch 2   • ondansetron (ZOFRAN) 4 mg tablet Take 1 tablet (4 mg total) by mouth every 6 (six) hours 12 tablet 0   • pantoprazole (PROTONIX) 20 mg tablet Take 1 tablet (20 mg total) by mouth daily 90 tablet 1   • polyethylene glycol (GOLYTELY) 4000 mL solution Take 4,000 mL by mouth once for 1 dose Colonoscopy prep 4000 mL 0   • polyethylene glycol (MIRALAX) 17 g packet Take 17 g by mouth daily 510 g 5   • prochlorperazine (COMPAZINE) 10 mg tablet Take 1 tablet (10 mg total) by mouth every 6 (six) hours as needed for nausea or vomiting 30 tablet 1   • psyllium (METAMUCIL) 58.6 % powder Take 1 packet by mouth 3 (three) times a day (Patient not taking: Reported on 5/17/2023) 425 g 0   • senna-docusate sodium (SENOKOT-S) 8.6-50 mg per tablet Take 1 tablet by mouth daily 7 tablet 0   • simethicone (MYLICON,GAS-X) 288 MG CAPS Take 1 capsule (125 mg total) by mouth every 6 (six) hours as needed for flatulence (Patient not taking: Reported on 5/17/2023) 28 capsule 0   • SUMAtriptan (IMITREX) 100 mg tablet Take 1 tablet (100 mg total) by mouth once as needed for migraine for up to 1 dose may repeat in 2 hours if necessary. Max dose 200mg in 24 hour period. 10 tablet 2   • tiZANidine (ZANAFLEX) 4 mg tablet Take 1 tablet (4 mg total) by mouth 2 (two) times a day as needed for muscle spasms (Patient not taking: Reported on 5/17/2023) 60 tablet 1     No current facility-administered medications for this visit. Allergies: Allergies   Allergen Reactions   • Shellfish-Derived Products - Food Allergy Itching and Swelling     "seafood "   • Ibuprofen Other (See Comments)     Patient states she gets hematoma   • Pregabalin Anxiety      Physical Exam:     /70 (BP Location: Right arm, Patient Position: Sitting, Cuff Size: Standard)   Pulse (!) 106   Temp (!) 97 °F (36.1 °C) (Temporal)   Resp 16   Ht 5' 2" (1.575 m)   Wt 70.8 kg (156 lb)   SpO2 98%   BMI 28.53 kg/m²     Physical Exam  Vitals and nursing note reviewed. Constitutional:       General: She is not in acute distress. Appearance: She is well-developed. HENT:      Head: Normocephalic and atraumatic. Right Ear: External ear normal.      Left Ear: External ear normal.      Nose: Nose normal.   Eyes:      Conjunctiva/sclera: Conjunctivae normal.   Cardiovascular:      Rate and Rhythm: Normal rate and regular rhythm. Pulses: Normal pulses. Heart sounds: Normal heart sounds. Pulmonary:      Effort: Pulmonary effort is normal. No respiratory distress. Breath sounds: Normal breath sounds. No wheezing. Abdominal:      General: Bowel sounds are normal.      Palpations: Abdomen is soft. Tenderness: There is abdominal tenderness (LLQ). There is no guarding or rebound. Musculoskeletal:      Cervical back: Normal range of motion and neck supple. Lumbar back: Tenderness present. No swelling. Normal range of motion. Negative right straight leg raise test and negative left straight leg raise test.      Left knee: Normal range of motion. Tenderness present over the medial joint line. Skin:     General: Skin is warm and dry. Neurological:      Mental Status: She is alert and oriented to person, place, and time. Psychiatric:         Behavior: Behavior normal.          - Utilized [employer] services for translation.       Noemi Mora PA-C   [facility]

## 2023-07-07 NOTE — ASSESSMENT & PLAN NOTE
- Recently established with gastroenterology and has been prescribed both MiraLAX and senna. However, patient reports they are ineffective. Patient reports she has not tried multiple medications including miralax, senna, colace, metamucil nothing is helpful. - Will prescribe linzess 72 mcg daily.   -Patient is scheduled for colonoscopy/EGD on 7/27/23.   - Continue follow-up with gastroenterology as scheduled.

## 2023-07-07 NOTE — ASSESSMENT & PLAN NOTE
- Reviewed MRI left knee without contrast (3/1/23) which showed: No meniscal tear or other internal derangement. -Reviewed recent left knee x-ray which was unremarkable. -Patient has established with orthopedics and underwent CSI on 3/23/2023 with no relief. Patient then completed physical therapy for about 3 sessions.  - Advised to take Tylenol or ibuprofen as needed for pain. - Advised to apply ice/ heating pad as needed to the affected area. -Continue follow-up with orthopedics as scheduled.

## 2023-07-07 NOTE — ED NOTES
Patient seen by provider with results of testing and discharge instructions reviewed with patient.      Jose Cherry RN  07/07/23 0001

## 2023-07-07 NOTE — ASSESSMENT & PLAN NOTE
- Patient underwent CT abdomen pelvis with contrast (6/10/23). Results show no evidence of acute abdominopelvic process. Chronic left hydrosalpinx. - Pelvic US (7/6/23) revealed: Fluid-filled tubular structure in the left adnexa similar to prior study likely representing hydrosalpinx. Status post hysterectomy. Right ovary was not visualized. - Patient is scheduled for EGD and colonoscopy for further evaluation on 7/27/23.   - OBGYN has prescribed amitriptyline and Depo injections for the pain, however, both have not been helpful. Mary Goodell had been prescribed but prior authorization was denied.   -Continue follow-up with OB/GYN and gastroenterology as scheduled.

## 2023-07-07 NOTE — ASSESSMENT & PLAN NOTE
- Reviewed recent lumbar spine x-ray which shows mild scoliosis, otherwise unremarkable study.  - Advised to apply ice/heating pad/topical therapies as needed to affected area. - Continue tizanidine 4 mg, to be taken twice daily as needed. - Continue alternating ibuprofen/Tylenol as needed for pain. - If symptoms persist, would recommend referral to physical therapy.

## 2023-07-07 NOTE — ED PROVIDER NOTES
History  Chief Complaint   Patient presents with   • Medical Problem     Patient states she has ovary problems and she is having a lot of pain that is traveling to both sides. She is suppose to have surgery but did not have it yet. • Chest Pain     Patient states that she is having pressure on her chest when she breathes 1.5 hours pta      60-year-old female with past medical history of chronic hydrosalpinx presents with multiple complaints. Patient states that since her Depo-Provera shot last month she has noted intermittent chest pain which became worse today associated with painful breathing. Patient also states that she is having left-sided "ovarian pain" patient states that she gets this when her hydrosalpinx is really bad. Patient needs to follow-up with OB/GYN. Patient states that she needs to have surgery to have her fallopian tube removed but has not been able to do that yet. Denies any other complaints. History provided by:  Patient   used: No        Prior to Admission Medications   Prescriptions Last Dose Informant Patient Reported? Taking? Elagolix Sodium (Orilissa) 150 MG TABS  Self No No   Sig: Take 150 mg by mouth in the morning   Patient not taking: Reported on 6/28/2023   SUMAtriptan (IMITREX) 100 mg tablet   No No   Sig: Take 1 tablet (100 mg total) by mouth once as needed for migraine for up to 1 dose may repeat in 2 hours if necessary. Max dose 200mg in 24 hour period.    aluminum-magnesium hydroxide 200-200 MG/5ML suspension  Self No No   Sig: Take 10 mL by mouth every 6 (six) hours as needed for heartburn   Patient not taking: Reported on 5/17/2023   amitriptyline (ELAVIL) 10 mg tablet  Self No No   Sig: Take 1 tablet (10 mg total) by mouth daily at bedtime   Patient not taking: Reported on 5/17/2023   aspirin-acetaminophen-caffeine (EXCEDRIN MIGRAINE) 250-250-65 MG per tablet  Self No No   Sig: Take 1 tablet by mouth in the morning   Patient not taking: Reported on 5/17/2023   bisacodyl (DULCOLAX) 5 mg EC tablet   No No   Sig: Take 4 tablets (20 mg total) by mouth once for 1 dose Colonoscopy prep   dicyclomine (BENTYL) 20 mg tablet   No No   Sig: Take 1 tablet (20 mg total) by mouth every 6 (six) hours   ergocalciferol (VITAMIN D2) 50,000 units  Self No No   Sig: Take 1 capsule (50,000 Units total) by mouth once a week   Patient not taking: Reported on 6/28/2023   medroxyPROGESTERone (DEPO-PROVERA) 150 mg/mL injection  Self No No   Sig: Inject 1 mL (150 mg total) into a muscle every 3 (three) months   miconazole (MONISTAT) 200 mg vaginal suppository  Self No No   Sig: Insert 1 suppository (200 mg total) into the vagina daily at bedtime   Patient not taking: Reported on 6/28/2023   naproxen (Naprosyn) 500 mg tablet  Self No No   Sig: Take 1 tablet (500 mg total) by mouth 2 (two) times a day with meals   neomycin-polymyxin-hydrocortisone (CORTISPORIN) 0.35%-10,000 units/mL-1% otic suspension  Self No No   Sig: Administer 4 drops into both ears 2 (two) times a day   nicotine (NICODERM CQ) 14 mg/24hr TD 24 hr patch  Self No No   Sig: Place 1 patch on the skin over 24 hours every 24 hours   pantoprazole (PROTONIX) 20 mg tablet   No No   Sig: Take 1 tablet (20 mg total) by mouth daily   polyethylene glycol (GOLYTELY) 4000 mL solution   No No   Sig: Take 4,000 mL by mouth once for 1 dose Colonoscopy prep   polyethylene glycol (MIRALAX) 17 g packet   No No   Sig: Take 17 g by mouth daily   prochlorperazine (COMPAZINE) 10 mg tablet   No No   Sig: Take 1 tablet (10 mg total) by mouth every 6 (six) hours as needed for nausea or vomiting   psyllium (METAMUCIL) 58.6 % powder  Self No No   Sig: Take 1 packet by mouth 3 (three) times a day   Patient not taking: Reported on 5/17/2023   senna-docusate sodium (SENOKOT-S) 8.6-50 mg per tablet   No No   Sig: Take 1 tablet by mouth daily   simethicone (MYLICON,GAS-X) 816 MG CAPS  Self No No   Sig: Take 1 capsule (125 mg total) by mouth every 6 (six) hours as needed for flatulence   Patient not taking: Reported on 5/17/2023   tiZANidine (ZANAFLEX) 4 mg tablet  Self No No   Sig: Take 1 tablet (4 mg total) by mouth 2 (two) times a day as needed for muscle spasms   Patient not taking: Reported on 5/17/2023      Facility-Administered Medications: None       Past Medical History:   Diagnosis Date   • Abnormal Pap smear of cervix 2015    cryosurgery   • Hypoglycemia    • Migraine headache with aura     managed with Elavil       Past Surgical History:   Procedure Laterality Date   • CHOLECYSTECTOMY LAPAROSCOPIC N/A 10/27/2020    Procedure: CHOLECYSTECTOMY LAPAROSCOPIC;  Surgeon: Pablito Middleton MD;  Location: [address] [place] [address] OR;  Service: General   • GYNECOLOGIC CRYOSURGERY  2015   • HYSTERECTOMY  2016   • LAPAROSCOPY  2018   • DC LAPAROSCOPY W/LYSIS OF ADHESIONS N/A 3/3/2020    Procedure: L.O.A.;  Surgeon: Bambi Clifton MD;  Location: [facility] OR;  Service: Gynecology   • DC LAPS ABD PRTM&OMENTUM DX W/WO SPEC BR/WA 44 [facility] N/A 8/9/2019    Procedure: LAPAROSCOPY DIAGNOSTIC; LYSIS OF ADHESIONS;  Surgeon: Ricky Harris MD;  Location[facility];  Service: Gynecology   • TUBAL LIGATION         Family History   Problem Relation Age of Onset   • Cancer Paternal Grandmother         uterine   • Cancer Paternal Aunt         uterine    • Breast cancer Neg Hx    • Colon cancer Neg Hx    • Ovarian cancer Neg Hx      I have reviewed and agree with the history as documented.     E-Cigarette/Vaping   • E-Cigarette Use Never User      E-Cigarette/Vaping Substances   • Nicotine No    • THC No    • CBD No    • Flavoring No    • Other No    • Unknown No      Social History     Tobacco Use   • Smoking status: Every Day     Packs/day: 0.50     Years: 15.00     Total pack years: 7.50     Types: Cigarettes   • Smokeless tobacco: Never   Vaping Use   • Vaping Use: Never used   Substance Use Topics   • Alcohol use: Not Currently   • Drug use: Never       Review of Systems Constitutional: Negative. Negative for chills and fatigue. HENT: Negative for ear pain and sore throat. Eyes: Negative for photophobia and redness. Respiratory: Positive for shortness of breath. Negative for apnea and cough. Cardiovascular: Positive for chest pain. Gastrointestinal: Negative for abdominal pain, nausea and vomiting. Genitourinary: Positive for pelvic pain. Negative for dysuria. Musculoskeletal: Negative for arthralgias, neck pain and neck stiffness. Skin: Negative for rash. Neurological: Negative for dizziness, tremors, syncope and weakness. Psychiatric/Behavioral: Negative for suicidal ideas. Physical Exam  Physical Exam  Constitutional:       General: She is not in acute distress. Appearance: She is well-developed. She is not diaphoretic. Eyes:      Pupils: Pupils are equal, round, and reactive to light. Cardiovascular:      Rate and Rhythm: Normal rate and regular rhythm. Pulmonary:      Effort: Pulmonary effort is normal. No respiratory distress. Breath sounds: Normal breath sounds. Abdominal:      General: Bowel sounds are normal. There is no distension. Palpations: Abdomen is soft. Tenderness: There is abdominal tenderness (LLQ). There is no right CVA tenderness, left CVA tenderness or guarding. Musculoskeletal:         General: Normal range of motion. Cervical back: Normal range of motion and neck supple. Skin:     General: Skin is warm and dry. Neurological:      Mental Status: She is alert and oriented to person, place, and time.          Vital Signs  ED Triage Vitals   Temperature Pulse Respirations Blood Pressure SpO2   07/06/23 2130 07/06/23 2130 07/06/23 2130 07/06/23 2130 07/06/23 2130   98.1 °F (36.7 °C) (!) 108 20 118/86 99 %      Temp Source Heart Rate Source Patient Position - Orthostatic VS BP Location FiO2 (%)   07/06/23 2130 07/06/23 2130 07/06/23 2130 07/06/23 2130 --   Tympanic Monitor Lying Left arm       Pain Score       07/06/23 2200       8           Vitals:    07/06/23 2130 07/06/23 2300   BP: 118/86 119/67   Pulse: (!) 108 80   Patient Position - Orthostatic VS: Lying Lying         Visual Acuity      ED Medications  Medications   sodium chloride 0.9 % bolus 1,000 mL (0 mL Intravenous Stopped 7/6/23 2300)   ketorolac (TORADOL) injection 15 mg (15 mg Intravenous Given 7/6/23 2200)       Diagnostic Studies  Results Reviewed     Procedure Component Value Units Date/Time    HS Troponin I 4hr [[id number]]     Lab Status: No result Specimen: Blood     Urine Microscopic [[id number]]  (Abnormal) Collected: 07/06/23 2155    Lab Status: Final result Specimen: Urine, Clean Catch Updated: 07/06/23 2235     RBC, UA 0-1 /hpf      WBC, UA 1-2 /hpf      Epithelial Cells Occasional /hpf      Bacteria, UA Occasional /hpf      MUCUS THREADS Occasional    HS Troponin 0hr (reflex protocol) [[id number]]  (Normal) Collected: 07/06/23 2155    Lab Status: Final result Specimen: Blood from Arm, Left Updated: 07/06/23 2231     hs TnI 0hr <2 ng/L     HS Troponin I 2hr [[id number]]     Lab Status: No result Specimen: Blood     Comprehensive metabolic panel [[id number]]  (Abnormal) Collected: 07/06/23 2155    Lab Status: Final result Specimen: Blood from Arm, Left Updated: 07/06/23 2224     Sodium 141 mmol/L      Potassium 3.4 mmol/L      Chloride 110 mmol/L      CO2 22 mmol/L      ANION GAP 9 mmol/L      BUN 14 mg/dL      Creatinine 1.08 mg/dL      Glucose 97 mg/dL      Calcium 9.3 mg/dL      AST 12 U/L      ALT 18 U/L      Alkaline Phosphatase 51 U/L      Total Protein 7.2 g/dL      Albumin 4.3 g/dL      Total Bilirubin 0.36 mg/dL      eGFR 65 ml/min/1.73sq m     Narrative:      [place] guidelines for Chronic Kidney Disease (CKD):   •  Stage 1 with normal or high GFR (GFR > 90 mL/min/1.73 square meters)  •  Stage 2 Mild CKD (GFR = 60-89 mL/min/1.73 square meters)  •  Stage 3A Moderate CKD (GFR = 45-59 mL/min/1.73 square meters)  •  Stage 3B Moderate CKD (GFR = 30-44 mL/min/1.73 square meters)  •  Stage 4 Severe CKD (GFR = 15-29 mL/min/1.73 square meters)  •  Stage 5 End Stage CKD (GFR <15 mL/min/1.73 square meters)  Note: GFR calculation is accurate only with a steady state creatinine    D-dimer, quantitative [[id number]]  (Normal) Collected: 07/06/23 2155    Lab Status: Final result Specimen: Blood from Arm, Left Updated: 07/06/23 2222     D-Dimer, Quant <0.27 ug/ml FEU     UA (URINE) with reflex to Scope [[id number]]  (Abnormal) Collected: 07/06/23 2155    Lab Status: Final result Specimen: Urine, Clean Catch Updated: 07/06/23 2215     Color, UA Mary     Clarity, UA Slightly Cloudy     Specific Gravity, UA 1.025     pH, UA 6.0     Leukocytes, UA Negative     Nitrite, UA Negative     Protein, UA 15 (Trace) mg/dl      Glucose, UA Negative mg/dl      Ketones, UA Negative mg/dl      Bilirubin, UA Negative     Occult Blood, UA 10.0     UROBILINOGEN UA 4.0 mg/dL     CBC and differential [[id number]] Collected: 07/06/23 2155    Lab Status: Final result Specimen: Blood from Arm, Left Updated: 07/06/23 2203     WBC 9.59 Thousand/uL      RBC 4.15 Million/uL      Hemoglobin 12.6 g/dL      Hematocrit 37.2 %      MCV 90 fL      MCH 30.4 pg      MCHC 33.9 g/dL      RDW 12.4 %      MPV 10.9 fL      Platelets 155 Thousands/uL      nRBC 0 /100 WBCs      Neutrophils Relative 59 %      Immat GRANS % 0 %      Lymphocytes Relative 30 %      Monocytes Relative 8 %      Eosinophils Relative 2 %      Basophils Relative 1 %      Neutrophils Absolute 5.74 Thousands/µL      Immature Grans Absolute 0.03 Thousand/uL      Lymphocytes Absolute 2.90 Thousands/µL      Monocytes Absolute 0.72 Thousand/µL      Eosinophils Absolute 0.15 Thousand/µL      Basophils Absolute 0.05 Thousands/µL                  US pelvis complete w transvaginal   Final Result by Nery Lopez DO (07/06 2324)      Fluid-filled tubular structure in the left adnexa similar to prior study likely representing hydrosalpinx      Status post hysterectomy. Right ovary was not visualized. Workstation performed: [id number]                    Procedures  ECG 12 Lead Documentation Only    Date/Time: 7/6/2023 9:30 PM    Performed by: Krystle Castaneda PA-C  Authorized by: Krystle Castaneda PA-C    Indications / Diagnosis:  Chest pain  Patient location:  ED  Interpretation:     Interpretation: normal    Rate:     ECG rate:  106    ECG rate assessment: tachycardic    Rhythm:     Rhythm: sinus bradycardia    Ectopy:     Ectopy: none    QRS:     QRS axis:  Normal    QRS intervals:  Normal  Conduction:     Conduction: normal    ST segments:     ST segments:  Normal  T waves:     T waves: normal               ED Course                                             Medical Decision Making  Patient presented with several complaints including left-sided ovarian pain with history of left-sided hydrosalpinx as well as chest pain. Chest pain was associated with deep breath and patient recently started taking Depo-Provera. Cardiac and pulmonary pathology such as pulmonary emboli were considered however patient had a negative D-dimer and negative troponin low heart score and benign EKG. Patient had resolution of symptoms without intervention. Patient was found to have chronic left-sided hydrosalpinx on ultrasound without evidence of torsion. Started on anti-inflammatories. Patient plans to follow-up with gynecology. Educated on return precautions. Discharged home. Amount and/or Complexity of Data Reviewed  Labs: ordered. Radiology: ordered. Risk  Prescription drug management.           Disposition  Final diagnoses:   Hydrosalpinx     Time reflects when diagnosis was documented in both MDM as applicable and the Disposition within this note     Time User Action Codes Description Comment    7/6/2023 11:52 PM Karma Washington Add [N70.11] Hydrosalpinx       ED Disposition     ED Disposition   Discharge    Condition   Stable    Date/Time   Thu Jul 6, 2023 11:52 PM    Comment   Denver Peer discharge to home/self care. Follow-up Information     Follow up With Specialties Details Why Contact Info Additional [address] [facility] Emergency Medicine Go to  If symptoms worsen [address] [phone] [id number] [facility] Emergency Department          Patient's Medications   Discharge Prescriptions    KETOROLAC (TORADOL) 10 MG TABLET    Take 1 tablet (10 mg total) by mouth every 6 (six) hours as needed for moderate pain       Start Date: 7/6/2023  End Date: --       Order Dose: 10 mg       Quantity: 30 tablet    Refills: 0    ONDANSETRON (ZOFRAN) 4 MG TABLET    Take 1 tablet (4 mg total) by mouth every 6 (six) hours       Start Date: 7/6/2023  End Date: --       Order Dose: 4 mg       Quantity: 12 tablet    Refills: 0       No discharge procedures on file.     PDMP Review     None          ED Provider  Electronically Signed by           Sarthak Olmstead PA-C  07/06/23 6301

## 2023-07-07 NOTE — ASSESSMENT & PLAN NOTE
- Reviewed CT head without contrast (2/24/23) which showed no acute intracranial abnormality.   -Patient was scheduled to establish with neurology, however she had to reschedule due to helping her father-in-law. Patient is now scheduled in September.  - Continue Imitrex 100 mg, as needed at onset of migraine.   -Per patient, she has tried Zofran and Reglan in the past for the nausea but they were both ineffective. Continue Compazine 10 mg as needed for nausea/vomiting.  - If migraines become more frequent, will consider restarting elavil for migraine prevention.

## 2023-07-07 NOTE — ED NOTES
Resting quietly. Reports pain level down to a 6. No apparent discomfort or distress noted.      Elena Mackay RN  07/06/23 0583

## 2023-07-25 RX ORDER — SODIUM CHLORIDE 9 MG/ML
125 INJECTION, SOLUTION INTRAVENOUS CONTINUOUS
OUTPATIENT
Start: 2023-07-25

## 2023-07-26 ENCOUNTER — ANESTHESIA (OUTPATIENT)
Dept: ANESTHESIOLOGY | Facility: HOSPITAL | Age: 38
End: 2023-07-26

## 2023-07-26 ENCOUNTER — ANESTHESIA EVENT (OUTPATIENT)
Dept: ANESTHESIOLOGY | Facility: HOSPITAL | Age: 38
End: 2023-07-26

## 2023-07-26 NOTE — ANESTHESIA PREPROCEDURE EVALUATION
Procedure:  PRE-OP ONLY    Relevant Problems   ANESTHESIA (within normal limits)      CARDIO (within normal limits)   (+) Migraine headache with aura      ENDO (within normal limits)      GI/HEPATIC   (+) Gastroesophageal reflux disease      /RENAL (within normal limits)      GYN (within normal limits)      HEMATOLOGY (within normal limits)      MUSCULOSKELETAL   (+) Chronic bilateral low back pain with left-sided sciatica      NEURO/PSYCH   (+) Chronic bilateral low back pain with left-sided sciatica   (+) Migraine headache with aura      PULMONARY (within normal limits)             Anesthesia Plan  ASA Score- 2     Anesthesia Type- IV sedation with anesthesia with ASA Monitors. Additional Monitors:   Airway Plan:           Plan Factors-Exercise tolerance (METS): >4 METS. Chart reviewed. Patient summary reviewed. Patient is not a current smoker. Induction- intravenous. Postoperative Plan-     Informed Consent- Anesthetic plan and risks discussed with patient.

## 2023-07-28 ENCOUNTER — OFFICE VISIT (OUTPATIENT)
Dept: OBGYN CLINIC | Facility: CLINIC | Age: 38
End: 2023-07-28

## 2023-07-28 ENCOUNTER — TELEPHONE (OUTPATIENT)
Dept: OBGYN CLINIC | Facility: CLINIC | Age: 38
End: 2023-07-28

## 2023-07-28 VITALS
DIASTOLIC BLOOD PRESSURE: 67 MMHG | SYSTOLIC BLOOD PRESSURE: 123 MMHG | BODY MASS INDEX: 28.02 KG/M2 | WEIGHT: 153.2 LBS | HEART RATE: 108 BPM

## 2023-07-28 DIAGNOSIS — R10.32 CONTINUOUS LLQ ABDOMINAL PAIN: Primary | Chronic | ICD-10-CM

## 2023-07-28 PROCEDURE — 99213 OFFICE O/P EST LOW 20 MIN: CPT | Performed by: OBSTETRICS & GYNECOLOGY

## 2023-07-28 NOTE — PROGRESS NOTES
Urogynecology - Follow Up Visit, [facility] Consultation   Mik Solitario   [id number]    Bengali-speaking: yes Phone  used: yes    Chief complaint: Left Lower quadrant pain     HPI: Mik Solitario is a 41year old who is status post total hysterecomy (in 2016 performed in [place]) who presents for follow upfor left lower quadrant pain. The patient states that she has had this pain for years and that it is extremely disruptive to her quality of life, stating that she cannot sleep most nights secondary to pain. She describes the pain as sharp and stabbing in quality. At baseline it is a 6 and escalates to a 10 at times. She states that the only medication that helps her pain is roxicodone. She frequently has to go to the ED for severe pain. She states that nothing makes it better aside from roxicodone. [place] and movement make it worse. The patient has had multiple visits for "possible exposure to STIs" in the last two years. In the past, the patient has had two diagnostic laparoscopy procedures. The last procedure was in March of 2020, and at that time, the surgeons were unable to identify the left ovary. Operative findings included: Significant pelvic adhesive disease involving left hemipelvis. Colon densely adhered to left pelvic side wall and to bladder and vaginal cuff. Normal appearing right ovary. No visible or palpable left ovary or fallopian tube following enterolysis and and full dissection of left pelvic side wall including all retroperitoneal structures. Presumably surgically absent. Recent imaging shows a left ovarian cyst and a dilated Fallopian tube, suspicious for hydrosalpinx. The patient states that she has not had relief in the past from NSAIDs. She has not used hormonal contraception in the past. At our last visit, we discussed medical versus surgical management.  Given her surgical history, she decided she would be willing to try medical management to avoid surgery. She currently smokes about one ppd and therefore is not a candidate for hormonal contraception. Initially, Lyrica and Orlissa were prescribed. Karyn Kong was not covered by her insurance and experienced a panic attack on the Lyrica, so this was discontinued. Amitriptyline was prescribed and the patient received Depo-Provera. She states that neither affected her pain. Today she states that she is so frustrated by her pain and that it is negatively affecting her quality of life because she cannot have sex or be active.      OB History    Para Term  AB Living   2 2 1 1 0 2   SAB IAB Ectopic Multiple Live Births   0 0 0 0 2      # Outcome Date GA Lbr Romeo/2nd Weight Sex Delivery Anes PTL Lv   2 Term            1                 Past Medical History:   Diagnosis Date   • Abnormal Pap smear of cervix     cryosurgery   • Hypoglycemia    • Migraine headache with aura     managed with Elavil       Past Surgical History:   Procedure Laterality Date   • CHOLECYSTECTOMY LAPAROSCOPIC N/A 10/27/2020    Procedure: CHOLECYSTECTOMY LAPAROSCOPIC;  Surgeon: Sabrina Reyes MD;  Location: [address] [place] [address] OR;  Service: General   • GYNECOLOGIC CRYOSURGERY     • HYSTERECTOMY  2016   • LAPAROSCOPY  2018   • LA LAPAROSCOPY W/LYSIS OF ADHESIONS N/A 3/3/2020    Procedure: L.O.A.;  Surgeon: Eloise Spear MD;  Location: [facility] OR;  Service: Gynecology   • LA LAPS ABD PRTM&OMENTUM DX W/WO SPEC BR/WA 44 [facility] N/A 2019    Procedure: LAPAROSCOPY DIAGNOSTIC; LYSIS OF ADHESIONS;  Surgeon: Herbert Rolle MD;  Location: [facility] OR;  Service: Gynecology   • TUBAL LIGATION         Family History   Problem Relation Age of Onset   • Cancer Paternal Grandmother         uterine   • Cancer Paternal Aunt         uterine    • Breast cancer Neg Hx    • Colon cancer Neg Hx    • Ovarian cancer Neg Hx        Social History     Socioeconomic History   • Marital status: Single     Spouse name: Not on file   • Number of children: Not on file   • Years of education: Not on file   • Highest education level: Not on file   Occupational History   • Not on file   Tobacco Use   • Smoking status: Every Day     Packs/day: 0.50     Years: 15.00     Total pack years: 7.50     Types: Cigarettes   • Smokeless tobacco: Never   Vaping Use   • Vaping Use: Never used   Substance and Sexual Activity   • Alcohol use: Not Currently   • Drug use: Never   • Sexual activity: Yes     Partners: Male     Birth control/protection: Female Sterilization   Other Topics Concern   • Not on file   Social History Narrative   • Not on file     Social Determinants of Health     Financial Resource Strain: Low Risk  (4/24/2023)    Overall Financial Resource Strain (CARDIA)    • Difficulty of Paying Living Expenses: Not hard at all   Food Insecurity: No Food Insecurity (4/24/2023)    Hunger Vital Sign    • Worried About Running Out of Food in the Last Year: Never true    • Ran Out of Food in the Last Year: Never true   Transportation Needs: No Transportation Needs (4/24/2023)    PRAPARE - Transportation    • Lack of Transportation (Medical): No    • Lack of Transportation (Non-Medical): No   Physical Activity: Insufficiently Active (6/23/2021)    Exercise Vital Sign    • Days of Exercise per Week: 5 days    • Minutes of Exercise per Session: 20 min   Stress: No Stress Concern Present (6/23/2021)    [facility]    • Feeling of Stress : Only a little   Social Connections:  Moderately Isolated (6/23/2021)    Social Connection and Isolation Panel [NHANES]    • Frequency of Communication with Friends and Family: More than three times a week    • Frequency of Social Gatherings with Friends and Family: Twice a week    • Attends Religion Services: Never    • Active Member of Clubs or Organizations: No    • Attends Club or Organization Meetings: Never    • Marital Status: Living with partner   Intimate Partner Violence: Not on file   Housing Stability: Low Risk  (6/23/2021)    Housing Stability Vital Sign    • Unable to Pay for Housing in the Last Year: No    • Number of Places Lived in the Last Year: 2    • Unstable Housing in the Last Year: No       Current Outpatient Medications on File Prior to Visit   Medication Sig Dispense Refill   • aluminum-magnesium hydroxide 200-200 MG/5ML suspension Take 10 mL by mouth every 6 (six) hours as needed for heartburn (Patient not taking: Reported on 5/17/2023) 355 mL 0   • amitriptyline (ELAVIL) 10 mg tablet Take 1 tablet (10 mg total) by mouth daily at bedtime (Patient not taking: Reported on 5/17/2023) 30 tablet 3   • aspirin-acetaminophen-caffeine (EXCEDRIN MIGRAINE) 250-250-65 MG per tablet Take 1 tablet by mouth in the morning (Patient not taking: Reported on 5/17/2023) 30 tablet 0   • bisacodyl (DULCOLAX) 5 mg EC tablet Take 4 tablets (20 mg total) by mouth once for 1 dose Colonoscopy prep 4 tablet 0   • dicyclomine (BENTYL) 20 mg tablet Take 1 tablet (20 mg total) by mouth every 6 (six) hours 120 tablet 0   • Elagolix Sodium (Orilissa) 150 MG TABS Take 150 mg by mouth in the morning (Patient not taking: Reported on 6/28/2023) 30 tablet 0   • ergocalciferol (VITAMIN D2) 50,000 units Take 1 capsule (50,000 Units total) by mouth once a week (Patient not taking: Reported on 6/28/2023) 16 capsule 0   • ketorolac (TORADOL) 10 mg tablet Take 1 tablet (10 mg total) by mouth every 6 (six) hours as needed for moderate pain 30 tablet 0   • linaCLOtide 72 MCG CAPS Take 72 mcg by mouth daily at least 30 minutes prior to the first meal of the day 30 capsule 1   • medroxyPROGESTERone (DEPO-PROVERA) 150 mg/mL injection Inject 1 mL (150 mg total) into a muscle every 3 (three) months 1 mL 0   • miconazole (MONISTAT) 200 mg vaginal suppository Insert 1 suppository (200 mg total) into the vagina daily at bedtime (Patient not taking: Reported on 6/28/2023) 3 suppository 0   • naproxen (Naprosyn) 500 mg tablet Take 1 tablet (500 mg total) by mouth 2 (two) times a day with meals 30 tablet 0   • neomycin-polymyxin-hydrocortisone (CORTISPORIN) 0.35%-10,000 units/mL-1% otic suspension Administer 4 drops into both ears 2 (two) times a day 10 mL 0   • nicotine (NICODERM CQ) 14 mg/24hr TD 24 hr patch Place 1 patch on the skin over 24 hours every 24 hours 28 patch 2   • ondansetron (ZOFRAN) 4 mg tablet Take 1 tablet (4 mg total) by mouth every 6 (six) hours 12 tablet 0   • pantoprazole (PROTONIX) 20 mg tablet Take 1 tablet (20 mg total) by mouth daily 90 tablet 1   • polyethylene glycol (GOLYTELY) 4000 mL solution Take 4,000 mL by mouth once for 1 dose Colonoscopy prep 4000 mL 0   • polyethylene glycol (MIRALAX) 17 g packet Take 17 g by mouth daily 510 g 5   • prochlorperazine (COMPAZINE) 10 mg tablet Take 1 tablet (10 mg total) by mouth every 6 (six) hours as needed for nausea or vomiting 30 tablet 1   • psyllium (METAMUCIL) 58.6 % powder Take 1 packet by mouth 3 (three) times a day (Patient not taking: Reported on 5/17/2023) 425 g 0   • senna-docusate sodium (SENOKOT-S) 8.6-50 mg per tablet Take 1 tablet by mouth daily 7 tablet 0   • simethicone (MYLICON,GAS-X) 342 MG CAPS Take 1 capsule (125 mg total) by mouth every 6 (six) hours as needed for flatulence (Patient not taking: Reported on 5/17/2023) 28 capsule 0   • SUMAtriptan (IMITREX) 100 mg tablet Take 1 tablet (100 mg total) by mouth once as needed for migraine for up to 1 dose may repeat in 2 hours if necessary. Max dose 200mg in 24 hour period. 10 tablet 2   • tiZANidine (ZANAFLEX) 4 mg tablet Take 1 tablet (4 mg total) by mouth 2 (two) times a day as needed for muscle spasms (Patient not taking: Reported on 5/17/2023) 60 tablet 1     No current facility-administered medications on file prior to visit.        Allergies   Allergen Reactions   • Shellfish-Derived Products - Food Allergy Itching and Swelling     "seafood "   • Ibuprofen Other (See Comments) Patient states she gets hematoma   • Pregabalin Anxiety       Physical exam:   Vitals:    07/28/23 1054   BP: 123/67   Pulse: (!) 108       Physical Exam  Constitutional:       General: She is not in acute distress. Appearance: Normal appearance. She is normal weight. She is not ill-appearing, toxic-appearing or diaphoretic. Genitourinary:      Genitourinary Comments: Thick white discharge covering the vagina   Normal external genitatlia    Extreme tenderness on bimanual exam     No point muscle tenderness noted. Vaginal cuff intact. Vaginal discharge present. No vaginal atrophy present. Right Adnexa: tender and full. Cervix is absent. Uterus is absent. HENT:      Head: Normocephalic and atraumatic. Cardiovascular:      Rate and Rhythm: Normal rate and regular rhythm. Pulmonary:      Effort: Pulmonary effort is normal. No respiratory distress. Abdominal:      General: Abdomen is flat. There is no distension. Palpations: Abdomen is soft. Tenderness: There is abdominal tenderness. There is guarding. Comments: Patient has TTP on both light and deep palpation on the LLQ, she does not endorse tenderness in any other quadrant  No bloating nor tympany    Neurological:      Mental Status: She is alert. Psychiatric:         Mood and Affect: Mood normal.         Behavior: Behavior normal.         Thought Content: Thought content normal.         Judgment: Judgment normal.     US pelvis complete w transvaginal    Result Date: 7/6/2023  Impression: Fluid-filled tubular structure in the left adnexa similar to prior study likely representing hydrosalpinx Status post hysterectomy. Right ovary was not visualized.      Narrative & Impression   CT ABDOMEN AND PELVIS WITH IV CONTRAST     INDICATION:   R10.84: Generalized abdominal pain  K59.00: Constipation, unspecified  R14.0: Abdominal distension (gaseous)  R68.81: Early satiety  R11.0: Nausea.     COMPARISON: CT abdomen and pelvis 4/5/2021 and pelvic ultrasound 5/5/2023     TECHNIQUE:  CT examination of the abdomen and pelvis was performed. Multiplanar 2D reformatted images were created from the source data.     This examination, like all CT scans performed in the [facility], was performed utilizing techniques to minimize radiation dose exposure, including the use of iterative reconstruction and automated exposure control. Radiation dose length   product (DLP) for this visit:  550 mGy-cm     IV Contrast:  100 mL of iohexol (OMNIPAQUE) 350  Enteric Contrast: Enteric contrast was administered.     FINDINGS:     ABDOMEN     LOWER CHEST:  No clinically significant abnormality identified in the visualized lower chest.     LIVER/BILIARY TREE:  Unremarkable.     GALLBLADDER: Post cholecystectomy.     SPLEEN:  Unremarkable.     PANCREAS:  Unremarkable.     ADRENAL GLANDS:  Unremarkable.     KIDNEYS/URETERS:  Unremarkable. No hydronephrosis.     STOMACH AND BOWEL:  Unremarkable.     APPENDIX: A normal appendix was visualized.     ABDOMINOPELVIC CAVITY:  No ascites. No pneumoperitoneum. No lymphadenopathy.     VESSELS: Trace aortic calcification. No aneurysm.     PELVIS     REPRODUCTIVE ORGANS: Post hysterectomy. Chronic left hydrosalpinx measures approximately 7 x 2.5 cm; no adjacent inflammatory changes.     URINARY BLADDER:  Unremarkable.     ABDOMINAL WALL/INGUINAL REGIONS:  Unremarkable.     OSSEOUS STRUCTURES: Mild levoconvex lumbar scoliosis.     IMPRESSION:     No evidence of acute abdominopelvic process.     Chronic left hydrosalpinx.     Trace aortic calcification. No abdominal aortic aneurysm           Assessment:  Austyn Blanca is a 41year old who is status post total hysterecomy (in 2016 performed in [place]) who presents for left lower quadrant pain of several years refractory to tylenol, Lyrica, Amitriptyline and Depo-Provera.  The patient cannot tolerate NSAIDs, Gabapentin or hormones (secondary to smoking). Plan:    Chronic LLQ Pelvic Pain   Today I had an extensive conversation (approximately 45 minutes) discussing surgical management. I explained that given prior surgical reports, she is at higher than average risk of complications. We reviewed risks of surgery, including bleeding, infection, damage to surrounding structures including the bladder and/or bowels. If an injury occurs during surgery and is identified, it will be addressed intraoperatively. This may result in extended use of a [place] and/or ureteral stents. We also discussed the risk of a bowel injury and the possible need for bowel resection, revision and repair. Additional surgical teams will be consulted if necessary. We discussed that the planned operative approach is laparoscopic however, if necessary, we may need to convert to laparotomy and/or make a small incision for specimen extraction. I also explained that whilst imaging studies demonstrate a possible hydrosalpinx, it is possible that removal of the tube and ovary may not resolve her pain. We discussed keeping her right ovary if possible so that she does not experience immediate surgical menopause. Will plan to do the case with Gyn Onc. Will contact their office to coordinate surgery.      Desmond Cordova MD

## 2023-08-03 ENCOUNTER — CLINICAL SUPPORT (OUTPATIENT)
Dept: OBGYN CLINIC | Facility: CLINIC | Age: 38
End: 2023-08-03

## 2023-08-03 VITALS
HEIGHT: 62 IN | HEART RATE: 102 BPM | BODY MASS INDEX: 28.34 KG/M2 | SYSTOLIC BLOOD PRESSURE: 106 MMHG | DIASTOLIC BLOOD PRESSURE: 72 MMHG | WEIGHT: 154 LBS

## 2023-08-03 DIAGNOSIS — Z30.42 ENCOUNTER FOR SURVEILLANCE OF INJECTABLE CONTRACEPTIVE: Primary | ICD-10-CM

## 2023-08-03 PROCEDURE — 96372 THER/PROPH/DIAG INJ SC/IM: CPT

## 2023-08-03 RX ORDER — MEDROXYPROGESTERONE ACETATE 150 MG/ML
150 INJECTION, SUSPENSION INTRAMUSCULAR ONCE
Status: COMPLETED | OUTPATIENT
Start: 2023-08-03 | End: 2023-08-03

## 2023-08-03 RX ADMIN — MEDROXYPROGESTERONE ACETATE 150 MG: 150 INJECTION, SUSPENSION INTRAMUSCULAR at 14:22

## 2023-08-03 NOTE — TELEPHONE ENCOUNTER
Patient would handy to know date of surgery as  is having surgery as well and does not want be around same time.

## 2023-08-07 ENCOUNTER — HOSPITAL ENCOUNTER (EMERGENCY)
Facility: HOSPITAL | Age: 38
Discharge: HOME | End: 2023-08-07
Attending: EMERGENCY MEDICINE
Payer: COMMERCIAL

## 2023-08-07 ENCOUNTER — TELEPHONE (OUTPATIENT)
Dept: OBGYN CLINIC | Facility: CLINIC | Age: 38
End: 2023-08-07

## 2023-08-07 ENCOUNTER — APPOINTMENT (EMERGENCY)
Dept: RADIOLOGY | Facility: HOSPITAL | Age: 38
End: 2023-08-07
Payer: COMMERCIAL

## 2023-08-07 VITALS
RESPIRATION RATE: 16 BRPM | SYSTOLIC BLOOD PRESSURE: 135 MMHG | DIASTOLIC BLOOD PRESSURE: 84 MMHG | HEART RATE: 80 BPM | OXYGEN SATURATION: 100 %

## 2023-08-07 DIAGNOSIS — R07.9 CHEST PAIN, UNSPECIFIED: Primary | ICD-10-CM

## 2023-08-07 LAB
ALBUMIN SERPL BCP-MCNC: 4.3 G/DL (ref 3.5–5)
ALP SERPL-CCNC: 52 U/L (ref 34–104)
ALT SERPL W P-5'-P-CCNC: 21 U/L (ref 7–52)
ANION GAP SERPL CALCULATED.3IONS-SCNC: 8 MMOL/L
AST SERPL W P-5'-P-CCNC: 19 U/L (ref 13–39)
ATRIAL RATE: 82 BPM
BACTERIA UR QL AUTO: NORMAL /HPF
BASOPHILS # BLD AUTO: 0.07 THOUSANDS/ÂΜL (ref 0–0.1)
BASOPHILS NFR BLD AUTO: 1 % (ref 0–1)
BILIRUB SERPL-MCNC: 0.3 MG/DL (ref 0.2–1)
BILIRUB UR QL STRIP: NEGATIVE
BUN SERPL-MCNC: 12 MG/DL (ref 5–25)
CALCIUM SERPL-MCNC: 8.8 MG/DL (ref 8.4–10.2)
CARDIAC TROPONIN I PNL SERPL HS: <2 NG/L
CHLORIDE SERPL-SCNC: 109 MMOL/L (ref 96–108)
CLARITY UR: CLEAR
CO2 SERPL-SCNC: 20 MMOL/L (ref 21–32)
COLOR UR: ABNORMAL
CREAT SERPL-MCNC: 0.83 MG/DL (ref 0.6–1.3)
D DIMER PPP FEU-MCNC: <0.27 UG/ML FEU
EOSINOPHIL # BLD AUTO: 0.22 THOUSAND/ÂΜL (ref 0–0.61)
EOSINOPHIL NFR BLD AUTO: 2 % (ref 0–6)
ERYTHROCYTE [DISTWIDTH] IN BLOOD BY AUTOMATED COUNT: 12.5 % (ref 11.6–15.1)
EXT PREGNANCY TEST URINE: NEGATIVE
EXT. CONTROL: NORMAL
GFR SERPL CREATININE-BSD FRML MDRD: 90 ML/MIN/1.73SQ M
GLUCOSE SERPL-MCNC: 110 MG/DL (ref 65–140)
GLUCOSE UR STRIP-MCNC: NEGATIVE MG/DL
HCT VFR BLD AUTO: 41.2 % (ref 34.8–46.1)
HGB BLD-MCNC: 13.7 G/DL (ref 11.5–15.4)
HGB UR QL STRIP.AUTO: 10
IMM GRANULOCYTES # BLD AUTO: 0.03 THOUSAND/UL (ref 0–0.2)
IMM GRANULOCYTES NFR BLD AUTO: 0 % (ref 0–2)
KETONES UR STRIP-MCNC: NEGATIVE MG/DL
LEUKOCYTE ESTERASE UR QL STRIP: 25
LYMPHOCYTES # BLD AUTO: 2.56 THOUSANDS/ÂΜL (ref 0.6–4.47)
LYMPHOCYTES NFR BLD AUTO: 28 % (ref 14–44)
MCH RBC QN AUTO: 30 PG (ref 26.8–34.3)
MCHC RBC AUTO-ENTMCNC: 33.3 G/DL (ref 31.4–37.4)
MCV RBC AUTO: 90 FL (ref 82–98)
MONOCYTES # BLD AUTO: 0.65 THOUSAND/ÂΜL (ref 0.17–1.22)
MONOCYTES NFR BLD AUTO: 7 % (ref 4–12)
NEUTROPHILS # BLD AUTO: 5.66 THOUSANDS/ÂΜL (ref 1.85–7.62)
NEUTS SEG NFR BLD AUTO: 62 % (ref 43–75)
NITRITE UR QL STRIP: NEGATIVE
NON-SQ EPI CELLS URNS QL MICRO: NORMAL /HPF
NRBC BLD AUTO-RTO: 0 /100 WBCS
P AXIS: 61 DEGREES
PH UR STRIP.AUTO: 5 [PH]
PLATELET # BLD AUTO: 263 THOUSANDS/UL (ref 149–390)
PMV BLD AUTO: 11.6 FL (ref 8.9–12.7)
POTASSIUM SERPL-SCNC: 4.6 MMOL/L (ref 3.5–5.3)
PR INTERVAL: 164 MS
PROT SERPL-MCNC: 6.8 G/DL (ref 6.4–8.4)
PROT UR STRIP-MCNC: NEGATIVE MG/DL
QRS AXIS: 35 DEGREES
QRSD INTERVAL: 80 MS
QT INTERVAL: 356 MS
QTC INTERVAL: 415 MS
RBC # BLD AUTO: 4.56 MILLION/UL (ref 3.81–5.12)
RBC #/AREA URNS AUTO: NORMAL /HPF
SODIUM SERPL-SCNC: 137 MMOL/L (ref 135–147)
SP GR UR STRIP.AUTO: 1.02 (ref 1–1.04)
T WAVE AXIS: 34 DEGREES
UROBILINOGEN UA: NEGATIVE MG/DL
VENTRICULAR RATE: 82 BPM
WBC # BLD AUTO: 9.19 THOUSAND/UL (ref 4.31–10.16)
WBC #/AREA URNS AUTO: NORMAL /HPF

## 2023-08-07 PROCEDURE — 85025 COMPLETE CBC W/AUTO DIFF WBC: CPT | Performed by: PHYSICIAN ASSISTANT

## 2023-08-07 PROCEDURE — 96374 THER/PROPH/DIAG INJ IV PUSH: CPT

## 2023-08-07 PROCEDURE — 71046 X-RAY EXAM CHEST 2 VIEWS: CPT

## 2023-08-07 PROCEDURE — 93010 ELECTROCARDIOGRAM REPORT: CPT

## 2023-08-07 PROCEDURE — 85379 FIBRIN DEGRADATION QUANT: CPT | Performed by: PHYSICIAN ASSISTANT

## 2023-08-07 PROCEDURE — 99285 EMERGENCY DEPT VISIT HI MDM: CPT

## 2023-08-07 PROCEDURE — 84484 ASSAY OF TROPONIN QUANT: CPT | Performed by: PHYSICIAN ASSISTANT

## 2023-08-07 PROCEDURE — 96361 HYDRATE IV INFUSION ADD-ON: CPT

## 2023-08-07 PROCEDURE — 81025 URINE PREGNANCY TEST: CPT | Performed by: PHYSICIAN ASSISTANT

## 2023-08-07 PROCEDURE — 36415 COLL VENOUS BLD VENIPUNCTURE: CPT | Performed by: PHYSICIAN ASSISTANT

## 2023-08-07 PROCEDURE — 93005 ELECTROCARDIOGRAM TRACING: CPT

## 2023-08-07 PROCEDURE — 80053 COMPREHEN METABOLIC PANEL: CPT | Performed by: PHYSICIAN ASSISTANT

## 2023-08-07 PROCEDURE — 81001 URINALYSIS AUTO W/SCOPE: CPT | Performed by: PHYSICIAN ASSISTANT

## 2023-08-07 RX ADMIN — SODIUM CHLORIDE 1000 ML: 0.9 INJECTION, SOLUTION INTRAVENOUS at 09:16

## 2023-08-07 RX ADMIN — MORPHINE SULFATE 2 MG: 2 INJECTION, SOLUTION INTRAMUSCULAR; INTRAVENOUS at 09:20

## 2023-08-07 NOTE — ED PROVIDER NOTES
History  Chief Complaint   Patient presents with   • Chest Pain     L arm pain after a depo shot "pain goes into the chest"      62-year-old female presenting for evaluation of left-sided chest pain which radiates into her arm. Patient reports this pain began 2 days ago after she received a Depo-Provera injection. Patient denies fevers, chills, coughing, shortness of breath, abdominal pain, nausea, vomiting, diarrhea. Patient reports the pain does not radiate into the back. Patient ports she does have pain with movement of the left arm as well as deep breathing. Patient denies any hemoptysis or lower leg swelling. Patient denies any palpitations. Patient reports no cardiac conditions to her knowledge. Patient denies headaches, lightheadedness or dizziness. Chest Pain  Associated symptoms: no abdominal pain, no dizziness, no fatigue, no fever, no nausea, no numbness, no palpitations, no shortness of breath and not vomiting        Prior to Admission Medications   Prescriptions Last Dose Informant Patient Reported? Taking? Elagolix Sodium (Orilissa) 150 MG TABS  Self No No   Sig: Take 150 mg by mouth in the morning   Patient not taking: Reported on 6/28/2023   SUMAtriptan (IMITREX) 100 mg tablet   No No   Sig: Take 1 tablet (100 mg total) by mouth once as needed for migraine for up to 1 dose may repeat in 2 hours if necessary. Max dose 200mg in 24 hour period.    aluminum-magnesium hydroxide 200-200 MG/5ML suspension  Self No No   Sig: Take 10 mL by mouth every 6 (six) hours as needed for heartburn   Patient not taking: Reported on 5/17/2023   amitriptyline (ELAVIL) 10 mg tablet  Self No No   Sig: Take 1 tablet (10 mg total) by mouth daily at bedtime   Patient not taking: Reported on 5/17/2023   aspirin-acetaminophen-caffeine (EXCEDRIN MIGRAINE) 250-250-65 MG per tablet  Self No No   Sig: Take 1 tablet by mouth in the morning   Patient not taking: Reported on 5/17/2023   bisacodyl (DULCOLAX) 5 mg EC tablet   No No   Sig: Take 4 tablets (20 mg total) by mouth once for 1 dose Colonoscopy prep   dicyclomine (BENTYL) 20 mg tablet   No No   Sig: Take 1 tablet (20 mg total) by mouth every 6 (six) hours   Patient not taking: Reported on 7/28/2023   ergocalciferol (VITAMIN D2) 50,000 units  Self No No   Sig: Take 1 capsule (50,000 Units total) by mouth once a week   Patient not taking: Reported on 6/28/2023   ketorolac (TORADOL) 10 mg tablet   No No   Sig: Take 1 tablet (10 mg total) by mouth every 6 (six) hours as needed for moderate pain   Patient not taking: Reported on 7/28/2023   linaCLOtide 72 MCG CAPS   No No   Sig: Take 72 mcg by mouth daily at least 30 minutes prior to the first meal of the day   Patient not taking: Reported on 7/28/2023   medroxyPROGESTERone (DEPO-PROVERA) 150 mg/mL injection  Self No No   Sig: Inject 1 mL (150 mg total) into a muscle every 3 (three) months   miconazole (MONISTAT) 200 mg vaginal suppository  Self No No   Sig: Insert 1 suppository (200 mg total) into the vagina daily at bedtime   Patient not taking: Reported on 6/28/2023   naproxen (Naprosyn) 500 mg tablet  Self No No   Sig: Take 1 tablet (500 mg total) by mouth 2 (two) times a day with meals   neomycin-polymyxin-hydrocortisone (CORTISPORIN) 0.35%-10,000 units/mL-1% otic suspension  Self No No   Sig: Administer 4 drops into both ears 2 (two) times a day   nicotine (NICODERM CQ) 14 mg/24hr TD 24 hr patch  Self No No   Sig: Place 1 patch on the skin over 24 hours every 24 hours   ondansetron (ZOFRAN) 4 mg tablet   No No   Sig: Take 1 tablet (4 mg total) by mouth every 6 (six) hours   Patient not taking: Reported on 7/28/2023   pantoprazole (PROTONIX) 20 mg tablet   No No   Sig: Take 1 tablet (20 mg total) by mouth daily   Patient not taking: Reported on 8/3/2023   polyethylene glycol (GOLYTELY) 4000 mL solution   No No   Sig: Take 4,000 mL by mouth once for 1 dose Colonoscopy prep   polyethylene glycol (MIRALAX) 17 g packet   No No   Sig: Take 17 g by mouth daily   prochlorperazine (COMPAZINE) 10 mg tablet   No No   Sig: Take 1 tablet (10 mg total) by mouth every 6 (six) hours as needed for nausea or vomiting   psyllium (METAMUCIL) 58.6 % powder  Self No No   Sig: Take 1 packet by mouth 3 (three) times a day   Patient not taking: Reported on 5/17/2023   senna-docusate sodium (SENOKOT-S) 8.6-50 mg per tablet   No No   Sig: Take 1 tablet by mouth daily   simethicone (MYLICON,GAS-X) 638 MG CAPS  Self No No   Sig: Take 1 capsule (125 mg total) by mouth every 6 (six) hours as needed for flatulence   Patient not taking: Reported on 5/17/2023   tiZANidine (ZANAFLEX) 4 mg tablet  Self No No   Sig: Take 1 tablet (4 mg total) by mouth 2 (two) times a day as needed for muscle spasms   Patient not taking: Reported on 5/17/2023      Facility-Administered Medications: None       Past Medical History:   Diagnosis Date   • Abnormal Pap smear of cervix 2015    cryosurgery   • Hypoglycemia    • Migraine headache with aura     managed with Elavil       Past Surgical History:   Procedure Laterality Date   • CHOLECYSTECTOMY LAPAROSCOPIC N/A 10/27/2020    Procedure: CHOLECYSTECTOMY LAPAROSCOPIC;  Surgeon: Marcela Thakur MD;  Location: [address] [place] [address] MAIN OR;  Service: General   • GYNECOLOGIC CRYOSURGERY  2015   • HYSTERECTOMY  2016   • LAPAROSCOPY  2018   • CA LAPAROSCOPY W/LYSIS OF ADHESIONS N/A 3/3/2020    Procedure: L.O.A.;  Surgeon: Елена Turner MD;  Location: [facility];  Service: Gynecology   • CA LAPS ABD PRTM&OMENTUM DX W/WO SPEC BR/WA SPX N/A 8/9/2019    Procedure: LAPAROSCOPY DIAGNOSTIC; LYSIS OF ADHESIONS;  Surgeon: Syed Boyce MD;  Location: [facility];  Service: Gynecology   • TUBAL LIGATION         Family History   Problem Relation Age of Onset   • Cancer Paternal Grandmother         uterine   • Cancer Paternal Aunt         uterine    • Breast cancer Neg Hx    • Colon cancer Neg Hx    • Ovarian cancer Neg Hx      I have reviewed and agree with the history as documented. E-Cigarette/Vaping   • E-Cigarette Use Never User      E-Cigarette/Vaping Substances   • Nicotine No    • THC No    • CBD No    • Flavoring No    • Other No    • Unknown No      Social History     Tobacco Use   • Smoking status: Every Day     Packs/day: 0.50     Years: 15.00     Total pack years: 7.50     Types: Cigarettes   • Smokeless tobacco: Never   Vaping Use   • Vaping Use: Never used   Substance Use Topics   • Alcohol use: Not Currently   • Drug use: Never       Review of Systems   Constitutional: Negative for chills, fatigue and fever. HENT: Negative for congestion, ear pain, rhinorrhea and sore throat. Eyes: Negative for redness. Respiratory: Negative for chest tightness and shortness of breath. Cardiovascular: Positive for chest pain. Negative for palpitations. Gastrointestinal: Negative for abdominal pain, nausea and vomiting. Genitourinary: Negative for dysuria and hematuria. Musculoskeletal: Negative. Skin: Negative for rash. Neurological: Negative for dizziness, syncope, light-headedness and numbness. Physical Exam  Physical Exam  Vitals and nursing note reviewed. Exam conducted with a chaperone present. Constitutional:       Appearance: She is well-developed. HENT:      Head: Normocephalic. Eyes:      General: No scleral icterus. Cardiovascular:      Rate and Rhythm: Normal rate and regular rhythm. Pulmonary:      Effort: Pulmonary effort is normal.      Breath sounds: Normal breath sounds. No stridor. Chest:       Abdominal:      General: There is no distension. Palpations: Abdomen is soft. Tenderness: There is no abdominal tenderness. Musculoskeletal:         General: Normal range of motion. Skin:     General: Skin is warm and dry. Capillary Refill: Capillary refill takes less than 2 seconds. Neurological:      Mental Status: She is alert and oriented to person, place, and time.          Vital Signs  ED Triage Vitals [08/07/23 0857]   Temp Pulse Respirations Blood Pressure SpO2   -- 90 20 135/84 100 %      Temp src Heart Rate Source Patient Position - Orthostatic VS BP Location FiO2 (%)   -- Monitor Lying Left arm --      Pain Score       7           Vitals:    08/07/23 0857 08/07/23 0920   BP: 135/84    Pulse: 90 80   Patient Position - Orthostatic VS: Lying          Visual Acuity      ED Medications  Medications   morphine injection 2 mg (2 mg Intravenous Given 8/7/23 0920)   sodium chloride 0.9 % bolus 1,000 mL (1,000 mL Intravenous New Bag 8/7/23 0916)       Diagnostic Studies  Results Reviewed     Procedure Component Value Units Date/Time    Urine Microscopic [[id number]]  (Normal) Collected: 08/07/23 0919    Lab Status: Final result Specimen: Urine, Clean Catch Updated: 08/07/23 1023     RBC, UA 0-1 /hpf      WBC, UA 1-2 /hpf      Epithelial Cells Occasional /hpf      Bacteria, UA Occasional /hpf     D-Dimer [[id number]]  (Normal) Collected: 08/07/23 0917    Lab Status: Final result Specimen: Blood from Arm, Right Updated: 08/07/23 1002     D-Dimer, Quant <0.27 ug/ml FEU     POCT pregnancy, urine [[id number]]  (Normal) Resulted: 08/07/23 0917    Lab Status: Final result Updated: 08/07/23 0959     EXT Preg Test, Ur Negative     Control Valid    HS Troponin 0hr (reflex protocol) [[id number]]  (Normal) Collected: 08/07/23 0917    Lab Status: Final result Specimen: Blood from Arm, Right Updated: 08/07/23 0951     hs TnI 0hr <2 ng/L     HS Troponin I 2hr [[id number]]     Lab Status: No result Specimen: Blood     Comprehensive metabolic panel [[id number]]  (Abnormal) Collected: 08/07/23 0917    Lab Status: Final result Specimen: Blood from Arm, Right Updated: 08/07/23 0949     Sodium 137 mmol/L      Potassium 4.6 mmol/L      Chloride 109 mmol/L      CO2 20 mmol/L      ANION GAP 8 mmol/L      BUN 12 mg/dL      Creatinine 0.83 mg/dL      Glucose 110 mg/dL      Calcium 8.8 mg/dL      AST 19 U/L      ALT 21 U/L Alkaline Phosphatase 52 U/L      Total Protein 6.8 g/dL      Albumin 4.3 g/dL      Total Bilirubin 0.30 mg/dL      eGFR 90 ml/min/1.73sq m     Narrative:      National Kid[employer] Disease Foundation guidelines for Chronic Kidney Disease (CKD):   •  Stage 1 with normal or high GFR (GFR > 90 mL/min/1.73 square meters)  •  Stage 2 Mild CKD (GFR = 60-89 mL/min/1.73 square meters)  •  Stage 3A Moderate CKD (GFR = 45-59 mL/min/1.73 square meters)  •  Stage 3B Moderate CKD (GFR = 30-44 mL/min/1.73 square meters)  •  Stage 4 Severe CKD (GFR = 15-29 mL/min/1.73 square meters)  •  Stage 5 End Stage CKD (GFR <15 mL/min/1.73 square meters)  Note: GFR calculation is accurate only with a steady state creatinine    UA (URINE) with reflex to Scope [[id number]]  (Abnormal) Collected: 08/07/23 0919    Lab Status: Final result Specimen: Urine, Clean Catch Updated: 08/07/23 0933     Color, UA Mary     Clarity, UA Clear     Specific Gravity, UA 1.020     pH, UA 5.0     Leukocytes, UA 25.0     Nitrite, UA Negative     Protein, UA Negative mg/dl      Glucose, UA Negative mg/dl      Ketones, UA Negative mg/dl      Bilirubin, UA Negative     Occult Blood, UA 10.0     UROBILINOGEN UA Negative mg/dL     CBC and differential [[id number]] Collected: 08/07/23 0917    Lab Status: Final result Specimen: Blood from Arm, Right Updated: 08/07/23 0930     WBC 9.19 Thousand/uL      RBC 4.56 Million/uL      Hemoglobin 13.7 g/dL      Hematocrit 41.2 %      MCV 90 fL      MCH 30.0 pg      MCHC 33.3 g/dL      RDW 12.5 %      MPV 11.6 fL      Platelets 730 Thousands/uL      nRBC 0 /100 WBCs      Neutrophils Relative 62 %      Immat GRANS % 0 %      Lymphocytes Relative 28 %      Monocytes Relative 7 %      Eosinophils Relative 2 %      Basophils Relative 1 %      Neutrophils Absolute 5.66 Thousands/µL      Immature Grans Absolute 0.03 Thousand/uL      Lymphocytes Absolute 2.56 Thousands/µL      Monocytes Absolute 0.65 Thousand/µL      Eosinophils Absolute 0.22 Thousand/µL      Basophils Absolute 0.07 Thousands/µL                  XR chest 2 views   ED Interpretation by Jake Bah PA-C (08/07 1748)   No acute cardiopulmonary abnormalities visualized      Final Result by Troy Young MD (08/07 2129)      No acute cardiopulmonary disease. Workstation performed: [id number]                    Procedures  ECG 12 Lead Documentation Only    Date/Time: 8/7/2023 9:08 AM    Performed by: Jake Bah PA-C  Authorized by: Jaek Bah PA-C    Indications / Diagnosis:  Chest pain  ECG reviewed by me, the ED Provider: yes    Patient location:  ED  Previous ECG:     Comparison to cardiac monitor: Yes    Interpretation:     Interpretation: normal    Rate:     ECG rate:  82    ECG rate assessment: normal    Rhythm:     Rhythm: sinus rhythm    Ectopy:     Ectopy: none    QRS:     QRS axis:  Normal    QRS intervals:  Normal  Conduction:     Conduction: normal    ST segments:     ST segments:  Normal  T waves:     T waves: normal    Comments:        QRS 80                 ED Course             HEART Risk Score    Flowsheet Row Most Recent Value   Heart Score Risk Calculator    History 0 Filed at: 08/07/2023 1029   ECG 0 Filed at: 08/07/2023 1029   Age 0 Filed at: 08/07/2023 1029   Risk Factors 0 Filed at: 08/07/2023 1029   Troponin 0 Filed at: 08/07/2023 1029   HEART Score 0 Filed at: 08/07/2023 1029                        SBIRT 22yo+    Flowsheet Row Most Recent Value   Initial Alcohol Screen: US AUDIT-C     1. How often do you have a drink containing alcohol? 0 Filed at: 08/07/2023 0909   2. How many drinks containing alcohol do you have on a typical day you are drinking? 0 Filed at: 08/07/2023 0909   3a. Male UNDER 65: How often do you have five or more drinks on one occasion? 0 Filed at: 08/07/2023 0909   3b. FEMALE Any Age, or MALE 65+: How often do you have 4 or more drinks on one occassion?  0 Filed at: 08/07/2023 0909 Audit-C Score 0 Filed at: 08/07/2023 0909   LISBETH: How many times in the past year have you. .. Used an illegal drug or used a prescription medication for non-medical reasons? Never Filed at: 08/07/2023 5846                    Medical Decision Making  Patient presenting for evaluation of chest pain. Differential diagnosis includes but is not limited to: ACS, aortic dissection or aneurysm, pneumonia, pneumothorax, pleural effusion, exacerbation of chronic condition, musculoskeletal pain and other. Given pleuritic nature to the chest pain D-dimer therefore has been ordered for potential PE evaluation noted to be negative no indication for CT PE study. Work-up to include cardiac markers of Troponin less than 2  heart score is 0 EKG on my interpretation shows no acute malignant dysrhythmia nor ischemic changes. Chest Xray on my interpretation shows no acute cardiopulmonary abnormalities. Based upon patient's work-up patient has not been admitted for observation. All imaging and/or lab testing discussed with patient, strict return to ED precautions discussed. Patient recommended to follow up promptly with appropriate outpatient provider. Patient and/or family members verbalizes understanding and agrees with plan. Patient is stable for discharge.     Portions of the record may have been created with voice recognition software. Occasional wrong word or "sound a like" substitutions may have occurred due to the inherent limitations of voice recognition software. Read the chart carefully and recognize, using context, where substitutions have occurred. Amount and/or Complexity of Data Reviewed  Labs: ordered. Radiology: ordered and independent interpretation performed. Risk  Prescription drug management.           Disposition  Final diagnoses:   Chest pain, unspecified     Time reflects when diagnosis was documented in both MDM as applicable and the Disposition within this note     Time User Action Codes Description Comment    8/7/2023 10:24 AM Darylene Barrios Add [R07.9] Chest pain, unspecified       ED Disposition     ED Disposition   Discharge    Condition   Good    Date/Time   Mon Aug 7, 2023 10:24 AM    Comment   Lorrie Jones discharge to home/self care. Follow-up Information     Follow up With Specialties Details Why Contact Info    [facility] Schedule an appointment as soon as possible for a visit  As needed [address]  [phone]            Patient's Medications   Discharge Prescriptions    No medications on file       No discharge procedures on file.     PDMP Review     None          ED Provider  Electronically Signed by           Yolanda Lopez PA-C  08/07/23 1520

## 2023-08-07 NOTE — Clinical Note
Artur Gallo was seen and treated in our emergency department on 8/7/2023. Diagnosis:     Taylor Kitchen  may return to work on return date. She may return on this date: 08/09/2023         If you have any questions or concerns, please don't hesitate to call.       Darrel Chaudhary PA-C    ______________________________           _______________          _______________  Hospital Representative                              Date                                Time

## 2023-08-08 ENCOUNTER — HOSPITAL ENCOUNTER (EMERGENCY)
Facility: HOSPITAL | Age: 38
Discharge: HOME | End: 2023-08-08
Attending: EMERGENCY MEDICINE
Payer: COMMERCIAL

## 2023-08-08 VITALS
TEMPERATURE: 98.3 F | OXYGEN SATURATION: 100 % | RESPIRATION RATE: 20 BRPM | WEIGHT: 154 LBS | DIASTOLIC BLOOD PRESSURE: 71 MMHG | HEART RATE: 102 BPM | BODY MASS INDEX: 28.17 KG/M2 | SYSTOLIC BLOOD PRESSURE: 121 MMHG

## 2023-08-08 DIAGNOSIS — R10.2 CHRONIC PELVIC PAIN IN FEMALE: Primary | ICD-10-CM

## 2023-08-08 DIAGNOSIS — G89.29 CHRONIC PELVIC PAIN IN FEMALE: Primary | ICD-10-CM

## 2023-08-08 PROCEDURE — 99284 EMERGENCY DEPT VISIT MOD MDM: CPT | Performed by: EMERGENCY MEDICINE

## 2023-08-08 PROCEDURE — NC001 PR NO CHARGE: Performed by: EMERGENCY MEDICINE

## 2023-08-08 PROCEDURE — 99284 EMERGENCY DEPT VISIT MOD MDM: CPT

## 2023-08-08 RX ORDER — OXYCODONE HYDROCHLORIDE AND ACETAMINOPHEN 5; 325 MG/1; MG/1
1 TABLET ORAL EVERY 4 HOURS PRN
Qty: 10 TABLET | Refills: 0 | Status: SHIPPED | OUTPATIENT
Start: 2023-08-08 | End: 2023-08-18

## 2023-08-08 NOTE — ED PROVIDER NOTES
History  Chief Complaint   Patient presents with   • Pelvic Pain     Patient reports chronic left sided pelvic pain for the past few months, reports she has been seen multiple times and dx with left ovarian cyst and a dilated fallopian tube, suspicious for hydrosalpinx. 41-year-old female with history of chronic left lower quadrant pain, chronic left hydrosalpinx, prior hysterectomy in 2016 presents to the emergency department with ongoing, severe left lower quadrant pain. He had numerous ED visits for pain. She describes the pain as crampy in nature. She is seeing OB/GYN regarding this pelvic pain and the hydrosalpinx. She states she recently received a shot of Depo-Provera in hopes to treat this medically. On review of the charts, she does have extensive adhesions and there is bowel adhering to the abdominal wall. This would make surgery complicated and may result in possible ureteral or bowel injury. She states that she cannot take NSAIDs. No fevers, chills, urinary complaints, nausea, vomiting      History provided by:  Patient and spouse   used: Yes    Pelvic Pain  Location:  Chronic left lower quadrant pain/chronic left hydrosalpinx  Severity:  Severe  Timing:  Constant  Progression:  Unchanged  Chronicity:  Chronic  Associated symptoms: no abdominal pain, no chest pain, no congestion, no cough, no diarrhea, no fatigue, no fever, no headaches, no nausea, no rhinorrhea, no shortness of breath, no sore throat, no vomiting and no wheezing        Prior to Admission Medications   Prescriptions Last Dose Informant Patient Reported? Taking? Elagolix Sodium (Orilissa) 150 MG TABS  Self No No   Sig: Take 150 mg by mouth in the morning   Patient not taking: Reported on 6/28/2023   SUMAtriptan (IMITREX) 100 mg tablet   No No   Sig: Take 1 tablet (100 mg total) by mouth once as needed for migraine for up to 1 dose may repeat in 2 hours if necessary. Max dose 200mg in 24 hour period. TD 24 hr patch  Self No No   Sig: Place 1 patch on the skin over 24 hours every 24 hours   ondansetron (ZOFRAN) 4 mg tablet   No No   Sig: Take 1 tablet (4 mg total) by mouth every 6 (six) hours   Patient not taking: Reported on 7/28/2023   pantoprazole (PROTONIX) 20 mg tablet   No No   Sig: Take 1 tablet (20 mg total) by mouth daily   Patient not taking: Reported on 8/3/2023   polyethylene glycol (GOLYTELY) 4000 mL solution   No No   Sig: Take 4,000 mL by mouth once for 1 dose Colonoscopy prep   polyethylene glycol (MIRALAX) 17 g packet   No No   Sig: Take 17 g by mouth daily   prochlorperazine (COMPAZINE) 10 mg tablet   No No   Sig: Take 1 tablet (10 mg total) by mouth every 6 (six) hours as needed for nausea or vomiting   psyllium (METAMUCIL) 58.6 % powder  Self No No   Sig: Take 1 packet by mouth 3 (three) times a day   Patient not taking: Reported on 5/17/2023   senna-docusate sodium (SENOKOT-S) 8.6-50 mg per tablet   No No   Sig: Take 1 tablet by mouth daily   simethicone (MYLICON,GAS-X) 377 MG CAPS  Self No No   Sig: Take 1 capsule (125 mg total) by mouth every 6 (six) hours as needed for flatulence   Patient not taking: Reported on 5/17/2023   tiZANidine (ZANAFLEX) 4 mg tablet  Self No No   Sig: Take 1 tablet (4 mg total) by mouth 2 (two) times a day as needed for muscle spasms   Patient not taking: Reported on 5/17/2023      Facility-Administered Medications: None       Past Medical History:   Diagnosis Date   • Abnormal Pap smear of cervix 2015    cryosurgery   • Hypoglycemia    • Migraine headache with aura     managed with Elavil       Past Surgical History:   Procedure Laterality Date   • CHOLECYSTECTOMY LAPAROSCOPIC N/A 10/27/2020    Procedure: CHOLECYSTECTOMY LAPAROSCOPIC;  Surgeon: Nayely Pimentel MD;  Location: [address] [place] [address] MAIN OR;  Service: General   • GYNECOLOGIC CRYOSURGERY  2015   • HYSTERECTOMY  2016   • LAPAROSCOPY  2018   • [place] LAPAROSCOPY W/LYSIS OF ADHESIONS N/A 3/3/2020    Procedure: L.O.A.; Surgeon: Wade Aleman MD;  Location[facility];  Service: Gynecology   • [place] LAPS ABD PRTM&OMENTUM DX W/WO SPEC BR/WA SPX N/A 8/9/2019    Procedure: LAPAROSCOPY DIAGNOSTIC; LYSIS OF ADHESIONS;  Surgeon: Licha Benton MD;  Location: [facility];  Service: Gynecology   • TUBAL LIGATION         Family History   Problem Relation Age of Onset   • Cancer Paternal Grandmother         uterine   • Cancer Paternal Aunt         uterine    • Breast cancer Neg Hx    • Colon cancer Neg Hx    • Ovarian cancer Neg Hx      I have reviewed and agree with the history as documented. E-Cigarette/Vaping   • E-Cigarette Use Never User      E-Cigarette/Vaping Substances   • Nicotine No    • THC No    • CBD No    • Flavoring No    • Other No    • Unknown No      Social History     Tobacco Use   • Smoking status: Every Day     Packs/day: 0.50     Years: 15.00     Total pack years: 7.50     Types: Cigarettes   • Smokeless tobacco: Never   Vaping Use   • Vaping Use: Never used   Substance Use Topics   • Alcohol use: Not Currently   • Drug use: Never       Review of Systems   Constitutional: Negative. Negative for chills, diaphoresis, fatigue and fever. HENT: Negative. Negative for congestion, rhinorrhea and sore throat. Eyes: Negative. Negative for discharge, redness and itching. Respiratory: Negative. Negative for apnea, cough, chest tightness, shortness of breath and wheezing. Cardiovascular: Negative for chest pain, palpitations and leg swelling. Gastrointestinal: Negative. Negative for abdominal pain, diarrhea, nausea and vomiting. Endocrine: Negative. Genitourinary: Positive for pelvic pain. Negative for difficulty urinating, dysuria, flank pain, frequency, hematuria, urgency, vaginal bleeding and vaginal discharge. Musculoskeletal: Negative. Negative for back pain. Skin: Negative. Allergic/Immunologic: Negative. Neurological: Negative.   Negative for dizziness, syncope, weakness, light-headedness, numbness and headaches. Hematological: Negative. All other systems reviewed and are negative. Physical Exam  Physical Exam  Vitals and nursing note reviewed. Constitutional:       General: She is awake. She is not in acute distress. Appearance: Normal appearance. She is well-developed and overweight. She is not ill-appearing, toxic-appearing or diaphoretic. HENT:      Head: Normocephalic and atraumatic. Right Ear: External ear normal.      Left Ear: External ear normal.      Nose: Nose normal.      Mouth/Throat:      Pharynx: No oropharyngeal exudate. Eyes:      General: No scleral icterus. Right eye: No discharge. Left eye: No discharge. Conjunctiva/sclera: Conjunctivae normal.   Neck:      Thyroid: No thyromegaly. Vascular: No JVD. Trachea: No tracheal deviation. Cardiovascular:      Rate and Rhythm: Normal rate and regular rhythm. Heart sounds: Normal heart sounds. No murmur heard. No friction rub. No gallop. Pulmonary:      Effort: Pulmonary effort is normal. No respiratory distress. Breath sounds: Normal breath sounds. No stridor. No wheezing, rhonchi or rales. Chest:      Chest wall: No tenderness. Abdominal:      General: Bowel sounds are normal. There is no distension. Palpations: Abdomen is soft. There is no mass. Tenderness: There is abdominal tenderness in the left lower quadrant. There is guarding. There is no rebound. Hernia: No hernia is present. Lymphadenopathy:      Cervical: No cervical adenopathy. Skin:     General: Skin is warm and dry. Coloration: Skin is not jaundiced or pale. Findings: No bruising, erythema, lesion or rash. Neurological:      General: No focal deficit present. Mental Status: She is alert and oriented to person, place, and time. Motor: No weakness or abnormal muscle tone. Deep Tendon Reflexes: Reflexes are normal and symmetric. Psychiatric:         Mood and Affect: Mood normal.         Behavior: Behavior is cooperative. Vital Signs  ED Triage Vitals [08/08/23 1606]   Temperature Pulse Respirations Blood Pressure SpO2   98.3 °F (36.8 °C) 102 20 121/71 100 %      Temp Source Heart Rate Source Patient Position - Orthostatic VS BP Location FiO2 (%)   Oral Monitor Sitting Right arm --      Pain Score       10 - Worst Possible Pain           Vitals:    08/08/23 1606   BP: 121/71   Pulse: 102   Patient Position - Orthostatic VS: Sitting         Visual Acuity      ED Medications  Medications - No data to display    Diagnostic Studies  Results Reviewed     None                 No orders to display              Procedures  Procedures         ED Course                               SBIRT 22yo+    Flowsheet Row Most Recent Value   Initial Alcohol Screen: US AUDIT-C     1. How often do you have a drink containing alcohol? 0 Filed at: 08/08/2023 1659   2. How many drinks containing alcohol do you have on a typical day you are drinking? 0 Filed at: 08/08/2023 1659   3b. FEMALE Any Age, or MALE 65+: How often do you have 4 or more drinks on one occassion? 0 Filed at: 08/08/2023 1659   Audit-C Score 0 Filed at: 08/08/2023 1659   LISBETH: How many times in the past year have you. .. Used an illegal drug or used a prescription medication for non-medical reasons? Never Filed at: 08/08/2023 1659                    Medical Decision Making  64-year-old female presents to the ED with exacerbation of her chronic left lower quadrant abdominal pain. She has history of left hydrosalpinx and has been following with OB/GYN. She has had numerous imaging studies showing the hydrosalpinx and there is also adhesions and part of the colon is adhered to the nominal wall. She is currently trying Depo Provera injections as a medical treatment due to surgery potentially being complicated.   She had no fevers or chills, nausea, vomiting, diarrhea or urinary complaints. She states that she cannot take NSAIDs. She tells me she has nothing at home for the pain. She has needed Roxicodone in the past for pain. We will prescribe short course of narcotic pain medication until patient can follow-up with her OB/GYN regarding her pain management. She is stable for discharge. Disposition  Final diagnoses:   Chronic pelvic pain in female     Time reflects when diagnosis was documented in both MDM as applicable and the Disposition within this note     Time User Action Codes Description Comment    8/8/2023  5:06 PM Ritchie Phelan Add [R10.2,  G89.29] Chronic pelvic pain in female       ED Disposition     ED Disposition   Discharge    Condition   Good    Date/Time   Tue Aug 8, 2023  5:05 PM    Comment   Marcos Camp discharge to home/self care. Follow-up Information     Follow up With Specialties Details Why Contact Info    Linda Ruiz MD Obstetrics and Gynecology, Obstetrics, Gynecology Schedule an appointment as soon as possible for a visit in 1 day  [address] [place] [id number] [phone]            Patient's Medications   Discharge Prescriptions    OXYCODONE-ACETAMINOPHEN (PERCOCET) 5-325 MG PER TABLET    Take 1 tablet by mouth every 4 (four) hours as needed for moderate pain for up to 10 days Max Daily Amount: 6 tablets       Start Date: 8/8/2023  End Date: 8/18/2023       Order Dose: 1 tablet       Quantity: 10 tablet    Refills: 0       No discharge procedures on file.     PDMP Review     None          ED Provider  Electronically Signed by           Catrachita Russo DO  08/08/23 2549

## 2023-09-06 ENCOUNTER — OFFICE VISIT (OUTPATIENT)
Dept: FAMILY MEDICINE CLINIC | Facility: CLINIC | Age: 38
End: 2023-09-06

## 2023-09-06 VITALS
RESPIRATION RATE: 17 BRPM | WEIGHT: 151.8 LBS | DIASTOLIC BLOOD PRESSURE: 60 MMHG | OXYGEN SATURATION: 98 % | HEART RATE: 102 BPM | SYSTOLIC BLOOD PRESSURE: 106 MMHG | BODY MASS INDEX: 27.94 KG/M2 | TEMPERATURE: 98.5 F | HEIGHT: 62 IN

## 2023-09-06 DIAGNOSIS — K21.9 GASTROESOPHAGEAL REFLUX DISEASE WITHOUT ESOPHAGITIS: ICD-10-CM

## 2023-09-06 DIAGNOSIS — G43.109 MIGRAINE WITH AURA AND WITHOUT STATUS MIGRAINOSUS, NOT INTRACTABLE: ICD-10-CM

## 2023-09-06 DIAGNOSIS — M54.2 NECK PAIN: Primary | ICD-10-CM

## 2023-09-06 DIAGNOSIS — E55.9 VITAMIN D DEFICIENCY: ICD-10-CM

## 2023-09-06 DIAGNOSIS — R10.32 CONTINUOUS LLQ ABDOMINAL PAIN: Chronic | ICD-10-CM

## 2023-09-06 DIAGNOSIS — H60.543 ACUTE ECZEMATOID OTITIS EXTERNA OF BOTH EARS: ICD-10-CM

## 2023-09-06 PROCEDURE — 99214 OFFICE O/P EST MOD 30 MIN: CPT | Performed by: PHYSICIAN ASSISTANT

## 2023-09-06 RX ORDER — ERGOCALCIFEROL 1.25 MG/1
50000 CAPSULE ORAL WEEKLY
Qty: 16 CAPSULE | Refills: 0 | Status: SHIPPED | OUTPATIENT
Start: 2023-09-06

## 2023-09-06 RX ORDER — SUMATRIPTAN 100 MG/1
100 TABLET, FILM COATED ORAL ONCE AS NEEDED
Qty: 10 TABLET | Refills: 2 | Status: SHIPPED | OUTPATIENT
Start: 2023-09-06 | End: 2023-09-08

## 2023-09-06 RX ORDER — OXYCODONE HYDROCHLORIDE AND ACETAMINOPHEN 5; 325 MG/1; MG/1
1 TABLET ORAL 2 TIMES DAILY PRN
Qty: 10 TABLET | Refills: 0 | Status: SHIPPED | OUTPATIENT
Start: 2023-09-06

## 2023-09-06 RX ORDER — PROCHLORPERAZINE MALEATE 10 MG
10 TABLET ORAL EVERY 6 HOURS PRN
Qty: 30 TABLET | Refills: 1 | Status: SHIPPED | OUTPATIENT
Start: 2023-09-06

## 2023-09-06 RX ORDER — NEOMYCIN SULFATE, POLYMYXIN B SULFATE AND HYDROCORTISONE 10; 3.5; 1 MG/ML; MG/ML; [USP'U]/ML
4 SUSPENSION/ DROPS AURICULAR (OTIC) 2 TIMES DAILY
Qty: 10 ML | Refills: 0 | Status: SHIPPED | OUTPATIENT
Start: 2023-09-06

## 2023-09-06 NOTE — PROGRESS NOTES
Assessment/Plan:    Migraine headache with aura  - Reviewed CT head without contrast (2/24/23) which showed no acute intracranial abnormality.   - Continue Imitrex 100 mg, as needed at onset of migraine.   -Per patient, she has tried Zofran and Reglan in the past for the nausea but they were both ineffective. Continue Compazine 10 mg as needed for nausea/vomiting.  - If migraines become more frequent, will consider restarting elavil for migraine prevention.   -Patient is scheduled to establish with neurology in September. Vitamin D deficiency  - Continue vitamin D 50,000 units once weekly. Gastroesophageal reflux disease  - Stable. Continue Protonix 20 mg daily. - Continue to avoid trigger foods. Continuous LLQ abdominal pain  - Patient underwent CT abdomen pelvis with contrast (6/10/23). Results show no evidence of acute abdominopelvic process. Chronic left hydrosalpinx. - Pelvic US (7/6/23) revealed: Fluid-filled tubular structure in the left adnexa similar to prior study likely representing hydrosalpinx. Status post hysterectomy. Right ovary was not visualized. - Patient was scheduled for EGD and colonoscopy for further evaluation on 7/27/23, however patient was unable to attend. Recommend calling to reschedule this.  - OBGYN has prescribed amitriptyline and Depo injections for the pain, however, both have not been helpful. Ana Paula Muff had been prescribed but prior authorization was denied.   -Patient reports she is now scheduled to establish with [facility] OBGYN for second opinion.  -Continue follow-up with OB/GYN and gastroenterology as scheduled. Diagnoses and all orders for this visit:    Neck pain  -     oxyCODONE-acetaminophen (Percocet) 5-325 mg per tablet; Take 1 tablet by mouth 2 (two) times a day as needed for moderate pain Max Daily Amount: 2 tablets    Migraine with aura and without status migrainosus, not intractable  -     SUMAtriptan (IMITREX) 100 mg tablet;  Take 1 tablet (100 mg total) by mouth once as needed for migraine for up to 1 dose may repeat in 2 hours if necessary. Max dose 200mg in 24 hour period. -     prochlorperazine (COMPAZINE) 10 mg tablet; Take 1 tablet (10 mg total) by mouth every 6 (six) hours as needed for nausea or vomiting    Acute eczematoid otitis externa of both ears  -     neomycin-polymyxin-hydrocortisone (CORTISPORIN) 0.35%-10,000 units/mL-1% otic suspension; Administer 4 drops into both ears 2 (two) times a day    Vitamin D deficiency  -     ergocalciferol (VITAMIN D2) 50,000 units; Take 1 capsule (50,000 Units total) by mouth once a week    Gastroesophageal reflux disease without esophagitis    Continuous LLQ abdominal pain          All of patients questions were answered. Patient understands and agrees with the above plan. Return in about 2 months (around 11/6/2023) for Next scheduled follow up migraines. Linda Bedoya PA-C  09/06/23  [facility]          Subjective:     Patient ID: Pedro Winters  is a 41 y.o. female with known PMH of migraines, GERD who presents today in office for neck pain. - Patient is a 41 y.o. female who presents today for neck pain. Since last visit, patient was seen in the ED 3 different times for pelvic pain. Patient was also complaining of chest pain at ED visit on 8/7/2023. Cardio work-up was normal.  Chest x-ray normal.  Patient was prescribed short course of Percocet upon discharge from ED visit on 8/8/23.    -Today, patient notes she has experienced relief of her pelvic pain when taking the Percocet. Patient notes she had been breaking it in half and only taking it once a day if needed, usually at night to help with sleeping. Patient notes she has tried multiple different medications to help with pain including tizanidine, Tylenol, amitriptyline, Depo injections, Toradol, tramadol-none of which were effective. Patient reports she is allergic to NSAIDs so has not been taking these.   Patient notes her chest pain has resolved. Patient notes she has scheduled an appointment to establish with new OB/GYN at [facility] for second opinion.    -Patient notes her migraines have been under good control. Patient notes she is having a lot of stress at home, but when she has a migraine, she will take the sumatriptan and it really helps. Patient notes she is having issues with her landlord and may have to move out have her place within the next few months. The following portions of the patient's history were reviewed and updated as appropriate: allergies, current medications, past family history, past medical history, past social history, past surgical history and problem list.        Review of Systems   Constitutional: Negative for chills and fever. HENT: Negative for congestion, ear pain and sore throat. Eyes: Negative for pain. Respiratory: Negative for cough, chest tightness and shortness of breath. Cardiovascular: Negative for chest pain and palpitations. Gastrointestinal: Positive for abdominal pain and constipation. Negative for diarrhea, nausea and vomiting. Genitourinary: Positive for pelvic pain. Negative for difficulty urinating and dysuria. Musculoskeletal: Positive for arthralgias, back pain and gait problem. Skin: Negative for rash. Neurological: Positive for headaches. Negative for dizziness and numbness. BMI Counseling: Body mass index is 27.76 kg/m². The BMI is above normal. Nutrition recommendations include decreasing portion sizes, encouraging healthy choices of fruits and vegetables, consuming healthier snacks, limiting drinks that contain sugar, moderation in carbohydrate intake, increasing intake of lean protein and reducing intake of cholesterol. Exercise recommendations include moderate physical activity 150 minutes/week. No pharmacotherapy was ordered. Rationale for BMI follow-up plan is due to patient being overweight or obese.            Objective:   Vitals: 09/06/23 0906   BP: 106/60   BP Location: Right arm   Patient Position: Sitting   Cuff Size: Standard   Pulse: 102   Resp: 17   Temp: 98.5 °F (36.9 °C)   TempSrc: Temporal   SpO2: 98%   Weight: 68.9 kg (151 lb 12.8 oz)   Height: 5' 2" (1.575 m)         Physical Exam  Vitals and nursing note reviewed. Constitutional:       General: She is not in acute distress. Appearance: She is well-developed. HENT:      Head: Normocephalic and atraumatic. Right Ear: External ear normal.      Left Ear: External ear normal.      Nose: Nose normal.   Eyes:      Conjunctiva/sclera: Conjunctivae normal.   Cardiovascular:      Rate and Rhythm: Normal rate and regular rhythm. Pulses: Normal pulses. Heart sounds: Normal heart sounds. Pulmonary:      Effort: Pulmonary effort is normal. No respiratory distress. Breath sounds: Normal breath sounds. No wheezing. Abdominal:      General: Bowel sounds are normal.      Palpations: Abdomen is soft. Tenderness: There is abdominal tenderness (LLQ). There is no guarding or rebound. Musculoskeletal:      Cervical back: Normal range of motion and neck supple. Lumbar back: Tenderness present. No swelling. Normal range of motion. Negative right straight leg raise test and negative left straight leg raise test.      Left knee: Normal range of motion. Tenderness present over the medial joint line. Skin:     General: Skin is warm and dry. Neurological:      Mental Status: She is alert and oriented to person, place, and time.    Psychiatric:         Behavior: Behavior normal.

## 2023-09-07 PROBLEM — M54.2 NECK PAIN: Status: ACTIVE | Noted: 2023-09-07

## 2023-09-07 PROBLEM — E55.9 VITAMIN D DEFICIENCY: Status: ACTIVE | Noted: 2023-09-07

## 2023-09-07 NOTE — ASSESSMENT & PLAN NOTE
- Reviewed CT head without contrast (2/24/23) which showed no acute intracranial abnormality.   - Continue Imitrex 100 mg, as needed at onset of migraine.   -Per patient, she has tried Zofran and Reglan in the past for the nausea but they were both ineffective. Continue Compazine 10 mg as needed for nausea/vomiting.  - If migraines become more frequent, will consider restarting elavil for migraine prevention.   -Patient is scheduled to establish with neurology in September.

## 2023-09-07 NOTE — ASSESSMENT & PLAN NOTE
- Patient underwent CT abdomen pelvis with contrast (6/10/23). Results show no evidence of acute abdominopelvic process. Chronic left hydrosalpinx. - Pelvic US (7/6/23) revealed: Fluid-filled tubular structure in the left adnexa similar to prior study likely representing hydrosalpinx. Status post hysterectomy. Right ovary was not visualized. - Patient was scheduled for EGD and colonoscopy for further evaluation on 7/27/23, however patient was unable to attend. Recommend calling to reschedule this.  - OBGYN has prescribed amitriptyline and Depo injections for the pain, however, both have not been helpful. Mittie Mccreedy had been prescribed but prior authorization was denied.   -Patient reports she is now scheduled to establish with [facility] OBGYN for second opinion.  -Continue follow-up with OB/GYN and gastroenterology as scheduled.

## 2023-09-08 ENCOUNTER — OFFICE VISIT (OUTPATIENT)
Dept: OBGYN CLINIC | Facility: CLINIC | Age: 38
End: 2023-09-08

## 2023-09-08 VITALS
DIASTOLIC BLOOD PRESSURE: 77 MMHG | BODY MASS INDEX: 28.08 KG/M2 | SYSTOLIC BLOOD PRESSURE: 112 MMHG | HEIGHT: 62 IN | HEART RATE: 102 BPM | WEIGHT: 152.6 LBS

## 2023-09-08 DIAGNOSIS — N89.8 VAGINA ITCHING: Primary | ICD-10-CM

## 2023-09-08 DIAGNOSIS — N89.8 VAGINAL DISCHARGE: ICD-10-CM

## 2023-09-08 DIAGNOSIS — B37.31 VAGINAL CANDIDIASIS: ICD-10-CM

## 2023-09-08 PROCEDURE — 99213 OFFICE O/P EST LOW 20 MIN: CPT | Performed by: NURSE PRACTITIONER

## 2023-09-08 RX ORDER — FLUCONAZOLE 150 MG/1
150 TABLET ORAL
Qty: 2 TABLET | Refills: 0 | Status: SHIPPED | OUTPATIENT
Start: 2023-09-08 | End: 2023-09-12

## 2023-09-08 NOTE — PATIENT INSTRUCTIONS
Ron por raymundo confianza en nuestro equipo. Giana Major y agradecemos queta comentarios. Si recibe jonathan encuesta nuestra, tómese unos momentos para informarnos cómo estamos.    Sinceramente,  [address] Cedric Campos

## 2023-09-08 NOTE — PROGRESS NOTES
PROBLEM GYNECOLOGICAL VISIT    Eduar Hills is a 41 y.o. female who presents today with complaint of vaginal discharge and itching.   Her general medical history has been reviewed and she reports it as follows:    Past Medical History:   Diagnosis Date   • Abnormal Pap smear of cervix     2015 cryosurgery; 2019 NILM pap/neg HPV   • Migraine with aura      Past Surgical History:   Procedure Laterality Date   • CHOLECYSTECTOMY LAPAROSCOPIC N/A 10/27/2020    Procedure: CHOLECYSTECTOMY LAPAROSCOPIC;  Surgeon: Gadiel Vergara MD;  Location: [facility] MAIN OR;  Service: General   • GYNECOLOGIC CRYOSURGERY     • HYSTERECTOMY     • LAPAROSCOPY     • [place] LAPAROSCOPY W/LYSIS OF ADHESIONS N/A 3/3/2020    Procedure: L.O.A.;  Surgeon: Uma Espinoza MD;  Location: [facility];  Service: Gynecology   • [place] LAPS ABD PRTM&OMENTUM DX W/WO SPEC BR/WA SPX N/A 2019    Procedure: LAPAROSCOPY DIAGNOSTIC; LYSIS OF ADHESIONS;  Surgeon: Betsy Watt MD;  Location: [facility];  Service: Gynecology   • TUBAL LIGATION       OB History        2    Para   2    Term   1       1    AB   0    Living   2       SAB   0    IAB   0    Ectopic   0    Multiple   0    Live Births   2               Social History     Tobacco Use   • Smoking status: Every Day     Packs/day: 0.50     Types: Cigarettes   • Smokeless tobacco: Never   Vaping Use   • Vaping Use: Never used   Substance Use Topics   • Alcohol use: Not Currently   • Drug use: Never     Social History     Substance and Sexual Activity   Sexual Activity Yes   • Partners: Male   • Birth control/protection: Female Sterilization       Current Outpatient Medications   Medication Instructions   • ergocalciferol (VITAMIN D2) 50,000 Units, Oral, Weekly   • ketorolac (TORADOL) 10 mg, Oral, Every 6 hours PRN   • neomycin-polymyxin-hydrocortisone (CORTISPORIN) 0.35%-10,000 units/mL-1% otic suspension 4 drops, Both Ears, 2 times daily   • nicotine ([place]) 14 mg/24hr TD 24 hr patch 1 patch, Transdermal, Every 24 hours   • oxyCODONE-acetaminophen (Percocet) 5-325 mg per tablet 1 tablet, Oral, 2 times daily PRN   • polyethylene glycol (MIRALAX) 17 g, Oral, Daily   • prochlorperazine (COMPAZINE) 10 mg, Oral, Every 6 hours PRN   • senna-docusate sodium (SENOKOT-S) 8.6-50 mg per tablet 1 tablet, Oral, Daily       History of Present Illness:   Reports vaginal discharge and itching for the past 2 days. Denies vaginal odor. Review of Systems:  Review of Systems   Constitutional: Negative. Gastrointestinal: Negative. Genitourinary: Positive for vaginal discharge and vaginal pain. Physical Exam:  /77   Pulse 102   Ht 5' 2" (1.575 m)   Wt 69.2 kg (152 lb 9.6 oz)   BMI 27.91 kg/m²   Physical Exam  Constitutional:       General: She is not in acute distress. Genitourinary:      Vulva exam comments: Erythema of mucous membranes @introitus. Vaginal discharge (white adherent) and erythema present. Neurological:      Mental Status: She is alert. Skin:     General: Skin is warm and dry. Vitals reviewed. Assessment:   1. Vaginal candidiasis. Plan:   1. Given Rx Diflucan. 2. Return to office in 10 months for annual GYN exam.   3. Patient's depression screening was assessed with a PHQ-2 score of 0. Their PHQ-9 score was 0. Clinically patient does not have depression. No treatment is required.

## 2023-09-13 ENCOUNTER — TELEPHONE (OUTPATIENT)
Dept: OBGYN CLINIC | Facility: CLINIC | Age: 38
End: 2023-09-13

## 2023-09-14 ENCOUNTER — OFFICE VISIT (OUTPATIENT)
Dept: OBGYN CLINIC | Facility: CLINIC | Age: 38
End: 2023-09-14

## 2023-09-14 VITALS
HEART RATE: 79 BPM | WEIGHT: 154.4 LBS | SYSTOLIC BLOOD PRESSURE: 117 MMHG | HEIGHT: 62 IN | DIASTOLIC BLOOD PRESSURE: 56 MMHG | BODY MASS INDEX: 28.41 KG/M2

## 2023-09-14 DIAGNOSIS — Z72.0 TOBACCO USE: ICD-10-CM

## 2023-09-14 DIAGNOSIS — N76.0 BV (BACTERIAL VAGINOSIS): Primary | ICD-10-CM

## 2023-09-14 DIAGNOSIS — B96.89 BV (BACTERIAL VAGINOSIS): Primary | ICD-10-CM

## 2023-09-14 DIAGNOSIS — B37.31 VAGINAL CANDIDIASIS: ICD-10-CM

## 2023-09-14 PROCEDURE — 99213 OFFICE O/P EST LOW 20 MIN: CPT | Performed by: NURSE PRACTITIONER

## 2023-09-14 RX ORDER — METRONIDAZOLE 500 MG/1
500 TABLET ORAL 2 TIMES DAILY
Qty: 14 TABLET | Refills: 0 | Status: SHIPPED | OUTPATIENT
Start: 2023-09-14 | End: 2023-09-21

## 2023-09-14 RX ORDER — FLUCONAZOLE 150 MG/1
150 TABLET ORAL ONCE
Qty: 1 TABLET | Refills: 0 | Status: SHIPPED | OUTPATIENT
Start: 2023-09-14 | End: 2023-09-14

## 2023-09-14 RX ORDER — NICOTINE 21 MG/24HR
1 PATCH, TRANSDERMAL 24 HOURS TRANSDERMAL EVERY 24 HOURS
Qty: 28 PATCH | Refills: 1 | Status: SHIPPED | OUTPATIENT
Start: 2023-09-14

## 2023-09-14 NOTE — PROGRESS NOTES
PROBLEM GYNECOLOGICAL VISIT    Tomas Nieto is a 41 y.o. female who presents today with complaint of vaginal itching.   Her general medical history has been reviewed and she reports it as follows:    Past Medical History:   Diagnosis Date   • Abnormal Pap smear of cervix      cryosurgery; 2019 NILM pap/neg HPV   • Migraine with aura      Past Surgical History:   Procedure Laterality Date   • CHOLECYSTECTOMY LAPAROSCOPIC N/A 10/27/2020    Procedure: CHOLECYSTECTOMY LAPAROSCOPIC;  Surgeon: Dionna Stevens MD;  Location: [facility] MAIN OR;  Service: General   • GYNECOLOGIC CRYOSURGERY     • HYSTERECTOMY     • LAPAROSCOPY  2018   • OH LAPAROSCOPY W/LYSIS OF ADHESIONS N/A 3/3/2020    Procedure: L.O.A.;  Surgeon: Soniya Jackson MD;  Location: [facility] Main OR;  Service: Gynecology   • OH LAPS ABD PRTM&OMENTUM DX W/WO SPEC BR/WA SPX N/A 2019    Procedure: LAPAROSCOPY DIAGNOSTIC; LYSIS OF ADHESIONS;  Surgeon: Ailin Chino MD;  Location: [facility] Main OR;  Service: Gynecology   • TUBAL LIGATION       OB History        2    Para   2    Term   1       1    AB   0    Living   2       SAB   0    IAB   0    Ectopic   0    Multiple   0    Live Births   2               Social History     Tobacco Use   • Smoking status: Every Day     Packs/day: 0.50     Types: Cigarettes   • Smokeless tobacco: Never   Vaping Use   • Vaping Use: Never used   Substance Use Topics   • Alcohol use: Not Currently   • Drug use: Never     Social History     Substance and Sexual Activity   Sexual Activity Yes   • Partners: Male   • Birth control/protection: Female Sterilization       Current Outpatient Medications   Medication Instructions   • ergocalciferol (VITAMIN D2) 50,000 Units, Oral, Weekly   • ketorolac (TORADOL) 10 mg, Oral, Every 6 hours PRN   • neomycin-polymyxin-hydrocortisone (CORTISPORIN) 0.35%-10,000 units/mL-1% otic suspension 4 drops, Both Ears, 2 times daily   • nicotine (NICODERM CQ) 14 mg/24hr TD 24 hr patch 1 patch, Transdermal, Every 24 hours   • oxyCODONE-acetaminophen (Percocet) 5-325 mg per tablet 1 tablet, Oral, 2 times daily PRN   • polyethylene glycol (MIRALAX) 17 g, Oral, Daily   • prochlorperazine (COMPAZINE) 10 mg, Oral, Every 6 hours PRN   • senna-docusate sodium (SENOKOT-S) 8.6-50 mg per tablet 1 tablet, Oral, Daily       History of Present Illness:   Patient was treated by me for vaginal candidiasis on 9/8/23. She reports that vaginal discharge has improved but that she still has itching externally. Denies vaginal odor. Review of Systems:  Review of Systems   Constitutional: Negative. Gastrointestinal: Negative. Genitourinary: Positive for vaginal pain (vulvar itching). Negative for vaginal discharge. Physical Exam:  /56 (BP Location: Right arm, Patient Position: Sitting, Cuff Size: Standard)   Pulse 79   Ht 5' 2" (1.575 m)   Wt 70 kg (154 lb 6.4 oz)   BMI 28.24 kg/m²   Physical Exam  Constitutional:       General: She is not in acute distress. Genitourinary:      Vulva exam comments: Normal.      Vaginal discharge present. No vaginal erythema. Neurological:      Mental Status: She is alert. Skin:     General: Skin is warm and dry. Vitals reviewed. Point of Care Testing:   -Wet mount: + clue cells, no trichomonads, few WBC's, pH=5.0   -KOH mount: no hyphae   -Whiff: positive    Assessment:   1. BV.   2. Vulvar itching    Plan:   1. Given Rx Flagyl. 2. Given Rx Mycolog cream and PO Diflucan. 3. Return to office as previously scheduled. .   4. Patient's depression screening was assessed with a PHQ-2 score of 0. Their PHQ-9 score was 0. Clinically patient does not have depression. No treatment is required.

## 2023-09-14 NOTE — PATIENT INSTRUCTIONS
Thank you for your confidence in our team.   We appreciate you and welcome your feedback. If you receive a survey from us, please take a few moments to let us know how we are doing.    Sincerely,  Bob Stewart

## 2023-09-18 DIAGNOSIS — G43.109 MIGRAINE WITH AURA AND WITHOUT STATUS MIGRAINOSUS, NOT INTRACTABLE: Primary | Chronic | ICD-10-CM

## 2023-09-19 ENCOUNTER — APPOINTMENT (OUTPATIENT)
Dept: LAB | Facility: CLINIC | Age: 38
End: 2023-09-19
Payer: COMMERCIAL

## 2023-09-19 ENCOUNTER — CONSULT (OUTPATIENT)
Dept: NEUROLOGY | Facility: CLINIC | Age: 38
End: 2023-09-19
Payer: COMMERCIAL

## 2023-09-19 VITALS
BODY MASS INDEX: 28.34 KG/M2 | RESPIRATION RATE: 20 BRPM | HEIGHT: 62 IN | DIASTOLIC BLOOD PRESSURE: 78 MMHG | OXYGEN SATURATION: 98 % | SYSTOLIC BLOOD PRESSURE: 102 MMHG | WEIGHT: 154 LBS | TEMPERATURE: 98.1 F | HEART RATE: 101 BPM

## 2023-09-19 DIAGNOSIS — G43.109 MIGRAINE WITH AURA AND WITHOUT STATUS MIGRAINOSUS, NOT INTRACTABLE: Chronic | ICD-10-CM

## 2023-09-19 LAB
MAGNESIUM SERPL-MCNC: 2.3 MG/DL (ref 1.9–2.7)
VIT B12 SERPL-MCNC: 189 PG/ML (ref 180–914)

## 2023-09-19 PROCEDURE — 82607 VITAMIN B-12: CPT

## 2023-09-19 PROCEDURE — 36415 COLL VENOUS BLD VENIPUNCTURE: CPT

## 2023-09-19 PROCEDURE — 99245 OFF/OP CONSLTJ NEW/EST HI 55: CPT | Performed by: PSYCHIATRY & NEUROLOGY

## 2023-09-19 PROCEDURE — 83735 ASSAY OF MAGNESIUM: CPT

## 2023-09-19 RX ORDER — SUMATRIPTAN 100 MG/1
100 TABLET, FILM COATED ORAL DAILY PRN
COMMUNITY

## 2023-09-19 NOTE — PROGRESS NOTES
[facility] Neurology Consult  PATIENT:  Jack Fofana (: 1985)  MRN:  [id number]  DATE OF SERVICE:  2023    REFERRED BY: Uzma Patel*  PMD: Gaurav Healy PA-C    Assessment/Plan:     Jack Fofana is a very pleasant  41 y.o. female with a past medical history that includes choledocholithiasis s/p cholescystectomy, hydrosalpinx, GERD, gastritis, hypoglycemia referred here for evaluation of headache. She reports having a unilateral throbbing pain with migrainous features that has become more severe over the past six months. Prior imaging includes a normal CTH done in february. I did note percocet being part of her medication list. She reports she uses it sparingly. I reviewed PDMP and she does not get frequent refills of this therefore less suspicion for overuse headache. She was prescribed sumatriptan by other provider which does help her headaches. At this time she does not have more than 15 headaches days in a month so does not warrant PPX. Will focus on ruling out reversible etiologies and abortive regimen.      Plan:  • Due to increased frequency and severity of headaches and migraines I recommend further evaluation with MRI brain with without contrast to rule out structural or treatable causes of symptoms   • Additional workup Vitamin B 12 and magnesium   • Given that patient has < 15 headache days in a month will recommend the following:  o For headache  recommend continuing sumatriptan at onset of visual aura  o For preventative purposes Riboflavin and magnesium 400 mg daily  • Discussed medication side effects and advised to call our office if they are unable to tolerate these  • Patient advised not to take over-the-counter or prescription pain medications more than 3 days per week to prevent medication overuse/rebound headache  • Recommend drinking at least 64 oz of water daily   • Discussed with patient the importance of keeping good sleep hygiene  • Advised to keep track of headache patterns       History of Present Illness:   Napoleon Wong is a right handed female who presents today for evaluation of headaches. Current medical illnesses  or past medical history include choledocholithiasis s/p cholescystectomy, hydrosalpinx, GERD, gastritis, hypoglycemia    Timing and severity:  History of headaches or are these new onset? Has a known history of migraines  How are these headaches different than your regular headaches? More intense  When did they start noticing current headaches (onset or increase in frequency)? 6 months  How many headache days do they have in a month?  - as of 9/19/2023: Unsure, can be many weeks without, but when she gets them, can last several days   What time of the day do the headaches start? No particular time of day  How long do the headaches last? Can last up to 3 days   Are you ever headache free? Yes  Any changes in medication, medical history or major life stressors associated with headache onset? No     Characteristics, aggravating and alleviating factors:  Where is your headache located? Bitemporal, then moves from side to side  Description of pain: Throbbing  What is the intensity of pain? Average: 5-6 /10, worst /10  Headache triggers:  No   What makes the headache worse? Lights noise  Do movements make the headache worse?  While vomiting, coughing, sneezing, bending over  What the headache better? tx in the ED      Associated symptoms:     Aura? with aura lights     [x] Nausea       [x] Vomiting          [x] Stiff or sore neck   [x] Problems with concentration  [x] Photophobia     [x]Phonophobia      [] Osmophobia  [x] Blurred vision and heavy vision   [x] Prefer quiet, dark room  [x] Light-headed or dizzy     [x] Tinnitus   [] Hands or feet tingle or feel numb/paresthesias      [] Ptosis      [] Facial droop  [] Lacrimation  [] Nasal congestion/rhinorrhea        Workup:  Have you seen someone else for headaches or pain? No  Have you ever had any Brain imaging? yes   Last eye exam: within the last year     Prior treatment:    What medications do you take or have you taken for your headaches? Abortive:   1. Toradol 10 mg   2. Sumatriptan- Has helped   3. Ibuprofen- bruises  4. Prochlorperazine- slightly helps       Preventative:  1. Pregbalin- panic attacks   2. Amitriptyline- used to work, excessive somnolence     Other:  Percocet- uses sparingly, reviewed pdmp    Injections:  Have you had trigger point injection performed and how often? No  Have you had Botox injection performed and how often? No     Alternative: Alternative therapies used in the past for headaches? no other headache interventions have been tried      Lifestyle:    Sleep :  averages: 6 hours  Problems falling asleep?:   Yes  Problems staying asleep?:  Yes  Do you snore while asleep? Yes, Partner says she does   Do you wake up with headaches? Yes  Have you ever had a sleep study done? No     Social:  Physical activity: No   Water:  1.5 L essential water per day  Caffeine: No per day  Mood: Depression, anxiety and bipolar, manages without medicine, feels like she handles it well. ETOH: No, stopped in 2013  Tobacco:  Half a pack a day. Since 26years of age. Has nicotine patches but they do not stick. Illicit drugs: No   Work: Unemployed   Lives with: Leticia Pettit, partner, father in law and stepson. Medication considerations:  History of kidney stones?: No   History of abnormal renal function? No   History of low blood pressure or cardiac arrythmias?: 102/78   Plan for pregnancy?  Operated    Family history:  Family history of headaches:  migraine headaches in mother  Any family history of aneurysms - No    The following portions of the patient's history were reviewed and updated as appropriate: allergies, current medications, past family history, past medical history, past social history, past surgical history and problem list.    Past Medical History:     Past Medical History:   Diagnosis Date   • Abnormal Pap smear of cervix     2015 cryosurgery; 6/2019 NILM pap/neg HPV   • Migraine with aura        Patient Active Problem List   Diagnosis   • Migraine headache with aura   • Elevated LFTs   • Gastroesophageal reflux disease   • Choledocholithiasis   • Gastritis   • Tobacco use   • Chronic pain of left knee   • Plantar fasciitis of left foot   • Chronic bilateral low back pain with left-sided sciatica   • Constipation   • Patellofemoral disorder of left knee   • Hydrosalpinx   • Continuous LLQ abdominal pain   • Vitamin D deficiency   • Neck pain       Medications:      Current Outpatient Medications   Medication Sig Dispense Refill   • ergocalciferol (VITAMIN D2) 50,000 units Take 1 capsule (50,000 Units total) by mouth once a week 16 capsule 0   • ketorolac (TORADOL) 10 mg tablet Take 1 tablet (10 mg total) by mouth every 6 (six) hours as needed for moderate pain 30 tablet 0   • metroNIDAZOLE (FLAGYL) 500 mg tablet Take 1 tablet (500 mg total) by mouth 2 (two) times a day for 7 days 14 tablet 0   • neomycin-polymyxin-hydrocortisone (CORTISPORIN) 0.35%-10,000 units/mL-1% otic suspension Administer 4 drops into both ears 2 (two) times a day 10 mL 0   • nicotine (NICODERM CQ) 14 mg/24hr TD 24 hr patch Place 1 patch on the skin over 24 hours every 24 hours 28 patch 1   • nystatin-triamcinolone (MYCOLOG-II) cream Apply topically 2 (two) times a day 15 g 0   • oxyCODONE-acetaminophen (Percocet) 5-325 mg per tablet Take 1 tablet by mouth 2 (two) times a day as needed for moderate pain Max Daily Amount: 2 tablets 10 tablet 0   • polyethylene glycol (MIRALAX) 17 g packet Take 17 g by mouth daily 510 g 5   • prochlorperazine (COMPAZINE) 10 mg tablet Take 1 tablet (10 mg total) by mouth every 6 (six) hours as needed for nausea or vomiting 30 tablet 1   • senna-docusate sodium (SENOKOT-S) 8.6-50 mg per tablet Take 1 tablet by mouth daily 7 tablet 0   • SUMAtriptan (IMITREX) 100 mg tablet Take 100 mg by mouth daily as needed for migraine       No current facility-administered medications for this visit. Allergies: Allergies   Allergen Reactions   • Shellfish-Derived Products - Food Allergy Itching and Swelling     "seafood "   • Ibuprofen Other (See Comments)     Patient states she gets hematoma   • Pregabalin Anxiety       Family History:     Family History   Problem Relation Age of Onset   • Cancer Paternal Grandmother         uterine   • Cancer Paternal Aunt         uterine    • Migraines Mother    • Breast cancer Neg Hx    • Colon cancer Neg Hx    • Ovarian cancer Neg Hx        Social History:       Social History     Socioeconomic History   • Marital status: Single     Spouse name: Not on file   • Number of children: Not on file   • Years of education: Not on file   • Highest education level: Not on file   Occupational History   • Not on file   Tobacco Use   • Smoking status: Every Day     Packs/day: 1.00     Years: 15.00     Total pack years: 15.00     Types: Cigarettes   • Smokeless tobacco: Never   Vaping Use   • Vaping Use: Never used   Substance and Sexual Activity   • Alcohol use: Never   • Drug use: Never   • Sexual activity: Yes     Partners: Male     Birth control/protection: Female Sterilization, None   Other Topics Concern   • Not on file   Social History Narrative   • Not on file     Social Determinants of Health     Financial Resource Strain: Low Risk  (9/14/2023)    Overall Financial Resource Strain (CARDIA)    • Difficulty of Paying Living Expenses: Not hard at all   Food Insecurity: No Food Insecurity (9/14/2023)    Hunger Vital Sign    • Worried About Running Out of Food in the Last Year: Never true    • Ran Out of Food in the Last Year: Never true   Transportation Needs: No Transportation Needs (9/14/2023)    PRAPARE - Transportation    • Lack of Transportation (Medical): No    • Lack of Transportation (Non-Medical):  No   Physical Activity: Insufficiently Active (6/23/2021)    Exercise Vital Sign    • Days of Exercise per Week: 5 days    • Minutes of Exercise per Session: 20 min   Stress: No Stress Concern Present (6/23/2021)    [facility]    • Feeling of Stress : Only a little   Social Connections: Moderately Isolated (6/23/2021)    Social Connection and Isolation Panel [NHANES]    • Frequency of Communication with Friends and Family: More than three times a week    • Frequency of Social Gatherings with Friends and Family: Twice a week    • Attends Latter day Services: Never    • Active Member of Clubs or Organizations: No    • Attends Club or Organization Meetings: Never    • Marital Status: Living with partner   Intimate Partner Violence: Not on file   Housing Stability: [address]  (6/23/2021)    Housing Stability Vital Sign    • Unable to Pay for Housing in the Last Year: No    • Number of Places Lived in the Last Year: 2    • Unstable Housing in the Last Year: No         Objective:   Temperature 98.1 °F (36.7 °C), temperature source Temporal, weight 69.9 kg (154 lb), not currently breastfeeding. Physical Exam  Eyes:      General: Lids are normal.      Extraocular Movements: Extraocular movements intact. Pupils: Pupils are equal, round, and reactive to light. Neurological:      Motor: Motor strength is normal.     Deep Tendon Reflexes:      Reflex Scores:       Bicep reflexes are 2+ on the right side and 2+ on the left side. Brachioradialis reflexes are 2+ on the right side and 2+ on the left side. Patellar reflexes are 2+ on the right side and 2+ on the left side. Achilles reflexes are 2+ on the right side and 2+ on the left side. Neurological Exam  Mental Status  Awake, alert and oriented to person, place and time. Cranial Nerves  CN II: Visual fields full to confrontation. CN III, IV, VI: Extraocular movements intact bilaterally.  Normal lids and orbits bilaterally. Pupils equal round and reactive to light bilaterally. CN V: Facial sensation is normal.  CN VII: Full and symmetric facial movement. CN VIII: Hearing is normal.  CN IX, X: Palate elevates symmetrically  CN XI: Shoulder shrug strength is normal.  CN XII: Tongue midline without atrophy or fasciculations. Motor   Strength is 5/5 throughout all four extremities. Sensory  Light touch is normal in upper and lower extremities. Temperature is normal in upper and lower extremities. Vibration is normal in upper and lower extremities. Reflexes                                            Right                      Left  Brachioradialis                    2+                         2+  Biceps                                 2+                         2+  Patellar                                2+                         2+  Achilles                                2+                         2+    Coordination  Right: Finger-to-nose normal.Left: Finger-to-nose normal.    Gait  Casual gait is normal including stance, stride, and arm swing. Pertinent lab results: Reviewed. No significant findings     Imaging:     [facility] 2/24/23: WNL     Review of Systems:     Review of Systems      Review of Systems   Constitutional: Negative for appetite change, fatigue and fever. HENT: Negative. Negative for hearing loss, tinnitus, trouble swallowing and voice change. Eyes: Positive for pain (both eyes) and visual disturbance (a lit of little white lights, sees double and blurry ). Negative for photophobia. Respiratory: Negative. Negative for shortness of breath. Cardiovascular: Negative. Negative for palpitations. Gastrointestinal: Positive for nausea and vomiting. Endocrine: Negative. Negative for cold intolerance. Genitourinary: Negative. Negative for dysuria, frequency and urgency. Musculoskeletal: Negative for back pain, gait problem, myalgias and neck pain. Skin: Negative. Negative for rash. Allergic/Immunologic: Negative. Neurological: Positive for dizziness and headaches (lasts 3 days when she gets migraines, doesn't know how frequent she gets them. She will get a pain in different areas of her head ). Negative for tremors, seizures, syncope, facial asymmetry, speech difficulty, weakness, light-headedness and numbness. Hematological: Negative. Does not bruise/bleed easily. Psychiatric/Behavioral: Positive for sleep disturbance (trouble falling asleep). Negative for confusion and hallucinations. All other systems reviewed and are negative. I have spent 60 minutes with Patient today in which greater than 50% of this time was spent in counseling/coordination of care regarding Diagnostic results, Prognosis, Risks and benefits of tx options, Instructions for management, Patient and family education, Importance of tx compliance, Risk factor reductions, Impressions, Counseling / Coordination of care, Documenting in the medical record, Reviewing / ordering tests, medicine, procedures   and Obtaining or reviewing history  . I also spent 30 minutes non face to face for this patient the same day.          *History obtained from patient as well as available medical record review    Author:  Kimani Tinajero M.D. 9/19/2023 8:52 AM

## 2023-09-19 NOTE — PROGRESS NOTES
Review of Systems   Constitutional: Negative for appetite change, fatigue and fever. HENT: Negative. Negative for hearing loss, tinnitus, trouble swallowing and voice change. Eyes: Positive for pain (both eyes) and visual disturbance (a lit of little white lights, sees double and blurry ). Negative for photophobia. Respiratory: Negative. Negative for shortness of breath. Cardiovascular: Negative. Negative for palpitations. Gastrointestinal: Positive for nausea and vomiting. Endocrine: Negative. Negative for cold intolerance. Genitourinary: Negative. Negative for dysuria, frequency and urgency. Musculoskeletal: Negative for back pain, gait problem, myalgias and neck pain. Skin: Negative. Negative for rash. Allergic/Immunologic: Negative. Neurological: Positive for dizziness and headaches (lasts 3 days when she gets migraines, doesn't know how frequent she gets them. She will get a pain in different areas of her head ). Negative for tremors, seizures, syncope, facial asymmetry, speech difficulty, weakness, light-headedness and numbness. Hematological: Negative. Does not bruise/bleed easily. Psychiatric/Behavioral: Positive for sleep disturbance (trouble falling asleep). Negative for confusion and hallucinations. All other systems reviewed and are negative.

## 2023-09-19 NOTE — PATIENT INSTRUCTIONS
MRI con contraste  Sumatriptan- Kristian tan pronto  sientes los cambios en la vision (lucecitas), lo que llamamos un aura. Sin todavia tienes dolor de [facility], puedes repetir la dosis. No mas de dos pastillas en 24 horas. No mas de 3 pastillas en jonathan semana.    Laboratorios: Vitamina B 12 y Magnesio   Consider suplementar raymundo dieta con 400 mg de magnesio y 400 mg de riboflavin (Vitamina B 12)

## 2023-09-22 ENCOUNTER — TELEPHONE (OUTPATIENT)
Dept: FAMILY MEDICINE CLINIC | Facility: CLINIC | Age: 38
End: 2023-09-22

## 2023-09-22 DIAGNOSIS — K21.9 GASTROESOPHAGEAL REFLUX DISEASE WITHOUT ESOPHAGITIS: Primary | ICD-10-CM

## 2023-09-22 NOTE — TELEPHONE ENCOUNTER
09/22/23    Pt called office and requested refill on the following med:     pantoprazole    Any questions, please contact Pt.

## 2023-09-25 ENCOUNTER — TELEPHONE (OUTPATIENT)
Dept: OBGYN CLINIC | Facility: CLINIC | Age: 38
End: 2023-09-25

## 2023-09-25 RX ORDER — PANTOPRAZOLE SODIUM 20 MG/1
20 TABLET, DELAYED RELEASE ORAL DAILY
Qty: 90 TABLET | Refills: 1 | Status: SHIPPED | OUTPATIENT
Start: 2023-09-25

## 2023-09-28 ENCOUNTER — HOSPITAL ENCOUNTER (OUTPATIENT)
Dept: MRI IMAGING | Facility: HOSPITAL | Age: 38
End: 2023-09-28
Attending: PSYCHIATRY & NEUROLOGY
Payer: COMMERCIAL

## 2023-09-28 DIAGNOSIS — G43.109 MIGRAINE WITH AURA AND WITHOUT STATUS MIGRAINOSUS, NOT INTRACTABLE: Chronic | ICD-10-CM

## 2023-09-28 PROCEDURE — A9585 GADOBUTROL INJECTION: HCPCS | Performed by: PSYCHIATRY & NEUROLOGY

## 2023-09-28 PROCEDURE — 70553 MRI BRAIN STEM W/O & W/DYE: CPT

## 2023-09-28 PROCEDURE — G1004 CDSM NDSC: HCPCS

## 2023-09-28 RX ORDER — GADOBUTROL 604.72 MG/ML
7 INJECTION INTRAVENOUS
Status: COMPLETED | OUTPATIENT
Start: 2023-09-28 | End: 2023-09-28

## 2023-09-28 RX ADMIN — GADOBUTROL 7 ML: 604.72 INJECTION INTRAVENOUS at 08:36

## 2023-09-29 ENCOUNTER — TELEPHONE (OUTPATIENT)
Age: 38
End: 2023-09-29

## 2023-09-29 NOTE — TELEPHONE ENCOUNTER
Patients GI provider:  Dr. Michael Bartlett     Number to return call: [phone]    Reason for call: Spoke with patient via . Patient calling as she previously had to cancel her colonoscopy in July due to vomiting from the prep. Patient was originally given a two day prep. Patient is asking if a different prep can be given to her as she would like to have the colonoscopy completed. Also, patient states that she experiences hypoglycemia. Is asking if there is any way the procedure can be preformed inpatient.      Scheduled procedure/appointment date if applicable: N/A

## 2023-10-02 ENCOUNTER — OFFICE VISIT (OUTPATIENT)
Dept: FAMILY MEDICINE CLINIC | Facility: CLINIC | Age: 38
End: 2023-10-02

## 2023-10-02 VITALS
TEMPERATURE: 97.8 F | HEART RATE: 74 BPM | RESPIRATION RATE: 18 BRPM | DIASTOLIC BLOOD PRESSURE: 72 MMHG | HEIGHT: 62 IN | WEIGHT: 156 LBS | SYSTOLIC BLOOD PRESSURE: 116 MMHG | OXYGEN SATURATION: 96 % | BODY MASS INDEX: 28.71 KG/M2

## 2023-10-02 DIAGNOSIS — K59.00 ACUTE CONSTIPATION: Primary | ICD-10-CM

## 2023-10-02 DIAGNOSIS — R68.89 FLU-LIKE SYMPTOMS: Primary | ICD-10-CM

## 2023-10-02 DIAGNOSIS — M54.2 NECK PAIN: ICD-10-CM

## 2023-10-02 LAB
SARS-COV-2 AG UPPER RESP QL IA: NEGATIVE
VALID CONTROL: NORMAL

## 2023-10-02 PROCEDURE — 87811 SARS-COV-2 COVID19 W/OPTIC: CPT | Performed by: FAMILY MEDICINE

## 2023-10-02 PROCEDURE — 99213 OFFICE O/P EST LOW 20 MIN: CPT | Performed by: FAMILY MEDICINE

## 2023-10-02 RX ORDER — GUAIFENESIN 200 MG/10ML
200 LIQUID ORAL 3 TIMES DAILY PRN
Qty: 120 ML | Refills: 0 | Status: SHIPPED | OUTPATIENT
Start: 2023-10-02

## 2023-10-02 RX ORDER — BISACODYL 5 MG/1
20 TABLET, DELAYED RELEASE ORAL ONCE
Qty: 4 TABLET | Refills: 0 | Status: SHIPPED | OUTPATIENT
Start: 2023-10-02 | End: 2023-10-02

## 2023-10-02 RX ORDER — SODIUM CHLORIDE FOR INHALATION 3 %
4 VIAL, NEBULIZER (ML) INHALATION AS NEEDED
Qty: 180 ML | Refills: 0 | Status: SHIPPED | OUTPATIENT
Start: 2023-10-02 | End: 2023-10-04

## 2023-10-02 RX ORDER — SODIUM CHLORIDE FOR INHALATION 0.9 %
3 VIAL, NEBULIZER (ML) INHALATION
Status: CANCELLED | OUTPATIENT
Start: 2023-10-02

## 2023-10-02 NOTE — ASSESSMENT & PLAN NOTE
Myalgia, productive cough, and sore throat for a week  Denies any sick contacts, fever, chills, sinus pressure or pain    Rapid Covid negative  Likely viral in nature    PLAN:  - Symptomatic therapy suggested: rest, increase fluids, use mist of vaporizer prn and call prn if symptoms persist or worsen. - Antitussive prescribed  - Call or return to clinic prn if these symptoms worsen or fail to improve as anticipated.

## 2023-10-02 NOTE — PROGRESS NOTES
Name: Giacomo Bear      : 1985      MRN: [id number]  Encounter Provider: Arlene Mckinley MD  Encounter Date: 10/2/2023   Encounter department: [address] [facility]     1. Flu-like symptoms  Assessment & Plan:  Myalgia, productive cough, and sore throat for a week  Denies any sick contacts, fever, chills, sinus pressure or pain    Rapid Covid negative  Likely viral in nature    PLAN:  - Symptomatic therapy suggested: rest, increase fluids, use mist of vaporizer prn and call prn if symptoms persist or worsen. - Antitussive prescribed  - Call or return to clinic prn if these symptoms worsen or fail to improve as anticipated. Orders:  -     guaiFENesin (ROBITUSSIN) 100 MG/5ML oral liquid; Take 10 mL (200 mg total) by mouth 3 (three) times a day as needed for cough  -     sodium chloride 3 % inhalation solution; Take 4 mL by nebulization as needed for cough  -     POCT Rapid Covid Ag       Subjective        Giacomo Bear is a 41 y.o. female presenting today with Cough, myalgia, productive cough with green phlegm, sore throat, of one week duration  Denies fever, chills, headache, sinus pressure or pain. Problems stable unless otherwise mentioned. Review of Systems   Constitutional: Negative for fatigue and fever. HENT: Positive for congestion, rhinorrhea and sore throat. Negative for ear pain, sinus pressure and sinus pain. Respiratory: Positive for cough. Negative for chest tightness, shortness of breath and wheezing.         Current Outpatient Medications on File Prior to Visit   Medication Sig   • ergocalciferol (VITAMIN D2) 50,000 units Take 1 capsule (50,000 Units total) by mouth once a week   • ketorolac (TORADOL) 10 mg tablet Take 1 tablet (10 mg total) by mouth every 6 (six) hours as needed for moderate pain   • neomycin-polymyxin-hydrocortisone (CORTISPORIN) 0.35%-10,000 units/mL-1% otic suspension Administer 4 drops into both ears 2 (two) times a day   • nicotine (NICODERM CQ) 14 mg/24hr TD 24 hr patch Place 1 patch on the skin over 24 hours every 24 hours   • nystatin-triamcinolone (MYCOLOG-II) cream Apply topically 2 (two) times a day   • oxyCODONE-acetaminophen (Percocet) 5-325 mg per tablet Take 1 tablet by mouth 2 (two) times a day as needed for moderate pain Max Daily Amount: 2 tablets   • pantoprazole (PROTONIX) 20 mg tablet Take 1 tablet (20 mg total) by mouth daily   • polyethylene glycol (MIRALAX) 17 g packet Take 17 g by mouth daily   • prochlorperazine (COMPAZINE) 10 mg tablet Take 1 tablet (10 mg total) by mouth every 6 (six) hours as needed for nausea or vomiting   • senna-docusate sodium (SENOKOT-S) 8.6-50 mg per tablet Take 1 tablet by mouth daily   • SUMAtriptan (IMITREX) 100 mg tablet Take 100 mg by mouth daily as needed for migraine       Objective     /72 (BP Location: Left arm, Patient Position: Sitting, Cuff Size: Standard)   Pulse 74   Temp 97.8 °F (36.6 °C) (Temporal)   Resp 18   Ht 5' 2" (1.575 m)   Wt 70.8 kg (156 lb)   SpO2 96%   BMI 28.53 kg/m²     Physical Exam  Vitals reviewed. Constitutional:       General: She is in acute distress. Appearance: Normal appearance. She is not ill-appearing or toxic-appearing. HENT:      Right Ear: Tympanic membrane and ear canal normal. There is no impacted cerumen. Left Ear: Tympanic membrane normal. There is no impacted cerumen. Ears:      Comments: Slight erythematous canal     Nose: Congestion and rhinorrhea present. Mouth/Throat:      Mouth: Mucous membranes are moist.      Pharynx: No oropharyngeal exudate or posterior oropharyngeal erythema. Eyes:      General: No scleral icterus. Cardiovascular:      Rate and Rhythm: Normal rate and regular rhythm. Pulses: Normal pulses. Heart sounds: Normal heart sounds. Pulmonary:      Effort: Pulmonary effort is normal. No respiratory distress.       Breath sounds: Normal breath sounds. No wheezing. Chest:      Chest wall: No tenderness. Abdominal:      Palpations: Abdomen is soft. Skin:     General: Skin is warm. Capillary Refill: Capillary refill takes less than 2 seconds. Neurological:      General: No focal deficit present. Mental Status: She is alert.        Sin Jarrell MD

## 2023-10-03 ENCOUNTER — TELEPHONE (OUTPATIENT)
Dept: FAMILY MEDICINE CLINIC | Facility: CLINIC | Age: 38
End: 2023-10-03

## 2023-10-03 NOTE — TELEPHONE ENCOUNTER
Per the pharmacy the below medication needs the instruction on the prescription in order for them to release the medication to the patient, please resend prescription    sodium chloride 3 % inhalation solution

## 2023-10-04 ENCOUNTER — TELEPHONE (OUTPATIENT)
Dept: NEUROLOGY | Facility: CLINIC | Age: 38
End: 2023-10-04

## 2023-10-04 RX ORDER — SODIUM CHLORIDE FOR INHALATION 3 %
4 VIAL, NEBULIZER (ML) INHALATION AS NEEDED
Qty: 180 ML | Refills: 0 | Status: SHIPPED | OUTPATIENT
Start: 2023-10-04 | End: 2023-11-03

## 2023-10-04 NOTE — TELEPHONE ENCOUNTER
Patient called in to request status of medication being resent. Tiger text sent to Dr. Shahla Leach.

## 2023-10-04 NOTE — TELEPHONE ENCOUNTER
Spoke with patient thru Greek interpretor     Patient is requesting  A Cb from the medical team to go over the MRI results completed on 9/29/23        [facility] CB # [phone]

## 2023-10-05 DIAGNOSIS — R93.89 ABNORMAL MRI: Primary | ICD-10-CM

## 2023-10-05 DIAGNOSIS — G43.109 MIGRAINE WITH AURA AND WITHOUT STATUS MIGRAINOSUS, NOT INTRACTABLE: Primary | ICD-10-CM

## 2023-10-05 NOTE — PROGRESS NOTES
I called and I discussed MRI results with patient after review with MS specialist.  Left parietal region flair changes with unusual characteristics for microangiopathic changes. It also has corresponding T1 weighted black hole lesions. Further work-up suggested by MS specialist including work-up for inflammatory causes, ischemia and vasculitis. Patient denies any focal weakness, ambulatory difficulties, urinary incontinence for visual disturbances. At this time I will order blood work to rule out other diseases that can cause T2 flair changes. I will order an MRI C-spine with and without contrast to evaluate for spinal cord lesions. I will have patient follow-up with me sooner, after MRI C-spine is done to review available work-up together. Will discuss with patient B 12 injections given recent level of 189. Future plan is to repeat MRI brain MS protocol with and without contrast in 6 months. Will consider ordering a CTA to screen for vasculitis. Pending work-up, may consider a lumbar puncture.

## 2023-10-06 ENCOUNTER — TELEPHONE (OUTPATIENT)
Dept: NEUROLOGY | Facility: CLINIC | Age: 38
End: 2023-10-06

## 2023-10-06 ENCOUNTER — APPOINTMENT (OUTPATIENT)
Dept: LAB | Facility: CLINIC | Age: 38
End: 2023-10-06
Payer: COMMERCIAL

## 2023-10-06 DIAGNOSIS — R93.89 ABNORMAL MRI: ICD-10-CM

## 2023-10-06 LAB
ANA SER QL IA: NEGATIVE
B BURGDOR IGG+IGM SER QL IA: NEGATIVE
CRP SERPL QL: 1.8 MG/L
ERYTHROCYTE [SEDIMENTATION RATE] IN BLOOD: 9 MM/HOUR (ref 0–19)

## 2023-10-06 PROCEDURE — 86235 NUCLEAR ANTIGEN ANTIBODY: CPT

## 2023-10-06 PROCEDURE — 86037 ANCA TITER EACH ANTIBODY: CPT

## 2023-10-06 PROCEDURE — 86162 COMPLEMENT TOTAL (CH50): CPT

## 2023-10-06 PROCEDURE — 85652 RBC SED RATE AUTOMATED: CPT

## 2023-10-06 PROCEDURE — 86618 LYME DISEASE ANTIBODY: CPT

## 2023-10-06 PROCEDURE — 86140 C-REACTIVE PROTEIN: CPT

## 2023-10-06 PROCEDURE — 36415 COLL VENOUS BLD VENIPUNCTURE: CPT

## 2023-10-06 PROCEDURE — 86038 ANTINUCLEAR ANTIBODIES: CPT

## 2023-10-06 PROCEDURE — 83520 IMMUNOASSAY QUANT NOS NONAB: CPT

## 2023-10-06 RX ORDER — SUMATRIPTAN 100 MG/1
100 TABLET, FILM COATED ORAL DAILY PRN
Qty: 9 TABLET | Refills: 0 | Status: SHIPPED | OUTPATIENT
Start: 2023-10-06

## 2023-10-06 NOTE — TELEPHONE ENCOUNTER
----- Message from Elise Javier MD sent at 10/5/2023  4:45 PM EDT -----  Garrett Das,     I called and discussed these MRI findings with her. Can you help her get the MRI C spine scheduled and get her scheduled sooner with me. Ideally 1 week after the C spine MRI. Also, her B 12 was low. I recommend B 12 injections. 1 every week for 5 weeks then 1 every month for 4 months.      Thank you,   Abdoul Pandya   ----- Message -----  From: Interface, Radiology Results In  Sent: 10/3/2023  11:12 AM EDT  To: Elise Javier MD

## 2023-10-06 NOTE — TELEPHONE ENCOUNTER
Placed call to central scheduling, I was able to schedule her for her MRI c spine on 10/14 @ 12:15. Placed call to patient to inform her of the date/time and location. Patient verbalized understanding. Scheduled patient 1 week after MRI on 10/23 @ 1030. Patient would like to start her B12 injections at the time of her OV due to her schedule being busy.

## 2023-10-07 LAB
ENA SS-A AB SER-ACNC: <0.2 AI (ref 0–0.9)
ENA SS-B AB SER-ACNC: <0.2 AI (ref 0–0.9)

## 2023-10-09 LAB — CH50 SERPL-ACNC: >60 U/ML

## 2023-10-09 RX ORDER — OXYCODONE HYDROCHLORIDE AND ACETAMINOPHEN 5; 325 MG/1; MG/1
1 TABLET ORAL 2 TIMES DAILY PRN
Qty: 10 TABLET | Refills: 0 | Status: SHIPPED | OUTPATIENT
Start: 2023-10-09

## 2023-10-10 LAB
C-ANCA TITR SER IF: NORMAL TITER
MYELOPEROXIDASE AB SER IA-ACNC: <0.2 UNITS (ref 0–0.9)
P-ANCA ATYPICAL TITR SER IF: NORMAL TITER
P-ANCA TITR SER IF: NORMAL TITER
PROTEINASE3 AB SER IA-ACNC: <0.2 UNITS (ref 0–0.9)

## 2023-10-11 ENCOUNTER — HOSPITAL ENCOUNTER (EMERGENCY)
Facility: HOSPITAL | Age: 38
Discharge: HOME | End: 2023-10-11
Attending: EMERGENCY MEDICINE | Admitting: EMERGENCY MEDICINE
Payer: COMMERCIAL

## 2023-10-11 VITALS
HEART RATE: 89 BPM | DIASTOLIC BLOOD PRESSURE: 71 MMHG | BODY MASS INDEX: 28.92 KG/M2 | WEIGHT: 158.1 LBS | OXYGEN SATURATION: 99 % | RESPIRATION RATE: 16 BRPM | SYSTOLIC BLOOD PRESSURE: 125 MMHG | TEMPERATURE: 99.1 F

## 2023-10-11 DIAGNOSIS — E86.0 DEHYDRATION: ICD-10-CM

## 2023-10-11 DIAGNOSIS — R42 DIZZINESS: Primary | ICD-10-CM

## 2023-10-11 LAB
ALBUMIN SERPL BCP-MCNC: 4.1 G/DL (ref 3.5–5)
ALP SERPL-CCNC: 48 U/L (ref 34–104)
ALT SERPL W P-5'-P-CCNC: 23 U/L (ref 7–52)
ANION GAP SERPL CALCULATED.3IONS-SCNC: 8 MMOL/L
AST SERPL W P-5'-P-CCNC: 23 U/L (ref 13–39)
ATRIAL RATE: 77 BPM
BACTERIA UR QL AUTO: ABNORMAL /HPF
BASOPHILS # BLD AUTO: 0.05 THOUSANDS/ÂΜL (ref 0–0.1)
BASOPHILS NFR BLD AUTO: 1 % (ref 0–1)
BILIRUB SERPL-MCNC: 0.37 MG/DL (ref 0.2–1)
BILIRUB UR QL STRIP: ABNORMAL
BUN SERPL-MCNC: 12 MG/DL (ref 5–25)
CALCIUM SERPL-MCNC: 9 MG/DL (ref 8.4–10.2)
CHLORIDE SERPL-SCNC: 106 MMOL/L (ref 96–108)
CLARITY UR: CLEAR
CO2 SERPL-SCNC: 24 MMOL/L (ref 21–32)
COLOR UR: ABNORMAL
CREAT SERPL-MCNC: 1.06 MG/DL (ref 0.6–1.3)
EOSINOPHIL # BLD AUTO: 0.21 THOUSAND/ÂΜL (ref 0–0.61)
EOSINOPHIL NFR BLD AUTO: 2 % (ref 0–6)
ERYTHROCYTE [DISTWIDTH] IN BLOOD BY AUTOMATED COUNT: 12.9 % (ref 11.6–15.1)
EXT PREGNANCY TEST URINE: NEGATIVE
EXT. CONTROL: NORMAL
GFR SERPL CREATININE-BSD FRML MDRD: 67 ML/MIN/1.73SQ M
GLUCOSE SERPL-MCNC: 120 MG/DL (ref 65–140)
GLUCOSE UR STRIP-MCNC: NEGATIVE MG/DL
HCT VFR BLD AUTO: 36.6 % (ref 34.8–46.1)
HGB BLD-MCNC: 12.4 G/DL (ref 11.5–15.4)
HGB UR QL STRIP.AUTO: 10
IMM GRANULOCYTES # BLD AUTO: 0.05 THOUSAND/UL (ref 0–0.2)
IMM GRANULOCYTES NFR BLD AUTO: 1 % (ref 0–2)
KETONES UR STRIP-MCNC: ABNORMAL MG/DL
LEUKOCYTE ESTERASE UR QL STRIP: 25
LYMPHOCYTES # BLD AUTO: 2.55 THOUSANDS/ÂΜL (ref 0.6–4.47)
LYMPHOCYTES NFR BLD AUTO: 27 % (ref 14–44)
MCH RBC QN AUTO: 30.5 PG (ref 26.8–34.3)
MCHC RBC AUTO-ENTMCNC: 33.9 G/DL (ref 31.4–37.4)
MCV RBC AUTO: 90 FL (ref 82–98)
MONOCYTES # BLD AUTO: 0.5 THOUSAND/ÂΜL (ref 0.17–1.22)
MONOCYTES NFR BLD AUTO: 5 % (ref 4–12)
MUCOUS THREADS UR QL AUTO: ABNORMAL
NEUTROPHILS # BLD AUTO: 6.18 THOUSANDS/ÂΜL (ref 1.85–7.62)
NEUTS SEG NFR BLD AUTO: 64 % (ref 43–75)
NITRITE UR QL STRIP: NEGATIVE
NON-SQ EPI CELLS URNS QL MICRO: ABNORMAL /HPF
NRBC BLD AUTO-RTO: 0 /100 WBCS
P AXIS: 58 DEGREES
PH UR STRIP.AUTO: 6 [PH]
PLATELET # BLD AUTO: 291 THOUSANDS/UL (ref 149–390)
PMV BLD AUTO: 10.7 FL (ref 8.9–12.7)
POTASSIUM SERPL-SCNC: 3.6 MMOL/L (ref 3.5–5.3)
PR INTERVAL: 164 MS
PROT SERPL-MCNC: 6.5 G/DL (ref 6.4–8.4)
PROT UR STRIP-MCNC: ABNORMAL MG/DL
QRS AXIS: 42 DEGREES
QRSD INTERVAL: 86 MS
QT INTERVAL: 368 MS
QTC INTERVAL: 416 MS
RBC # BLD AUTO: 4.07 MILLION/UL (ref 3.81–5.12)
RBC #/AREA URNS AUTO: ABNORMAL /HPF
SODIUM SERPL-SCNC: 138 MMOL/L (ref 135–147)
SP GR UR STRIP.AUTO: 1.02 (ref 1–1.04)
T WAVE AXIS: 31 DEGREES
UROBILINOGEN UA: 4 MG/DL
VENTRICULAR RATE: 77 BPM
WBC # BLD AUTO: 9.54 THOUSAND/UL (ref 4.31–10.16)
WBC #/AREA URNS AUTO: ABNORMAL /HPF

## 2023-10-11 PROCEDURE — 85025 COMPLETE CBC W/AUTO DIFF WBC: CPT | Performed by: EMERGENCY MEDICINE

## 2023-10-11 PROCEDURE — 99284 EMERGENCY DEPT VISIT MOD MDM: CPT

## 2023-10-11 PROCEDURE — 93010 ELECTROCARDIOGRAM REPORT: CPT | Performed by: INTERNAL MEDICINE

## 2023-10-11 PROCEDURE — 96360 HYDRATION IV INFUSION INIT: CPT

## 2023-10-11 PROCEDURE — 36415 COLL VENOUS BLD VENIPUNCTURE: CPT | Performed by: EMERGENCY MEDICINE

## 2023-10-11 PROCEDURE — 80053 COMPREHEN METABOLIC PANEL: CPT | Performed by: EMERGENCY MEDICINE

## 2023-10-11 PROCEDURE — 81025 URINE PREGNANCY TEST: CPT | Performed by: EMERGENCY MEDICINE

## 2023-10-11 PROCEDURE — 93005 ELECTROCARDIOGRAM TRACING: CPT

## 2023-10-11 PROCEDURE — 81001 URINALYSIS AUTO W/SCOPE: CPT | Performed by: EMERGENCY MEDICINE

## 2023-10-11 PROCEDURE — 99285 EMERGENCY DEPT VISIT HI MDM: CPT | Performed by: EMERGENCY MEDICINE

## 2023-10-11 RX ADMIN — SODIUM CHLORIDE 1000 ML: 0.9 INJECTION, SOLUTION INTRAVENOUS at 17:46

## 2023-10-11 NOTE — ED PROVIDER NOTES
History  Chief Complaint   Patient presents with    Dizziness     Pt reports feeling dizziness and blurry vision for 1 hour. Pts states "I had MRI that showed swelling of the left side of my brain" Pt reports she took ibuprofen around 1500 today with no relief. History provided by:  Patient and significant other  Dizziness  Quality:  Lightheadedness  Severity:  Mild  Onset quality:  Gradual  Timing:  Intermittent  Progression:  Worsening  Chronicity:  New  Context: physical activity and standing up    Context: not with inactivity    Relieved by:  Nothing  Worsened by:  Nothing  Ineffective treatments:  None tried  Associated symptoms: no chest pain, no diarrhea, no headaches, no nausea, no shortness of breath and no weakness        Prior to Admission Medications   Prescriptions Last Dose Informant Patient Reported? Taking?    SUMAtriptan (IMITREX) 100 mg tablet   No No   Sig: Take 1 tablet (100 mg total) by mouth daily as needed for migraine   bisacodyl (DULCOLAX) 5 mg EC tablet   No No   Sig: Take 4 tablets (20 mg total) by mouth once for 1 dose Colonoscopy prep   ergocalciferol (VITAMIN D2) 50,000 units  Self No No   Sig: Take 1 capsule (50,000 Units total) by mouth once a week   guaiFENesin (ROBITUSSIN) 100 MG/5ML oral liquid   No No   Sig: Take 10 mL (200 mg total) by mouth 3 (three) times a day as needed for cough   ketorolac (TORADOL) 10 mg tablet  Self No No   Sig: Take 1 tablet (10 mg total) by mouth every 6 (six) hours as needed for moderate pain   neomycin-polymyxin-hydrocortisone (CORTISPORIN) 0.35%-10,000 units/mL-1% otic suspension  Self No No   Sig: Administer 4 drops into both ears 2 (two) times a day   nicotine (NICODERM CQ) 14 mg/24hr TD 24 hr patch  Self No No   Sig: Place 1 patch on the skin over 24 hours every 24 hours   nystatin-triamcinolone (MYCOLOG-II) cream  Self No No   Sig: Apply topically 2 (two) times a day   oxyCODONE-acetaminophen (Percocet) 5-325 mg per tablet   No No   Sig: Take 1 tablet by mouth 2 (two) times a day as needed for moderate pain Max Daily Amount: 2 tablets   pantoprazole (PROTONIX) 20 mg tablet   No No   Sig: Take 1 tablet (20 mg total) by mouth daily   polyethylene glycol (GOLYTELY) 4000 mL solution   No No   Sig: Take 4,000 mL by mouth once for 1 dose Colonoscopy prep   polyethylene glycol (MIRALAX) 17 g packet  Self No No   Sig: Take 17 g by mouth daily   prochlorperazine (COMPAZINE) 10 mg tablet  Self No No   Sig: Take 1 tablet (10 mg total) by mouth every 6 (six) hours as needed for nausea or vomiting   senna-docusate sodium (SENOKOT-S) 8.6-50 mg per tablet   No No   Sig: Take 1 tablet by mouth daily   sodium chloride 3 % inhalation solution   No No   Sig: Take 4 mL by nebulization as needed for cough Use every 4-6 hours as needed.       Facility-Administered Medications: None       Past Medical History:   Diagnosis Date    Abnormal Pap smear of cervix     2015 cryosurgery; 6/2019 NILM pap/neg HPV    Migraine with aura        Past Surgical History:   Procedure Laterality Date    CHOLECYSTECTOMY LAPAROSCOPIC N/A 10/27/2020    Procedure: CHOLECYSTECTOMY LAPAROSCOPIC;  Surgeon: Jaswinder Preciado MD;  Location: [facility] MAIN OR;  Service: General    GYNECOLOGIC CRYOSURGERY  2015    HYSTERECTOMY  2016    LAPAROSCOPY  2018    TN LAPAROSCOPY W/LYSIS OF ADHESIONS N/A 3/3/2020    Procedure: L.O.A.;  Surgeon: Maryse Joy MD;  Location: [facility] Main OR;  Service: Gynecology    TN LAPS ABD PRTM&OMENTUM DX W/WO SPEC BR/WA [facility] N/A 8/9/2019    Procedure: LAPAROSCOPY DIAGNOSTIC; LYSIS OF ADHESIONS;  Surgeon: Maciej Jiménez MD;  Location: [facility] Main OR;  Service: Gynecology    TUBAL LIGATION         Family History   Problem Relation Age of Onset    Cancer Paternal Grandmother         uterine    Cancer Paternal Aunt         uterine     Migraines Mother     Breast cancer Neg Hx     Colon cancer Neg Hx     Ovarian cancer Neg Hx      I have reviewed and agree with the history as documented. E-Cigarette/Vaping    E-Cigarette Use Never User      E-Cigarette/Vaping Substances     Social History     Tobacco Use    Smoking status: Every Day     Packs/day: 1.00     Years: 15.00     Total pack years: 15.00     Types: Cigarettes     Passive exposure: Current    Smokeless tobacco: Never   Vaping Use    Vaping Use: Never used   Substance Use Topics    Alcohol use: Never    Drug use: Never       Review of Systems   Constitutional:  Negative for chills and fever. HENT:  Negative for rhinorrhea, sore throat and trouble swallowing. Eyes:  Negative for pain. Respiratory:  Negative for cough, shortness of breath, wheezing and stridor. Cardiovascular:  Negative for chest pain and leg swelling. Gastrointestinal:  Negative for abdominal pain, diarrhea and nausea. Endocrine: Negative for polyuria. Genitourinary:  Negative for dysuria, flank pain and urgency. Musculoskeletal:  Negative for joint swelling, myalgias and neck stiffness. Skin:  Negative for rash. Allergic/Immunologic: Negative for immunocompromised state. Neurological:  Positive for dizziness. Negative for syncope, weakness, numbness and headaches. Psychiatric/Behavioral:  Negative for confusion and suicidal ideas. All other systems reviewed and are negative. Physical Exam  Physical Exam  Vitals and nursing note reviewed. Constitutional:       Appearance: Normal appearance. She is well-developed. HENT:      Head: Normocephalic and atraumatic. Nose: Nose normal.      Mouth/Throat:      Mouth: Mucous membranes are moist.   Eyes:      Extraocular Movements: Extraocular movements intact. Pupils: Pupils are equal, round, and reactive to light. Cardiovascular:      Rate and Rhythm: Normal rate and regular rhythm. Heart sounds: No murmur heard. No friction rub. Pulmonary:      Effort: No respiratory distress. Breath sounds: Normal breath sounds. No wheezing or rales.    Abdominal: General: Bowel sounds are normal. There is no distension. Palpations: Abdomen is soft. Tenderness: There is no abdominal tenderness. Musculoskeletal:         General: No tenderness. Normal range of motion. Cervical back: Normal range of motion and neck supple. Skin:     General: Skin is warm. Findings: No rash. Neurological:      Mental Status: She is alert and oriented to person, place, and time.    Psychiatric:         Mood and Affect: Mood normal.         Vital Signs  ED Triage Vitals [10/11/23 1716]   Temperature Pulse Respirations Blood Pressure SpO2   99.1 °F (37.3 °C) 89 16 125/71 99 %      Temp Source Heart Rate Source Patient Position - Orthostatic VS BP Location FiO2 (%)   Tympanic Monitor Lying Left arm --      Pain Score       --           Vitals:    10/11/23 1716   BP: 125/71   Pulse: 89   Patient Position - Orthostatic VS: Lying         Visual Acuity  Visual Acuity      Flowsheet Row Most Recent Value   L Pupil Size (mm) 3   R Pupil Size (mm) 3            ED Medications  Medications   sodium chloride 0.9 % bolus 1,000 mL (0 mL Intravenous Stopped 10/11/23 1909)       Diagnostic Studies  Results Reviewed       Procedure Component Value Units Date/Time    Urine Microscopic [[id number]]  (Abnormal) Collected: 10/11/23 1745    Lab Status: Final result Specimen: Urine, Other Updated: 10/11/23 1839     RBC, UA 1-2 /hpf      WBC, UA 1-2 /hpf      Epithelial Cells Moderate /hpf      Bacteria, UA Occasional /hpf      MUCUS THREADS Moderate    Comprehensive metabolic panel [[id number]] Collected: 10/11/23 1745    Lab Status: Final result Specimen: Blood from Arm, Left Updated: 10/11/23 1812     Sodium 138 mmol/L      Potassium 3.6 mmol/L      Chloride 106 mmol/L      CO2 24 mmol/L      ANION GAP 8 mmol/L      BUN 12 mg/dL      Creatinine 1.06 mg/dL      Glucose 120 mg/dL      Calcium 9.0 mg/dL      AST 23 U/L      ALT 23 U/L      Alkaline Phosphatase 48 U/L      Total Protein 6.5 g/dL Albumin 4.1 g/dL      Total Bilirubin 0.37 mg/dL      eGFR 67 ml/min/1.73sq m     Narrative:      [place] guidelines for Chronic Kidney Disease (CKD):     Stage 1 with normal or high GFR (GFR > 90 mL/min/1.73 square meters)    Stage 2 Mild CKD (GFR = 60-89 mL/min/1.73 square meters)    Stage 3A Moderate CKD (GFR = 45-59 mL/min/1.73 square meters)    Stage 3B Moderate CKD (GFR = 30-44 mL/min/1.73 square meters)    Stage 4 Severe CKD (GFR = 15-29 mL/min/1.73 square meters)    Stage 5 End Stage CKD (GFR <15 mL/min/1.73 square meters)  Note: GFR calculation is accurate only with a steady state creatinine    UA (URINE) with reflex to Scope [[id number]]  (Abnormal) Collected: 10/11/23 1745    Lab Status: Final result Specimen: Urine, Other Updated: 10/11/23 1811     Color, UA Mary     Clarity, UA Clear     Specific Gravity, UA 1.025     pH, UA 6.0     Leukocytes, UA 25.0     Nitrite, UA Negative     Protein, UA 30 (1+) mg/dl      Glucose, UA Negative mg/dl      Ketones, UA 5 (Trace) mg/dl      Bilirubin, UA 1 mg/dL     Occult Blood, UA 10.0     UROBILINOGEN UA 4.0 mg/dL     CBC and differential [[id number]] Collected: 10/11/23 1745    Lab Status: Final result Specimen: Blood from Arm, Left Updated: 10/11/23 1801     WBC 9.54 Thousand/uL      RBC 4.07 Million/uL      Hemoglobin 12.4 g/dL      Hematocrit 36.6 %      MCV 90 fL      MCH 30.5 pg      MCHC 33.9 g/dL      RDW 12.9 %      MPV 10.7 fL      Platelets 683 Thousands/uL      nRBC 0 /100 WBCs      Neutrophils Relative 64 %      Immat GRANS % 1 %      Lymphocytes Relative 27 %      Monocytes Relative 5 %      Eosinophils Relative 2 %      Basophils Relative 1 %      Neutrophils Absolute 6.18 Thousands/µL      Immature Grans Absolute 0.05 Thousand/uL      Lymphocytes Absolute 2.55 Thousands/µL      Monocytes Absolute 0.50 Thousand/µL      Eosinophils Absolute 0.21 Thousand/µL      Basophils Absolute 0.05 Thousands/µL     POCT pregnancy, urine [[id number]]  (Normal) Resulted: 10/11/23 1745    Lab Status: Final result Updated: 10/11/23 1745     EXT Preg Test, Ur Negative     Control Valid                   No orders to display              Procedures  Procedures         ED Course  ED Course as of 10/11/23 1929   Wed Oct 11, 2023   1906 Mild dehydration noted with ketones in the urine. IV fluids given in the emergency department patient feeling much improved at this point time plan will be for outpatient management and follow-up. 1906 Pt re-examined and evaluated after testing and treatment. Spoke with the patient and feeling improved and sxs have resolved. Will discharge home with close f/u with pcp and instructed to return to the ED if sxs worsen or continue. Pt agrees with the plan for discharge and feels comfortable to go home with proper f/u. Advised to return for worsening or additional problems. Diagnostic tests were reviewed and questions answered. Diagnosis, care plan and treatment options were discussed. The patient understand instructions and will follow up as directed. Counseling:there was a  detailed discussion with the patient and/or guardian regarding: the historical points, exam findings, and any diagnostic results supporting the discharge diagnosis, lab results, radiology results, discharge instructions reviewed with patient and/or family/caregiver and understanding was verbalized. Instructions given to return to the emergency department if symptoms worsen or persist, or if there are any questions or concerns that arise at home. All labs reviewed and utilized in the medical decision making process    All radiology studies independently viewed by me and interpreted by the radiologist.                                   SBIRT 20yo+      Flowsheet Row Most Recent Value   Initial Alcohol Screen: US AUDIT-C     1. How often do you have a drink containing alcohol? 0 Filed at: 10/11/2023 1720   2.  How many drinks containing alcohol do you have on a typical day you are drinking? 0 Filed at: 10/11/2023 1720   3a. Male UNDER 65: How often do you have five or more drinks on one occasion? 0 Filed at: 10/11/2023 1720   3b. FEMALE Any Age, or MALE 65+: How often do you have 4 or more drinks on one occassion? 0 Filed at: 10/11/2023 1720   Audit-C Score 0 Filed at: 10/11/2023 1720   LISBETH: How many times in the past year have you. .. Used an illegal drug or used a prescription medication for non-medical reasons? Never Filed at: 10/11/2023 1720                      Medical Decision Making  Amount and/or Complexity of Data Reviewed  Labs: ordered. Disposition  Final diagnoses:   Dizziness   Dehydration     Time reflects when diagnosis was documented in both MDM as applicable and the Disposition within this note       Time User Action Codes Description Comment    10/11/2023  7:03 PM Kang Salinas Add [R42] Dizziness     10/11/2023  7:03 PM Kang Salinas Add [E86.0] Dehydration           ED Disposition       ED Disposition   Discharge    Condition   Stable    Date/Time   Wed Oct 11, 2023 1903    Comment   Cait Sibley discharge to home/self care.                    Follow-up Information    None         Discharge Medication List as of 10/11/2023  7:03 PM        CONTINUE these medications which have NOT CHANGED    Details   bisacodyl (DULCOLAX) 5 mg EC tablet Take 4 tablets (20 mg total) by mouth once for 1 dose Colonoscopy prep, Starting Mon 10/2/2023, Normal      ergocalciferol (VITAMIN D2) 50,000 units Take 1 capsule (50,000 Units total) by mouth once a week, Starting Wed 9/6/2023, Normal      guaiFENesin (ROBITUSSIN) 100 MG/5ML oral liquid Take 10 mL (200 mg total) by mouth 3 (three) times a day as needed for cough, Starting Mon 10/2/2023, Normal      ketorolac (TORADOL) 10 mg tablet Take 1 tablet (10 mg total) by mouth every 6 (six) hours as needed for moderate pain, Starting Thu 7/6/2023, Normal neomycin-polymyxin-hydrocortisone (CORTISPORIN) 0.35%-10,000 units/mL-1% otic suspension Administer 4 drops into both ears 2 (two) times a day, Starting Wed 9/6/2023, Normal      nicotine (NICODERM CQ) 14 mg/24hr TD 24 hr patch Place 1 patch on the skin over 24 hours every 24 hours, Starting Thu 9/14/2023, Normal      nystatin-triamcinolone (MYCOLOG-II) cream Apply topically 2 (two) times a day, Starting Thu 9/14/2023, Normal      oxyCODONE-acetaminophen (Percocet) 5-325 mg per tablet Take 1 tablet by mouth 2 (two) times a day as needed for moderate pain Max Daily Amount: 2 tablets, Starting Mon 10/9/2023, Normal      pantoprazole (PROTONIX) 20 mg tablet Take 1 tablet (20 mg total) by mouth daily, Starting Mon 9/25/2023, Normal      polyethylene glycol (GOLYTELY) 4000 mL solution Take 4,000 mL by mouth once for 1 dose Colonoscopy prep, Starting Mon 10/2/2023, Normal      polyethylene glycol (MIRALAX) 17 g packet Take 17 g by mouth daily, Starting Fri 6/2/2023, Until Wed 11/29/2023, Normal      prochlorperazine (COMPAZINE) 10 mg tablet Take 1 tablet (10 mg total) by mouth every 6 (six) hours as needed for nausea or vomiting, Starting Wed 9/6/2023, Normal      senna-docusate sodium (SENOKOT-S) 8.6-50 mg per tablet Take 1 tablet by mouth daily, Starting Thu 10/5/2023, Normal      sodium chloride 3 % inhalation solution Take 4 mL by nebulization as needed for cough Use every 4-6 hours as needed., Starting Wed 10/4/2023, Until Fri 11/3/2023 at 2359, Normal      SUMAtriptan (IMITREX) 100 mg tablet Take 1 tablet (100 mg total) by mouth daily as needed for migraine, Starting Fri 10/6/2023, Normal             No discharge procedures on file.     PDMP Review         Value Time User    PDMP Reviewed  Yes 10/9/2023  1:40 PM Nubia Winston PA-C            ED Provider  Electronically Signed by             Vashti Brar DO  10/11/23 6290

## 2023-10-11 NOTE — ED PROCEDURE NOTE
PROCEDURE  ECG 12 Lead Documentation Only    Date/Time: 10/11/2023 5:43 PM    Performed by: Jaquelin Lord DO  Authorized by: Jaquelin Lord DO    ECG reviewed by me, the ED Provider: yes    Patient location:  ED  Previous ECG:     Previous ECG:  Compared to current    Similarity:  No change  Interpretation:     Interpretation: normal    Rate:     ECG rate assessment: normal    Rhythm:     Rhythm: sinus rhythm    Ectopy:     Ectopy: none    QRS:     QRS axis:  Normal    QRS intervals:  Normal  Conduction:     Conduction: normal    ST segments:     ST segments:  Normal  T waves:     T waves: normal         Jaquelin Lord DO  10/11/23 1743

## 2023-10-16 ENCOUNTER — TELEPHONE (OUTPATIENT)
Dept: NEUROLOGY | Facility: CLINIC | Age: 38
End: 2023-10-16

## 2023-10-16 NOTE — TELEPHONE ENCOUNTER
Spoke to patient, I confirmed their upcoming appointment. Patient was informed of date/time and location of visit. Patient states she has not had the MRI done due to her insurance not approving it. She would like to know if she needs to keep her upcoming visit 10/23 or if she should call back to r/s once the MRI has been approved and done. Please advise.

## 2023-10-17 DIAGNOSIS — R11.0 NAUSEA: ICD-10-CM

## 2023-10-17 DIAGNOSIS — R14.0 BLOATING: ICD-10-CM

## 2023-10-17 DIAGNOSIS — K59.00 ACUTE CONSTIPATION: ICD-10-CM

## 2023-10-17 DIAGNOSIS — R10.84 GENERALIZED ABDOMINAL PAIN: ICD-10-CM

## 2023-10-17 DIAGNOSIS — R68.81 EARLY SATIETY: ICD-10-CM

## 2023-10-17 RX ORDER — SENNA AND DOCUSATE SODIUM 50; 8.6 MG/1; MG/1
1 TABLET, FILM COATED ORAL DAILY
Qty: 7 TABLET | Refills: 2 | Status: SHIPPED | OUTPATIENT
Start: 2023-10-17

## 2023-10-17 NOTE — TELEPHONE ENCOUNTER
Reason for call:   [x] Refill   [] Prior Auth  [] Other:     Office:   [] PCP/Provider -   [x] Specialty/Provider - Gastro    Medication: senna-docusate sodium (SENOKOT-S)      Dose/Frequency: 8.6-50 mg/QD    Quantity: 7    Pharmacy: [facility] [address] [place] [id number]  Phone: [phone]  Fax: [phone]  KANWAL #: [id number]     Does the patient have enough for 3 days?    [] Yes   [x] No - Send as HP to POD

## 2023-10-18 ENCOUNTER — HOSPITAL ENCOUNTER (EMERGENCY)
Facility: HOSPITAL | Age: 38
Discharge: HOME | End: 2023-10-18
Attending: EMERGENCY MEDICINE
Payer: COMMERCIAL

## 2023-10-18 VITALS
SYSTOLIC BLOOD PRESSURE: 111 MMHG | DIASTOLIC BLOOD PRESSURE: 86 MMHG | BODY MASS INDEX: 28.67 KG/M2 | RESPIRATION RATE: 20 BRPM | OXYGEN SATURATION: 100 % | WEIGHT: 156.75 LBS | HEART RATE: 87 BPM | TEMPERATURE: 99.4 F

## 2023-10-18 DIAGNOSIS — G43.909 MIGRAINE: Primary | ICD-10-CM

## 2023-10-18 PROCEDURE — 99284 EMERGENCY DEPT VISIT MOD MDM: CPT | Performed by: EMERGENCY MEDICINE

## 2023-10-18 PROCEDURE — 99283 EMERGENCY DEPT VISIT LOW MDM: CPT

## 2023-10-18 RX ORDER — METOCLOPRAMIDE HYDROCHLORIDE 5 MG/ML
10 INJECTION INTRAMUSCULAR; INTRAVENOUS ONCE
Status: COMPLETED | OUTPATIENT
Start: 2023-10-18 | End: 2023-10-18

## 2023-10-18 RX ORDER — KETOROLAC TROMETHAMINE 30 MG/ML
15 INJECTION, SOLUTION INTRAMUSCULAR; INTRAVENOUS ONCE
Status: COMPLETED | OUTPATIENT
Start: 2023-10-18 | End: 2023-10-18

## 2023-10-18 RX ADMIN — SODIUM CHLORIDE 1000 ML: 0.9 INJECTION, SOLUTION INTRAVENOUS at 17:45

## 2023-10-18 RX ADMIN — METOCLOPRAMIDE 10 MG: 5 INJECTION, SOLUTION INTRAMUSCULAR; INTRAVENOUS at 17:46

## 2023-10-18 RX ADMIN — KETOROLAC TROMETHAMINE 15 MG: 30 INJECTION, SOLUTION INTRAMUSCULAR; INTRAVENOUS at 17:46

## 2023-10-18 NOTE — ED PROVIDER NOTES
History  Chief Complaint   Patient presents with    Headache - Recurrent or Known Dx Migraines     3-4 hrs ago and has been seeing a neurologist. Pain is crippling neck       HPI  Patient 41year old female presenting with headache. Known to have migraine headaches and states she is having an acute exacerbation. Started 4 hours ago and used her sumatriptan with mild relief. Patient reports photophobia and nausea. Reports no other complaints. Prior to Admission Medications   Prescriptions Last Dose Informant Patient Reported? Taking?    SUMAtriptan (IMITREX) 100 mg tablet   No No   Sig: Take 1 tablet (100 mg total) by mouth daily as needed for migraine   bisacodyl (DULCOLAX) 5 mg EC tablet   No No   Sig: Take 4 tablets (20 mg total) by mouth once for 1 dose Colonoscopy prep   ergocalciferol (VITAMIN D2) 50,000 units  Self No No   Sig: Take 1 capsule (50,000 Units total) by mouth once a week   guaiFENesin (ROBITUSSIN) 100 MG/5ML oral liquid   No No   Sig: Take 10 mL (200 mg total) by mouth 3 (three) times a day as needed for cough   ketorolac (TORADOL) 10 mg tablet  Self No No   Sig: Take 1 tablet (10 mg total) by mouth every 6 (six) hours as needed for moderate pain   neomycin-polymyxin-hydrocortisone (CORTISPORIN) 0.35%-10,000 units/mL-1% otic suspension  Self No No   Sig: Administer 4 drops into both ears 2 (two) times a day   nicotine (NICODERM CQ) 14 mg/24hr TD 24 hr patch  Self No No   Sig: Place 1 patch on the skin over 24 hours every 24 hours   nystatin-triamcinolone (MYCOLOG-II) cream  Self No No   Sig: Apply topically 2 (two) times a day   oxyCODONE-acetaminophen (Percocet) 5-325 mg per tablet   No No   Sig: Take 1 tablet by mouth 2 (two) times a day as needed for moderate pain Max Daily Amount: 2 tablets   pantoprazole (PROTONIX) 20 mg tablet   No No   Sig: Take 1 tablet (20 mg total) by mouth daily   polyethylene glycol (GOLYTELY) 4000 mL solution   No No   Sig: Take 4,000 mL by mouth once for 1 dose Colonoscopy prep   polyethylene glycol (MIRALAX) 17 g packet  Self No No   Sig: Take 17 g by mouth daily   prochlorperazine (COMPAZINE) 10 mg tablet  Self No No   Sig: Take 1 tablet (10 mg total) by mouth every 6 (six) hours as needed for nausea or vomiting   senna-docusate sodium (SENOKOT-S) 8.6-50 mg per tablet   No No   Sig: Take 1 tablet by mouth daily   sodium chloride 3 % inhalation solution   No No   Sig: Take 4 mL by nebulization as needed for cough Use every 4-6 hours as needed. Facility-Administered Medications: None       Past Medical History:   Diagnosis Date    Abnormal Pap smear of cervix     2015 cryosurgery; 6/2019 NILM pap/neg HPV    Migraine with aura        Past Surgical History:   Procedure Laterality Date    CHOLECYSTECTOMY LAPAROSCOPIC N/A 10/27/2020    Procedure: CHOLECYSTECTOMY LAPAROSCOPIC;  Surgeon: Kody García MD;  Location: [address] [place] [address] OR;  Service: General    GYNECOLOGIC CRYOSURGERY  2015    HYSTERECTOMY  2016    LAPAROSCOPY  2018    NC LAPAROSCOPY W/LYSIS OF ADHESIONS N/A 3/3/2020    Procedure: L.O.A.;  Surgeon: Coby Garza MD;  Location: [facility] OR;  Service: Gynecology    NC LAPS ABD PRTM&OMENTUM DX W/WO SPEC BR/WA [facility] N/A 8/9/2019    Procedure: LAPAROSCOPY DIAGNOSTIC; LYSIS OF ADHESIONS;  Surgeon: Bryan Corrigan MD;  Location: [facility] OR;  Service: Gynecology    TUBAL LIGATION         Family History   Problem Relation Age of Onset    Cancer Paternal Grandmother         uterine    Cancer Paternal Aunt         uterine     Migraines Mother     Breast cancer Neg Hx     Colon cancer Neg Hx     Ovarian cancer Neg Hx      I have reviewed and agree with the history as documented.     E-Cigarette/Vaping    E-Cigarette Use Never User      E-Cigarette/Vaping Substances     Social History     Tobacco Use    Smoking status: Every Day     Packs/day: 1.00     Years: 15.00     Total pack years: 15.00     Types: Cigarettes     Passive exposure: Current    Smokeless tobacco: Never   Vaping Use    Vaping Use: Never used   Substance Use Topics    Alcohol use: Never    Drug use: Never       Review of Systems   Constitutional:  Negative for chills, diaphoresis, fever and unexpected weight change. HENT:  Negative for ear pain and sore throat. Eyes:  Positive for photophobia. Negative for visual disturbance. Respiratory:  Negative for cough, chest tightness and shortness of breath. Cardiovascular:  Negative for chest pain and leg swelling. Gastrointestinal:  Positive for nausea. Negative for abdominal distention, abdominal pain, constipation, diarrhea and vomiting. Endocrine: Negative. Genitourinary:  Negative for difficulty urinating and dysuria. Musculoskeletal: Negative. Skin: Negative. Allergic/Immunologic: Negative. Neurological:  Positive for headaches. Hematological: Negative. Psychiatric/Behavioral: Negative. All other systems reviewed and are negative. Physical Exam  Physical Exam  Vitals and nursing note reviewed. Constitutional:       General: She is not in acute distress. Appearance: Normal appearance. She is not ill-appearing. HENT:      Head: Normocephalic and atraumatic. Right Ear: External ear normal.      Left Ear: External ear normal.      Nose: Nose normal.      Mouth/Throat:      Mouth: Mucous membranes are moist.      Pharynx: Oropharynx is clear. Eyes:      General: No scleral icterus. Right eye: No discharge. Left eye: No discharge. Extraocular Movements: Extraocular movements intact. Conjunctiva/sclera: Conjunctivae normal.      Pupils: Pupils are equal, round, and reactive to light. Cardiovascular:      Rate and Rhythm: Normal rate and regular rhythm. Pulses: Normal pulses. Heart sounds: Normal heart sounds. Pulmonary:      Effort: Pulmonary effort is normal.      Breath sounds: Normal breath sounds. Abdominal:      General: Abdomen is flat.  Bowel sounds are normal. There is no distension. Palpations: Abdomen is soft. Tenderness: There is no abdominal tenderness. There is no guarding or rebound. Musculoskeletal:         General: Normal range of motion. Cervical back: Normal range of motion and neck supple. Skin:     General: Skin is warm and dry. Capillary Refill: Capillary refill takes less than 2 seconds. Neurological:      General: No focal deficit present. Mental Status: She is alert and oriented to person, place, and time. Mental status is at baseline. Cranial Nerves: No cranial nerve deficit. Sensory: No sensory deficit. Motor: No weakness. Gait: Gait normal.   Psychiatric:         Mood and Affect: Mood normal.         Behavior: Behavior normal.         Thought Content:  Thought content normal.         Judgment: Judgment normal.         Vital Signs  ED Triage Vitals   Temperature Pulse Respirations Blood Pressure SpO2   10/18/23 1717 10/18/23 1717 10/18/23 1717 10/18/23 1717 10/18/23 1717   99.4 °F (37.4 °C) 87 20 111/86 100 %      Temp Source Heart Rate Source Patient Position - Orthostatic VS BP Location FiO2 (%)   10/18/23 1717 10/18/23 1717 10/18/23 1717 10/18/23 1717 --   Tympanic Monitor Sitting Right arm       Pain Score       10/18/23 1746       10 - Worst Possible Pain           Vitals:    10/18/23 1717   BP: 111/86   Pulse: 87   Patient Position - Orthostatic VS: Sitting         Visual Acuity      ED Medications  Medications   sodium chloride 0.9 % bolus 1,000 mL (0 mL Intravenous Stopped 10/18/23 1801)   ketorolac (TORADOL) injection 15 mg (15 mg Intravenous Given 10/18/23 1746)   metoclopramide (REGLAN) injection 10 mg (10 mg Intravenous Given 10/18/23 1746)       Diagnostic Studies  Results Reviewed       None                   No orders to display              Procedures  Procedures         ED Course                               SBIRT 22yo+      Flowsheet Row Most Recent Value   Initial Alcohol Screen: US AUDIT-C 1. How often do you have a drink containing alcohol? 0 Filed at: 10/18/2023 1726   2. How many drinks containing alcohol do you have on a typical day you are drinking? 0 Filed at: 10/18/2023 1726   3a. Male UNDER 65: How often do you have five or more drinks on one occasion? 0 Filed at: 10/18/2023 1726   3b. FEMALE Any Age, or MALE 65+: How often do you have 4 or more drinks on one occassion? 0 Filed at: 10/18/2023 1726   Audit-C Score 0 Filed at: 10/18/2023 1726   LISBETH: How many times in the past year have you. .. Used an illegal drug or used a prescription medication for non-medical reasons? Never Filed at: 10/18/2023 1726                      Medical Decision Making  41year old female with headache   Similar to her previous migraine headaches   Will treat with migraine cocktail       Problems Addressed:  Migraine: acute illness or injury    Risk  Prescription drug management. Disposition  Final diagnoses:   Migraine     Time reflects when diagnosis was documented in both MDM as applicable and the Disposition within this note       Time User Action Codes Description Comment    10/18/2023  5:29  E Moustapha Leung, [facility] [C19.121] Migraine           ED Disposition       ED Disposition   Discharge    Condition   Stable    Date/Time   Wed Oct 18, 2023 1758    Comment   [place] discharge to home/self care.                    Follow-up Information       Follow up With Specialties Details Why Contact Info Additional Information    [facility] Schedule an appointment as soon as possible for a visit   [address] [facility]  [address]  [phone]       [facility] Emergency Department Emergency Medicine Go to  If symptoms worsen [address] [id number]  [address] [facility] Emergency Department            Discharge Medication List as of 10/18/2023  5:58 PM        CONTINUE these medications which have NOT CHANGED    Details   bisacodyl (DULCOLAX) 5 mg EC tablet Take 4 tablets (20 mg total) by mouth once for 1 dose Colonoscopy prep, Starting Mon 10/2/2023, Normal      ergocalciferol (VITAMIN D2) 50,000 units Take 1 capsule (50,000 Units total) by mouth once a week, Starting Wed 9/6/2023, Normal      guaiFENesin (ROBITUSSIN) 100 MG/5ML oral liquid Take 10 mL (200 mg total) by mouth 3 (three) times a day as needed for cough, Starting Mon 10/2/2023, Normal      ketorolac (TORADOL) 10 mg tablet Take 1 tablet (10 mg total) by mouth every 6 (six) hours as needed for moderate pain, Starting Thu 7/6/2023, Normal      neomycin-polymyxin-hydrocortisone (CORTISPORIN) 0.35%-10,000 units/mL-1% otic suspension Administer 4 drops into both ears 2 (two) times a day, Starting Wed 9/6/2023, Normal      nicotine (NICODERM CQ) 14 mg/24hr TD 24 hr patch Place 1 patch on the skin over 24 hours every 24 hours, Starting Thu 9/14/2023, Normal      nystatin-triamcinolone (MYCOLOG-II) cream Apply topically 2 (two) times a day, Starting Thu 9/14/2023, Normal      oxyCODONE-acetaminophen (Percocet) 5-325 mg per tablet Take 1 tablet by mouth 2 (two) times a day as needed for moderate pain Max Daily Amount: 2 tablets, Starting Mon 10/9/2023, Normal      pantoprazole (PROTONIX) 20 mg tablet Take 1 tablet (20 mg total) by mouth daily, Starting Mon 9/25/2023, Normal      polyethylene glycol (GOLYTELY) 4000 mL solution Take 4,000 mL by mouth once for 1 dose Colonoscopy prep, Starting Mon 10/2/2023, Normal      polyethylene glycol (MIRALAX) 17 g packet Take 17 g by mouth daily, Starting Fri 6/2/2023, Until Wed 11/29/2023, Normal      prochlorperazine (COMPAZINE) 10 mg tablet Take 1 tablet (10 mg total) by mouth every 6 (six) hours as needed for nausea or vomiting, Starting Wed 9/6/2023, Normal      senna-docusate sodium (SENOKOT-S) 8.6-50 mg per tablet Take 1 tablet by mouth daily, Starting Tue 10/17/2023, Normal      sodium chloride 3 % inhalation solution Take 4 mL by nebulization as needed for cough Use every 4-6 hours as needed., Starting Wed 10/4/2023, Until Fri 11/3/2023 at 2359, Normal      SUMAtriptan (IMITREX) 100 mg tablet Take 1 tablet (100 mg total) by mouth daily as needed for migraine, Starting Fri 10/6/2023, Normal             No discharge procedures on file.     PDMP Review         Value Time User    PDMP Reviewed  Yes 10/9/2023  1:40 PM Garry Siddiqui PA-C            ED Provider  Electronically Signed by             Елена Fleming MD  10/18/23 4380

## 2023-10-19 ENCOUNTER — OFFICE VISIT (OUTPATIENT)
Dept: OTOLARYNGOLOGY | Facility: CLINIC | Age: 38
End: 2023-10-19
Payer: COMMERCIAL

## 2023-10-19 ENCOUNTER — OFFICE VISIT (OUTPATIENT)
Dept: AUDIOLOGY | Facility: CLINIC | Age: 38
End: 2023-10-19
Payer: COMMERCIAL

## 2023-10-19 DIAGNOSIS — J34.2 DEVIATED NASAL SEPTUM: ICD-10-CM

## 2023-10-19 DIAGNOSIS — Z86.69 HISTORY OF EAR INFECTIONS: ICD-10-CM

## 2023-10-19 DIAGNOSIS — Z01.10 NORMAL HEARING TEST OF BOTH EARS: Primary | ICD-10-CM

## 2023-10-19 DIAGNOSIS — R51.9 CHRONIC NONINTRACTABLE HEADACHE, UNSPECIFIED HEADACHE TYPE: ICD-10-CM

## 2023-10-19 DIAGNOSIS — G89.29 CHRONIC NONINTRACTABLE HEADACHE, UNSPECIFIED HEADACHE TYPE: ICD-10-CM

## 2023-10-19 DIAGNOSIS — H62.41 OTITIS EXTERNA IN OTHER DISEASES CLASSIFIED ELSEWHERE, RIGHT EAR: ICD-10-CM

## 2023-10-19 DIAGNOSIS — J32.4 CHRONIC PANSINUSITIS: Primary | ICD-10-CM

## 2023-10-19 PROCEDURE — 99204 OFFICE O/P NEW MOD 45 MIN: CPT | Performed by: OTOLARYNGOLOGY

## 2023-10-19 PROCEDURE — 92557 COMPREHENSIVE HEARING TEST: CPT | Performed by: AUDIOLOGIST-HEARING AID FITTER

## 2023-10-19 PROCEDURE — 92567 TYMPANOMETRY: CPT | Performed by: AUDIOLOGIST-HEARING AID FITTER

## 2023-10-19 RX ORDER — CEPHALEXIN 500 MG/1
500 CAPSULE ORAL EVERY 8 HOURS SCHEDULED
Qty: 21 CAPSULE | Refills: 0 | Status: SHIPPED | OUTPATIENT
Start: 2023-10-19 | End: 2023-10-26

## 2023-10-19 RX ORDER — FLUTICASONE PROPIONATE 50 MCG
1 SPRAY, SUSPENSION (ML) NASAL 2 TIMES DAILY
Qty: 16 G | Refills: 1 | Status: SHIPPED | OUTPATIENT
Start: 2023-10-19

## 2023-10-19 NOTE — PROGRESS NOTES
[facility]  Scotty Tyson 41 y.o. female MRN: [id number]  Encounter: [id number]  Isabella Lorenzo MD  Office : [phone]  Also available on [employer]    Thank you for referring Scotty Tyson for an evaluation. My recommendations are included. Please do not hesitate to contact me with any questions you may have. ASSESSMENT AND PLAN:      1. Chronic pansinusitis  fluticasone (FLONASE) 50 mcg/act nasal spray    CT sinus wo contrast      2. Chronic nonintractable headache, unspecified headache type  CT sinus wo contrast      3. Otitis externa in other diseases classified elsewhere, right ear  cephalexin (KEFLEX) 500 mg capsule      4. Deviated nasal septum  fluticasone (FLONASE) 50 mcg/act nasal spray    CT sinus wo contrast          Patient with granuloma type lesion on the skin of the meatus of the external auditory canal.  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 We will try a course of Keflex, continue the topical eardrops. We will request a CT scan of the sinuses as well to better evaluate the chronic sinusitis findings on the MRI. These inflammatory changes as well as the deviated septum may be contributory to some of her headaches. I recommend in the interim  Flonase to the nose as well.  ______________________________________________________________________    Reason for consultation : otic pruritus    HPI: Scotty Tyson is a 41 y.o. female who presents with 5-6 weeks history of otic pruritus, bilaterally. No subjective hearing loss. No otorrhea, no blood. Patient has been on neomycin polymyxin hydrocortisone eardrops without any improvement.   Of note patient has been going intermittently to the ER with recurrent headaches "migraines "      PRIOR VISIT, ENDOSCOPIC EVALUATION :     REVIEW OF SYSTEMS:    Review of systems:  10 Point ROS was performed and negative except as above or otherwise noted in the medical record.     Historical Information   Past Medical History:   Diagnosis Date    Abnormal Pap smear of cervix     2015 cryosurgery; 6/2019 NILM pap/neg HPV    Migraine with aura      Past Surgical History:   Procedure Laterality Date    CHOLECYSTECTOMY LAPAROSCOPIC N/A 10/27/2020    Procedure: CHOLECYSTECTOMY LAPAROSCOPIC;  Surgeon: Karel Cushing, MD;  Location: [address] [place] [address] OR;  Service: General    GYNECOLOGIC CRYOSURGERY  2015    HYSTERECTOMY  2016    LAPAROSCOPY  2018    SD LAPAROSCOPY W/LYSIS OF ADHESIONS N/A 3/3/2020    Procedure: L.O.A.;  Surgeon: Joan Reyes MD;  Location: [facility] OR;  Service: Gynecology    SD LAPS ABD PRTM&OMENTUM DX W/WO SPEC BR/WA [facility] N/A 8/9/2019    Procedure: LAPAROSCOPY DIAGNOSTIC; LYSIS OF ADHESIONS;  Surgeon: Arvin Grant MD;  Location: [facility] OR;  Service: Gynecology    TUBAL LIGATION       Social History   Social History     Substance and Sexual Activity   Alcohol Use Never     Social History     Substance and Sexual Activity   Drug Use Never     Social History     Tobacco Use   Smoking Status Every Day    Packs/day: 1.00    Years: 15.00    Total pack years: 15.00    Types: Cigarettes    Passive exposure: Current   Smokeless Tobacco Never     Family History   Problem Relation Age of Onset    Cancer Paternal Grandmother         uterine    Cancer Paternal Aunt         uterine     Migraines Mother     Breast cancer Neg Hx     Colon cancer Neg Hx     Ovarian cancer Neg Hx        Meds/Allergies       Current Outpatient Medications:     cephalexin (KEFLEX) 500 mg capsule    fluticasone (FLONASE) 50 mcg/act nasal spray    bisacodyl (DULCOLAX) 5 mg EC tablet    ergocalciferol (VITAMIN D2) 50,000 units    guaiFENesin (ROBITUSSIN) 100 MG/5ML oral liquid    ketorolac (TORADOL) 10 mg tablet    neomycin-polymyxin-hydrocortisone (CORTISPORIN) 0.35%-10,000 units/mL-1% otic suspension    nicotine (NICODERM CQ) 14 mg/24hr TD 24 hr patch nystatin-triamcinolone (MYCOLOG-II) cream    oxyCODONE-acetaminophen (Percocet) 5-325 mg per tablet    pantoprazole (PROTONIX) 20 mg tablet    polyethylene glycol (GOLYTELY) 4000 mL solution    polyethylene glycol (MIRALAX) 17 g packet    prochlorperazine (COMPAZINE) 10 mg tablet    senna-docusate sodium (SENOKOT-S) 8.6-50 mg per tablet    sodium chloride 3 % inhalation solution    SUMAtriptan (IMITREX) 100 mg tablet  No current facility-administered medications for this visit. Allergies   Allergen Reactions    Shellfish-Derived Products - Food Allergy Itching and Swelling     "seafood "    Ibuprofen Other (See Comments)     Patient states she gets hematoma    Pregabalin Anxiety         PHYSICAL EXAM:    not currently breastfeeding. There is no height or weight on file to calculate BMI. Constitutional: Oriented to person, place, and time. Well-developed and well-nourished, no apparent distress, non-toxic appearance. Cooperative, able to hear and answer questions without difficulty. Voice: Normal voice quality. Head: Normocephalic, atraumatic. No scars, masses or lesions. Face: Symmetric, no edema, no sinus tenderness. Eyes: Vision grossly intact, extra-ocular movement intact. Right Ear: External ear normal.  Auditory canal with a lesion that appears implanted on the anterior wall near the meatus of the external auditory canal.  It is a round/spheric hypervascular, approximately 5 mm pedunculated lesion suggesting a granuloma type lesion on the meatus of the external auditory canal anterior wall. No cerumen impaction. .  Tympanic membrane well-appearing, without retraction or scarring. No fluid present. No post-auricular erythema or tenderness  Left Ear: External ear normal.  Auditory canal clear. Tympanic membrane well-appearing, without retraction or scarring. No fluid present. No post-auricular erythema or tenderness. Nose: Septum with right septal deviation, nares clear.   Mucosa moist, turbinates well appearing. No crusting, polyps or discharge evident. Oral cavity: Dentition intact. Mucosa moist, lips normal.  Tongue mobile, floor of mouth normal.  Hard palate unremarkable. No masses or lesions. Oropharynx: Uvula is midline, soft palate normal.  Unremarkable oropharyngeal inlet. Tonsils unremarkable. Posterior pharyngeal wall clear. No masses or lesions. Salivary glands:  Parotid glands and submandibular glands symmetric, no enlargement or tenderness. Neck: Normal laryngeal elevation with swallow. Trachea midline. No masses or lesions. No palpable adenopathy. Thyroid: normal in size, unremarkable without tenderness or palpable nodules. Pulmonary/Chest: Normal effort and rate. No respiratory distress. Musculoskeletal: Normal range of motion. Neurological: Cranial nerves 2-12 intact. Skin: Skin is warm and dry. Psychiatric: Normal mood and affect. Audiometry:  10/19/2023 excellent hearing level, no evidence of any conductive hearing loss. Pure-tone average is 10 and 5 dB, SRT 10 and 1500% discrimination, type A Tymp  Imaging Studies: I have personally reviewed images on the PACS system and : Recent MRI with contrast 9/28/2023 does not reveal a soft tissue lesion or mass on the external auditory canal.  There is significant enhancement of the mucosa of the right sphenoid sinus, right maxillary sinus as well as right frontal sinus.

## 2023-10-23 ENCOUNTER — TELEPHONE (OUTPATIENT)
Dept: NEUROLOGY | Facility: CLINIC | Age: 38
End: 2023-10-23

## 2023-10-24 ENCOUNTER — HOSPITAL ENCOUNTER (OUTPATIENT)
Dept: MRI IMAGING | Facility: HOSPITAL | Age: 38
Discharge: HOME | End: 2023-10-24
Attending: PSYCHIATRY & NEUROLOGY
Payer: COMMERCIAL

## 2023-10-24 DIAGNOSIS — R93.89 ABNORMAL MRI: ICD-10-CM

## 2023-10-24 PROCEDURE — A9585 GADOBUTROL INJECTION: HCPCS | Performed by: PSYCHIATRY & NEUROLOGY

## 2023-10-24 PROCEDURE — 72156 MRI NECK SPINE W/O & W/DYE: CPT

## 2023-10-24 PROCEDURE — G1004 CDSM NDSC: HCPCS

## 2023-10-24 RX ORDER — GADOBUTROL 604.72 MG/ML
7 INJECTION INTRAVENOUS
Status: COMPLETED | OUTPATIENT
Start: 2023-10-24 | End: 2023-10-24

## 2023-10-24 RX ADMIN — GADOBUTROL 7 ML: 604.72 INJECTION INTRAVENOUS at 16:25

## 2023-11-02 ENCOUNTER — OFFICE VISIT (OUTPATIENT)
Dept: NEUROLOGY | Facility: CLINIC | Age: 38
End: 2023-11-02
Payer: COMMERCIAL

## 2023-11-02 VITALS
SYSTOLIC BLOOD PRESSURE: 110 MMHG | BODY MASS INDEX: 29.55 KG/M2 | HEIGHT: 62 IN | HEART RATE: 102 BPM | RESPIRATION RATE: 18 BRPM | WEIGHT: 160.6 LBS | OXYGEN SATURATION: 99 % | TEMPERATURE: 98.2 F | DIASTOLIC BLOOD PRESSURE: 72 MMHG

## 2023-11-02 DIAGNOSIS — G43.109 MIGRAINE WITH AURA AND WITHOUT STATUS MIGRAINOSUS, NOT INTRACTABLE: ICD-10-CM

## 2023-11-02 DIAGNOSIS — M54.10 RADICULOPATHY: ICD-10-CM

## 2023-11-02 DIAGNOSIS — R93.89 ABNORMAL MRI: Primary | ICD-10-CM

## 2023-11-02 DIAGNOSIS — M54.2 NECK PAIN: ICD-10-CM

## 2023-11-02 DIAGNOSIS — E53.8 VITAMIN B 12 DEFICIENCY: ICD-10-CM

## 2023-11-02 PROCEDURE — 96372 THER/PROPH/DIAG INJ SC/IM: CPT | Performed by: PSYCHIATRY & NEUROLOGY

## 2023-11-02 PROCEDURE — 99215 OFFICE O/P EST HI 40 MIN: CPT | Performed by: PSYCHIATRY & NEUROLOGY

## 2023-11-02 RX ORDER — CYANOCOBALAMIN 1000 UG/ML
1000 INJECTION, SOLUTION INTRAMUSCULAR; SUBCUTANEOUS WEEKLY
Status: SHIPPED | OUTPATIENT
Start: 2023-11-02 | End: 2023-12-07

## 2023-11-02 RX ORDER — CYANOCOBALAMIN 1000 UG/ML
1000 INJECTION, SOLUTION INTRAMUSCULAR; SUBCUTANEOUS WEEKLY
Status: DISCONTINUED | OUTPATIENT
Start: 2023-11-02 | End: 2023-11-02

## 2023-11-02 RX ORDER — NAPROXEN 500 MG/1
500 TABLET ORAL AS NEEDED
COMMUNITY

## 2023-11-02 RX ORDER — SUMATRIPTAN 100 MG/1
100 TABLET, FILM COATED ORAL DAILY PRN
Qty: 9 TABLET | Refills: 12 | Status: SHIPPED | OUTPATIENT
Start: 2023-11-02

## 2023-11-02 RX ADMIN — CYANOCOBALAMIN 1000 MCG: 1000 INJECTION, SOLUTION INTRAMUSCULAR; SUBCUTANEOUS at 09:40

## 2023-11-02 NOTE — ASSESSMENT & PLAN NOTE
Patient seen for headaches. Given change in nature of headache I ordered an MRI brain with and without contrast that showed a few scattered foci of T2/FLAIR hyperintensity in the periventricular and subcortical white matter, however there was an unusual left parietal white matter change that was larger with a T1 weighted correlate, concerning for potential demyelination. I ordered a follow-up MRI C-spine to evaluate for any potential T2 FLAIR changes of the spine. This was normal. B12 189. Lyme, Sjogren's, CONCHA, ESR, CRP, ANCA, Complement for basic vasculitis workup WNL. Physical exam today without any lateralizing weakness or hyperreflexia.      Plan:  Repeat MRI brain MS protocol w/wo 6 months from prior to evaluate for any evolution of signal   Ordered an LP in the meantime for evaluation of demyelinating disease, vasculitis malignancy etc..   B 12 injections as above

## 2023-11-02 NOTE — ASSESSMENT & PLAN NOTE
Patient reporting pain initiating along lower cervical spinous process when she lays down, radiating to her right arm. MRI C spine:  C4-C5: Mild bulge. No significant canal stenosis or foraminal narrowing. C5-C6: Central disc protrusion. Mild mass effect on the thecal sac. No significant foraminal narrowing. C6-C7: Central disc protrusion. Mild mass effect on the thecal sac. No significant foraminal narrowing. Probable atypical hemangioma in the C6 vertebral body. Ordered EMG to rule out radiculopathy. Advised to follow with orthopedics about the hemangioma and bony abnormalities.

## 2023-11-02 NOTE — TELEPHONE ENCOUNTER
Pt called in today stated that she was concerned that her appts for [place] was on 11/17/23 at 1030am, EMG was on 03/08/24, MRI was on 03/13/241 at 11am,  but her f/u appt was on 02/05/24 at 9am. Pt is asking if her f/u appt needs to be moved until after all testing has been completed. Please call pt to advise at [phone].

## 2023-11-02 NOTE — PROGRESS NOTES
Patient ID: Author Trejo is a 41 y.o. female. Assessment/Plan:    Migraine headache with aura  Author Neil is a very pleasant  41 y.o. female with a past medical history that includes choledocholithiasis s/p cholescystectomy, hydrosalpinx, GERD, gastritis, hypoglycemia referred here for evaluation of headache. during our initial visit patient having and unilateral throbbing pain with migrainous features that had become more severe over the prior 6 months. Headache accompanied by a visual aura. Given change in nature of headache I ordered an MRI brain with and without contrast that showed a few scattered foci of T2/FLAIR hyperintensity in the periventricular and subcortical white matter, however there was an unusual left parietal white matter change that was larger with a T1 weighted correlate. I ordered a follow-up MRI C-spine to evaluate for any potential T2 layer changes of the spine. This was normal. B12 189. Today she reports sumatriptan helps but at times can have headaches that last up to 4 days. Also reports a newer headache that is stabbing in nature, lasting 20-30 minutes occurring 2-3 times in a day. She has been undergoing significant stress since I last saw her which is likely contributing to her headaches. She dose report is still is headache free most days of the month. Plan:   Will continue sumatriptan 100 mg, advised on taking first does at onset of aura and a second dose if headache persists 1-2 H after first dose  Discussed considering gabapentin, however we opted against this given hx of lyrica causing panic attacks   Will continue to monitor headaches while her ongoing stressors resolve  Will do weekly B 12 injections for 5 weeks, then monthly for 4 months  For preventative purposes Riboflavin and magnesium 400 mg daily  Patient advised not to take over-the-counter or prescription pain medications more than 3 days per week to prevent medication overuse/rebound headache  Recommend drinking at least 64 oz of water daily   Discussed with patient the importance of keeping good sleep hygiene  Advised to keep track of headache patterns  Follow up in 3 months     Abnormal MRI  Patient seen for headaches. Given change in nature of headache I ordered an MRI brain with and without contrast that showed a few scattered foci of T2/FLAIR hyperintensity in the periventricular and subcortical white matter, however there was an unusual left parietal white matter change that was larger with a T1 weighted correlate, concerning for potential demyelination. I ordered a follow-up MRI C-spine to evaluate for any potential T2 FLAIR changes of the spine. This was normal. B12 189. Lyme, Sjogren's, CONCHA, ESR, CRP, ANCA, Complement for basic vasculitis workup WNL. Physical exam today without any lateralizing weakness or hyperreflexia. Plan:  Repeat MRI brain MS protocol w/wo 6 months from prior to evaluate for any evolution of signal   Ordered an LP in the meantime for evaluation of demyelinating disease, vasculitis malignancy etc..   B 12 injections as above      Neck pain  Patient reporting pain initiating along lower cervical spinous process when she lays down, radiating to her right arm. MRI C spine:  C4-C5: Mild bulge. No significant canal stenosis or foraminal narrowing. C5-C6: Central disc protrusion. Mild mass effect on the thecal sac. No significant foraminal narrowing. C6-C7: Central disc protrusion. Mild mass effect on the thecal sac. No significant foraminal narrowing. Probable atypical hemangioma in the C6 vertebral body. Ordered EMG to rule out radiculopathy. Advised to follow with orthopedics about the hemangioma and bony abnormalities. Diagnoses and all orders for this visit:    Abnormal MRI  -     FL OUT-patient lumbar puncture; Future  -     MRI brain MS wo and w contrast; Future    Radiculopathy  -     EMG 1 Limb;  Future    Vitamin B 12 deficiency  -     Discontinue: cyanocobalamin injection 1,000 mcg  -     cyanocobalamin injection 1,000 mcg    Migraine with aura and without status migrainosus, not intractable  -     SUMAtriptan (IMITREX) 100 mg tablet; Take 1 tablet (100 mg total) by mouth daily as needed for migraine  -     MRI brain MS wo and w contrast; Future    Neck pain    Other orders  -     naproxen (EC NAPROSYN) 500 MG EC tablet; Take 500 mg by mouth if needed for mild pain  -     Nursing communication Encourage po fluids and aim for 500 cc of H20 over 1 hour before procedure.; Standing  -     Platelet count - Signed and Held; Standing  -     Protime-INR - Signed and Held; Standing  -     MS Panel, CSF; Standing  -     Red Top-MS Panel; Standing  -     Non-gynecologic cytology; Standing  -     Myelin basic protein, CSF; Standing  -     Leukemia/Lymphoma flow cytometry; Standing  -     ELIDA Virus, Ultrasensitive - Miscellaneous Test; Standing  -     Gram stain CSF; Standing  -     RBC count,CSF; Standing  -     Protein CSF; Standing  -     Glucose CSF; Standing  -     CSF white cell count with differential; Standing  -     CMV PCR, CSF QUAL - Miscellaneous Test; Standing  -     CONCHA, body fluid; Standing  -     Meningitis/Encephalitis (ME) Panel; Standing  -     Red Top - Signed and Held; Standing  -     Lyme disease, PCR; Standing  -     Protein electrophoresis, serum; Standing  -     CDS-1; CDS-1; [facility] - Miscellaneous Test; Standing       Subjective:    HPI    Patient is a very pleasant 71-year-old female with history of choledocholithiasis/P cholecystectomy, hydrosalpinx, GERD, gastritis, hypoglycemia who presents as a headache follow-up. I last saw patient on 9/19/2023. To review, during our initial visit patient having and unilateral throbbing pain with migrainous features that had become more severe over the prior 6 months. Headache accompanied by a visual aura.   Given change in nature of headache I ordered an MRI brain with and without contrast that showed a few scattered foci of T2/FLAIR hyperintensity in the periventricular and subcortical white matter, however there was an unusual left parietal white matter change that was larger with a T1 weighted correlate. I ordered a follow-up MRI C-spine to evaluate for any potential T2 FLAIR changes of the spine. This was normal.    Blood work:  B12 189  WNL: Lyme, Sjogren's, CONCHA, ESR, CRP, ANCA, Complement    Prior treatment:   Abortive: Toradol 10 mg   Sumatriptan- Has helped   Ibuprofen- bruises  Prochlorperazine- slightly helps         Preventative:  Pregbalin- panic attacks   Amitriptyline- used to work, excessive somnolence       Interval history:  When she lies down gets a radiating pain from her neck into her right arm. She reports sumatriptan helps however as of late has been having headaches that can last up to 4 days. She has been getting episodes of a stabbing pain that lasts 20-30 minutes. This can occur 2-3 times in a day  Has increased stress, is getting evicted from her apartment and currently is unable to work due to multiple ongoing medical issues. Still using percocet rarely         The following portions of the patient's history were reviewed and updated as appropriate: allergies, current medications, past family history, past medical history, past social history, past surgical history, and problem list and ros. Objective:    Blood pressure 110/72, pulse 102, temperature 98.2 °F (36.8 °C), temperature source Temporal, resp. rate 18, height 5' 2" (1.575 m), weight 72.8 kg (160 lb 9.6 oz), SpO2 99 %, not currently breastfeeding. Physical Exam  Constitutional:       General: She is awake. Eyes:      General: Lids are normal.      Extraocular Movements: Extraocular movements intact. Neurological:      Mental Status: She is alert.       Motor: Motor strength is normal.     Deep Tendon Reflexes:      Reflex Scores:       Tricep reflexes are 2+ on the right side and 2+ on the left side. Bicep reflexes are 2+ on the right side and 2+ on the left side. Brachioradialis reflexes are 2+ on the right side and 2+ on the left side. Patellar reflexes are 2+ on the right side and 2+ on the left side. Psychiatric:         Speech: Speech normal.         Neurological Exam  Mental Status  Awake and alert. Oriented to person, place and time. Speech is normal. Language is fluent with no aphasia. Attention and concentration are normal. Fund of knowledge is appropriate for level of education. Cranial Nerves  CN III, IV, VI: Extraocular movements intact bilaterally. Normal lids and orbits bilaterally. CN VII: Full and symmetric facial movement. CN VIII: Hearing is normal.  CN XII: Tongue midline without atrophy or fasciculations. Motor   Strength is 5/5 throughout all four extremities. Sensory  Proprioception is normal in upper and lower extremities. Reflexes                                            Right                      Left  Brachioradialis                    2+                         2+  Biceps                                 2+                         2+  Triceps                                2+                         2+  Patellar                                2+                         2+    Gait  Casual gait is normal including stance, stride, and arm swing. ROS:    Review of Systems    Review of Systems   Constitutional:  Negative for appetite change, fatigue and fever. HENT: Negative. Negative for hearing loss, tinnitus, trouble swallowing and voice change. Eyes: Negative. Negative for photophobia, pain and visual disturbance. Respiratory: Negative. Negative for shortness of breath. Cardiovascular: Negative. Negative for palpitations. Gastrointestinal: Negative. Negative for nausea and vomiting. Endocrine: Negative. Negative for cold intolerance. Genitourinary: Negative.   Negative for dysuria, frequency and urgency. Musculoskeletal:  Positive for neck pain (daily). Negative for back pain, gait problem and myalgias. Skin: Negative. Negative for rash. Allergic/Immunologic: Negative. Neurological:  Positive for weakness (right arm - also pain) and headaches (daily). Negative for dizziness, tremors, seizures, syncope, facial asymmetry, speech difficulty, light-headedness and numbness. Hematological: Negative. Does not bruise/bleed easily. Psychiatric/Behavioral: Negative. Negative for confusion, hallucinations and sleep disturbance. All other systems reviewed and are negative.

## 2023-11-02 NOTE — PROGRESS NOTES
Review of Systems   Constitutional:  Negative for appetite change, fatigue and fever. HENT: Negative. Negative for hearing loss, tinnitus, trouble swallowing and voice change. Eyes: Negative. Negative for photophobia, pain and visual disturbance. Respiratory: Negative. Negative for shortness of breath. Cardiovascular: Negative. Negative for palpitations. Gastrointestinal: Negative. Negative for nausea and vomiting. Endocrine: Negative. Negative for cold intolerance. Genitourinary: Negative. Negative for dysuria, frequency and urgency. Musculoskeletal:  Positive for neck pain (daily). Negative for back pain, gait problem and myalgias. Skin: Negative. Negative for rash. Allergic/Immunologic: Negative. Neurological:  Positive for weakness (right arm - also pain) and headaches (daily). Negative for dizziness, tremors, seizures, syncope, facial asymmetry, speech difficulty, light-headedness and numbness. Hematological: Negative. Does not bruise/bleed easily. Psychiatric/Behavioral: Negative. Negative for confusion, hallucinations and sleep disturbance. All other systems reviewed and are negative.

## 2023-11-02 NOTE — ASSESSMENT & PLAN NOTE
Scotty Tyson is a very pleasant  41 y.o. female with a past medical history that includes choledocholithiasis s/p cholescystectomy, hydrosalpinx, GERD, gastritis, hypoglycemia referred here for evaluation of headache. during our initial visit patient having and unilateral throbbing pain with migrainous features that had become more severe over the prior 6 months. Headache accompanied by a visual aura. Given change in nature of headache I ordered an MRI brain with and without contrast that showed a few scattered foci of T2/FLAIR hyperintensity in the periventricular and subcortical white matter, however there was an unusual left parietal white matter change that was larger with a T1 weighted correlate. I ordered a follow-up MRI C-spine to evaluate for any potential T2 layer changes of the spine. This was normal. B12 189. Today she reports sumatriptan helps but at times can have headaches that last up to 4 days. Also reports a newer headache that is stabbing in nature, lasting 20-30 minutes occurring 2-3 times in a day. She has been undergoing significant stress since I last saw her which is likely contributing to her headaches. She dose report is still is headache free most days of the month. Plan:   Will continue sumatriptan 100 mg, advised on taking first does at onset of aura and a second dose if headache persists 1-2 H after first dose  Discussed considering gabapentin, however we opted against this given hx of lyrica causing panic attacks   Will continue to monitor headaches while her ongoing stressors resolve  Will do weekly B 12 injections for 5 weeks, then monthly for 4 months  For preventative purposes Riboflavin and magnesium 400 mg daily  Patient advised not to take over-the-counter or prescription pain medications more than 3 days per week to prevent medication overuse/rebound headache  Recommend drinking at least 64 oz of water daily   Discussed with patient the importance of keeping good sleep hygiene  Advised to keep track of headache patterns  Follow up in 3 months

## 2023-11-03 ENCOUNTER — HOSPITAL ENCOUNTER (OUTPATIENT)
Dept: RADIOLOGY | Facility: HOSPITAL | Age: 38
Discharge: HOME | End: 2023-11-03
Attending: ORTHOPAEDIC SURGERY
Payer: COMMERCIAL

## 2023-11-03 ENCOUNTER — OFFICE VISIT (OUTPATIENT)
Dept: OBGYN CLINIC | Facility: HOSPITAL | Age: 38
End: 2023-11-03
Payer: COMMERCIAL

## 2023-11-03 VITALS
HEART RATE: 102 BPM | DIASTOLIC BLOOD PRESSURE: 71 MMHG | WEIGHT: 160.5 LBS | BODY MASS INDEX: 29.53 KG/M2 | SYSTOLIC BLOOD PRESSURE: 104 MMHG | HEIGHT: 62 IN

## 2023-11-03 DIAGNOSIS — R52 PAIN: ICD-10-CM

## 2023-11-03 DIAGNOSIS — R52 PAIN: Primary | ICD-10-CM

## 2023-11-03 DIAGNOSIS — M54.2 NECK PAIN: ICD-10-CM

## 2023-11-03 PROCEDURE — 99214 OFFICE O/P EST MOD 30 MIN: CPT | Performed by: ORTHOPAEDIC SURGERY

## 2023-11-03 PROCEDURE — 72050 X-RAY EXAM NECK SPINE 4/5VWS: CPT

## 2023-11-03 NOTE — NURSING NOTE
Called patient to complete consult and go over instructions in [place], patient is scheduled on 11/17/23   for diagnostic lumbar puncture. Verified allergies and no current anticoagulant medication present. Diet, medication instructions (taking own meds prior to test), also went over with patient that as of today with hold any ASA or ASA products till the date of the procedure and need for  was explained. Also went over instructions of location, time and date of procedure, of location and time ambulatory procedure unit. Also reviewed the procedure itself and answered any questions. Explained also post recovery instructions. Patient made aware that she may call if any other questions that may arise to the myself, gave them my contact information. Information was also sent via e-mail to verified address and via epic my chart, see message below. [address],    Estás programada a las 10:30 a.m. para diagnóstico de punción lumbar. Tienes que venir a las 9 a.m. a  el campus de [facility] en [address] [facility], [address] [facility] [address] [place] a [facility]. Te cambmelinaán para tu procedimiento, actualizarán la [address] y te harán análisis de [facility]. Se necesita raymundo juanito para hacer le un analize de plaquetas y coagulación el día del procedimiento para garantizar raymundo seguridad y curación después del procedimiento. Puede jamilah un desayuno ligero, beber líquidos y ajmilah todos los medicamentos que se le receten. Por favor, no tome aspirina y  [place] tenga cuidado con cualquier medicamento de [employer], compruebe si la aspirina es dana de los ingredientes (Tylenol, Motrin y Aleve están joelle para jamilah). Si no hay restricciones de líquidos, aumente raymundo ingesta de líquidos 3 días antes del procedimiento.     Para el procedimiento que estará sobre raymundo [facility], usaremos jonathan radiografía para ayudar a acceder a raymundo líquido cefalorraquídeo jonathan vez que se limpie el área de la parte baja de la espalda y se le reciba jonathan anestesia local. Kamryn Saris que se haya recogido el líquido, lo enviaremos para raymundo prueba según las instrucciones del proveedor de pedidos. Se le aplicará jonathan venda en el sitio y se le ayudará a volver a raymundo krystle para que pueda volver a los servicios quirúrgicos para ser monitoreado veronica el tiempo [facility], generalmente 1 hora. Al ser dado de luis manuel, necesitará que lo lleven a casa, así que traiga un conductor para [employer]. La noche o el día siguiente puede experimentar dolor en la parte baja de la espalda y dolor de Raliegh Johan son normales y esperados. Loretta instrucciones de luis manuel serán descansar, hidratarse con líquidos (la cafeína también ayuda con el dolor de hal si [employer]) y jamilah los medicamentos de venta cleve divina Tylenol, Motrin o Aleve. Por favor, no dude en llamar si surge cualquier otra pregunta o inquietud. 5 [facility] [address]  [phone] (Office)  [phone] (Fax)  [email or web address]

## 2023-11-03 NOTE — PROGRESS NOTES
Assessment & Plan/Medical Decision Makin y.o. female with Neck Pain and imaging findings most notable for Cervical Spondylosis, incidental findings of atypical C6 hemangioma         The clinical, physical and imaging findings were reviewed with the patient. Abeba Yu  has a constellation of findings consistent with Cervical Myofascial Painin the setting of mild cervical degenerative disease. Fortunately patient remains neurologically intact and functional. Physical exam showing decreased cervical ROM. We discussed the treatment options including physical therapy, at home exercises, activity modifications, chiropractic medicine, oral medications, interventional spine procedures. At this time recommend continued conservative treatments. Patient notes she has an upcoming surgery and will need to defer neck treatments at this time. We will provide her with referrals to be used when possible for the patient. Referral placed for COMPREHENSIVE SPINE PHYSICAL THERAPY to work on core strengthening, lumbar ROM, strengthening, and stretching exercises. Referral to pain management for evaluation and treatment. Discussed potential role of steroid injection at or near the source of pain to provide targeted relief. Patient instructed to return to office/ER sooner if symptoms are not improving, getting worse, or new worrisome/neurologic symptoms arise. Subjective:      Chief Complaint: Neck Pain    HPI:  Yann Beltran is a 41 y.o. female RHD presenting for initial visit with chief complaint of neck pain and MRI findings of C6 atypical hemangioma. Patient also being evaluated by Neurology for ongoing headaches/migraines. Neck pain has been going on for years. She does not report any specific inciting factors. Also reports right shoulder pain. Denies any numbness/tingling in extremities. Denies any monie trauma. Denies fever or chills, no night sweats.  Denies any bladder or bowel changes. Conservative therapy includes the following:   Medications: Tylenol & IBU, migraine medications    Injections: No     Physical Therapy: No  Chiropractic Medicine: has not attempted  Accupunture/Massage Therapy: has not attempted   These therapeutic modalities were ineffective at providing sustained pain relief/functional improvement.      Nicotine dependent: 1/2 ppd   Occupation: does not work at this time  Living situation: Lives with family   ADLs: patient is able to perform     Objective:     Family History   Problem Relation Age of Onset    Cancer Paternal Grandmother         uterine    Cancer Paternal Aunt         uterine     Migraines Mother     Breast cancer Neg Hx     Colon cancer Neg Hx     Ovarian cancer Neg Hx        Past Medical History:   Diagnosis Date    Abnormal Pap smear of cervix     2015 cryosurgery; 6/2019 NILM pap/neg HPV    Migraine with aura        Current Outpatient Medications   Medication Sig Dispense Refill    bisacodyl (DULCOLAX) 5 mg EC tablet Take 4 tablets (20 mg total) by mouth once for 1 dose Colonoscopy prep 4 tablet 0    ergocalciferol (VITAMIN D2) 50,000 units Take 1 capsule (50,000 Units total) by mouth once a week 16 capsule 0    fluticasone (FLONASE) 50 mcg/act nasal spray 1 spray into each nostril 2 (two) times a day 16 g 1    guaiFENesin (ROBITUSSIN) 100 MG/5ML oral liquid Take 10 mL (200 mg total) by mouth 3 (three) times a day as needed for cough (Patient not taking: Reported on 11/2/2023) 120 mL 0    ketorolac (TORADOL) 10 mg tablet Take 1 tablet (10 mg total) by mouth every 6 (six) hours as needed for moderate pain (Patient not taking: Reported on 11/2/2023) 30 tablet 0    naproxen (EC NAPROSYN) 500 MG EC tablet Take 500 mg by mouth if needed for mild pain      neomycin-polymyxin-hydrocortisone (CORTISPORIN) 0.35%-10,000 units/mL-1% otic suspension Administer 4 drops into both ears 2 (two) times a day 10 mL 0    nicotine (NICODERM CQ) 14 mg/24hr TD 24 hr patch Place 1 patch on the skin over 24 hours every 24 hours 28 patch 1    nystatin-triamcinolone (MYCOLOG-II) cream Apply topically 2 (two) times a day 15 g 0    oxyCODONE-acetaminophen (Percocet) 5-325 mg per tablet Take 1 tablet by mouth 2 (two) times a day as needed for moderate pain Max Daily Amount: 2 tablets 10 tablet 0    pantoprazole (PROTONIX) 20 mg tablet Take 1 tablet (20 mg total) by mouth daily 90 tablet 1    polyethylene glycol (GOLYTELY) 4000 mL solution Take 4,000 mL by mouth once for 1 dose Colonoscopy prep 4000 mL 0    polyethylene glycol (MIRALAX) 17 g packet Take 17 g by mouth daily 510 g 5    prochlorperazine (COMPAZINE) 10 mg tablet Take 1 tablet (10 mg total) by mouth every 6 (six) hours as needed for nausea or vomiting 30 tablet 1    senna-docusate sodium (SENOKOT-S) 8.6-50 mg per tablet Take 1 tablet by mouth daily 7 tablet 2    sodium chloride 3 % inhalation solution Take 4 mL by nebulization as needed for cough Use every 4-6 hours as needed.  180 mL 0    SUMAtriptan (IMITREX) 100 mg tablet Take 1 tablet (100 mg total) by mouth daily as needed for migraine 9 tablet 12     Current Facility-Administered Medications   Medication Dose Route Frequency Provider Last Rate Last Admin    cyanocobalamin injection 1,000 mcg  1,000 mcg Intramuscular Weekly Kye Oneill MD   1,000 mcg at 11/02/23 0940       Past Surgical History:   Procedure Laterality Date    CHOLECYSTECTOMY LAPAROSCOPIC N/A 10/27/2020    Procedure: CHOLECYSTECTOMY LAPAROSCOPIC;  Surgeon: Mali Velazquez MD;  Location: [address] [place] [address] OR;  Service: General    GYNECOLOGIC CRYOSURGERY  2015    HYSTERECTOMY  2016    LAPAROSCOPY  2018    [place] LAPAROSCOPY W/LYSIS OF ADHESIONS N/A 3/3/2020    Procedure: L.O.A.;  Surgeon: Gricelda Caruso MD;  Location: [facility] OR;  Service: Gynecology    [place] LAPS ABD PRTM&OMENTUM DX W/WO SPEC BR/WA [facility] N/A 8/9/2019    Procedure: LAPAROSCOPY DIAGNOSTIC; LYSIS OF ADHESIONS;  Surgeon: Blossom Davis Ayad Pandey MD;  Location: [facility] OR;  Service: Gynecology    TUBAL LIGATION         Social History     Socioeconomic History    Marital status: Single     Spouse name: Not on file    Number of children: Not on file    Years of education: Not on file    Highest education level: Not on file   Occupational History    Not on file   Tobacco Use    Smoking status: Every Day     Packs/day: 1.00     Years: 15.00     Total pack years: 15.00     Types: Cigarettes     Passive exposure: Current    Smokeless tobacco: Never   Vaping Use    Vaping Use: Never used   Substance and Sexual Activity    Alcohol use: Never    Drug use: Never    Sexual activity: Yes     Partners: Male     Birth control/protection: Female Sterilization, None   Other Topics Concern    Not on file   Social History Narrative    Not on file     Social Determinants of Health     Financial Resource Strain: Low Risk  (9/14/2023)    Overall Financial Resource Strain (CARDIA)     Difficulty of Paying Living Expenses: Not hard at all   Food Insecurity: No Food Insecurity (9/14/2023)    Hunger Vital Sign     Worried About Running Out of Food in the Last Year: Never true     Ran Out of Food in the Last Year: Never true   Transportation Needs: No Transportation Needs (9/14/2023)    PRAPARE - Transportation     Lack of Transportation (Medical): No     Lack of Transportation (Non-Medical): No   Physical Activity: Insufficiently Active (6/23/2021)    Exercise Vital Sign     Days of Exercise per Week: 5 days     Minutes of Exercise per Session: 20 min   Stress: No Stress Concern Present (6/23/2021)    [facility]     Feeling of Stress : Only a little   Social Connections:  Moderately Isolated (6/23/2021)    Social Connection and Isolation Panel [NHANES]     Frequency of Communication with Friends and Family: More than three times a week     Frequency of Social Gatherings with Friends and Family: Twice a week Attends Islam Services: Never     Active Member of Clubs or Organizations: No     Attends Club or Organization Meetings: Never     Marital Status: Living with partner   Intimate Partner Violence: Not on file   Housing Stability: Low Risk  (6/23/2021)    Housing Stability Vital Sign     Unable to Pay for Housing in the Last Year: No     Number of [address] in the Last Year: 2     Unstable Housing in the Last Year: No       Allergies   Allergen Reactions    Shellfish-Derived Products - Food Allergy Itching and Swelling     "seafood "    Ibuprofen Other (See Comments)     Patient states she gets hematoma    Pregabalin Anxiety       Review of Systems  General- denies fever/chills  HEENT- denies hearing loss or sore throat  Eyes- denies eye pain or visual disturbances, denies red eyes  Respiratory- denies cough or SOB  Cardio- denies chest pain or palpitations  GI- denies abdominal pain  Endocrine- denies urinary frequency  Urinary- denies pain with urination  Musculoskeletal- Negative except noted above  Skin- denies rashes or wounds  Neurological- denies dizziness or headache  Psychiatric- denies anxiety or difficulty concentrating    Physical Exam  /71   Pulse 102   Ht 5' 2" (1.575 m)   Wt 72.8 kg (160 lb 7.9 oz)   BMI 29.35 kg/m²     General/Constitutional: No apparent distress: well-nourished and well developed. Lymphatic: No appreciable lymphadenopathy  Respiratory: Non-labored breathing  Vascular: No edema, swelling or tenderness, except as noted in detailed exam.  Integumentary: No impressive skin lesions present, except as noted in detailed exam.  Psych: Normal mood and affect, oriented to person, place and time. MSK: normal other than stated in HPI and exam  Gait & balance: no evidence of myelopathic gait, ambulates Independently     Cervical  spine range of motion:  -Forward flexion chin to chest  -Extension to 60  -Lateral bend 30 right, 30 left  -Rotation 45 right, 45 left.     There is no point tenderness with palpation along the posterior cervical, thoracic, lumbar spine. Neurologic:  Upper Extremity Motor Function    Right  Left    Deltoid  5/5  5/5    Bicep  5/5  5/5    Wrist extension  5/5  5/5    Tricep  5/5  5/5    Finger flexion/  5/5  5/5    Hand intrinsic  5/5  5/5      Lower Extremity Motor Function    Right  Left    Iliopsoas  5/5  5/5    Quadriceps 5/5 5/5   Tibialis anterior  5/5  5/5    EHL  5/5  5/5    Gastroc. muscle  5/5  5/5    Heel rise  5/5  5/5    Toe rise  5/5  5/5      Sensory: light touch is intact to bilateral upper and lower extremities     Reflexes:    Right Left   Biceps 2+ 2+   Triceps 2+ 2+   Brachioradialis 2+ 2+   Patellar 1+ 1+   Achilles 1+ 1+   Babinski neg neg     Other tests:  Spurling's: Negative  Ferguson's: Negative  Clonus: Negative  Tandem gait: Negative  Carpal Tinel/Phalen: negative bilateral   Cubital Tinel: negative bilateral   Shoulder: painless active & passive ROM bilateral     Diagnostic Tests   IMAGING: I have personally reviewed the images and these are my findings:  Cervical Spine X-rays from 11/3/23: multi level mild cervical spondylosis with mild loss of disc height, no apparent spondylolisthesis, no appreciated lytic/blastic lesions, no obvious instability    Cervical Spine MRI from 10/24/2023: multi level cervical mild disc degeneration with mild/moderate disc desiccation most noted at C5-6 and C6-7, no significant central stenosis, no cord signal abnormalities, no apparent foraminal stenosis, mild/moderate facet arthrosis;  signal abnormality within C6 vertebral body that appears well circumscribed and without post contrast enhancement (Radiology report indicates "Probable atypical hemangioma in the C6 vertebral body")     Electronic Medical Records were reviewed including Neurology notes, MRI report     Procedures, if performed today     None performed       Portions of the record may have been created with voice recognition software. Occasional wrong word or "sound a like" substitutions may have occurred due to the inherent limitations of voice recognition software. Read the chart carefully and recognize, using context, where substitutions have occurred.

## 2023-11-06 ENCOUNTER — OFFICE VISIT (OUTPATIENT)
Dept: FAMILY MEDICINE CLINIC | Facility: CLINIC | Age: 38
End: 2023-11-06

## 2023-11-06 VITALS
OXYGEN SATURATION: 97 % | DIASTOLIC BLOOD PRESSURE: 60 MMHG | BODY MASS INDEX: 29.08 KG/M2 | HEART RATE: 104 BPM | SYSTOLIC BLOOD PRESSURE: 100 MMHG | WEIGHT: 159 LBS

## 2023-11-06 DIAGNOSIS — M54.2 NECK PAIN: ICD-10-CM

## 2023-11-06 DIAGNOSIS — K21.9 GASTROESOPHAGEAL REFLUX DISEASE WITHOUT ESOPHAGITIS: ICD-10-CM

## 2023-11-06 DIAGNOSIS — K59.00 CONSTIPATION, UNSPECIFIED CONSTIPATION TYPE: Chronic | ICD-10-CM

## 2023-11-06 DIAGNOSIS — G43.109 MIGRAINE WITH AURA AND WITHOUT STATUS MIGRAINOSUS, NOT INTRACTABLE: Primary | Chronic | ICD-10-CM

## 2023-11-06 DIAGNOSIS — R10.2 CHRONIC PELVIC PAIN IN FEMALE: ICD-10-CM

## 2023-11-06 DIAGNOSIS — K59.00 ACUTE CONSTIPATION: ICD-10-CM

## 2023-11-06 DIAGNOSIS — G89.29 CHRONIC PELVIC PAIN IN FEMALE: ICD-10-CM

## 2023-11-06 DIAGNOSIS — G89.29 CHRONIC BILATERAL LOW BACK PAIN WITH LEFT-SIDED SCIATICA: Chronic | ICD-10-CM

## 2023-11-06 DIAGNOSIS — M54.42 CHRONIC BILATERAL LOW BACK PAIN WITH LEFT-SIDED SCIATICA: Chronic | ICD-10-CM

## 2023-11-06 DIAGNOSIS — J32.4 CHRONIC PANSINUSITIS: ICD-10-CM

## 2023-11-06 PROCEDURE — 99215 OFFICE O/P EST HI 40 MIN: CPT | Performed by: PHYSICIAN ASSISTANT

## 2023-11-06 RX ORDER — OXYCODONE HYDROCHLORIDE AND ACETAMINOPHEN 5; 325 MG/1; MG/1
1 TABLET ORAL 2 TIMES DAILY PRN
Qty: 10 TABLET | Refills: 0 | Status: SHIPPED | OUTPATIENT
Start: 2023-11-06

## 2023-11-06 RX ORDER — PANTOPRAZOLE SODIUM 20 MG/1
20 TABLET, DELAYED RELEASE ORAL DAILY
Qty: 90 TABLET | Refills: 1 | Status: SHIPPED | OUTPATIENT
Start: 2023-11-06

## 2023-11-06 RX ORDER — BISACODYL 5 MG/1
10 TABLET, DELAYED RELEASE ORAL 2 TIMES DAILY PRN
Qty: 60 TABLET | Refills: 1 | Status: SHIPPED | OUTPATIENT
Start: 2023-11-06

## 2023-11-06 NOTE — PROGRESS NOTES
Assessment/Plan:    Migraine headache with aura  -Patient has established with neurology and underwent MRI brain. Further work-up was suggested by MS specialist including work-up for inflammatory causes, ischemia and vasculitis. -Reviewed MRI C-spine which was normal.  - Patient is now scheduled for lumbar puncture on 11/17/23 and MRI brain follow up in 6 months.   -Continue sumatriptan 100 mg as needed for abortive therapy.  -Continue vitamin B12 injections as scheduled with neurology. - Continue riboflavin and magnesium for 100 mg daily for preventative purposes. - Continue follow-up with neurology as scheduled. Gastroesophageal reflux disease  - Stable. Continue Protonix 20 mg daily. - Continue to avoid trigger foods.  - Patient is scheduled for EGD/colonoscopy in January 2024. Constipation  - Patient reports she has tried multiple medications including miralax, senna, colace, metamucil nothing is helpful. She has been taking dulcolax as needed which has been most effective.   -Patient is scheduled for colonoscopy/EGD in January 2024.   - Continue follow-up with gastroenterology as scheduled. Chronic pansinusitis  - Patient recently established with ENT and is now scheduled for CT sinuses for further evaluation of chronic sinusitis findings on MRI. -Possible inflammatory findings and deviated septum contributing to patient's headaches. - Continue Flonase daily.  -Continue follow-up with ENT as scheduled. Neck pain  MRI C spine (10/24/23):  C4-C5: Mild bulge. No significant canal stenosis or foraminal narrowing. C5-C6: Central disc protrusion. Mild mass effect on the thecal sac. No significant foraminal narrowing. C6-C7: Central disc protrusion. Mild mass effect on the thecal sac. No significant foraminal narrowing. Probable atypical hemangioma in the C6 vertebral body.   -Patient has established with orthopedics and has been referred to physical therapy and pain management.     Chronic bilateral low back pain with left-sided sciatica  - Lumbar spine x-ray which shows mild scoliosis, otherwise unremarkable study.  - Advised to apply ice/heating pad/topical therapies as needed to affected area. - Continue tizanidine 4 mg, to be taken twice daily as needed. - Continue alternating ibuprofen/Tylenol as needed for pain.  -Patient requesting temporary refill of oxycodone, to take very sparingly. Patient reports cutting tablets in half. Refill provided today. - Assessed possible risk for misuse, abuse, or addiction based on patient's family and social history.  - Educated patient on possible side effects and risks with taking this medication including risks of abuse, misuse, and addiction.  - PDMP reviewed. No red flags noted. - If symptoms persist, would recommend referral to physical therapy. Chronic pelvic pain in female  - Patient underwent CT abdomen pelvis with contrast (6/10/23). Results show no evidence of acute abdominopelvic process. Chronic left hydrosalpinx. - Pelvic US (7/6/23) revealed: Fluid-filled tubular structure in the left adnexa similar to prior study likely representing hydrosalpinx. Status post hysterectomy. Right ovary was not visualized. - OBGYN has prescribed amitriptyline and Depo injections for the pain, however, both have not been helpful. Lunette Buster had been prescribed but prior authorization was denied.   -Patient has now established with [facility] for second opinion and is now s/p CT guided aspiration of left pelvic cystic mass on 10/26/23.   - Continue follow up with [facility] as scheduled. Diagnoses and all orders for this visit:    Migraine with aura and without status migrainosus, not intractable    Acute constipation  -     bisacodyl (DULCOLAX) 5 mg EC tablet;  Take 2 tablets (10 mg total) by mouth 2 (two) times a day as needed for constipation (constipation) Colonoscopy prep    Neck pain  -     oxyCODONE-acetaminophen (Percocet) 5-325 mg per tablet; Take 1 tablet by mouth 2 (two) times a day as needed for moderate pain Max Daily Amount: 2 tablets    Gastroesophageal reflux disease without esophagitis  -     pantoprazole (PROTONIX) 20 mg tablet; Take 1 tablet (20 mg total) by mouth daily    Constipation, unspecified constipation type    Chronic pansinusitis    Chronic bilateral low back pain with left-sided sciatica    Chronic pelvic pain in female        All of patients questions were answered. Patient understands and agrees with the above plan. Return in about 4 weeks (around 12/4/2023) for Next scheduled follow up back pain. Irma Godoy PA-C  11/06/23  [address] [facility] [place]          Subjective:     Patient ID: Francesca Banks  is a 41 y.o. female with known PMH of migraines, GERD who presents today in office for pelvic pain follow up. - Patient is a 41 y.o. female who presents today for pelvic pain follow up. Patient notes she has now established with [facility] for her pelvic pain. Patient notes she underwent CT-guided aspiration of left pelvic cystic mass on 10/26/2023. Patient notes she is no longer having pain with sexual intercourse. -Patient has established with orthopedics due to neck pain and MRI finding of C6 atypical hemangioma. Patient notes she has been referred to physical therapy and pain management. Patient notes she was told she does not have the worry about the hemangioma.    -Patient notes recently she has been experiencing very bad pain of her lower back and tailbone area. Patient notes she has been taking ibuprofen, Tylenol with little relief. Patient notes the oxycodone did help a lot. Patient notes she would cut the tablet in half and only take very sparingly.    -Patient has now established with ENT and CT sinuses has been ordered to further evaluate the chronic sinusitis findings on the MRI. -Patient notes she is currently under a lot of stress.   Patient notes she recently found out she has 3 weeks to move and she currently does not have the money to move to a new place.    - Patient notes her constipation has relatively been stable when taking Dulcolax as needed.    -Patient has established with neurology and underwent MRI brain. Further work-up was suggested by MS specialist including work-up for amatory causes, ischemia and vasculitis. Patient underwent MRI C-spine which was normal.  Patient had additional blood work completed and was found to have low vitamin B12 levels. Patient was started on vitamin B12 injections. Today, patient notes her headaches have currently been stable. The following portions of the patient's history were reviewed and updated as appropriate: allergies, current medications, past family history, past medical history, past social history, past surgical history, and problem list.        Review of Systems   Constitutional:  Negative for chills and fever. HENT:  Negative for congestion, ear pain and sore throat. Eyes:  Negative for pain. Respiratory:  Negative for cough, chest tightness and shortness of breath. Cardiovascular:  Negative for chest pain and palpitations. Gastrointestinal:  Positive for abdominal pain and constipation. Negative for diarrhea, nausea and vomiting. Genitourinary:  Positive for pelvic pain. Negative for difficulty urinating and dysuria. Musculoskeletal:  Positive for arthralgias, back pain and gait problem. Skin:  Negative for rash. Neurological:  Positive for headaches. Negative for dizziness and numbness. Tobacco Cessation Counseling: The patient is sincerely urged to quit consumption of tobacco. She is ready to quit tobacco.           Objective:   Vitals:    11/06/23 0919   BP: 100/60   BP Location: Left arm   Patient Position: Sitting   Cuff Size: Standard   Pulse: 104   SpO2: 97%   Weight: 72.1 kg (159 lb)         Physical Exam  Vitals and nursing note reviewed.    Constitutional:       General: She is not in acute distress. Appearance: She is well-developed. HENT:      Head: Normocephalic and atraumatic. Right Ear: External ear normal.      Left Ear: External ear normal.      Nose: Nose normal.   Eyes:      Conjunctiva/sclera: Conjunctivae normal.   Cardiovascular:      Rate and Rhythm: Normal rate and regular rhythm. Pulses: Normal pulses. Heart sounds: Normal heart sounds. Pulmonary:      Effort: Pulmonary effort is normal. No respiratory distress. Breath sounds: Normal breath sounds. No wheezing. Abdominal:      General: Bowel sounds are normal.      Palpations: Abdomen is soft. Tenderness: There is no abdominal tenderness. There is no guarding or rebound. Musculoskeletal:      Cervical back: Normal range of motion and neck supple. Lumbar back: Tenderness present. No swelling. Normal range of motion. Negative right straight leg raise test and negative left straight leg raise test.   Skin:     General: Skin is warm and dry. Neurological:      Mental Status: She is alert and oriented to person, place, and time. Psychiatric:         Behavior: Behavior normal.           - Utilized [employer] services for translation. I have spent a total time of 60 minutes on 11/06/23 in caring for this patient including Diagnostic results, Prognosis, Risks and benefits of tx options, Instructions for management, Patient and family education, Importance of tx compliance, Risk factor reductions, Impressions, Counseling / Coordination of care, Documenting in the medical record, Reviewing / ordering tests, medicine, procedures  , and Obtaining or reviewing history  .

## 2023-11-11 PROBLEM — K21.9 GASTROESOPHAGEAL REFLUX DISEASE: Chronic | Status: ACTIVE | Noted: 2021-04-06

## 2023-11-11 PROBLEM — M54.2 NECK PAIN: Chronic | Status: ACTIVE | Noted: 2023-09-07

## 2023-11-11 PROBLEM — J32.4 CHRONIC PANSINUSITIS: Status: ACTIVE | Noted: 2023-11-11

## 2023-11-11 PROBLEM — R10.2 CHRONIC PELVIC PAIN IN FEMALE: Chronic | Status: ACTIVE | Noted: 2020-02-07

## 2023-11-11 PROBLEM — G89.29 CHRONIC PELVIC PAIN IN FEMALE: Chronic | Status: ACTIVE | Noted: 2020-02-07

## 2023-11-11 PROBLEM — G89.29 CHRONIC PELVIC PAIN IN FEMALE: Status: ACTIVE | Noted: 2020-02-07

## 2023-11-11 PROBLEM — J32.4 CHRONIC PANSINUSITIS: Chronic | Status: ACTIVE | Noted: 2023-11-11

## 2023-11-12 NOTE — ASSESSMENT & PLAN NOTE
-Patient has established with neurology and underwent MRI brain. Further work-up was suggested by MS specialist including work-up for inflammatory causes, ischemia and vasculitis. -Reviewed MRI C-spine which was normal.  - Patient is now scheduled for lumbar puncture on 11/17/23 and MRI brain follow up in 6 months.   -Continue sumatriptan 100 mg as needed for abortive therapy.  -Continue vitamin B12 injections as scheduled with neurology. - Continue riboflavin and magnesium for 100 mg daily for preventative purposes. - Continue follow-up with neurology as scheduled.

## 2023-11-12 NOTE — ASSESSMENT & PLAN NOTE
- Lumbar spine x-ray which shows mild scoliosis, otherwise unremarkable study.  - Advised to apply ice/heating pad/topical therapies as needed to affected area. - Continue tizanidine 4 mg, to be taken twice daily as needed. - Continue alternating ibuprofen/Tylenol as needed for pain.  -Patient requesting temporary refill of oxycodone, to take very sparingly. Patient reports cutting tablets in half. Refill provided today. - Assessed possible risk for misuse, abuse, or addiction based on patient's family and social history.  - Educated patient on possible side effects and risks with taking this medication including risks of abuse, misuse, and addiction.  - PDMP reviewed. No red flags noted. - If symptoms persist, would recommend referral to physical therapy.

## 2023-11-12 NOTE — ASSESSMENT & PLAN NOTE
- Patient recently established with ENT and is now scheduled for CT sinuses for further evaluation of chronic sinusitis findings on MRI. -Possible inflammatory findings and deviated septum contributing to patient's headaches. - Continue Flonase daily.  -Continue follow-up with ENT as scheduled.

## 2023-11-12 NOTE — ASSESSMENT & PLAN NOTE
MRI C spine (10/24/23):  C4-C5: Mild bulge. No significant canal stenosis or foraminal narrowing. C5-C6: Central disc protrusion. Mild mass effect on the thecal sac. No significant foraminal narrowing. C6-C7: Central disc protrusion. Mild mass effect on the thecal sac. No significant foraminal narrowing. Probable atypical hemangioma in the C6 vertebral body.   -Patient has established with orthopedics and has been referred to physical therapy and pain management.

## 2023-11-12 NOTE — ASSESSMENT & PLAN NOTE
- Stable. Continue Protonix 20 mg daily. - Continue to avoid trigger foods.  - Patient is scheduled for EGD/colonoscopy in January 2024.

## 2023-11-12 NOTE — ASSESSMENT & PLAN NOTE
- Patient underwent CT abdomen pelvis with contrast (6/10/23). Results show no evidence of acute abdominopelvic process. Chronic left hydrosalpinx. - Pelvic US (7/6/23) revealed: Fluid-filled tubular structure in the left adnexa similar to prior study likely representing hydrosalpinx. Status post hysterectomy. Right ovary was not visualized. - OBGYN has prescribed amitriptyline and Depo injections for the pain, however, both have not been helpful. Madeline Lasso had been prescribed but prior authorization was denied.   -Patient has now established with [facility] for second opinion and is now s/p CT guided aspiration of left pelvic cystic mass on 10/26/23.   - Continue follow up with [facility] as scheduled.

## 2023-11-12 NOTE — ASSESSMENT & PLAN NOTE
- Patient reports she has tried multiple medications including miralax, senna, colace, metamucil nothing is helpful. She has been taking dulcolax as needed which has been most effective.   -Patient is scheduled for colonoscopy/EGD in January 2024.   - Continue follow-up with gastroenterology as scheduled.

## 2023-11-16 ENCOUNTER — OFFICE VISIT (OUTPATIENT)
Dept: OBGYN CLINIC | Facility: CLINIC | Age: 38
End: 2023-11-16

## 2023-11-16 ENCOUNTER — CLINICAL SUPPORT (OUTPATIENT)
Dept: NEUROLOGY | Facility: CLINIC | Age: 38
End: 2023-11-16
Payer: COMMERCIAL

## 2023-11-16 ENCOUNTER — OFFICE VISIT (OUTPATIENT)
Dept: OTOLARYNGOLOGY | Facility: CLINIC | Age: 38
End: 2023-11-16

## 2023-11-16 VITALS
BODY MASS INDEX: 29.4 KG/M2 | HEIGHT: 62 IN | WEIGHT: 159.8 LBS | DIASTOLIC BLOOD PRESSURE: 76 MMHG | SYSTOLIC BLOOD PRESSURE: 113 MMHG | HEART RATE: 109 BPM

## 2023-11-16 DIAGNOSIS — B96.89 BACTERIAL VAGINOSIS: Primary | ICD-10-CM

## 2023-11-16 DIAGNOSIS — E53.8 B12 DEFICIENCY: Primary | ICD-10-CM

## 2023-11-16 DIAGNOSIS — N76.0 BACTERIAL VAGINOSIS: Primary | ICD-10-CM

## 2023-11-16 DIAGNOSIS — Z72.51 HIGH RISK HETEROSEXUAL BEHAVIOR: ICD-10-CM

## 2023-11-16 DIAGNOSIS — L29.9 ITCHING OF EAR: Primary | ICD-10-CM

## 2023-11-16 DIAGNOSIS — N89.8 VAGINAL DISCHARGE: ICD-10-CM

## 2023-11-16 DIAGNOSIS — N89.8 VAGINAL ODOR: ICD-10-CM

## 2023-11-16 LAB
BV WHIFF TEST VAG QL: POSITIVE
CLUE CELLS SPEC QL WET PREP: PRESENT
PH SMN: 5 [PH]
SL AMB POCT WET MOUNT: POSITIVE
T VAGINALIS VAG QL WET PREP: ABNORMAL
YEAST VAG QL WET PREP: NEGATIVE

## 2023-11-16 PROCEDURE — 96372 THER/PROPH/DIAG INJ SC/IM: CPT

## 2023-11-16 PROCEDURE — 87491 CHLMYD TRACH DNA AMP PROBE: CPT | Performed by: OBSTETRICS & GYNECOLOGY

## 2023-11-16 PROCEDURE — 99214 OFFICE O/P EST MOD 30 MIN: CPT | Performed by: OBSTETRICS & GYNECOLOGY

## 2023-11-16 PROCEDURE — 87591 N.GONORRHOEAE DNA AMP PROB: CPT | Performed by: OBSTETRICS & GYNECOLOGY

## 2023-11-16 PROCEDURE — 87210 SMEAR WET MOUNT SALINE/INK: CPT | Performed by: OBSTETRICS & GYNECOLOGY

## 2023-11-16 RX ORDER — FLUOCINOLONE ACETONIDE 0.11 MG/ML
3 OIL AURICULAR (OTIC) DAILY
Qty: 20 ML | Refills: 0 | Status: SHIPPED | OUTPATIENT
Start: 2023-11-16

## 2023-11-16 RX ORDER — METRONIDAZOLE 500 MG/1
500 TABLET ORAL EVERY 12 HOURS SCHEDULED
Qty: 7 TABLET | Refills: 0 | Status: SHIPPED | OUTPATIENT
Start: 2023-11-16 | End: 2023-11-20

## 2023-11-16 RX ADMIN — CYANOCOBALAMIN 1000 MCG: 1000 INJECTION, SOLUTION INTRAMUSCULAR; SUBCUTANEOUS at 07:22

## 2023-11-16 NOTE — PROGRESS NOTES
[address] [facility] Obstetrics & Gynecology    Problem List Items Addressed This Visit    None  Visit Diagnoses       Bacterial vaginosis    -  Primary    Relevant Medications    metroNIDAZOLE (FLAGYL) 500 mg tablet    Vaginal discharge        Relevant Orders    POCT wet mount    Vaginal odor        Relevant Orders    POCT wet mount    High risk heterosexual behavior        Relevant Orders    HIV 1/2 AG/AB w Reflex [facility] for 2 yr old and above    RPR-Syphilis Screening (Total Syphilis IGG/IGM)    Hepatitis C antibody    Chlamydia/GC amplified DNA by PCR            Subjective:   Guanako Saini is a 41 y.o. [id number] here for problem visit. She has had an abnormal discharge for the last few days with an associated odor. Minimal external discomfort. She requests STI testing as well. Objective:  Vitals: Blood pressure 113/76, pulse (!) 109, height 5' 2" (1.575 m), weight 72.5 kg (159 lb 12.8 oz), not currently breastfeeding. Body mass index is 29.23 kg/m². Physical Exam  Genitourinary:     Comments: Normal appearing external genitalia. Cervix appears normal. Copious off white discharge throughout the entire vaginal vault    Recent Results (from the past 24 hour(s))   POCT wet mount    Collection Time: 11/16/23  4:55 PM   Result Value Ref Range    WET MOUNT positive     Yeast, Wet Prep negative     pH 5     Whiff Test positive     Clue Cells present     Trich, Wet Prep absent        Patient discussed with Dr. Tomeka Robison.  [id number] used. Randa Salazar MD  PGY-IV, OB/GYN  11/16/2023

## 2023-11-16 NOTE — PROGRESS NOTES
[facility]  [facility] [address]  Otolaryngology      Otolaryngology -- Follow up visit    Jack Fofana is a 41 y.o. who presents with a chief complaint of itching in ear. HPI:  Jack Fofana is a 41year old that present to the office with concerns of itching in ears. Seen by Dr. Winston Jalloh 10/19/23 for same concerns. She tried neomycin polymyxin hydrocortisone drops without improvement. She has not tried anything else. She is scheduled for a CT scan of her sinuses in a few days.      Allergies   Allergen Reactions    Shellfish-Derived Products - Food Allergy Itching and Swelling     "seafood "    Ibuprofen Other (See Comments)     Patient states she gets hematoma    Pregabalin Anxiety     Past Medical History:   Diagnosis Date    Abnormal Pap smear of cervix     2015 cryosurgery; 6/2019 NILM pap/neg HPV    Allergic     Depression     Headache(784.0)     Migraine with aura      Past Surgical History:   Procedure Laterality Date    CHOLECYSTECTOMY LAPAROSCOPIC N/A 10/27/2020    Procedure: CHOLECYSTECTOMY LAPAROSCOPIC;  Surgeon: Torie Li MD;  Location: [address] [place] [address] MAIN OR;  Service: General    GYNECOLOGIC CRYOSURGERY  2015    HYSTERECTOMY  2016    LAPAROSCOPY  2018    [place] LAPAROSCOPY W/LYSIS OF ADHESIONS N/A 3/3/2020    Procedure: L.O.A.;  Surgeon: Elisabeth Duggan MD;  Location[facility];  Service: Gynecology    [place] LAPS ABD PRTM&OMENTUM DX W/WO SPEC BR/WA [facility] N/A 8/9/2019    Procedure: LAPAROSCOPY DIAGNOSTIC; LYSIS OF ADHESIONS;  Surgeon: Lisandro Saucedo MD;  Location: [facility];  Service: Gynecology    TUBAL LIGATION       Family History   Problem Relation Age of Onset    Cancer Paternal Grandmother         uterine    Cancer Paternal Aunt         uterine     Migraines Mother     Arthritis Mother     Hypertension Father     Breast cancer Neg Hx     Colon cancer Neg Hx     Ovarian cancer Neg Hx      Current Outpatient Medications on File Prior to Visit   Medication Sig Dispense Refill    bisacodyl (DULCOLAX) 5 mg EC tablet Take 2 tablets (10 mg total) by mouth 2 (two) times a day as needed for constipation (constipation) Colonoscopy prep 60 tablet 1    ergocalciferol (VITAMIN D2) 50,000 units Take 1 capsule (50,000 Units total) by mouth once a week 16 capsule 0    fluticasone (FLONASE) 50 mcg/act nasal spray 1 spray into each nostril 2 (two) times a day 16 g 1    guaiFENesin (ROBITUSSIN) 100 MG/5ML oral liquid Take 10 mL (200 mg total) by mouth 3 (three) times a day as needed for cough (Patient not taking: Reported on 11/2/2023) 120 mL 0    ketorolac (TORADOL) 10 mg tablet Take 1 tablet (10 mg total) by mouth every 6 (six) hours as needed for moderate pain (Patient not taking: Reported on 11/2/2023) 30 tablet 0    naproxen (EC NAPROSYN) 500 MG EC tablet Take 500 mg by mouth if needed for mild pain      neomycin-polymyxin-hydrocortisone (CORTISPORIN) 0.35%-10,000 units/mL-1% otic suspension Administer 4 drops into both ears 2 (two) times a day 10 mL 0    nicotine (NICODERM CQ) 14 mg/24hr TD 24 hr patch Place 1 patch on the skin over 24 hours every 24 hours 28 patch 1    nystatin-triamcinolone (MYCOLOG-II) cream Apply topically 2 (two) times a day 15 g 0    oxyCODONE-acetaminophen (Percocet) 5-325 mg per tablet Take 1 tablet by mouth 2 (two) times a day as needed for moderate pain Max Daily Amount: 2 tablets 10 tablet 0    pantoprazole (PROTONIX) 20 mg tablet Take 1 tablet (20 mg total) by mouth daily 90 tablet 1    polyethylene glycol (GOLYTELY) 4000 mL solution Take 4,000 mL by mouth once for 1 dose Colonoscopy prep 4000 mL 0    polyethylene glycol (MIRALAX) 17 g packet Take 17 g by mouth daily 510 g 5    prochlorperazine (COMPAZINE) 10 mg tablet Take 1 tablet (10 mg total) by mouth every 6 (six) hours as needed for nausea or vomiting 30 tablet 1    senna-docusate sodium (SENOKOT-S) 8.6-50 mg per tablet Take 1 tablet by mouth daily 7 tablet 2    SUMAtriptan (IMITREX) 100 mg tablet Take 1 tablet (100 mg total) by mouth daily as needed for migraine 9 tablet 12     Current Facility-Administered Medications on File Prior to Visit   Medication Dose Route Frequency Provider Last Rate Last Admin    cyanocobalamin injection 1,000 mcg  1,000 mcg Intramuscular Weekly Alphonso Noel MD   1,000 mcg at 11/16/23 6335           Results reviewed; images from any scan have been personally reviewed: Audiometry:  10/19/2023 excellent hearing level, no evidence of any conductive hearing loss. Pure-tone average is 10 and 5 dB, SRT 10 and 1500% discrimination, type A Tymp      Physical exam:    There were no vitals taken for this visit. Physical Exam  Vitals reviewed. Constitutional:       General: She is not in acute distress. Appearance: She is well-developed. HENT:      Head: Normocephalic and atraumatic. Right Ear: Tympanic membrane, ear canal and external ear normal. There is no impacted cerumen. Left Ear: Tympanic membrane, ear canal and external ear normal. There is no impacted cerumen. Ears:      Comments: Right ear round/spheric hypervascular, approximately 5 mm pedunculated lesion suggesting a granuloma type lesion on the meatus of the external auditory canal anterior wall     Nose: Nose normal. No congestion. Mouth/Throat:      Mouth: Mucous membranes are moist.      Pharynx: Oropharynx is clear. No oropharyngeal exudate. Eyes:      General:         Right eye: No discharge. Left eye: No discharge. Extraocular Movements: Extraocular movements intact. Conjunctiva/sclera: Conjunctivae normal.      Pupils: Pupils are equal, round, and reactive to light. Pulmonary:      Effort: Pulmonary effort is normal. No respiratory distress. Breath sounds: Normal breath sounds. Musculoskeletal:      Cervical back: Normal range of motion and neck supple. Neurological:      Mental Status: She is alert.    Psychiatric: Mood and Affect: Mood normal.         Procedures      Assessment:   1. Itching of ear  fluocinolone acetonide (DermOtic) 0.01 % otic oil          Orders  No orders of the defined types were placed in this encounter. Discussion/Plan:    1. On exam, right ear has round/spheric hypervascular, approximately 5 mm pedunculated lesion suggesting a granuloma type lesion on the meatus of the external auditory canal anterior wall. Ears well appearing bilaterally on otoscopic exam. TM's intact. No infection, effusion, perforation, or retraction. Can consider demotic oil in the ear for chronic itching. Recommend also against q tip use. If dermotic oil not covered by insurance, can consider using olive oil. She will follow up with Dr. Shabbir Ocampo after her CT sinus in completed. Dictation software was used to dictate this note. It may contain errors with dictating incorrect words/spelling. Please contact provider directly for any questions. Thank you for allowing me to participate in the care of your patient.

## 2023-11-17 ENCOUNTER — HOSPITAL ENCOUNTER (OUTPATIENT)
Dept: RADIOLOGY | Facility: HOSPITAL | Age: 38
Discharge: HOME | End: 2023-11-17
Attending: PSYCHIATRY & NEUROLOGY
Payer: COMMERCIAL

## 2023-11-17 VITALS
HEIGHT: 62 IN | SYSTOLIC BLOOD PRESSURE: 121 MMHG | RESPIRATION RATE: 18 BRPM | HEART RATE: 76 BPM | DIASTOLIC BLOOD PRESSURE: 60 MMHG | WEIGHT: 157 LBS | BODY MASS INDEX: 28.89 KG/M2 | OXYGEN SATURATION: 98 % | TEMPERATURE: 98.3 F

## 2023-11-17 DIAGNOSIS — R93.89 ABNORMAL MRI: ICD-10-CM

## 2023-11-17 LAB
APPEARANCE CSF: NORMAL
GLUCOSE CSF-MCNC: 72 MG/DL (ref 40–70)
GRAM STN SPEC: NORMAL
INR PPP: 1.09 (ref 0.84–1.19)
PLATELET # BLD AUTO: 264 THOUSANDS/UL (ref 149–390)
PMV BLD AUTO: 11 FL (ref 8.9–12.7)
PROT CSF-MCNC: 20 MG/DL (ref 15–45)
PROTHROMBIN TIME: 14 SECONDS (ref 11.6–14.5)
RBC # CSF MANUAL: 0 UL (ref 0–10)
TOTAL CELLS COUNTED BLD: NO
TUBE # CSF: 4
WBC # CSF AUTO: 0 /UL (ref 0–5)

## 2023-11-17 PROCEDURE — 87476 LYME DIS DNA AMP PROBE: CPT | Performed by: PSYCHIATRY & NEUROLOGY

## 2023-11-17 PROCEDURE — 88185 FLOWCYTOMETRY/TC ADD-ON: CPT | Performed by: PSYCHIATRY & NEUROLOGY

## 2023-11-17 PROCEDURE — 82784 ASSAY IGA/IGD/IGG/IGM EACH: CPT | Performed by: PSYCHIATRY & NEUROLOGY

## 2023-11-17 PROCEDURE — 89051 BODY FLUID CELL COUNT: CPT | Performed by: PSYCHIATRY & NEUROLOGY

## 2023-11-17 PROCEDURE — 88184 FLOWCYTOMETRY/ TC 1 MARKER: CPT | Performed by: PSYCHIATRY & NEUROLOGY

## 2023-11-17 PROCEDURE — 82042 OTHER SOURCE ALBUMIN QUAN EA: CPT | Performed by: PSYCHIATRY & NEUROLOGY

## 2023-11-17 PROCEDURE — 87798 DETECT AGENT NOS DNA AMP: CPT | Performed by: PSYCHIATRY & NEUROLOGY

## 2023-11-17 PROCEDURE — 84165 PROTEIN E-PHORESIS SERUM: CPT | Performed by: PSYCHIATRY & NEUROLOGY

## 2023-11-17 PROCEDURE — 86039 ANTINUCLEAR ANTIBODIES (ANA): CPT | Performed by: PSYCHIATRY & NEUROLOGY

## 2023-11-17 PROCEDURE — 62328 DX LMBR SPI PNXR W/FLUOR/CT: CPT

## 2023-11-17 PROCEDURE — 84157 ASSAY OF PROTEIN OTHER: CPT | Performed by: PSYCHIATRY & NEUROLOGY

## 2023-11-17 PROCEDURE — 83873 ASSAY OF CSF PROTEIN: CPT | Performed by: PSYCHIATRY & NEUROLOGY

## 2023-11-17 PROCEDURE — 82040 ASSAY OF SERUM ALBUMIN: CPT | Performed by: PSYCHIATRY & NEUROLOGY

## 2023-11-17 PROCEDURE — 83916 OLIGOCLONAL BANDS: CPT | Performed by: PSYCHIATRY & NEUROLOGY

## 2023-11-17 PROCEDURE — 85610 PROTHROMBIN TIME: CPT | Performed by: PSYCHIATRY & NEUROLOGY

## 2023-11-17 PROCEDURE — 82945 GLUCOSE OTHER FLUID: CPT | Performed by: PSYCHIATRY & NEUROLOGY

## 2023-11-17 PROCEDURE — 88108 CYTOPATH CONCENTRATE TECH: CPT | Performed by: PATHOLOGY

## 2023-11-17 PROCEDURE — 85049 AUTOMATED PLATELET COUNT: CPT | Performed by: PSYCHIATRY & NEUROLOGY

## 2023-11-17 PROCEDURE — 89050 BODY FLUID CELL COUNT: CPT | Performed by: PSYCHIATRY & NEUROLOGY

## 2023-11-17 PROCEDURE — 87496 CYTOMEG DNA AMP PROBE: CPT | Performed by: PSYCHIATRY & NEUROLOGY

## 2023-11-17 RX ORDER — ACETAMINOPHEN 325 MG/1
650 TABLET ORAL EVERY 6 HOURS PRN
Status: DISCONTINUED | OUTPATIENT
Start: 2023-11-17 | End: 2023-11-18 | Stop reason: HOSPADM

## 2023-11-17 RX ORDER — LIDOCAINE HYDROCHLORIDE 10 MG/ML
5 INJECTION, SOLUTION EPIDURAL; INFILTRATION; INTRACAUDAL; PERINEURAL
Status: COMPLETED | OUTPATIENT
Start: 2023-11-17 | End: 2023-11-17

## 2023-11-17 RX ORDER — ACETAMINOPHEN 325 MG/1
TABLET ORAL
Status: COMPLETED
Start: 2023-11-17 | End: 2023-11-17

## 2023-11-17 RX ADMIN — LIDOCAINE HYDROCHLORIDE 5 ML: 10 INJECTION, SOLUTION EPIDURAL; INFILTRATION; INTRACAUDAL; PERINEURAL at 11:11

## 2023-11-17 RX ADMIN — ACETAMINOPHEN 650 MG: 325 TABLET, FILM COATED ORAL at 11:30

## 2023-11-17 RX ADMIN — ACETAMINOPHEN 650 MG: 325 TABLET ORAL at 11:30

## 2023-11-18 LAB
C TRACH DNA SPEC QL NAA+PROBE: NEGATIVE
N GONORRHOEA DNA SPEC QL NAA+PROBE: NEGATIVE

## 2023-11-20 ENCOUNTER — TELEPHONE (OUTPATIENT)
Dept: NEUROLOGY | Facility: CLINIC | Age: 38
End: 2023-11-20

## 2023-11-20 DIAGNOSIS — M54.10 RADICULITIS: Primary | ICD-10-CM

## 2023-11-20 LAB — SCAN RESULT: NORMAL

## 2023-11-20 RX ORDER — DEXAMETHASONE 2 MG/1
2 TABLET ORAL
Qty: 5 TABLET | Refills: 0 | Status: SHIPPED | OUTPATIENT
Start: 2023-11-20

## 2023-11-20 NOTE — PROGRESS NOTES
Spoke to patient on the phone reports having a stabbing pain in lower back radiating down L3-L4 distribution on the L. Reports she felt sx immediately after needle insertion for the lumbar puncture on Friday. Patient likely with irritation of the nerve from procedure. Sent decadron 2mg X 5 days to treat radiculitis. Advised her to let me know how she is doing next week.

## 2023-11-20 NOTE — TELEPHONE ENCOUNTER
Antoine Tirado called in today stating that she had a lumbar puncture procedure done on 11/17/23. She was told to take Tylenol afterwards. Pt stated that since then she has been having pain in her back, left leg and left glute. Pt is asking is this normal? Pls call pt at [phone]. I was assisted by  #[id number] "Andres Moss". Pt prefers Andorran speaking.

## 2023-11-21 DIAGNOSIS — R93.89 ABNORMAL MRI: Primary | ICD-10-CM

## 2023-11-21 LAB
ALBUMIN SERPL ELPH-MCNC: 4.05 G/DL (ref 3.2–5.1)
ALBUMIN SERPL ELPH-MCNC: 60.5 % (ref 48–70)
ALPHA1 GLOB SERPL ELPH-MCNC: 0.27 G/DL (ref 0.15–0.47)
ALPHA1 GLOB SERPL ELPH-MCNC: 4.1 % (ref 1.8–7)
ALPHA2 GLOB SERPL ELPH-MCNC: 0.59 G/DL (ref 0.42–1.04)
ALPHA2 GLOB SERPL ELPH-MCNC: 8.8 % (ref 5.9–14.9)
BETA GLOB ABNORMAL SERPL ELPH-MCNC: 0.43 G/DL (ref 0.31–0.57)
BETA1 GLOB SERPL ELPH-MCNC: 6.4 % (ref 4.7–7.7)
BETA2 GLOB SERPL ELPH-MCNC: 6.6 % (ref 3.1–7.9)
BETA2+GAMMA GLOB SERPL ELPH-MCNC: 0.44 G/DL (ref 0.2–0.58)
GAMMA GLOB ABNORMAL SERPL ELPH-MCNC: 0.91 G/DL (ref 0.4–1.66)
GAMMA GLOB SERPL ELPH-MCNC: 13.6 % (ref 6.9–22.3)
IGG/ALB SER: 1.53 {RATIO} (ref 1.1–1.8)
PROT PATTERN SERPL ELPH-IMP: NORMAL
PROT SERPL-MCNC: 6.7 G/DL (ref 6.4–8.2)

## 2023-11-21 PROCEDURE — 88108 CYTOPATH CONCENTRATE TECH: CPT | Performed by: PATHOLOGY

## 2023-11-21 PROCEDURE — 84165 PROTEIN E-PHORESIS SERUM: CPT | Performed by: STUDENT IN AN ORGANIZED HEALTH CARE EDUCATION/TRAINING PROGRAM

## 2023-11-21 NOTE — TELEPHONE ENCOUNTER
11/17/23 at 12:36    Hi, this is [employer] calling from [facility] lab, [facility]. I need a spinal fluid culture ordered on Russ Ranks because she had a gram stain, but no culture was ordered. If someone could please put in an add on or an order for a spinal fluid culture, please do so. So I can send that over to microbiology to be done. Thank you.  Bye.

## 2023-11-22 ENCOUNTER — HOSPITAL ENCOUNTER (EMERGENCY)
Facility: HOSPITAL | Age: 38
Discharge: HOME | End: 2023-11-22
Attending: EMERGENCY MEDICINE
Payer: COMMERCIAL

## 2023-11-22 VITALS
RESPIRATION RATE: 21 BRPM | WEIGHT: 160.05 LBS | OXYGEN SATURATION: 97 % | TEMPERATURE: 98.7 F | BODY MASS INDEX: 29.27 KG/M2 | SYSTOLIC BLOOD PRESSURE: 103 MMHG | HEART RATE: 100 BPM | DIASTOLIC BLOOD PRESSURE: 62 MMHG

## 2023-11-22 DIAGNOSIS — M54.9 BACK PAIN: Primary | ICD-10-CM

## 2023-11-22 DIAGNOSIS — M62.838 MUSCLE SPASM: ICD-10-CM

## 2023-11-22 DIAGNOSIS — M54.32 SCIATICA OF LEFT SIDE: ICD-10-CM

## 2023-11-22 LAB
ALB CSF/SERPL: 2 {RATIO} (ref 0–8)
ALBUMIN CSF-MCNC: 8 MG/DL (ref 7–29)
ALBUMIN SERPL-MCNC: 4.3 G/DL (ref 3.9–4.9)
ANA ELISA RESULT: NEGATIVE RATIO
ANTINUCLEAR ANTIBODY BY ELISA: NORMAL
B BURGDOR DNA SPEC QL NAA+PROBE: NEGATIVE
IGG CSF-MCNC: 1 MG/DL (ref 0–6.7)
IGG SERPL-MCNC: 957 MG/DL (ref 586–1602)
IGG SYNTH RATE SER+CSF CALC-MRATE: -2.8 MG/DAY
IGG/ALB CLEAR SER+CSF-RTO: 0.6 (ref 0–0.7)
IGG/ALB CSF: 0.13 {RATIO} (ref 0–0.25)
Lab: NORMAL
MBP CSF-MCNC: 1.5 NG/ML (ref 0–3.7)
OLIGOCLONAL BANDS.IT SER+CSF QL: NORMAL
RATIO: 0.1

## 2023-11-22 PROCEDURE — 99284 EMERGENCY DEPT VISIT MOD MDM: CPT | Performed by: EMERGENCY MEDICINE

## 2023-11-22 PROCEDURE — 96375 TX/PRO/DX INJ NEW DRUG ADDON: CPT

## 2023-11-22 PROCEDURE — 99283 EMERGENCY DEPT VISIT LOW MDM: CPT

## 2023-11-22 PROCEDURE — 96374 THER/PROPH/DIAG INJ IV PUSH: CPT

## 2023-11-22 RX ORDER — DIAZEPAM 5 MG/ML
2.5 INJECTION, SOLUTION INTRAMUSCULAR; INTRAVENOUS ONCE
Status: COMPLETED | OUTPATIENT
Start: 2023-11-22 | End: 2023-11-22

## 2023-11-22 RX ORDER — ONDANSETRON 2 MG/ML
4 INJECTION INTRAMUSCULAR; INTRAVENOUS ONCE
Status: COMPLETED | OUTPATIENT
Start: 2023-11-22 | End: 2023-11-22

## 2023-11-22 RX ORDER — METHOCARBAMOL 500 MG/1
500 TABLET, FILM COATED ORAL 2 TIMES DAILY
Qty: 20 TABLET | Refills: 0 | Status: SHIPPED | OUTPATIENT
Start: 2023-11-22

## 2023-11-22 RX ORDER — LIDOCAINE 50 MG/G
1 PATCH TOPICAL EVERY 24 HOURS
Qty: 15 PATCH | Refills: 0 | Status: SHIPPED | OUTPATIENT
Start: 2023-11-22

## 2023-11-22 RX ORDER — LIDOCAINE 50 MG/G
1 PATCH TOPICAL ONCE
Status: DISCONTINUED | OUTPATIENT
Start: 2023-11-22 | End: 2023-11-22 | Stop reason: HOSPADM

## 2023-11-22 RX ORDER — ACETAMINOPHEN 325 MG/1
975 TABLET ORAL ONCE
Status: COMPLETED | OUTPATIENT
Start: 2023-11-22 | End: 2023-11-22

## 2023-11-22 RX ORDER — KETOROLAC TROMETHAMINE 30 MG/ML
15 INJECTION, SOLUTION INTRAMUSCULAR; INTRAVENOUS ONCE
Status: COMPLETED | OUTPATIENT
Start: 2023-11-22 | End: 2023-11-22

## 2023-11-22 RX ADMIN — ACETAMINOPHEN 975 MG: 325 TABLET ORAL at 02:26

## 2023-11-22 RX ADMIN — KETOROLAC TROMETHAMINE 15 MG: 30 INJECTION, SOLUTION INTRAMUSCULAR; INTRAVENOUS at 02:28

## 2023-11-22 RX ADMIN — LIDOCAINE 1 PATCH: 700 PATCH TOPICAL at 02:26

## 2023-11-22 RX ADMIN — DIAZEPAM 2.5 MG: 10 INJECTION, SOLUTION INTRAMUSCULAR; INTRAVENOUS at 02:28

## 2023-11-22 RX ADMIN — ONDANSETRON 4 MG: 2 INJECTION INTRAMUSCULAR; INTRAVENOUS at 02:28

## 2023-11-22 NOTE — ED PROVIDER NOTES
History  Chief Complaint   Patient presents with    Back Pain     Patient had Lumbar Puncture Friday - States Saturday began having pain in mid lower back that has since moved down L Leg - taking Motrin and Tylenol with no relief - no problems urinating      (Karen Armstrong) Karen Armstrong is a 41 y.o. female     They presented to the emergency department on November 22, 2023. Patient presents with:  Back Pain: Patient had Lumbar Puncture Friday - States Saturday began having pain in mid lower back that has since moved down L Leg - taking Motrin and Tylenol with no relief - no problems urinating. The patient states that she had a lumbar puncture 5 days ago while being evaluated for her migraines and noting abnormal MRI. Patient states that she was initially doing RA however once the anesthesia from the lumbar puncture wore off she began having worsening lower back pain with radiation to her left leg. Patient describes it as strong, and notes that she has been able to walk however it has been somewhat difficult secondary due to pain. Patient also notes that she has been nauseous but is not vomiting. Patient notes that she has been taking Tylenol as well as Motrin and believes she has been also been using dexamethasone with last dose on Tuesday (yesterday). Patient denies fever, chills, traumatic injury, chest pain, difficulty breathing, abdominal pain, change in bowel habits, change in urination, incontinence, or any other complaint at this time. Prior to Admission Medications   Prescriptions Last Dose Informant Patient Reported? Taking?    SUMAtriptan (IMITREX) 100 mg tablet   No No   Sig: Take 1 tablet (100 mg total) by mouth daily as needed for migraine   bisacodyl (DULCOLAX) 5 mg EC tablet   No No   Sig: Take 2 tablets (10 mg total) by mouth 2 (two) times a day as needed for constipation (constipation) Colonoscopy prep   dexamethasone (DECADRON) 2 mg tablet   No No   Sig: Take 1 tablet (2 mg total) by mouth daily with breakfast   ergocalciferol (VITAMIN D2) 50,000 units  Self No No   Sig: Take 1 capsule (50,000 Units total) by mouth once a week   fluocinolone acetonide (DermOtic) 0.01 % otic oil   No No   Sig: Administer 3 drops into both ears daily   Patient not taking: Reported on 2023   fluticasone (FLONASE) 50 mcg/act nasal spray  Self No No   Si spray into each nostril 2 (two) times a day   guaiFENesin (ROBITUSSIN) 100 MG/5ML oral liquid  Self No No   Sig: Take 10 mL (200 mg total) by mouth 3 (three) times a day as needed for cough   Patient not taking: Reported on 2023   ketorolac (TORADOL) 10 mg tablet  Self No No   Sig: Take 1 tablet (10 mg total) by mouth every 6 (six) hours as needed for moderate pain   Patient not taking: Reported on 2023   naproxen (EC NAPROSYN) 500 MG EC tablet  Self Yes No   Sig: Take 500 mg by mouth if needed for mild pain   neomycin-polymyxin-hydrocortisone (CORTISPORIN) 0.35%-10,000 units/mL-1% otic suspension  Self No No   Sig: Administer 4 drops into both ears 2 (two) times a day   nicotine (NICODERM CQ) 14 mg/24hr TD 24 hr patch  Self No No   Sig: Place 1 patch on the skin over 24 hours every 24 hours   nystatin-triamcinolone (MYCOLOG-II) cream  Self No No   Sig: Apply topically 2 (two) times a day   oxyCODONE-acetaminophen (Percocet) 5-325 mg per tablet   No No   Sig: Take 1 tablet by mouth 2 (two) times a day as needed for moderate pain Max Daily Amount: 2 tablets   pantoprazole (PROTONIX) 20 mg tablet   No No   Sig: Take 1 tablet (20 mg total) by mouth daily   polyethylene glycol (GOLYTELY) 4000 mL solution  Self No No   Sig: Take 4,000 mL by mouth once for 1 dose Colonoscopy prep   polyethylene glycol (MIRALAX) 17 g packet  Self No No   Sig: Take 17 g by mouth daily   prochlorperazine (COMPAZINE) 10 mg tablet  Self No No   Sig: Take 1 tablet (10 mg total) by mouth every 6 (six) hours as needed for nausea or vomiting senna-docusate sodium (SENOKOT-S) 8.6-50 mg per tablet  Self No No   Sig: Take 1 tablet by mouth daily      Facility-Administered Medications Last Administration Doses Remaining   cyanocobalamin injection 1,000 mcg 11/16/2023  7:22 AM 3          Past Medical History:   Diagnosis Date    Abnormal Pap smear of cervix     2015 cryosurgery; 6/2019 NILM pap/neg HPV    Allergic     Depression     Headache(784.0)     Migraine with aura        Past Surgical History:   Procedure Laterality Date    CHOLECYSTECTOMY LAPAROSCOPIC N/A 10/27/2020    Procedure: CHOLECYSTECTOMY LAPAROSCOPIC;  Surgeon: Nori Mancera MD;  Location: [facility] MAIN OR;  Service: General    [place] LUMBAR PUNCTURE DIAGNOSTIC  11/17/2023    GYNECOLOGIC CRYOSURGERY  2015    HYSTERECTOMY  2016    supracervical    LAPAROSCOPY  2018    [place] LAPAROSCOPY W/LYSIS OF ADHESIONS N/A 03/03/2020    Procedure: L.O.A.;  Surgeon: Yonathan Quezada MD;  Location[facility];  Service: Gynecology    [place] LAPS ABD PRTM&OMENTUM DX W/WO SPEC BR/WA [facility] N/A 08/09/2019    Procedure: LAPAROSCOPY DIAGNOSTIC; LYSIS OF ADHESIONS;  Surgeon: Srinivasa Robles MD;  Location: [facility];  Service: Gynecology    TUBAL LIGATION         Family History   Problem Relation Age of Onset    Cancer Paternal Grandmother         uterine    Cancer Paternal Aunt         uterine     Migraines Mother     Arthritis Mother     Hypertension Father     Breast cancer Neg Hx     Colon cancer Neg Hx     Ovarian cancer Neg Hx      I have reviewed and agree with the history as documented.     E-Cigarette/Vaping    E-Cigarette Use Never User      E-Cigarette/Vaping Substances     Social History     Tobacco Use    Smoking status: Every Day     Packs/day: 0.50     Years: 15.00     Total pack years: 7.50     Types: Cigarettes     Passive exposure: Current    Smokeless tobacco: Never   Vaping Use    Vaping Use: Never used   Substance Use Topics    Alcohol use: Never    Drug use: Never        Review of Systems Constitutional:  Negative for chills and fever. HENT:  Negative for ear pain and sore throat. Eyes:  Negative for pain and visual disturbance. Respiratory:  Negative for cough and shortness of breath. Cardiovascular:  Negative for chest pain and palpitations. Gastrointestinal:  Negative for abdominal pain and vomiting. Genitourinary:  Negative for dysuria and hematuria. Musculoskeletal:  Positive for back pain. Negative for arthralgias. Skin:  Negative for color change and rash. Neurological:  Negative for seizures and syncope. All other systems reviewed and are negative. Physical Exam  ED Triage Vitals   Temperature Pulse Respirations Blood Pressure SpO2   11/22/23 0151 11/22/23 0151 11/22/23 0151 11/22/23 0151 11/22/23 0151   98.7 °F (37.1 °C) 100 21 103/62 97 %      Temp Source Heart Rate Source Patient Position - Orthostatic VS BP Location FiO2 (%)   11/22/23 0151 11/22/23 0151 11/22/23 0151 11/22/23 0151 --   Oral Monitor Lying Left arm       Pain Score       11/22/23 0226       7             Orthostatic Vital Signs  Vitals:    11/22/23 0151   BP: 103/62   Pulse: 100   Patient Position - Orthostatic VS: Lying       Physical Exam  Vitals and nursing note reviewed. Constitutional:       General: She is in acute distress (Moderate secondary due to pain). Appearance: Normal appearance. HENT:      Head: Normocephalic and atraumatic. Right Ear: External ear normal.      Left Ear: External ear normal.      Nose: Nose normal.      Mouth/Throat:      Mouth: Mucous membranes are moist.   Eyes:      Conjunctiva/sclera: Conjunctivae normal.   Cardiovascular:      Rate and Rhythm: Normal rate and regular rhythm. Pulmonary:      Effort: Pulmonary effort is normal. No respiratory distress. Breath sounds: Normal breath sounds. Abdominal:      General: Abdomen is flat. Bowel sounds are normal.      Tenderness: There is no abdominal tenderness. There is no guarding or rebound. Musculoskeletal:         General: Tenderness (Lumbar paraspinal tenderness bilaterally, worse on the left, palpable spasm, no erythema, or overlying skin changes) present. Normal range of motion. Cervical back: Normal range of motion. Comments: Positive left straight leg raise   Skin:     General: Skin is warm and dry. Neurological:      Mental Status: She is alert. Mental status is at baseline. Psychiatric:         Mood and Affect: Mood normal.         ED Medications  Medications   lidocaine (LIDODERM) 5 % patch 1 patch (1 patch Topical Medication Applied 11/22/23 0226)   ondansetron (ZOFRAN) injection 4 mg (4 mg Intravenous Given 11/22/23 0228)   ketorolac (TORADOL) injection 15 mg (15 mg Intravenous Given 11/22/23 0228)   acetaminophen (TYLENOL) tablet 975 mg (975 mg Oral Given 11/22/23 0226)   diazepam (VALIUM) injection 2.5 mg (2.5 mg Intravenous Given 11/22/23 0228)       Diagnostic Studies  Results Reviewed       None                   No orders to display         Procedures  Procedures      ED Course                             SBIRT 22yo+      Flowsheet Row Most Recent Value   Initial Alcohol Screen: US AUDIT-C     1. How often do you have a drink containing alcohol? 0 Filed at: 11/22/2023 0154   2. How many drinks containing alcohol do you have on a typical day you are drinking? 0 Filed at: 11/22/2023 0154   3b. FEMALE Any Age, or MALE 65+: How often do you have 4 or more drinks on one occassion? 0 Filed at: 11/22/2023 0154   Audit-C Score 0 Filed at: 11/22/2023 2932   LISBETH: How many times in the past year have you. .. Used an illegal drug or used a prescription medication for non-medical reasons?  Never Filed at: 11/22/2023 0154                  Medical Decision Making  Patient presents with:  Back Pain: Patient had Lumbar Puncture Friday - States Saturday began having pain in mid lower back that has since moved down L Leg - taking Motrin and Tylenol with no relief - no problems urinating       Patient seen and examined noted to be in moderate distress 2/2 pain, lumbar paraspinal tenderness bilaterally, worse on the left, palpable spasm, no erythema, or overlying skin changes, positive straight leg raise of LLE. Differential diagnosis includes but is not limited to Muscle spasm, radiculopathy, sciatica. Patient had improvement of symptoms with above medications. Patient was provided with information for comprehensive spine program.      Patient appears well, is nontoxic appearing, expresses understanding and agrees with plan of care at this time. In light of this patient would benefit from outpatient management. Patient was rx'd: Robaxin, Lidoderm, Voltaren        Risk  OTC drugs. Prescription drug management. Disposition  Final diagnoses:   Back pain   Sciatica of left side   Muscle spasm     Time reflects when diagnosis was documented in both MDM as applicable and the Disposition within this note       Time User Action Codes Description Comment    11/22/2023  2:40 AM de Nauvoo Grills Add [M54.9] Back pain     11/22/2023  2:41 AM de [place] Grills Add [M54.32] Sciatica of left side     11/22/2023  2:41 AM de Prabhu Grills Add [W17.519] Muscle spasm           ED Disposition       ED Disposition   Discharge    Condition   Stable    Date/Time   Wed Nov 22, 2023 0253    Comment   Kamryn Shank discharge to home/self care.                    Follow-up Information       Follow up With Specialties Details Why Contact Info Additional Information    [facility]   [address]  [address] [place] [address]  [address] [facility]   [phone]            Patient's Medications   Discharge Prescriptions    DICLOFENAC SODIUM (VOLTAREN) 1 %    Apply 2 g topically 4 (four) times a day       Start Date: 11/22/2023End Date: --       Order Dose: 2 g       Quantity: 50 g Refills: 0    LIDOCAINE (LIDODERM) 5 %    Apply 1 patch topically over 12 hours every 24 hours Remove & Discard patch within 12 hours or as directed by MD       Start Date: 11/22/2023End Date: --       Order Dose: 1 patch       Quantity: 15 patch    Refills: 0    METHOCARBAMOL (ROBAXIN) 500 MG TABLET    Take 1 tablet (500 mg total) by mouth 2 (two) times a day       Start Date: 11/22/2023End Date: --       Order Dose: 500 mg       Quantity: 20 tablet    Refills: 0     No discharge procedures on file. PDMP Review         Value Time User    PDMP Reviewed  Yes 11/6/2023  9:40 AM Amarjit Peguero PA-C             ED Provider  Attending physically available and evaluated Kurtis Alvarenga. I managed the patient along with the ED Attending.     Electronically Signed by           Maite Santamaria MD  11/22/23 8498

## 2023-11-22 NOTE — ED ATTENDING ATTESTATION
11/22/2023  I, Juanita Mckinney DO, saw and evaluated the patient. I have discussed the patient with the resident/non-physician practitioner and agree with the resident's/non-physician practitioner's findings, Plan of Care, and MDM as documented in the resident's/non-physician practitioner's note, except where noted. All available labs and Radiology studies were reviewed. I was present for key portions of any procedure(s) performed by the resident/non-physician practitioner and I was immediately available to provide assistance. At this point I agree with the current assessment done in the Emergency Department. I have conducted an independent evaluation of this patient a history and physical is as follows:    50-year-old female presents with complaint of back pain that radiates into her leg. She recently had an LP performed for assessment of her headaches. Since that time she has been having sciatica. She was given a prescription of Decadron for likely irritation of the nerve from the procedure. She reports taking that plus Tylenol with no relief of symptoms. She has had no fevers or focal neurological deficits. Pain sxs improved with meds.   She will be following up as an outpt and is aware of reasons to to the emergency department    ED Course         Critical Care Time  Procedures

## 2023-11-26 LAB — MISCELLANEOUS LAB TEST RESULT: NORMAL

## 2023-11-28 LAB — MISCELLANEOUS LAB TEST RESULT: NORMAL

## 2023-11-30 ENCOUNTER — TELEPHONE (OUTPATIENT)
Dept: RADIOLOGY | Facility: HOSPITAL | Age: 38
End: 2023-11-30

## 2023-11-30 NOTE — NURSING NOTE
Follow up phone call made, patient had lumbar puncture procedure. Offering  complaints of left leg pain. Per patient she went to our ED and was prescribed voltaren and methocarbamol that have been working but still has some residual discomfort from what they say was a muscle spasm.

## 2023-12-04 ENCOUNTER — OFFICE VISIT (OUTPATIENT)
Dept: FAMILY MEDICINE CLINIC | Facility: CLINIC | Age: 38
End: 2023-12-04

## 2023-12-04 VITALS
DIASTOLIC BLOOD PRESSURE: 60 MMHG | WEIGHT: 160.6 LBS | BODY MASS INDEX: 29.37 KG/M2 | OXYGEN SATURATION: 97 % | SYSTOLIC BLOOD PRESSURE: 120 MMHG | HEART RATE: 107 BPM

## 2023-12-04 DIAGNOSIS — K59.00 CONSTIPATION, UNSPECIFIED CONSTIPATION TYPE: Chronic | ICD-10-CM

## 2023-12-04 DIAGNOSIS — M54.42 CHRONIC BILATERAL LOW BACK PAIN WITH LEFT-SIDED SCIATICA: Chronic | ICD-10-CM

## 2023-12-04 DIAGNOSIS — Z13.1 SCREENING FOR DIABETES MELLITUS: ICD-10-CM

## 2023-12-04 DIAGNOSIS — R10.2 CHRONIC PELVIC PAIN IN FEMALE: Chronic | ICD-10-CM

## 2023-12-04 DIAGNOSIS — M54.2 NECK PAIN: Chronic | ICD-10-CM

## 2023-12-04 DIAGNOSIS — H66.002 NON-RECURRENT ACUTE SUPPURATIVE OTITIS MEDIA OF LEFT EAR WITHOUT SPONTANEOUS RUPTURE OF TYMPANIC MEMBRANE: ICD-10-CM

## 2023-12-04 DIAGNOSIS — E53.8 VITAMIN B 12 DEFICIENCY: Primary | ICD-10-CM

## 2023-12-04 DIAGNOSIS — G43.109 MIGRAINE WITH AURA AND WITHOUT STATUS MIGRAINOSUS, NOT INTRACTABLE: Chronic | ICD-10-CM

## 2023-12-04 DIAGNOSIS — G89.29 CHRONIC PELVIC PAIN IN FEMALE: Chronic | ICD-10-CM

## 2023-12-04 DIAGNOSIS — G89.29 CHRONIC BILATERAL LOW BACK PAIN WITH LEFT-SIDED SCIATICA: Chronic | ICD-10-CM

## 2023-12-04 DIAGNOSIS — H60.392 OTHER INFECTIVE ACUTE OTITIS EXTERNA OF LEFT EAR: ICD-10-CM

## 2023-12-04 DIAGNOSIS — E55.9 VITAMIN D DEFICIENCY: ICD-10-CM

## 2023-12-04 DIAGNOSIS — K21.9 GASTROESOPHAGEAL REFLUX DISEASE WITHOUT ESOPHAGITIS: Chronic | ICD-10-CM

## 2023-12-04 DIAGNOSIS — J32.4 CHRONIC PANSINUSITIS: Chronic | ICD-10-CM

## 2023-12-04 PROCEDURE — 99215 OFFICE O/P EST HI 40 MIN: CPT | Performed by: PHYSICIAN ASSISTANT

## 2023-12-04 RX ORDER — OFLOXACIN 3 MG/ML
5 SOLUTION AURICULAR (OTIC) 2 TIMES DAILY
Qty: 10 ML | Refills: 0 | Status: SHIPPED | OUTPATIENT
Start: 2023-12-04 | End: 2023-12-11

## 2023-12-04 RX ORDER — AMOXICILLIN 500 MG/1
500 TABLET, FILM COATED ORAL 2 TIMES DAILY
Qty: 20 TABLET | Refills: 0 | Status: SHIPPED | OUTPATIENT
Start: 2023-12-04 | End: 2023-12-14

## 2023-12-04 NOTE — ASSESSMENT & PLAN NOTE
- Per patient, she has now completed vitamin B12 injections through neurology. - Will check updated vitamin B12 level.

## 2023-12-04 NOTE — ASSESSMENT & PLAN NOTE
-Patient has established with neurology and underwent MRI brain. Further work-up was suggested by MS specialist including work-up for inflammatory causes, ischemia and vasculitis. -Reviewed MRI C-spine which was normal.  - Patient underwent lumbar puncture on 11/17/23. Results are pending.  - Patient scheduled for follow up MRI brain in 6 months (March 2024). -Continue sumatriptan 100 mg as needed for abortive therapy. - Per patient, she has now completed vitamin B12 injections. - Continue riboflavin and magnesium for 100 mg daily for preventative purposes. - Continue follow-up with neurology as scheduled.

## 2023-12-04 NOTE — ASSESSMENT & PLAN NOTE
- Patient underwent CT abdomen pelvis with contrast (6/10/23). Results show no evidence of acute abdominopelvic process. Chronic left hydrosalpinx. - Pelvic US (7/6/23) revealed: Fluid-filled tubular structure in the left adnexa similar to prior study likely representing hydrosalpinx. Status post hysterectomy. Right ovary was not visualized. - OBGYN has prescribed amitriptyline and Depo injections for the pain, however, both have not been helpful. Jerrlyn Bors had been prescribed but prior authorization was denied.   -Patient has now established with [facility] for second opinion and is now s/p CT guided aspiration of left pelvic cystic mass on 10/26/23.   - Continue follow up with [facility] as scheduled.

## 2023-12-04 NOTE — ASSESSMENT & PLAN NOTE
- Lumbar spine x-ray which shows mild scoliosis, otherwise unremarkable study.  - Advised to apply ice/heating pad/topical therapies as needed to affected area. - Continue tizanidine 4 mg, to be taken twice daily as needed. - Continue alternating ibuprofen/Tylenol as needed for pain. - Patient has been taking percocet very sparingly. - Assessed possible risk for misuse, abuse, or addiction based on patient's family and social history.  - Educated patient on possible side effects and risks with taking this medication including risks of abuse, misuse, and addiction.  - PDMP reviewed. No red flags noted. - If symptoms persist, would recommend referral to physical therapy.

## 2023-12-05 ENCOUNTER — LAB (OUTPATIENT)
Dept: LAB | Facility: HOSPITAL | Age: 38
End: 2023-12-05
Payer: COMMERCIAL

## 2023-12-05 DIAGNOSIS — E55.9 VITAMIN D DEFICIENCY: ICD-10-CM

## 2023-12-05 DIAGNOSIS — E53.8 VITAMIN B 12 DEFICIENCY: ICD-10-CM

## 2023-12-05 DIAGNOSIS — Z72.51 HIGH RISK HETEROSEXUAL BEHAVIOR: ICD-10-CM

## 2023-12-05 DIAGNOSIS — Z13.1 SCREENING FOR DIABETES MELLITUS: ICD-10-CM

## 2023-12-05 DIAGNOSIS — R93.89 ABNORMAL MRI: ICD-10-CM

## 2023-12-05 LAB
25(OH)D3 SERPL-MCNC: 34 NG/ML (ref 30–100)
ALBUMIN SERPL BCP-MCNC: 4.4 G/DL (ref 3.5–5)
ALP SERPL-CCNC: 58 U/L (ref 34–104)
ALT SERPL W P-5'-P-CCNC: 25 U/L (ref 7–52)
ANION GAP SERPL CALCULATED.3IONS-SCNC: 8 MMOL/L
AST SERPL W P-5'-P-CCNC: 15 U/L (ref 13–39)
BILIRUB SERPL-MCNC: 0.53 MG/DL (ref 0.2–1)
BUN SERPL-MCNC: 14 MG/DL (ref 5–25)
CALCIUM SERPL-MCNC: 9.4 MG/DL (ref 8.4–10.2)
CHLORIDE SERPL-SCNC: 106 MMOL/L (ref 96–108)
CO2 SERPL-SCNC: 24 MMOL/L (ref 21–32)
CREAT SERPL-MCNC: 0.84 MG/DL (ref 0.6–1.3)
EST. AVERAGE GLUCOSE BLD GHB EST-MCNC: 105 MG/DL
GFR SERPL CREATININE-BSD FRML MDRD: 89 ML/MIN/1.73SQ M
GLUCOSE P FAST SERPL-MCNC: 102 MG/DL (ref 65–99)
HBA1C MFR BLD: 5.3 %
HCV AB SER QL: NORMAL
HIV 1+2 AB+HIV1 P24 AG SERPL QL IA: NORMAL
HIV 2 AB SERPL QL IA: NORMAL
HIV1 AB SERPL QL IA: NORMAL
HIV1 P24 AG SERPL QL IA: NORMAL
POTASSIUM SERPL-SCNC: 3.9 MMOL/L (ref 3.5–5.3)
PROT SERPL-MCNC: 7 G/DL (ref 6.4–8.4)
SODIUM SERPL-SCNC: 138 MMOL/L (ref 135–147)
TREPONEMA PALLIDUM IGG+IGM AB [PRESENCE] IN SERUM OR PLASMA BY IMMUNOASSAY: NORMAL
VIT B12 SERPL-MCNC: 225 PG/ML (ref 180–914)

## 2023-12-05 PROCEDURE — 86780 TREPONEMA PALLIDUM: CPT

## 2023-12-05 PROCEDURE — 86803 HEPATITIS C AB TEST: CPT

## 2023-12-05 PROCEDURE — 87389 HIV-1 AG W/HIV-1&-2 AB AG IA: CPT

## 2023-12-05 PROCEDURE — 80053 COMPREHEN METABOLIC PANEL: CPT

## 2023-12-05 PROCEDURE — 83036 HEMOGLOBIN GLYCOSYLATED A1C: CPT

## 2023-12-05 PROCEDURE — 82306 VITAMIN D 25 HYDROXY: CPT

## 2023-12-05 PROCEDURE — 36415 COLL VENOUS BLD VENIPUNCTURE: CPT

## 2023-12-05 PROCEDURE — 82607 VITAMIN B-12: CPT

## 2023-12-05 NOTE — RESULT ENCOUNTER NOTE
Please let Kasie Bonilla know her STI testing was normal.    Thanks! Randa Johnson MD  PGY-IV, OB/GYN  12/5/2023, 4:40 PM

## 2023-12-26 ENCOUNTER — HOSPITAL ENCOUNTER (EMERGENCY)
Facility: HOSPITAL | Age: 38
Discharge: HOME | End: 2023-12-26
Attending: EMERGENCY MEDICINE
Payer: COMMERCIAL

## 2023-12-26 VITALS
DIASTOLIC BLOOD PRESSURE: 77 MMHG | SYSTOLIC BLOOD PRESSURE: 116 MMHG | OXYGEN SATURATION: 96 % | TEMPERATURE: 98.8 F | HEART RATE: 106 BPM | WEIGHT: 164.9 LBS | RESPIRATION RATE: 20 BRPM | BODY MASS INDEX: 30.16 KG/M2

## 2023-12-26 DIAGNOSIS — B34.9 VIRAL SYNDROME: Primary | ICD-10-CM

## 2023-12-26 LAB
FLUAV RNA RESP QL NAA+PROBE: NEGATIVE
FLUBV RNA RESP QL NAA+PROBE: NEGATIVE
RSV RNA RESP QL NAA+PROBE: NEGATIVE
SARS-COV-2 RNA RESP QL NAA+PROBE: POSITIVE

## 2023-12-26 PROCEDURE — 99284 EMERGENCY DEPT VISIT MOD MDM: CPT

## 2023-12-26 PROCEDURE — 99283 EMERGENCY DEPT VISIT LOW MDM: CPT | Performed by: EMERGENCY MEDICINE

## 2023-12-26 PROCEDURE — 0241U HB NFCT DS VIR RESP RNA 4 TRGT: CPT

## 2023-12-26 NOTE — Clinical Note
Lorrie Jones was seen and treated in our emergency department on 12/26/2023.    No restrictions            Diagnosis:     Lorrie  .    She may return on this date: 12/27/2023         If you have any questions or concerns, please don't hesitate to call.      Mohit Rivas PA-C    ______________________________           _______________          _______________  Hospital Representative                              Date                                Time

## 2023-12-26 NOTE — ED PROVIDER NOTES
"HPI: Patient is a 38 y.o. female who presents with 2 days of fever, chills, fatigue, and myalgias which the patient describes at mild The patient has had contact with people with similar symptoms.  The patient without relief of symptoms.    Allergies   Allergen Reactions    Shellfish-Derived Products - Food Allergy Itching and Swelling     \"seafood \"    Ibuprofen Other (See Comments)     Patient states she gets hematoma    Pregabalin Anxiety       Past Medical History:   Diagnosis Date    Abnormal Pap smear of cervix     2015 cryosurgery; 6/2019 NILM pap/neg HPV    Allergic     Depression     Headache(784.0)     Migraine with aura       Past Surgical History:   Procedure Laterality Date    CHOLECYSTECTOMY LAPAROSCOPIC N/A 10/27/2020    Procedure: CHOLECYSTECTOMY LAPAROSCOPIC;  Surgeon: Lina Narayan MD;  Location:  MAIN OR;  Service: General    FL LUMBAR PUNCTURE DIAGNOSTIC  11/17/2023    GYNECOLOGIC CRYOSURGERY  2015    HYSTERECTOMY  2016    supracervical    LAPAROSCOPY  2018    CT LAPAROSCOPY W/LYSIS OF ADHESIONS N/A 03/03/2020    Procedure: L.O.A.;  Surgeon: Noy Paz MD;  Location[facility];  Service: Gynecology    CT LAPS ABD PRTM&OMENTUM DX W/WO SPEC BR/WA SPX N/A 08/09/2019    Procedure: LAPAROSCOPY DIAGNOSTIC; LYSIS OF ADHESIONS;  Surgeon: Abebe Walls MD;  Location: [facility];  Service: Gynecology    TUBAL LIGATION       Social History     Tobacco Use    Smoking status: Every Day     Current packs/day: 0.50     Average packs/day: 0.5 packs/day for 15.0 years (7.5 ttl pk-yrs)     Types: Cigarettes     Passive exposure: Current    Smokeless tobacco: Never   Vaping Use    Vaping status: Never Used   Substance Use Topics    Alcohol use: Never    Drug use: Never       Nursing notes reviewed  Physical Exam:  ED Triage Vitals [12/26/23 1157]   Temperature Pulse Respirations Blood Pressure SpO2   98.8 °F (37.1 °C) (!) 106 20 116/77 96 %      Temp Source Heart Rate Source Patient " Position - Orthostatic VS BP Location FiO2 (%)   Oral Monitor Lying Left arm --      Pain Score       --           ROS: Positive for fever, chills, fatigue, and myalgias, the remainder of a 10 organ system ROS was otherwise unremarkable.  General: awake, alert, no acute distress    Head: normocephalic, atraumatic    Eyes: no scleral icterus  Ears: external ears normal, hearing grossly intact  Nose: external exam grossly normal, negative nasal discharge  Neck: symmetric, No JVD noted, trachea midline  Pulmonary: no respiratory distress, no tachypnea noted  Cardiovascular: appears well perfused  Abdomen: no distention noted  Musculoskeletal: no deformities noted, tone normal  Neuro: grossly non-focal  Psych: mood and affect appropriate    The patient is stable and has a history and physical exam consistent with a viral illness. COVID19 testing has been performed.  I considered the patient's other medical conditions as applicable/noted above in my medical decision making.  The patient is stable upon discharge. The plan is for supportive care at home.    The patient (and any family present) verbalized understanding of the discharge instructions and warnings that would necessitate return to the Emergency Department.  All questions were answered prior to discharge.    Medications - No data to display  Final diagnoses:   Viral syndrome     Time reflects when diagnosis was documented in both MDM as applicable and the Disposition within this note       Time User Action Codes Description Comment    12/26/2023 12:03 PM Mohit Rivas Add [B34.9] Viral syndrome           ED Disposition       ED Disposition   Discharge    Condition   Stable    Date/Time   Tue Dec 26, 2023 12:03 PM    Comment   Lorrie Jones discharge to home/self care.                   Follow-up Information       Follow up With Specialties Details Why Contact Info Additional Information    Margarita Walker PA-C Family Medicine   [address] [place] [address]  [address] [address]  [phone]       Formerly [facility], [facility] Emergency Department Emergency Medicine  As needed, If symptoms worsen [address] [facility] [id number] [phone] Formerly [facility], [facility] Emergency Department          Discharge Medication List as of 12/26/2023 12:03 PM        CONTINUE these medications which have NOT CHANGED    Details   bisacodyl (DULCOLAX) 5 mg EC tablet Take 2 tablets (10 mg total) by mouth 2 (two) times a day as needed for constipation (constipation) Colonoscopy prep, Starting Mon 11/6/2023, Normal      dexamethasone (DECADRON) 2 mg tablet Take 1 tablet (2 mg total) by mouth daily with breakfast, Starting Mon 11/20/2023, Normal      Diclofenac Sodium (VOLTAREN) 1 % Apply 2 g topically 4 (four) times a day, Starting Wed 11/22/2023, Normal      ergocalciferol (VITAMIN D2) 50,000 units Take 1 capsule (50,000 Units total) by mouth once a week, Starting Wed 9/6/2023, Normal      fluocinolone acetonide (DermOtic) 0.01 % otic oil Administer 3 drops into both ears daily, Starting Thu 11/16/2023, Normal      fluticasone (FLONASE) 50 mcg/act nasal spray 1 spray into each nostril 2 (two) times a day, Starting Thu 10/19/2023, Normal      guaiFENesin (ROBITUSSIN) 100 MG/5ML oral liquid Take 10 mL (200 mg total) by mouth 3 (three) times a day as needed for cough, Starting Mon 10/2/2023, Normal      ketorolac (TORADOL) 10 mg tablet Take 1 tablet (10 mg total) by mouth every 6 (six) hours as needed for moderate pain, Starting Thu 7/6/2023, Normal      lidocaine (LIDODERM) 5 % Apply 1 patch topically over 12 hours every 24 hours Remove & Discard patch within 12 hours or as directed by MD, Starting Wed 11/22/2023, Print      methocarbamol (ROBAXIN) 500 mg tablet Take 1 tablet (500 mg total) by mouth 2 (two) times a day, Starting Wed 11/22/2023, Normal      naproxen (EC NAPROSYN) 500 MG EC tablet Take 500 mg by mouth if needed for mild pain,  Historical Med      nicotine (NICODERM CQ) 14 mg/24hr TD 24 hr patch Place 1 patch on the skin over 24 hours every 24 hours, Starting Thu 9/14/2023, Normal      nystatin-triamcinolone (MYCOLOG-II) cream Apply topically 2 (two) times a day, Starting Thu 9/14/2023, Normal      oxyCODONE-acetaminophen (Percocet) 5-325 mg per tablet Take 1 tablet by mouth 2 (two) times a day as needed for moderate pain Max Daily Amount: 2 tablets, Starting Mon 11/6/2023, Normal      pantoprazole (PROTONIX) 20 mg tablet Take 1 tablet (20 mg total) by mouth daily, Starting Mon 11/6/2023, Normal      polyethylene glycol (GOLYTELY) 4000 mL solution Take 4,000 mL by mouth once for 1 dose Colonoscopy prep, Starting Mon 10/2/2023, Normal      polyethylene glycol (MIRALAX) 17 g packet Take 17 g by mouth daily, Starting Fri 6/2/2023, Until Wed 11/29/2023, Normal      prochlorperazine (COMPAZINE) 10 mg tablet Take 1 tablet (10 mg total) by mouth every 6 (six) hours as needed for nausea or vomiting, Starting Wed 9/6/2023, Normal      senna-docusate sodium (SENOKOT-S) 8.6-50 mg per tablet Take 1 tablet by mouth daily, Starting Tue 10/17/2023, Normal      SUMAtriptan (IMITREX) 100 mg tablet Take 1 tablet (100 mg total) by mouth daily as needed for migraine, Starting Thu 11/2/2023, Normal           No discharge procedures on file.    Electronically Signed by       Mohit Rivas PA-C  12/26/23 1916

## 2023-12-28 LAB — MISCELLANEOUS LAB TEST RESULT: NORMAL

## 2023-12-29 DIAGNOSIS — E55.9 VITAMIN D DEFICIENCY: ICD-10-CM

## 2023-12-29 RX ORDER — ERGOCALCIFEROL 1.25 MG/1
50000 CAPSULE ORAL WEEKLY
Qty: 16 CAPSULE | Refills: 1 | Status: SHIPPED | OUTPATIENT
Start: 2023-12-29

## 2023-12-30 ENCOUNTER — HOSPITAL ENCOUNTER (EMERGENCY)
Facility: HOSPITAL | Age: 38
Discharge: HOME | End: 2023-12-30
Attending: EMERGENCY MEDICINE | Admitting: EMERGENCY MEDICINE
Payer: COMMERCIAL

## 2023-12-30 VITALS
DIASTOLIC BLOOD PRESSURE: 78 MMHG | OXYGEN SATURATION: 97 % | HEART RATE: 108 BPM | WEIGHT: 159.1 LBS | BODY MASS INDEX: 29.1 KG/M2 | TEMPERATURE: 98.4 F | RESPIRATION RATE: 20 BRPM | SYSTOLIC BLOOD PRESSURE: 135 MMHG

## 2023-12-30 DIAGNOSIS — U07.1 COVID-19: Primary | ICD-10-CM

## 2023-12-30 PROCEDURE — 96361 HYDRATE IV INFUSION ADD-ON: CPT

## 2023-12-30 PROCEDURE — 96374 THER/PROPH/DIAG INJ IV PUSH: CPT

## 2023-12-30 PROCEDURE — 99284 EMERGENCY DEPT VISIT MOD MDM: CPT | Performed by: EMERGENCY MEDICINE

## 2023-12-30 PROCEDURE — 99284 EMERGENCY DEPT VISIT MOD MDM: CPT

## 2023-12-30 RX ORDER — ACETAMINOPHEN 325 MG/1
650 TABLET ORAL ONCE
Status: COMPLETED | OUTPATIENT
Start: 2023-12-30 | End: 2023-12-30

## 2023-12-30 RX ORDER — ONDANSETRON 2 MG/ML
4 INJECTION INTRAMUSCULAR; INTRAVENOUS ONCE
Status: COMPLETED | OUTPATIENT
Start: 2023-12-30 | End: 2023-12-30

## 2023-12-30 RX ADMIN — SODIUM CHLORIDE 1000 ML: 0.9 INJECTION, SOLUTION INTRAVENOUS at 17:51

## 2023-12-30 RX ADMIN — ACETAMINOPHEN 650 MG: 325 TABLET ORAL at 17:48

## 2023-12-30 RX ADMIN — ONDANSETRON 4 MG: 2 INJECTION INTRAMUSCULAR; INTRAVENOUS at 17:52

## 2023-12-30 NOTE — ED PROVIDER NOTES
History  Chief Complaint   Patient presents with    Flu Symptoms     Patient has covid, patient c/o worsening sore throat, weakness, body aches, nausea     38-year-old female, recently diagnosed with COVID-19, presents with continued symptoms of bodyaches, fatigue, and sore throat.  Patient states that she does not feel well.  Able to tolerate liquids but feels nauseous.  Also reports feeling constipated.      History provided by:  Patient   used: No    Flu Symptoms  Presenting symptoms: cough, fatigue, myalgias, nausea and sore throat    Presenting symptoms: no shortness of breath and no vomiting    Associated symptoms: nasal congestion        Prior to Admission Medications   Prescriptions Last Dose Informant Patient Reported? Taking?   Diclofenac Sodium (VOLTAREN) 1 %   No No   Sig: Apply 2 g topically 4 (four) times a day   SUMAtriptan (IMITREX) 100 mg tablet   No No   Sig: Take 1 tablet (100 mg total) by mouth daily as needed for migraine   bisacodyl (DULCOLAX) 5 mg EC tablet   No No   Sig: Take 2 tablets (10 mg total) by mouth 2 (two) times a day as needed for constipation (constipation) Colonoscopy prep   dexamethasone (DECADRON) 2 mg tablet Not Taking  No No   Sig: Take 1 tablet (2 mg total) by mouth daily with breakfast   Patient not taking: Reported on 2023   ergocalciferol (VITAMIN D2) 50,000 units   No No   Sig: TAKE 1 CAPSULE BY MOUTH ONCE A WEEK   fluocinolone acetonide (DermOtic) 0.01 % otic oil Not Taking  No No   Sig: Administer 3 drops into both ears daily   Patient not taking: Reported on 2023   fluticasone (FLONASE) 50 mcg/act nasal spray  Self No No   Si spray into each nostril 2 (two) times a day   guaiFENesin (ROBITUSSIN) 100 MG/5ML oral liquid Not Taking Self No No   Sig: Take 10 mL (200 mg total) by mouth 3 (three) times a day as needed for cough   Patient not taking: Reported on 2023   ketorolac (TORADOL) 10 mg tablet Not Taking Self No No   Sig: Take  1 tablet (10 mg total) by mouth every 6 (six) hours as needed for moderate pain   Patient not taking: Reported on 11/2/2023   lidocaine (LIDODERM) 5 %   No No   Sig: Apply 1 patch topically over 12 hours every 24 hours Remove & Discard patch within 12 hours or as directed by MD   methocarbamol (ROBAXIN) 500 mg tablet   No No   Sig: Take 1 tablet (500 mg total) by mouth 2 (two) times a day   naproxen (EC NAPROSYN) 500 MG EC tablet  Self Yes No   Sig: Take 500 mg by mouth if needed for mild pain   nicotine (NICODERM CQ) 14 mg/24hr TD 24 hr patch  Self No No   Sig: Place 1 patch on the skin over 24 hours every 24 hours   nystatin-triamcinolone (MYCOLOG-II) cream  Self No No   Sig: Apply topically 2 (two) times a day   oxyCODONE-acetaminophen (Percocet) 5-325 mg per tablet Not Taking  No No   Sig: Take 1 tablet by mouth 2 (two) times a day as needed for moderate pain Max Daily Amount: 2 tablets   Patient not taking: Reported on 12/30/2023   pantoprazole (PROTONIX) 20 mg tablet   No No   Sig: Take 1 tablet (20 mg total) by mouth daily   polyethylene glycol (GOLYTELY) 4000 mL solution  Self No No   Sig: Take 4,000 mL by mouth once for 1 dose Colonoscopy prep   polyethylene glycol (MIRALAX) 17 g packet  Self No No   Sig: Take 17 g by mouth daily   prochlorperazine (COMPAZINE) 10 mg tablet Not Taking Self No No   Sig: Take 1 tablet (10 mg total) by mouth every 6 (six) hours as needed for nausea or vomiting   Patient not taking: Reported on 12/30/2023   senna-docusate sodium (SENOKOT-S) 8.6-50 mg per tablet Not Taking Self No No   Sig: Take 1 tablet by mouth daily   Patient not taking: Reported on 12/30/2023      Facility-Administered Medications: None       Past Medical History:   Diagnosis Date    Abnormal Pap smear of cervix     2015 cryosurgery; 6/2019 NILM pap/neg HPV    Allergic     Depression     Headache(784.0)     Migraine with aura        Past Surgical History:   Procedure Laterality Date    CHOLECYSTECTOMY  LAPAROSCOPIC N/A 10/27/2020    Procedure: CHOLECYSTECTOMY LAPAROSCOPIC;  Surgeon: Lina Narayan MD;  Location:  MAIN OR;  Service: General    FL LUMBAR PUNCTURE DIAGNOSTIC  11/17/2023    GYNECOLOGIC CRYOSURGERY  2015    HYSTERECTOMY  2016    supracervical    LAPAROSCOPY  2018    [place] LAPAROSCOPY W/LYSIS OF ADHESIONS N/A 03/03/2020    Procedure: L.O.A.;  Surgeon: Noy Paz MD;  [facility];  Service: Gynecology    [place] LAPS ABD PRTM&OMENTUM DX W/WO SPEC BR/WA SPX N/A 08/09/2019    Procedure: LAPAROSCOPY DIAGNOSTIC; LYSIS OF ADHESIONS;  Surgeon: Abebe Walls MD;  Location[facility];  Service: Gynecology    TUBAL LIGATION         Family History   Problem Relation Age of Onset    Cancer Paternal Grandmother         uterine    Cancer Paternal Aunt         uterine     Migraines Mother     Arthritis Mother     Hypertension Father     Breast cancer Neg Hx     Colon cancer Neg Hx     Ovarian cancer Neg Hx      I have reviewed and agree with the history as documented.    E-Cigarette/Vaping    E-Cigarette Use Never User      E-Cigarette/Vaping Substances     Social History     Tobacco Use    Smoking status: Every Day     Current packs/day: 0.50     Average packs/day: 0.5 packs/day for 15.0 years (7.5 ttl pk-yrs)     Types: Cigarettes     Passive exposure: Current    Smokeless tobacco: Never   Vaping Use    Vaping status: Never Used   Substance Use Topics    Alcohol use: Never    Drug use: Never       Review of Systems   Constitutional:  Positive for fatigue.   HENT:  Positive for congestion and sore throat.    Respiratory:  Positive for cough. Negative for shortness of breath.    Gastrointestinal:  Positive for nausea. Negative for vomiting.   Musculoskeletal:  Positive for myalgias.       Physical Exam  Physical Exam  Vitals and nursing note reviewed.   Constitutional:       General: She is not in acute distress.  HENT:      Head: Normocephalic and atraumatic.      Mouth/Throat:      Mouth:  Mucous membranes are moist.      Pharynx: Oropharynx is clear. No oropharyngeal exudate.   Eyes:      Extraocular Movements: Extraocular movements intact.      Conjunctiva/sclera: Conjunctivae normal.      Pupils: Pupils are equal, round, and reactive to light.   Cardiovascular:      Rate and Rhythm: Regular rhythm. Tachycardia present.   Pulmonary:      Effort: Pulmonary effort is normal.      Breath sounds: Normal breath sounds. No wheezing or rhonchi.   Abdominal:      Palpations: Abdomen is soft.      Tenderness: There is no abdominal tenderness.   Musculoskeletal:         General: Normal range of motion.      Cervical back: Normal range of motion and neck supple. No rigidity.   Skin:     General: Skin is warm and dry.   Neurological:      General: No focal deficit present.      Mental Status: She is alert and oriented to person, place, and time.      Motor: No weakness.      Gait: Gait normal.         Vital Signs  ED Triage Vitals   Temperature Pulse Respirations Blood Pressure SpO2   12/30/23 1728 12/30/23 1728 12/30/23 1728 12/30/23 1728 12/30/23 1728   98.4 °F (36.9 °C) (!) 108 20 135/78 97 %      Temp Source Heart Rate Source Patient Position - Orthostatic VS BP Location FiO2 (%)   12/30/23 1728 12/30/23 1728 12/30/23 1728 12/30/23 1728 --   Oral Monitor Lying Left arm       Pain Score       12/30/23 1748       8           Vitals:    12/30/23 1728   BP: 135/78   Pulse: (!) 108   Patient Position - Orthostatic VS: Lying         Visual Acuity      ED Medications  Medications   sodium chloride 0.9 % bolus 1,000 mL (1,000 mL Intravenous New Bag 12/30/23 1751)   ondansetron (ZOFRAN) injection 4 mg (4 mg Intravenous Given 12/30/23 1752)   acetaminophen (TYLENOL) tablet 650 mg (650 mg Oral Given 12/30/23 1748)       Diagnostic Studies  Results Reviewed       None                   No orders to display              Procedures  Procedures         ED Course  ED Course as of 12/30/23 1847   Sat Dec 30, 2023   1847  Patient reports feeling better after receiving medications, IV fluids in ED.  Patient with normal respiratory effort on repeat exam, ambulating in ED without difficulty.  Started to rest at home, continue using over-the-counter medication as needed, follow-up with primary doctor.                               SBIRT 20yo+      Flowsheet Row Most Recent Value   Initial Alcohol Screen: US AUDIT-C     1. How often do you have a drink containing alcohol? 0 Filed at: 12/30/2023 1723   2. How many drinks containing alcohol do you have on a typical day you are drinking?  0 Filed at: 12/30/2023 1723   3a. Male UNDER 65: How often do you have five or more drinks on one occasion? 0 Filed at: 12/30/2023 1723   3b. FEMALE Any Age, or MALE 65+: How often do you have 4 or more drinks on one occassion? 0 Filed at: 12/30/2023 1723   Audit-C Score 0 Filed at: 12/30/2023 1723   LISBETH: How many times in the past year have you...    Used an illegal drug or used a prescription medication for non-medical reasons? Never Filed at: 12/30/2023 1723                      Medical Decision Making  38-year-old female, recently diagnosed with COVID-19, presents with continued symptoms of bodyaches, nausea.  Differential diagnosis includes dehydration, viral illness, pneumonia among other diagnoses.  Patient appears in no distress, normal respiratory efforts with clear lungs, oxygen saturation normal on room air.  Patient states she feels dehydrated, will give IV fluids and antiemetic medication, continue to monitor in ED and reevaluate.    Amount and/or Complexity of Data Reviewed  External Data Reviewed: notes.    Risk  OTC drugs.  Prescription drug management.             Disposition  Final diagnoses:   COVID-19     Time reflects when diagnosis was documented in both MDM as applicable and the Disposition within this note       Time User Action Codes Description Comment    12/30/2023  6:44 PM Ricky Kim Add [U07.1] COVID-19           ED  Disposition       ED Disposition   Discharge    Condition   Stable    Date/Time   Sat Dec 30, 2023 0526    Comment   Lorrie Thomas[place]otty discharge to home/self care.                   Follow-up Information       Follow up With Specialties Details Why Contact Info    Margarita Walker PA-C Family Medicine Schedule an appointment as soon as possible for a visit   [address] [place] [address]  [phone]              Patient's Medications   Discharge Prescriptions    No medications on file       No discharge procedures on file.    PDMP Review         Value Time User    PDMP Reviewed  Yes 11/6/2023  9:40 AM Margarita Wakler PA-C            ED Provider  Electronically Signed by             Ricky Kim MD  12/30/23 [id number]

## 2024-01-02 ENCOUNTER — OFFICE VISIT (OUTPATIENT)
Dept: FAMILY MEDICINE CLINIC | Facility: CLINIC | Age: 39
End: 2024-01-02

## 2024-01-02 VITALS
OXYGEN SATURATION: 98 % | DIASTOLIC BLOOD PRESSURE: 60 MMHG | WEIGHT: 160.8 LBS | SYSTOLIC BLOOD PRESSURE: 110 MMHG | BODY MASS INDEX: 29.41 KG/M2 | TEMPERATURE: 97.4 F | HEART RATE: 115 BPM

## 2024-01-02 DIAGNOSIS — K59.00 CONSTIPATION, UNSPECIFIED CONSTIPATION TYPE: Chronic | ICD-10-CM

## 2024-01-02 DIAGNOSIS — R68.81 EARLY SATIETY: ICD-10-CM

## 2024-01-02 DIAGNOSIS — G89.29 CHRONIC PELVIC PAIN IN FEMALE: Chronic | ICD-10-CM

## 2024-01-02 DIAGNOSIS — J32.4 CHRONIC PANSINUSITIS: Chronic | ICD-10-CM

## 2024-01-02 DIAGNOSIS — E53.8 VITAMIN B 12 DEFICIENCY: Primary | ICD-10-CM

## 2024-01-02 DIAGNOSIS — R09.81 NASAL CONGESTION: ICD-10-CM

## 2024-01-02 DIAGNOSIS — K21.9 GASTROESOPHAGEAL REFLUX DISEASE WITHOUT ESOPHAGITIS: Chronic | ICD-10-CM

## 2024-01-02 DIAGNOSIS — G43.109 MIGRAINE WITH AURA AND WITHOUT STATUS MIGRAINOSUS, NOT INTRACTABLE: Chronic | ICD-10-CM

## 2024-01-02 DIAGNOSIS — M54.42 CHRONIC BILATERAL LOW BACK PAIN WITH LEFT-SIDED SCIATICA: Chronic | ICD-10-CM

## 2024-01-02 DIAGNOSIS — M54.2 NECK PAIN: Chronic | ICD-10-CM

## 2024-01-02 DIAGNOSIS — G89.29 CHRONIC BILATERAL LOW BACK PAIN WITH LEFT-SIDED SCIATICA: Chronic | ICD-10-CM

## 2024-01-02 DIAGNOSIS — U07.1 COVID-19: ICD-10-CM

## 2024-01-02 DIAGNOSIS — R10.2 CHRONIC PELVIC PAIN IN FEMALE: Chronic | ICD-10-CM

## 2024-01-02 DIAGNOSIS — R14.0 BLOATING: ICD-10-CM

## 2024-01-02 DIAGNOSIS — R11.0 NAUSEA: ICD-10-CM

## 2024-01-02 DIAGNOSIS — R10.84 GENERALIZED ABDOMINAL PAIN: ICD-10-CM

## 2024-01-02 DIAGNOSIS — L29.9 ITCHING OF EAR: ICD-10-CM

## 2024-01-02 DIAGNOSIS — K59.00 ACUTE CONSTIPATION: ICD-10-CM

## 2024-01-02 DIAGNOSIS — E55.9 VITAMIN D DEFICIENCY: Chronic | ICD-10-CM

## 2024-01-02 PROCEDURE — 99215 OFFICE O/P EST HI 40 MIN: CPT | Performed by: PHYSICIAN ASSISTANT

## 2024-01-02 RX ORDER — SODIUM CHLORIDE FOR INHALATION 0.9 %
3 VIAL, NEBULIZER (ML) INHALATION EVERY 6 HOURS PRN
Qty: 60 ML | Refills: 1 | Status: SHIPPED | OUTPATIENT
Start: 2024-01-02

## 2024-01-02 RX ORDER — POLYETHYLENE GLYCOL 3350 17 G/17G
17 POWDER, FOR SOLUTION ORAL DAILY
Qty: 510 G | Refills: 5 | Status: SHIPPED | OUTPATIENT
Start: 2024-01-02 | End: 2024-06-30

## 2024-01-02 RX ORDER — ASCORBIC ACID 500 MG
500 TABLET ORAL DAILY
Qty: 90 TABLET | Refills: 1 | Status: SHIPPED | OUTPATIENT
Start: 2024-01-02

## 2024-01-02 NOTE — ASSESSMENT & PLAN NOTE
- Now established with ENT and has been prescribed DermOtic oil drops.  Patient denies any improvement.  - Reviewed recent ENT notes.  They have recommended for patient to also try olive oil.  - Continue follow-up with ENT as scheduled.

## 2024-01-02 NOTE — ASSESSMENT & PLAN NOTE
- Patient underwent CT abdomen pelvis with contrast (6/10/23).  Results show no evidence of acute abdominopelvic process.  Chronic left hydrosalpinx.  - Pelvic US (7/6/23) revealed: Fluid-filled tubular structure in the left adnexa similar to prior study likely representing hydrosalpinx. Status post hysterectomy. Right ovary was not visualized.  - OBGYN has prescribed amitriptyline and Depo injections for the pain, however, both have not been helpful. Orlissa had been prescribed but prior authorization was denied.   -Patient has now established with [facility] for second opinion and is now s/p CT guided aspiration of left pelvic cystic mass on 10/26/23.   - Continue follow up with [facility] as scheduled.   -Per patient, she believes a lot of her pain is due to her constipation.

## 2024-01-02 NOTE — ASSESSMENT & PLAN NOTE
-Patient has established with neurology and underwent MRI brain.  Further work-up was suggested by MS specialist including work-up for inflammatory causes, ischemia and vasculitis.  -Reviewed MRI C-spine which was normal.  - Patient underwent lumbar puncture on 11/17/23.  - Patient scheduled for follow up MRI brain in 6 months (March 2024).   -Continue sumatriptan 100 mg as needed for abortive therapy.  - Per patient, she has now completed vitamin B12 injections, however recent vitamin B12 level continues to be low.  She does not wish to proceed with further injections.  Will start daily vitamin B12 supplement.  - Continue riboflavin and magnesium for 100 mg daily for preventative purposes.  - Continue follow-up with neurology as scheduled.

## 2024-01-02 NOTE — PATIENT INSTRUCTIONS
[facility]  [phone]       [facility]     [address] [address] [phone]

## 2024-01-02 NOTE — ASSESSMENT & PLAN NOTE
- Patient reports she has tried multiple medications including miralax, senna, colace, metamucil nothing is helpful. She has been taking dulcolax as needed which has been most effective.   -Per patient, she believes most of her abdominal pain is secondary to her constipation.  -Patient is scheduled for colonoscopy/EGD in January 2024.   - Continue follow-up with gastroenterology as scheduled.

## 2024-01-02 NOTE — ASSESSMENT & PLAN NOTE
MRI C spine (10/24/23):  C4-C5: Mild bulge. No significant canal stenosis or foraminal narrowing.  C5-C6: Central disc protrusion. Mild mass effect on the thecal sac. No significant foraminal narrowing.  C6-C7: Central disc protrusion. Mild mass effect on the thecal sac. No significant foraminal narrowing.  Probable atypical hemangioma in the C6 vertebral body.   -Patient has established with orthopedics and has been referred to physical therapy and pain management. Patient does not wish to persue injections for her neck.

## 2024-01-02 NOTE — ASSESSMENT & PLAN NOTE
- Reviewed recent vitamin D level which has improved.  - Continue vitamin D 50,000 units once weekly.

## 2024-01-02 NOTE — ASSESSMENT & PLAN NOTE
- Per patient, she has now completed vitamin B12 injections through neurology.  -Reviewed recent vitamin B12 level which has improved, but is still low.  Patient does not wish to continue with B12 injections.  - Will start once daily vitamin B12 supplement.

## 2024-01-02 NOTE — PROGRESS NOTES
Assessment/Plan:    Vitamin B 12 deficiency  - Per patient, she has now completed vitamin B12 injections through neurology.  -Reviewed recent vitamin B12 level which has improved, but is still low.  Patient does not wish to continue with B12 injections.  - Will start once daily vitamin B12 supplement.    Vitamin D deficiency  - Reviewed recent vitamin D level which has improved.  - Continue vitamin D 50,000 units once weekly.    Chronic pansinusitis  - Patient recently established with ENT and is now scheduled for CT sinuses for further evaluation of chronic sinusitis findings on MRI.  -Possible inflammatory findings and deviated septum contributing to patient's headaches.  - Continue Flonase daily.  -Continue follow-up with ENT as scheduled.    Itching of ear  - Now established with ENT and has been prescribed DermOtic oil drops.  Patient denies any improvement.  - Reviewed recent ENT notes.  They have recommended for patient to also try olive oil.  - Continue follow-up with ENT as scheduled.    Migraine headache with aura  -Patient has established with neurology and underwent MRI brain.  Further work-up was suggested by MS specialist including work-up for inflammatory causes, ischemia and vasculitis.  -Reviewed MRI C-spine which was normal.  - Patient underwent lumbar puncture on 11/17/23.  - Patient scheduled for follow up MRI brain in 6 months (March 2024).   -Continue sumatriptan 100 mg as needed for abortive therapy.  - Per patient, she has now completed vitamin B12 injections, however recent vitamin B12 level continues to be low.  She does not wish to proceed with further injections.  Will start daily vitamin B12 supplement.  - Continue riboflavin and magnesium for 100 mg daily for preventative purposes.  - Continue follow-up with neurology as scheduled.    Chronic pelvic pain in female  - Patient underwent CT abdomen pelvis with contrast (6/10/23).  Results show no evidence of acute abdominopelvic process.   Chronic left hydrosalpinx.  - Pelvic US (7/6/23) revealed: Fluid-filled tubular structure in the left adnexa similar to prior study likely representing hydrosalpinx. Status post hysterectomy. Right ovary was not visualized.  - OBGYN has prescribed amitriptyline and Depo injections for the pain, however, both have not been helpful. Orlissa had been prescribed but prior authorization was denied.   -Patient has now established with [facility] for second opinion and is now s/p CT guided aspiration of left pelvic cystic mass on 10/26/23.   - Continue follow up with [facility] as scheduled.   -Per patient, she believes a lot of her pain is due to her constipation.    Gastroesophageal reflux disease  - Stable.  Continue Protonix 20 mg daily.  - Continue to avoid trigger foods.  - Patient is scheduled for EGD/colonoscopy in January 2024.    Chronic bilateral low back pain with left-sided sciatica  - Lumbar spine x-ray which shows mild scoliosis, otherwise unremarkable study.  - Advised to apply ice/heating pad/topical therapies as needed to affected area.  - Continue tizanidine 4 mg, to be taken twice daily as needed.  - Continue alternating ibuprofen/Tylenol as needed for pain.  - Patient has been taking percocet very sparingly.   - Assessed possible risk for misuse, abuse, or addiction based on patient's family and social history.  - Educated patient on possible side effects and risks with taking this medication including risks of abuse, misuse, and addiction.  - PDMP reviewed.  No red flags noted.  - If symptoms persist, would recommend referral to physical therapy.    Constipation  - Patient reports she has tried multiple medications including miralax, senna, colace, metamucil nothing is helpful. She has been taking dulcolax as needed which has been most effective.   -Per patient, she believes most of her abdominal pain is secondary to her constipation.  -Patient is scheduled for colonoscopy/EGD in January 2024.   -  Continue follow-up with gastroenterology as scheduled.    Neck pain  MRI C spine (10/24/23):  C4-C5: Mild bulge. No significant canal stenosis or foraminal narrowing.  C5-C6: Central disc protrusion. Mild mass effect on the thecal sac. No significant foraminal narrowing.  C6-C7: Central disc protrusion. Mild mass effect on the thecal sac. No significant foraminal narrowing.  Probable atypical hemangioma in the C6 vertebral body.   -Patient has established with orthopedics and has been referred to physical therapy and pain management. Patient does not wish to persue injections for her neck.       Diagnoses and all orders for this visit:    Vitamin B 12 deficiency  -     cyanocobalamin (VITAMIN B-12) 1000 MCG tablet; Take 1 tablet (1,000 mcg total) by mouth daily  -     ascorbic acid (VITAMIN C) 500 MG tablet; Take 1 tablet (500 mg total) by mouth daily    Generalized abdominal pain  -     polyethylene glycol (MIRALAX) 17 g packet; Take 17 g by mouth daily    Acute constipation  -     Sennosides 15 MG CHEW; Chew 1 tablet (15 mg total) 2 (two) times a day as needed (constipation)  -     polyethylene glycol (MIRALAX) 17 g packet; Take 17 g by mouth daily    Bloating    Early satiety    Nausea    Vitamin D deficiency    Chronic pansinusitis    Itching of ear    Migraine with aura and without status migrainosus, not intractable    Chronic pelvic pain in female    Gastroesophageal reflux disease without esophagitis    Chronic bilateral low back pain with left-sided sciatica    Constipation, unspecified constipation type    Neck pain    Nasal congestion  -     sodium chloride 0.9 % nebulizer solution; Take 3 mL by nebulization every 6 (six) hours as needed for wheezing or shortness of breath (congestion)    COVID-19  -     sodium chloride 0.9 % nebulizer solution; Take 3 mL by nebulization every 6 (six) hours as needed for wheezing or shortness of breath (congestion)    Other orders  -     Adult Nebulizer Package        All  of patients questions were answered. Patient understands and agrees with the above plan.     Return in about 4 weeks (around 1/30/2024) for Next scheduled follow up constipation.    Margarita Walker PA-C  01/02/24  [facility]          Subjective:     Patient ID: Lorrie Jones  is a 38 y.o. female with known PMH of migraines, GERD, neck pain, chronic pansinusitis, constipation, chronic back pain, migraines, vitamin D deficiency, vitamin B12 deficiency who presents today in office for lab results follow-up.     - Patient is a 38 y.o. female who presents today for lab results follow-up.  Patient notes she had tested positive for COVID-19 on 12/26/2023.  Patient notes her symptoms have now much improved but she does continue with slight congestion and cough.  Patient did report to the emergency department twice-on 12/26/2023 and 12/30/2023 due to her symptoms.    -Today, patient notes she believes a lot of her abdominal pain is secondary to constipation.  Patient notes when she does have a bowel movement, the pain improves.  Patient notes she would like to try the chocolate ex-lax chews.     -Patient notes she did try the oil drops that ENT prescribed for the itchiness of her ear, but they have not been helpful.  Patient notes she would like to receive a second opinion from a different ENT doctor.      The following portions of the patient's history were reviewed and updated as appropriate: allergies, current medications, past family history, past medical history, past social history, past surgical history, and problem list.        Review of Systems   Constitutional:  Negative for chills and fever.   HENT:  Positive for congestion. Negative for ear pain and sore throat.         Itchiness of right ear   Eyes:  Negative for pain.   Respiratory:  Positive for cough. Negative for chest tightness, shortness of breath and wheezing.    Cardiovascular:  Negative for chest pain and palpitations.   Gastrointestinal:   Positive for abdominal pain and constipation. Negative for diarrhea, nausea and vomiting.   Genitourinary:  Positive for pelvic pain. Negative for difficulty urinating and dysuria.   Musculoskeletal:  Positive for arthralgias, back pain and gait problem.   Skin:  Negative for rash.   Neurological:  Positive for headaches. Negative for dizziness and numbness.            Tobacco Cessation Counseling: Tobacco cessation counseling was provided. The patient is sincerely urged to quit consumption of tobacco. She is not ready to quit tobacco. Medication options and side effects of medication discussed. Patient refused medication.           Objective:   Vitals:    01/02/24 0855   BP: 110/60   BP Location: Left arm   Patient Position: Sitting   Cuff Size: Standard   Pulse: (!) 115   Temp: (!) 97.4 °F (36.3 °C)   TempSrc: Temporal   SpO2: 98%   Weight: 72.9 kg (160 lb 12.8 oz)         Physical Exam  Vitals and nursing note reviewed.   Constitutional:       General: She is not in acute distress.     Appearance: She is well-developed.   HENT:      Head: Normocephalic and atraumatic.      Right Ear: Tympanic membrane and external ear normal.      Left Ear: Tympanic membrane and external ear normal.      Nose: Congestion present.      Mouth/Throat:      Mouth: Mucous membranes are moist.      Pharynx: No posterior oropharyngeal erythema.   Eyes:      Conjunctiva/sclera: Conjunctivae normal.   Cardiovascular:      Rate and Rhythm: Regular rhythm. Tachycardia present.      Pulses: Normal pulses.      Heart sounds: Normal heart sounds.   Pulmonary:      Effort: Pulmonary effort is normal. No respiratory distress.      Breath sounds: Normal breath sounds. No wheezing.   Abdominal:      General: Bowel sounds are normal.      Palpations: Abdomen is soft.      Tenderness: There is no abdominal tenderness. There is no guarding or rebound.   Musculoskeletal:      Cervical back: Normal range of motion and neck supple.      Lumbar back:  Tenderness present. No swelling. Normal range of motion. Negative right straight leg raise test and negative left straight leg raise test.   Skin:     General: Skin is warm and dry.   Neurological:      Mental Status: She is alert and oriented to person, place, and time.   Psychiatric:         Behavior: Behavior normal.

## 2024-01-03 LAB
DME PARACHUTE DELIVERY DATE ACTUAL: NORMAL
DME PARACHUTE DELIVERY DATE REQUESTED: NORMAL
DME PARACHUTE ITEM DESCRIPTION: NORMAL
DME PARACHUTE ORDER STATUS: NORMAL
DME PARACHUTE SUPPLIER NAME: NORMAL
DME PARACHUTE SUPPLIER PHONE: NORMAL

## 2024-01-08 ENCOUNTER — TELEPHONE (OUTPATIENT)
Dept: GASTROENTEROLOGY | Facility: MEDICAL CENTER | Age: 39
End: 2024-01-08

## 2024-01-11 ENCOUNTER — ANESTHESIA (OUTPATIENT)
Dept: GASTROENTEROLOGY | Facility: HOSPITAL | Age: 39
End: 2024-01-11

## 2024-01-11 ENCOUNTER — HOSPITAL ENCOUNTER (OUTPATIENT)
Dept: GASTROENTEROLOGY | Facility: HOSPITAL | Age: 39
Setting detail: OUTPATIENT SURGERY
End: 2024-01-11
Attending: INTERNAL MEDICINE
Payer: COMMERCIAL

## 2024-01-11 ENCOUNTER — ANESTHESIA EVENT (OUTPATIENT)
Dept: GASTROENTEROLOGY | Facility: HOSPITAL | Age: 39
End: 2024-01-11

## 2024-01-11 VITALS
SYSTOLIC BLOOD PRESSURE: 100 MMHG | HEART RATE: 78 BPM | RESPIRATION RATE: 18 BRPM | BODY MASS INDEX: 29.44 KG/M2 | DIASTOLIC BLOOD PRESSURE: 64 MMHG | OXYGEN SATURATION: 99 % | HEIGHT: 62 IN | WEIGHT: 160 LBS | TEMPERATURE: 97.7 F

## 2024-01-11 DIAGNOSIS — R14.0 BLOATING: ICD-10-CM

## 2024-01-11 DIAGNOSIS — K59.00 ACUTE CONSTIPATION: ICD-10-CM

## 2024-01-11 DIAGNOSIS — R68.81 EARLY SATIETY: ICD-10-CM

## 2024-01-11 DIAGNOSIS — R10.84 GENERALIZED ABDOMINAL PAIN: ICD-10-CM

## 2024-01-11 DIAGNOSIS — R11.0 NAUSEA: ICD-10-CM

## 2024-01-11 PROCEDURE — 43239 EGD BIOPSY SINGLE/MULTIPLE: CPT | Performed by: STUDENT IN AN ORGANIZED HEALTH CARE EDUCATION/TRAINING PROGRAM

## 2024-01-11 PROCEDURE — 45378 DIAGNOSTIC COLONOSCOPY: CPT | Performed by: STUDENT IN AN ORGANIZED HEALTH CARE EDUCATION/TRAINING PROGRAM

## 2024-01-11 PROCEDURE — 88305 TISSUE EXAM BY PATHOLOGIST: CPT | Performed by: STUDENT IN AN ORGANIZED HEALTH CARE EDUCATION/TRAINING PROGRAM

## 2024-01-11 RX ORDER — PROPOFOL 10 MG/ML
INJECTION, EMULSION INTRAVENOUS AS NEEDED
Status: DISCONTINUED | OUTPATIENT
Start: 2024-01-11 | End: 2024-01-11

## 2024-01-11 RX ORDER — LIDOCAINE HYDROCHLORIDE 20 MG/ML
INJECTION, SOLUTION EPIDURAL; INFILTRATION; INTRACAUDAL; PERINEURAL AS NEEDED
Status: DISCONTINUED | OUTPATIENT
Start: 2024-01-11 | End: 2024-01-11

## 2024-01-11 RX ORDER — SODIUM CHLORIDE 9 MG/ML
125 INJECTION, SOLUTION INTRAVENOUS CONTINUOUS
Status: DISCONTINUED | OUTPATIENT
Start: 2024-01-11 | End: 2024-01-15 | Stop reason: HOSPADM

## 2024-01-11 RX ADMIN — PROPOFOL 50 MG: 10 INJECTION, EMULSION INTRAVENOUS at 10:21

## 2024-01-11 RX ADMIN — PROPOFOL 50 MG: 10 INJECTION, EMULSION INTRAVENOUS at 10:10

## 2024-01-11 RX ADMIN — PROPOFOL 50 MG: 10 INJECTION, EMULSION INTRAVENOUS at 10:14

## 2024-01-11 RX ADMIN — PROPOFOL 100 MG: 10 INJECTION, EMULSION INTRAVENOUS at 10:09

## 2024-01-11 RX ADMIN — LIDOCAINE HYDROCHLORIDE 100 MG: 20 INJECTION, SOLUTION EPIDURAL; INFILTRATION; INTRACAUDAL at 10:09

## 2024-01-11 RX ADMIN — PROPOFOL 50 MG: 10 INJECTION, EMULSION INTRAVENOUS at 10:25

## 2024-01-11 RX ADMIN — SODIUM CHLORIDE 125 ML/HR: 0.9 INJECTION, SOLUTION INTRAVENOUS at 08:53

## 2024-01-11 RX ADMIN — PROPOFOL 50 MG: 10 INJECTION, EMULSION INTRAVENOUS at 10:12

## 2024-01-11 NOTE — ANESTHESIA PREPROCEDURE EVALUATION
Procedure:  COLONOSCOPY  EGD    Relevant Problems   CARDIO   (+) Migraine headache with aura      GI/HEPATIC   (+) Gastroesophageal reflux disease      MUSCULOSKELETAL   (+) Chronic bilateral low back pain with left-sided sciatica      NEURO/PSYCH   (+) Chronic bilateral low back pain with left-sided sciatica   (+) Chronic pelvic pain in female   (+) Migraine headache with aura      Other   (+) Tobacco use        Physical Exam    Airway    Mallampati score: II  TM Distance: >3 FB  Neck ROM: full     Dental   No notable dental hx     Cardiovascular  Rhythm: regular, Rate: normal, Cardiovascular exam normal    Pulmonary  Pulmonary exam normal Breath sounds clear to auscultation    Other Findings  post-pubertal.      Anesthesia Plan  ASA Score- 2     Anesthesia Type- general with ASA Monitors.         Additional Monitors:     Airway Plan:            Plan Factors-    Chart reviewed.    Patient summary reviewed.    Patient is a current smoker.  Patient instructed to abstain from smoking on day of procedure. Patient smoked on day of surgery.            Induction-     Postoperative Plan-     Informed Consent- Anesthetic plan and risks discussed with patient.

## 2024-01-11 NOTE — H&P
History and Physical - [facility]  Lorrie Jones 38 y.o. female MRN: [id number]                  HPI: Lorrie Jones is a 38 y.o. year old female who presents for nausea, early satiety, change in bowel habits.      REVIEW OF SYSTEMS: Per the HPI, and otherwise unremarkable.    Historical Information   Past Medical History:   Diagnosis Date    Abnormal Pap smear of cervix     2015 cryosurgery; 6/2019 NILM pap/neg HPV    Allergic     Depression     Headache(784.0)     Migraine with aura      Past Surgical History:   Procedure Laterality Date    CHOLECYSTECTOMY LAPAROSCOPIC N/A 10/27/2020    Procedure: CHOLECYSTECTOMY LAPAROSCOPIC;  Surgeon: Lina Narayan MD;  Location:  MAIN OR;  Service: General    FL LUMBAR PUNCTURE DIAGNOSTIC  11/17/2023    GYNECOLOGIC CRYOSURGERY  2015    HYSTERECTOMY  2016    supracervical    LAPAROSCOPY  2018    AK LAPAROSCOPY W/LYSIS OF ADHESIONS N/A 03/03/2020    Procedure: L.O.A.;  Surgeon: Noy Paz MD;  Location: [facility];  Service: Gynecology    AK LAPS ABD PRTM&OMENTUM DX W/WO SPEC BR/WA SPX N/A 08/09/2019    Procedure: LAPAROSCOPY DIAGNOSTIC; LYSIS OF ADHESIONS;  Surgeon: Abebe Walls MD;  Location: [facility];  Service: Gynecology    TUBAL LIGATION       Social History   Social History     Substance and Sexual Activity   Alcohol Use Never     Social History     Substance and Sexual Activity   Drug Use Never     Social History     Tobacco Use   Smoking Status Every Day    Current packs/day: 0.50    Average packs/day: 0.5 packs/day for 15.0 years (7.5 ttl pk-yrs)    Types: Cigarettes    Passive exposure: Current   Smokeless Tobacco Never     Family History   Problem Relation Age of Onset    Cancer Paternal Grandmother         uterine    Cancer Paternal Aunt         uterine     Migraines Mother     Arthritis Mother     Hypertension Father     Breast cancer Neg Hx     Colon cancer Neg Hx     Ovarian cancer Neg Hx   "      Meds/Allergies       Current Outpatient Medications:     bisacodyl (DULCOLAX) 5 mg EC tablet    nicotine (NICODERM CQ) 14 mg/24hr TD 24 hr patch    polyethylene glycol (MIRALAX) 17 g packet    Sennosides 15 MG CHEW    ascorbic acid (VITAMIN C) 500 MG tablet    cyanocobalamin (VITAMIN B-12) 1000 MCG tablet    dexamethasone (DECADRON) 2 mg tablet    Diclofenac Sodium (VOLTAREN) 1 %    ergocalciferol (VITAMIN D2) 50,000 units    fluocinolone acetonide (DermOtic) 0.01 % otic oil    fluticasone (FLONASE) 50 mcg/act nasal spray    guaiFENesin (ROBITUSSIN) 100 MG/5ML oral liquid    ketorolac (TORADOL) 10 mg tablet    lidocaine (LIDODERM) 5 %    methocarbamol (ROBAXIN) 500 mg tablet    naproxen (EC NAPROSYN) 500 MG EC tablet    nystatin-triamcinolone (MYCOLOG-II) cream    oxyCODONE-acetaminophen (Percocet) 5-325 mg per tablet    pantoprazole (PROTONIX) 20 mg tablet    polyethylene glycol (GOLYTELY) 4000 mL solution    prochlorperazine (COMPAZINE) 10 mg tablet    senna-docusate sodium (SENOKOT-S) 8.6-50 mg per tablet    sodium chloride 0.9 % nebulizer solution    SUMAtriptan (IMITREX) 100 mg tablet    Current Facility-Administered Medications:     sodium chloride 0.9 % infusion, 125 mL/hr, Intravenous, Continuous, Continue from Pre-op at 01/11/24 0929    Allergies   Allergen Reactions    Shellfish-Derived Products - Food Allergy Itching and Swelling     \"seafood \"    Ibuprofen Other (See Comments)     Patient states she gets hematoma    Pregabalin Anxiety       Objective     /67   Pulse 83   Temp 97.7 °F (36.5 °C) (Temporal)   Resp 12   Ht 5' 2\" (1.575 m)   Wt 72.6 kg (160 lb)   SpO2 97%   BMI 29.26 kg/m²       PHYSICAL EXAM    Gen: NAD  Head: NCAT  CV: RRR  CHEST: Clear  ABD: soft, NT/ND  EXT: no edema      ASSESSMENT/PLAN:  This is a 38 y.o. year old female here for EGD and colonoscopy, and she is stable and optimized for her procedure.        "

## 2024-01-11 NOTE — ANESTHESIA POSTPROCEDURE EVALUATION
Post-Op Assessment Note    CV Status:  Stable    Pain management: adequate       Mental Status:  Alert and awake   Hydration Status:  Euvolemic   PONV Controlled:  Controlled   Airway Patency:  Patent     Post Op Vitals Reviewed: Yes      Staff: Anesthesiologist               /64 (01/11/24 1051)    Temp      Pulse 78 (01/11/24 1051)   Resp 18 (01/11/24 1051)    SpO2 99 % (01/11/24 1051)

## 2024-01-12 ENCOUNTER — TELEPHONE (OUTPATIENT)
Dept: GASTROENTEROLOGY | Facility: CLINIC | Age: 39
End: 2024-01-12

## 2024-01-12 NOTE — TELEPHONE ENCOUNTER
----- Message from Socorro Murdock MD sent at 1/12/2024 11:42 AM EST -----  Regarding: Appointment needed  Hello,  Please schedule follow up OV at [place]. She had EGD and colonoscopy yesterday and does not have a follow up visit.  She's previously seen Dr. Stroud but could also be seen by an AP.   Thanks  [place]

## 2024-01-14 ENCOUNTER — HOSPITAL ENCOUNTER (EMERGENCY)
Facility: HOSPITAL | Age: 39
Discharge: HOME | End: 2024-01-14
Attending: EMERGENCY MEDICINE
Payer: COMMERCIAL

## 2024-01-14 VITALS
SYSTOLIC BLOOD PRESSURE: 129 MMHG | WEIGHT: 159.9 LBS | OXYGEN SATURATION: 98 % | TEMPERATURE: 100.2 F | BODY MASS INDEX: 29.25 KG/M2 | DIASTOLIC BLOOD PRESSURE: 81 MMHG | RESPIRATION RATE: 22 BRPM | HEART RATE: 90 BPM

## 2024-01-14 DIAGNOSIS — T78.40XA ALLERGIC REACTION, INITIAL ENCOUNTER: ICD-10-CM

## 2024-01-14 DIAGNOSIS — R21 RASH AND NONSPECIFIC SKIN ERUPTION: Primary | ICD-10-CM

## 2024-01-14 PROCEDURE — 99284 EMERGENCY DEPT VISIT MOD MDM: CPT | Performed by: EMERGENCY MEDICINE

## 2024-01-14 PROCEDURE — 99283 EMERGENCY DEPT VISIT LOW MDM: CPT

## 2024-01-14 RX ORDER — DIPHENHYDRAMINE HCL 25 MG
25 TABLET ORAL EVERY 6 HOURS
Qty: 20 TABLET | Refills: 0 | Status: SHIPPED | OUTPATIENT
Start: 2024-01-14 | End: 2024-01-19

## 2024-01-14 RX ORDER — DEXAMETHASONE SODIUM PHOSPHATE 10 MG/ML
8 INJECTION, SOLUTION INTRAMUSCULAR; INTRAVENOUS ONCE
Status: DISCONTINUED | OUTPATIENT
Start: 2024-01-14 | End: 2024-01-14

## 2024-01-14 RX ORDER — FAMOTIDINE 20 MG/1
20 TABLET, FILM COATED ORAL 2 TIMES DAILY
Qty: 10 TABLET | Refills: 0 | Status: SHIPPED | OUTPATIENT
Start: 2024-01-14 | End: 2024-01-19

## 2024-01-14 RX ORDER — PREDNISONE 20 MG/1
50 TABLET ORAL DAILY
Qty: 15 TABLET | Refills: 0 | Status: SHIPPED | OUTPATIENT
Start: 2024-01-14 | End: 2024-01-19

## 2024-01-14 RX ORDER — FAMOTIDINE 20 MG/1
20 TABLET, FILM COATED ORAL ONCE
Status: COMPLETED | OUTPATIENT
Start: 2024-01-14 | End: 2024-01-14

## 2024-01-14 RX ORDER — DIPHENHYDRAMINE HYDROCHLORIDE 50 MG/ML
50 INJECTION INTRAMUSCULAR; INTRAVENOUS ONCE
Status: DISCONTINUED | OUTPATIENT
Start: 2024-01-14 | End: 2024-01-14

## 2024-01-14 RX ORDER — DIPHENHYDRAMINE HCL 25 MG
50 TABLET ORAL ONCE
Status: COMPLETED | OUTPATIENT
Start: 2024-01-14 | End: 2024-01-14

## 2024-01-14 RX ADMIN — FAMOTIDINE 20 MG: 20 TABLET ORAL at 02:29

## 2024-01-14 RX ADMIN — DEXAMETHASONE SODIUM PHOSPHATE 10 MG: 10 INJECTION, SOLUTION INTRAMUSCULAR; INTRAVENOUS at 02:30

## 2024-01-14 RX ADMIN — DIPHENHYDRAMINE HYDROCHLORIDE 50 MG: 25 TABLET ORAL at 02:29

## 2024-01-14 NOTE — ED PROVIDER NOTES
History  Chief Complaint   Patient presents with    Rash     C/o of abd, chest, back, and feet rash since eating cod fish in the afternoon. Took benadryl an hour pta      HPI    39 yo F presents to ed for eval of pruritus. Full body. Started several hours ago. Took benadryl 50 mg hour pta. Minimal relief. Started after eating cod. Allergic to shellfish.     Any rashes and if so, where? No hives  Time of symptom onset? Several hours ago  Any known allergies? shellfish  Any changes to soaps / detergents / cleaning products? no  Any tongue / lip swelling? no  Any SOB or difficulty breathing? no  Any history of intubations / ICU admissions? no  Any other system involvement (abodminal, respiratory, circulatory) and associated symptoms? no      Prior to Admission Medications   Prescriptions Last Dose Informant Patient Reported? Taking?   Diclofenac Sodium (VOLTAREN) 1 %   No No   Sig: Apply 2 g topically 4 (four) times a day   SUMAtriptan (IMITREX) 100 mg tablet   No No   Sig: Take 1 tablet (100 mg total) by mouth daily as needed for migraine   Sennosides 15 MG CHEW   No No   Sig: Chew 1 tablet (15 mg total) 2 (two) times a day as needed (constipation)   ascorbic acid (VITAMIN C) 500 MG tablet   No No   Sig: Take 1 tablet (500 mg total) by mouth daily   bisacodyl (DULCOLAX) 5 mg EC tablet   No No   Sig: Take 2 tablets (10 mg total) by mouth 2 (two) times a day as needed for constipation (constipation) Colonoscopy prep   cyanocobalamin (VITAMIN B-12) 1000 MCG tablet   No No   Sig: Take 1 tablet (1,000 mcg total) by mouth daily   dexamethasone (DECADRON) 2 mg tablet   No No   Sig: Take 1 tablet (2 mg total) by mouth daily with breakfast   Patient not taking: Reported on 12/30/2023   ergocalciferol (VITAMIN D2) 50,000 units   No No   Sig: TAKE 1 CAPSULE BY MOUTH ONCE A WEEK   fluocinolone acetonide (DermOtic) 0.01 % otic oil   No No   Sig: Administer 3 drops into both ears daily   Patient not taking: Reported on 11/16/2023    fluticasone (FLONASE) 50 mcg/act nasal spray  Self No No   Si spray into each nostril 2 (two) times a day   guaiFENesin (ROBITUSSIN) 100 MG/5ML oral liquid  Self No No   Sig: Take 10 mL (200 mg total) by mouth 3 (three) times a day as needed for cough   Patient not taking: Reported on 2023   ketorolac (TORADOL) 10 mg tablet  Self No No   Sig: Take 1 tablet (10 mg total) by mouth every 6 (six) hours as needed for moderate pain   Patient not taking: Reported on 2023   lidocaine (LIDODERM) 5 %   No No   Sig: Apply 1 patch topically over 12 hours every 24 hours Remove & Discard patch within 12 hours or as directed by MD   methocarbamol (ROBAXIN) 500 mg tablet   No No   Sig: Take 1 tablet (500 mg total) by mouth 2 (two) times a day   naproxen (EC NAPROSYN) 500 MG EC tablet  Self Yes No   Sig: Take 500 mg by mouth if needed for mild pain   nicotine (NICODERM CQ) 14 mg/24hr TD 24 hr patch  Self No No   Sig: Place 1 patch on the skin over 24 hours every 24 hours   nystatin-triamcinolone (MYCOLOG-II) cream  Self No No   Sig: Apply topically 2 (two) times a day   oxyCODONE-acetaminophen (Percocet) 5-325 mg per tablet   No No   Sig: Take 1 tablet by mouth 2 (two) times a day as needed for moderate pain Max Daily Amount: 2 tablets   Patient not taking: Reported on 2023   pantoprazole (PROTONIX) 20 mg tablet   No No   Sig: Take 1 tablet (20 mg total) by mouth daily   polyethylene glycol (MIRALAX) 17 g packet   No No   Sig: Take 17 g by mouth daily   prochlorperazine (COMPAZINE) 10 mg tablet  Self No No   Sig: Take 1 tablet (10 mg total) by mouth every 6 (six) hours as needed for nausea or vomiting   Patient not taking: Reported on 2023   senna-docusate sodium (SENOKOT-S) 8.6-50 mg per tablet  Self No No   Sig: Take 1 tablet by mouth daily   Patient not taking: Reported on 2023   sodium chloride 0.9 % nebulizer solution   No No   Sig: Take 3 mL by nebulization every 6 (six) hours as needed for  wheezing or shortness of breath (congestion)      Facility-Administered Medications: None       Past Medical History:   Diagnosis Date    Abnormal Pap smear of cervix     2015 cryosurgery; 6/2019 NILM pap/neg HPV    Allergic     Depression     Headache(784.0)     Migraine with aura        Past Surgical History:   Procedure Laterality Date    CHOLECYSTECTOMY LAPAROSCOPIC N/A 10/27/2020    Procedure: CHOLECYSTECTOMY LAPAROSCOPIC;  Surgeon: Lina Narayan MD;  Location:  MAIN OR;  Service: General    FL LUMBAR PUNCTURE DIAGNOSTIC  11/17/2023    GYNECOLOGIC CRYOSURGERY  2015    HYSTERECTOMY  2016    supracervical    LAPAROSCOPY  2018    NH LAPAROSCOPY W/LYSIS OF ADHESIONS N/A 03/03/2020    Procedure: L.O.A.;  Surgeon: Noy Paz MD;  Location: [facility];  Service: Gynecology    NH LAPS ABD PRTM&OMENTUM DX W/WO SPEC BR/WA SPX N/A 08/09/2019    Procedure: LAPAROSCOPY DIAGNOSTIC; LYSIS OF ADHESIONS;  Surgeon: Abebe Walls MD;  Location: [facility];  Service: Gynecology    TUBAL LIGATION         Family History   Problem Relation Age of Onset    Cancer Paternal Grandmother         uterine    Cancer Paternal Aunt         uterine     Migraines Mother     Arthritis Mother     Hypertension Father     Breast cancer Neg Hx     Colon cancer Neg Hx     Ovarian cancer Neg Hx      I have reviewed and agree with the history as documented.    E-Cigarette/Vaping    E-Cigarette Use Never User      E-Cigarette/Vaping Substances     Social History     Tobacco Use    Smoking status: Every Day     Current packs/day: 0.50     Average packs/day: 0.5 packs/day for 15.0 years (7.5 ttl pk-yrs)     Types: Cigarettes     Passive exposure: Current    Smokeless tobacco: Never   Vaping Use    Vaping status: Never Used   Substance Use Topics    Alcohol use: Never    Drug use: Never       Review of Systems   Constitutional:  Negative for chills, fatigue and fever.   HENT:  Negative for sore throat.    Eyes:  Negative for  redness and visual disturbance.   Respiratory:  Negative for cough and shortness of breath.    Cardiovascular:  Negative for chest pain.   Gastrointestinal:  Negative for abdominal pain, diarrhea and nausea.   Genitourinary:  Negative for difficulty urinating, dysuria and pelvic pain.   Musculoskeletal:  Negative for back pain.   Skin:  Positive for rash.   Neurological:  Negative for syncope, weakness and headaches.   All other systems reviewed and are negative.      Physical Exam  Physical Exam  Vitals and nursing note reviewed.   Constitutional:       General: She is not in acute distress.  HENT:      Head: Normocephalic and atraumatic.      Right Ear: External ear normal.      Left Ear: External ear normal.      Mouth/Throat:      Mouth: Mucous membranes are moist.      Pharynx: Oropharynx is clear. No oropharyngeal exudate or posterior oropharyngeal erythema.   Eyes:      Extraocular Movements: Extraocular movements intact.      Conjunctiva/sclera: Conjunctivae normal.   Cardiovascular:      Rate and Rhythm: Normal rate and regular rhythm.      Heart sounds: Normal heart sounds.   Pulmonary:      Effort: Pulmonary effort is normal. No respiratory distress.      Breath sounds: Normal breath sounds.   Abdominal:      General: Abdomen is flat.      Tenderness: There is no abdominal tenderness.   Musculoskeletal:         General: Normal range of motion.      Cervical back: Normal range of motion.   Skin:     General: Skin is warm and dry.      Comments: Excoriations throughout torso  No hives   Neurological:      Mental Status: She is alert and oriented to person, place, and time.      Cranial Nerves: No cranial nerve deficit.      Motor: No abnormal muscle tone.      Coordination: Coordination normal.         Vital Signs  ED Triage Vitals [01/14/24 0140]   Temperature Pulse Respirations Blood Pressure SpO2   100.2 °F (37.9 °C) 90 22 129/81 98 %      Temp Source Heart Rate Source Patient Position - Orthostatic VS  BP Location FiO2 (%)   Tympanic Monitor Sitting Left arm --      Pain Score       --           Vitals:    01/14/24 0140   BP: 129/81   Pulse: 90   Patient Position - Orthostatic VS: Sitting         Visual Acuity      ED Medications  Medications   famotidine (PEPCID) tablet 20 mg (20 mg Oral Given 1/14/24 0229)   dexamethasone oral liquid 10 mg 1 mL (10 mg Oral Given 1/14/24 0230)   diphenhydrAMINE (BENADRYL) tablet 50 mg (50 mg Oral Given 1/14/24 0229)       Diagnostic Studies  Results Reviewed       None                   No orders to display              Procedures  Procedures         ED Course  ED Course as of 01/14/24 0700   Sun Jan 14, 2024   0329 Patient on re eval has resolution of symptoms               Medical Decision Making  Risk  OTC drugs.  Prescription drug management.      History Provided by patient. Differential considered allergic reaction, no signs of anaphylaxis, only one system involvement. Consideration of tests: no testing required, simple allergic reaction at this time, will treat with h1 h2 blockers steroids and re evaluate to ensure no progression of symptoms or recurrence.     After on hour of observation, no tongue lip swelling no sob no wheezing, resolution of rash. Will continue 5 day course of meds. pcp and allergy follow up.    The patient was instructed to follow up as documented. Strict return precautions were discussed with the patient and the patient was instructed to return to the emergency department immediately if symptoms worsen. The patient/patient family member acknowledged and were in agreement with plan.            Disposition  Final diagnoses:   Rash and nonspecific skin eruption   Allergic reaction, initial encounter     Time reflects when diagnosis was documented in both MDM as applicable and the Disposition within this note       Time User Action Codes Description Comment    1/14/2024  3:30 AM Chadwick Amaro Add [R21] Rash and nonspecific skin eruption     1/14/2024   3:30 AM Chadwick Amaro [T78.40XA] Allergic reaction, initial encounter           ED Disposition       ED Disposition   Discharge    Condition   Stable    Date/Time   Sun Jan 14, 2024  3:30 AM    Comment   Lorrie Jones discharge to home/self care.                   Follow-up Information       Follow up With Specialties Details Why Contact Info Additional Information    Margarita Walker PA-C Family Medicine Schedule an appointment as soon as possible for a visit in 3 days For follow up regarding your symptoms and recheck [address] [facility] [address]  [facility] [id number]  [phone]       [facility] Allergy Schedule an appointment as soon as possible for a visit in 3 days For follow up regarding your symptoms and recheck [address] [place] [address] [address]  [facility] [address] [phone] [facility], [address] [place] [address], [place] [address]            Discharge Medication List as of 1/14/2024  3:32 AM        START taking these medications    Details   diphenhydrAMINE (BENADRYL) 25 mg tablet Take 1 tablet (25 mg total) by mouth every 6 (six) hours for 5 days, Starting Sun 1/14/2024, Until Fri 1/19/2024, Normal      famotidine (PEPCID) 20 mg tablet Take 1 tablet (20 mg total) by mouth 2 (two) times a day for 5 days, Starting Sun 1/14/2024, Until Fri 1/19/2024, Normal      predniSONE 20 mg tablet Take 2.5 tablets (50 mg total) by mouth daily for 5 days, Starting Sun 1/14/2024, Until Fri 1/19/2024, Normal           CONTINUE these medications which have NOT CHANGED    Details   ascorbic acid (VITAMIN C) 500 MG tablet Take 1 tablet (500 mg total) by mouth daily, Starting Tue 1/2/2024, Normal      bisacodyl (DULCOLAX) 5 mg EC tablet Take 2 tablets (10 mg total) by mouth 2 (two) times a day as needed for constipation (constipation) Colonoscopy prep, Starting Mon 11/6/2023, Normal       cyanocobalamin (VITAMIN B-12) 1000 MCG tablet Take 1 tablet (1,000 mcg total) by mouth daily, Starting Tue 1/2/2024, Normal      dexamethasone (DECADRON) 2 mg tablet Take 1 tablet (2 mg total) by mouth daily with breakfast, Starting Mon 11/20/2023, Normal      Diclofenac Sodium (VOLTAREN) 1 % Apply 2 g topically 4 (four) times a day, Starting Wed 11/22/2023, Normal      ergocalciferol (VITAMIN D2) 50,000 units TAKE 1 CAPSULE BY MOUTH ONCE A WEEK, Starting Fri 12/29/2023, Normal      fluocinolone acetonide (DermOtic) 0.01 % otic oil Administer 3 drops into both ears daily, Starting Thu 11/16/2023, Normal      fluticasone (FLONASE) 50 mcg/act nasal spray 1 spray into each nostril 2 (two) times a day, Starting Thu 10/19/2023, Normal      guaiFENesin (ROBITUSSIN) 100 MG/5ML oral liquid Take 10 mL (200 mg total) by mouth 3 (three) times a day as needed for cough, Starting Mon 10/2/2023, Normal      ketorolac (TORADOL) 10 mg tablet Take 1 tablet (10 mg total) by mouth every 6 (six) hours as needed for moderate pain, Starting Thu 7/6/2023, Normal      lidocaine (LIDODERM) 5 % Apply 1 patch topically over 12 hours every 24 hours Remove & Discard patch within 12 hours or as directed by MD, Starting Wed 11/22/2023, Print      methocarbamol (ROBAXIN) 500 mg tablet Take 1 tablet (500 mg total) by mouth 2 (two) times a day, Starting Wed 11/22/2023, Normal      naproxen (EC NAPROSYN) 500 MG EC tablet Take 500 mg by mouth if needed for mild pain, Historical Med      nicotine (NICODERM CQ) 14 mg/24hr TD 24 hr patch Place 1 patch on the skin over 24 hours every 24 hours, Starting Thu 9/14/2023, Normal      nystatin-triamcinolone (MYCOLOG-II) cream Apply topically 2 (two) times a day, Starting Thu 9/14/2023, Normal      oxyCODONE-acetaminophen (Percocet) 5-325 mg per tablet Take 1 tablet by mouth 2 (two) times a day as needed for moderate pain Max Daily Amount: 2 tablets, Starting Mon 11/6/2023, Normal      pantoprazole (PROTONIX)  20 mg tablet Take 1 tablet (20 mg total) by mouth daily, Starting Mon 11/6/2023, Normal      polyethylene glycol (MIRALAX) 17 g packet Take 17 g by mouth daily, Starting Tue 1/2/2024, Until Sun 6/30/2024, Normal      prochlorperazine (COMPAZINE) 10 mg tablet Take 1 tablet (10 mg total) by mouth every 6 (six) hours as needed for nausea or vomiting, Starting Wed 9/6/2023, Normal      senna-docusate sodium (SENOKOT-S) 8.6-50 mg per tablet Take 1 tablet by mouth daily, Starting Tue 10/17/2023, Normal      Sennosides 15 MG CHEW Chew 1 tablet (15 mg total) 2 (two) times a day as needed (constipation), Starting Tue 1/2/2024, Normal      sodium chloride 0.9 % nebulizer solution Take 3 mL by nebulization every 6 (six) hours as needed for wheezing or shortness of breath (congestion), Starting Tue 1/2/2024, Normal      SUMAtriptan (IMITREX) 100 mg tablet Take 1 tablet (100 mg total) by mouth daily as needed for migraine, Starting Thu 11/2/2023, Normal             No discharge procedures on file.    PDMP Review         Value Time User    PDMP Reviewed  Yes 11/6/2023  9:40 AM Margarita Walker PA-C            ED Provider  Electronically Signed by             Chadwick Amaro MD  01/14/24 0701

## 2024-01-14 NOTE — ED NOTES
Pt seen, assessed, treated and d/c'd by provider, independent of nursing care.        Amandeep Mane RN  01/14/24 [id number]

## 2024-01-15 PROCEDURE — 88305 TISSUE EXAM BY PATHOLOGIST: CPT | Performed by: STUDENT IN AN ORGANIZED HEALTH CARE EDUCATION/TRAINING PROGRAM

## 2024-01-19 ENCOUNTER — TELEPHONE (OUTPATIENT)
Dept: FAMILY MEDICINE CLINIC | Facility: CLINIC | Age: 39
End: 2024-01-19

## 2024-01-19 DIAGNOSIS — H52.10 MYOPIA, UNSPECIFIED LATERALITY: Primary | ICD-10-CM

## 2024-01-30 ENCOUNTER — TELEPHONE (OUTPATIENT)
Dept: NEUROLOGY | Facility: CLINIC | Age: 39
End: 2024-01-30

## 2024-01-30 ENCOUNTER — OFFICE VISIT (OUTPATIENT)
Dept: GASTROENTEROLOGY | Facility: CLINIC | Age: 39
End: 2024-01-30
Payer: COMMERCIAL

## 2024-01-30 VITALS
SYSTOLIC BLOOD PRESSURE: 116 MMHG | BODY MASS INDEX: 29.7 KG/M2 | HEIGHT: 62 IN | DIASTOLIC BLOOD PRESSURE: 64 MMHG | WEIGHT: 161.4 LBS | TEMPERATURE: 98.7 F

## 2024-01-30 DIAGNOSIS — R10.84 GENERALIZED ABDOMINAL PAIN: ICD-10-CM

## 2024-01-30 DIAGNOSIS — R14.0 BLOATING: ICD-10-CM

## 2024-01-30 DIAGNOSIS — K58.1 IRRITABLE BOWEL SYNDROME WITH CONSTIPATION: Primary | ICD-10-CM

## 2024-01-30 DIAGNOSIS — R68.81 EARLY SATIETY: ICD-10-CM

## 2024-01-30 DIAGNOSIS — K59.00 ACUTE CONSTIPATION: ICD-10-CM

## 2024-01-30 DIAGNOSIS — R11.0 NAUSEA: ICD-10-CM

## 2024-01-30 DIAGNOSIS — Z00.00 PREVENTATIVE HEALTH CARE: ICD-10-CM

## 2024-01-30 PROCEDURE — 99214 OFFICE O/P EST MOD 30 MIN: CPT | Performed by: INTERNAL MEDICINE

## 2024-01-30 RX ORDER — POLYETHYLENE GLYCOL 3350 17 G/17G
17 POWDER, FOR SOLUTION ORAL DAILY
Qty: 510 G | Refills: 5 | Status: SHIPPED | OUTPATIENT
Start: 2024-01-30 | End: 2024-07-28

## 2024-01-30 RX ORDER — SENNA AND DOCUSATE SODIUM 50; 8.6 MG/1; MG/1
1 TABLET, FILM COATED ORAL DAILY
Qty: 30 TABLET | Refills: 5 | Status: SHIPPED | OUTPATIENT
Start: 2024-01-30 | End: 2024-07-28

## 2024-01-30 NOTE — PROGRESS NOTES
[facility] - Outpatient Follow-up Note  Lorrie Jones 38 y.o. female MRN: [id number]  Encounter: [id number]          ASSESSMENT AND PLAN:      1.  Irritable bowel syndrome constipation type  2.  Preventative health care    Patient currently taking MiraLAX as needed.  Discussed with patient about taking MiraLAX scheduled.  She can start taking 1 daily dose and increase it up to 3 times a day if needed.  The goal is to have 1-2 soft loose bowel movements per day with feeling of complete evacuation.  Which ever dose of MiraLAX helps her achieve the goal she should continue to take that on a daily basis.  Once she is maxed out on MiraLAX, we can discuss about adding another agent or she can take Senokot as needed I will prescribe the Senokot today as she ran out.  Increase hydration to 10 to 12 cups of water a day.  Hepatitis C antibody was done in 2023 which was reviewed by me today and found to be negative.  Hepatitis B screening test was done in 2021 which was negative based on my review of the labs today.  ______________________________________________________________________    SUBJECTIVE: 38-year-old with history of cholecystectomy, opioid intake, constipation presents for follow-up.    During last office visit patient reported abdominal pain, nausea, bloating, early satiety, low appetite and acute onset of constipation.  EGD was performed which showed gastritis.  Gastric and duodenal biopsies did not show any significant disease.  Colonoscopy did not show any large masses.  Patient was started on pantoprazole, MiraLAX and Senokot.  Celiac antibody was done in June 2023 which was reviewed by me today and found to be negative.  CT abdomen was done in 11/20/2023 the report of which was reviewed by me today and showed left ovarian cyst.    Patient reports that she continues to have hard stools.  She has 1 hard bowel movement per day or every other day.  She continues to have  abdominal pain, and bloating and fullness.  Nausea has resolved.  Currently not taking Senokot as she ran out.  Takes MiraLAX as needed.  Denies intake of opioids currently.      REVIEW OF SYSTEMS IS OTHERWISE NEGATIVE.      Historical Information   Past Medical History:   Diagnosis Date    Abnormal Pap smear of cervix     2015 cryosurgery; 6/2019 NILM pap/neg HPV    Allergic     Depression     Headache(784.0)     Migraine with aura      Past Surgical History:   Procedure Laterality Date    CHOLECYSTECTOMY LAPAROSCOPIC N/A 10/27/2020    Procedure: CHOLECYSTECTOMY LAPAROSCOPIC;  Surgeon: Lina Narayan MD;  Location:  MAIN OR;  Service: General    COLONOSCOPY      FL LUMBAR PUNCTURE DIAGNOSTIC  11/17/2023    GYNECOLOGIC CRYOSURGERY  2015    HYSTERECTOMY  2016    supracervical    LAPAROSCOPY  2018    FL LAPAROSCOPY W/LYSIS OF ADHESIONS N/A 03/03/2020    Procedure: L.O.A.;  Surgeon: Noy Paz MD;  Location: [facility];  Service: Gynecology    FL LAPS ABD PRTM&OMENTUM DX W/WO SPEC BR/WA SPX N/A 08/09/2019    Procedure: LAPAROSCOPY DIAGNOSTIC; LYSIS OF ADHESIONS;  Surgeon: Abebe Walls MD;  Location: [facility];  Service: Gynecology    TUBAL LIGATION      UPPER GASTROINTESTINAL ENDOSCOPY       Social History   Social History     Substance and Sexual Activity   Alcohol Use Never     Social History     Substance and Sexual Activity   Drug Use Never     Social History     Tobacco Use   Smoking Status Every Day    Current packs/day: 0.50    Average packs/day: 0.5 packs/day for 15.0 years (7.5 ttl pk-yrs)    Types: Cigarettes    Passive exposure: Current   Smokeless Tobacco Never     Family History   Problem Relation Age of Onset    Cancer Paternal Grandmother         uterine    Cancer Paternal Aunt         uterine     Migraines Mother     Arthritis Mother     Hypertension Father     Breast cancer Neg Hx     Colon cancer Neg Hx     Ovarian cancer Neg Hx        Meds/Allergies       Current  "Outpatient Medications:     ascorbic acid (VITAMIN C) 500 MG tablet    bisacodyl (DULCOLAX) 5 mg EC tablet    cyanocobalamin (VITAMIN B-12) 1000 MCG tablet    Diclofenac Sodium (VOLTAREN) 1 %    ergocalciferol (VITAMIN D2) 50,000 units    lidocaine (LIDODERM) 5 %    methocarbamol (ROBAXIN) 500 mg tablet    naproxen (EC NAPROSYN) 500 MG EC tablet    nicotine (NICODERM CQ) 14 mg/24hr TD 24 hr patch    nystatin-triamcinolone (MYCOLOG-II) cream    pantoprazole (PROTONIX) 20 mg tablet    polyethylene glycol (MIRALAX) 17 g packet    Sennosides 15 MG CHEW    sodium chloride 0.9 % nebulizer solution    SUMAtriptan (IMITREX) 100 mg tablet    dexamethasone (DECADRON) 2 mg tablet    diphenhydrAMINE (BENADRYL) 25 mg tablet    famotidine (PEPCID) 20 mg tablet    fluocinolone acetonide (DermOtic) 0.01 % otic oil    fluticasone (FLONASE) 50 mcg/act nasal spray    guaiFENesin (ROBITUSSIN) 100 MG/5ML oral liquid    ketorolac (TORADOL) 10 mg tablet    oxyCODONE-acetaminophen (Percocet) 5-325 mg per tablet    prochlorperazine (COMPAZINE) 10 mg tablet    senna-docusate sodium (SENOKOT-S) 8.6-50 mg per tablet    Allergies   Allergen Reactions    Shellfish-Derived Products - Food Allergy Itching and Swelling     \"seafood \"    Ibuprofen Other (See Comments)     Patient states she gets hematoma    Pregabalin Anxiety           Objective     Blood pressure 116/64, temperature 98.7 °F (37.1 °C), height 5' 2\" (1.575 m), weight 73.2 kg (161 lb 6.4 oz), not currently breastfeeding. Body mass index is 29.52 kg/m².      PHYSICAL EXAM:      General Appearance:   Alert, cooperative, no distress   HEENT:   Normocephalic, atraumatic, anicteric.     Neck:  Supple, symmetrical, trachea midline   Lungs:   Clear to auscultation bilaterally; no rales, rhonchi or wheezing; respirations unlabored    Heart::   Regular rate and rhythm; no murmur, rub, or gallop.   Abdomen:   Soft, non-tender, non-distended; normal bowel sounds; no masses, no organomegaly  "   Genitalia:   Deferred    Rectal:   Deferred    Extremities:  No cyanosis, clubbing or edema    Pulses:  2+ and symmetric    Skin:  No jaundice, rashes, or lesions    Lymph nodes:  No palpable cervical lymphadenopathy        Lab Results:   No visits with results within 1 Day(s) from this visit.   Latest known visit with results is:   Hospital Outpatient Visit on 01/11/2024   Component Date Value    Case Report 01/11/2024                      Value:Surgical Pathology Report                         Case: [id number]                                  Authorizing Provider:  Socorro Murdock MD          Collected:           01/11/2024 1012              Ordering Location:     [facility], [facility]        Received:            01/11/2024 1510                                     [facility]                                                          Pathologist:           Javier Cleveland DO                                                          Specimens:   A) - Polyp, Stomach/Small Intestine, duodenal polyp                                                 B) - Stomach, r/o h pylori                                                                 Final Diagnosis 01/11/2024                      Value:This result contains rich text formatting which cannot be displayed here.    Additional Information 01/11/2024                      Value:This result contains rich text formatting which cannot be displayed here.    Gross Description 01/11/2024                      Value:This result contains rich text formatting which cannot be displayed here.         Radiology Results:   Colonoscopy    Result Date: 1/11/2024  Narrative: Table formatting from the original result was not included. [facility] [address] [facility] [address] [phone] DATE OF SERVICE: 1/11/24 PHYSICIAN(S): Attending: Socorro Murdock MD Fellow: No Staff Documented INDICATION: Early satiety, Nausea, Bloating, Generalized abdominal  pain, Acute constipation POST-OP DIAGNOSIS: See the impression below. HISTORY: Prior colonoscopy: No prior colonoscopy. BOWEL PREPARATION: Miralax/Dulcolax PREPROCEDURE: Informed consent was obtained for the procedure, including sedation. Risks including but not limited to bleeding, infection, perforation, adverse drug reaction and aspiration were explained in detail. Also explained about less than 100% sensitivity with the exam and other alternatives. The patient was placed in the left lateral decubitus position. Procedure: Colonoscopy DETAILS OF PROCEDURE: Patient was taken to the procedure room where a time out was performed to confirm correct patient and correct procedure. The patient underwent monitored anesthesia care, which was administered by an anesthesia professional. The patient's blood pressure, heart rate, level of consciousness, oxygen, respirations, ECG and ETCO2 were monitored throughout the procedure. A digital rectal exam was performed. The scope was introduced through the anus and advanced to the terminal ileum. Retroflexion was performed in the rectum. The quality of bowel preparation was evaluated using the [place] Bowel Preparation Scale with scores of: right colon = 1, transverse colon = 1, left colon = 1. The total BBPS score was 3. Bowel prep was not adequate. The patient experienced no blood loss. The procedure was not difficult. The patient tolerated the procedure well. There were no apparent adverse events. ANESTHESIA INFORMATION: ASA: II Anesthesia Type: General MEDICATIONS: sodium chloride 0.9 % infusion 600 mL*  *From user-documented volume (Totals for administrations occurring from 1006 to 1034 on 01/11/24) FINDINGS: Poor prep.  No large masses or significant mucosal abnormalities were seen. EVENTS: Procedure Events Event Event Time ENDO CECUM REACHED 1/11/2024 10:24 AM ENDO SCOPE OUT TIME 1/11/2024 10:34 AM SPECIMENS: ID Type Source Tests Collected by Time Destination 1 : duodenal  polyp Tissue Polyp, Stomach/Small Intestine TISSUE EXAM Socorro Murdock MD 1/11/2024 10:12 AM  2 : r/o h pylori Tissue Stomach TISSUE EXAM Socorro Murdock MD 1/11/2024 10:13 AM  EQUIPMENT: Colonoscope - 789     Impression: No impression generated RECOMMENDATION:  Start screening colonoscopies at appropriate age per current guidelines   Socorro Murdock MD     EGD    Result Date: 1/11/2024  Narrative: Table formatting from the original result was not included. [facility] [address] [facility] [address] [phone] DATE OF SERVICE: 1/11/24 PHYSICIAN(S): Attending: Socorro Murdock MD Fellow: No Staff Documented INDICATION: Early satiety, Nausea, Bloating, Generalized abdominal pain, Acute constipation POST-OP DIAGNOSIS: See the impression below. PREPROCEDURE: Informed consent was obtained for the procedure, including sedation.  Risks of perforation, hemorrhage, adverse drug reaction and aspiration were discussed. The patient was placed in the left lateral decubitus position. Patient was explained about the risks and benefits of the procedure. Risks including but not limited to bleeding, infection, and perforation were explained in detail. Also explained about less than 100% sensitivity with the exam and other alternatives. PROCEDURE: EGD DETAILS OF PROCEDURE: Patient was taken to the procedure room where a time out was performed to confirm correct patient and correct procedure. The patient underwent monitored anesthesia care, which was administered by an anesthesia professional. The patient's blood pressure, heart rate, level of consciousness, respirations, oxygen and ETCO2 were monitored throughout the procedure. The scope was advanced to the second part of the duodenum. Retroflexion was performed in the fundus. The patient experienced no blood loss. The procedure was not difficult. The patient tolerated the procedure well. There were no apparent adverse events. ANESTHESIA INFORMATION:  ASA: II Anesthesia Type: General MEDICATIONS: No administrations occurring from 1006 to 1016 on 01/11/24 FINDINGS: The esophagus appeared normal. Z-line is 36 cm from the incisors. Mild, generalized erythematous mucosa in the body of the stomach and antrum; performed cold forceps biopsy to rule out H. pylori The duodenal bulb appeared normal. Sessile polyp measuring smaller than 5 mm in the 1st part of the duodenum; performed cold forceps biopsy with complete piecemeal removal SPECIMENS: ID Type Source Tests Collected by Time Destination 1 : duodenal polyp Tissue Polyp, Stomach/Small Intestine TISSUE EXAM Socorro Murdock MD 1/11/2024 10:12 AM  2 : r/o h pylori Tissue Stomach TISSUE EXAM Socorro Murdock MD 1/11/2024 10:13 AM      Impression: The esophagus appeared normal. Mild erythematous mucosa in the body of the stomach and antrum; performed cold forceps biopsy The duodenal bulb appeared normal. Polyp measuring smaller than 5 mm in the 1st part of the duodenum; performed cold forceps biopsy with piecemeal removal RECOMMENDATION:  Await pathology results   Socorro Murdock MD

## 2024-01-30 NOTE — TELEPHONE ENCOUNTER
Placed call to confirm patients upcoming appointment, Informed patient of the date/time and location. Advised to call office if they need to reschedule their visit.

## 2024-02-02 ENCOUNTER — PATIENT MESSAGE (OUTPATIENT)
Dept: NEUROLOGY | Facility: CLINIC | Age: 39
End: 2024-02-02

## 2024-02-02 NOTE — TELEPHONE ENCOUNTER
Placed call to r/s 2/5 appoint due to provider being out of the office. Pt was r/s to 2/28 @ 8am.

## 2024-02-06 NOTE — TELEPHONE ENCOUNTER
Aneta,  Yes, it would be ok for her to reschedule until after the studies are completed.   Thanks so much!  RC

## 2024-02-06 NOTE — PATIENT COMMUNICATION
Hi, of course!    Patient is asking if she can cancel her 2/28 appointment w Ismael and wait to see her until her MRI and EMG have been done. She has her EMG scheduled w Uzair on 3/8 and her MRI w anesthesia scheduled on 3/13, so she wants to know if its ok for her to cancel the 2/28 appointment and wait until after they have been completed to review the results in one visit.

## 2024-02-12 ENCOUNTER — TELEPHONE (OUTPATIENT)
Dept: FAMILY MEDICINE CLINIC | Facility: CLINIC | Age: 39
End: 2024-02-12

## 2024-02-12 NOTE — TELEPHONE ENCOUNTER
Second call to pt, she answered. I informed her of office closing due to weather and appt to be converted to virtual. Pt requested appt be rescheduled for an in office visit. Appt rescheduled for 2/23/24

## 2024-02-21 NOTE — PRE-PROCEDURE INSTRUCTIONS
Pre-Surgery Instructions:   Medication Instructions    ascorbic acid (VITAMIN C) 500 MG tablet Hold day of surgery.    bisacodyl (DULCOLAX) 5 mg EC tablet Hold day of surgery.    cyanocobalamin (VITAMIN B-12) 1000 MCG tablet Hold day of surgery.    Diclofenac Sodium (VOLTAREN) 1 % Hold day of surgery.    ergocalciferol (VITAMIN D2) 50,000 units Hold day of surgery.    famotidine (PEPCID) 20 mg tablet Take day of surgery.    methocarbamol (ROBAXIN) 500 mg tablet Uses PRN- OK to take day of surgery    naproxen (EC NAPROSYN) 500 MG EC tablet Uses PRN- OK to take day of surgery    pantoprazole (PROTONIX) 20 mg tablet Take day of surgery.    polyethylene glycol (MIRALAX) 17 g packet Uses PRN- DO NOT take day of surgery    senna-docusate sodium (SENOKOT-S) 8.6-50 mg per tablet Uses PRN- DO NOT take day of surgery    SUMAtriptan (IMITREX) 100 mg tablet Uses PRN- DO NOT take day of surgery    The patient should have nothing to eat or drink after 11 pm the night before the MRI. The patient may take their medications with a sip of water at least 2 hours prior to their arrival time.  You will receive a call the evening before your MRI appointment with additional instructions.      Please leave your jewelry and valuables at home, wedding rings are the exception.   Please bring your physician order, insurance cards, a form of photo ID and a list of your medications with you. Arrive 15 minutes prior to your appointment time in order to register. Please bring any prior CT or MRI studies of this area that were not performed at a [facility].

## 2024-02-23 ENCOUNTER — OFFICE VISIT (OUTPATIENT)
Dept: FAMILY MEDICINE CLINIC | Facility: CLINIC | Age: 39
End: 2024-02-23

## 2024-02-23 VITALS
HEART RATE: 106 BPM | WEIGHT: 161.2 LBS | TEMPERATURE: 97.5 F | SYSTOLIC BLOOD PRESSURE: 110 MMHG | OXYGEN SATURATION: 97 % | DIASTOLIC BLOOD PRESSURE: 64 MMHG | BODY MASS INDEX: 29.48 KG/M2

## 2024-02-23 DIAGNOSIS — K59.00 CONSTIPATION, UNSPECIFIED CONSTIPATION TYPE: Chronic | ICD-10-CM

## 2024-02-23 DIAGNOSIS — G89.29 CHRONIC PELVIC PAIN IN FEMALE: Chronic | ICD-10-CM

## 2024-02-23 DIAGNOSIS — E55.9 VITAMIN D DEFICIENCY: Chronic | ICD-10-CM

## 2024-02-23 DIAGNOSIS — G43.109 MIGRAINE WITH AURA AND WITHOUT STATUS MIGRAINOSUS, NOT INTRACTABLE: Chronic | ICD-10-CM

## 2024-02-23 DIAGNOSIS — Z72.0 TOBACCO USE: Primary | ICD-10-CM

## 2024-02-23 DIAGNOSIS — R10.2 CHRONIC PELVIC PAIN IN FEMALE: Chronic | ICD-10-CM

## 2024-02-23 DIAGNOSIS — K21.9 GASTROESOPHAGEAL REFLUX DISEASE WITHOUT ESOPHAGITIS: Chronic | ICD-10-CM

## 2024-02-23 DIAGNOSIS — J32.4 CHRONIC PANSINUSITIS: Chronic | ICD-10-CM

## 2024-02-23 DIAGNOSIS — G89.29 CHRONIC BILATERAL LOW BACK PAIN WITH LEFT-SIDED SCIATICA: Chronic | ICD-10-CM

## 2024-02-23 DIAGNOSIS — E53.8 VITAMIN B 12 DEFICIENCY: Chronic | ICD-10-CM

## 2024-02-23 DIAGNOSIS — M54.2 NECK PAIN: Chronic | ICD-10-CM

## 2024-02-23 DIAGNOSIS — M54.42 CHRONIC BILATERAL LOW BACK PAIN WITH LEFT-SIDED SCIATICA: Chronic | ICD-10-CM

## 2024-02-23 RX ORDER — NICOTINE 21 MG/24HR
1 PATCH, TRANSDERMAL 24 HOURS TRANSDERMAL EVERY 24 HOURS
Qty: 28 PATCH | Refills: 1 | Status: SHIPPED | OUTPATIENT
Start: 2024-02-23

## 2024-02-23 NOTE — ASSESSMENT & PLAN NOTE
-Patient has established with neurology and underwent MRI brain.  Further work-up was suggested by MS specialist including work-up for inflammatory causes, ischemia and vasculitis.  -Reviewed MRI C-spine which was normal.  - Patient underwent lumbar puncture on 11/17/23.  - Patient scheduled for follow up MRI brain in 6 months (March 2024).   -Continue sumatriptan 100 mg as needed for abortive therapy.  - Per patient, she has now completed vitamin B12 injections, however recent vitamin B12 level continues to be low.  She does not wish to proceed with further injections.  Continue daily vitamin B12 supplement.  - Continue riboflavin and magnesium for 100 mg daily for preventative purposes.  - Continue follow-up with neurology as scheduled.

## 2024-02-23 NOTE — ASSESSMENT & PLAN NOTE
- Generally stable.  Patient reports only taking her medications on the weekend because that is when she has easy access to a bathroom, however per chart review, gastroenterology has recommended for patient to take her medications daily.  - Recommend start taking miralax scheduled instead of as needed. Could then add on senokot if needed.   -Patient underwent colonoscopy in January 2024.   - Continue follow-up with gastroenterology as scheduled.

## 2024-02-23 NOTE — PROGRESS NOTES
Assessment/Plan:    Migraine headache with aura  -Patient has established with neurology and underwent MRI brain.  Further work-up was suggested by MS specialist including work-up for inflammatory causes, ischemia and vasculitis.  -Reviewed MRI C-spine which was normal.  - Patient underwent lumbar puncture on 11/17/23.  - Patient scheduled for follow up MRI brain in 6 months (March 2024).   -Continue sumatriptan 100 mg as needed for abortive therapy.  - Per patient, she has now completed vitamin B12 injections, however recent vitamin B12 level continues to be low.  She does not wish to proceed with further injections.  Continue daily vitamin B12 supplement.  - Continue riboflavin and magnesium for 100 mg daily for preventative purposes.  - Continue follow-up with neurology as scheduled.    Chronic pelvic pain in female  - Patient underwent CT abdomen pelvis with contrast (6/10/23).  Results show no evidence of acute abdominopelvic process.  Chronic left hydrosalpinx.  - Pelvic US (7/6/23) revealed: Fluid-filled tubular structure in the left adnexa similar to prior study likely representing hydrosalpinx. Status post hysterectomy. Right ovary was not visualized.  - OBGYN has prescribed amitriptyline and Depo injections for the pain, however, both have not been helpful. Orlissa had been prescribed but prior authorization was denied.   -Patient has now established with [facility] for second opinion and is now s/p CT guided aspiration of left pelvic cystic mass on 10/26/23.   - Patient recently seen by [facility]BG[facility] and has been recommended to follow up with Gyn Oncology.   -Per patient, she believes a lot of her pain is due to her constipation.    Gastroesophageal reflux disease  -Patient underwent EGD in January 2024.  Biopsies are positive for acid irritation.  - Stable.  Continue Protonix 20 mg daily.  - Continue to avoid trigger foods.      Chronic bilateral low back pain with left-sided sciatica  - Lumbar spine x-ray  which shows mild scoliosis, otherwise unremarkable study.  - Advised to apply ice/heating pad/topical therapies as needed to affected area.  - Continue OTC voltaren gel as needed.  - Continue tizanidine 4 mg, to be taken twice daily as needed.  - Continue alternating ibuprofen/Tylenol as needed for pain.  - If symptoms persist, would recommend referral to physical therapy.    Constipation  - Generally stable.  Patient reports only taking her medications on the weekend because that is when she has easy access to a bathroom, however per chart review, gastroenterology has recommended for patient to take her medications daily.  - Recommend start taking miralax scheduled instead of as needed. Could then add on senokot if needed.   -Patient underwent colonoscopy in January 2024.   - Continue follow-up with gastroenterology as scheduled.    Vitamin D deficiency  - Reviewed recent vitamin D level which has improved.  - Continue vitamin D 50,000 units once weekly.    Neck pain  MRI C spine (10/24/23):  C4-C5: Mild bulge. No significant canal stenosis or foraminal narrowing.  C5-C6: Central disc protrusion. Mild mass effect on the thecal sac. No significant foraminal narrowing.  C6-C7: Central disc protrusion. Mild mass effect on the thecal sac. No significant foraminal narrowing.  Probable atypical hemangioma in the C6 vertebral body.   -Patient has established with orthopedics and has been referred to physical therapy and pain management. Patient does not wish to persue injections for her neck.     Chronic pansinusitis  - Patient recently established with ENT and is now scheduled for CT sinuses for further evaluation of chronic sinusitis findings on MRI.  -Possible inflammatory findings and deviated septum contributing to patient's headaches.  - Continue Flonase daily.  -Continue follow-up with ENT as scheduled.    Vitamin B 12 deficiency  - Per patient, she has now completed vitamin B12 injections through  neurology.  -Reviewed recent vitamin B12 level which has improved, but is still low.  Patient does not wish to continue with B12 injections.  - Continue once daily oral vitamin B12 supplement.    Tobacco use  - Currently smoking about half pack per day.  - Patient requesting to restart nicotine patches.   - Refill placed for nicotine patches 14 mg.      Diagnoses and all orders for this visit:    Tobacco use  -     nicotine (NICODERM CQ) 14 mg/24hr TD 24 hr patch; Place 1 patch on the skin over 24 hours every 24 hours    Migraine with aura and without status migrainosus, not intractable    Chronic pelvic pain in female    Gastroesophageal reflux disease without esophagitis    Chronic bilateral low back pain with left-sided sciatica    Constipation, unspecified constipation type    Vitamin D deficiency    Neck pain    Chronic pansinusitis    Vitamin B 12 deficiency          All of patients questions were answered. Patient understands and agrees with the above plan.     Return in about 3 months (around 5/23/2024) for Next scheduled follow up migraines.    Margarita Walker PA-C  02/23/24  [facility]          Subjective:     Patient ID: Lorrie Jones  is a 38 y.o. female with known PMH of migraines, GERD, neck pain, chronic pansinusitis, constipation, chronic back pain, migraines, vitamin D deficiency, vitamin B12 deficiency who presents today in office for constipation follow up.     - Patient is a 38 y.o. female who presents today for constipation follow up.      Patient notes she is currently smoking about 10 cigarettes a day and would like to quit smoking.  She would like to try nicotine patches again.  Patient notes her constipation has been stable.  Patient notes she does not take her pills every day, usually only on the weekends because that is when she will notice you have access to the bathroom easily.  Patient notes her headaches have been stable.  Patient notes they generally occur on the left  side of her head.  She has an upcoming MRI within the next month.    -Patient notes her back pain has been stable.  Patient notes she continues to move around throughout the day and refrains from either sitting, standing, walking for long period of time.  Patient notes she has been using Voltaren gel which has been effective.      The following portions of the patient's history were reviewed and updated as appropriate: allergies, current medications, past family history, past medical history, past social history, past surgical history, and problem list.        Review of Systems   Constitutional:  Negative for chills and fever.   HENT:  Negative for congestion, ear pain and sore throat.    Eyes:  Negative for pain.   Respiratory:  Negative for cough, chest tightness, shortness of breath and wheezing.    Cardiovascular:  Negative for chest pain and palpitations.   Gastrointestinal:  Positive for abdominal pain and constipation. Negative for diarrhea, nausea and vomiting.   Genitourinary:  Positive for pelvic pain. Negative for difficulty urinating and dysuria.   Musculoskeletal:  Positive for arthralgias, back pain and gait problem.   Skin:  Negative for rash.   Neurological:  Positive for headaches. Negative for dizziness and numbness.          BMI Counseling: Body mass index is 29.48 kg/m². The BMI is above normal. Nutrition recommendations include decreasing portion sizes, encouraging healthy choices of fruits and vegetables, consuming healthier snacks, limiting drinks that contain sugar, moderation in carbohydrate intake, increasing intake of lean protein and reducing intake of cholesterol. Exercise recommendations include moderate physical activity 150 minutes/week. No pharmacotherapy was ordered. Rationale for BMI follow-up plan is due to patient being overweight or obese.     Tobacco Cessation Counseling: Tobacco cessation counseling was provided. The patient is sincerely urged to quit consumption of tobacco.  She is ready to quit tobacco. Medication options and side effects of medication discussed. Patient agreed to medication. Nicotine patch was prescribed.           Objective:   Vitals:    02/23/24 0857   BP: 110/64   BP Location: Right arm   Patient Position: Sitting   Cuff Size: Standard   Pulse: (!) 106   Temp: 97.5 °F (36.4 °C)   TempSrc: Temporal   SpO2: 97%   Weight: 73.1 kg (161 lb 3.2 oz)         Physical Exam  Vitals and nursing note reviewed.   Constitutional:       General: She is not in acute distress.     Appearance: She is well-developed.   HENT:      Head: Normocephalic and atraumatic.      Right Ear: External ear normal.      Left Ear: External ear normal.      Nose: Nose normal.      Mouth/Throat:      Mouth: Mucous membranes are moist.   Eyes:      Conjunctiva/sclera: Conjunctivae normal.   Cardiovascular:      Rate and Rhythm: Regular rhythm. Tachycardia present.      Pulses: Normal pulses.      Heart sounds: Normal heart sounds.   Pulmonary:      Effort: Pulmonary effort is normal. No respiratory distress.      Breath sounds: Normal breath sounds. No wheezing.   Abdominal:      General: Bowel sounds are normal.      Palpations: Abdomen is soft.      Tenderness: There is no abdominal tenderness. There is no guarding or rebound.   Musculoskeletal:      Cervical back: Normal range of motion and neck supple.   Skin:     General: Skin is warm and dry.   Neurological:      Mental Status: She is alert and oriented to person, place, and time.   Psychiatric:         Behavior: Behavior normal.

## 2024-02-23 NOTE — ASSESSMENT & PLAN NOTE
- Currently smoking about half pack per day.  - Patient requesting to restart nicotine patches.   - Refill placed for nicotine patches 14 mg.

## 2024-02-23 NOTE — ASSESSMENT & PLAN NOTE
- Per patient, she has now completed vitamin B12 injections through neurology.  -Reviewed recent vitamin B12 level which has improved, but is still low.  Patient does not wish to continue with B12 injections.  - Continue once daily oral vitamin B12 supplement.

## 2024-02-23 NOTE — ASSESSMENT & PLAN NOTE
-Patient underwent EGD in January 2024.  Biopsies are positive for acid irritation.  - Stable.  Continue Protonix 20 mg daily.  - Continue to avoid trigger foods.

## 2024-02-23 NOTE — ASSESSMENT & PLAN NOTE
- Patient underwent CT abdomen pelvis with contrast (6/10/23).  Results show no evidence of acute abdominopelvic process.  Chronic left hydrosalpinx.  - Pelvic US (7/6/23) revealed: Fluid-filled tubular structure in the left adnexa similar to prior study likely representing hydrosalpinx. Status post hysterectomy. Right ovary was not visualized.  - OBGYN has prescribed amitriptyline and Depo injections for the pain, however, both have not been helpful. Orlissa had been prescribed but prior authorization was denied.   -Patient has now established with [facility] OBG[facility] for second opinion and is now s/p CT guided aspiration of left pelvic cystic mass on 10/26/23.   - Patient recently seen by [facility] OBG[facility] and has been recommended to follow up with Gyn Oncology.   -Per patient, she believes a lot of her pain is due to her constipation.

## 2024-02-23 NOTE — ASSESSMENT & PLAN NOTE
- Lumbar spine x-ray which shows mild scoliosis, otherwise unremarkable study.  - Advised to apply ice/heating pad/topical therapies as needed to affected area.  - Continue OTC voltaren gel as needed.  - Continue tizanidine 4 mg, to be taken twice daily as needed.  - Continue alternating ibuprofen/Tylenol as needed for pain.  - If symptoms persist, would recommend referral to physical therapy.

## 2024-03-04 ENCOUNTER — TELEPHONE (OUTPATIENT)
Dept: NEUROLOGY | Facility: CLINIC | Age: 39
End: 2024-03-04

## 2024-03-08 ENCOUNTER — HOSPITAL ENCOUNTER (OUTPATIENT)
Dept: NEUROLOGY | Facility: CLINIC | Age: 39
End: 2024-03-08
Payer: COMMERCIAL

## 2024-03-08 DIAGNOSIS — M54.10 RADICULOPATHY: ICD-10-CM

## 2024-03-08 PROBLEM — M54.12 CERVICAL RADICULOPATHY: Status: ACTIVE | Noted: 2024-03-08

## 2024-03-08 PROCEDURE — 95886 MUSC TEST DONE W/N TEST COMP: CPT | Performed by: PSYCHIATRY & NEUROLOGY

## 2024-03-08 PROCEDURE — 95910 NRV CNDJ TEST 7-8 STUDIES: CPT | Performed by: PSYCHIATRY & NEUROLOGY

## 2024-03-13 ENCOUNTER — HOSPITAL ENCOUNTER (OUTPATIENT)
Dept: RADIOLOGY | Facility: HOSPITAL | Age: 39
Discharge: HOME | End: 2024-03-13
Attending: PSYCHIATRY & NEUROLOGY
Payer: COMMERCIAL

## 2024-03-13 ENCOUNTER — ANESTHESIA (OUTPATIENT)
Dept: RADIOLOGY | Facility: HOSPITAL | Age: 39
End: 2024-03-13

## 2024-03-13 ENCOUNTER — ANESTHESIA EVENT (OUTPATIENT)
Dept: RADIOLOGY | Facility: HOSPITAL | Age: 39
End: 2024-03-13

## 2024-03-13 VITALS
DIASTOLIC BLOOD PRESSURE: 75 MMHG | HEART RATE: 68 BPM | HEIGHT: 62 IN | TEMPERATURE: 97.9 F | BODY MASS INDEX: 29.66 KG/M2 | WEIGHT: 161.16 LBS | SYSTOLIC BLOOD PRESSURE: 128 MMHG | RESPIRATION RATE: 18 BRPM | OXYGEN SATURATION: 99 %

## 2024-03-13 DIAGNOSIS — R93.89 ABNORMAL MRI: ICD-10-CM

## 2024-03-13 DIAGNOSIS — G43.109 MIGRAINE WITH AURA AND WITHOUT STATUS MIGRAINOSUS, NOT INTRACTABLE: ICD-10-CM

## 2024-03-13 PROCEDURE — G1004 CDSM NDSC: HCPCS

## 2024-03-13 PROCEDURE — 70553 MRI BRAIN STEM W/O & W/DYE: CPT

## 2024-03-13 PROCEDURE — A9585 GADOBUTROL INJECTION: HCPCS | Performed by: PSYCHIATRY & NEUROLOGY

## 2024-03-13 RX ORDER — PROPOFOL 10 MG/ML
INJECTION, EMULSION INTRAVENOUS AS NEEDED
Status: DISCONTINUED | OUTPATIENT
Start: 2024-03-13 | End: 2024-03-13

## 2024-03-13 RX ORDER — ONDANSETRON 2 MG/ML
INJECTION INTRAMUSCULAR; INTRAVENOUS AS NEEDED
Status: DISCONTINUED | OUTPATIENT
Start: 2024-03-13 | End: 2024-03-13

## 2024-03-13 RX ORDER — DEXAMETHASONE SODIUM PHOSPHATE 10 MG/ML
INJECTION, SOLUTION INTRAMUSCULAR; INTRAVENOUS AS NEEDED
Status: DISCONTINUED | OUTPATIENT
Start: 2024-03-13 | End: 2024-03-13

## 2024-03-13 RX ORDER — SODIUM CHLORIDE, SODIUM LACTATE, POTASSIUM CHLORIDE, CALCIUM CHLORIDE 600; 310; 30; 20 MG/100ML; MG/100ML; MG/100ML; MG/100ML
125 INJECTION, SOLUTION INTRAVENOUS CONTINUOUS
Status: DISCONTINUED | OUTPATIENT
Start: 2024-03-13 | End: 2024-03-14 | Stop reason: HOSPADM

## 2024-03-13 RX ORDER — LIDOCAINE HYDROCHLORIDE 10 MG/ML
INJECTION, SOLUTION EPIDURAL; INFILTRATION; INTRACAUDAL; PERINEURAL AS NEEDED
Status: DISCONTINUED | OUTPATIENT
Start: 2024-03-13 | End: 2024-03-13

## 2024-03-13 RX ORDER — LIDOCAINE HYDROCHLORIDE 10 MG/ML
0.5 INJECTION, SOLUTION EPIDURAL; INFILTRATION; INTRACAUDAL; PERINEURAL ONCE
Status: COMPLETED | OUTPATIENT
Start: 2024-03-13 | End: 2024-03-13

## 2024-03-13 RX ORDER — SODIUM CHLORIDE, SODIUM LACTATE, POTASSIUM CHLORIDE, CALCIUM CHLORIDE 600; 310; 30; 20 MG/100ML; MG/100ML; MG/100ML; MG/100ML
INJECTION, SOLUTION INTRAVENOUS CONTINUOUS PRN
Status: DISCONTINUED | OUTPATIENT
Start: 2024-03-13 | End: 2024-03-13

## 2024-03-13 RX ORDER — GADOBUTROL 604.72 MG/ML
7 INJECTION INTRAVENOUS
Status: COMPLETED | OUTPATIENT
Start: 2024-03-13 | End: 2024-03-13

## 2024-03-13 RX ADMIN — LIDOCAINE HYDROCHLORIDE 50 MG: 10 INJECTION, SOLUTION EPIDURAL; INFILTRATION; INTRACAUDAL; PERINEURAL at 08:08

## 2024-03-13 RX ADMIN — SODIUM CHLORIDE, SODIUM LACTATE, POTASSIUM CHLORIDE, AND CALCIUM CHLORIDE 125 ML/HR: .6; .31; .03; .02 INJECTION, SOLUTION INTRAVENOUS at 07:50

## 2024-03-13 RX ADMIN — ONDANSETRON 4 MG: 2 INJECTION INTRAMUSCULAR; INTRAVENOUS at 08:08

## 2024-03-13 RX ADMIN — DEXAMETHASONE SODIUM PHOSPHATE 10 MG: 10 INJECTION, SOLUTION INTRAMUSCULAR; INTRAVENOUS at 08:08

## 2024-03-13 RX ADMIN — LIDOCAINE HYDROCHLORIDE 0.5 ML: 10 INJECTION, SOLUTION EPIDURAL; INFILTRATION; INTRACAUDAL; PERINEURAL at 07:50

## 2024-03-13 RX ADMIN — SODIUM CHLORIDE, SODIUM LACTATE, POTASSIUM CHLORIDE, AND CALCIUM CHLORIDE: .6; .31; .03; .02 INJECTION, SOLUTION INTRAVENOUS at 08:03

## 2024-03-13 RX ADMIN — GADOBUTROL 7 ML: 604.72 INJECTION INTRAVENOUS at 08:45

## 2024-03-13 RX ADMIN — PROPOFOL 300 MG: 10 INJECTION, EMULSION INTRAVENOUS at 08:08

## 2024-03-13 NOTE — ANESTHESIA POSTPROCEDURE EVALUATION
Post-Op Assessment Note    CV Status:  Stable  Pain Score: 0    Pain management: adequate       Mental Status:  Sleepy and awake   Hydration Status:  Stable   PONV Controlled:  None   Airway Patency:  Patent     Post Op Vitals Reviewed: Yes    No anethesia notable event occurred.    Staff: Anesthesiologist, CRNA               BP      Temp      Pulse     Resp      SpO2      REPOT TO MRI RN, VSS ON MONITORS, SV ON RA.  SEE RN NOTES FOR VS

## 2024-03-13 NOTE — ANESTHESIA PREPROCEDURE EVALUATION
Procedure:  MRI BRAIN MS WO AND W CONTRAST    Relevant Problems   CARDIO   (+) Migraine headache with aura      GI/HEPATIC   (+) Gastroesophageal reflux disease      MUSCULOSKELETAL   (+) Chronic bilateral low back pain with left-sided sciatica      NEURO/PSYCH   (+) Chronic bilateral low back pain with left-sided sciatica   (+) Chronic pelvic pain in female   (+) Migraine headache with aura      Behavioral Health   (+) Tobacco use      Orthopedic/Musculoskeletal   (+) Cervical radiculopathy      FEN/Gastrointestinal   (+) Gastritis      Other   (+) Flu-like symptoms      EKG, CBC & CMP are normal  Physical Exam    Airway    Mallampati score: II  TM Distance: >3 FB  Neck ROM: full     Dental       Cardiovascular      Pulmonary      Other Findings  post-pubertal.    Anesthesia Plan  ASA Score- 2     Anesthesia Type- general with ASA Monitors.         Additional Monitors:     Airway Plan: LMA.           Plan Factors-    Chart reviewed. EKG reviewed.  Existing labs reviewed. Patient summary reviewed.    Patient is a current smoker.  Patient smoked on day of surgery.            Induction- intravenous.    Postoperative Plan- . Planned trial extubation    Informed Consent- Anesthetic plan and risks discussed with patient.  I personally reviewed this patient with the CRNA. Discussed and agreed on the Anesthesia Plan with the CRNA..

## 2024-03-13 NOTE — NURSING NOTE
Brain MRI with and without contrast completed and patient tolerated procedure well. Post-procedure vital signs taken and recorded. Report given to [facility] nurse Evelyn. Patient placed in for transport to be taken to [facility]. Patient voided in bedpan prior to transport. Patient offers no complaints or verbalizes any issues upon leaving MRI.

## 2024-03-14 ENCOUNTER — TELEPHONE (OUTPATIENT)
Dept: NEUROLOGY | Facility: CLINIC | Age: 39
End: 2024-03-14

## 2024-03-14 ENCOUNTER — APPOINTMENT (OUTPATIENT)
Dept: LAB | Facility: HOSPITAL | Age: 39
End: 2024-03-14
Payer: COMMERCIAL

## 2024-03-14 DIAGNOSIS — M54.10 RADICULITIS: ICD-10-CM

## 2024-03-14 DIAGNOSIS — G43.109 MIGRAINE WITH AURA AND WITHOUT STATUS MIGRAINOSUS, NOT INTRACTABLE: Primary | Chronic | ICD-10-CM

## 2024-03-14 LAB
BUN SERPL-MCNC: 12 MG/DL (ref 5–25)
CREAT SERPL-MCNC: 0.78 MG/DL (ref 0.6–1.3)
GFR SERPL CREATININE-BSD FRML MDRD: 97 ML/MIN/1.73SQ M

## 2024-03-14 PROCEDURE — 36415 COLL VENOUS BLD VENIPUNCTURE: CPT

## 2024-03-14 PROCEDURE — 84520 ASSAY OF UREA NITROGEN: CPT

## 2024-03-14 PROCEDURE — 82565 ASSAY OF CREATININE: CPT

## 2024-03-14 RX ORDER — DEXAMETHASONE 2 MG/1
2 TABLET ORAL
Qty: 5 TABLET | Refills: 0 | Status: SHIPPED | OUTPATIENT
Start: 2024-03-14 | End: 2024-03-20 | Stop reason: SDUPTHER

## 2024-03-14 NOTE — TELEPHONE ENCOUNTER
Aye Smith MD  You2 hours ago (12:04 PM)     AM  I sent decadron 2mg X5 days to take in the morning with breakfast     
Patient called in stating that her headaches are unbearable after her MRI test yesterday , they are pounding frontal and retro orbital bilateral . They are constant and her sumatriptan is not working. Patient is requesting a call back for possible instructions to prevent having to go in to the hospital.  
no

## 2024-03-19 DIAGNOSIS — Z72.0 TOBACCO USE: ICD-10-CM

## 2024-03-19 RX ORDER — NICOTINE 21 MG/24HR
1 PATCH, TRANSDERMAL 24 HOURS TRANSDERMAL EVERY 24 HOURS
Qty: 28 PATCH | Refills: 3 | Status: SHIPPED | OUTPATIENT
Start: 2024-03-19

## 2024-03-20 ENCOUNTER — OFFICE VISIT (OUTPATIENT)
Dept: NEUROLOGY | Facility: CLINIC | Age: 39
End: 2024-03-20
Payer: COMMERCIAL

## 2024-03-20 VITALS
RESPIRATION RATE: 18 BRPM | OXYGEN SATURATION: 98 % | HEIGHT: 62 IN | BODY MASS INDEX: 29.44 KG/M2 | WEIGHT: 160 LBS | HEART RATE: 106 BPM | SYSTOLIC BLOOD PRESSURE: 112 MMHG | DIASTOLIC BLOOD PRESSURE: 72 MMHG | TEMPERATURE: 98 F

## 2024-03-20 DIAGNOSIS — M54.2 NECK PAIN: Chronic | ICD-10-CM

## 2024-03-20 DIAGNOSIS — M54.10 RADICULITIS: ICD-10-CM

## 2024-03-20 DIAGNOSIS — G43.109 MIGRAINE HEADACHE WITH AURA: Primary | Chronic | ICD-10-CM

## 2024-03-20 PROCEDURE — 99214 OFFICE O/P EST MOD 30 MIN: CPT | Performed by: PSYCHIATRY & NEUROLOGY

## 2024-03-20 PROCEDURE — 96372 THER/PROPH/DIAG INJ SC/IM: CPT | Performed by: PSYCHIATRY & NEUROLOGY

## 2024-03-20 RX ORDER — KETOROLAC TROMETHAMINE 30 MG/ML
30 INJECTION, SOLUTION INTRAMUSCULAR; INTRAVENOUS ONCE
Status: COMPLETED | OUTPATIENT
Start: 2024-03-20 | End: 2024-03-20

## 2024-03-20 RX ORDER — DEXAMETHASONE 2 MG/1
2 TABLET ORAL
Qty: 5 TABLET | Refills: 0 | Status: SHIPPED | OUTPATIENT
Start: 2024-03-20 | End: 2024-03-20

## 2024-03-20 RX ORDER — DEXAMETHASONE 2 MG/1
2 TABLET ORAL
Qty: 5 TABLET | Refills: 0 | Status: SHIPPED | OUTPATIENT
Start: 2024-03-20

## 2024-03-20 RX ADMIN — KETOROLAC TROMETHAMINE 30 MG: 30 INJECTION, SOLUTION INTRAMUSCULAR; INTRAVENOUS at 09:34

## 2024-03-20 NOTE — PROGRESS NOTES
Patient ID: Lorrie Jones is a 39 y.o. female.    Assessment/Plan:    Migraine headache with aura  Lorrie Jones is a very pleasant  38 y.o. female with a past medical history that includes choledocholithiasis s/p cholescystectomy, hydrosalpinx, GERD, gastritis, hypoglycemia who presents for headache follow-up.    During our initial visit patient having and unilateral throbbing pain with migrainous features that had become more severe over the prior 6 months.  Headache accompanied by a visual aura.  Given change in nature of headache I ordered an MRI brain with and without contrast that showed a few scattered foci of T2/FLAIR hyperintensity in the periventricular and subcortical white matter, however there was an unusual left parietal white matter change that was larger with a T1 weighted correlate.  I ordered a follow-up MRI C-spine to evaluate for any potential T2 layer changes of the spine.  This was normal.  Follow-up 6 months MRI brain MS protocol without any new findings, stable.  B12 189.    Today patient reports that she currently has a headache.  She had a breakthrough headache after her MRI was done but otherwise she has been stable.  She reports decreased stress as she was able to find a place to move.    Overall I believe that patient has a migraine with aura induced by stress with good response to sumatriptan.    Plan:  Given that she currently has a headache, will give her Toradol 30 mg IM injection in office today and Decadron 2 mg x 5 days to break current cycle  Will continue to monitor her headaches.   Will consider repeating imaging in 1 to 2 years to assess for any additional white matter changes  Will continue sumatriptan 100 mg, advised on taking first does at onset of aura and a second dose if headache persists 1-2 H after first dose  Will avoid gabapentinoids given her history of Lyrica causing panic attacks  Continue B12 supplementation  For preventative purposes Riboflavin  and magnesium 400 mg daily  Patient advised not to take over-the-counter or prescription pain medications more than 3 days per week to prevent medication overuse/rebound headache  Recommend drinking at least 64 oz of water daily   Discussed with patient the importance of keeping good sleep hygiene  Advised to keep track of headache patterns  Follow up in 3 months     Neck pain  Patient reporting pain initiating along lower cervical spinous process when she lays down, radiating to her right arm.     MRI C spine:  C4-C5: Mild bulge. No significant canal stenosis or foraminal narrowing.  C5-C6: Central disc protrusion. Mild mass effect on the thecal sac. No significant foraminal narrowing.  C6-C7: Central disc protrusion. Mild mass effect on the thecal sac. No significant foraminal narrowing.  Probable atypical hemangioma in the C6 vertebral body.     EMG of the right arm reveals evidence of minimal chronic denervation most in keeping with a right C7 root distribution     Plan:  Recommend conservative management and use of prn robaxin and antiinflammatories       Diagnoses and all orders for this visit:    Migraine headache with aura  -     ketorolac (TORADOL) injection 30 mg  -     Discontinue: dexamethasone (DECADRON) 2 mg tablet; Take 1 tablet (2 mg total) by mouth daily with breakfast    Radiculitis  -     dexamethasone (DECADRON) 2 mg tablet; Take 1 tablet (2 mg total) by mouth daily with breakfast    Neck pain       Subjective:    Lorrie Jones is a very pleasant  38 y.o. female with a past medical history that includes choledocholithiasis s/p cholescystectomy, hydrosalpinx, GERD, gastritis, hypoglycemia who presents for follow up of headache and abnormal MRI.      Initial visit (9/19/2023):    Lorrie Jones is a very pleasant  38 y.o. female with a past medical history that includes choledocholithiasis s/p cholescystectomy, hydrosalpinx, GERD, gastritis, hypoglycemia referred here for  evaluation of headache.     She reports having a unilateral throbbing pain with migrainous features that has become more severe over the past six months. Prior imaging includes a normal CTH done in february. I did note percocet being part of her medication list. She reports she uses it sparingly. I reviewed PDMP and she does not get frequent refills of this therefore less suspicion for overuse headache. She was prescribed sumatriptan by other provider which does help her headaches. At this time she does not have more than 15 headaches days in a month so does not warrant PPX. Will focus on ruling out reversible etiologies and abortive regimen. Given change in nature of headache I ordered an MRI brain with and without contrast        Prior visits:    10/5/23: I called and I discussed MRI results with patient after review with MS specialist.  Left parietal region flair changes with unusual characteristics for microangiopathic changes.  It also has corresponding T1 weighted black hole lesions.  Further work-up suggested by MS specialist including work-up for inflammatory causes, ischemia and vasculitis.  Patient denies any focal weakness, ambulatory difficulties, urinary incontinence for visual disturbances. At this time I will order blood work to rule out other diseases that can cause T2 flair changes.  I will order an MRI C-spine with and without contrast to evaluate for spinal cord lesions.  I will have patient follow-up with me sooner, after MRI C-spine is done to review available work-up together. Will discuss with patient B 12 injections given recent level of 189. Future plan is to repeat MRI brain MS protocol with and without contrast in 6 months.  Will consider ordering a CTA to screen for vasculitis.  Pending work-up, may consider a lumbar puncture.     11/2/23:  Today she reports sumatriptan helps but at times can have headaches that last up to 4 days. Also reports a newer headache that is stabbing in nature,  lasting 20-30 minutes occurring 2-3 times in a day. She has been undergoing significant stress since I last saw her which is likely contributing to her headaches. She dose report is still is headache free most days of the month.  Repeat MRI brain MS protocol w/wo 6 months from prior to evaluate for any evolution of signal. LP ordered. EMG ordered for neck pain.  B12 189.  Plan for repletion.    11/20/23: Spoke to patient on the phone reports having a stabbing pain in lower back radiating down L3-L4 distribution on the L. Reports she felt sx immediately after needle insertion for the lumbar puncture on Friday. Patient likely with irritation of the nerve from procedure. Sent decadron 2mg X 5 days to treat radiculitis. Advised her to let me know how she is doing next week    Headache frequency:  9/19/2023: Unsure, can be many weeks without, but when she gets them, can last several days    11/2/2023: She reports sumatriptan helps however as of late has been having headaches that can last up to 4 days. She has been getting episodes of a stabbing pain that lasts 20-30 minutes. This can occur 2-3 times in a day  3/20/2024:Breakthrough headache after MRI done. Other wise her headaches are under control     Workup:    MRI brain with and without contrast (9/28/2023: A few scattered foci of T2/FLAIR hyperintensity noted in the periventricular and subcortical white matter. Finding is nonspecific with broad differential considerations (given patient's young age). Main differentials include: complex migraines,   precocious microangiopathy, demyelinating disease, sequela of remote infectious or inflammatory etiology (such as Lyme's disease).         MRI C-spine with and without contrast (10/29/2023):  No intrinsic cord signal abnormality identified. No abnormal enhancement within the cervical spinal canal.    C4-C5: Mild bulge. No significant canal stenosis or foraminal narrowing.  C5-C6: Central disc protrusion. Mild mass effect  on the thecal sac. No significant foraminal narrowing.  C6-C7: Central disc protrusion. Mild mass effect on the thecal sac. No significant foraminal narrowing.  Probable atypical hemangioma in the C6 vertebral body    MRI brain MS protocol with and without contrast (3/13/2024):Stable scattered foci of supratentorial T2/FLAIR hyperintensities without associated enhancement or restricted diffusion. Differential remains the same including complex migraines, precocious microangiopathic, demyelinating disease,   infectious/inflammatory process.    NMO, MOG negative     Lumbar puncture (11/17/2023):   Latest Reference Range & Units 11/17/23 11:11   APPEARANCE CSF  clear and colorless   TUBE NUMBER CSF  4   XANTHOCHROMIA No  No   WBC CSF 0 - 5 /uL 0   GLUCOSE CSF 40 - 70 mg/dL 72 (H)   PROTEIN CSF 15 - 45 mg/dL 20   RBC CSF 0 - 10 uL 0   MYELIN BASIC PROTEIN CSF 0.0 - 3.7 ng/mL 1.5   IgG, CSF 0.0 - 6.7 mg/dL 1.0   IgG Index, CSF 0.0 - 0.7  0.6   IgG Synthetic Rate -9.9 TO +3.3 mg/day   -2.8   CSF/SERUM ALBUMIN ALB.INDEX 0 - 8  2   OLIGOCLONAL BANDS  0   CSF ALBUMIN 7 - 29 mg/dL 8   CONCHA, BODY FLUID  Rpt   (H): Data is abnormally high  Rpt: View report in Results Review for more information    EMG (3/8/22):  1) Normal right median and ulnar motor conduction studies; normal left median motor conduction study   2) Normal right median and ulnar F waves   3) Normal right median, ulnar, and superficial radial sensory conduction studies; normal left median sensory conduction study   4) EMG of the right arm reveals evidence of minimal chronic denervation most in keeping with a right C7 root distribution     Interval history:  Moved   Breakthrough headache after MRI done   Currently has a headache,   Other wise her headaches are under control       The following portions of the patient's history were reviewed and updated as appropriate: allergies, current medications, past family history, past medical history, past social history,  "past surgical history, and problem list and review of systems.         Objective:    Blood pressure 112/72, pulse (!) 106, temperature 98 °F (36.7 °C), temperature source Temporal, resp. rate 18, height 5' 2\" (1.575 m), weight 72.6 kg (160 lb), SpO2 98%, not currently breastfeeding.    Physical Exam    Neurological Exam      ROS:    Review of Systems   Constitutional:  Negative for appetite change, fatigue and fever.   HENT: Negative.  Negative for hearing loss, tinnitus, trouble swallowing and voice change.    Eyes: Negative.  Negative for photophobia, pain and visual disturbance.   Respiratory: Negative.  Negative for shortness of breath.    Cardiovascular: Negative.  Negative for palpitations.   Gastrointestinal: Negative.  Negative for nausea and vomiting.   Endocrine: Negative.  Negative for cold intolerance.   Genitourinary: Negative.  Negative for dysuria, frequency and urgency.   Musculoskeletal:  Negative for back pain, gait problem, myalgias, neck pain and neck stiffness.   Skin: Negative.  Negative for rash.   Allergic/Immunologic: Negative.    Neurological:  Positive for headaches (had 1 migraine since LOV - currently has a HA now, states it started after her test last week). Negative for dizziness, tremors, seizures, syncope, facial asymmetry, speech difficulty, weakness, light-headedness and numbness.   Hematological: Negative.  Does not bruise/bleed easily.   Psychiatric/Behavioral: Negative.  Negative for confusion, hallucinations and sleep disturbance.    All other systems reviewed and are negative.                  "

## 2024-03-21 DIAGNOSIS — M54.10 RADICULITIS: ICD-10-CM

## 2024-04-01 ENCOUNTER — OFFICE VISIT (OUTPATIENT)
Dept: LAB | Facility: HOSPITAL | Age: 39
End: 2024-04-01
Payer: COMMERCIAL

## 2024-04-01 ENCOUNTER — APPOINTMENT (OUTPATIENT)
Dept: LAB | Facility: HOSPITAL | Age: 39
End: 2024-04-01
Payer: COMMERCIAL

## 2024-04-01 ENCOUNTER — HOSPITAL ENCOUNTER (OUTPATIENT)
Dept: RADIOLOGY | Facility: HOSPITAL | Age: 39
Discharge: HOME | End: 2024-04-01
Payer: COMMERCIAL

## 2024-04-01 DIAGNOSIS — R09.81 NASAL CONGESTION: ICD-10-CM

## 2024-04-01 DIAGNOSIS — K21.9 GASTROESOPHAGEAL REFLUX DISEASE WITHOUT ESOPHAGITIS: ICD-10-CM

## 2024-04-01 DIAGNOSIS — R19.00 PELVIC LUMP: ICD-10-CM

## 2024-04-01 DIAGNOSIS — U07.1 COVID-19: ICD-10-CM

## 2024-04-01 DIAGNOSIS — L29.9 ITCHING OF EAR: ICD-10-CM

## 2024-04-01 LAB
ALBUMIN SERPL BCP-MCNC: 4.3 G/DL (ref 3.5–5)
ALP SERPL-CCNC: 71 U/L (ref 34–104)
ALT SERPL W P-5'-P-CCNC: 44 U/L (ref 7–52)
ANION GAP SERPL CALCULATED.3IONS-SCNC: 11 MMOL/L (ref 4–13)
AST SERPL W P-5'-P-CCNC: 25 U/L (ref 13–39)
ATRIAL RATE: 77 BPM
BASOPHILS # BLD AUTO: 0.06 THOUSANDS/ÂΜL (ref 0–0.1)
BASOPHILS NFR BLD AUTO: 1 % (ref 0–1)
BILIRUB SERPL-MCNC: 0.35 MG/DL (ref 0.2–1)
BUN SERPL-MCNC: 16 MG/DL (ref 5–25)
CALCIUM SERPL-MCNC: 9.4 MG/DL (ref 8.4–10.2)
CHLORIDE SERPL-SCNC: 104 MMOL/L (ref 96–108)
CO2 SERPL-SCNC: 24 MMOL/L (ref 21–32)
CREAT SERPL-MCNC: 0.76 MG/DL (ref 0.6–1.3)
EOSINOPHIL # BLD AUTO: 0.24 THOUSAND/ÂΜL (ref 0–0.61)
EOSINOPHIL NFR BLD AUTO: 2 % (ref 0–6)
ERYTHROCYTE [DISTWIDTH] IN BLOOD BY AUTOMATED COUNT: 12.7 % (ref 11.6–15.1)
GFR SERPL CREATININE-BSD FRML MDRD: 100 ML/MIN/1.73SQ M
GLUCOSE P FAST SERPL-MCNC: 89 MG/DL (ref 65–99)
HCT VFR BLD AUTO: 39.7 % (ref 34.8–46.1)
HGB BLD-MCNC: 13.3 G/DL (ref 11.5–15.4)
IMM GRANULOCYTES # BLD AUTO: 0.05 THOUSAND/UL (ref 0–0.2)
IMM GRANULOCYTES NFR BLD AUTO: 1 % (ref 0–2)
LYMPHOCYTES # BLD AUTO: 2.5 THOUSANDS/ÂΜL (ref 0.6–4.47)
LYMPHOCYTES NFR BLD AUTO: 26 % (ref 14–44)
MCH RBC QN AUTO: 30.7 PG (ref 26.8–34.3)
MCHC RBC AUTO-ENTMCNC: 33.5 G/DL (ref 31.4–37.4)
MCV RBC AUTO: 92 FL (ref 82–98)
MONOCYTES # BLD AUTO: 0.68 THOUSAND/ÂΜL (ref 0.17–1.22)
MONOCYTES NFR BLD AUTO: 7 % (ref 4–12)
NEUTROPHILS # BLD AUTO: 6.29 THOUSANDS/ÂΜL (ref 1.85–7.62)
NEUTS SEG NFR BLD AUTO: 63 % (ref 43–75)
NRBC BLD AUTO-RTO: 0 /100 WBCS
P AXIS: 54 DEGREES
PLATELET # BLD AUTO: 304 THOUSANDS/UL (ref 149–390)
PMV BLD AUTO: 11.4 FL (ref 8.9–12.7)
POTASSIUM SERPL-SCNC: 4 MMOL/L (ref 3.5–5.3)
PR INTERVAL: 168 MS
PROT SERPL-MCNC: 6.9 G/DL (ref 6.4–8.4)
QRS AXIS: 42 DEGREES
QRSD INTERVAL: 82 MS
QT INTERVAL: 390 MS
QTC INTERVAL: 441 MS
RBC # BLD AUTO: 4.33 MILLION/UL (ref 3.81–5.12)
SODIUM SERPL-SCNC: 139 MMOL/L (ref 135–147)
T WAVE AXIS: 42 DEGREES
VENTRICULAR RATE: 77 BPM
WBC # BLD AUTO: 9.82 THOUSAND/UL (ref 4.31–10.16)

## 2024-04-01 PROCEDURE — 71046 X-RAY EXAM CHEST 2 VIEWS: CPT

## 2024-04-01 PROCEDURE — 36415 COLL VENOUS BLD VENIPUNCTURE: CPT

## 2024-04-01 PROCEDURE — 80053 COMPREHEN METABOLIC PANEL: CPT

## 2024-04-01 PROCEDURE — 93010 ELECTROCARDIOGRAM REPORT: CPT | Performed by: INTERNAL MEDICINE

## 2024-04-01 PROCEDURE — 85025 COMPLETE CBC W/AUTO DIFF WBC: CPT

## 2024-04-01 PROCEDURE — 93005 ELECTROCARDIOGRAM TRACING: CPT

## 2024-04-01 RX ORDER — SODIUM CHLORIDE FOR INHALATION 0.9 %
VIAL, NEBULIZER (ML) INHALATION
Qty: 60 ML | Refills: 1 | Status: SHIPPED | OUTPATIENT
Start: 2024-04-01

## 2024-04-01 RX ORDER — PANTOPRAZOLE SODIUM 20 MG/1
20 TABLET, DELAYED RELEASE ORAL DAILY
Qty: 90 TABLET | Refills: 0 | Status: SHIPPED | OUTPATIENT
Start: 2024-04-01 | End: 2024-04-08 | Stop reason: SDUPTHER

## 2024-04-01 RX ORDER — FLUOCINOLONE ACETONIDE 0.11 MG/ML
OIL AURICULAR (OTIC)
Qty: 20 ML | Refills: 0 | Status: SHIPPED | OUTPATIENT
Start: 2024-04-01

## 2024-04-01 RX ORDER — PANTOPRAZOLE SODIUM 20 MG/1
20 TABLET, DELAYED RELEASE ORAL DAILY
Qty: 30 TABLET | OUTPATIENT
Start: 2024-04-01

## 2024-04-02 ENCOUNTER — TELEPHONE (OUTPATIENT)
Age: 39
End: 2024-04-02
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
## 2024-04-08 ENCOUNTER — OFFICE VISIT (OUTPATIENT)
Dept: GASTROENTEROLOGY | Facility: CLINIC | Age: 39
End: 2024-04-08
Payer: COMMERCIAL

## 2024-04-08 ENCOUNTER — HOSPITAL ENCOUNTER (EMERGENCY)
Facility: HOSPITAL | Age: 39
Discharge: HOME | End: 2024-04-08
Attending: EMERGENCY MEDICINE
Payer: COMMERCIAL

## 2024-04-08 VITALS
SYSTOLIC BLOOD PRESSURE: 116 MMHG | HEIGHT: 62 IN | TEMPERATURE: 97.4 F | DIASTOLIC BLOOD PRESSURE: 60 MMHG | WEIGHT: 160.6 LBS | BODY MASS INDEX: 29.55 KG/M2

## 2024-04-08 VITALS
WEIGHT: 160.2 LBS | TEMPERATURE: 98.2 F | OXYGEN SATURATION: 97 % | BODY MASS INDEX: 29.3 KG/M2 | RESPIRATION RATE: 18 BRPM | HEART RATE: 74 BPM | DIASTOLIC BLOOD PRESSURE: 65 MMHG | SYSTOLIC BLOOD PRESSURE: 113 MMHG

## 2024-04-08 DIAGNOSIS — K21.9 GASTROESOPHAGEAL REFLUX DISEASE WITHOUT ESOPHAGITIS: ICD-10-CM

## 2024-04-08 DIAGNOSIS — K59.00 ACUTE CONSTIPATION: ICD-10-CM

## 2024-04-08 DIAGNOSIS — R14.0 BLOATING: ICD-10-CM

## 2024-04-08 DIAGNOSIS — K29.50 CHRONIC GASTRITIS WITHOUT BLEEDING, UNSPECIFIED GASTRITIS TYPE: ICD-10-CM

## 2024-04-08 DIAGNOSIS — N83.202 CYST OF LEFT OVARY: ICD-10-CM

## 2024-04-08 DIAGNOSIS — K58.1 IRRITABLE BOWEL SYNDROME WITH CONSTIPATION: Primary | ICD-10-CM

## 2024-04-08 DIAGNOSIS — R10.84 GENERALIZED ABDOMINAL PAIN: ICD-10-CM

## 2024-04-08 DIAGNOSIS — R68.81 EARLY SATIETY: ICD-10-CM

## 2024-04-08 DIAGNOSIS — N83.209 OVARIAN CYST: Primary | ICD-10-CM

## 2024-04-08 DIAGNOSIS — R11.0 NAUSEA: ICD-10-CM

## 2024-04-08 DIAGNOSIS — Z00.00 PREVENTATIVE HEALTH CARE: ICD-10-CM

## 2024-04-08 LAB
BASOPHILS # BLD AUTO: 0.03 THOUSANDS/ÂΜL (ref 0–0.1)
BASOPHILS NFR BLD AUTO: 0 % (ref 0–1)
EOSINOPHIL # BLD AUTO: 0.14 THOUSAND/ÂΜL (ref 0–0.61)
EOSINOPHIL NFR BLD AUTO: 1 % (ref 0–6)
ERYTHROCYTE [DISTWIDTH] IN BLOOD BY AUTOMATED COUNT: 12.7 % (ref 11.6–15.1)
HCT VFR BLD AUTO: 36.8 % (ref 34.8–46.1)
HGB BLD-MCNC: 12.8 G/DL (ref 11.5–15.4)
IMM GRANULOCYTES # BLD AUTO: 0.03 THOUSAND/UL (ref 0–0.2)
IMM GRANULOCYTES NFR BLD AUTO: 0 % (ref 0–2)
LYMPHOCYTES # BLD AUTO: 2.42 THOUSANDS/ÂΜL (ref 0.6–4.47)
LYMPHOCYTES NFR BLD AUTO: 24 % (ref 14–44)
MCH RBC QN AUTO: 31.4 PG (ref 26.8–34.3)
MCHC RBC AUTO-ENTMCNC: 34.8 G/DL (ref 31.4–37.4)
MCV RBC AUTO: 90 FL (ref 82–98)
MONOCYTES # BLD AUTO: 0.61 THOUSAND/ÂΜL (ref 0.17–1.22)
MONOCYTES NFR BLD AUTO: 6 % (ref 4–12)
NEUTROPHILS # BLD AUTO: 6.72 THOUSANDS/ÂΜL (ref 1.85–7.62)
NEUTS SEG NFR BLD AUTO: 69 % (ref 43–75)
NRBC BLD AUTO-RTO: 0 /100 WBCS
PLATELET # BLD AUTO: 277 THOUSANDS/UL (ref 149–390)
PMV BLD AUTO: 10.8 FL (ref 8.9–12.7)
RBC # BLD AUTO: 4.08 MILLION/UL (ref 3.81–5.12)
WBC # BLD AUTO: 9.95 THOUSAND/UL (ref 4.31–10.16)

## 2024-04-08 PROCEDURE — 36415 COLL VENOUS BLD VENIPUNCTURE: CPT | Performed by: EMERGENCY MEDICINE

## 2024-04-08 PROCEDURE — 99284 EMERGENCY DEPT VISIT MOD MDM: CPT | Performed by: EMERGENCY MEDICINE

## 2024-04-08 PROCEDURE — 85025 COMPLETE CBC W/AUTO DIFF WBC: CPT | Performed by: EMERGENCY MEDICINE

## 2024-04-08 PROCEDURE — 99214 OFFICE O/P EST MOD 30 MIN: CPT | Performed by: INTERNAL MEDICINE

## 2024-04-08 RX ORDER — OXYCODONE HYDROCHLORIDE AND ACETAMINOPHEN 5; 325 MG/1; MG/1
1 TABLET ORAL EVERY 6 HOURS PRN
Qty: 16 TABLET | Refills: 0 | Status: SHIPPED | OUTPATIENT
Start: 2024-04-08

## 2024-04-08 RX ORDER — ONDANSETRON 2 MG/ML
4 INJECTION INTRAMUSCULAR; INTRAVENOUS ONCE
Status: COMPLETED | OUTPATIENT
Start: 2024-04-08 | End: 2024-04-08

## 2024-04-08 RX ORDER — PANTOPRAZOLE SODIUM 20 MG/1
20 TABLET, DELAYED RELEASE ORAL DAILY
Qty: 30 TABLET | Refills: 5 | Status: SHIPPED | OUTPATIENT
Start: 2024-04-08 | End: 2024-10-05

## 2024-04-08 RX ORDER — SENNA AND DOCUSATE SODIUM 50; 8.6 MG/1; MG/1
1 TABLET, FILM COATED ORAL DAILY
Qty: 30 TABLET | Refills: 5 | Status: SHIPPED | OUTPATIENT
Start: 2024-04-08 | End: 2024-10-05

## 2024-04-08 RX ORDER — MORPHINE SULFATE 4 MG/ML
4 INJECTION, SOLUTION INTRAMUSCULAR; INTRAVENOUS ONCE
Status: COMPLETED | OUTPATIENT
Start: 2024-04-08 | End: 2024-04-08

## 2024-04-08 RX ADMIN — ONDANSETRON 4 MG: 2 INJECTION INTRAMUSCULAR; INTRAVENOUS at 19:21

## 2024-04-08 RX ADMIN — MORPHINE SULFATE 4 MG: 4 INJECTION, SOLUTION INTRAMUSCULAR; INTRAVENOUS at 19:21

## 2024-04-08 NOTE — PROGRESS NOTES
[facility] - Outpatient Follow-up Note  Lorrie Jones 39 y.o. female MRN: [id number]  Encounter: [id number]          ASSESSMENT AND PLAN:      1.  Irritable bowel syndrome constipation type  2.  Gastritis  3.  Ovarian cyst  4.  Preventative health care    Chronic symptoms which are partially controlled.  Patient taking Senokot as needed.  Continue Senokot as it helps patient.  Advised patient to take it at least daily to help with the bowel movements more regular.  Patient does have pain with passage of bowel movements which is expected is likely due to the ovarian cyst.  Continue pantoprazole to help treat gastritis.  Patient getting evaluated by gynecology oncology colorectal surgery for possible surgery for ovarian cyst.  We will reevaluate the pain with defecation after the cyst was removed and if she continues to have pain then evaluate further etiologies.  Patient had hepatitis B screening test done in 2021 which was negative for infection.  Patient hepatitis C screening test done in 2023 and the labs that was negative as well.    RTC 6 months.    Panda Stroud MD  Gastroenterology  [facility]  Date: April 8, 2024    ______________________________________________________________________    SUBJECTIVE: Seen 39-year-old with irritable bowel syndrome constipation type status post cholecystectomy, opioid intake presents for follow-up.    During last office visit patient reported constipation not controlled.  She was told to MiraLAX scheduled.  EGD showed gastritis gastric biopsy was negative for any significant disease.  Colonoscopy in 2024 showed poor prep but no large masses were seen.  Since last office visit patient had CT abdomen on 4/6/2024 the report of which was reviewed by me today and showed left ovarian cyst 4.2 cm associated with hydrosalpinx.  This is after aspiration of the cyst.    Patient reports that she is  scheduled to see  colorectal surgeon at outside hospital for possible surgery of the ovarian cyst since it is next to the colon.  She was seen by gynecology oncology at outside hospital as well and there planning for surgery to remove the cyst due to recurrence of cyst post aspiration communicated with help of colorectal surgery If needed according to the review of the notes from patient records today.    Patient reports that she continues to have constipation intermittently.  She is not taking MiraLAX.  Instead she is taking docusate senna combo intermittently.  She does not like to get addicted to medications so was not taking it intermittently going some days without the medication.  Whenever she takes the medication it helps the constipation.    Labs done in April 2024 showed normal blood counts, renal function liver enzymes according to my review of labs today.  TSH in 2022 was normal levels as well.      REVIEW OF SYSTEMS IS OTHERWISE NEGATIVE.      Historical Information   Past Medical History:   Diagnosis Date    Abnormal Pap smear of cervix     2015 cryosurgery; 6/2019 NILM pap/neg HPV    Allergic     Depression     Headache(784.0)     Migraine with aura      Past Surgical History:   Procedure Laterality Date    CHOLECYSTECTOMY LAPAROSCOPIC N/A 10/27/2020    Procedure: CHOLECYSTECTOMY LAPAROSCOPIC;  Surgeon: Lina Narayan MD;  Location:  MAIN OR;  Service: General    COLONOSCOPY      FL LUMBAR PUNCTURE DIAGNOSTIC  11/17/2023    GYNECOLOGIC CRYOSURGERY  2015    HYSTERECTOMY  2016    supracervical    LAPAROSCOPY  2018    ME LAPAROSCOPY W/LYSIS OF ADHESIONS N/A 03/03/2020    Procedure: L.O.A.;  Surgeon: Noy Paz MD;  Location: [facility];  Service: Gynecology    ME LAPS ABD PRTM&OMENTUM DX W/WO SPEC BR/WA SPX N/A 08/09/2019    Procedure: LAPAROSCOPY DIAGNOSTIC; LYSIS OF ADHESIONS;  Surgeon: Abebe Walls MD;  Location: [facility];  Service: Gynecology    TUBAL LIGATION      UPPER GASTROINTESTINAL  ENDOSCOPY       Social History   Social History     Substance and Sexual Activity   Alcohol Use Never     Social History     Substance and Sexual Activity   Drug Use Never     Social History     Tobacco Use   Smoking Status Every Day    Current packs/day: 0.50    Average packs/day: 0.5 packs/day for 15.0 years (7.5 ttl pk-yrs)    Types: Cigarettes    Passive exposure: Current   Smokeless Tobacco Never     Family History   Problem Relation Age of Onset    Cancer Paternal Grandmother         uterine    Cancer Paternal Aunt         uterine     Migraines Mother     Arthritis Mother     Hypertension Father     Breast cancer Neg Hx     Colon cancer Neg Hx     Ovarian cancer Neg Hx        Meds/Allergies       Current Outpatient Medications:     ascorbic acid (VITAMIN C) 500 MG tablet    bisacodyl (DULCOLAX) 5 mg EC tablet    cyanocobalamin (VITAMIN B-12) 1000 MCG tablet    dexamethasone (DECADRON) 2 mg tablet    Diclofenac Sodium (VOLTAREN) 1 %    ergocalciferol (VITAMIN D2) 50,000 units    fluocinolone acetonide (DERMOTIC) 0.01 % otic oil    guaiFENesin (ROBITUSSIN) 100 MG/5ML oral liquid    ketorolac (TORADOL) 10 mg tablet    lidocaine (LIDODERM) 5 %    methocarbamol (ROBAXIN) 500 mg tablet    naproxen (EC NAPROSYN) 500 MG EC tablet    nicotine (NICODERM CQ) 14 mg/24hr TD 24 hr patch    nystatin-triamcinolone (MYCOLOG-II) cream    pantoprazole (PROTONIX) 20 mg tablet    polyethylene glycol (MIRALAX) 17 g packet    polyethylene glycol (MIRALAX) 17 g packet    prochlorperazine (COMPAZINE) 10 mg tablet    senna-docusate sodium (SENOKOT-S) 8.6-50 mg per tablet    senna-docusate sodium (SENOKOT-S) 8.6-50 mg per tablet    Sennosides 15 MG CHEW    sodium chloride 0.9 % nebulizer solution    SUMAtriptan (IMITREX) 100 mg tablet    diphenhydrAMINE (BENADRYL) 25 mg tablet    famotidine (PEPCID) 20 mg tablet    fluticasone (FLONASE) 50 mcg/act nasal spray    oxyCODONE-acetaminophen (Percocet) 5-325 mg per tablet    Allergies  "  Allergen Reactions    Shellfish-Derived Products - Food Allergy Itching and Swelling     \"seafood \"    Ibuprofen Other (See Comments)     Patient states she gets hematoma    Pregabalin Anxiety           Objective     Blood pressure 116/60, temperature (!) 97.4 °F (36.3 °C), height 5' 2\" (1.575 m), weight 72.8 kg (160 lb 9.6 oz), not currently breastfeeding. Body mass index is 29.37 kg/m².      PHYSICAL EXAM:      General Appearance:   Alert, cooperative, no distress   HEENT:   Normocephalic, atraumatic, anicteric.     Neck:  Supple, symmetrical, trachea midline   Lungs:   Clear to auscultation bilaterally; no rales, rhonchi or wheezing; respirations unlabored    Heart::   Regular rate and rhythm; no murmur, rub, or gallop.   Abdomen:   Soft, non-tender, non-distended; normal bowel sounds; no masses, no organomegaly    Genitalia:   Deferred    Rectal:   Deferred    Extremities:  No cyanosis, clubbing or edema    Pulses:  2+ and symmetric    Skin:  No jaundice, rashes, or lesions    Lymph nodes:  No palpable cervical lymphadenopathy        Lab Results:   No visits with results within 1 Day(s) from this visit.   Latest known visit with results is:   Office Visit on 04/01/2024   Component Date Value    Ventricular Rate 04/01/2024 77     Atrial Rate 04/01/2024 77     VT Interval 04/01/2024 168     QRSD Interval 04/01/2024 82     QT Interval 04/01/2024 390     QTC Interval 04/01/2024 441     P Axis 04/01/2024 54     QRS Axis 04/01/2024 42     T Wave Glide 04/01/2024 42          Radiology Results:   CTA abdomen pelvis w wo contrast    Result Date: 4/6/2024  Narrative: History: LLQ abdominal pain   Exam: CT of the abdomen and pelvis with IV contrast.   Technique: Using helical technique, axial images were obtained through the abdomen and pelvis following administration of 80 cc of Omnipaque 350 intravenous contrast. Coronal and sagittal reformations were performed.   Comparison: CT abdomen pelvis, 3/21/2024.     Findings: " Lung Bases: Visualized lung bases are clear.  The heart is normal in size. There is no pericardial or pleural effusion. Liver: Normal in size and contour without focal mass. Gallbladder/Bile ducts: Gallbladder is surgically absent. Spleen: Normal. Pancreas: Normal. Adrenal glands: Normal. Kidneys/Ureters: No evidence of hydronephrosis, renal calculus or solid renal mass. Bowel/Mesentery: Loops of large and small bowel are normal in caliber without wall thickening or evidence of obstruction. The appendix is visualized and appears within normal limits. There is no free fluid.  No free air. Lymph nodes: Normal. Vessels: Normal. Abdominal Wall: Normal.   Pelvis: Uterus is surgically absent. Left ovarian cyst is present measuring 4.2 cm with adjacent tubular fluid-filled structure which can indicate hydrosalpinx. Urinary Bladder: Normal. Lymph nodes:  Normal.   Bones: There is no acute osseous abnormality identified.      Impression: Impression: Left ovarian cyst measuring 4.2 cm with probable associated hydrosalpinx. Workstation:[id number]    US TRANSVAGINAL    Result Date: 4/6/2024  Narrative: Pelvic sonogram 04/06/2024 Reason for exam: Left pelvic pain Transabdominal and transvaginal pelvic sonogram were performed. The patient is status post hysterectomy. No free fluid is evident in the pelvis. The right ovary could not be visualized. The left ovary measures 4.8 x 2.8 x 2.9 cm. Left ovary contains a cyst. The cyst measures approximately 4.2 cm in greatest dimension. There is also a serpiginous tubular structure which is distended by what appears to be simple fluid suggesting a hydrosalpinx. Doppler spectral analysis demonstrates normal appearing visceral arterial and venous waveforms arising from the left ovary.    Impression: IMPRESSION: 4.2 cm left ovarian cyst associated with a hydrosalpinx. Right ovary not visualized. Workstation:[id number]    US PELVIS    Result Date: 4/6/2024  Narrative: Pelvic sonogram 04/06/2024  Reason for exam: Left pelvic pain Transabdominal and transvaginal pelvic sonogram were performed. The patient is status post hysterectomy. No free fluid is evident in the pelvis. The right ovary could not be visualized. The left ovary measures 4.8 x 2.8 x 2.9 cm. Left ovary contains a cyst. The cyst measures approximately 4.2 cm in greatest dimension. There is also a serpiginous tubular structure which is distended by what appears to be simple fluid suggesting a hydrosalpinx. Doppler spectral analysis demonstrates normal appearing visceral arterial and venous waveforms arising from the left ovary.    Impression: IMPRESSION: 4.2 cm left ovarian cyst associated with a hydrosalpinx. Right ovary not visualized. Workstation:[id number]    US OVARIAN DOPPLER COMPLETE    Result Date: 4/6/2024  Narrative: Pelvic sonogram 04/06/2024 Reason for exam: Left pelvic pain Transabdominal and transvaginal pelvic sonogram were performed. The patient is status post hysterectomy. No free fluid is evident in the pelvis. The right ovary could not be visualized. The left ovary measures 4.8 x 2.8 x 2.9 cm. Left ovary contains a cyst. The cyst measures approximately 4.2 cm in greatest dimension. There is also a serpiginous tubular structure which is distended by what appears to be simple fluid suggesting a hydrosalpinx. Doppler spectral analysis demonstrates normal appearing visceral arterial and venous waveforms arising from the left ovary.    Impression: IMPRESSION: 4.2 cm left ovarian cyst associated with a hydrosalpinx. Right ovary not visualized. Workstation:[id number]    XR chest pa & lateral    Result Date: 4/1/2024  Narrative: CHEST INDICATION:   Intra-abdominal and pelvic swelling, mass and lump, unspecified site. COMPARISON:  None. EXAM PERFORMED/VIEWS:  XR CHEST PA & LATERAL FINDINGS: Cardiomediastinal silhouette appears unremarkable. The lungs are clear.  No pneumothorax or pleural effusion. Osseous structures appear within normal  limits for patient age.     Impression: No acute cardiopulmonary disease. No significant change from 8/7/2023 Electronically signed: 04/01/2024 09:16 AM Thomas Benson MD    CTA abdomen pelvis w wo contrast    Result Date: 3/21/2024  Narrative: Abdomen Workstation:[id number]    MRI brain MS wo and w contrast    Result Date: 3/20/2024  Narrative: MRI BRAIN WITH AND WITHOUT CONTRAST INDICATION:  R93.89: Abnormal findings on diagnostic imaging of other specified body structures G43.109: Migraine with aura, not intractable, without status migrainosus.  Demyelinating disease. COMPARISON: MRI of the brain from 9/28/2023, MRI of the cervical spine from 10/24/2023 TECHNIQUE:  Multiplanar, multisequence imaging of the brain was performed before and after gadolinium administration. IV Contrast:  7 mL of Gadobutrol injection (SINGLE-DOSE) IMAGE QUALITY:  Diagnostic. FINDINGS: BRAIN PARENCHYMA: Redemonstrated few scattered foci of T2/FLAIR hyperintensities involving the periventricular and subcortical white matter. Signal abnormality is most prominent along the atria of the left lateral ventricle (6:16). No associated restricted diffusion or associated enhancement Postcontrast imaging of the brain demonstrates no abnormal enhancement. There is no discrete mass, mass effect or midline shift. There is no intracranial hemorrhage.  There is no evidence of acute infarction. VENTRICLES:  Normal for the patient's age. SELLA AND PITUITARY GLAND:  Normal. ORBITS:  Normal. PARANASAL SINUSES: Mild mucosal thickening of the paranasal sinuses. VASCULATURE:  Evaluation of the major intracranial vasculature demonstrates appropriate flow voids. CALVARIUM AND SKULL BASE:  Normal. EXTRACRANIAL SOFT TISSUES:  Normal.     Impression: -Stable scattered foci of supratentorial T2/FLAIR hyperintensities without associated enhancement or restricted diffusion. Differential remains the same including complex migraines, precocious microangiopathic,  demyelinating disease, infectious/inflammatory process. -Mildly improved paranasal sinus disease. Workstation performed: [id number]

## 2024-04-08 NOTE — ADDENDUM NOTE
Addended by: NADEEM VELEZ on: 4/8/2024 09:16 AM     Modules accepted: Orders    
Addended by: NADEEM VELEZ on: 4/8/2024 09:17 AM     Modules accepted: Orders    
yes

## 2024-04-08 NOTE — ED PROVIDER NOTES
History  Chief Complaint   Patient presents with    Abdominal Pain     Pt. Reports LLQ pain where cyst is and reports vomiting that started today.      Patient is a 39-year-old female who presents with an ongoing llq pain from a known ovarian cyst.  Just seen at an OSH on 4/6/24.  At that time had full work up with labs, ua, CTA and US.  4.2 cm ovarian cyst.  D/c'd with tramadol.  LD today at a time patient cannot recall.  +nausea today.  No vomiting.  States GYN referred her to see Colorectal for surgery.  Has not had an appointment yet.         Prior to Admission Medications   Prescriptions Last Dose Informant Patient Reported? Taking?   Diclofenac Sodium (VOLTAREN) 1 %  Self No No   Sig: Apply 2 g topically 4 (four) times a day   SUMAtriptan (IMITREX) 100 mg tablet  Self No No   Sig: Take 1 tablet (100 mg total) by mouth daily as needed for migraine   Sennosides 15 MG CHEW  Self No No   Sig: Chew 1 tablet (15 mg total) 2 (two) times a day as needed (constipation)   ascorbic acid (VITAMIN C) 500 MG tablet  Self No No   Sig: Take 1 tablet (500 mg total) by mouth daily   bisacodyl (DULCOLAX) 5 mg EC tablet  Self No No   Sig: Take 2 tablets (10 mg total) by mouth 2 (two) times a day as needed for constipation (constipation) Colonoscopy prep   cyanocobalamin (VITAMIN B-12) 1000 MCG tablet  Self No No   Sig: Take 1 tablet (1,000 mcg total) by mouth daily   dexamethasone (DECADRON) 2 mg tablet  Self No No   Sig: Take 1 tablet (2 mg total) by mouth daily with breakfast   diphenhydrAMINE (BENADRYL) 25 mg tablet  Self No No   Sig: Take 1 tablet (25 mg total) by mouth every 6 (six) hours for 5 days   ergocalciferol (VITAMIN D2) 50,000 units  Self No No   Sig: TAKE 1 CAPSULE BY MOUTH ONCE A WEEK   famotidine (PEPCID) 20 mg tablet  Self No No   Sig: Take 1 tablet (20 mg total) by mouth 2 (two) times a day for 5 days   fluocinolone acetonide (DERMOTIC) 0.01 % otic oil  Self No No   Sig: INSTILL 3 DROPS IN BOTH EARS DAILY    fluticasone (FLONASE) 50 mcg/act nasal spray  Self No No   Si spray into each nostril 2 (two) times a day   Patient not taking: Reported on 3/20/2024   guaiFENesin (ROBITUSSIN) 100 MG/5ML oral liquid  Self No No   Sig: Take 10 mL (200 mg total) by mouth 3 (three) times a day as needed for cough   ketorolac (TORADOL) 10 mg tablet  Self No No   Sig: Take 1 tablet (10 mg total) by mouth every 6 (six) hours as needed for moderate pain   lidocaine (LIDODERM) 5 %  Self No No   Sig: Apply 1 patch topically over 12 hours every 24 hours Remove & Discard patch within 12 hours or as directed by MD   methocarbamol (ROBAXIN) 500 mg tablet  Self No No   Sig: Take 1 tablet (500 mg total) by mouth 2 (two) times a day   naproxen (EC NAPROSYN) 500 MG EC tablet  Self Yes No   Sig: Take 500 mg by mouth if needed for mild pain   nicotine (NICODERM CQ) 14 mg/24hr TD 24 hr patch  Self No No   Sig: PLACE 1 PATCH ON THE SKIN OVER 24 HOURS EVERY 24 HOURS   nystatin-triamcinolone (MYCOLOG-II) cream  Self No No   Sig: Apply topically 2 (two) times a day   oxyCODONE-acetaminophen (Percocet) 5-325 mg per tablet  Self No No   Sig: Take 1 tablet by mouth 2 (two) times a day as needed for moderate pain Max Daily Amount: 2 tablets   Patient not taking: Reported on 2023   pantoprazole (PROTONIX) 20 mg tablet   No No   Sig: Take 1 tablet (20 mg total) by mouth daily   polyethylene glycol (MIRALAX) 17 g packet  Self No No   Sig: Take 17 g by mouth daily   polyethylene glycol (MIRALAX) 17 g packet  Self No No   Sig: Take 17 g by mouth daily   prochlorperazine (COMPAZINE) 10 mg tablet  Self No No   Sig: Take 1 tablet (10 mg total) by mouth every 6 (six) hours as needed for nausea or vomiting   senna-docusate sodium (SENOKOT-S) 8.6-50 mg per tablet  Self No No   Sig: Take 1 tablet by mouth daily   senna-docusate sodium (SENOKOT-S) 8.6-50 mg per tablet   No No   Sig: Take 1 tablet by mouth daily   sodium chloride 0.9 % nebulizer solution  Self  No No   Sig: TAKE 3ML BY BEBULIZATION EVERY 6 HOURS AS NEEDED FOR WHEEZING OR SHORTNESS OF BREATH      Facility-Administered Medications: None       Past Medical History:   Diagnosis Date    Abnormal Pap smear of cervix     2015 cryosurgery; 6/2019 NILM pap/neg HPV    Allergic     Depression     Headache(784.0)     Migraine with aura        Past Surgical History:   Procedure Laterality Date    CHOLECYSTECTOMY LAPAROSCOPIC N/A 10/27/2020    Procedure: CHOLECYSTECTOMY LAPAROSCOPIC;  Surgeon: Lina Narayan MD;  Location:  MAIN OR;  Service: General    COLONOSCOPY      FL LUMBAR PUNCTURE DIAGNOSTIC  11/17/2023    GYNECOLOGIC CRYOSURGERY  2015    HYSTERECTOMY  2016    supracervical    LAPAROSCOPY  2018    NM LAPAROSCOPY W/LYSIS OF ADHESIONS N/A 03/03/2020    Procedure: L.O.A.;  Surgeon: Noy Paz MD;  Location: [facility];  Service: Gynecology    NM LAPS ABD PRTM&OMENTUM DX W/WO SPEC BR/WA SPX N/A 08/09/2019    Procedure: LAPAROSCOPY DIAGNOSTIC; LYSIS OF ADHESIONS;  Surgeon: Abebe Walls MD;  Location: [facility];  Service: Gynecology    TUBAL LIGATION      UPPER GASTROINTESTINAL ENDOSCOPY         Family History   Problem Relation Age of Onset    Cancer Paternal Grandmother         uterine    Cancer Paternal Aunt         uterine     Migraines Mother     Arthritis Mother     Hypertension Father     Breast cancer Neg Hx     Colon cancer Neg Hx     Ovarian cancer Neg Hx      I have reviewed and agree with the history as documented.    E-Cigarette/Vaping    E-Cigarette Use Never User      E-Cigarette/Vaping Substances    Nicotine No     THC No     CBD No     Flavoring No     Other No     Unknown No      Social History     Tobacco Use    Smoking status: Every Day     Current packs/day: 0.50     Average packs/day: 0.5 packs/day for 15.0 years (7.5 ttl pk-yrs)     Types: Cigarettes     Passive exposure: Current    Smokeless tobacco: Never   Vaping Use    Vaping status: Never Used   Substance Use  Topics    Alcohol use: Never    Drug use: Never       Review of Systems   Constitutional:  Positive for appetite change.   HENT: Negative.     Eyes: Negative.    Respiratory: Negative.     Cardiovascular: Negative.    Gastrointestinal:  Positive for abdominal pain and nausea.   Endocrine: Negative.    Genitourinary: Negative.    Musculoskeletal: Negative.    Skin: Negative.    Allergic/Immunologic: Negative.    Neurological: Negative.    Hematological: Negative.    Psychiatric/Behavioral: Negative.     All other systems reviewed and are negative.      Physical Exam  Physical Exam  Vitals and nursing note reviewed.   Constitutional:       Appearance: She is well-developed. She is obese.   HENT:      Head: Normocephalic and atraumatic.      Mouth/Throat:      Mouth: Mucous membranes are moist.      Pharynx: Oropharynx is clear.   Cardiovascular:      Rate and Rhythm: Normal rate and regular rhythm.      Heart sounds: Normal heart sounds.   Pulmonary:      Effort: Pulmonary effort is normal.      Breath sounds: Normal breath sounds.   Abdominal:      General: Abdomen is protuberant.      Palpations: Abdomen is soft.      Tenderness: There is abdominal tenderness in the left lower quadrant. There is no right CVA tenderness, left CVA tenderness, guarding or rebound. Negative signs include Lanza's sign.   Skin:     General: Skin is warm and dry.      Capillary Refill: Capillary refill takes less than 2 seconds.   Neurological:      General: No focal deficit present.      Mental Status: She is alert and oriented to person, place, and time.   Psychiatric:         Mood and Affect: Mood normal.         Behavior: Behavior normal.         Vital Signs  ED Triage Vitals [04/08/24 1853]   Temperature Pulse Respirations Blood Pressure SpO2   98.2 °F (36.8 °C) 94 20 162/89 100 %      Temp Source Heart Rate Source Patient Position - Orthostatic VS BP Location FiO2 (%)   Oral Monitor Sitting Right arm --      Pain Score       10 -  Worst Possible Pain           Vitals:    04/08/24 1853 04/08/24 2000   BP: 162/89 113/65   Pulse: 94 74   Patient Position - Orthostatic VS: Sitting Lying         Visual Acuity      ED Medications  Medications   morphine injection 4 mg (4 mg Intravenous Given 4/8/24 1921)   ondansetron (ZOFRAN) injection 4 mg (4 mg Intravenous Given 4/8/24 1921)       Diagnostic Studies  Results Reviewed       Procedure Component Value Units Date/Time    CBC and differential [[id number]] Collected: 04/08/24 1919    Lab Status: Final result Specimen: Blood from Arm, Right Updated: 04/08/24 1923     WBC 9.95 Thousand/uL      RBC 4.08 Million/uL      Hemoglobin 12.8 g/dL      Hematocrit 36.8 %      MCV 90 fL      MCH 31.4 pg      MCHC 34.8 g/dL      RDW 12.7 %      MPV 10.8 fL      Platelets 277 Thousands/uL      nRBC 0 /100 WBCs      Segmented % 69 %      Immature Grans % 0 %      Lymphocytes % 24 %      Monocytes % 6 %      Eosinophils Relative 1 %      Basophils Relative 0 %      Absolute Neutrophils 6.72 Thousands/µL      Absolute Immature Grans 0.03 Thousand/uL      Absolute Lymphocytes 2.42 Thousands/µL      Absolute Monocytes 0.61 Thousand/µL      Eosinophils Absolute 0.14 Thousand/µL      Basophils Absolute 0.03 Thousands/µL                    No orders to display              Procedures  Procedures         ED Course  ED Course as of 04/09/24 1535   Mon Apr 08, 2024 1950 WBC: 9.95   1950 Hemoglobin: 12.8   2012 Pain improved but not resolved.  Reviewed bloodwork.  Will dc with percocet.  Advised not to take with ultram.  Needs to keep calling for follow up.                                SBIRT 20yo+      Flowsheet Row Most Recent Value   Initial Alcohol Screen: US AUDIT-C     1. How often do you have a drink containing alcohol? 0 Filed at: 04/08/2024 1937   2. How many drinks containing alcohol do you have on a typical day you are drinking?  0 Filed at: 04/08/2024 1937   3b. FEMALE Any Age, or MALE 65+: How often do you have 4  or more drinks on one occassion? 0 Filed at: 04/08/2024 1937   Audit-C Score 0 Filed at: 04/08/2024 1937   LISBETH: How many times in the past year have you...    Used an illegal drug or used a prescription medication for non-medical reasons? Never Filed at: 04/08/2024 1937                      Medical Decision Making  Problems Addressed:  Ovarian cyst:     Details: Known ovarian cyst with extensive workup just a few days ago, US, CTA.  CBC unchanged.  Pain control and needs follow up with surgeon.     Amount and/or Complexity of Data Reviewed  External Data Reviewed: notes.     Details: Last ER visit.   Labs: ordered. Decision-making details documented in ED Course.    Risk  Prescription drug management.             Disposition  Final diagnoses:   Ovarian cyst     Time reflects when diagnosis was documented in both MDM as applicable and the Disposition within this note       Time User Action Codes Description Comment    4/8/2024  8:13 PM Eddie Martínez Add [N83.209] Ovarian cyst           ED Disposition       ED Disposition   Discharge    Condition   Stable    Date/Time   Mon Apr 8, 2024 2015    Comment   Lorrie Jones discharge to home/self care.                   Follow-up Information       Follow up With Specialties Details Why Contact Info    Margarita Walker PA-C [facility] [address] [address]  [phone]      Margarita Walker PA-C [facility] [address] [address]  [phone]              Discharge Medication List as of 4/8/2024  8:15 PM        START taking these medications    Details   !! oxyCODONE-acetaminophen (Percocet) 5-325 mg per tablet Take 1 tablet by mouth every 6 (six) hours as needed for moderate pain for up to 16 doses Max Daily Amount: 4 tablets, Starting Mon 4/8/2024, Normal       !! - Potential duplicate medications found. Please discuss with provider.        CONTINUE these medications which have NOT CHANGED     Details   ascorbic acid (VITAMIN C) 500 MG tablet Take 1 tablet (500 mg total) by mouth daily, Starting Tue 1/2/2024, Normal      bisacodyl (DULCOLAX) 5 mg EC tablet Take 2 tablets (10 mg total) by mouth 2 (two) times a day as needed for constipation (constipation) Colonoscopy prep, Starting Mon 11/6/2023, Normal      cyanocobalamin (VITAMIN B-12) 1000 MCG tablet Take 1 tablet (1,000 mcg total) by mouth daily, Starting Tue 1/2/2024, Normal      dexamethasone (DECADRON) 2 mg tablet Take 1 tablet (2 mg total) by mouth daily with breakfast, Starting Wed 3/20/2024, Normal      Diclofenac Sodium (VOLTAREN) 1 % Apply 2 g topically 4 (four) times a day, Starting Wed 11/22/2023, Normal      diphenhydrAMINE (BENADRYL) 25 mg tablet Take 1 tablet (25 mg total) by mouth every 6 (six) hours for 5 days, Starting Sun 1/14/2024, Until Wed 3/20/2024, Normal      ergocalciferol (VITAMIN D2) 50,000 units TAKE 1 CAPSULE BY MOUTH ONCE A WEEK, Starting Fri 12/29/2023, Normal      famotidine (PEPCID) 20 mg tablet Take 1 tablet (20 mg total) by mouth 2 (two) times a day for 5 days, Starting Sun 1/14/2024, Until Wed 3/20/2024, Normal      fluocinolone acetonide (DERMOTIC) 0.01 % otic oil INSTILL 3 DROPS IN BOTH EARS DAILY, Normal      fluticasone (FLONASE) 50 mcg/act nasal spray 1 spray into each nostril 2 (two) times a day, Starting Thu 10/19/2023, Normal      guaiFENesin (ROBITUSSIN) 100 MG/5ML oral liquid Take 10 mL (200 mg total) by mouth 3 (three) times a day as needed for cough, Starting Mon 10/2/2023, Normal      ketorolac (TORADOL) 10 mg tablet Take 1 tablet (10 mg total) by mouth every 6 (six) hours as needed for moderate pain, Starting Thu 7/6/2023, Normal      lidocaine (LIDODERM) 5 % Apply 1 patch topically over 12 hours every 24 hours Remove & Discard patch within 12 hours or as directed by MD, Starting Wed 11/22/2023, Print      methocarbamol (ROBAXIN) 500 mg tablet Take 1 tablet (500 mg total) by mouth 2 (two) times a day,  Starting Wed 11/22/2023, Normal      naproxen (EC NAPROSYN) 500 MG EC tablet Take 500 mg by mouth if needed for mild pain, Historical Med      nicotine (NICODERM CQ) 14 mg/24hr TD 24 hr patch PLACE 1 PATCH ON THE SKIN OVER 24 HOURS EVERY 24 HOURS, Starting Tue 3/19/2024, Normal      nystatin-triamcinolone (MYCOLOG-II) cream Apply topically 2 (two) times a day, Starting Thu 9/14/2023, Normal      !! oxyCODONE-acetaminophen (Percocet) 5-325 mg per tablet Take 1 tablet by mouth 2 (two) times a day as needed for moderate pain Max Daily Amount: 2 tablets, Starting Mon 11/6/2023, Normal      pantoprazole (PROTONIX) 20 mg tablet Take 1 tablet (20 mg total) by mouth daily, Starting Mon 4/8/2024, Until Sat 10/5/2024, Normal      !! polyethylene glycol (MIRALAX) 17 g packet Take 17 g by mouth daily, Starting Tue 1/2/2024, Until Sun 6/30/2024, Normal      !! polyethylene glycol (MIRALAX) 17 g packet Take 17 g by mouth daily, Starting Tue 1/30/2024, Until Sun 7/28/2024, Normal      prochlorperazine (COMPAZINE) 10 mg tablet Take 1 tablet (10 mg total) by mouth every 6 (six) hours as needed for nausea or vomiting, Starting Wed 9/6/2023, Normal      !! senna-docusate sodium (SENOKOT-S) 8.6-50 mg per tablet Take 1 tablet by mouth daily, Starting Tue 1/30/2024, Until Sun 7/28/2024, Normal      !! senna-docusate sodium (SENOKOT-S) 8.6-50 mg per tablet Take 1 tablet by mouth daily, Starting Mon 4/8/2024, Until Sat 10/5/2024, Normal      Sennosides 15 MG CHEW Chew 1 tablet (15 mg total) 2 (two) times a day as needed (constipation), Starting Tue 1/2/2024, Normal      sodium chloride 0.9 % nebulizer solution TAKE 3ML BY BEBULIZATION EVERY 6 HOURS AS NEEDED FOR WHEEZING OR SHORTNESS OF BREATH, Normal      SUMAtriptan (IMITREX) 100 mg tablet Take 1 tablet (100 mg total) by mouth daily as needed for migraine, Starting u 11/2/2023, Normal       !! - Potential duplicate medications found. Please discuss with provider.          No discharge  procedures on file.    PDMP Review         Value Time User    PDMP Reviewed  Yes 11/6/2023  9:40 AM Margarita Walker PA-C            ED Provider  Electronically Signed by             Eddie Martínez MD  04/09/24 [id number]

## 2024-04-14 NOTE — ASSESSMENT & PLAN NOTE
Patient reporting pain initiating along lower cervical spinous process when she lays down, radiating to her right arm.     MRI C spine:  C4-C5: Mild bulge. No significant canal stenosis or foraminal narrowing.  C5-C6: Central disc protrusion. Mild mass effect on the thecal sac. No significant foraminal narrowing.  C6-C7: Central disc protrusion. Mild mass effect on the thecal sac. No significant foraminal narrowing.  Probable atypical hemangioma in the C6 vertebral body.     EMG of the right arm reveals evidence of minimal chronic denervation most in keeping with a right C7 root distribution     Plan:  Recommend conservative management and use of prn robaxin and antiinflammatories

## 2024-04-14 NOTE — ASSESSMENT & PLAN NOTE
Lorrie Jones is a very pleasant  38 y.o. female with a past medical history that includes choledocholithiasis s/p cholescystectomy, hydrosalpinx, GERD, gastritis, hypoglycemia who presents for headache follow-up.    During our initial visit patient having and unilateral throbbing pain with migrainous features that had become more severe over the prior 6 months.  Headache accompanied by a visual aura.  Given change in nature of headache I ordered an MRI brain with and without contrast that showed a few scattered foci of T2/FLAIR hyperintensity in the periventricular and subcortical white matter, however there was an unusual left parietal white matter change that was larger with a T1 weighted correlate.  I ordered a follow-up MRI C-spine to evaluate for any potential T2 layer changes of the spine.  This was normal.  Follow-up 6 months MRI brain MS protocol without any new findings, stable.  B12 189.    Today patient reports that she currently has a headache.  She had a breakthrough headache after her MRI was done but otherwise she has been stable.  She reports decreased stress as she was able to find a place to move.    Overall I believe that patient has a migraine with aura induced by stress with good response to sumatriptan.    Plan:  Given that she currently has a headache, will give her Toradol 30 mg IM injection in office today and Decadron 2 mg x 5 days to break current cycle  Will continue to monitor her headaches.   Will consider repeating imaging in 1 to 2 years to assess for any additional white matter changes  Will continue sumatriptan 100 mg, advised on taking first does at onset of aura and a second dose if headache persists 1-2 H after first dose  Will avoid gabapentinoids given her history of Lyrica causing panic attacks  Continue B12 supplementation  For preventative purposes Riboflavin and magnesium 400 mg daily  Patient advised not to take over-the-counter or prescription pain medications  more than 3 days per week to prevent medication overuse/rebound headache  Recommend drinking at least 64 oz of water daily   Discussed with patient the importance of keeping good sleep hygiene  Advised to keep track of headache patterns  Follow up in 3 months

## 2024-04-17 ENCOUNTER — TELEPHONE (OUTPATIENT)
Age: 39
End: 2024-04-17

## 2024-04-17 NOTE — TELEPHONE ENCOUNTER
Pt called to ask what medication she should use to clear her bowels for a surgery coming up. I did not see any surgery with our group. The pt stated it is with someone else. I advised the pt to call the provider who is doing the surgery to ask them

## 2024-04-19 DIAGNOSIS — K59.00 ACUTE CONSTIPATION: ICD-10-CM

## 2024-04-19 RX ORDER — SENNOSIDES 15 MG/1
TABLET, CHEWABLE ORAL
Qty: 60 TABLET | Refills: 1 | Status: SHIPPED | OUTPATIENT
Start: 2024-04-19

## 2024-04-27 ENCOUNTER — APPOINTMENT (EMERGENCY)
Dept: CT IMAGING | Facility: HOSPITAL | Age: 39
End: 2024-04-27
Payer: COMMERCIAL

## 2024-04-27 ENCOUNTER — HOSPITAL ENCOUNTER (EMERGENCY)
Facility: HOSPITAL | Age: 39
Discharge: HOME | End: 2024-04-27
Attending: EMERGENCY MEDICINE
Payer: COMMERCIAL

## 2024-04-27 VITALS
SYSTOLIC BLOOD PRESSURE: 122 MMHG | TEMPERATURE: 98 F | OXYGEN SATURATION: 100 % | HEART RATE: 100 BPM | RESPIRATION RATE: 20 BRPM | BODY MASS INDEX: 28.67 KG/M2 | DIASTOLIC BLOOD PRESSURE: 87 MMHG | WEIGHT: 156.75 LBS

## 2024-04-27 DIAGNOSIS — G89.18 POST-OPERATIVE PAIN: Primary | ICD-10-CM

## 2024-04-27 LAB
ALBUMIN SERPL BCP-MCNC: 4.3 G/DL (ref 3.5–5)
ALP SERPL-CCNC: 64 U/L (ref 34–104)
ALT SERPL W P-5'-P-CCNC: 68 U/L (ref 7–52)
ANION GAP SERPL CALCULATED.3IONS-SCNC: 9 MMOL/L (ref 4–13)
AST SERPL W P-5'-P-CCNC: 49 U/L (ref 13–39)
BASOPHILS # BLD AUTO: 0.05 THOUSANDS/ÂΜL (ref 0–0.1)
BASOPHILS NFR BLD AUTO: 1 % (ref 0–1)
BILIRUB SERPL-MCNC: 0.34 MG/DL (ref 0.2–1)
BUN SERPL-MCNC: 13 MG/DL (ref 5–25)
CALCIUM SERPL-MCNC: 9.3 MG/DL (ref 8.4–10.2)
CHLORIDE SERPL-SCNC: 102 MMOL/L (ref 96–108)
CO2 SERPL-SCNC: 27 MMOL/L (ref 21–32)
CREAT SERPL-MCNC: 0.84 MG/DL (ref 0.6–1.3)
EOSINOPHIL # BLD AUTO: 0.78 THOUSAND/ÂΜL (ref 0–0.61)
EOSINOPHIL NFR BLD AUTO: 7 % (ref 0–6)
ERYTHROCYTE [DISTWIDTH] IN BLOOD BY AUTOMATED COUNT: 12.3 % (ref 11.6–15.1)
GFR SERPL CREATININE-BSD FRML MDRD: 88 ML/MIN/1.73SQ M
GLUCOSE SERPL-MCNC: 104 MG/DL (ref 65–140)
HCT VFR BLD AUTO: 39.7 % (ref 34.8–46.1)
HGB BLD-MCNC: 13.4 G/DL (ref 11.5–15.4)
IMM GRANULOCYTES # BLD AUTO: 0.05 THOUSAND/UL (ref 0–0.2)
IMM GRANULOCYTES NFR BLD AUTO: 1 % (ref 0–2)
LIPASE SERPL-CCNC: 16 U/L (ref 11–82)
LYMPHOCYTES # BLD AUTO: 2.37 THOUSANDS/ÂΜL (ref 0.6–4.47)
LYMPHOCYTES NFR BLD AUTO: 22 % (ref 14–44)
MAGNESIUM SERPL-MCNC: 1.9 MG/DL (ref 1.9–2.7)
MCH RBC QN AUTO: 31 PG (ref 26.8–34.3)
MCHC RBC AUTO-ENTMCNC: 33.8 G/DL (ref 31.4–37.4)
MCV RBC AUTO: 92 FL (ref 82–98)
MONOCYTES # BLD AUTO: 0.64 THOUSAND/ÂΜL (ref 0.17–1.22)
MONOCYTES NFR BLD AUTO: 6 % (ref 4–12)
NEUTROPHILS # BLD AUTO: 7.07 THOUSANDS/ÂΜL (ref 1.85–7.62)
NEUTS SEG NFR BLD AUTO: 63 % (ref 43–75)
NRBC BLD AUTO-RTO: 0 /100 WBCS
PLATELET # BLD AUTO: 297 THOUSANDS/UL (ref 149–390)
PMV BLD AUTO: 10.9 FL (ref 8.9–12.7)
POTASSIUM SERPL-SCNC: 4.2 MMOL/L (ref 3.5–5.3)
PROT SERPL-MCNC: 7.4 G/DL (ref 6.4–8.4)
RBC # BLD AUTO: 4.32 MILLION/UL (ref 3.81–5.12)
SODIUM SERPL-SCNC: 138 MMOL/L (ref 135–147)
WBC # BLD AUTO: 10.96 THOUSAND/UL (ref 4.31–10.16)

## 2024-04-27 PROCEDURE — 99285 EMERGENCY DEPT VISIT HI MDM: CPT | Performed by: EMERGENCY MEDICINE

## 2024-04-27 PROCEDURE — 36415 COLL VENOUS BLD VENIPUNCTURE: CPT | Performed by: EMERGENCY MEDICINE

## 2024-04-27 PROCEDURE — 96375 TX/PRO/DX INJ NEW DRUG ADDON: CPT

## 2024-04-27 PROCEDURE — 80053 COMPREHEN METABOLIC PANEL: CPT | Performed by: EMERGENCY MEDICINE

## 2024-04-27 PROCEDURE — 85025 COMPLETE CBC W/AUTO DIFF WBC: CPT | Performed by: EMERGENCY MEDICINE

## 2024-04-27 PROCEDURE — 96374 THER/PROPH/DIAG INJ IV PUSH: CPT

## 2024-04-27 PROCEDURE — 99283 EMERGENCY DEPT VISIT LOW MDM: CPT

## 2024-04-27 PROCEDURE — 96361 HYDRATE IV INFUSION ADD-ON: CPT

## 2024-04-27 PROCEDURE — 83690 ASSAY OF LIPASE: CPT | Performed by: EMERGENCY MEDICINE

## 2024-04-27 PROCEDURE — 74177 CT ABD & PELVIS W/CONTRAST: CPT

## 2024-04-27 PROCEDURE — 83735 ASSAY OF MAGNESIUM: CPT | Performed by: EMERGENCY MEDICINE

## 2024-04-27 RX ORDER — TRAMADOL HYDROCHLORIDE 50 MG/1
50 TABLET ORAL EVERY 8 HOURS PRN
Qty: 10 TABLET | Refills: 0 | Status: SHIPPED | OUTPATIENT
Start: 2024-04-27 | End: 2024-05-01

## 2024-04-27 RX ORDER — ONDANSETRON 2 MG/ML
4 INJECTION INTRAMUSCULAR; INTRAVENOUS ONCE
Status: COMPLETED | OUTPATIENT
Start: 2024-04-27 | End: 2024-04-27

## 2024-04-27 RX ORDER — TRAMADOL HYDROCHLORIDE 50 MG/1
50 TABLET ORAL ONCE
Status: COMPLETED | OUTPATIENT
Start: 2024-04-27 | End: 2024-04-27

## 2024-04-27 RX ORDER — MORPHINE SULFATE 4 MG/ML
4 INJECTION, SOLUTION INTRAMUSCULAR; INTRAVENOUS ONCE
Status: COMPLETED | OUTPATIENT
Start: 2024-04-27 | End: 2024-04-27

## 2024-04-27 RX ADMIN — SODIUM CHLORIDE 1000 ML: 0.9 INJECTION, SOLUTION INTRAVENOUS at 19:24

## 2024-04-27 RX ADMIN — TRAMADOL HYDROCHLORIDE 50 MG: 50 TABLET, COATED ORAL at 22:36

## 2024-04-27 RX ADMIN — MORPHINE SULFATE 4 MG: 4 INJECTION, SOLUTION INTRAMUSCULAR; INTRAVENOUS at 19:17

## 2024-04-27 RX ADMIN — ONDANSETRON 4 MG: 2 INJECTION INTRAMUSCULAR; INTRAVENOUS at 19:17

## 2024-04-27 RX ADMIN — IOHEXOL 100 ML: 350 INJECTION, SOLUTION INTRAVENOUS at 19:35

## 2024-04-27 NOTE — ED PROVIDER NOTES
History  Chief Complaint   Patient presents with    Wound Check     Patient had surgery to remove a cyst from her ovary on Tuesday at [facility]; patient c/o increasing pain and dizziness. Using tramadol for pain     39-year-old female presenting emerged department with right-sided abdominal pain, currently 4 days postop salpingo-oophorectomy by gyn onc at outside hospital.  No nausea vomiting.  No fever chest pain or diarrhea.        Prior to Admission Medications   Prescriptions Last Dose Informant Patient Reported? Taking?   Diclofenac Sodium (VOLTAREN) 1 %  Self No No   Sig: Apply 2 g topically 4 (four) times a day   SUMAtriptan (IMITREX) 100 mg tablet  Self No No   Sig: Take 1 tablet (100 mg total) by mouth daily as needed for migraine   Sennosides (Ex-Lax) 15 MG CHEW   No No   Sig: CHEW 1 TABLET BY MOUTH TWICE DAILY AS NEEDED FOR CONSTIPATION   ascorbic acid (VITAMIN C) 500 MG tablet  Self No No   Sig: Take 1 tablet (500 mg total) by mouth daily   bisacodyl (DULCOLAX) 5 mg EC tablet  Self No No   Sig: Take 2 tablets (10 mg total) by mouth 2 (two) times a day as needed for constipation (constipation) Colonoscopy prep   cyanocobalamin (VITAMIN B-12) 1000 MCG tablet  Self No No   Sig: Take 1 tablet (1,000 mcg total) by mouth daily   dexamethasone (DECADRON) 2 mg tablet  Self No No   Sig: Take 1 tablet (2 mg total) by mouth daily with breakfast   diphenhydrAMINE (BENADRYL) 25 mg tablet  Self No No   Sig: Take 1 tablet (25 mg total) by mouth every 6 (six) hours for 5 days   ergocalciferol (VITAMIN D2) 50,000 units  Self No No   Sig: TAKE 1 CAPSULE BY MOUTH ONCE A WEEK   famotidine (PEPCID) 20 mg tablet  Self No No   Sig: Take 1 tablet (20 mg total) by mouth 2 (two) times a day for 5 days   fluocinolone acetonide (DERMOTIC) 0.01 % otic oil  Self No No   Sig: INSTILL 3 DROPS IN BOTH EARS DAILY   fluticasone (FLONASE) 50 mcg/act nasal spray  Self No No   Si spray into each nostril 2 (two) times a day   Patient not  taking: Reported on 3/20/2024   guaiFENesin (ROBITUSSIN) 100 MG/5ML oral liquid  Self No No   Sig: Take 10 mL (200 mg total) by mouth 3 (three) times a day as needed for cough   ketorolac (TORADOL) 10 mg tablet  Self No No   Sig: Take 1 tablet (10 mg total) by mouth every 6 (six) hours as needed for moderate pain   lidocaine (LIDODERM) 5 %  Self No No   Sig: Apply 1 patch topically over 12 hours every 24 hours Remove & Discard patch within 12 hours or as directed by MD   methocarbamol (ROBAXIN) 500 mg tablet  Self No No   Sig: Take 1 tablet (500 mg total) by mouth 2 (two) times a day   naproxen (EC NAPROSYN) 500 MG EC tablet  Self Yes No   Sig: Take 500 mg by mouth if needed for mild pain   nicotine (NICODERM CQ) 14 mg/24hr TD 24 hr patch  Self No No   Sig: PLACE 1 PATCH ON THE SKIN OVER 24 HOURS EVERY 24 HOURS   nystatin-triamcinolone (MYCOLOG-II) cream  Self No No   Sig: Apply topically 2 (two) times a day   oxyCODONE-acetaminophen (Percocet) 5-325 mg per tablet  Self No No   Sig: Take 1 tablet by mouth 2 (two) times a day as needed for moderate pain Max Daily Amount: 2 tablets   Patient not taking: Reported on 12/30/2023   oxyCODONE-acetaminophen (Percocet) 5-325 mg per tablet   No No   Sig: Take 1 tablet by mouth every 6 (six) hours as needed for moderate pain for up to 16 doses Max Daily Amount: 4 tablets   pantoprazole (PROTONIX) 20 mg tablet   No No   Sig: Take 1 tablet (20 mg total) by mouth daily   polyethylene glycol (MIRALAX) 17 g packet  Self No No   Sig: Take 17 g by mouth daily   polyethylene glycol (MIRALAX) 17 g packet  Self No No   Sig: Take 17 g by mouth daily   prochlorperazine (COMPAZINE) 10 mg tablet  Self No No   Sig: Take 1 tablet (10 mg total) by mouth every 6 (six) hours as needed for nausea or vomiting   senna-docusate sodium (SENOKOT-S) 8.6-50 mg per tablet  Self No No   Sig: Take 1 tablet by mouth daily   senna-docusate sodium (SENOKOT-S) 8.6-50 mg per tablet   No No   Sig: Take 1 tablet  by mouth daily   sodium chloride 0.9 % nebulizer solution  Self No No   Sig: TAKE 3ML BY BEBULIZATION EVERY 6 HOURS AS NEEDED FOR WHEEZING OR SHORTNESS OF BREATH      Facility-Administered Medications: None       Past Medical History:   Diagnosis Date    Abnormal Pap smear of cervix     2015 cryosurgery; 6/2019 NILM pap/neg HPV    Allergic     Depression     Headache(784.0)     Migraine with aura        Past Surgical History:   Procedure Laterality Date    CHOLECYSTECTOMY LAPAROSCOPIC N/A 10/27/2020    Procedure: CHOLECYSTECTOMY LAPAROSCOPIC;  Surgeon: Lina Narayan MD;  Location:  MAIN OR;  Service: General    COLONOSCOPY      FL LUMBAR PUNCTURE DIAGNOSTIC  11/17/2023    GYNECOLOGIC CRYOSURGERY  2015    HYSTERECTOMY  2016    supracervical    LAPAROSCOPY  2018    AR LAPAROSCOPY W/LYSIS OF ADHESIONS N/A 03/03/2020    Procedure: L.O.A.;  Surgeon: Noy Paz MD;  Location: [facility];  Service: Gynecology    AR LAPS ABD PRTM&OMENTUM DX W/WO SPEC BR/WA SPX N/A 08/09/2019    Procedure: LAPAROSCOPY DIAGNOSTIC; LYSIS OF ADHESIONS;  Surgeon: Abebe Walls MD;  Location: [facility];  Service: Gynecology    TUBAL LIGATION      UPPER GASTROINTESTINAL ENDOSCOPY         Family History   Problem Relation Age of Onset    Cancer Paternal Grandmother         uterine    Cancer Paternal Aunt         uterine     Migraines Mother     Arthritis Mother     Hypertension Father     Breast cancer Neg Hx     Colon cancer Neg Hx     Ovarian cancer Neg Hx      I have reviewed and agree with the history as documented.    E-Cigarette/Vaping    E-Cigarette Use Never User      E-Cigarette/Vaping Substances    Nicotine No     THC No     CBD No     Flavoring No     Other No     Unknown No      Social History     Tobacco Use    Smoking status: Every Day     Current packs/day: 0.50     Average packs/day: 0.5 packs/day for 15.0 years (7.5 ttl pk-yrs)     Types: Cigarettes     Passive exposure: Current    Smokeless tobacco:  Never   Vaping Use    Vaping status: Never Used   Substance Use Topics    Alcohol use: Never    Drug use: Never       Review of Systems   Constitutional:  Negative for chills and fever.   HENT:  Negative for ear pain and sore throat.    Eyes:  Negative for pain and visual disturbance.   Respiratory:  Negative for cough and shortness of breath.    Cardiovascular:  Negative for chest pain and palpitations.   Gastrointestinal:  Positive for abdominal pain. Negative for vomiting.   Genitourinary:  Negative for dysuria and hematuria.   Musculoskeletal:  Negative for arthralgias and back pain.   Skin:  Negative for color change and rash.   Neurological:  Negative for seizures and syncope.   All other systems reviewed and are negative.      Physical Exam  Physical Exam  Vitals and nursing note reviewed.   Constitutional:       General: She is not in acute distress.     Appearance: She is well-developed.   HENT:      Head: Normocephalic and atraumatic.   Eyes:      Conjunctiva/sclera: Conjunctivae normal.   Cardiovascular:      Rate and Rhythm: Normal rate and regular rhythm.      Heart sounds: No murmur heard.  Pulmonary:      Effort: Pulmonary effort is normal. No respiratory distress.      Breath sounds: Normal breath sounds.   Abdominal:      Palpations: Abdomen is soft.      Tenderness: There is abdominal tenderness.      Comments: Right upper quadrant right lower quadrant tenderness to palpation without rebound but with guarding.   Musculoskeletal:         General: No swelling.      Cervical back: Neck supple.   Skin:     General: Skin is warm and dry.      Capillary Refill: Capillary refill takes less than 2 seconds.   Neurological:      Mental Status: She is alert.   Psychiatric:         Mood and Affect: Mood normal.         Vital Signs  ED Triage Vitals   Temperature Pulse Respirations Blood Pressure SpO2   04/27/24 1851 04/27/24 1851 04/27/24 1851 04/27/24 1851 04/27/24 1851   98 °F (36.7 °C) 100 20 122/87 100 %       Temp src Heart Rate Source Patient Position - Orthostatic VS BP Location FiO2 (%)   -- -- -- -- --             Pain Score       04/27/24 1917       9           Vitals:    04/27/24 1851   BP: 122/87   Pulse: 100         Visual Acuity      ED Medications  Medications   sodium chloride 0.9 % bolus 1,000 mL (0 mL Intravenous Stopped 4/27/24 2123)   ondansetron (ZOFRAN) injection 4 mg (4 mg Intravenous Given 4/27/24 1917)   morphine injection 4 mg (4 mg Intravenous Given 4/27/24 1917)   iohexol (OMNIPAQUE) 350 MG/ML injection (MULTI-DOSE) 100 mL (100 mL Intravenous Given 4/27/24 1935)   traMADol (ULTRAM) tablet 50 mg (50 mg Oral Given 4/27/24 2236)       Diagnostic Studies  Results Reviewed       Procedure Component Value Units Date/Time    Comprehensive metabolic panel [[id number]]  (Abnormal) Collected: 04/27/24 1920    Lab Status: Final result Specimen: Blood from Arm, Right Updated: 04/27/24 1959     Sodium 138 mmol/L      Potassium 4.2 mmol/L      Chloride 102 mmol/L      CO2 27 mmol/L      ANION GAP 9 mmol/L      BUN 13 mg/dL      Creatinine 0.84 mg/dL      Glucose 104 mg/dL      Calcium 9.3 mg/dL      AST 49 U/L      ALT 68 U/L      Alkaline Phosphatase 64 U/L      Total Protein 7.4 g/dL      Albumin 4.3 g/dL      Total Bilirubin 0.34 mg/dL      eGFR 88 ml/min/1.73sq m     Narrative:      National Kidney Disease Foundation guidelines for Chronic Kidney Disease (CKD):     Stage 1 with normal or high GFR (GFR > 90 mL/min/1.73 square meters)    Stage 2 Mild CKD (GFR = 60-89 mL/min/1.73 square meters)    Stage 3A Moderate CKD (GFR = 45-59 mL/min/1.73 square meters)    Stage 3B Moderate CKD (GFR = 30-44 mL/min/1.73 square meters)    Stage 4 Severe CKD (GFR = 15-29 mL/min/1.73 square meters)    Stage 5 End Stage CKD (GFR <15 mL/min/1.73 square meters)  Note: GFR calculation is accurate only with a steady state creatinine    Magnesium [[id number]]  (Normal) Collected: 04/27/24 1920    Lab Status: Final result  Specimen: Blood from Arm, Right Updated: 04/27/24 1959     Magnesium 1.9 mg/dL     Lipase [[id number]]  (Normal) Collected: 04/27/24 1920    Lab Status: Final result Specimen: Blood from Arm, Right Updated: 04/27/24 1959     Lipase 16 u/L     CBC and differential [[id number]]  (Abnormal) Collected: 04/27/24 1920    Lab Status: Final result Specimen: Blood from Arm, Right Updated: 04/27/24 1934     WBC 10.96 Thousand/uL      RBC 4.32 Million/uL      Hemoglobin 13.4 g/dL      Hematocrit 39.7 %      MCV 92 fL      MCH 31.0 pg      MCHC 33.8 g/dL      RDW 12.3 %      MPV 10.9 fL      Platelets 297 Thousands/uL      nRBC 0 /100 WBCs      Segmented % 63 %      Immature Grans % 1 %      Lymphocytes % 22 %      Monocytes % 6 %      Eosinophils Relative 7 %      Basophils Relative 1 %      Absolute Neutrophils 7.07 Thousands/µL      Absolute Immature Grans 0.05 Thousand/uL      Absolute Lymphocytes 2.37 Thousands/µL      Absolute Monocytes 0.64 Thousand/µL      Eosinophils Absolute 0.78 Thousand/µL      Basophils Absolute 0.05 Thousands/µL                    CT abdomen pelvis with contrast   Final Result by Jay Palacios MD (04/27 2119)      Marked irregular inflammatory change involving a segment of sigmoid colon along the left pelvic sidewall which given recent percutaneous aspiration of an adnexal cyst in approximately the same location raises concern for serosal layer bowel injury versus    colitis of infectious/inflammatory etiology. No evidence of intraperitoneal air or organized collection..      Right adnexal cyst measuring 5.7 cm. Based on the ACR O-RADS system, this is O-RADS category 2 (almost certainly benign with <1% risk of malignancy). The management recommendation is follow-up pelvic ultrasound in 1 year.      REFERENCE: Radiology 2020; [id number]               Workstation performed: [id number]                    Procedures  Procedures         ED Course                               SBIRT 22yo+      Flowsheet  Row Most Recent Value   Initial Alcohol Screen: US AUDIT-C     1. How often do you have a drink containing alcohol? 0 Filed at: 04/27/2024 1852   2. How many drinks containing alcohol do you have on a typical day you are drinking?  0 Filed at: 04/27/2024 1852   3a. Male UNDER 65: How often do you have five or more drinks on one occasion? 0 Filed at: 04/27/2024 1852   3b. FEMALE Any Age, or MALE 65+: How often do you have 4 or more drinks on one occassion? 0 Filed at: 04/27/2024 1852   Audit-C Score 0 Filed at: 04/27/2024 1852   LISBETH: How many times in the past year have you...    Used an illegal drug or used a prescription medication for non-medical reasons? Never Filed at: 04/27/2024 1852                      Medical Decision Making  39-year-old female with right-sided abdominal pain postoperatively.  Concerning for postoperative infection versus bowel injury.  CT abdomen pelvis concerning for bowel injury, discussed with [facility] Gyn Onc surgeon Dr. Pleitez about concern for bowel surgery.  He notes that there was a postoperative colonoscopy done and there is no bowel injury and that his clinic will see patient on Tuesday if the patient calls on Monday to make an appointment.  Patient notified to call Monday and make an appointment.    Amount and/or Complexity of Data Reviewed  Labs: ordered.  Radiology: ordered.    Risk  Prescription drug management.             Disposition  Final diagnoses:   Post-operative pain     Time reflects when diagnosis was documented in both MDM as applicable and the Disposition within this note       Time User Action Codes Description Comment    4/27/2024 10:34 PM Howard Spear Add [G89.18] Post-operative pain           ED Disposition       ED Disposition   Discharge    Condition   Stable    Date/Time   Sat Apr 27, 2024 2233    Comment   Lorrie Jones discharge to home/self care.                   Follow-up Information       Follow up With Specialties Details Why  Contact Info    Margarita Walker PA-C Family [place] [id number]  [phone]              Discharge Medication List as of 4/27/2024 10:34 PM        START taking these medications    Details   traMADol (Ultram) 50 mg tablet Take 1 tablet (50 mg total) by mouth every 8 (eight) hours as needed for moderate pain for up to 4 days, Starting Sat 4/27/2024, Until Wed 5/1/2024 at 2359, Normal           CONTINUE these medications which have NOT CHANGED    Details   ascorbic acid (VITAMIN C) 500 MG tablet Take 1 tablet (500 mg total) by mouth daily, Starting Tue 1/2/2024, Normal      bisacodyl (DULCOLAX) 5 mg EC tablet Take 2 tablets (10 mg total) by mouth 2 (two) times a day as needed for constipation (constipation) Colonoscopy prep, Starting Mon 11/6/2023, Normal      cyanocobalamin (VITAMIN B-12) 1000 MCG tablet Take 1 tablet (1,000 mcg total) by mouth daily, Starting Tue 1/2/2024, Normal      dexamethasone (DECADRON) 2 mg tablet Take 1 tablet (2 mg total) by mouth daily with breakfast, Starting Wed 3/20/2024, Normal      Diclofenac Sodium (VOLTAREN) 1 % Apply 2 g topically 4 (four) times a day, Starting Wed 11/22/2023, Normal      diphenhydrAMINE (BENADRYL) 25 mg tablet Take 1 tablet (25 mg total) by mouth every 6 (six) hours for 5 days, Starting Sun 1/14/2024, Until Wed 3/20/2024, Normal      ergocalciferol (VITAMIN D2) 50,000 units TAKE 1 CAPSULE BY MOUTH ONCE A WEEK, Starting Fri 12/29/2023, Normal      famotidine (PEPCID) 20 mg tablet Take 1 tablet (20 mg total) by mouth 2 (two) times a day for 5 days, Starting Sun 1/14/2024, Until Wed 3/20/2024, Normal      fluocinolone acetonide (DERMOTIC) 0.01 % otic oil INSTILL 3 DROPS IN BOTH EARS DAILY, Normal      fluticasone (FLONASE) 50 mcg/act nasal spray 1 spray into each nostril 2 (two) times a day, Starting u 10/19/2023, Normal      guaiFENesin (ROBITUSSIN) 100 MG/5ML oral liquid Take 10 mL (200 mg total) by mouth 3 (three) times  a day as needed for cough, Starting Mon 10/2/2023, Normal      ketorolac (TORADOL) 10 mg tablet Take 1 tablet (10 mg total) by mouth every 6 (six) hours as needed for moderate pain, Starting Thu 7/6/2023, Normal      lidocaine (LIDODERM) 5 % Apply 1 patch topically over 12 hours every 24 hours Remove & Discard patch within 12 hours or as directed by MD, Starting Wed 11/22/2023, Print      methocarbamol (ROBAXIN) 500 mg tablet Take 1 tablet (500 mg total) by mouth 2 (two) times a day, Starting Wed 11/22/2023, Normal      naproxen (EC NAPROSYN) 500 MG EC tablet Take 500 mg by mouth if needed for mild pain, Historical Med      nicotine (NICODERM CQ) 14 mg/24hr TD 24 hr patch PLACE 1 PATCH ON THE SKIN OVER 24 HOURS EVERY 24 HOURS, Starting Tue 3/19/2024, Normal      nystatin-triamcinolone (MYCOLOG-II) cream Apply topically 2 (two) times a day, Starting Thu 9/14/2023, Normal      !! oxyCODONE-acetaminophen (Percocet) 5-325 mg per tablet Take 1 tablet by mouth 2 (two) times a day as needed for moderate pain Max Daily Amount: 2 tablets, Starting Mon 11/6/2023, Normal      !! oxyCODONE-acetaminophen (Percocet) 5-325 mg per tablet Take 1 tablet by mouth every 6 (six) hours as needed for moderate pain for up to 16 doses Max Daily Amount: 4 tablets, Starting Mon 4/8/2024, Normal      pantoprazole (PROTONIX) 20 mg tablet Take 1 tablet (20 mg total) by mouth daily, Starting Mon 4/8/2024, Until Sat 10/5/2024, Normal      !! polyethylene glycol (MIRALAX) 17 g packet Take 17 g by mouth daily, Starting Tue 1/2/2024, Until Sun 6/30/2024, Normal      !! polyethylene glycol (MIRALAX) 17 g packet Take 17 g by mouth daily, Starting Tue 1/30/2024, Until Sun 7/28/2024, Normal      prochlorperazine (COMPAZINE) 10 mg tablet Take 1 tablet (10 mg total) by mouth every 6 (six) hours as needed for nausea or vomiting, Starting Wed 9/6/2023, Normal      !! senna-docusate sodium (SENOKOT-S) 8.6-50 mg per tablet Take 1 tablet by mouth daily,  Starting Tue 1/30/2024, Until Sun 7/28/2024, Normal      !! senna-docusate sodium (SENOKOT-S) 8.6-50 mg per tablet Take 1 tablet by mouth daily, Starting Mon 4/8/2024, Until Sat 10/5/2024, Normal      Sennosides (Ex-Lax) 15 MG CHEW CHEW 1 TABLET BY MOUTH TWICE DAILY AS NEEDED FOR CONSTIPATION, Normal      sodium chloride 0.9 % nebulizer solution TAKE 3ML BY BEBULIZATION EVERY 6 HOURS AS NEEDED FOR WHEEZING OR SHORTNESS OF BREATH, Normal      SUMAtriptan (IMITREX) 100 mg tablet Take 1 tablet (100 mg total) by mouth daily as needed for migraine, Starting Thu 11/2/2023, Normal       !! - Potential duplicate medications found. Please discuss with provider.          No discharge procedures on file.    PDMP Review         Value Time User    PDMP Reviewed  Yes 11/6/2023  9:40 AM Margarita Walker PA-C            ED Provider  Electronically Signed by             Howard Spear MD  04/27/24 0571

## 2024-05-03 RX ORDER — DEXAMETHASONE 2 MG/1
2 TABLET ORAL
Qty: 5 TABLET | Refills: 0 | Status: SHIPPED | OUTPATIENT
Start: 2024-05-03

## 2024-05-04 ENCOUNTER — HOSPITAL ENCOUNTER (EMERGENCY)
Facility: HOSPITAL | Age: 39
Discharge: HOME | End: 2024-05-04
Attending: EMERGENCY MEDICINE
Payer: COMMERCIAL

## 2024-05-04 VITALS
OXYGEN SATURATION: 99 % | SYSTOLIC BLOOD PRESSURE: 114 MMHG | RESPIRATION RATE: 18 BRPM | HEART RATE: 94 BPM | DIASTOLIC BLOOD PRESSURE: 88 MMHG | BODY MASS INDEX: 28.73 KG/M2 | WEIGHT: 157.1 LBS

## 2024-05-04 DIAGNOSIS — G43.909 MIGRAINE: Primary | ICD-10-CM

## 2024-05-04 LAB — GLUCOSE SERPL-MCNC: 115 MG/DL (ref 65–140)

## 2024-05-04 PROCEDURE — 82948 REAGENT STRIP/BLOOD GLUCOSE: CPT

## 2024-05-04 PROCEDURE — 99284 EMERGENCY DEPT VISIT MOD MDM: CPT | Performed by: EMERGENCY MEDICINE

## 2024-05-04 PROCEDURE — 96374 THER/PROPH/DIAG INJ IV PUSH: CPT

## 2024-05-04 PROCEDURE — 99284 EMERGENCY DEPT VISIT MOD MDM: CPT

## 2024-05-04 PROCEDURE — 96375 TX/PRO/DX INJ NEW DRUG ADDON: CPT

## 2024-05-04 PROCEDURE — 96361 HYDRATE IV INFUSION ADD-ON: CPT

## 2024-05-04 RX ORDER — KETOROLAC TROMETHAMINE 30 MG/ML
15 INJECTION, SOLUTION INTRAMUSCULAR; INTRAVENOUS ONCE
Status: COMPLETED | OUTPATIENT
Start: 2024-05-04 | End: 2024-05-04

## 2024-05-04 RX ORDER — METOCLOPRAMIDE HYDROCHLORIDE 5 MG/ML
10 INJECTION INTRAMUSCULAR; INTRAVENOUS ONCE
Status: COMPLETED | OUTPATIENT
Start: 2024-05-04 | End: 2024-05-04

## 2024-05-04 RX ORDER — DIPHENHYDRAMINE HYDROCHLORIDE 50 MG/ML
25 INJECTION INTRAMUSCULAR; INTRAVENOUS ONCE
Status: COMPLETED | OUTPATIENT
Start: 2024-05-04 | End: 2024-05-04

## 2024-05-04 RX ADMIN — SODIUM CHLORIDE 1000 ML: 0.9 INJECTION, SOLUTION INTRAVENOUS at 21:33

## 2024-05-04 RX ADMIN — DIPHENHYDRAMINE HYDROCHLORIDE 25 MG: 50 INJECTION, SOLUTION INTRAMUSCULAR; INTRAVENOUS at 21:35

## 2024-05-04 RX ADMIN — METOCLOPRAMIDE 10 MG: 5 INJECTION, SOLUTION INTRAMUSCULAR; INTRAVENOUS at 21:35

## 2024-05-04 RX ADMIN — KETOROLAC TROMETHAMINE 15 MG: 30 INJECTION, SOLUTION INTRAMUSCULAR; INTRAVENOUS at 21:34

## 2024-05-05 DIAGNOSIS — M54.10 RADICULITIS: ICD-10-CM

## 2024-05-05 NOTE — ED PROVIDER NOTES
History  Chief Complaint   Patient presents with    Headache     Headache since yesterday - took Sumatriptan with no relief - had a few episodes of vomiting and nausea      Presents with a typical migraine that did not improve with sumatriptan.  No new features.  Nausea, no vision changes.  Nonbloody nonbilious emesis        Prior to Admission Medications   Prescriptions Last Dose Informant Patient Reported? Taking?   Diclofenac Sodium (VOLTAREN) 1 %  Self No No   Sig: Apply 2 g topically 4 (four) times a day   SUMAtriptan (IMITREX) 100 mg tablet  Self No No   Sig: Take 1 tablet (100 mg total) by mouth daily as needed for migraine   Sennosides (Ex-Lax) 15 MG CHEW   No No   Sig: CHEW 1 TABLET BY MOUTH TWICE DAILY AS NEEDED FOR CONSTIPATION   ascorbic acid (VITAMIN C) 500 MG tablet  Self No No   Sig: Take 1 tablet (500 mg total) by mouth daily   bisacodyl (DULCOLAX) 5 mg EC tablet  Self No No   Sig: Take 2 tablets (10 mg total) by mouth 2 (two) times a day as needed for constipation (constipation) Colonoscopy prep   cyanocobalamin (VITAMIN B-12) 1000 MCG tablet  Self No No   Sig: Take 1 tablet (1,000 mcg total) by mouth daily   dexamethasone (DECADRON) 2 mg tablet   No No   Sig: TAKE 1 TABLET (2 MG TOTAL) BY MOUTH DAILY WITH BREAKFAST   diphenhydrAMINE (BENADRYL) 25 mg tablet  Self No No   Sig: Take 1 tablet (25 mg total) by mouth every 6 (six) hours for 5 days   ergocalciferol (VITAMIN D2) 50,000 units  Self No No   Sig: TAKE 1 CAPSULE BY MOUTH ONCE A WEEK   famotidine (PEPCID) 20 mg tablet  Self No No   Sig: Take 1 tablet (20 mg total) by mouth 2 (two) times a day for 5 days   fluocinolone acetonide (DERMOTIC) 0.01 % otic oil  Self No No   Sig: INSTILL 3 DROPS IN BOTH EARS DAILY   fluticasone (FLONASE) 50 mcg/act nasal spray  Self No No   Si spray into each nostril 2 (two) times a day   Patient not taking: Reported on 3/20/2024   guaiFENesin (ROBITUSSIN) 100 MG/5ML oral liquid  Self No No   Sig: Take 10 mL (200  mg total) by mouth 3 (three) times a day as needed for cough   ketorolac (TORADOL) 10 mg tablet  Self No No   Sig: Take 1 tablet (10 mg total) by mouth every 6 (six) hours as needed for moderate pain   lidocaine (LIDODERM) 5 %  Self No No   Sig: Apply 1 patch topically over 12 hours every 24 hours Remove & Discard patch within 12 hours or as directed by MD   methocarbamol (ROBAXIN) 500 mg tablet  Self No No   Sig: Take 1 tablet (500 mg total) by mouth 2 (two) times a day   naproxen (EC NAPROSYN) 500 MG EC tablet  Self Yes No   Sig: Take 500 mg by mouth if needed for mild pain   nicotine (NICODERM CQ) 14 mg/24hr TD 24 hr patch  Self No No   Sig: PLACE 1 PATCH ON THE SKIN OVER 24 HOURS EVERY 24 HOURS   nystatin-triamcinolone (MYCOLOG-II) cream  Self No No   Sig: Apply topically 2 (two) times a day   oxyCODONE-acetaminophen (Percocet) 5-325 mg per tablet  Self No No   Sig: Take 1 tablet by mouth 2 (two) times a day as needed for moderate pain Max Daily Amount: 2 tablets   Patient not taking: Reported on 12/30/2023   oxyCODONE-acetaminophen (Percocet) 5-325 mg per tablet   No No   Sig: Take 1 tablet by mouth every 6 (six) hours as needed for moderate pain for up to 16 doses Max Daily Amount: 4 tablets   pantoprazole (PROTONIX) 20 mg tablet   No No   Sig: Take 1 tablet (20 mg total) by mouth daily   polyethylene glycol (MIRALAX) 17 g packet  Self No No   Sig: Take 17 g by mouth daily   polyethylene glycol (MIRALAX) 17 g packet  Self No No   Sig: Take 17 g by mouth daily   prochlorperazine (COMPAZINE) 10 mg tablet  Self No No   Sig: Take 1 tablet (10 mg total) by mouth every 6 (six) hours as needed for nausea or vomiting   senna-docusate sodium (SENOKOT-S) 8.6-50 mg per tablet  Self No No   Sig: Take 1 tablet by mouth daily   senna-docusate sodium (SENOKOT-S) 8.6-50 mg per tablet   No No   Sig: Take 1 tablet by mouth daily   sodium chloride 0.9 % nebulizer solution  Self No No   Sig: TAKE 3ML BY BEBULIZATION EVERY 6 HOURS  AS NEEDED FOR WHEEZING OR SHORTNESS OF BREATH      Facility-Administered Medications: None       Past Medical History:   Diagnosis Date    Abnormal Pap smear of cervix     2015 cryosurgery; 6/2019 NILM pap/neg HPV    Allergic     Depression     Headache(784.0)     Migraine with aura        Past Surgical History:   Procedure Laterality Date    CHOLECYSTECTOMY LAPAROSCOPIC N/A 10/27/2020    Procedure: CHOLECYSTECTOMY LAPAROSCOPIC;  Surgeon: Lina Narayan MD;  Location:  MAIN OR;  Service: General    COLONOSCOPY      FL LUMBAR PUNCTURE DIAGNOSTIC  11/17/2023    GYNECOLOGIC CRYOSURGERY  2015    HYSTERECTOMY  2016    supracervical    LAPAROSCOPY  2018    FL LAPAROSCOPY W/LYSIS OF ADHESIONS N/A 03/03/2020    Procedure: L.O.A.;  Surgeon: Noy Paz MD;  Location: [facility];  Service: Gynecology    FL LAPS ABD PRTM&OMENTUM DX W/WO SPEC BR/WA SPX N/A 08/09/2019    Procedure: LAPAROSCOPY DIAGNOSTIC; LYSIS OF ADHESIONS;  Surgeon: Abebe Walls MD;  Location: [facility];  Service: Gynecology    TUBAL LIGATION      UPPER GASTROINTESTINAL ENDOSCOPY         Family History   Problem Relation Age of Onset    Cancer Paternal Grandmother         uterine    Cancer Paternal Aunt         uterine     Migraines Mother     Arthritis Mother     Hypertension Father     Breast cancer Neg Hx     Colon cancer Neg Hx     Ovarian cancer Neg Hx      I have reviewed and agree with the history as documented.    E-Cigarette/Vaping    E-Cigarette Use Never User      E-Cigarette/Vaping Substances    Nicotine No     THC No     CBD No     Flavoring No     Other No     Unknown No      Social History     Tobacco Use    Smoking status: Every Day     Current packs/day: 0.50     Average packs/day: 0.5 packs/day for 15.0 years (7.5 ttl pk-yrs)     Types: Cigarettes     Passive exposure: Current    Smokeless tobacco: Never   Vaping Use    Vaping status: Never Used   Substance Use Topics    Alcohol use: Never    Drug use: Never        Review of Systems   Constitutional:  Negative for chills and fever.   HENT:  Negative for ear pain and sore throat.    Eyes:  Negative for pain and visual disturbance.   Respiratory:  Negative for cough and shortness of breath.    Cardiovascular:  Negative for chest pain and palpitations.   Gastrointestinal:  Positive for nausea and vomiting. Negative for abdominal pain.   Genitourinary:  Negative for dysuria and hematuria.   Musculoskeletal:  Negative for arthralgias and back pain.   Skin:  Negative for color change and rash.   Neurological:  Positive for headaches. Negative for seizures and syncope.   All other systems reviewed and are negative.      Physical Exam  Physical Exam  Vitals and nursing note reviewed.   Constitutional:       General: She is not in acute distress.     Appearance: She is well-developed.   HENT:      Head: Normocephalic and atraumatic.   Eyes:      Conjunctiva/sclera: Conjunctivae normal.   Cardiovascular:      Rate and Rhythm: Normal rate and regular rhythm.      Heart sounds: No murmur heard.  Pulmonary:      Effort: Pulmonary effort is normal. No respiratory distress.      Breath sounds: Normal breath sounds.   Abdominal:      Palpations: Abdomen is soft.      Tenderness: There is no abdominal tenderness.   Musculoskeletal:         General: No swelling.      Cervical back: Neck supple.   Skin:     General: Skin is warm and dry.      Capillary Refill: Capillary refill takes less than 2 seconds.   Neurological:      Mental Status: She is alert.   Psychiatric:         Mood and Affect: Mood normal.         Vital Signs  ED Triage Vitals   Temp Pulse Respirations Blood Pressure SpO2   -- 05/04/24 2036 05/04/24 2036 05/04/24 2036 05/04/24 2036    94 18 114/88 99 %      Temp src Heart Rate Source Patient Position - Orthostatic VS BP Location FiO2 (%)   -- 05/04/24 2036 05/04/24 2036 05/04/24 2036 --    Monitor Sitting Left arm       Pain Score       05/04/24 2118       10 - Worst  Possible Pain           Vitals:    05/04/24 2036   BP: 114/88   Pulse: 94   Patient Position - Orthostatic VS: Sitting         Visual Acuity      ED Medications  Medications   sodium chloride 0.9 % bolus 1,000 mL (1,000 mL Intravenous New Bag 5/4/24 2133)   ketorolac (TORADOL) injection 15 mg (15 mg Intravenous Given 5/4/24 2134)   metoclopramide (REGLAN) injection 10 mg (10 mg Intravenous Given 5/4/24 2135)   diphenhydrAMINE (BENADRYL) injection 25 mg (25 mg Intravenous Given 5/4/24 2135)       Diagnostic Studies  Results Reviewed       Procedure Component Value Units Date/Time    Fingerstick Glucose (POCT) [[id number]]  (Normal) Collected: 05/04/24 2034    Lab Status: Final result Specimen: Blood Updated: 05/04/24 2034     POC Glucose 115 mg/dl                    No orders to display              Procedures  Procedures         ED Course                               SBIRT 20yo+      Flowsheet Row Most Recent Value   Initial Alcohol Screen: US AUDIT-C     1. How often do you have a drink containing alcohol? 0 Filed at: 05/04/2024 2038   2. How many drinks containing alcohol do you have on a typical day you are drinking?  0 Filed at: 05/04/2024 2038   3b. FEMALE Any Age, or MALE 65+: How often do you have 4 or more drinks on one occassion? 0 Filed at: 05/04/2024 2038   Audit-C Score 0 Filed at: 05/04/2024 2038   LISBETH: How many times in the past year have you...    Used an illegal drug or used a prescription medication for non-medical reasons? Never Filed at: 05/04/2024 2038                      Medical Decision Making  39-year-old female with a typical migraine that did not improve with sumatriptan.  Feels completely better after migraine culture.  PCP follow-up.    Amount and/or Complexity of Data Reviewed  Labs: ordered.    Risk  Prescription drug management.             Disposition  Final diagnoses:   Migraine     Time reflects when diagnosis was documented in both MDM as applicable and the Disposition within this  note       Time User Action Codes Description Comment    5/4/2024 10:30 PM Howard Spear Add [G43.909] Migraine           ED Disposition       ED Disposition   Discharge    Condition   Stable    Date/Time   Sat May 4, 2024 2230    Comment   [place] to [place]/self care.                   Follow-up Information       Follow up With Specialties Details Why Contact Info    Margarita Walker PA-C [place] [id number]  [phone]              Patient's Medications   Discharge Prescriptions    No medications on file       No discharge procedures on file.    PDMP Review         Value Time User    PDMP Reviewed  Yes 11/6/2023  9:40 AM Margarita Walker PA-C            ED Provider  Electronically Signed by             Howard Spear MD  05/04/24 3010

## 2024-05-13 DIAGNOSIS — K59.00 ACUTE CONSTIPATION: ICD-10-CM

## 2024-05-13 DIAGNOSIS — M54.9 BACK PAIN: ICD-10-CM

## 2024-05-13 DIAGNOSIS — N83.209 OVARIAN CYST: ICD-10-CM

## 2024-05-15 RX ORDER — BISACODYL 5 MG/1
10 TABLET, DELAYED RELEASE ORAL 2 TIMES DAILY PRN
Qty: 60 TABLET | Refills: 0 | Status: SHIPPED | OUTPATIENT
Start: 2024-05-15

## 2024-05-15 RX ORDER — OXYCODONE HYDROCHLORIDE AND ACETAMINOPHEN 5; 325 MG/1; MG/1
1 TABLET ORAL EVERY 6 HOURS PRN
Qty: 16 TABLET | Refills: 0 | OUTPATIENT
Start: 2024-05-15

## 2024-05-15 RX ORDER — METHOCARBAMOL 500 MG/1
500 TABLET, FILM COATED ORAL 2 TIMES DAILY
Qty: 30 TABLET | Refills: 0 | Status: SHIPPED | OUTPATIENT
Start: 2024-05-15

## 2024-05-17 ENCOUNTER — HOSPITAL ENCOUNTER (EMERGENCY)
Facility: HOSPITAL | Age: 39
Discharge: HOME | End: 2024-05-18
Attending: EMERGENCY MEDICINE
Payer: COMMERCIAL

## 2024-05-17 DIAGNOSIS — R10.9 ABDOMINAL PAIN: Primary | ICD-10-CM

## 2024-05-17 DIAGNOSIS — N83.209 OVARIAN CYST: ICD-10-CM

## 2024-05-17 DIAGNOSIS — N94.9 ADNEXAL CYST: ICD-10-CM

## 2024-05-17 LAB
ALBUMIN SERPL BCP-MCNC: 4 G/DL (ref 3.5–5)
ALP SERPL-CCNC: 51 U/L (ref 34–104)
ALT SERPL W P-5'-P-CCNC: 22 U/L (ref 7–52)
ANION GAP SERPL CALCULATED.3IONS-SCNC: 9 MMOL/L (ref 4–13)
AST SERPL W P-5'-P-CCNC: 19 U/L (ref 13–39)
BACTERIA UR QL AUTO: ABNORMAL /HPF
BASOPHILS # BLD AUTO: 0.06 THOUSANDS/ÂΜL (ref 0–0.1)
BASOPHILS NFR BLD AUTO: 0 % (ref 0–1)
BILIRUB SERPL-MCNC: 0.44 MG/DL (ref 0.2–1)
BILIRUB UR QL STRIP: NEGATIVE
BUN SERPL-MCNC: 13 MG/DL (ref 5–25)
CALCIUM SERPL-MCNC: 9 MG/DL (ref 8.4–10.2)
CAOX CRY URNS QL MICRO: ABNORMAL /HPF
CHLORIDE SERPL-SCNC: 106 MMOL/L (ref 96–108)
CLARITY UR: ABNORMAL
CO2 SERPL-SCNC: 22 MMOL/L (ref 21–32)
COLOR UR: ABNORMAL
CREAT SERPL-MCNC: 0.77 MG/DL (ref 0.6–1.3)
EOSINOPHIL # BLD AUTO: 0.34 THOUSAND/ÂΜL (ref 0–0.61)
EOSINOPHIL NFR BLD AUTO: 2 % (ref 0–6)
ERYTHROCYTE [DISTWIDTH] IN BLOOD BY AUTOMATED COUNT: 12.4 % (ref 11.6–15.1)
GFR SERPL CREATININE-BSD FRML MDRD: 98 ML/MIN/1.73SQ M
GLUCOSE SERPL-MCNC: 95 MG/DL (ref 65–140)
GLUCOSE UR STRIP-MCNC: NEGATIVE MG/DL
HCT VFR BLD AUTO: 37.8 % (ref 34.8–46.1)
HGB BLD-MCNC: 12.4 G/DL (ref 11.5–15.4)
HGB UR QL STRIP.AUTO: 50
IMM GRANULOCYTES # BLD AUTO: 0.06 THOUSAND/UL (ref 0–0.2)
IMM GRANULOCYTES NFR BLD AUTO: 0 % (ref 0–2)
KETONES UR STRIP-MCNC: NEGATIVE MG/DL
LEUKOCYTE ESTERASE UR QL STRIP: 25
LYMPHOCYTES # BLD AUTO: 3.55 THOUSANDS/ÂΜL (ref 0.6–4.47)
LYMPHOCYTES NFR BLD AUTO: 24 % (ref 14–44)
MCH RBC QN AUTO: 31 PG (ref 26.8–34.3)
MCHC RBC AUTO-ENTMCNC: 32.8 G/DL (ref 31.4–37.4)
MCV RBC AUTO: 95 FL (ref 82–98)
MONOCYTES # BLD AUTO: 0.82 THOUSAND/ÂΜL (ref 0.17–1.22)
MONOCYTES NFR BLD AUTO: 6 % (ref 4–12)
MUCOUS THREADS UR QL AUTO: ABNORMAL
NEUTROPHILS # BLD AUTO: 9.77 THOUSANDS/ÂΜL (ref 1.85–7.62)
NEUTS SEG NFR BLD AUTO: 68 % (ref 43–75)
NITRITE UR QL STRIP: NEGATIVE
NON-SQ EPI CELLS URNS QL MICRO: ABNORMAL /HPF
NRBC BLD AUTO-RTO: 0 /100 WBCS
PH UR STRIP.AUTO: 5 [PH]
PLATELET # BLD AUTO: 211 THOUSANDS/UL (ref 149–390)
PMV BLD AUTO: 12 FL (ref 8.9–12.7)
POTASSIUM SERPL-SCNC: 4 MMOL/L (ref 3.5–5.3)
PROT SERPL-MCNC: 6.7 G/DL (ref 6.4–8.4)
PROT UR STRIP-MCNC: ABNORMAL MG/DL
RBC # BLD AUTO: 4 MILLION/UL (ref 3.81–5.12)
RBC #/AREA URNS AUTO: ABNORMAL /HPF
SODIUM SERPL-SCNC: 137 MMOL/L (ref 135–147)
SP GR UR STRIP.AUTO: 1.03 (ref 1–1.04)
UROBILINOGEN UA: 1 MG/DL
WBC # BLD AUTO: 14.6 THOUSAND/UL (ref 4.31–10.16)
WBC #/AREA URNS AUTO: ABNORMAL /HPF

## 2024-05-17 PROCEDURE — 99284 EMERGENCY DEPT VISIT MOD MDM: CPT

## 2024-05-17 PROCEDURE — 81001 URINALYSIS AUTO W/SCOPE: CPT | Performed by: EMERGENCY MEDICINE

## 2024-05-17 PROCEDURE — 99284 EMERGENCY DEPT VISIT MOD MDM: CPT | Performed by: EMERGENCY MEDICINE

## 2024-05-17 PROCEDURE — 36415 COLL VENOUS BLD VENIPUNCTURE: CPT | Performed by: EMERGENCY MEDICINE

## 2024-05-17 PROCEDURE — 80053 COMPREHEN METABOLIC PANEL: CPT | Performed by: EMERGENCY MEDICINE

## 2024-05-17 PROCEDURE — 96376 TX/PRO/DX INJ SAME DRUG ADON: CPT

## 2024-05-17 PROCEDURE — 96374 THER/PROPH/DIAG INJ IV PUSH: CPT

## 2024-05-17 PROCEDURE — 96361 HYDRATE IV INFUSION ADD-ON: CPT

## 2024-05-17 PROCEDURE — 96375 TX/PRO/DX INJ NEW DRUG ADDON: CPT

## 2024-05-17 PROCEDURE — 85025 COMPLETE CBC W/AUTO DIFF WBC: CPT | Performed by: EMERGENCY MEDICINE

## 2024-05-17 RX ORDER — PSEUDOEPHED/ACETAMINOPH/DIPHEN 30MG-500MG
TABLET ORAL
COMMUNITY
Start: 2024-04-23

## 2024-05-17 RX ORDER — ONDANSETRON 2 MG/ML
4 INJECTION INTRAMUSCULAR; INTRAVENOUS ONCE
Status: COMPLETED | OUTPATIENT
Start: 2024-05-17 | End: 2024-05-17

## 2024-05-17 RX ADMIN — MORPHINE SULFATE 2 MG: 2 INJECTION, SOLUTION INTRAMUSCULAR; INTRAVENOUS at 22:39

## 2024-05-17 RX ADMIN — ONDANSETRON 4 MG: 2 INJECTION INTRAMUSCULAR; INTRAVENOUS at 22:40

## 2024-05-17 RX ADMIN — SODIUM CHLORIDE 1000 ML: 0.9 INJECTION, SOLUTION INTRAVENOUS at 22:38

## 2024-05-17 RX ADMIN — MORPHINE SULFATE 2 MG: 2 INJECTION, SOLUTION INTRAMUSCULAR; INTRAVENOUS at 23:52

## 2024-05-18 ENCOUNTER — APPOINTMENT (EMERGENCY)
Dept: CT IMAGING | Facility: HOSPITAL | Age: 39
End: 2024-05-18
Payer: COMMERCIAL

## 2024-05-18 VITALS
DIASTOLIC BLOOD PRESSURE: 83 MMHG | SYSTOLIC BLOOD PRESSURE: 132 MMHG | TEMPERATURE: 98.3 F | WEIGHT: 156.53 LBS | BODY MASS INDEX: 28.63 KG/M2 | RESPIRATION RATE: 18 BRPM | OXYGEN SATURATION: 97 % | HEART RATE: 64 BPM

## 2024-05-18 PROCEDURE — 74177 CT ABD & PELVIS W/CONTRAST: CPT

## 2024-05-18 RX ORDER — OXYCODONE HYDROCHLORIDE AND ACETAMINOPHEN 5; 325 MG/1; MG/1
1 TABLET ORAL EVERY 6 HOURS PRN
Qty: 10 TABLET | Refills: 0 | Status: SHIPPED | OUTPATIENT
Start: 2024-05-18

## 2024-05-18 RX ADMIN — IOHEXOL 100 ML: 350 INJECTION, SOLUTION INTRAVENOUS at 00:41

## 2024-05-18 NOTE — ED PROVIDER NOTES
History  Chief Complaint   Patient presents with    Abdominal Pain     Complaining of generalized lower abdominal pain x2 days. Pt had a recent procedure to remove cysts in her ovaries.      39-year-old female presents with complaint of right sided abdominal pain.  Patient reports that she had a  salpingo-oophorectomy at [facility] approximately 3 weeks ago.  She has had some ongoing pain symptoms since then but the pain is increased over the past several days.  She has had nausea with no vomiting and denies any fevers.  Pain is worse with movement and palpation.  She reports that she has had no issues with bowel or bladder function.  Patient reports that she has had multiple abdominal surgeries in the past but denies having had pain like this previously.      Abdominal Pain  Pain location:  R flank  Pain quality: aching and gnawing    Pain severity:  Severe  Onset quality:  Gradual  Duration: days.  Timing:  Constant  Progression:  Worsening  Chronicity:  New  Relieved by:  Nothing  Worsened by:  Movement, coughing and palpation  Associated symptoms: nausea    Associated symptoms: no constipation, no diarrhea, no dysuria, no fever, no hematuria, no shortness of breath and no vomiting        Prior to Admission Medications   Prescriptions Last Dose Informant Patient Reported? Taking?   Acetaminophen Extra Strength 500 MG TABS   Yes Yes   Diclofenac Sodium (VOLTAREN) 1 %  Self No No   Sig: Apply 2 g topically 4 (four) times a day   SUMAtriptan (IMITREX) 100 mg tablet  Self No No   Sig: Take 1 tablet (100 mg total) by mouth daily as needed for migraine   Sennosides (Ex-Lax) 15 MG CHEW   No No   Sig: CHEW 1 TABLET BY MOUTH TWICE DAILY AS NEEDED FOR CONSTIPATION   ascorbic acid (VITAMIN C) 500 MG tablet  Self No No   Sig: Take 1 tablet (500 mg total) by mouth daily   bisacodyl (DULCOLAX) 5 mg EC tablet   No No   Sig: Take 2 tablets (10 mg total) by mouth 2 (two) times a day as needed for constipation (constipation) Colonoscopy  prep   cyanocobalamin (VITAMIN B-12) 1000 MCG tablet  Self No No   Sig: Take 1 tablet (1,000 mcg total) by mouth daily   dexamethasone (DECADRON) 2 mg tablet   No No   Sig: TAKE 1 TABLET (2 MG TOTAL) BY MOUTH DAILY WITH BREAKFAST   ergocalciferol (VITAMIN D2) 50,000 units  Self No No   Sig: TAKE 1 CAPSULE BY MOUTH ONCE A WEEK   fluocinolone acetonide (DERMOTIC) 0.01 % otic oil  Self No No   Sig: INSTILL 3 DROPS IN BOTH EARS DAILY   fluticasone (FLONASE) 50 mcg/act nasal spray  Self No No   Si spray into each nostril 2 (two) times a day   Patient not taking: Reported on 3/20/2024   lidocaine (LIDODERM) 5 %  Self No No   Sig: Apply 1 patch topically over 12 hours every 24 hours Remove & Discard patch within 12 hours or as directed by MD   methocarbamol (ROBAXIN) 500 mg tablet   No No   Sig: Take 1 tablet (500 mg total) by mouth 2 (two) times a day   naproxen (EC NAPROSYN) 500 MG EC tablet  Self Yes No   Sig: Take 500 mg by mouth if needed for mild pain   nicotine (NICODERM CQ) 14 mg/24hr TD 24 hr patch  Self No No   Sig: PLACE 1 PATCH ON THE SKIN OVER 24 HOURS EVERY 24 HOURS   nystatin-triamcinolone (MYCOLOG-II) cream  Self No No   Sig: Apply topically 2 (two) times a day   oxyCODONE-acetaminophen (Percocet) 5-325 mg per tablet   No No   Sig: Take 1 tablet by mouth every 6 (six) hours as needed for moderate pain for up to 16 doses Max Daily Amount: 4 tablets   oxyCODONE-acetaminophen (Percocet) 5-325 mg per tablet   No Yes   Sig: Take 1 tablet by mouth every 6 (six) hours as needed for moderate pain for up to 10 doses Max Daily Amount: 4 tablets   pantoprazole (PROTONIX) 20 mg tablet   No No   Sig: Take 1 tablet (20 mg total) by mouth daily   polyethylene glycol (MIRALAX) 17 g packet  Self No No   Sig: Take 17 g by mouth daily   polyethylene glycol (MIRALAX) 17 g packet  Self No No   Sig: Take 17 g by mouth daily   prochlorperazine (COMPAZINE) 10 mg tablet  Self No No   Sig: Take 1 tablet (10 mg total) by mouth  every 6 (six) hours as needed for nausea or vomiting   senna-docusate sodium (SENOKOT-S) 8.6-50 mg per tablet   No No   Sig: Take 1 tablet by mouth daily   sodium chloride 0.9 % nebulizer solution  Self No No   Sig: TAKE 3ML BY BEBULIZATION EVERY 6 HOURS AS NEEDED FOR WHEEZING OR SHORTNESS OF BREATH      Facility-Administered Medications: None       Past Medical History:   Diagnosis Date    Abnormal Pap smear of cervix     2015 cryosurgery; 6/2019 NILM pap/neg HPV    Allergic     Depression     Headache(784.0)     Migraine with aura        Past Surgical History:   Procedure Laterality Date    CHOLECYSTECTOMY LAPAROSCOPIC N/A 10/27/2020    Procedure: CHOLECYSTECTOMY LAPAROSCOPIC;  Surgeon: Lina Narayan MD;  Location:  MAIN OR;  Service: General    COLONOSCOPY      FL LUMBAR PUNCTURE DIAGNOSTIC  11/17/2023    GYNECOLOGIC CRYOSURGERY  2015    HYSTERECTOMY  2016    supracervical    LAPAROSCOPY  2018    NH LAPAROSCOPY W/LYSIS OF ADHESIONS N/A 03/03/2020    Procedure: L.O.A.;  Surgeon: Noy Paz MD;  Location: [facility];  Service: Gynecology    NH LAPS ABD PRTM&OMENTUM DX W/WO SPEC BR/WA SPX N/A 08/09/2019    Procedure: LAPAROSCOPY DIAGNOSTIC; LYSIS OF ADHESIONS;  Surgeon: Abebe Walls MD;  Location: [facility];  Service: Gynecology    TUBAL LIGATION      UPPER GASTROINTESTINAL ENDOSCOPY         Family History   Problem Relation Age of Onset    Cancer Paternal Grandmother         uterine    Cancer Paternal Aunt         uterine     Migraines Mother     Arthritis Mother     Hypertension Father     Breast cancer Neg Hx     Colon cancer Neg Hx     Ovarian cancer Neg Hx      I have reviewed and agree with the history as documented.    E-Cigarette/Vaping    E-Cigarette Use Never User      E-Cigarette/Vaping Substances    Nicotine No     THC No     CBD No     Flavoring No     Other No     Unknown No      Social History     Tobacco Use    Smoking status: Every Day     Current packs/day: 0.50      Average packs/day: 0.5 packs/day for 15.0 years (7.5 ttl pk-yrs)     Types: Cigarettes     Passive exposure: Current    Smokeless tobacco: Never   Vaping Use    Vaping status: Never Used   Substance Use Topics    Alcohol use: Never    Drug use: Never       Review of Systems   Constitutional:  Negative for fever.   Respiratory:  Negative for shortness of breath.    Gastrointestinal:  Positive for abdominal pain and nausea. Negative for constipation, diarrhea and vomiting.   Genitourinary:  Negative for dysuria and hematuria.   All other systems reviewed and are negative.      Physical Exam  Physical Exam  Vitals and nursing note reviewed.   Constitutional:       General: She is not in acute distress.     Appearance: Normal appearance. She is well-developed. She is not ill-appearing or toxic-appearing.   HENT:      Head: Normocephalic and atraumatic.      Right Ear: External ear normal.      Left Ear: External ear normal.      Nose: Nose normal. No congestion.      Mouth/Throat:      Mouth: Mucous membranes are moist.      Pharynx: Oropharynx is clear.   Eyes:      Conjunctiva/sclera: Conjunctivae normal.      Pupils: Pupils are equal, round, and reactive to light.   Cardiovascular:      Rate and Rhythm: Normal rate and regular rhythm.      Heart sounds: Normal heart sounds.   Pulmonary:      Effort: Pulmonary effort is normal. No respiratory distress.      Breath sounds: Normal breath sounds. No wheezing.   Abdominal:      General: Bowel sounds are normal.      Palpations: Abdomen is soft.      Tenderness: There is abdominal tenderness. There is no guarding.       Musculoskeletal:         General: No tenderness or deformity.      Cervical back: Normal range of motion and neck supple. No rigidity.   Skin:     General: Skin is warm and dry.      Capillary Refill: Capillary refill takes less than 2 seconds.      Findings: No rash.   Neurological:      General: No focal deficit present.      Mental Status: She is alert  and oriented to person, place, and time.   Psychiatric:         Mood and Affect: Mood normal.         Behavior: Behavior normal.         Vital Signs  ED Triage Vitals   Temperature Pulse Respirations Blood Pressure SpO2   05/17/24 2214 05/17/24 2214 05/17/24 2214 05/17/24 2214 05/17/24 2214   98.3 °F (36.8 °C) 93 16 126/78 100 %      Temp src Heart Rate Source Patient Position - Orthostatic VS BP Location FiO2 (%)   -- 05/17/24 2349 05/17/24 2214 05/17/24 2214 --    Monitor Lying Right arm       Pain Score       05/17/24 2239       10 - Worst Possible Pain           Vitals:    05/17/24 2214 05/17/24 2349 05/18/24 0126   BP: 126/78 121/79 132/83   Pulse: 93 61 64   Patient Position - Orthostatic VS: Lying Lying Lying         Visual Acuity      ED Medications  Medications   iohexol (OMNIPAQUE) 240 MG/ML solution 50 mL (has no administration in time range)   sodium chloride 0.9 % bolus 1,000 mL (0 mL Intravenous Stopped 5/18/24 0042)   morphine injection 2 mg (2 mg Intravenous Given 5/17/24 2239)   ondansetron (ZOFRAN) injection 4 mg (4 mg Intravenous Given 5/17/24 2240)   morphine injection 2 mg (2 mg Intravenous Given 5/17/24 2352)   iohexol (OMNIPAQUE) 350 MG/ML injection (MULTI-DOSE) 100 mL (100 mL Intravenous Given 5/18/24 0041)       Diagnostic Studies  Results Reviewed       Procedure Component Value Units Date/Time    Urine Microscopic [[id number]]  (Abnormal) Collected: 05/17/24 2233    Lab Status: Final result Specimen: Urine, Clean Catch Updated: 05/17/24 2330     RBC, UA 1-2 /hpf      WBC, UA 1-2 /hpf      Epithelial Cells Occasional /hpf      Bacteria, UA Moderate /hpf      Ca Oxalate Priti, UA Moderate /hpf      MUCUS THREADS Moderate    Comprehensive metabolic panel [[id number]] Collected: 05/17/24 2238    Lab Status: Final result Specimen: Blood from Arm, Right Updated: 05/17/24 2321     Sodium 137 mmol/L      Potassium 4.0 mmol/L      Chloride 106 mmol/L      CO2 22 mmol/L      ANION GAP 9 mmol/L      BUN  13 mg/dL      Creatinine 0.77 mg/dL      Glucose 95 mg/dL      Calcium 9.0 mg/dL      AST 19 U/L      ALT 22 U/L      Alkaline Phosphatase 51 U/L      Total Protein 6.7 g/dL      Albumin 4.0 g/dL      Total Bilirubin 0.44 mg/dL      eGFR 98 ml/min/1.73sq m     Narrative:      National Kid[employer] Disease Foundation guidelines for Chronic Kidney Disease (CKD):     Stage 1 with normal or high GFR (GFR > 90 mL/min/1.73 square meters)    Stage 2 Mild CKD (GFR = 60-89 mL/min/1.73 square meters)    Stage 3A Moderate CKD (GFR = 45-59 mL/min/1.73 square meters)    Stage 3B Moderate CKD (GFR = 30-44 mL/min/1.73 square meters)    Stage 4 Severe CKD (GFR = 15-29 mL/min/1.73 square meters)    Stage 5 End Stage CKD (GFR <15 mL/min/1.73 square meters)  Note: GFR calculation is accurate only with a steady state creatinine    CBC and differential [[id number]]  (Abnormal) Collected: 05/17/24 2238    Lab Status: Final result Specimen: Blood from Arm, Right Updated: 05/17/24 2256     WBC 14.60 Thousand/uL      RBC 4.00 Million/uL      Hemoglobin 12.4 g/dL      Hematocrit 37.8 %      MCV 95 fL      MCH 31.0 pg      MCHC 32.8 g/dL      RDW 12.4 %      MPV 12.0 fL      Platelets 211 Thousands/uL      nRBC 0 /100 WBCs      Segmented % 68 %      Immature Grans % 0 %      Lymphocytes % 24 %      Monocytes % 6 %      Eosinophils Relative 2 %      Basophils Relative 0 %      Absolute Neutrophils 9.77 Thousands/µL      Absolute Immature Grans 0.06 Thousand/uL      Absolute Lymphocytes 3.55 Thousands/µL      Absolute Monocytes 0.82 Thousand/µL      Eosinophils Absolute 0.34 Thousand/µL      Basophils Absolute 0.06 Thousands/µL     UA (URINE) with reflex to Scope [[id number]]  (Abnormal) Collected: 05/17/24 2233    Lab Status: Final result Specimen: Urine, Clean Catch Updated: 05/17/24 2256     Color, UA Mary     Clarity, UA Slightly Cloudy     Specific Gravity, UA 1.030     pH, UA 5.0     Leukocytes, UA 25.0     Nitrite, UA Negative     Protein, UA  30 (1+) mg/dl      Glucose, UA Negative mg/dl      Ketones, UA Negative mg/dl      Bilirubin, UA Negative     Occult Blood, UA 50.0     UROBILINOGEN UA 1.0 mg/dL                    CT abdomen pelvis with contrast   Final Result by Javier Bal MD (05/18 0148)      1.  Enlarging cyst in the right adnexa measures as large as 8.2 cm. This was also present on the recent postoperative CT. Differential includes ovarian and peritoneal inclusion cyst, and right hydrosalpinx. Recommend follow-up with gynecology, and pelvic    ultrasound.   2.  Small amount of pelvic free fluid            Workstation performed: [id number]                    Procedures  Procedures         ED Course                               SBIRT 22yo+      Flowsheet Row Most Recent Value   Initial Alcohol Screen: US AUDIT-C     1. How often do you have a drink containing alcohol? 0 Filed at: 05/17/2024 2215   2. How many drinks containing alcohol do you have on a typical day you are drinking?  0 Filed at: 05/17/2024 2215   3a. Male UNDER 65: How often do you have five or more drinks on one occasion? 0 Filed at: 05/17/2024 2215   3b. FEMALE Any Age, or MALE 65+: How often do you have 4 or more drinks on one occassion? 0 Filed at: 05/17/2024 2215   Audit-C Score 0 Filed at: 05/17/2024 2215   LISBETH: How many times in the past year have you...    Used an illegal drug or used a prescription medication for non-medical reasons? Never Filed at: 05/17/2024 2215                      Medical Decision Making  39-year-old female presents with complaint of right-sided abdominal pain in the setting of recent salpingo-oophorectomy.  Pain improved with medications and CT redemonstrated a large adnexal cyst.  Differential diagnosis includes a peritoneal inclusion cyst.  Discussed the need for close follow-up along with having a follow-up ultrasound as recommended by the radiologist.  Prescription for analgesia has been provided and the patient is aware of the treatment plan  and reasons to return to the ER.    Amount and/or Complexity of Data Reviewed  Labs: ordered.  Radiology: ordered.    Risk  Prescription drug management.             Disposition  Final diagnoses:   Abdominal pain   Adnexal cyst     Time reflects when diagnosis was documented in both MDM as applicable and the Disposition within this note       Time User Action Codes Description Comment    5/18/2024  2:07 AM Gregory James [R10.9] Abdominal pain     5/18/2024  2:07 AM Gregory James [N94.9] Adnexal cyst     5/18/2024  2:07 AM Gregory James [N83.209] Ovarian cyst           ED Disposition       ED Disposition   Discharge    Condition   Stable    Date/Time   Sat May 18, 2024 0207    Comment   Lorrie Jones discharge to home/self care.                   Follow-up Information    None         Current Discharge Medication List        CONTINUE these medications which have CHANGED    Details   oxyCODONE-acetaminophen (Percocet) 5-325 mg per tablet Take 1 tablet by mouth every 6 (six) hours as needed for moderate pain for up to 10 doses Max Daily Amount: 4 tablets  Qty: 10 tablet, Refills: 0    Associated Diagnoses: Ovarian cyst           CONTINUE these medications which have NOT CHANGED    Details   Acetaminophen Extra Strength 500 MG TABS       ascorbic acid (VITAMIN C) 500 MG tablet Take 1 tablet (500 mg total) by mouth daily  Qty: 90 tablet, Refills: 1    Associated Diagnoses: Vitamin B 12 deficiency      bisacodyl (DULCOLAX) 5 mg EC tablet Take 2 tablets (10 mg total) by mouth 2 (two) times a day as needed for constipation (constipation) Colonoscopy prep  Qty: 60 tablet, Refills: 0    Associated Diagnoses: Acute constipation      cyanocobalamin (VITAMIN B-12) 1000 MCG tablet Take 1 tablet (1,000 mcg total) by mouth daily  Qty: 90 tablet, Refills: 1    Associated Diagnoses: Vitamin B 12 deficiency      dexamethasone (DECADRON) 2 mg tablet TAKE 1 TABLET (2 MG TOTAL) BY MOUTH DAILY WITH BREAKFAST  Qty: 5  tablet, Refills: 0    Associated Diagnoses: Radiculitis      Diclofenac Sodium (VOLTAREN) 1 % Apply 2 g topically 4 (four) times a day  Qty: 50 g, Refills: 0    Associated Diagnoses: Back pain      ergocalciferol (VITAMIN D2) 50,000 units TAKE 1 CAPSULE BY MOUTH ONCE A WEEK  Qty: 16 capsule, Refills: 1    Associated Diagnoses: Vitamin D deficiency      fluocinolone acetonide (DERMOTIC) 0.01 % otic oil INSTILL 3 DROPS IN BOTH EARS DAILY  Qty: 20 mL, Refills: 0    Associated Diagnoses: Itching of ear      fluticasone (FLONASE) 50 mcg/act nasal spray 1 spray into each nostril 2 (two) times a day  Qty: 16 g, Refills: 1    Associated Diagnoses: Chronic pansinusitis; Deviated nasal septum      lidocaine (LIDODERM) 5 % Apply 1 patch topically over 12 hours every 24 hours Remove & Discard patch within 12 hours or as directed by MD  Qty: 15 patch, Refills: 0    Associated Diagnoses: Back pain      methocarbamol (ROBAXIN) 500 mg tablet Take 1 tablet (500 mg total) by mouth 2 (two) times a day  Qty: 30 tablet, Refills: 0    Associated Diagnoses: Back pain      naproxen (EC NAPROSYN) 500 MG EC tablet Take 500 mg by mouth if needed for mild pain      nicotine (NICODERM CQ) 14 mg/24hr TD 24 hr patch PLACE 1 PATCH ON THE SKIN OVER 24 HOURS EVERY 24 HOURS  Qty: 28 patch, Refills: 3    Associated Diagnoses: Tobacco use      nystatin-triamcinolone (MYCOLOG-II) cream Apply topically 2 (two) times a day  Qty: 15 g, Refills: 0    Associated Diagnoses: Vaginal candidiasis      pantoprazole (PROTONIX) 20 mg tablet Take 1 tablet (20 mg total) by mouth daily  Qty: 30 tablet, Refills: 5    Associated Diagnoses: Gastroesophageal reflux disease without esophagitis      !! polyethylene glycol (MIRALAX) 17 g packet Take 17 g by mouth daily  Qty: 510 g, Refills: 5    Associated Diagnoses: Generalized abdominal pain; Acute constipation      !! polyethylene glycol (MIRALAX) 17 g packet Take 17 g by mouth daily  Qty: 510 g, Refills: 5    Associated  Diagnoses: Irritable bowel syndrome with constipation; [facility] care      prochlorperazine (COMPAZINE) 10 mg tablet Take 1 tablet (10 mg total) by mouth every 6 (six) hours as needed for nausea or vomiting  Qty: 30 tablet, Refills: 1    Associated Diagnoses: Migraine with aura and without status migrainosus, not intractable      senna-docusate sodium (SENOKOT-S) 8.6-50 mg per tablet Take 1 tablet by mouth daily  Qty: 30 tablet, Refills: 5    Associated Diagnoses: Early satiety; Nausea; Bloating; Generalized abdominal pain; Acute constipation      Sennosides (Ex-Lax) 15 MG CHEW CHEW 1 TABLET BY MOUTH TWICE DAILY AS NEEDED FOR CONSTIPATION  Qty: 60 tablet, Refills: 1    Associated Diagnoses: Acute constipation      sodium chloride 0.9 % nebulizer solution TAKE 3ML BY BEBULIZATION EVERY 6 HOURS AS NEEDED FOR WHEEZING OR SHORTNESS OF BREATH  Qty: 60 mL, Refills: 1    Associated Diagnoses: Nasal congestion; COVID-19      SUMAtriptan (IMITREX) 100 mg tablet Take 1 tablet (100 mg total) by mouth daily as needed for migraine  Qty: 9 tablet, Refills: 12    Associated Diagnoses: Migraine with aura and without status migrainosus, not intractable       !! - Potential duplicate medications found. Please discuss with provider.          No discharge procedures on file.    PDMP Review         Value Time User    PDMP Reviewed  Yes 5/15/2024  5:13 PM Margarita Walker PA-C            ED Provider  Electronically Signed by             Gregory James DO  05/18/24 0215

## 2024-05-22 DIAGNOSIS — K59.00 ACUTE CONSTIPATION: ICD-10-CM

## 2024-05-22 DIAGNOSIS — E53.8 VITAMIN B 12 DEFICIENCY: ICD-10-CM

## 2024-05-22 DIAGNOSIS — M54.9 BACK PAIN: ICD-10-CM

## 2024-05-22 RX ORDER — ASCORBIC ACID 500 MG
500 TABLET ORAL DAILY
Qty: 90 TABLET | Refills: 1 | Status: SHIPPED | OUTPATIENT
Start: 2024-05-22

## 2024-05-22 RX ORDER — BISACODYL 5 MG
TABLET, DELAYED RELEASE (ENTERIC COATED) ORAL
Qty: 60 TABLET | Refills: 0 | OUTPATIENT
Start: 2024-05-22

## 2024-05-22 RX ORDER — METHOCARBAMOL 500 MG/1
500 TABLET, FILM COATED ORAL 2 TIMES DAILY
Qty: 30 TABLET | Refills: 0 | OUTPATIENT
Start: 2024-05-22

## 2024-05-22 RX ORDER — SENNOSIDES 15 MG/1
TABLET, CHEWABLE ORAL
Qty: 60 TABLET | Refills: 1 | Status: SHIPPED | OUTPATIENT
Start: 2024-05-22

## 2024-05-23 ENCOUNTER — HOSPITAL ENCOUNTER (EMERGENCY)
Facility: HOSPITAL | Age: 39
Discharge: HOME | End: 2024-05-23
Attending: EMERGENCY MEDICINE
Payer: COMMERCIAL

## 2024-05-23 VITALS
OXYGEN SATURATION: 99 % | RESPIRATION RATE: 14 BRPM | WEIGHT: 157.4 LBS | SYSTOLIC BLOOD PRESSURE: 115 MMHG | DIASTOLIC BLOOD PRESSURE: 76 MMHG | TEMPERATURE: 98 F | HEART RATE: 67 BPM | BODY MASS INDEX: 28.79 KG/M2

## 2024-05-23 DIAGNOSIS — G43.909 MIGRAINE HEADACHE: Primary | ICD-10-CM

## 2024-05-23 PROCEDURE — 96365 THER/PROPH/DIAG IV INF INIT: CPT

## 2024-05-23 PROCEDURE — 99284 EMERGENCY DEPT VISIT MOD MDM: CPT | Performed by: PHYSICIAN ASSISTANT

## 2024-05-23 PROCEDURE — 96375 TX/PRO/DX INJ NEW DRUG ADDON: CPT

## 2024-05-23 PROCEDURE — 99283 EMERGENCY DEPT VISIT LOW MDM: CPT

## 2024-05-23 RX ORDER — DIPHENHYDRAMINE HYDROCHLORIDE 50 MG/ML
25 INJECTION INTRAMUSCULAR; INTRAVENOUS ONCE
Status: COMPLETED | OUTPATIENT
Start: 2024-05-23 | End: 2024-05-23

## 2024-05-23 RX ORDER — MAGNESIUM SULFATE HEPTAHYDRATE 40 MG/ML
2 INJECTION, SOLUTION INTRAVENOUS ONCE
Status: COMPLETED | OUTPATIENT
Start: 2024-05-23 | End: 2024-05-23

## 2024-05-23 RX ORDER — METOCLOPRAMIDE HYDROCHLORIDE 5 MG/ML
10 INJECTION INTRAMUSCULAR; INTRAVENOUS ONCE
Status: COMPLETED | OUTPATIENT
Start: 2024-05-23 | End: 2024-05-23

## 2024-05-23 RX ORDER — KETOROLAC TROMETHAMINE 30 MG/ML
15 INJECTION, SOLUTION INTRAMUSCULAR; INTRAVENOUS ONCE
Status: COMPLETED | OUTPATIENT
Start: 2024-05-23 | End: 2024-05-23

## 2024-05-23 RX ADMIN — SODIUM CHLORIDE 1000 ML: 0.9 INJECTION, SOLUTION INTRAVENOUS at 01:17

## 2024-05-23 RX ADMIN — METOCLOPRAMIDE 10 MG: 5 INJECTION, SOLUTION INTRAMUSCULAR; INTRAVENOUS at 01:18

## 2024-05-23 RX ADMIN — MAGNESIUM SULFATE HEPTAHYDRATE 2 G: 2 INJECTION, SOLUTION INTRAVENOUS at 01:19

## 2024-05-23 RX ADMIN — DIPHENHYDRAMINE HYDROCHLORIDE 25 MG: 50 INJECTION, SOLUTION INTRAMUSCULAR; INTRAVENOUS at 01:18

## 2024-05-23 RX ADMIN — KETOROLAC TROMETHAMINE 15 MG: 30 INJECTION, SOLUTION INTRAMUSCULAR; INTRAVENOUS at 01:19

## 2024-05-23 NOTE — ED PROVIDER NOTES
History  Chief Complaint   Patient presents with    Headache     Migraine HA since 1700.  Pt has migraine hx.  NV x 1.  Also reports odd taste in mouth.  Took sumatriptan without improvement.       Patient is a 39-year-old female with history of migraines with aura, surgical history of cholecystectomy, tubal ligation that presents to the emergency department with aching persistent worsening left-sided head pain with generalized head involvement for 2 days.  Patient has associated symptomatology of sensitivity and nausea beginning with the current ED presentation of head pain.  Patient states that she has a history of head pain and current symptoms resemble those previous experience when she has a migraine headache.  Patient denies palliative factors with provocative factors of looking at light.  Patient affirms not effective treatment of sumatriptan.  Patient with last neurology visit on 3/20/2024 for migraine headache with aura, radiculitis and neck pain.  Patient denies numbness, tingling, loss of power of any or all extremities.  Patient denies fevers, chills, vomiting, diarrhea, constipation and urinary symptoms.  Patient denies recent fall or recent trauma.  Patient denies sick contacts and recent travel.  Patient denies worst headache of life.  Patient denies chest pain, shortness of breath, and abdominal pain.        History provided by:  Patient   used: No    Headache  Associated symptoms: no abdominal pain, no back pain, no congestion, no cough, no diarrhea, no dizziness, no drainage, no ear pain, no eye pain, no fever, no nausea, no neck pain, no neck stiffness, no numbness, no photophobia, no seizures, no sinus pressure, no sore throat, no vomiting and no weakness        Prior to Admission Medications   Prescriptions Last Dose Informant Patient Reported? Taking?   Acetaminophen Extra Strength 500 MG TABS   Yes No   Diclofenac Sodium (VOLTAREN) 1 %  Self No No   Sig: Apply 2 g topically  4 (four) times a day   Ex-Lax 15 MG CHEW   No No   Sig: CHEW 1 TABLET BY MOUTH TWICE DAILY AS NEEDED FOR CONSTIPATION   SUMAtriptan (IMITREX) 100 mg tablet  Self No No   Sig: Take 1 tablet (100 mg total) by mouth daily as needed for migraine   ascorbic acid (VITAMIN C) 500 mg tablet   No No   Sig: TAKE ONE TABLET BY MOUTH DAILY   bisacodyl (DULCOLAX) 5 mg EC tablet   No No   Sig: Take 2 tablets (10 mg total) by mouth 2 (two) times a day as needed for constipation (constipation) Colonoscopy prep   cyanocobalamin (VITAMIN B-12) 1000 MCG tablet  Self No No   Sig: Take 1 tablet (1,000 mcg total) by mouth daily   dexamethasone (DECADRON) 2 mg tablet   No No   Sig: TAKE 1 TABLET (2 MG TOTAL) BY MOUTH DAILY WITH BREAKFAST   ergocalciferol (VITAMIN D2) 50,000 units  Self No No   Sig: TAKE 1 CAPSULE BY MOUTH ONCE A WEEK   fluocinolone acetonide (DERMOTIC) 0.01 % otic oil  Self No No   Sig: INSTILL 3 DROPS IN BOTH EARS DAILY   fluticasone (FLONASE) 50 mcg/act nasal spray  Self No No   Si spray into each nostril 2 (two) times a day   Patient not taking: Reported on 3/20/2024   lidocaine (LIDODERM) 5 %  Self No No   Sig: Apply 1 patch topically over 12 hours every 24 hours Remove & Discard patch within 12 hours or as directed by MD   methocarbamol (ROBAXIN) 500 mg tablet   No No   Sig: Take 1 tablet (500 mg total) by mouth 2 (two) times a day   naproxen (EC NAPROSYN) 500 MG EC tablet  Self Yes No   Sig: Take 500 mg by mouth if needed for mild pain   nicotine (NICODERM CQ) 14 mg/24hr TD 24 hr patch  Self No No   Sig: PLACE 1 PATCH ON THE SKIN OVER 24 HOURS EVERY 24 HOURS   nystatin-triamcinolone (MYCOLOG-II) cream  Self No No   Sig: Apply topically 2 (two) times a day   oxyCODONE-acetaminophen (Percocet) 5-325 mg per tablet   No No   Sig: Take 1 tablet by mouth every 6 (six) hours as needed for moderate pain for up to 10 doses Max Daily Amount: 4 tablets   pantoprazole (PROTONIX) 20 mg tablet   No No   Sig: Take 1 tablet (20  mg total) by mouth daily   polyethylene glycol (MIRALAX) 17 g packet  Self No No   Sig: Take 17 g by mouth daily   polyethylene glycol (MIRALAX) 17 g packet  Self No No   Sig: Take 17 g by mouth daily   prochlorperazine (COMPAZINE) 10 mg tablet  Self No No   Sig: Take 1 tablet (10 mg total) by mouth every 6 (six) hours as needed for nausea or vomiting   senna-docusate sodium (SENOKOT-S) 8.6-50 mg per tablet   No No   Sig: Take 1 tablet by mouth daily   sodium chloride 0.9 % nebulizer solution  Self No No   Sig: TAKE 3ML BY BEBULIZATION EVERY 6 HOURS AS NEEDED FOR WHEEZING OR SHORTNESS OF BREATH      Facility-Administered Medications: None       Past Medical History:   Diagnosis Date    Abnormal Pap smear of cervix     2015 cryosurgery; 6/2019 NILM pap/neg HPV    Allergic     Depression     Headache(784.0)     Migraine with aura        Past Surgical History:   Procedure Laterality Date    CHOLECYSTECTOMY LAPAROSCOPIC N/A 10/27/2020    Procedure: CHOLECYSTECTOMY LAPAROSCOPIC;  Surgeon: Lina Narayan MD;  Location:  MAIN OR;  Service: General    COLONOSCOPY      FL LUMBAR PUNCTURE DIAGNOSTIC  11/17/2023    GYNECOLOGIC CRYOSURGERY  2015    HYSTERECTOMY  2016    supracervical    LAPAROSCOPY  2018    [facility] LAPAROSCOPY W/LYSIS OF ADHESIONS N/A 03/03/2020    Procedure: L.O.A.;  Surgeon: Noy Paz MD;  Location: [facility];  Service: Gynecology    VA LAPS ABD PRTM&OMENTUM DX W/WO SPEC BR/WA SPX N/A 08/09/2019    Procedure: LAPAROSCOPY DIAGNOSTIC; LYSIS OF ADHESIONS;  Surgeon: Abebe Walls MD;  Location: [facility];  Service: Gynecology    TUBAL LIGATION      UPPER GASTROINTESTINAL ENDOSCOPY         Family History   Problem Relation Age of Onset    Cancer Paternal Grandmother         uterine    Cancer Paternal Aunt         uterine     Migraines Mother     Arthritis Mother     Hypertension Father     Breast cancer Neg Hx     Colon cancer Neg Hx     Ovarian cancer Neg Hx      I have reviewed and agree  with the history as documented.    E-Cigarette/Vaping    E-Cigarette Use Never User      E-Cigarette/Vaping Substances    Nicotine No     THC No     CBD No     Flavoring No     Other No     Unknown No      Social History     Tobacco Use    Smoking status: Every Day     Current packs/day: 0.50     Average packs/day: 0.5 packs/day for 15.0 years (7.5 ttl pk-yrs)     Types: Cigarettes     Passive exposure: Current    Smokeless tobacco: Never   Vaping Use    Vaping status: Never Used   Substance Use Topics    Alcohol use: Never    Drug use: Never       Review of Systems   Constitutional:  Negative for activity change, appetite change, chills and fever.   HENT:  Negative for congestion, ear pain, postnasal drip, rhinorrhea, sinus pressure, sinus pain, sore throat and tinnitus.    Eyes:  Negative for photophobia, pain and visual disturbance.   Respiratory:  Negative for cough, chest tightness and shortness of breath.    Cardiovascular:  Negative for chest pain and palpitations.   Gastrointestinal:  Negative for abdominal pain, constipation, diarrhea, nausea and vomiting.   Genitourinary:  Negative for difficulty urinating, dysuria, flank pain, frequency, hematuria and urgency.   Musculoskeletal:  Negative for arthralgias, back pain, gait problem, neck pain and neck stiffness.   Skin:  Negative for color change, pallor and rash.   Allergic/Immunologic: Negative for environmental allergies and food allergies.   Neurological:  Positive for headaches. Negative for dizziness, seizures, syncope, weakness and numbness.   Psychiatric/Behavioral:  Negative for confusion.    All other systems reviewed and are negative.      Physical Exam  Physical Exam  Vitals and nursing note reviewed.   Constitutional:       General: She is awake.      Appearance: Normal appearance. She is well-developed and normal weight. She is not ill-appearing, toxic-appearing or diaphoretic.      Comments: /77   Pulse 80   Temp 98 °F (36.7 °C) (Oral)    Resp 20   Wt 71.4 kg (157 lb 6.4 oz)   SpO2 99%   BMI 28.79 kg/m²      HENT:      Head: Normocephalic and atraumatic.      Jaw: There is normal jaw occlusion.      Right Ear: Hearing, tympanic membrane and external ear normal. No decreased hearing noted. No drainage, swelling or tenderness. No mastoid tenderness.      Left Ear: Hearing, tympanic membrane and external ear normal. No decreased hearing noted. No drainage, swelling or tenderness. No mastoid tenderness.      Nose: Nose normal.      Mouth/Throat:      Lips: Pink.      Mouth: Mucous membranes are moist.      Pharynx: Oropharynx is clear. Uvula midline.   Eyes:      General: Lids are normal. Vision grossly intact. Gaze aligned appropriately.         Right eye: No discharge.         Left eye: No discharge.      Extraocular Movements: Extraocular movements intact.      Conjunctiva/sclera: Conjunctivae normal.      Pupils: Pupils are equal, round, and reactive to light.   Neck:      Vascular: No JVD.      Trachea: Trachea and phonation normal. No tracheal tenderness or tracheal deviation.      Comments: No midline tenderness, no stepoffs  No tenderness with passive and active cervical ROM with head turning approximately 45 degree angles to the left and right  No cervical tenderness with cranial axial loading    Cardiovascular:      Rate and Rhythm: Normal rate and regular rhythm.      Pulses: Normal pulses.           Radial pulses are 2+ on the right side and 2+ on the left side.        Posterior tibial pulses are 2+ on the right side and 2+ on the left side.      Heart sounds: Normal heart sounds. No murmur heard.  Pulmonary:      Effort: Pulmonary effort is normal. No respiratory distress.      Breath sounds: Normal breath sounds and air entry. No stridor. No decreased breath sounds, wheezing, rhonchi or rales.   Chest:      Chest wall: No tenderness.   Abdominal:      General: Abdomen is flat. Bowel sounds are normal. There is no distension.       Palpations: Abdomen is soft. Abdomen is not rigid.      Tenderness: There is no abdominal tenderness. There is no guarding or rebound.   Musculoskeletal:         General: No swelling. Normal range of motion.      Cervical back: Full passive range of motion without pain, normal range of motion and neck supple. No rigidity. No spinous process tenderness or muscular tenderness. Normal range of motion.      Comments: Passive ROM intact  Upper and lower extremity 5/5 bilaterally  Neurovascularly intact  No grinding or clicking of joints       Feet:      Right foot:      Toenail Condition: Right toenails are normal.      Left foot:      Toenail Condition: Left toenails are normal.   Lymphadenopathy:      Head:      Right side of head: No submental, submandibular, tonsillar, preauricular, posterior auricular or occipital adenopathy.      Left side of head: No submental, submandibular, tonsillar, preauricular, posterior auricular or occipital adenopathy.      Cervical: No cervical adenopathy.      Right cervical: No superficial, deep or posterior cervical adenopathy.     Left cervical: No superficial, deep or posterior cervical adenopathy.   Skin:     General: Skin is warm and dry.      Capillary Refill: Capillary refill takes less than 2 seconds.      Findings: No rash.   Neurological:      General: No focal deficit present.      Mental Status: She is alert and oriented to person, place, and time. Mental status is at baseline.      GCS: GCS eye subscore is 4. GCS verbal subscore is 5. GCS motor subscore is 6.      Cranial Nerves: Cranial nerves 2-12 are intact.      Sensory: Sensation is intact. No sensory deficit.      Motor: Motor function is intact.      Coordination: Coordination is intact.      Gait: Gait is intact.      Deep Tendon Reflexes: Reflexes are normal and symmetric.      Reflex Scores:       Patellar reflexes are 2+ on the right side and 2+ on the left side.  Psychiatric:         Attention and Perception:  Attention normal.         Mood and Affect: Mood normal.         Speech: Speech normal.         Behavior: Behavior normal. Behavior is cooperative.         Thought Content: Thought content normal.         Judgment: Judgment normal.         Vital Signs  ED Triage Vitals [05/23/24 0051]   Temperature Pulse Respirations Blood Pressure SpO2   98 °F (36.7 °C) 80 20 121/77 99 %      Temp Source Heart Rate Source Patient Position - Orthostatic VS BP Location FiO2 (%)   Oral -- -- -- --      Pain Score       --           Vitals:    05/23/24 0051 05/23/24 0205   BP: 121/77 115/76   Pulse: 80 67         Visual Acuity      ED Medications  Medications   metoclopramide (REGLAN) injection 10 mg (10 mg Intravenous Given 5/23/24 0118)   diphenhydrAMINE (BENADRYL) injection 25 mg (25 mg Intravenous Given 5/23/24 0118)   ketorolac (TORADOL) injection 15 mg (15 mg Intravenous Given 5/23/24 0119)   sodium chloride 0.9 % bolus 1,000 mL (0 mL Intravenous Stopped 5/23/24 0205)   magnesium sulfate 2 g/50 mL IVPB (premix) 2 g (0 g Intravenous Stopped 5/23/24 0205)       Diagnostic Studies  Results Reviewed       None                   No orders to display              Procedures  Procedures         ED Course  ED Course as of 05/23/24 0220   u May 23, 2024   0151 Reevaluation of patient with patient verbalizes decrease in head pain symptoms status post medication delivery.  Patient alert and oriented x 3, GCS 15, no acute focal neurological deficits.  Will have patient follow-up with her neurologist as an outpatient.  Return to ED instructions firmly in place.                               SBIRT 22yo+      Flowsheet Row Most Recent Value   Initial Alcohol Screen: US AUDIT-C     1. How often do you have a drink containing alcohol? 0 Filed at: 05/23/2024 0053   2. How many drinks containing alcohol do you have on a typical day you are drinking?  0 Filed at: 05/23/2024 0053   3b. FEMALE Any Age, or MALE 65+: How often do you have 4 or more  drinks on one occassion? 0 Filed at: 05/23/2024 0053   Audit-C Score 0 Filed at: 05/23/2024 0053   LISBETH: How many times in the past year have you...    Used an illegal drug or used a prescription medication for non-medical reasons? Never Filed at: 05/23/2024 0053                      Medical Decision Making  Patient is a 39-year-old female with history of migraines with aura, surgical history of cholecystectomy, tubal ligation that presents to the emergency department with aching persistent worsening left-sided head pain with generalized head involvement for 2 days.    Hemodynamically stable and afebrile  GCS 15, AAOx3, no acute focal neurological deficits  Last neurology visit on 3/20/2024 for migraine headache with aura.  Delivered one liter bolus of nsl delivered over the course of one hour  Delivered toradol, benadryl, Magnesium, and Reglan  Avoid migraine triggers  Follow up with neurology  Follow up with PCP  Follow up with the Emergency Department if symptoms persist or exacerbate  Patient verbalizes understanding of all clinical laboratory findings, discharge instructions, follow up, and verbalizes agreement with treatment plan    *Due to voice recognition software, sound alike and misspelled words may be contained in the documentation*      Risk  Prescription drug management.             Disposition  Final diagnoses:   Migraine headache     Time reflects when diagnosis was documented in both MDM as applicable and the Disposition within this note       Time User Action Codes Description Comment    5/23/2024  1:53 AM Sonny Duff Add [G43.909] Migraine headache           ED Disposition       ED Disposition   Discharge    Condition   Stable    Date/Time   Thu May 23, 2024 0152    Comment   Lorrie Jones discharge to home/self care.                   Follow-up Information       Follow up With Specialties Details Why Contact Info Additional Information    Margarita Walker PA-C Family Medicine   [address] [address]  [place] [address]  [phone]       [facility] Emergency Department Emergency Medicine  As needed [address] [facility] [address] [phone] [facility] Emergency Department    Aye Smith MD Neurology  for further evaluation of symptoms [address] [facility]  [place] [address]  [phone]               Discharge Medication List as of 5/23/2024  1:53 AM        CONTINUE these medications which have NOT CHANGED    Details   Acetaminophen Extra Strength 500 MG TABS Historical Med      ascorbic acid (VITAMIN C) 500 mg tablet TAKE ONE TABLET BY MOUTH DAILY, Starting Wed 5/22/2024, Normal      bisacodyl (DULCOLAX) 5 mg EC tablet Take 2 tablets (10 mg total) by mouth 2 (two) times a day as needed for constipation (constipation) Colonoscopy prep, Starting Wed 5/15/2024, Normal      cyanocobalamin (VITAMIN B-12) 1000 MCG tablet Take 1 tablet (1,000 mcg total) by mouth daily, Starting Tue 1/2/2024, Normal      dexamethasone (DECADRON) 2 mg tablet TAKE 1 TABLET (2 MG TOTAL) BY MOUTH DAILY WITH BREAKFAST, Starting Fri 5/3/2024, Normal      Diclofenac Sodium (VOLTAREN) 1 % Apply 2 g topically 4 (four) times a day, Starting Wed 11/22/2023, Normal      ergocalciferol (VITAMIN D2) 50,000 units TAKE 1 CAPSULE BY MOUTH ONCE A WEEK, Starting Fri 12/29/2023, Normal      Ex-Lax 15 MG CHEW CHEW 1 TABLET BY MOUTH TWICE DAILY AS NEEDED FOR CONSTIPATION, Normal      fluocinolone acetonide (DERMOTIC) 0.01 % otic oil INSTILL 3 DROPS IN BOTH EARS DAILY, Normal      fluticasone (FLONASE) 50 mcg/act nasal spray 1 spray into each nostril 2 (two) times a day, Starting Thu 10/19/2023, Normal      lidocaine (LIDODERM) 5 % Apply 1 patch topically over 12 hours every 24 hours Remove & Discard patch within 12 hours or as directed by MD, Starting Wed 11/22/2023, Print      methocarbamol (ROBAXIN) 500 mg tablet Take 1 tablet (500 mg total) by mouth 2 (two)  times a day, Starting Wed 5/15/2024, Normal      naproxen (EC NAPROSYN) 500 MG EC tablet Take 500 mg by mouth if needed for mild pain, Historical Med      nicotine (NICODERM CQ) 14 mg/24hr TD 24 hr patch PLACE 1 PATCH ON THE SKIN OVER 24 HOURS EVERY 24 HOURS, Starting Tue 3/19/2024, Normal      nystatin-triamcinolone (MYCOLOG-II) cream Apply topically 2 (two) times a day, Starting Thu 9/14/2023, Normal      oxyCODONE-acetaminophen (Percocet) 5-325 mg per tablet Take 1 tablet by mouth every 6 (six) hours as needed for moderate pain for up to 10 doses Max Daily Amount: 4 tablets, Starting Sat 5/18/2024, Normal      pantoprazole (PROTONIX) 20 mg tablet Take 1 tablet (20 mg total) by mouth daily, Starting Mon 4/8/2024, Until Sat 10/5/2024, Normal      !! polyethylene glycol (MIRALAX) 17 g packet Take 17 g by mouth daily, Starting Tue 1/2/2024, Until Sun 6/30/2024, Normal      !! polyethylene glycol (MIRALAX) 17 g packet Take 17 g by mouth daily, Starting Tue 1/30/2024, Until Sun 7/28/2024, Normal      prochlorperazine (COMPAZINE) 10 mg tablet Take 1 tablet (10 mg total) by mouth every 6 (six) hours as needed for nausea or vomiting, Starting Wed 9/6/2023, Normal      senna-docusate sodium (SENOKOT-S) 8.6-50 mg per tablet Take 1 tablet by mouth daily, Starting Mon 4/8/2024, Until Sat 10/5/2024, Normal      sodium chloride 0.9 % nebulizer solution TAKE 3ML BY BEBULIZATION EVERY 6 HOURS AS NEEDED FOR WHEEZING OR SHORTNESS OF BREATH, Normal      SUMAtriptan (IMITREX) 100 mg tablet Take 1 tablet (100 mg total) by mouth daily as needed for migraine, Starting Thu 11/2/2023, Normal       !! - Potential duplicate medications found. Please discuss with provider.          No discharge procedures on file.    PDMP Review         Value Time User    PDMP Reviewed  Yes 5/23/2024  1:02 AM Sonny Duff PA-C            ED Provider  Electronically Signed by             Sonny Duff PA-C  05/23/24 1427

## 2024-05-23 NOTE — Clinical Note
Lorrie Jones was seen and treated in our emergency department on 5/23/2024.                Diagnosis:     Lorrie  .    She may return on this date: 05/25/2024         If you have any questions or concerns, please don't hesitate to call.      Sonny Duff PA-C    ______________________________           _______________          _______________  Hospital Representative                              Date                                Time

## 2024-05-24 ENCOUNTER — TELEPHONE (OUTPATIENT)
Dept: NEUROLOGY | Facility: CLINIC | Age: 39
End: 2024-05-24

## 2024-05-24 NOTE — TELEPHONE ENCOUNTER
Pt was in the ED on 5/23 due to migraines.     Please call pt to go over new concerns and medications.     Please assist.

## 2024-05-25 ENCOUNTER — APPOINTMENT (EMERGENCY)
Dept: CT IMAGING | Facility: HOSPITAL | Age: 39
End: 2024-05-25
Payer: COMMERCIAL

## 2024-05-25 ENCOUNTER — HOSPITAL ENCOUNTER (EMERGENCY)
Facility: HOSPITAL | Age: 39
Discharge: HOME | End: 2024-05-26
Attending: EMERGENCY MEDICINE
Payer: COMMERCIAL

## 2024-05-25 DIAGNOSIS — R93.5 ABNORMAL CT OF THE ABDOMEN: ICD-10-CM

## 2024-05-25 DIAGNOSIS — R10.9 ABDOMINAL PAIN: Primary | ICD-10-CM

## 2024-05-25 DIAGNOSIS — K29.70 GASTRITIS: ICD-10-CM

## 2024-05-25 DIAGNOSIS — N83.201 CYST OF RIGHT OVARY: ICD-10-CM

## 2024-05-25 DIAGNOSIS — M54.9 BACK PAIN: ICD-10-CM

## 2024-05-25 DIAGNOSIS — K59.00 ACUTE CONSTIPATION: ICD-10-CM

## 2024-05-25 LAB
ALBUMIN SERPL BCP-MCNC: 4.2 G/DL (ref 3.5–5)
ALP SERPL-CCNC: 58 U/L (ref 34–104)
ALT SERPL W P-5'-P-CCNC: 33 U/L (ref 7–52)
ANION GAP SERPL CALCULATED.3IONS-SCNC: 9 MMOL/L (ref 4–13)
AST SERPL W P-5'-P-CCNC: 15 U/L (ref 13–39)
BACTERIA UR QL AUTO: ABNORMAL /HPF
BASOPHILS # BLD AUTO: 0.03 THOUSANDS/ÂΜL (ref 0–0.1)
BASOPHILS NFR BLD AUTO: 0 % (ref 0–1)
BILIRUB DIRECT SERPL-MCNC: 0.07 MG/DL (ref 0–0.2)
BILIRUB SERPL-MCNC: 0.35 MG/DL (ref 0.2–1)
BILIRUB UR QL STRIP: NEGATIVE
BUN SERPL-MCNC: 14 MG/DL (ref 5–25)
CALCIUM SERPL-MCNC: 9.6 MG/DL (ref 8.4–10.2)
CAOX CRY URNS QL MICRO: ABNORMAL /HPF
CHLORIDE SERPL-SCNC: 107 MMOL/L (ref 96–108)
CLARITY UR: CLEAR
CO2 SERPL-SCNC: 24 MMOL/L (ref 21–32)
COLOR UR: YELLOW
CREAT SERPL-MCNC: 0.83 MG/DL (ref 0.6–1.3)
EOSINOPHIL # BLD AUTO: 0.17 THOUSAND/ÂΜL (ref 0–0.61)
EOSINOPHIL NFR BLD AUTO: 1 % (ref 0–6)
ERYTHROCYTE [DISTWIDTH] IN BLOOD BY AUTOMATED COUNT: 12.3 % (ref 11.6–15.1)
GFR SERPL CREATININE-BSD FRML MDRD: 89 ML/MIN/1.73SQ M
GLUCOSE SERPL-MCNC: 106 MG/DL (ref 65–140)
GLUCOSE UR STRIP-MCNC: NEGATIVE MG/DL
HCT VFR BLD AUTO: 37.7 % (ref 34.8–46.1)
HGB BLD-MCNC: 12.9 G/DL (ref 11.5–15.4)
HGB UR QL STRIP.AUTO: ABNORMAL
IMM GRANULOCYTES # BLD AUTO: 0.06 THOUSAND/UL (ref 0–0.2)
IMM GRANULOCYTES NFR BLD AUTO: 0 % (ref 0–2)
KETONES UR STRIP-MCNC: NEGATIVE MG/DL
LEUKOCYTE ESTERASE UR QL STRIP: NEGATIVE
LIPASE SERPL-CCNC: 8 U/L (ref 11–82)
LYMPHOCYTES # BLD AUTO: 2.61 THOUSANDS/ÂΜL (ref 0.6–4.47)
LYMPHOCYTES NFR BLD AUTO: 18 % (ref 14–44)
MCH RBC QN AUTO: 30.7 PG (ref 26.8–34.3)
MCHC RBC AUTO-ENTMCNC: 34.2 G/DL (ref 31.4–37.4)
MCV RBC AUTO: 90 FL (ref 82–98)
MONOCYTES # BLD AUTO: 0.97 THOUSAND/ÂΜL (ref 0.17–1.22)
MONOCYTES NFR BLD AUTO: 7 % (ref 4–12)
MUCOUS THREADS UR QL AUTO: ABNORMAL
NEUTROPHILS # BLD AUTO: 10.39 THOUSANDS/ÂΜL (ref 1.85–7.62)
NEUTS SEG NFR BLD AUTO: 74 % (ref 43–75)
NITRITE UR QL STRIP: NEGATIVE
NON-SQ EPI CELLS URNS QL MICRO: ABNORMAL /HPF
NRBC BLD AUTO-RTO: 0 /100 WBCS
PH UR STRIP.AUTO: 5.5 [PH] (ref 4.5–8)
PLATELET # BLD AUTO: 279 THOUSANDS/UL (ref 149–390)
PMV BLD AUTO: 10.9 FL (ref 8.9–12.7)
POTASSIUM SERPL-SCNC: 3.3 MMOL/L (ref 3.5–5.3)
PROT SERPL-MCNC: 6.9 G/DL (ref 6.4–8.4)
PROT UR STRIP-MCNC: NEGATIVE MG/DL
RBC # BLD AUTO: 4.2 MILLION/UL (ref 3.81–5.12)
RBC #/AREA URNS AUTO: ABNORMAL /HPF
SODIUM SERPL-SCNC: 140 MMOL/L (ref 135–147)
SP GR UR STRIP.AUTO: >=1.03 (ref 1–1.03)
UROBILINOGEN UR QL STRIP.AUTO: 0.2 E.U./DL
WBC # BLD AUTO: 14.23 THOUSAND/UL (ref 4.31–10.16)
WBC #/AREA URNS AUTO: ABNORMAL /HPF

## 2024-05-25 PROCEDURE — 99284 EMERGENCY DEPT VISIT MOD MDM: CPT

## 2024-05-25 PROCEDURE — 36415 COLL VENOUS BLD VENIPUNCTURE: CPT | Performed by: EMERGENCY MEDICINE

## 2024-05-25 PROCEDURE — 85025 COMPLETE CBC W/AUTO DIFF WBC: CPT | Performed by: EMERGENCY MEDICINE

## 2024-05-25 PROCEDURE — 80076 HEPATIC FUNCTION PANEL: CPT | Performed by: EMERGENCY MEDICINE

## 2024-05-25 PROCEDURE — 81001 URINALYSIS AUTO W/SCOPE: CPT

## 2024-05-25 PROCEDURE — 74177 CT ABD & PELVIS W/CONTRAST: CPT

## 2024-05-25 PROCEDURE — 96361 HYDRATE IV INFUSION ADD-ON: CPT

## 2024-05-25 PROCEDURE — 96374 THER/PROPH/DIAG INJ IV PUSH: CPT

## 2024-05-25 PROCEDURE — 96375 TX/PRO/DX INJ NEW DRUG ADDON: CPT

## 2024-05-25 PROCEDURE — 83690 ASSAY OF LIPASE: CPT | Performed by: EMERGENCY MEDICINE

## 2024-05-25 PROCEDURE — 80048 BASIC METABOLIC PNL TOTAL CA: CPT | Performed by: EMERGENCY MEDICINE

## 2024-05-25 RX ORDER — KETOROLAC TROMETHAMINE 30 MG/ML
15 INJECTION, SOLUTION INTRAMUSCULAR; INTRAVENOUS ONCE
Status: COMPLETED | OUTPATIENT
Start: 2024-05-25 | End: 2024-05-25

## 2024-05-25 RX ORDER — ONDANSETRON 2 MG/ML
4 INJECTION INTRAMUSCULAR; INTRAVENOUS ONCE
Status: COMPLETED | OUTPATIENT
Start: 2024-05-25 | End: 2024-05-25

## 2024-05-25 RX ORDER — HYDROMORPHONE HCL/PF 1 MG/ML
0.5 SYRINGE (ML) INJECTION ONCE
Status: COMPLETED | OUTPATIENT
Start: 2024-05-25 | End: 2024-05-25

## 2024-05-25 RX ADMIN — KETOROLAC TROMETHAMINE 15 MG: 30 INJECTION, SOLUTION INTRAMUSCULAR; INTRAVENOUS at 22:06

## 2024-05-25 RX ADMIN — SODIUM CHLORIDE 1000 ML: 0.9 INJECTION, SOLUTION INTRAVENOUS at 22:06

## 2024-05-25 RX ADMIN — HYDROMORPHONE HYDROCHLORIDE 0.5 MG: 0.5 INJECTION, SOLUTION INTRAMUSCULAR; INTRAVENOUS; SUBCUTANEOUS at 22:07

## 2024-05-25 RX ADMIN — IOHEXOL 100 ML: 350 INJECTION, SOLUTION INTRAVENOUS at 23:05

## 2024-05-25 RX ADMIN — ONDANSETRON 4 MG: 2 INJECTION INTRAMUSCULAR; INTRAVENOUS at 22:06

## 2024-05-26 VITALS
OXYGEN SATURATION: 97 % | HEART RATE: 60 BPM | SYSTOLIC BLOOD PRESSURE: 117 MMHG | RESPIRATION RATE: 17 BRPM | DIASTOLIC BLOOD PRESSURE: 73 MMHG | BODY MASS INDEX: 28.39 KG/M2 | WEIGHT: 155.2 LBS | TEMPERATURE: 98.3 F

## 2024-05-26 PROCEDURE — NC001 PR NO CHARGE: Performed by: EMERGENCY MEDICINE

## 2024-05-26 PROCEDURE — 96376 TX/PRO/DX INJ SAME DRUG ADON: CPT

## 2024-05-26 RX ORDER — HYDROMORPHONE HCL/PF 1 MG/ML
0.5 SYRINGE (ML) INJECTION ONCE
Status: COMPLETED | OUTPATIENT
Start: 2024-05-26 | End: 2024-05-26

## 2024-05-26 RX ORDER — SUCRALFATE ORAL 1 G/10ML
1 SUSPENSION ORAL 3 TIMES DAILY
Qty: 210 ML | Refills: 0 | Status: SHIPPED | OUTPATIENT
Start: 2024-05-26 | End: 2024-06-02

## 2024-05-26 RX ORDER — OXYCODONE HYDROCHLORIDE AND ACETAMINOPHEN 5; 325 MG/1; MG/1
1 TABLET ORAL EVERY 8 HOURS PRN
Qty: 6 TABLET | Refills: 0 | Status: SHIPPED | OUTPATIENT
Start: 2024-05-26 | End: 2024-05-28

## 2024-05-26 RX ORDER — FAMOTIDINE 20 MG/1
20 TABLET, FILM COATED ORAL DAILY
Qty: 14 TABLET | Refills: 0 | Status: SHIPPED | OUTPATIENT
Start: 2024-05-26 | End: 2024-06-09

## 2024-05-26 RX ORDER — FAMOTIDINE 20 MG/1
20 TABLET, FILM COATED ORAL ONCE
Status: COMPLETED | OUTPATIENT
Start: 2024-05-26 | End: 2024-05-26

## 2024-05-26 RX ORDER — SUCRALFATE 1 G/1
1 TABLET ORAL ONCE
Status: COMPLETED | OUTPATIENT
Start: 2024-05-26 | End: 2024-05-26

## 2024-05-26 RX ORDER — OXYCODONE HYDROCHLORIDE AND ACETAMINOPHEN 5; 325 MG/1; MG/1
1 TABLET ORAL ONCE
Status: COMPLETED | OUTPATIENT
Start: 2024-05-26 | End: 2024-05-26

## 2024-05-26 RX ORDER — DICYCLOMINE HCL 20 MG
20 TABLET ORAL 2 TIMES DAILY PRN
Qty: 4 TABLET | Refills: 0 | Status: SHIPPED | OUTPATIENT
Start: 2024-05-26 | End: 2024-05-28

## 2024-05-26 RX ADMIN — FAMOTIDINE 20 MG: 20 TABLET, FILM COATED ORAL at 01:35

## 2024-05-26 RX ADMIN — SUCRALFATE 1 G: 1 TABLET ORAL at 01:35

## 2024-05-26 RX ADMIN — OXYCODONE HYDROCHLORIDE AND ACETAMINOPHEN 1 TABLET: 5; 325 TABLET ORAL at 01:35

## 2024-05-26 RX ADMIN — HYDROMORPHONE HYDROCHLORIDE 0.5 MG: 0.5 INJECTION, SOLUTION INTRAMUSCULAR; INTRAVENOUS; SUBCUTANEOUS at 00:16

## 2024-05-26 NOTE — ED PROVIDER NOTES
History  Chief Complaint   Patient presents with   • Abdominal Pain     Patient states last month she had her ovary and fallopian tubes removed. On Monday she had fluid drained from right ovarian cyst. Since then she is have right abdominal pain that is radiating to her back and other side of her abdomen.     Lorrie is a pleasant 40 yo F here for evaluation of multiple complaints.  She has a history of migraines and reports a gradual onset moderate intensity headache that is been ongoing for about 2 days now.  She was seen at [facility] for similar symptoms 2 days ago, treated symptomatically and discharged.  Additionally she is complaining of pain in her right lower abdomen and flank.  She has history of salpingo-oophorectomy with lysis of adhesions with [facility] gynecology on 4/23/2024.  She subsequently was admitted to [facility] on 5/19/2024 and underwent CT-guided aspiration of a right ovarian cyst on 5/20/2024.  She was discharged shortly after without complication.  She states that since that time she has had some gradually worsening pain in her right lower quadrant with radiation to her flank.  No associated fevers.  No nausea or vomiting.  No urinary symptoms.      Abdominal Pain  Associated symptoms: no chest pain, no chills, no cough, no dysuria, no fever, no hematuria, no nausea, no shortness of breath, no sore throat and no vomiting        Prior to Admission Medications   Prescriptions Last Dose Informant Patient Reported? Taking?   Acetaminophen Extra Strength 500 MG TABS   Yes No   Diclofenac Sodium (VOLTAREN) 1 %  Self No No   Sig: Apply 2 g topically 4 (four) times a day   Ex-Lax 15 MG CHEW   No No   Sig: CHEW 1 TABLET BY MOUTH TWICE DAILY AS NEEDED FOR CONSTIPATION   SUMAtriptan (IMITREX) 100 mg tablet  Self No No   Sig: Take 1 tablet (100 mg total) by mouth daily as needed for migraine   ascorbic acid (VITAMIN C) 500 mg tablet   No No   Sig: TAKE ONE TABLET BY MOUTH DAILY    bisacodyl (DULCOLAX) 5 mg EC tablet   No No   Sig: Take 2 tablets (10 mg total) by mouth 2 (two) times a day as needed for constipation (constipation) Colonoscopy prep   cyanocobalamin (VITAMIN B-12) 1000 MCG tablet  Self No No   Sig: Take 1 tablet (1,000 mcg total) by mouth daily   dexamethasone (DECADRON) 2 mg tablet   No No   Sig: TAKE 1 TABLET (2 MG TOTAL) BY MOUTH DAILY WITH BREAKFAST   ergocalciferol (VITAMIN D2) 50,000 units  Self No No   Sig: TAKE 1 CAPSULE BY MOUTH ONCE A WEEK   fluocinolone acetonide (DERMOTIC) 0.01 % otic oil  Self No No   Sig: INSTILL 3 DROPS IN BOTH EARS DAILY   fluticasone (FLONASE) 50 mcg/act nasal spray  Self No No   Si spray into each nostril 2 (two) times a day   Patient not taking: Reported on 3/20/2024   lidocaine (LIDODERM) 5 %  Self No No   Sig: Apply 1 patch topically over 12 hours every 24 hours Remove & Discard patch within 12 hours or as directed by MD   methocarbamol (ROBAXIN) 500 mg tablet   No No   Sig: Take 1 tablet (500 mg total) by mouth 2 (two) times a day   naproxen (EC NAPROSYN) 500 MG EC tablet  Self Yes No   Sig: Take 500 mg by mouth if needed for mild pain   nicotine (NICODERM CQ) 14 mg/24hr TD 24 hr patch  Self No No   Sig: PLACE 1 PATCH ON THE SKIN OVER 24 HOURS EVERY 24 HOURS   nystatin-triamcinolone (MYCOLOG-II) cream  Self No No   Sig: Apply topically 2 (two) times a day   oxyCODONE-acetaminophen (Percocet) 5-325 mg per tablet   No No   Sig: Take 1 tablet by mouth every 6 (six) hours as needed for moderate pain for up to 10 doses Max Daily Amount: 4 tablets   pantoprazole (PROTONIX) 20 mg tablet   No No   Sig: Take 1 tablet (20 mg total) by mouth daily   polyethylene glycol (MIRALAX) 17 g packet  Self No No   Sig: Take 17 g by mouth daily   polyethylene glycol (MIRALAX) 17 g packet  Self No No   Sig: Take 17 g by mouth daily   prochlorperazine (COMPAZINE) 10 mg tablet  Self No No   Sig: Take 1 tablet (10 mg total) by mouth every 6 (six) hours as  needed for nausea or vomiting   senna-docusate sodium (SENOKOT-S) 8.6-50 mg per tablet   No No   Sig: Take 1 tablet by mouth daily   sodium chloride 0.9 % nebulizer solution  Self No No   Sig: TAKE 3ML BY BEBULIZATION EVERY 6 HOURS AS NEEDED FOR WHEEZING OR SHORTNESS OF BREATH      Facility-Administered Medications: None       Past Medical History:   Diagnosis Date   • Abnormal Pap smear of cervix     2015 cryosurgery; 6/2019 NILM pap/neg HPV   • Allergic    • Depression    • Headache(784.0)    • Migraine with aura        Past Surgical History:   Procedure Laterality Date   • CHOLECYSTECTOMY LAPAROSCOPIC N/A 10/27/2020    Procedure: CHOLECYSTECTOMY LAPAROSCOPIC;  Surgeon: Lina Narayan MD;  Location:  MAIN OR;  Service: General   • COLONOSCOPY     • FL LUMBAR PUNCTURE DIAGNOSTIC  11/17/2023   • GYNECOLOGIC CRYOSURGERY  2015   • HYSTERECTOMY  2016    supracervical   • LAPAROSCOPY  2018   • NC LAPAROSCOPY W/LYSIS OF ADHESIONS N/A 03/03/2020    Procedure: L.O.A.;  Surgeon: Noy Paz MD;  Location: [facility];  Service: Gynecology   • NC LAPS ABD PRTM&OMENTUM DX W/WO SPEC BR/WA SPX N/A 08/09/2019    Procedure: LAPAROSCOPY DIAGNOSTIC; LYSIS OF ADHESIONS;  Surgeon: Abebe Walls MD;  Location: [facility];  Service: Gynecology   • TUBAL LIGATION     • UPPER GASTROINTESTINAL ENDOSCOPY         Family History   Problem Relation Age of Onset   • Cancer Paternal Grandmother         uterine   • Cancer Paternal Aunt         uterine    • Migraines Mother    • Arthritis Mother    • Hypertension Father    • Breast cancer Neg Hx    • Colon cancer Neg Hx    • Ovarian cancer Neg Hx      I have reviewed and agree with the history as documented.    E-Cigarette/Vaping   • E-Cigarette Use Never User      E-Cigarette/Vaping Substances   • Nicotine No    • THC No    • CBD No    • Flavoring No    • Other No    • Unknown No      Social History     Tobacco Use   • Smoking status: Every Day     Current packs/day: 0.50      Average packs/day: 0.5 packs/day for 15.0 years (7.5 ttl pk-yrs)     Types: Cigarettes     Passive exposure: Current   • Smokeless tobacco: Never   Vaping Use   • Vaping status: Never Used   Substance Use Topics   • Alcohol use: Never   • Drug use: Never       Review of Systems   Constitutional:  Negative for chills and fever.   HENT:  Negative for ear pain and sore throat.    Eyes:  Negative for visual disturbance.   Respiratory:  Negative for cough and shortness of breath.    Cardiovascular:  Negative for chest pain and palpitations.   Gastrointestinal:  Positive for abdominal pain. Negative for nausea and vomiting.   Genitourinary:  Negative for dysuria and hematuria.   Musculoskeletal:  Negative for arthralgias and back pain.   Skin:  Negative for color change and rash.   Neurological:  Positive for headaches. Negative for seizures and syncope.   All other systems reviewed and are negative.      Physical Exam  Physical Exam    Vital Signs  ED Triage Vitals [05/25/24 2042]   Temperature Pulse Respirations Blood Pressure SpO2   98.3 °F (36.8 °C) 98 18 152/85 99 %      Temp Source Heart Rate Source Patient Position - Orthostatic VS BP Location FiO2 (%)   Oral Monitor Sitting Right arm --      Pain Score       10 - Worst Possible Pain           Vitals:    05/25/24 2042   BP: 152/85   Pulse: 98   Patient Position - Orthostatic VS: Sitting         Visual Acuity      ED Medications  Medications   sodium chloride 0.9 % bolus 1,000 mL (1,000 mL Intravenous New Bag 5/25/24 2206)   ondansetron (ZOFRAN) injection 4 mg (4 mg Intravenous Given 5/25/24 2206)   ketorolac (TORADOL) injection 15 mg (15 mg Intravenous Given 5/25/24 2206)   HYDROmorphone (DILAUDID) injection 0.5 mg (0.5 mg Intravenous Given 5/25/24 2207)   iohexol (OMNIPAQUE) 350 MG/ML injection (MULTI-DOSE) 100 mL (100 mL Intravenous Given 5/25/24 2305)       Diagnostic Studies  Results Reviewed       Procedure Component Value Units Date/Time    Urine  Microscopic [[id number]]  (Abnormal) Collected: 05/25/24 2213    Lab Status: Final result Specimen: Urine, Clean Catch Updated: 05/25/24 2231     RBC, UA 2-4 /hpf      WBC, UA 1-2 /hpf      Epithelial Cells Occasional /hpf      Bacteria, UA None Seen /hpf      MUCUS THREADS Moderate     Ca Oxalate Priti, UA Occasional /hpf     Basic metabolic panel [[id number]]  (Abnormal) Collected: 05/25/24 2204    Lab Status: Final result Specimen: Blood from Hand, Right Updated: 05/25/24 2230     Sodium 140 mmol/L      Potassium 3.3 mmol/L      Chloride 107 mmol/L      CO2 24 mmol/L      ANION GAP 9 mmol/L      BUN 14 mg/dL      Creatinine 0.83 mg/dL      Glucose 106 mg/dL      Calcium 9.6 mg/dL      eGFR 89 ml/min/1.73sq m     Narrative:      National Kidney Disease Foundation guidelines for Chronic Kidney Disease (CKD):   •  Stage 1 with normal or high GFR (GFR > 90 mL/min/1.73 square meters)  •  Stage 2 Mild CKD (GFR = 60-89 mL/min/1.73 square meters)  •  Stage 3A Moderate CKD (GFR = 45-59 mL/min/1.73 square meters)  •  Stage 3B Moderate CKD (GFR = 30-44 mL/min/1.73 square meters)  •  Stage 4 Severe CKD (GFR = 15-29 mL/min/1.73 square meters)  •  Stage 5 End Stage CKD (GFR <15 mL/min/1.73 square meters)  Note: GFR calculation is accurate only with a steady state creatinine    Hepatic function panel [[id number]]  (Normal) Collected: 05/25/24 2204    Lab Status: Final result Specimen: Blood from Hand, Right Updated: 05/25/24 2230     Total Bilirubin 0.35 mg/dL      Bilirubin, Direct 0.07 mg/dL      Alkaline Phosphatase 58 U/L      AST 15 U/L      ALT 33 U/L      Total Protein 6.9 g/dL      Albumin 4.2 g/dL     Lipase [[id number]]  (Abnormal) Collected: 05/25/24 2204    Lab Status: Final result Specimen: Blood from Hand, Right Updated: 05/25/24 2230     Lipase 8 u/L     Urine Macroscopic, POC [[id number]]  (Abnormal) Collected: 05/25/24 2213    Lab Status: Final result Specimen: Urine Updated: 05/25/24 2829     Color, UA Yellow      Clarity, UA Clear     pH, UA 5.5     Leukocytes, UA Negative     Nitrite, UA Negative     Protein, UA Negative mg/dl      Glucose, UA Negative mg/dl      Ketones, UA Negative mg/dl      Urobilinogen, UA 0.2 E.U./dl      Bilirubin, UA Negative     Occult Blood, UA Small     Specific Gravity, UA >=1.030    Narrative:      CLINITEK RESULT    CBC and differential [[id number]]  (Abnormal) Collected: 05/25/24 2204    Lab Status: Final result Specimen: Blood from Hand, Right Updated: 05/25/24 2214     WBC 14.23 Thousand/uL      RBC 4.20 Million/uL      Hemoglobin 12.9 g/dL      Hematocrit 37.7 %      MCV 90 fL      MCH 30.7 pg      MCHC 34.2 g/dL      RDW 12.3 %      MPV 10.9 fL      Platelets 279 Thousands/uL      nRBC 0 /100 WBCs      Segmented % 74 %      Immature Grans % 0 %      Lymphocytes % 18 %      Monocytes % 7 %      Eosinophils Relative 1 %      Basophils Relative 0 %      Absolute Neutrophils 10.39 Thousands/µL      Absolute Immature Grans 0.06 Thousand/uL      Absolute Lymphocytes 2.61 Thousands/µL      Absolute Monocytes 0.97 Thousand/µL      Eosinophils Absolute 0.17 Thousand/µL      Basophils Absolute 0.03 Thousands/µL                    CT abdomen pelvis with contrast    (Results Pending)              Procedures  Procedures         ED Course                               SBIRT 22yo+      Flowsheet Row Most Recent Value   Initial Alcohol Screen: US AUDIT-C     1. How often do you have a drink containing alcohol? 0 Filed at: 05/25/2024 2044   2. How many drinks containing alcohol do you have on a typical day you are drinking?  0 Filed at: 05/25/2024 2044   3a. Male UNDER 65: How often do you have five or more drinks on one occasion? 0 Filed at: 05/25/2024 2044   3b. FEMALE Any Age, or MALE 65+: How often do you have 4 or more drinks on one occassion? 0 Filed at: 05/25/2024 2044   Audit-C Score 0 Filed at: 05/25/2024 2044   LISBETH: How many times in the past year have you...    Used an illegal drug or used a  prescription medication for non-medical reasons? Never Filed at: 05/25/2024 2044                      Medical Decision Making  Amount and/or Complexity of Data Reviewed  Labs: ordered.  Radiology: ordered.    Risk  Prescription drug management.           Disposition  Final diagnoses:   None     ED Disposition       None          Follow-up Information    None         Patient's Medications   Discharge Prescriptions    No medications on file       No discharge procedures on file.    PDMP Review         Value Time User    PDMP Reviewed  Yes 5/23/2024  1:02 AM Sonny Duff PA-C            ED Provider  Electronically Signed by

## 2024-05-26 NOTE — ED PROVIDER NOTES
"Patient signed out to Dr Cervantes. Patient presented with migraine as well as right lower abdominal and flank pain. Patient was treated for her migraine and feeling improved. Pending CT at this time    She did have in April RA-LSO with lysis of adhesions and bilateral ureteral catheter placement for L Adnexal mass.  Patient had an pelvic ultrasound on 5/19 which showed an 8 cm right adnexal cystic mass. She had an CT guided aspiration of the adnexal cystic mass on 5/20 for 80cc of clear yellow fluid and was found to be stable post procedure.     12:40 AM  Noted CT per [facility] \" 2.4 cm rounded rim enhancing cystic structure in right adnexum likely represent the normal dominant ovarian follicle. Immediately posterior to this, there is a 2 cm thin-walled simple appearing fluid collection may be postsurgical seroma versus normal loculation of simple fluid.  But they cannot rule out hydrosalpinx versus early abscess formation\"    Will reach out to OB/GYN    1:20 AM    Discussed case with OB/GYN.  Patient is afebrile with stable leukocytosis and recent surgeries as well as ureteral stents concern for adhesions and pain. Low concern for abscess.       Patient resting in bed in no distress. She reports that she she has had 2 types of pain ever since her surgeries.  Family at bedside who helped translate.  She reports that after her first surgery she had some pain, but after her second surgery she has had pain since that time.  She has no fevers, chills, nausea, vomiting or diarrhea.  Patient does admit that she has not been listening to postoperative instructions and has been cleaning and moving around the house.    Patient denies any abnormal vaginal bleeding, spotting, discharge.  She reports that the only medication that gave her for pain is Tylenol.  She reports that the pain medicine that other physician had given her did help her.    She reports that she was calling her doctors at [facility] but came here to get a " CT.    On exam she was resting in bed in no distress.  She was moving about in bed independently.  Maintaining airway and secretions in no stridor and no respiratory distress.  No conversational dyspnea.  Abdomen is obese but nondistended.  He has diffuse tenderness without any rebound or guarding.  She has no CVA tenderness.    Again reviewed with her and offered her ultrasound today.  She initially stated she would wait for this but now declines.  She understands that to rule out out abscess I discussed with gynecology on-call however patient does not wish to wait for this.  She understands risks.    Also discussed with her at length return to ER instructions given.  She was asking if she can just come get the ultrasound as an outpatient or come back in to get it.  I discussed with her the process of an emergency department and that she would need to sign in have reexamination and determination by that physician.  I did discuss with her that we will give her very short prescription for Percocet to get her in better controlled.  Also reported to her we will start Carafate and Pepcid for gastritis.    Patient is asking how long this pain will take after surgery.  She states that this has been there ever since her 2 surgeries.  I also referred her back to her surgeon and discussed with her that she will need to follow better postop instructions.     Patient is agreeable plan of care.    Counseling: I had a detailed discussion with the patient and/or guardian regarding: the historical points, exam findings, and any diagnostic results supporting the discharge diagnosis, lab results, radiology results, discharge instructions reviewed with patient and/or family/caregiver and understanding was verbalized. Instructions given to return to the emergency department if symptoms worsen or persist, or if there are any questions or concerns that arise at home.     All imaging and/or lab testing discussed with patient, strict  return to ED precautions discussed. Patient recommended to follow up promptly with appropriate outpatient provider. Patient and/or family members verbalizes understanding and agrees with plan. Patient and/or family members were given opportunity to ask questions, all questions were answered at this time. Patient is stable for discharge       Jamila Chang DO  05/26/24 0151

## 2024-05-26 NOTE — DISCHARGE INSTRUCTIONS
Sixes se ha comentado, tiene un quiste ovárico en el lado derecho, así divina líquido que podría ser normal en el postoperatorio.  Debe llamar a raymundo ginecólogo para jonathan ecografía ambulatoria.    Divina se ha comentado, existe jonathan baja posibilidad de que se produzca un absceso.  [place] se identifica mejor en la ecografía.    Si desarrolla jonathan temperatura superior a 100.4.  Vómitos persistentes o cualquier inquietud, regrese a la aleida de emergencias  Asegúrese de llamar a raymundo ginecólogo oncólogo para un seguimiento minucioso

## 2024-05-27 DIAGNOSIS — Z72.0 TOBACCO USE: ICD-10-CM

## 2024-05-28 RX ORDER — BISACODYL 5 MG
TABLET, DELAYED RELEASE (ENTERIC COATED) ORAL
Qty: 60 TABLET | Refills: 0 | OUTPATIENT
Start: 2024-05-28

## 2024-05-28 RX ORDER — METHOCARBAMOL 500 MG/1
500 TABLET, FILM COATED ORAL 2 TIMES DAILY
Qty: 30 TABLET | Refills: 0 | OUTPATIENT
Start: 2024-05-28

## 2024-05-28 NOTE — TELEPHONE ENCOUNTER
[facility] 
 
 
 [address] 
[phone] Patient: Radha Strong MRN: [id number] IOS:[id number] Visit Information Date & Time Provider Department Dept. Phone Encounter #  
 3/16/2018  8:40 AM Lokesh Marie MD [facility] [phone] [id number] Follow-up Instructions Return in about 4 months (around 7/16/2018) for fasting, medicare wellness, physical.  
  
Your Appointments 5/3/2018  8:20 AM  
ROUTINE CARE with Lokesh Marie MD  
[facility]) Appt Note: FOLLOW UP VISIT -  FASTING -  $10CP-  [facility]  10-10- 2017; lvm for patient to call back and r/s appointment; FOLLOW UP VISIT - FASTING - $10CP 0pb clg 1/5/2018  
 [address] [place]  
[phone]  
  
   
 [place] [phone]  
  
    
 6/18/2018  9:00 AM  
STRESS ECHOCARDIOGRAMS with [facility] (MINAL SCHEDULING) Appt Note: 6 month f/u with strss echo dx cav see dr Miladys Gaines after  
 [address] [place] [address]  
[address] [facility] [address]  
  
    
 6/18/2018 10:00 AM  
ECHO CARDIOGRAMS 2D with ECHO, [facility] (MINAL SCHEDULING) Appt Note: 6 month f/u with strss echo dx cav see dr Miladys Gaines after  
 [address] [place]  
[address] [facility]  
  
    
 6/18/2018 10:00 AM  
ESTABLISHED PATIENT with Carlo Diallo MD  
[facility] (MINAL SCHEDULING) Appt Note: 6 month f/u with strss echo dx cav see dr Miladys Gaines after  
 [address] [place]  
[address] [address] Upcoming Health Maintenance  Date Due  
 Per ED note from 5/23/2025, patient presented with worsening left sided head pain for 2 days. associated symptoms of light sensitivity and nausea, also noting odd taste in mouth. no relief with sumatriptan. denies and triggers: head trauma, sickness.     patient was given reglan, benadryl, toradol, mag and IVF. headache symtpoms improved. patient was discharged with instruction for neuro follow up.   DTaP/Tdap/Td series (1 - Tdap) 10/5/1972 FOBT Q 1 YEAR AGE 50-75 10/5/2001 ZOSTER VACCINE AGE 60> 8/5/2011 Pneumococcal 65+ Low/Medium Risk (1 of 2 - PCV13) 10/5/2016 MEDICARE YEARLY EXAM 10/5/2016 HEMOGLOBIN A1C Q6M 11/30/2017 EYE EXAM RETINAL OR DILATED Q1 12/6/2017 FOOT EXAM Q1 5/30/2018 MICROALBUMIN Q1 5/30/2018 LIPID PANEL Q1 5/30/2018 GLAUCOMA SCREENING Q2Y 12/6/2018 Allergies as of 3/16/2018  Review Complete On: 3/16/2018 By: Luis Christopher MD  
 No Known Allergies Current Immunizations  Reviewed on 11/8/2017 Name Date Influenza Nasal Vaccine 11/7/2017 Influenza Vaccine 10/20/2015 10:00 AM, 1/27/2015  9:30 AM, 10/22/2013 Influenza Vaccine (Quad) PF 11/12/2016 10:10 AM  
  
 Not reviewed this visit You Were Diagnosed With   
  
 Codes Comments Type 2 diabetes mellitus with hyperglycemia, without long-term current use of insulin (HCC)    -  Primary ICD-10-CM: E11.65 ICD-9-CM: 250.00, 790.29 Dyslipidemia, goal LDL below 100     ICD-10-CM: E78.5 ICD-9-CM: 272.4 Essential hypertension with goal blood pressure less than 130/85     ICD-10-CM: I10 
ICD-9-CM: 401.9 Colon cancer screening     ICD-10-CM: Z12.11 ICD-9-CM: V76.51 Coronary artery disease involving native coronary artery of native heart without angina pectoris     ICD-10-CM: I25.10 ICD-9-CM: 414.01 Vitals BP Pulse Temp Resp Height(growth percentile) Weight(growth percentile) 118/56 (BP 1 Location: Right arm, BP Patient Position: Sitting) 82 97.9 °F (36.6 °C) (Oral) 16 5' 5\" (1.651 m) 143 lb 12.8 oz (65.2 kg) SpO2 BMI Smoking Status 98% 23.93 kg/m2 Never Smoker Vitals History BMI and BSA Data Body Mass Index Body Surface Area  
 23.93 kg/m 2 1.73 m 2 Preferred Pharmacy Pharmacy Name Phone [facility] [phone] Your Updated Medication List  
  
   
 This list is accurate as of 3/16/18  9:21 AM.  Always use your most recent med list.  
  
  
  
  
 aspirin 81 mg tablet Take 81 mg by mouth daily. clopidogrel 75 mg Tab Commonly known as:  PLAVIX Take 1 Tab by mouth daily. glipiZIDE 10 mg tablet Commonly known as:  Rubina Formosa Take 1 Tab by mouth two (2) times a day. glucose blood VI test strips strip Commonly known as:  Oskar Chum Check sugar once daily  
  
 hydroCHLOROthiazide 25 mg tablet Commonly known as:  HYDRODIURIL Take 25 mg by mouth daily (after breakfast). JANUVIA 50 mg tablet Generic drug:  SITagliptin TAKE ONE TABLET BY MOUTH ONCE DAILY  
  
 lancets 33 gauge Misc Commonly known as: One Touch Bettylou Grim Check sugar once daily  
  
 losartan 100 mg tablet Commonly known as:  COZAAR  
  
 losartan-hydroCHLOROthiazide 50-12.5 mg per tablet Commonly known as:  HYZAAR Take 1 Tab by mouth daily. metFORMIN 1,000 mg tablet Commonly known as:  GLUCOPHAGE Take 1 Tab by mouth two (2) times daily (with meals). nitroglycerin 0.4 mg SL tablet Commonly known as:  NITROSTAT  
  
 pneumococcal 13 grant conj dip 0.5 mL Syrg injection Commonly known as:  PREVNAR 13 (PF)  
0.5 mL by IntraMUSCular route once for 1 dose. pravastatin 40 mg tablet Commonly known as:  PRAVACHOL Take 1 Tab by mouth nightly. therapeutic multivitamin tablet Commonly known as:  [facility] Take 1 Tab by mouth daily. Prescriptions Printed Refills  
 pneumococcal 13 grant conj dip (PREVNAR 13, PF,) 0.5 mL syrg injection 0 Si.5 mL by IntraMUSCular route once for 1 dose. Class: Print Route: IntraMUSCular We Performed the Following HEMOGLOBIN A1C WITH EAG [77624 CPT(R)]  DIABETES FOOT EXAM [HM7 Custom] LIPID PANEL [54827 CPT(R)] METABOLIC PANEL, COMPREHENSIVE [08886 CPT(R)] MICROALBUMIN, UR, RAND W/ MICROALB/CREAT RATIO D2026196 CPT(R)] REFERRAL TO GASTROENTEROLOGY [OSZ84 Custom] REFERRAL TO OPHTHALMOLOGY [REF57 Custom] Comments:  
 Please evaluate patient for diabetic retinopathy. Follow-up Instructions Return in about 4 months (around 7/16/2018) for fasting, medicare wellness, physical.  
  
  
Referral Information Referral ID Referred By Referred To  
  
 [id number] [facility] [address] [facility] [phone] [place], [address] Visits Status Start Date End Date 1 New Request 3/16/18 3/16/19 If your referral has a status of pending review or denied, additional information will be sent to support the outcome of this decision. Referral ID Referred By Referred To  
 [id number] MD Sultana Reddyshaheen [address]  [place] [address] Phone: [phone] Fax: [phone] Visits Status Start Date End Date 1 New Request 3/16/18 3/16/19 If your referral has a status of pending review or denied, additional information will be sent to support the outcome of this decision. Patient Instructions Hip Arthritis: Exercises Your Care Instructions Here are some examples of exercises for hip arthritis. Start each exercise slowly. Ease off the exercise if you start to have pain. Your doctor or physical therapist will tell you when you can start these exercises and which ones will work best for you. How to do the exercises Straight-leg raises to the outside 1. Lie on your side, with your affected hip on top. 2. Tighten the front thigh muscles of your top leg to keep your knee straight. 3. Keep your hip and your leg straight in line with the rest of your body, and keep your knee pointing forward. Do not drop your hip back. 4. Lift your top leg straight up toward the ceiling, about 12 inches off the floor. Hold for about 6 seconds, then slowly lower your leg. 5. Repeat 8 to 12 times. 6. Switch legs and repeat steps 1 through 5, even if only one hip is sore. Straight-leg raises to the inside 1. Lie on your side with your affected hip on the floor. 2. You can either prop up your other leg on a chair, or you can bend that knee and put that foot in front of your other knee. Do not drop your hip back. 3. Tighten the muscles on the front thigh of your bottom leg to straighten that knee. 4. Keep your kneecap pointing forward and your leg straight, and lift your bottom leg up toward the ceiling about 6 inches. Hold for about 6 seconds, then lower slowly. 5. Repeat 8 to 12 times. 6. Switch legs and repeat steps 1 through 5, even if only one hip is sore. Hip hike 1. Stand sideways on the bottom step of a staircase, and hold on to the banister or wall. 2. Keeping both knees straight, lift your good leg off the step and let it hang down. Then hike your good hip up to the same level as your affected hip or a little higher. 3. Repeat 8 to 12 times. 4. Switch legs and repeat steps 1 through 3, even if only one hip is sore. Bridging 1. Lie on your back with both knees bent. Your knees should be bent about 90 degrees. 2. Then push your feet into the floor, squeeze your buttocks, and lift your hips off the floor until your shoulders, hips, and knees are all in a straight line. 3. Hold for about 6 seconds as you continue to breathe normally, and then slowly lower your hips back down to the floor and rest for up to 10 seconds. 4. Repeat 8 to 12 times. Hamstring stretch (lying down) 1. Lie flat on your back with your legs straight. If you feel discomfort in your back, place a small towel roll under your lower back. 2. Holding the back of your affected leg, lift your leg straight up and toward your body until you feel a stretch at the back of your thigh. 3. Hold the stretch for at least 30 seconds. 4. Repeat 2 to 4 times. 5. Switch legs and repeat steps 1 through 4, even if only one hip is sore. Standing quadriceps stretch 1. If you are not steady on your feet, hold on to a chair, counter, or wall. You can also lie on your stomach or your side to do this exercise. 2. Bend the knee of the leg you want to stretch, and reach behind you to grab the front of your foot or ankle with the hand on the same side. For example, if you are stretching your right leg, use your right hand. 3. Keeping your knees next to each other, pull your foot toward your buttock until you feel a gentle stretch across the front of your hip and down the front of your thigh. Your knee should be pointed directly to the ground, and not out to the side. 4. Hold the stretch for at least 15 to 30 seconds. 5. Repeat 2 to 4 times. 6. Switch legs and repeat steps 1 through 5, even if only one hip is sore. Hip rotator stretch 1. Lie on your back with both knees bent and your feet flat on the floor. 2. Put the ankle of your affected leg on your opposite thigh near your knee. 3. Use your hand to gently push your knee away from your body until you feel a gentle stretch around your hip. 4. Hold the stretch for 15 to 30 seconds. 5. Repeat 2 to 4 times. 6. Repeat steps 1 through 5, but this time use your hand to gently pull your knee toward your opposite shoulder. 7. Switch legs and repeat steps 1 through 6, even if only one hip is sore. Knee-to-chest 
 
1. Lie on your back with your knees bent and your feet flat on the floor. 2. Bring your affected leg to your chest, keeping the other foot flat on the floor (or keeping the other leg straight, whichever feels better on your lower back). 3. Keep your lower back pressed to the floor. Hold for at least 15 to 30 seconds. 4. Relax, and lower the knee to the starting position. 5. Repeat 2 to 4 times. 6. Switch legs and repeat steps 1 through 5, even if only one hip is sore. 7. To get more stretch, put your other leg flat on the floor while pulling your knee to your chest. 
Clamshell 1. Lie on your side, with your affected hip on top. Keep your feet and knees together and your knees bent. 2. Raise your top knee, but keep your feet together. Do not let your hips roll back. Your legs should open up like a clamshell. 3. Hold for 6 seconds. 4. Slowly lower your knee back down. Rest for 10 seconds. 5. Repeat 8 to 12 times. 6. Switch legs and repeat steps 1 through 5, even if only one hip is sore. Follow-up care is a key part of your treatment and safety. Be sure to make and go to all appointments, and call your doctor if you are having problems. It's also a good idea to know your test results and keep a list of the medicines you take. Where can you learn more? Go to http://pardeep-harjit.info/. Enter S391 in the search box to learn more about \"Hip Arthritis: Exercises. \" Current as of: March 21, 2017 Content Version: 11.4 © 0629-0223 [employer]. Care instructions adapted under license by [employer] (which disclaims liability or warranty for this information). If you have questions about a medical condition or this instruction, always ask your healthcare professional. [place] [address] any warranty or liability for your use of this information. Introducing [address]! Dear Gill Marshall: Thank you for requesting a [employer] account. Our records indicate that you already have an active [employer] account. You can access your account anytime at [email or web address] Did you know that you can access your hospital and ER discharge instructions at any time in [employer]? You can also review all of your test results from your hospital stay or ER visit. Additional Information If you have questions, please visit the Frequently Asked Questions section of the [employer] website at [email or web address] Remember, [employer] is NOT to be used for urgent needs. For medical emergencies, dial 911. Now available from your iPhone and Android! Please provide this summary of care documentation to your next provider. Your primary care clinician is listed as Jarrod Jacobs. If you have any questions after today's visit, please call [phone].

## 2024-05-29 RX ORDER — NICOTINE 21 MG/24HR
1 PATCH, TRANSDERMAL 24 HOURS TRANSDERMAL EVERY 24 HOURS
Qty: 28 PATCH | Refills: 2 | Status: SHIPPED | OUTPATIENT
Start: 2024-05-29

## 2024-06-11 DIAGNOSIS — E53.8 VITAMIN B 12 DEFICIENCY: ICD-10-CM

## 2024-06-12 DIAGNOSIS — M54.9 BACK PAIN: ICD-10-CM

## 2024-06-12 DIAGNOSIS — K59.00 ACUTE CONSTIPATION: ICD-10-CM

## 2024-06-12 RX ORDER — LANOLIN ALCOHOL/MO/W.PET/CERES
1000 CREAM (GRAM) TOPICAL DAILY
Qty: 90 TABLET | Refills: 1 | Status: SHIPPED | OUTPATIENT
Start: 2024-06-12

## 2024-06-13 RX ORDER — METHOCARBAMOL 500 MG/1
500 TABLET, FILM COATED ORAL 2 TIMES DAILY
Qty: 30 TABLET | Refills: 0 | Status: SHIPPED | OUTPATIENT
Start: 2024-06-13

## 2024-06-13 RX ORDER — BISACODYL 5 MG
TABLET, DELAYED RELEASE (ENTERIC COATED) ORAL
Qty: 60 TABLET | Refills: 0 | Status: SHIPPED | OUTPATIENT
Start: 2024-06-13

## 2024-06-20 ENCOUNTER — OFFICE VISIT (OUTPATIENT)
Dept: OBGYN CLINIC | Facility: CLINIC | Age: 39
End: 2024-06-20

## 2024-06-20 VITALS
HEIGHT: 62 IN | HEART RATE: 120 BPM | WEIGHT: 155.4 LBS | BODY MASS INDEX: 28.6 KG/M2 | SYSTOLIC BLOOD PRESSURE: 120 MMHG | DIASTOLIC BLOOD PRESSURE: 79 MMHG

## 2024-06-20 DIAGNOSIS — N89.8 VAGINAL ODOR: Primary | ICD-10-CM

## 2024-06-20 DIAGNOSIS — E55.9 VITAMIN D DEFICIENCY: ICD-10-CM

## 2024-06-20 DIAGNOSIS — M54.9 BACK PAIN: ICD-10-CM

## 2024-06-20 DIAGNOSIS — B96.89 BV (BACTERIAL VAGINOSIS): ICD-10-CM

## 2024-06-20 DIAGNOSIS — N76.0 BV (BACTERIAL VAGINOSIS): ICD-10-CM

## 2024-06-20 DIAGNOSIS — K59.00 ACUTE CONSTIPATION: ICD-10-CM

## 2024-06-20 PROCEDURE — 99213 OFFICE O/P EST LOW 20 MIN: CPT | Performed by: OBSTETRICS & GYNECOLOGY

## 2024-06-20 PROCEDURE — 87210 SMEAR WET MOUNT SALINE/INK: CPT | Performed by: OBSTETRICS & GYNECOLOGY

## 2024-06-20 RX ORDER — ERGOCALCIFEROL 1.25 MG/1
50000 CAPSULE ORAL WEEKLY
Qty: 4 CAPSULE | OUTPATIENT
Start: 2024-06-20

## 2024-06-20 RX ORDER — METRONIDAZOLE 500 MG/1
500 TABLET ORAL EVERY 12 HOURS SCHEDULED
Qty: 14 TABLET | Refills: 0 | Status: SHIPPED | OUTPATIENT
Start: 2024-06-20 | End: 2024-06-27

## 2024-06-20 RX ORDER — BISACODYL 5 MG
TABLET, DELAYED RELEASE (ENTERIC COATED) ORAL
Qty: 60 TABLET | Refills: 0 | OUTPATIENT
Start: 2024-06-20

## 2024-06-20 RX ORDER — METHOCARBAMOL 500 MG/1
500 TABLET, FILM COATED ORAL 2 TIMES DAILY
Qty: 30 TABLET | Refills: 0 | OUTPATIENT
Start: 2024-06-20

## 2024-06-20 NOTE — PROGRESS NOTES
PROBLEM GYNECOLOGICAL VISIT    Lorrie Jones is a 39 y.o. female who presents today with complaint of vaginal odor.  Her general medical history has been reviewed and she reports it as follows:    Past Medical History:   Diagnosis Date    Abnormal Pap smear of cervix      cryosurgery; 2019 NILM pap/neg HPV    Allergic     Depression     Headache(784.0)     Migraine with aura      Past Surgical History:   Procedure Laterality Date    CHOLECYSTECTOMY LAPAROSCOPIC N/A 10/27/2020    Procedure: CHOLECYSTECTOMY LAPAROSCOPIC;  Surgeon: Lina Narayan MD;  Location:  MAIN OR;  Service: General    COLONOSCOPY      FL LUMBAR PUNCTURE DIAGNOSTIC  2023    GYNECOLOGIC CRYOSURGERY  2015    HYSTERECTOMY  2016    supracervical    LAPAROSCOPY  2018    SC LAPAROSCOPY W/LYSIS OF ADHESIONS N/A 2020    Procedure: L.O.A.;  Surgeon: Noy Paz MD;  Location: [facility];  Service: Gynecology    SC LAPS ABD PRTM&OMENTUM DX W/WO SPEC BR/WA SPX N/A 2019    Procedure: LAPAROSCOPY DIAGNOSTIC; LYSIS OF ADHESIONS;  Surgeon: Abebe Walls MD;  Location: [facility];  Service: Gynecology    TUBAL LIGATION      UPPER GASTROINTESTINAL ENDOSCOPY       OB History          2    Para   2    Term   1       1    AB   0    Living   2         SAB   0    IAB   0    Ectopic   0    Multiple   0    Live Births   2               Social History     Tobacco Use    Smoking status: Every Day     Current packs/day: 0.50     Average packs/day: 0.5 packs/day for 15.0 years (7.5 ttl pk-yrs)     Types: Cigarettes     Passive exposure: Current    Smokeless tobacco: Never   Vaping Use    Vaping status: Never Used   Substance Use Topics    Alcohol use: Never    Drug use: Never     Social History     Substance and Sexual Activity   Sexual Activity Yes    Partners: Male    Birth control/protection: Female Sterilization, None       Current Outpatient Medications   Medication Instructions    Acetaminophen  Extra Strength 500 MG TABS     ascorbic acid (VITAMIN C) 500 mg, Oral, Daily    bisacodyl 5 MG EC tablet TAKE 2 TABLETS (10 MG TOTAL) BY MOUTH 2 (TWO) TIMES A DAY AS NEEDED FOR CONSTIPATION (CONSTIPATION) COLONOSCOPY PREP    dexamethasone (DECADRON) 2 mg, Oral, Daily with breakfast    Diclofenac Sodium (VOLTAREN) 2 g, Topical, 4 times daily    dicyclomine (BENTYL) 20 mg, Oral, 2 times daily PRN    ergocalciferol (VITAMIN D2) 50,000 Units, Oral, Weekly    Ex-Lax 15 MG CHEW CHEW 1 TABLET BY MOUTH TWICE DAILY AS NEEDED FOR CONSTIPATION    famotidine (PEPCID) 20 mg, Oral, Daily    fluocinolone acetonide (DERMOTIC) 0.01 % otic oil INSTILL 3 DROPS IN BOTH EARS DAILY    fluticasone (FLONASE) 50 mcg/act nasal spray 1 spray, Nasal, 2 times daily    lidocaine (LIDODERM) 5 % 1 patch, Topical, Every 24 hours, Remove & Discard patch within 12 hours or as directed by MD    methocarbamol (ROBAXIN) 500 mg, Oral, 2 times daily    naproxen (EC NAPROSYN) 500 mg, Oral, As needed    nicotine (NICODERM CQ) 14 mg/24hr TD 24 hr patch 1 patch, Transdermal, Every 24 hours    nystatin-triamcinolone (MYCOLOG-II) cream Topical, 2 times daily    oxyCODONE-acetaminophen (Percocet) 5-325 mg per tablet 1 tablet, Oral, Every 6 hours PRN    pantoprazole (PROTONIX) 20 mg, Oral, Daily    polyethylene glycol (MIRALAX) 17 g, Oral, Daily    polyethylene glycol (MIRALAX) 17 g, Oral, Daily    prochlorperazine (COMPAZINE) 10 mg, Oral, Every 6 hours PRN    senna-docusate sodium (SENOKOT-S) 8.6-50 mg per tablet 1 tablet, Oral, Daily    sodium chloride 0.9 % nebulizer solution TAKE 3ML BY BEBULIZATION EVERY 6 HOURS AS NEEDED FOR WHEEZING OR SHORTNESS OF BREATH    sucralfate (CARAFATE) 1 g, Oral, 3 times daily    SUMAtriptan (IMITREX) 100 mg, Oral, Daily PRN    vitamin B-12 (VITAMIN B-12) 1,000 mcg, Oral, Daily       History of Present Illness:   Patient presents with c/o vaginal odor after intercourse.    Review of Systems:  Review of Systems   Genitourinary:   "Negative for pelvic pain, vaginal bleeding and vaginal discharge.        Vaginal odor after intercourse   All other systems reviewed and are negative.      Physical Exam:  /79 (BP Location: Left arm, Patient Position: Sitting, Cuff Size: Standard)   Pulse (!) 120 Comment: pt reports walked to appointment  Ht 5' 2\" (1.575 m)   Wt 70.5 kg (155 lb 6.4 oz)   BMI 28.42 kg/m²   Physical Exam  Constitutional:       Appearance: Normal appearance.   Genitourinary:      Bladder and urethral meatus normal.      No lesions in the vagina.      Right Labia: No rash, tenderness or lesions.     Left Labia: No tenderness, lesions or rash.     Vaginal discharge present.      No cervical motion tenderness, discharge or lesion.      No urethral tenderness or mass present.   Neurological:      Mental Status: She is alert.   Vitals reviewed.         Point of Care Testing:   -Wet mount: abnormal     Assessment:   1. BV    Plan:   1. Flagyl escribe   2. Review feminine hygiene   3. Return to office prn.    Reviewed with patient that [facility], but that they will not necessarily be reviewed by me immediately.  Explained that I will review results at my earliest opportunity and contact patient appropriately.  "

## 2024-06-20 NOTE — PATIENT INSTRUCTIONS
Ron por raymundo confianza en nuestro equipo.   Le agradecemos y agradecemos queta comentarios.   Si recibe jonathan encuesta nuestra, tómese unos momentos para informarnos cómo estamos.   Sinceramente,  Yaeliesere Toussaint-Foster, DO

## 2024-06-25 ENCOUNTER — PATIENT MESSAGE (OUTPATIENT)
Dept: NEUROLOGY | Facility: CLINIC | Age: 39
End: 2024-06-25

## 2024-07-02 DIAGNOSIS — E55.9 VITAMIN D DEFICIENCY: ICD-10-CM

## 2024-07-02 RX ORDER — ERGOCALCIFEROL 1.25 MG/1
50000 CAPSULE ORAL WEEKLY
Qty: 4 CAPSULE | OUTPATIENT
Start: 2024-07-02

## 2024-07-22 DIAGNOSIS — R10.84 GENERALIZED ABDOMINAL PAIN: ICD-10-CM

## 2024-07-22 DIAGNOSIS — R11.0 NAUSEA: ICD-10-CM

## 2024-07-22 DIAGNOSIS — R68.81 EARLY SATIETY: ICD-10-CM

## 2024-07-22 DIAGNOSIS — K59.00 ACUTE CONSTIPATION: ICD-10-CM

## 2024-07-22 DIAGNOSIS — R14.0 BLOATING: ICD-10-CM

## 2024-07-22 RX ORDER — ASPIRIN 81 MG
1 TABLET, DELAYED RELEASE (ENTERIC COATED) ORAL DAILY
Qty: 30 TABLET | Refills: 5 | Status: SHIPPED | OUTPATIENT
Start: 2024-07-22

## 2024-07-22 NOTE — PATIENT COMMUNICATION
Per google translate, patient mychart message:    Hello, I would like to ask Dr. Guevara a question if she could give me a call it would be Perfect [phone]. Thank you.

## 2024-07-23 NOTE — PATIENT COMMUNICATION
Per google translate:    Hello, when Dr. Ribeiro returns.      *Patient asking for a return call from  when she returns.   soft/nondistended

## 2024-07-25 ENCOUNTER — TELEPHONE (OUTPATIENT)
Age: 39
End: 2024-07-25

## 2024-07-25 NOTE — PATIENT COMMUNICATION
Elysia Parkinson MD   to Neurology Pod Clinical       7/23/24  2:51 PM  Team, can someone who can speak In Vietnamese  or with a , reach out to the patient and let her know Dr. Guevara is out for a few weeks. Please check with her if there is anything we can help her with.

## 2024-07-30 DIAGNOSIS — K59.00 ACUTE CONSTIPATION: ICD-10-CM

## 2024-07-30 RX ORDER — SENNOSIDES 15 MG/1
TABLET, CHEWABLE ORAL
Qty: 60 TABLET | Refills: 1 | Status: SHIPPED | OUTPATIENT
Start: 2024-07-30

## 2024-07-31 ENCOUNTER — TELEPHONE (OUTPATIENT)
Dept: OBGYN CLINIC | Facility: CLINIC | Age: 39
End: 2024-07-31

## 2024-08-01 ENCOUNTER — OFFICE VISIT (OUTPATIENT)
Dept: FAMILY MEDICINE CLINIC | Facility: CLINIC | Age: 39
End: 2024-08-01

## 2024-08-01 ENCOUNTER — TELEPHONE (OUTPATIENT)
Age: 39
End: 2024-08-01

## 2024-08-01 VITALS
TEMPERATURE: 98.7 F | HEART RATE: 93 BPM | HEIGHT: 62 IN | WEIGHT: 155 LBS | DIASTOLIC BLOOD PRESSURE: 85 MMHG | RESPIRATION RATE: 18 BRPM | BODY MASS INDEX: 28.52 KG/M2 | OXYGEN SATURATION: 99 % | SYSTOLIC BLOOD PRESSURE: 122 MMHG

## 2024-08-01 DIAGNOSIS — G89.29 CHRONIC BILATERAL LOW BACK PAIN WITH LEFT-SIDED SCIATICA: Chronic | ICD-10-CM

## 2024-08-01 DIAGNOSIS — M54.42 CHRONIC BILATERAL LOW BACK PAIN WITH LEFT-SIDED SCIATICA: Chronic | ICD-10-CM

## 2024-08-01 DIAGNOSIS — G89.29 CHRONIC PELVIC PAIN IN FEMALE: Chronic | ICD-10-CM

## 2024-08-01 DIAGNOSIS — M54.9 BACK PAIN: ICD-10-CM

## 2024-08-01 DIAGNOSIS — Z00.00 ANNUAL PHYSICAL EXAM: Primary | ICD-10-CM

## 2024-08-01 DIAGNOSIS — R10.2 CHRONIC PELVIC PAIN IN FEMALE: Chronic | ICD-10-CM

## 2024-08-01 DIAGNOSIS — E55.9 VITAMIN D DEFICIENCY: ICD-10-CM

## 2024-08-01 DIAGNOSIS — G43.109 MIGRAINE WITH AURA AND WITHOUT STATUS MIGRAINOSUS, NOT INTRACTABLE: Chronic | ICD-10-CM

## 2024-08-01 DIAGNOSIS — E53.8 VITAMIN B 12 DEFICIENCY: ICD-10-CM

## 2024-08-01 DIAGNOSIS — K21.9 GASTROESOPHAGEAL REFLUX DISEASE WITHOUT ESOPHAGITIS: Chronic | ICD-10-CM

## 2024-08-01 PROCEDURE — 99395 PREV VISIT EST AGE 18-39: CPT | Performed by: PHYSICIAN ASSISTANT

## 2024-08-01 PROCEDURE — 99215 OFFICE O/P EST HI 40 MIN: CPT | Performed by: PHYSICIAN ASSISTANT

## 2024-08-01 RX ORDER — OMEPRAZOLE 40 MG/1
40 CAPSULE, DELAYED RELEASE ORAL DAILY
Qty: 90 CAPSULE | Refills: 1 | Status: SHIPPED | OUTPATIENT
Start: 2024-08-01

## 2024-08-01 RX ORDER — METHOCARBAMOL 500 MG/1
500 TABLET, FILM COATED ORAL 2 TIMES DAILY PRN
Qty: 60 TABLET | Refills: 1 | Status: SHIPPED | OUTPATIENT
Start: 2024-08-01

## 2024-08-01 RX ORDER — LANOLIN ALCOHOL/MO/W.PET/CERES
1000 CREAM (GRAM) TOPICAL DAILY
Qty: 90 TABLET | Refills: 1 | Status: SHIPPED | OUTPATIENT
Start: 2024-08-01

## 2024-08-01 RX ORDER — CELECOXIB 100 MG/1
100 CAPSULE ORAL 2 TIMES DAILY PRN
Qty: 60 CAPSULE | Refills: 2 | Status: SHIPPED | OUTPATIENT
Start: 2024-08-01

## 2024-08-01 RX ORDER — ASCORBIC ACID 500 MG
500 TABLET ORAL DAILY
Qty: 90 TABLET | Refills: 1 | Status: SHIPPED | OUTPATIENT
Start: 2024-08-01

## 2024-08-01 RX ORDER — ERGOCALCIFEROL 1.25 MG/1
50000 CAPSULE ORAL WEEKLY
Qty: 16 CAPSULE | Refills: 1 | Status: SHIPPED | OUTPATIENT
Start: 2024-08-01

## 2024-08-01 NOTE — ASSESSMENT & PLAN NOTE
-Patient underwent EGD in January 2024.  Biopsies are positive for acid irritation.  - Patient wishes to switch from Protonix to omeprazole.  - Will prescribe omeprazole 40 mg daily.  - Continue to avoid trigger foods.

## 2024-08-01 NOTE — PATIENT INSTRUCTIONS
If you would like to be added to the wait list for services within [facility], you will need to contact them directly at [facility] - [phone]    Outpatient Mental Health Resources     [place] Suicide Prevention Lifeline: Dial #418  If you prefer to text, you can reach the Crisis Text Line by texting “[place]” to 033156    ¿Te encuentras en crisis? Envía un mensaje de texto con la palabra AYUDA al [phone] para comunicarte de manera gratuita con un Consejero de Crisis  Apoyo gratuito las 24 horas del día, los 7 días de la semana, al alcance de tu mano.    [facility] CRISIS for mental health emergencies and/or please go to your local Emergency Department Call [phone] if you or a loved one are in a mental health crisis.  You can Visit https://www.[facility].[place].gov/Services/[place]ental-Health-In[place]/Documents/Suicide_Prevention_Hotlines.pdf to find 24/7 crisis phone line for other [facility].    [facility] Mental Health  If you have NO insurance for outpatient Mental Health services you will need to have a liability appointment with [facility] to assess you to qualify for funding. [facility] does NOT provide funding for Medications.  Please call [phone] or [phone] to schedule an intake assessment    [facility]   ? Location 1: [facility] [place] [address] [phone]   ? Location 2: [facility] [address] [place] [address] [phone]        ? English only* Serves ages 4+          ? Accepts Medicare and some commercial plans    [facility]   ? [address] [place] [address] [phone]; [phone]  ? Bilingual English/Eritrean Serves ages 5+   ? Accepts Medical Assistance     [facility]   ? [address] [place] [address] [phone]   ? Bilingual English/Eritrean Serves ages 14+   ? Accepts Medical Assistance, (Not currently accepting Medicare), & Major Commercial Insurances     [facility]   ? [address]  [place] [address] [phone]        ? Bilingual English/French Serves ages 6+   ? Accepts Medical Assistance & Commercial Insurance     [facility]   ? Previous [address] location is closed  ? [address] [facility], [address] [phone]   ? Bilingual English/French Serves ages 3+   ? Accepts Medical Assistance & Some Commercial Insurance     [facility]   ? [address] [facility] [address] [place] [address] [phone]   ? Bilingual English/French Serves ages 5+   ? Accepts Medical Assistance     [facility]   ? [address] [place] [address] [phone]  ? Bilingual English/French Serves ages 3+   ? Accepts Medical Assistance & Some Commercial Insurance     PREVENTIVE MEASURES   ? Location 1: [address] [place] [address] [phone]        ? Location 2: [address] [place] [address]   ? Bilingual English/French Serves ages 18+  ? Accepts Medical Assistance    [facility], [facility]  ? [address], [place] [address] [phone]  ? Bilingual English/French  ? Accepts Aetna, Cigna, [facility]um[facility], [facility], [employer], Medicare, [employer], [facility]. No Medical Assistance    [facility] ([phone])  Medicare/Medicare Advantage, Medical Assistance/HealthChoices, Commercial, and self-pay as payment.    [facility]         ? [address], at [facility], [place] [address]; [phone]         ? Bilingual English/French Serves ages 6+         ? Accepts Medical Assistance     TEEN HELP LINE  ? 5-069-271-TALK    CRIME VICTIMS [place]       ? [phone]       ? 24/7 [facility] Hotline    [facility]       ? [phone]       ? 24/7 [facility] Hotline    www.psychologytoday.[employer] is a resource to find psychotherapy providers, patients can filter therapist search list based on a number of criteria including language, specialty, gender, insurance, etc.  Individuals seeking will need to reach out to perspective providers through information in the directory. You are encouraged to contact multiple providers to given that many providers have a significant wait list for services as well as to find a provider is a good fit for you!    [facility] is an organization of families, friends and individuals whose lives have been affected by mental illness. Together, we advocate for better lives for those individuals who have a m)ental illness. Visit: [email or web address].org to learn more or to search for local support resources including qualified mental health providers.

## 2024-08-01 NOTE — ASSESSMENT & PLAN NOTE
- Lumbar spine x-ray which shows mild scoliosis, otherwise unremarkable study.  - Advised to apply ice/heating pad/topical therapies as needed to affected area.  - Will prescribe Robaxin 500 mg, twice daily as needed.  Discussed possible side effect of drowsiness and to avoid operating heavy machinery or driving when taking medication.  - Will prescribe Celebrex 100 mg, twice daily as needed.  - If symptoms persist, would recommend referral to physical therapy.

## 2024-08-01 NOTE — ASSESSMENT & PLAN NOTE
- Reviewed neurology note from March 2024.  -Patient has established with neurology and underwent MRI brain.  Further work-up was suggested by MS specialist including work-up for inflammatory causes, ischemia and vasculitis.  -Reviewed MRI C-spine which was normal.  - Patient underwent lumbar puncture on 11/17/23.  -Continue sumatriptan 100 mg as needed for abortive therapy.  - Per patient, she has now completed vitamin B12 injections, however recent vitamin B12 level continues to be low.  She does not wish to proceed with further injections.  Continue daily vitamin B12 supplement.  - Continue riboflavin and magnesium for 100 mg daily for preventative purposes.  - Continue follow-up with neurology as scheduled.

## 2024-08-01 NOTE — PROGRESS NOTES
ADULT ANNUAL PHYSICAL  [facility]    NAME: Lorrie Jones  AGE: 39 y.o. SEX: female  : 1985     DATE: 2024     Assessment and Plan:     Problem List Items Addressed This Visit          Cardiovascular and Mediastinum    Migraine headache with aura (Chronic)     - Reviewed neurology note from 2024.  -Patient has established with neurology and underwent MRI brain.  Further work-up was suggested by MS specialist including work-up for inflammatory causes, ischemia and vasculitis.  -Reviewed MRI C-spine which was normal.  - Patient underwent lumbar puncture on 23.  -Continue sumatriptan 100 mg as needed for abortive therapy.  - Per patient, she has now completed vitamin B12 injections, however recent vitamin B12 level continues to be low.  She does not wish to proceed with further injections.  Continue daily vitamin B12 supplement.  - Continue riboflavin and magnesium for 100 mg daily for preventative purposes.  - Continue follow-up with neurology as scheduled.         Relevant Medications    methocarbamol (ROBAXIN) 500 mg tablet    celecoxib (CeleBREX) 100 mg capsule       Digestive    Gastroesophageal reflux disease (Chronic)     -Patient underwent EGD in 2024.  Biopsies are positive for acid irritation.  - Patient wishes to switch from Protonix to omeprazole.  - Will prescribe omeprazole 40 mg daily.  - Continue to avoid trigger foods.         Relevant Medications    omeprazole (PriLOSEC) 40 MG capsule       Nervous and Auditory    Chronic bilateral low back pain with left-sided sciatica (Chronic)     - Lumbar spine x-ray which shows mild scoliosis, otherwise unremarkable study.  - Advised to apply ice/heating pad/topical therapies as needed to affected area.  - Will prescribe Robaxin 500 mg, twice daily as needed.  Discussed possible side effect of drowsiness and to avoid operating heavy machinery or driving  when taking medication.  - Will prescribe Celebrex 100 mg, twice daily as needed.  - If symptoms persist, would recommend referral to physical therapy.            Surgery/Wound/Pain    Chronic pelvic pain in female (Chronic)     -Much improved since undergoing surgery in April 2024.  -Reviewed gynecology oncology note from 6/6/2024. Patient has had multiple abdominal surgeries done in the past.  Most recently underwent surgery due to persistent left adnexal cyst.  Patient underwent robotic assisted laparoscopic extensive lysis of adhesion, left salpingo-oophorectomy, cystoscopy with placement of bilateral ureteral catheters on 4/23/2024.  Surgery was performed along with colorectal surgery as well as urology given complexity of the case.  -Postoperatively patient had a pelvic fluid collection which likely represented seroma/peritoneal inclusion cyst.  Conservative management with CT-guided aspiration was successfully performed on 5/20/2024.  - Repeat CT scan on 6/3/2024 shows no acute abnormality.  - At this time, gynecology oncology does not recommend any additional surgical intervention and should follow-up regularly with gynecology.            Other    Vitamin D deficiency (Chronic)     - Reviewed recent vitamin D level which has improved.  - Continue vitamin D 50,000 units once weekly.         Relevant Medications    ergocalciferol (VITAMIN D2) 50,000 units    Vitamin B 12 deficiency (Chronic)     - Per patient, she has now completed vitamin B12 injections through neurology.  -Reviewed recent vitamin B12 level which has improved, but is still low.  Patient does not wish to continue with B12 injections.  - Continue once daily oral vitamin B12 supplement.         Relevant Medications    ascorbic acid (VITAMIN C) 500 mg tablet    vitamin B-12 (VITAMIN B-12) 1,000 mcg tablet     Other Visit Diagnoses       Annual physical exam    -  Primary    Back pain        Relevant Medications    methocarbamol (ROBAXIN) 500 mg  tablet    celecoxib (CeleBREX) 100 mg capsule            Immunizations and preventive care screenings were discussed with patient today. Appropriate education was printed on patient's after visit summary.    Counseling:  Alcohol/drug use: discussed moderation in alcohol intake, the recommendations for healthy alcohol use, and avoidance of illicit drug use.  Dental Health: discussed importance of regular tooth brushing, flossing, and dental visits.  Injury prevention: discussed safety/seat belts, safety helmets, smoke detectors, carbon dioxide detectors, and smoking near bedding or upholstery.  Sexual health: discussed sexually transmitted diseases, partner selection, use of condoms, avoidance of unintended pregnancy, and contraceptive alternatives.  Exercise: the importance of regular exercise/physical activity was discussed. Recommend exercise 3-5 times per week for at least 30 minutes.       Depression Screening and Follow-up Plan: Patient was screened for depression during today's encounter. They screened negative with a PHQ-2 score of 0.        Return in about 1 month (around 9/1/2024) for Next scheduled follow up GERD.     Chief Complaint:     Chief Complaint   Patient presents with    Follow-up      History of Present Illness:     Adult Annual Physical   Patient here for a comprehensive physical exam.  Patient notes her chronic pelvic pain has much improved since she underwent surgery in April.  Patient notes she does continue with her back pain.  Constipation has been stable with medication.      Diet and Physical Activity  Diet/Nutrition: well balanced diet.   Exercise: walking.      Depression Screening  PHQ-2/9 Depression Screening    Little interest or pleasure in doing things: 0 - not at all  Feeling down, depressed, or hopeless: 0 - not at all  PHQ-2 Score: 0  PHQ-2 Interpretation: Negative depression screen       General Health  Sleep: sleeps well  Hearing: normal - bilateral.  Vision: goes for regular  eye exams and wears glasses.   Dental: no dental visits for >1 year.       /GYN Health  Contraceptive method: s/p hysterectomy     Review of Systems:     Review of Systems   Constitutional:  Negative for chills and fever.   HENT:  Negative for congestion, ear pain and sore throat.    Eyes:  Negative for pain.   Respiratory:  Negative for cough, chest tightness, shortness of breath and wheezing.    Cardiovascular:  Negative for chest pain and palpitations.   Gastrointestinal:  Negative for abdominal pain, constipation, diarrhea, nausea and vomiting.   Genitourinary:  Negative for difficulty urinating, dysuria and pelvic pain.   Musculoskeletal:  Positive for arthralgias and back pain. Negative for gait problem.   Skin:  Negative for rash.   Neurological:  Positive for headaches (occasionally). Negative for dizziness and numbness.      Past Medical History:     Past Medical History:   Diagnosis Date    Abnormal Pap smear of cervix     2015 cryosurgery; 6/2019 NILM pap/neg HPV    Allergic     Depression     Headache(784.0)     Migraine with aura       Past Surgical History:     Past Surgical History:   Procedure Laterality Date    CHOLECYSTECTOMY LAPAROSCOPIC N/A 10/27/2020    Procedure: CHOLECYSTECTOMY LAPAROSCOPIC;  Surgeon: Lina Narayan MD;  Location:  MAIN OR;  Service: General    COLONOSCOPY      FL LUMBAR PUNCTURE DIAGNOSTIC  11/17/2023    GYNECOLOGIC CRYOSURGERY  2015    HYSTERECTOMY  2016    supracervical    LAPAROSCOPY  2018    MS LAPAROSCOPY W/LYSIS OF ADHESIONS N/A 03/03/2020    Procedure: L.O.A.;  Surgeon: Noy Paz MD;  Location: [facility];  Service: Gynecology    MS LAPS ABD PRTM&OMENTUM DX W/WO SPEC BR/WA SPX N/A 08/09/2019    Procedure: LAPAROSCOPY DIAGNOSTIC; LYSIS OF ADHESIONS;  Surgeon: Abeeb Walls MD;  Location: [facility];  Service: Gynecology    TUBAL LIGATION      UPPER GASTROINTESTINAL ENDOSCOPY        Social History:     Social History     Socioeconomic History     Marital status: Single     Spouse name: None    Number of children: None    Years of education: None    Highest education level: None   Occupational History    None   Tobacco Use    Smoking status: Every Day     Current packs/day: 0.50     Average packs/day: 0.5 packs/day for 15.0 years (7.5 ttl pk-yrs)     Types: Cigarettes     Passive exposure: Current    Smokeless tobacco: Never   Vaping Use    Vaping status: Never Used   Substance and Sexual Activity    Alcohol use: Never    Drug use: Never    Sexual activity: Yes     Partners: Male     Birth control/protection: Female Sterilization, None   Other Topics Concern    None   Social History Narrative    None     Social Determinants of Health     Financial Resource Strain: Low Risk  (6/4/2024)    Received from [facility]    Overall Financial Resource Strain (CARDIA)     Difficulty of Paying Living Expenses: Not very hard   Food Insecurity: No Food Insecurity (6/4/2024)    Received from [facility]    Hunger Vital Sign     Worried About Running Out of Food in the Last Year: Never true     Ran Out of Food in the Last Year: Never true   Transportation Needs: No Transportation Needs (6/4/2024)    Received from [facility]    PRAPARE - Transportation     Lack of Transportation (Medical): No     Lack of Transportation (Non-Medical): No   Physical Activity: Insufficiently Active (6/23/2021)    Exercise Vital Sign     Days of Exercise per Week: 5 days     Minutes of Exercise per Session: 20 min   Stress: No Stress Concern Present (6/23/2021)    [place] of Occupational Health - Occupational Stress Questionnaire     Feeling of Stress : Only a little   Social Connections: Moderately Isolated (6/23/2021)    Social Connection and Isolation Panel [NHANES]     Frequency of Communication with Friends and Family: More than three times a week     Frequency of Social Gatherings with Friends and Family: Twice a week      Attends Yazdanism Services: Never     Active Member of Clubs or Organizations: No     Attends Club or Organization Meetings: Never     Marital Status: Living with partner   Intimate Partner Violence: Not At Risk (6/4/2024)    Received from [facility]    Humiliation, Afraid, Rape, and Kick questionnaire     Fear of Current or Ex-Partner: No     Emotionally Abused: No     Physically Abused: No     Sexually Abused: No   Housing Stability: High Risk (6/20/2024)    Housing Stability Vital Sign     Unable to Pay for Housing in the Last Year: No     Number of Times Moved in the Last Year: 2     Homeless in the Last Year: No       Social Determinants of Health     Tobacco Use: High Risk (8/1/2024)    Patient History     Smoking Tobacco Use: Every Day     Smokeless Tobacco Use: Never     Passive Exposure: Current   Alcohol Use: Not At Risk (6/23/2021)    AUDIT-C     Frequency of Alcohol Consumption: Never     Average Number of Drinks: Patient declined     Frequency of Binge Drinking: Never   Financial Resource Strain: Low Risk  (6/4/2024)    Received from [facility]    Overall Financial Resource Strain (CARDIA)     Difficulty of Paying Living Expenses: Not very hard   Food Insecurity: No Food Insecurity (6/4/2024)    Received from [facility]    Hunger Vital Sign     Worried About Running Out of Food in the Last Year: Never true     Ran Out of Food in the Last Year: Never true   Transportation Needs: No Transportation Needs (6/4/2024)    Received from [facility]    PRAPARE - Transportation     Lack of Transportation (Medical): No     Lack of Transportation (Non-Medical): No   Physical Activity: Insufficiently Active (6/23/2021)    Exercise Vital Sign     Days of Exercise per Week: 5 days     Minutes of Exercise per Session: 20 min   Stress: No Stress Concern Present (6/23/2021)    [place] of Occupational Health - Occupational Stress  Questionnaire     Feeling of Stress : Only a little   Social Connections: Moderately Isolated (6/23/2021)    Social Connection and Isolation Panel [NHANES]     Frequency of Communication with Friends and Family: More than three times a week     Frequency of Social Gatherings with Friends and Family: Twice a week     Attends Amish Services: Never     Active Member of Clubs or Organizations: No     Attends Club or Organization Meetings: Never     Marital Status: Living with partner   Intimate Partner Violence: Not At Risk (6/4/2024)    Received from [facility]    Humiliation, Afraid, Rape, and Kick questionnaire     Fear of Current or Ex-Partner: No     Emotionally Abused: No     Physically Abused: No     Sexually Abused: No   Depression: Not at risk (8/1/2024)    PHQ-2     PHQ-2 Score: 0   Housing Stability: High Risk (6/20/2024)    Housing Stability Vital Sign     Unable to Pay for Housing in the Last Year: No     Number of Times Moved in the Last Year: 2     Homeless in the Last Year: No   Utilities: Not At Risk (6/4/2024)    Received from [facility] Utilities     Threatened with loss of utilities: No   Health Literacy: Not on file        Family History:     Family History   Problem Relation Age of Onset    Cancer Paternal Grandmother         uterine    Cancer Paternal Aunt         uterine     Migraines Mother     Arthritis Mother     Hypertension Father     Breast cancer Neg Hx     Colon cancer Neg Hx     Ovarian cancer Neg Hx       Current Medications:     Current Outpatient Medications   Medication Sig Dispense Refill    ascorbic acid (VITAMIN C) 500 mg tablet Take 1 tablet (500 mg total) by mouth daily 90 tablet 1    celecoxib (CeleBREX) 100 mg capsule Take 1 capsule (100 mg total) by mouth 2 (two) times a day as needed for mild pain 60 capsule 2    ergocalciferol (VITAMIN D2) 50,000 units Take 1 capsule (50,000 Units total) by mouth once a week 16 capsule 1     methocarbamol (ROBAXIN) 500 mg tablet Take 1 tablet (500 mg total) by mouth 2 (two) times a day as needed for muscle spasms 60 tablet 1    omeprazole (PriLOSEC) 40 MG capsule Take 1 capsule (40 mg total) by mouth daily 90 capsule 1    vitamin B-12 (VITAMIN B-12) 1,000 mcg tablet Take 1 tablet (1,000 mcg total) by mouth daily 90 tablet 1    Acetaminophen Extra Strength 500 MG TABS       bisacodyl 5 MG EC tablet TAKE 2 TABLETS (10 MG TOTAL) BY MOUTH 2 (TWO) TIMES A DAY AS NEEDED FOR CONSTIPATION (CONSTIPATION) COLONOSCOPY PREP 60 tablet 0    dexamethasone (DECADRON) 2 mg tablet TAKE 1 TABLET (2 MG TOTAL) BY MOUTH DAILY WITH BREAKFAST 5 tablet 0    Diclofenac Sodium (VOLTAREN) 1 % Apply 2 g topically 4 (four) times a day 50 g 0    dicyclomine (BENTYL) 20 mg tablet Take 1 tablet (20 mg total) by mouth 2 (two) times a day as needed (abd pain) for up to 2 days 4 tablet 0    Ex-Lax 15 MG CHEW CHEW 1 TABLET BY MOUTH TWICE DAILY AS NEEDED FOR CONSTIPATION 60 tablet 1    famotidine (PEPCID) 20 mg tablet Take 1 tablet (20 mg total) by mouth daily for 14 days 14 tablet 0    fluocinolone acetonide (DERMOTIC) 0.01 % otic oil INSTILL 3 DROPS IN BOTH EARS DAILY 20 mL 0    fluticasone (FLONASE) 50 mcg/act nasal spray 1 spray into each nostril 2 (two) times a day (Patient not taking: Reported on 3/20/2024) 16 g 1    lidocaine (LIDODERM) 5 % Apply 1 patch topically over 12 hours every 24 hours Remove & Discard patch within 12 hours or as directed by MD 15 patch 0    naproxen (EC NAPROSYN) 500 MG EC tablet Take 500 mg by mouth if needed for mild pain      nicotine (NICODERM CQ) 14 mg/24hr TD 24 hr patch PLACE 1 PATCH ON THE SKIN OVER 24 HOURS EVERY 24 HOURS 28 patch 2    nystatin-triamcinolone (MYCOLOG-II) cream Apply topically 2 (two) times a day 15 g 0    oxyCODONE-acetaminophen (Percocet) 5-325 mg per tablet Take 1 tablet by mouth every 6 (six) hours as needed for moderate pain for up to 10 doses Max Daily Amount: 4 tablets 10  "tablet 0    pantoprazole (PROTONIX) 20 mg tablet Take 1 tablet (20 mg total) by mouth daily 30 tablet 5    polyethylene glycol (MIRALAX) 17 g packet Take 17 g by mouth daily 510 g 5    polyethylene glycol (MIRALAX) 17 g packet Take 17 g by mouth daily 510 g 5    prochlorperazine (COMPAZINE) 10 mg tablet Take 1 tablet (10 mg total) by mouth every 6 (six) hours as needed for nausea or vomiting 30 tablet 1    Senna Plus 8.6-50 MG per tablet TAKE 1 TABLET BY MOUTH DAILY 30 tablet 5    sodium chloride 0.9 % nebulizer solution TAKE 3ML BY BEBULIZATION EVERY 6 HOURS AS NEEDED FOR WHEEZING OR SHORTNESS OF BREATH 60 mL 1    sucralfate (CARAFATE) 1 g/10 mL suspension Take 10 mL (1 g total) by mouth 3 (three) times a day for 7 days 210 mL 0    SUMAtriptan (IMITREX) 100 mg tablet Take 1 tablet (100 mg total) by mouth daily as needed for migraine 9 tablet 12     No current facility-administered medications for this visit.      Allergies:     Allergies   Allergen Reactions    Shellfish-Derived Products - Food Allergy Itching and Swelling     \"seafood \"    Ibuprofen Other (See Comments)     Patient states she gets hematoma    Pregabalin Anxiety      Physical Exam:     /85 (BP Location: Left arm, Patient Position: Sitting, Cuff Size: Standard)   Pulse 93   Temp 98.7 °F (37.1 °C) (Temporal)   Resp 18   Ht 5' 2\" (1.575 m)   Wt 70.3 kg (155 lb)   SpO2 99%   BMI 28.35 kg/m²     Physical Exam  Vitals and nursing note reviewed.   Constitutional:       General: She is not in acute distress.     Appearance: She is well-developed.   HENT:      Head: Normocephalic and atraumatic.      Right Ear: External ear normal.      Left Ear: External ear normal.      Nose: Nose normal.   Eyes:      Conjunctiva/sclera: Conjunctivae normal.   Cardiovascular:      Rate and Rhythm: Normal rate and regular rhythm.      Pulses: Normal pulses.      Heart sounds: Normal heart sounds.   Pulmonary:      Effort: Pulmonary effort is normal. No " respiratory distress.      Breath sounds: Normal breath sounds. No wheezing.   Abdominal:      General: Bowel sounds are normal.      Palpations: Abdomen is soft.      Tenderness: There is no abdominal tenderness.   Musculoskeletal:      Cervical back: Normal range of motion and neck supple.   Skin:     General: Skin is warm and dry.   Neurological:      Mental Status: She is alert and oriented to person, place, and time.   Psychiatric:         Behavior: Behavior normal.       I have spent a total time of 40 minutes in caring for this patient on the day of the visit/encounter including Diagnostic results, Risks and benefits of tx options, Instructions for management, Patient and family education, Documenting in the medical record, Reviewing / ordering tests, medicine, procedures  , and Obtaining or reviewing history  .      Margarita Walker PA-C   [facility]

## 2024-08-01 NOTE — ASSESSMENT & PLAN NOTE
-Much improved since undergoing surgery in April 2024.  -Reviewed gynecology oncology note from 6/6/2024. Patient has had multiple abdominal surgeries done in the past.  Most recently underwent surgery due to persistent left adnexal cyst.  Patient underwent robotic assisted laparoscopic extensive lysis of adhesion, left salpingo-oophorectomy, cystoscopy with placement of bilateral ureteral catheters on 4/23/2024.  Surgery was performed along with colorectal surgery as well as urology given complexity of the case.  -Postoperatively patient had a pelvic fluid collection which likely represented seroma/peritoneal inclusion cyst.  Conservative management with CT-guided aspiration was successfully performed on 5/20/2024.  - Repeat CT scan on 6/3/2024 shows no acute abnormality.  - At this time, gynecology oncology does not recommend any additional surgical intervention and should follow-up regularly with gynecology.

## 2024-08-01 NOTE — TELEPHONE ENCOUNTER
Pt called in to speak with Dr. Guevara directly, she stated she has sent numerous mychart messages that were never responded and would like to follow up.Pt only speaks Slovak.

## 2024-08-02 NOTE — TELEPHONE ENCOUNTER
Clarice  Are you able to schedule this patient a follow up with Dr Guevara when she returns? Patient speaks Greenlandic.        Thanks,  ELEAZAR        Spoke to patient via  line. She is upset that it took a while for someone to get back to her. I apologized. She notes that the abortives for migraine do not always help and sometimes she has to go to the ER for migraine cocktail. She is interested in trying a migraine device called Nerivio. I tried to complete the online inquiry with her to see if she would qualify, but the site kept freezing. I will try again. Patient has [employer]. She would like to have an appt with someone to help her learn how to use the device if it is approved. I have asked the office to schedule her a follow up with Dr Guevara when Dr BOWLES returns because the patient is due for follow up. This was a 16 minute phone call.   --ELEAZAR

## 2024-08-02 NOTE — TELEPHONE ENCOUNTER
"Clarice,    Are you able to schedule this patient a follow up with Dr Guevara when she returns? Patient speaks [place]ican.    Please let her know that we are faxing the prescription to the company regarding the migraine device Nerivio.     Here is what the site says about insurance coverage. Is there anyone that can call to find out if her insurance covers it?  \"Nercarl is eligible for insurance coverage! Insurance coverage differs by individual; it is best to call Nerivio C[employer] so we can discuss your unique circumstances. Call Nerivio Cares Support team at  [phone] Mon-Fri, 8:00 am - 8:00 pm to learn more.\"    Thanks,  RC        Thanks,  RC        Spoke to patient via  line. She is upset that it took a while for someone to get back to her. I apologized. She notes that the abortives for migraine do not always help and sometimes she has to go to the ER for migraine cocktail. She is interested in trying a migraine device called Nerivio. I tried to complete the online inquiry with her to see if she would qualify, but the site kept freezing. She would like to have an appt with someone to help her learn how to use the device if it is approved. I have asked the office to schedule her a follow up with Dr Guevara when Dr BOWLES returns because the patient is due for follow up. This was a 16 minute phone call.     I completed the script for Nerivio and will give to RN to fax.  --RC  "

## 2024-08-03 RX ORDER — DEXAMETHASONE 2 MG/1
2 TABLET ORAL
Qty: 5 TABLET | Refills: 0 | OUTPATIENT
Start: 2024-08-03

## 2024-08-04 DIAGNOSIS — Z72.0 TOBACCO USE: ICD-10-CM

## 2024-08-05 DIAGNOSIS — M54.9 BACK PAIN: ICD-10-CM

## 2024-08-05 RX ORDER — NICOTINE 21 MG/24HR
1 PATCH, TRANSDERMAL 24 HOURS TRANSDERMAL EVERY 24 HOURS
Qty: 28 PATCH | Refills: 2 | Status: SHIPPED | OUTPATIENT
Start: 2024-08-05

## 2024-08-05 RX ORDER — METHOCARBAMOL 500 MG/1
500 TABLET, FILM COATED ORAL 2 TIMES DAILY PRN
Qty: 60 TABLET | Refills: 1 | OUTPATIENT
Start: 2024-08-05

## 2024-08-26 DIAGNOSIS — K21.9 GASTROESOPHAGEAL REFLUX DISEASE WITHOUT ESOPHAGITIS: ICD-10-CM

## 2024-08-26 RX ORDER — PANTOPRAZOLE SODIUM 20 MG/1
20 TABLET, DELAYED RELEASE ORAL DAILY
Qty: 90 TABLET | Refills: 0 | Status: SHIPPED | OUTPATIENT
Start: 2024-08-26 | End: 2025-02-22

## 2024-08-28 DIAGNOSIS — M54.9 BACK PAIN: ICD-10-CM

## 2024-08-28 RX ORDER — CELECOXIB 100 MG/1
100 CAPSULE ORAL 2 TIMES DAILY PRN
Qty: 60 CAPSULE | Refills: 2 | Status: SHIPPED | OUTPATIENT
Start: 2024-08-28

## 2024-09-04 ENCOUNTER — OFFICE VISIT (OUTPATIENT)
Dept: OBGYN CLINIC | Facility: CLINIC | Age: 39
End: 2024-09-04

## 2024-09-04 VITALS
HEIGHT: 62 IN | DIASTOLIC BLOOD PRESSURE: 77 MMHG | BODY MASS INDEX: 28.52 KG/M2 | HEART RATE: 86 BPM | SYSTOLIC BLOOD PRESSURE: 114 MMHG | WEIGHT: 155 LBS

## 2024-09-04 DIAGNOSIS — B37.9 YEAST INFECTION: Primary | ICD-10-CM

## 2024-09-04 LAB
BV WHIFF TEST VAG QL: NEGATIVE
CLUE CELLS SPEC QL WET PREP: NEGATIVE
PH SMN: 4.5 [PH]
SL AMB POCT WET MOUNT: ABNORMAL
T VAGINALIS VAG QL WET PREP: NEGATIVE
YEAST VAG QL WET PREP: POSITIVE

## 2024-09-04 PROCEDURE — 99213 OFFICE O/P EST LOW 20 MIN: CPT | Performed by: NURSE PRACTITIONER

## 2024-09-04 PROCEDURE — 87210 SMEAR WET MOUNT SALINE/INK: CPT | Performed by: NURSE PRACTITIONER

## 2024-09-04 RX ORDER — FLUCONAZOLE 150 MG/1
150 TABLET ORAL ONCE
Qty: 1 TABLET | Refills: 1 | Status: SHIPPED | OUTPATIENT
Start: 2024-09-04 | End: 2024-09-04

## 2024-09-04 NOTE — PATIENT INSTRUCTIONS
Take Fluconazole as directed  Wear loose clothes and cotton underwear  Call with needs or concern  Annual GYN exam is due after 2 weeks

## 2024-09-04 NOTE — PROGRESS NOTES
"Assessment/Plan:     Diagnoses and all orders for this visit:    Yeast infection  -     fluconazole (DIFLUCAN) 150 mg tablet; Take 1 tablet (150 mg total) by mouth once for 1 dose  -     POCT wet mount      Plan  Take Fluconazole as directed  Wear loose clothes and cotton underwear  Call with needs or concern  Annual GYN exam is due after 2 weeks  Pt verbalized understanding of all discussed.      Subjective:      Patient ID: Lorrie Jones is a 39 y.o. female.    HPI  Pt states for several days she has noted vaginal discharge and vulvar itching  6/21/2019 last WNL PAP and negative HPV    Explained wet mount was positive for yeast, safe and effective use of Fluconazole was provided, discussed comfort measures    Depression Screening Follow-up Plan: Patient's depression screening was negative with a PHQ-2 score of 0. Their PHQ-9 score was 0. Clinically patient does not have depression. No treatment is required.      The following portions of the patient's history were reviewed and updated as appropriate: allergies, current medications, past family history, past medical history, past social history, past surgical history and problem list.    Review of Systems    .Pertinent items are note in the HPI      Objective:      /77 (BP Location: Right arm, Patient Position: Sitting)   Pulse 86   Ht 5' 2\" (1.575 m)   Wt 70.3 kg (155 lb)   BMI 28.35 kg/m²          Physical Exam    Alert and oriented  Denies pain  WNL respiratory effort, negative cough or SOB  Vulva negative lesions, slight erythema  Vagina erythema, positive thick white discharge   Cervix positive thick white discharge, negative lesions  Wet mount positive for yeast  "

## 2024-09-13 ENCOUNTER — TELEPHONE (OUTPATIENT)
Dept: OBGYN CLINIC | Facility: CLINIC | Age: 39
End: 2024-09-13

## 2024-09-13 NOTE — TELEPHONE ENCOUNTER
Patient called because her symptoms have not improved since taking 2 pills of Difulcan. Patient would like to speak to provider regarding symptoms that have not improved.   Patient is still having vaginal itching, and bad odor. Patient has also noticed an increased amount of moisture in vaginal area. Patient was prescribed Metronidazole (flagyl) 500mg on 6/20/24 by  but patient never took it so she still has a full bottle of these at home. Please advise.

## 2024-09-26 ENCOUNTER — HOSPITAL ENCOUNTER (EMERGENCY)
Facility: HOSPITAL | Age: 39
Discharge: HOME | End: 2024-09-26
Attending: EMERGENCY MEDICINE
Payer: COMMERCIAL

## 2024-09-26 ENCOUNTER — PATIENT MESSAGE (OUTPATIENT)
Dept: FAMILY MEDICINE CLINIC | Facility: CLINIC | Age: 39
End: 2024-09-26

## 2024-09-26 VITALS
RESPIRATION RATE: 16 BRPM | WEIGHT: 156 LBS | DIASTOLIC BLOOD PRESSURE: 69 MMHG | SYSTOLIC BLOOD PRESSURE: 117 MMHG | BODY MASS INDEX: 28.53 KG/M2 | TEMPERATURE: 98.8 F | OXYGEN SATURATION: 99 % | HEART RATE: 85 BPM

## 2024-09-26 DIAGNOSIS — R10.9 ABDOMINAL PAIN: ICD-10-CM

## 2024-09-26 DIAGNOSIS — R51.9 HEADACHE: Primary | ICD-10-CM

## 2024-09-26 LAB
ALBUMIN SERPL BCG-MCNC: 4.3 G/DL (ref 3.5–5)
ALP SERPL-CCNC: 60 U/L (ref 34–104)
ALT SERPL W P-5'-P-CCNC: 53 U/L (ref 7–52)
ANION GAP SERPL CALCULATED.3IONS-SCNC: 9 MMOL/L (ref 4–13)
AST SERPL W P-5'-P-CCNC: 35 U/L (ref 13–39)
BACTERIA UR QL AUTO: ABNORMAL /HPF
BASOPHILS # BLD AUTO: 0.04 THOUSANDS/ΜL (ref 0–0.1)
BASOPHILS NFR BLD AUTO: 1 % (ref 0–1)
BILIRUB SERPL-MCNC: 0.38 MG/DL (ref 0.2–1)
BILIRUB UR QL STRIP: NEGATIVE
BUN SERPL-MCNC: 15 MG/DL (ref 5–25)
CALCIUM SERPL-MCNC: 9.6 MG/DL (ref 8.4–10.2)
CHLORIDE SERPL-SCNC: 105 MMOL/L (ref 96–108)
CLARITY UR: CLEAR
CO2 SERPL-SCNC: 25 MMOL/L (ref 21–32)
COLOR UR: YELLOW
CREAT SERPL-MCNC: 0.7 MG/DL (ref 0.6–1.3)
EOSINOPHIL # BLD AUTO: 0.18 THOUSAND/ΜL (ref 0–0.61)
EOSINOPHIL NFR BLD AUTO: 3 % (ref 0–6)
ERYTHROCYTE [DISTWIDTH] IN BLOOD BY AUTOMATED COUNT: 12.7 % (ref 11.6–15.1)
GFR SERPL CREATININE-BSD FRML MDRD: 110 ML/MIN/1.73SQ M
GLUCOSE SERPL-MCNC: 98 MG/DL (ref 65–140)
GLUCOSE UR STRIP-MCNC: NEGATIVE MG/DL
HCT VFR BLD AUTO: 39.3 % (ref 34.8–46.1)
HGB BLD-MCNC: 13.3 G/DL (ref 11.5–15.4)
HGB UR QL STRIP.AUTO: 10
IMM GRANULOCYTES # BLD AUTO: 0.02 THOUSAND/UL (ref 0–0.2)
IMM GRANULOCYTES NFR BLD AUTO: 0 % (ref 0–2)
KETONES UR STRIP-MCNC: NEGATIVE MG/DL
LEUKOCYTE ESTERASE UR QL STRIP: NEGATIVE
LIPASE SERPL-CCNC: 15 U/L (ref 11–82)
LYMPHOCYTES # BLD AUTO: 1.81 THOUSANDS/ΜL (ref 0.6–4.47)
LYMPHOCYTES NFR BLD AUTO: 25 % (ref 14–44)
MCH RBC QN AUTO: 31.1 PG (ref 26.8–34.3)
MCHC RBC AUTO-ENTMCNC: 33.8 G/DL (ref 31.4–37.4)
MCV RBC AUTO: 92 FL (ref 82–98)
MONOCYTES # BLD AUTO: 0.66 THOUSAND/ΜL (ref 0.17–1.22)
MONOCYTES NFR BLD AUTO: 9 % (ref 4–12)
MUCOUS THREADS UR QL AUTO: ABNORMAL
NEUTROPHILS # BLD AUTO: 4.61 THOUSANDS/ΜL (ref 1.85–7.62)
NEUTS SEG NFR BLD AUTO: 62 % (ref 43–75)
NITRITE UR QL STRIP: NEGATIVE
NON-SQ EPI CELLS URNS QL MICRO: ABNORMAL /HPF
NRBC BLD AUTO-RTO: 0 /100 WBCS
PH UR STRIP.AUTO: 6.5 [PH]
PLATELET # BLD AUTO: 270 THOUSANDS/UL (ref 149–390)
PMV BLD AUTO: 11 FL (ref 8.9–12.7)
POTASSIUM SERPL-SCNC: 3.9 MMOL/L (ref 3.5–5.3)
PROT SERPL-MCNC: 7.1 G/DL (ref 6.4–8.4)
PROT UR STRIP-MCNC: ABNORMAL MG/DL
RBC # BLD AUTO: 4.28 MILLION/UL (ref 3.81–5.12)
RBC #/AREA URNS AUTO: ABNORMAL /HPF
SODIUM SERPL-SCNC: 139 MMOL/L (ref 135–147)
SP GR UR STRIP.AUTO: 1.02 (ref 1–1.04)
UROBILINOGEN UA: NEGATIVE MG/DL
WBC # BLD AUTO: 7.32 THOUSAND/UL (ref 4.31–10.16)
WBC #/AREA URNS AUTO: ABNORMAL /HPF

## 2024-09-26 PROCEDURE — 83690 ASSAY OF LIPASE: CPT | Performed by: EMERGENCY MEDICINE

## 2024-09-26 PROCEDURE — 80053 COMPREHEN METABOLIC PANEL: CPT | Performed by: EMERGENCY MEDICINE

## 2024-09-26 PROCEDURE — 81001 URINALYSIS AUTO W/SCOPE: CPT | Performed by: EMERGENCY MEDICINE

## 2024-09-26 PROCEDURE — 99284 EMERGENCY DEPT VISIT MOD MDM: CPT | Performed by: EMERGENCY MEDICINE

## 2024-09-26 PROCEDURE — 85025 COMPLETE CBC W/AUTO DIFF WBC: CPT | Performed by: EMERGENCY MEDICINE

## 2024-09-26 PROCEDURE — 36415 COLL VENOUS BLD VENIPUNCTURE: CPT | Performed by: EMERGENCY MEDICINE

## 2024-09-26 PROCEDURE — 81003 URINALYSIS AUTO W/O SCOPE: CPT | Performed by: EMERGENCY MEDICINE

## 2024-09-26 PROCEDURE — 99284 EMERGENCY DEPT VISIT MOD MDM: CPT

## 2024-09-26 PROCEDURE — 96374 THER/PROPH/DIAG INJ IV PUSH: CPT

## 2024-09-26 PROCEDURE — 96375 TX/PRO/DX INJ NEW DRUG ADDON: CPT

## 2024-09-26 RX ORDER — METOCLOPRAMIDE HYDROCHLORIDE 5 MG/ML
10 INJECTION INTRAMUSCULAR; INTRAVENOUS ONCE
Status: COMPLETED | OUTPATIENT
Start: 2024-09-26 | End: 2024-09-26

## 2024-09-26 RX ORDER — DIPHENHYDRAMINE HYDROCHLORIDE 50 MG/ML
12.5 INJECTION INTRAMUSCULAR; INTRAVENOUS ONCE
Status: COMPLETED | OUTPATIENT
Start: 2024-09-26 | End: 2024-09-26

## 2024-09-26 RX ADMIN — METOCLOPRAMIDE 10 MG: 5 INJECTION, SOLUTION INTRAMUSCULAR; INTRAVENOUS at 16:49

## 2024-09-26 RX ADMIN — DIPHENHYDRAMINE HYDROCHLORIDE 12.5 MG: 50 INJECTION, SOLUTION INTRAMUSCULAR; INTRAVENOUS at 16:49

## 2024-09-26 NOTE — ED PROVIDER NOTES
Final diagnoses:   Headache   Abdominal pain     ED Disposition       ED Disposition   Discharge    Condition   Stable    Date/Time   u Sep 26, 2024  5:23 PM    Comment   Lorrie Jones discharge to home/self care.                   Assessment & Plan       Medical Decision Making  Patient's headache resolved after medications clinically not subarachnoid hemorrhage or meningitis was neurologically intact she has an upcoming appoint with neurology and she has a family practice doctor she has medications at home as far show mild abdominal discomfort at this benign there is no leukocytosis her LFTs were normal lipase was normal clinically not hepatitis pancreatitis cholecystitis clinically not appendicitis no gap acidosis patient safe for discharge    Amount and/or Complexity of Data Reviewed  Labs: ordered.    Risk  Prescription drug management.             Medications   sodium chloride 0.9 % bolus 1,000 mL (1,000 mL Intravenous Not Given 9/26/24 1724)   metoclopramide (REGLAN) injection 10 mg (10 mg Intravenous Given 9/26/24 1649)   diphenhydrAMINE (BENADRYL) injection 12.5 mg (12.5 mg Intravenous Given 9/26/24 1649)       ED Risk Strat Scores                           SBIRT 22yo+      Flowsheet Row Most Recent Value   Initial Alcohol Screen: US AUDIT-C     1. How often do you have a drink containing alcohol? 0 Filed at: 09/26/2024 1556   2. How many drinks containing alcohol do you have on a typical day you are drinking?  0 Filed at: 09/26/2024 1556   3b. FEMALE Any Age, or MALE 65+: How often do you have 4 or more drinks on one occassion? 0 Filed at: 09/26/2024 1556   Audit-C Score 0 Filed at: 09/26/2024 1556   LISBETH: How many times in the past year have you...    Used an illegal drug or used a prescription medication for non-medical reasons? Never Filed at: 09/26/2024 1556                            History of Present Illness       Chief Complaint   Patient presents with    Headache     Patient reports  headache for 2 days.  Patient took tylenol today and reglan pta.       Past Medical History:   Diagnosis Date    Abnormal Pap smear of cervix     2015 cryosurgery; 6/2019 NILM pap/neg HPV    Allergic     Depression     Headache(784.0)     Migraine with aura       Past Surgical History:   Procedure Laterality Date    CHOLECYSTECTOMY LAPAROSCOPIC N/A 10/27/2020    Procedure: CHOLECYSTECTOMY LAPAROSCOPIC;  Surgeon: Lina Narayan MD;  Location:  MAIN OR;  Service: General    COLONOSCOPY      FL LUMBAR PUNCTURE DIAGNOSTIC  11/17/2023    GYNECOLOGIC CRYOSURGERY  2015    HYSTERECTOMY  2016    supracervical    LAPAROSCOPY  2018    MS LAPAROSCOPY W/LYSIS OF ADHESIONS N/A 03/03/2020    Procedure: L.O.A.;  Surgeon: Noy Paz MD;  Location: [facility];  Service: Gynecology    MS LAPS ABD PRTM&OMENTUM DX W/WO SPEC BR/WA SPX N/A 08/09/2019    Procedure: LAPAROSCOPY DIAGNOSTIC; LYSIS OF ADHESIONS;  Surgeon: Abebe Walls MD;  Location: [facility];  Service: Gynecology    TUBAL LIGATION      UPPER GASTROINTESTINAL ENDOSCOPY        Family History   Problem Relation Age of Onset    Cancer Paternal Grandmother         uterine    Cancer Paternal Aunt         uterine     Migraines Mother     Arthritis Mother     Hypertension Father     Breast cancer Neg Hx     Colon cancer Neg Hx     Ovarian cancer Neg Hx       Social History     Tobacco Use    Smoking status: Every Day     Current packs/day: 0.50     Average packs/day: 0.5 packs/day for 15.0 years (7.5 ttl pk-yrs)     Types: Cigarettes     Passive exposure: Current    Smokeless tobacco: Never   Vaping Use    Vaping status: Never Used   Substance Use Topics    Alcohol use: Never    Drug use: Never      E-Cigarette/Vaping    E-Cigarette Use Never User       E-Cigarette/Vaping Substances    Nicotine No     THC No     CBD No     Flavoring No     Other No     Unknown No       I have reviewed and agree with the history as documented.     Patient with history of  migraines having her typical right frontal headache that radiates to the back of her head for 2 days came on gradually gotten worse vomited 1 time no fevers no weakness in extremities no difficulty speaking or seeing patient also wants me to address that she is having 3 days of mild periumbilical pain no dysuria no diarrhea      Headache  Associated symptoms: abdominal pain and vomiting    Associated symptoms: no back pain, no diarrhea, no dizziness, no eye pain, no fever, no numbness, no seizures and no weakness        Review of Systems   Constitutional:  Negative for chills and fever.   Eyes:  Negative for pain and visual disturbance.   Respiratory:  Negative for shortness of breath.    Cardiovascular:  Negative for chest pain and palpitations.   Gastrointestinal:  Positive for abdominal pain and vomiting. Negative for diarrhea.   Genitourinary:  Negative for dysuria.   Musculoskeletal:  Negative for arthralgias and back pain.   Skin:  Negative for color change and rash.   Neurological:  Positive for headaches. Negative for dizziness, seizures, syncope, weakness and numbness.   Psychiatric/Behavioral:  Negative for confusion.    All other systems reviewed and are negative.          Objective       ED Triage Vitals [09/26/24 1557]   Temperature Pulse Blood Pressure Respirations SpO2 Patient Position - Orthostatic VS   98.8 °F (37.1 °C) 85 117/69 16 99 % Sitting      Temp Source Heart Rate Source BP Location FiO2 (%) Pain Score    Oral Monitor Left arm -- --      Vitals      Date and Time Temp Pulse SpO2 Resp BP Pain Score FACES Pain Rating User   09/26/24 1557 98.8 °F (37.1 °C) 85 99 % 16 117/69 -- -- LH            Physical Exam  Vitals and nursing note reviewed.   Constitutional:       General: She is not in acute distress.     Appearance: She is well-developed.   HENT:      Head: Normocephalic and atraumatic.   Eyes:      Extraocular Movements: Extraocular movements intact.      Conjunctiva/sclera: Conjunctivae  normal.      Pupils: Pupils are equal, round, and reactive to light.      Comments: No nystagmus   Cardiovascular:      Rate and Rhythm: Normal rate and regular rhythm.      Heart sounds: No murmur heard.  Pulmonary:      Effort: Pulmonary effort is normal. No respiratory distress.      Breath sounds: Normal breath sounds.   Abdominal:      General: Bowel sounds are normal.      Palpations: Abdomen is soft.      Tenderness: There is abdominal tenderness. There is no right CVA tenderness, left CVA tenderness, guarding or rebound.      Comments: Tenderness periumbilical with no erythema and no rebound or guarding   Musculoskeletal:         General: No swelling.      Cervical back: Normal range of motion and neck supple. No rigidity or tenderness.   Skin:     General: Skin is warm and dry.      Capillary Refill: Capillary refill takes less than 2 seconds.   Neurological:      General: No focal deficit present.      Mental Status: She is alert. Mental status is at baseline.      Cranial Nerves: No cranial nerve deficit.      Sensory: No sensory deficit.      Motor: No weakness.      Coordination: Coordination normal.      Comments: Oriented   Psychiatric:         Mood and Affect: Mood normal.         Thought Content: Thought content normal.         Results Reviewed       Procedure Component Value Units Date/Time    Comprehensive metabolic panel [[id number]]  (Abnormal) Collected: 09/26/24 1648    Lab Status: Final result Specimen: Blood from Hand, Left Updated: 09/26/24 1717     Sodium 139 mmol/L      Potassium 3.9 mmol/L      Chloride 105 mmol/L      CO2 25 mmol/L      ANION GAP 9 mmol/L      BUN 15 mg/dL      Creatinine 0.70 mg/dL      Glucose 98 mg/dL      Calcium 9.6 mg/dL      AST 35 U/L      ALT 53 U/L      Alkaline Phosphatase 60 U/L      Total Protein 7.1 g/dL      Albumin 4.3 g/dL      Total Bilirubin 0.38 mg/dL      eGFR 110 ml/min/1.73sq m     Narrative:      National Kid[employer] guidelines for  Chronic Kidney Disease (CKD):     Stage 1 with normal or high GFR (GFR > 90 mL/min/1.73 square meters)    Stage 2 Mild CKD (GFR = 60-89 mL/min/1.73 square meters)    Stage 3A Moderate CKD (GFR = 45-59 mL/min/1.73 square meters)    Stage 3B Moderate CKD (GFR = 30-44 mL/min/1.73 square meters)    Stage 4 Severe CKD (GFR = 15-29 mL/min/1.73 square meters)    Stage 5 End Stage CKD (GFR <15 mL/min/1.73 square meters)  Note: GFR calculation is accurate only with a steady state creatinine    Lipase [[id number]]  (Normal) Collected: 09/26/24 1648    Lab Status: Final result Specimen: Blood from Hand, Left Updated: 09/26/24 1717     Lipase 15 u/L     CBC and differential [[id number]] Collected: 09/26/24 1648    Lab Status: Final result Specimen: Blood from Hand, Left Updated: 09/26/24 1702     WBC 7.32 Thousand/uL      RBC 4.28 Million/uL      Hemoglobin 13.3 g/dL      Hematocrit 39.3 %      MCV 92 fL      MCH 31.1 pg      MCHC 33.8 g/dL      RDW 12.7 %      MPV 11.0 fL      Platelets 270 Thousands/uL      nRBC 0 /100 WBCs      Segmented % 62 %      Immature Grans % 0 %      Lymphocytes % 25 %      Monocytes % 9 %      Eosinophils Relative 3 %      Basophils Relative 1 %      Absolute Neutrophils 4.61 Thousands/µL      Absolute Immature Grans 0.02 Thousand/uL      Absolute Lymphocytes 1.81 Thousands/µL      Absolute Monocytes 0.66 Thousand/µL      Eosinophils Absolute 0.18 Thousand/µL      Basophils Absolute 0.04 Thousands/µL     Urine Microscopic [[id number]]  (Abnormal) Collected: 09/26/24 1607    Lab Status: Final result Specimen: Urine, Clean Catch Updated: 09/26/24 1622     RBC, UA 1-2 /hpf      WBC, UA 0-1 /hpf      Epithelial Cells Occasional /hpf      Bacteria, UA Occasional /hpf      MUCUS THREADS Moderate    UA w Reflex to Microscopic w Reflex to Culture [[id number]]  (Abnormal) Collected: 09/26/24 1607    Lab Status: Final result Specimen: Urine, Clean Catch Updated: 09/26/24 1613     Color, UA Yellow     Clarity, UA  Clear     Specific Gravity, UA 1.020     pH, UA 6.5     Leukocytes, UA Negative     Nitrite, UA Negative     Protein, UA 15 (Trace) mg/dl      Glucose, UA Negative mg/dl      Ketones, UA Negative mg/dl      Bilirubin, UA Negative     Occult Blood, UA 10.0     UROBILINOGEN UA Negative mg/dL             No orders to display       Procedures    ED Medication and Procedure Management   Prior to Admission Medications   Prescriptions Last Dose Informant Patient Reported? Taking?   Acetaminophen Extra Strength 500 MG TABS   Yes No   Patient not taking: Reported on 9/4/2024   Diclofenac Sodium (VOLTAREN) 1 %  Self No No   Sig: Apply 2 g topically 4 (four) times a day   Ex-Lax 15 MG CHEW   No No   Sig: CHEW 1 TABLET BY MOUTH TWICE DAILY AS NEEDED FOR CONSTIPATION   SUMAtriptan (IMITREX) 100 mg tablet  Self No No   Sig: Take 1 tablet (100 mg total) by mouth daily as needed for migraine   Senna Plus 8.6-50 MG per tablet   No No   Sig: TAKE 1 TABLET BY MOUTH DAILY   ascorbic acid (VITAMIN C) 500 mg tablet   No No   Sig: Take 1 tablet (500 mg total) by mouth daily   bisacodyl 5 MG EC tablet   No No   Sig: TAKE 2 TABLETS (10 MG TOTAL) BY MOUTH 2 (TWO) TIMES A DAY AS NEEDED FOR CONSTIPATION (CONSTIPATION) COLONOSCOPY PREP   celecoxib (CeleBREX) 100 mg capsule   No No   Sig: TAKE 1 CAPSULE (100 MG TOTAL) BY MOUTH 2 (TWO) TIMES A DAY AS NEEDED FOR MILD PAIN   dexamethasone (DECADRON) 2 mg tablet   No No   Sig: TAKE 1 TABLET (2 MG TOTAL) BY MOUTH DAILY WITH BREAKFAST   dicyclomine (BENTYL) 20 mg tablet   No No   Sig: Take 1 tablet (20 mg total) by mouth 2 (two) times a day as needed (abd pain) for up to 2 days   ergocalciferol (VITAMIN D2) 50,000 units   No No   Sig: Take 1 capsule (50,000 Units total) by mouth once a week   famotidine (PEPCID) 20 mg tablet   No No   Sig: Take 1 tablet (20 mg total) by mouth daily for 14 days   fluocinolone acetonide (DERMOTIC) 0.01 % otic oil  Self No No   Sig: INSTILL 3 DROPS IN BOTH EARS DAILY    fluticasone (FLONASE) 50 mcg/act nasal spray  Self No No   Si spray into each nostril 2 (two) times a day   Patient not taking: Reported on 3/20/2024   lidocaine (LIDODERM) 5 %  Self No No   Sig: Apply 1 patch topically over 12 hours every 24 hours Remove & Discard patch within 12 hours or as directed by MD   methocarbamol (ROBAXIN) 500 mg tablet   No No   Sig: Take 1 tablet (500 mg total) by mouth 2 (two) times a day as needed for muscle spasms   naproxen (EC NAPROSYN) 500 MG EC tablet  Self Yes No   Sig: Take 500 mg by mouth if needed for mild pain   Patient not taking: Reported on 2024   nicotine (NICODERM CQ) 14 mg/24hr TD 24 hr patch   No No   Sig: PLACE 1 PATCH ON THE SKIN OVER 24 HOURS EVERY 24 HOURS   nystatin-triamcinolone (MYCOLOG-II) cream  Self No No   Sig: Apply topically 2 (two) times a day   omeprazole (PriLOSEC) 40 MG capsule   No No   Sig: Take 1 capsule (40 mg total) by mouth daily   Patient not taking: Reported on 2024   oxyCODONE-acetaminophen (Percocet) 5-325 mg per tablet   No No   Sig: Take 1 tablet by mouth every 6 (six) hours as needed for moderate pain for up to 10 doses Max Daily Amount: 4 tablets   Patient not taking: Reported on 2024   pantoprazole (PROTONIX) 20 mg tablet   No No   Sig: TAKE 1 TABLET (20 MG TOTAL) BY MOUTH DAILY   polyethylene glycol (MIRALAX) 17 g packet  Self No No   Sig: Take 17 g by mouth daily   polyethylene glycol (MIRALAX) 17 g packet  Self No No   Sig: Take 17 g by mouth daily   prochlorperazine (COMPAZINE) 10 mg tablet  Self No No   Sig: Take 1 tablet (10 mg total) by mouth every 6 (six) hours as needed for nausea or vomiting   sodium chloride 0.9 % nebulizer solution  Self No No   Sig: TAKE 3ML BY BEBULIZATION EVERY 6 HOURS AS NEEDED FOR WHEEZING OR SHORTNESS OF BREATH   Patient not taking: Reported on 2024   sucralfate (CARAFATE) 1 g/10 mL suspension   No No   Sig: Take 10 mL (1 g total) by mouth 3 (three) times a day for 7 days   vitamin  B-12 (VITAMIN B-12) 1,000 mcg tablet   No No   Sig: Take 1 tablet (1,000 mcg total) by mouth daily      Facility-Administered Medications: None     Discharge Medication List as of 9/26/2024  5:24 PM        CONTINUE these medications which have NOT CHANGED    Details   Acetaminophen Extra Strength 500 MG TABS Historical Med      ascorbic acid (VITAMIN C) 500 mg tablet Take 1 tablet (500 mg total) by mouth daily, Starting Thu 8/1/2024, Normal      bisacodyl 5 MG EC tablet TAKE 2 TABLETS (10 MG TOTAL) BY MOUTH 2 (TWO) TIMES A DAY AS NEEDED FOR CONSTIPATION (CONSTIPATION) COLONOSCOPY PREP, Normal      celecoxib (CeleBREX) 100 mg capsule TAKE 1 CAPSULE (100 MG TOTAL) BY MOUTH 2 (TWO) TIMES A DAY AS NEEDED FOR MILD PAIN, Starting Wed 8/28/2024, Normal      dexamethasone (DECADRON) 2 mg tablet TAKE 1 TABLET (2 MG TOTAL) BY MOUTH DAILY WITH BREAKFAST, Starting Fri 5/3/2024, Normal      Diclofenac Sodium (VOLTAREN) 1 % Apply 2 g topically 4 (four) times a day, Starting Wed 11/22/2023, Normal      dicyclomine (BENTYL) 20 mg tablet Take 1 tablet (20 mg total) by mouth 2 (two) times a day as needed (abd pain) for up to 2 days, Starting Sun 5/26/2024, Until Tue 5/28/2024 at 2359, Normal      ergocalciferol (VITAMIN D2) 50,000 units Take 1 capsule (50,000 Units total) by mouth once a week, Starting Thu 8/1/2024, Normal      Ex-Lax 15 MG CHEW CHEW 1 TABLET BY MOUTH TWICE DAILY AS NEEDED FOR CONSTIPATION, Normal      famotidine (PEPCID) 20 mg tablet Take 1 tablet (20 mg total) by mouth daily for 14 days, Starting Sun 5/26/2024, Until Sun 6/9/2024, Normal      fluocinolone acetonide (DERMOTIC) 0.01 % otic oil INSTILL 3 DROPS IN BOTH EARS DAILY, Normal      fluticasone (FLONASE) 50 mcg/act nasal spray 1 spray into each nostril 2 (two) times a day, Starting Thu 10/19/2023, Normal      lidocaine (LIDODERM) 5 % Apply 1 patch topically over 12 hours every 24 hours Remove & Discard patch within 12 hours or as directed by MD, Starting Wed  11/22/2023, Print      methocarbamol (ROBAXIN) 500 mg tablet Take 1 tablet (500 mg total) by mouth 2 (two) times a day as needed for muscle spasms, Starting Thu 8/1/2024, Normal      naproxen (EC NAPROSYN) 500 MG EC tablet Take 500 mg by mouth if needed for mild pain, Historical Med      nicotine (NICODERM CQ) 14 mg/24hr TD 24 hr patch PLACE 1 PATCH ON THE SKIN OVER 24 HOURS EVERY 24 HOURS, Starting Mon 8/5/2024, Normal      nystatin-triamcinolone (MYCOLOG-II) cream Apply topically 2 (two) times a day, Starting Thu 9/14/2023, Normal      omeprazole (PriLOSEC) 40 MG capsule Take 1 capsule (40 mg total) by mouth daily, Starting Thu 8/1/2024, Normal      oxyCODONE-acetaminophen (Percocet) 5-325 mg per tablet Take 1 tablet by mouth every 6 (six) hours as needed for moderate pain for up to 10 doses Max Daily Amount: 4 tablets, Starting Sat 5/18/2024, Normal      pantoprazole (PROTONIX) 20 mg tablet TAKE 1 TABLET (20 MG TOTAL) BY MOUTH DAILY, Starting Mon 8/26/2024, Until Sat 2/22/2025, Normal      polyethylene glycol (MIRALAX) 17 g packet Take 17 g by mouth daily, Starting Tue 1/2/2024, Until Sun 6/30/2024, Normal      polyethylene glycol (MIRALAX) 17 g packet Take 17 g by mouth daily, Starting Tue 1/30/2024, Until Sun 7/28/2024, Normal      prochlorperazine (COMPAZINE) 10 mg tablet Take 1 tablet (10 mg total) by mouth every 6 (six) hours as needed for nausea or vomiting, Starting Wed 9/6/2023, Normal      Senna Plus 8.6-50 MG per tablet TAKE 1 TABLET BY MOUTH DAILY, Starting Mon 7/22/2024, Normal      sodium chloride 0.9 % nebulizer solution TAKE 3ML BY BEBULIZATION EVERY 6 HOURS AS NEEDED FOR WHEEZING OR SHORTNESS OF BREATH, Normal      sucralfate (CARAFATE) 1 g/10 mL suspension Take 10 mL (1 g total) by mouth 3 (three) times a day for 7 days, Starting Sun 5/26/2024, Until Sun 6/2/2024, Normal      SUMAtriptan (IMITREX) 100 mg tablet Take 1 tablet (100 mg total) by mouth daily as needed for migraine, Starting Thu  11/2/2023, Normal      vitamin B-12 (VITAMIN B-12) 1,000 mcg tablet Take 1 tablet (1,000 mcg total) by mouth daily, Starting Thu 8/1/2024, Normal           No discharge procedures on file.  ED SEPSIS DOCUMENTATION   Time reflects when diagnosis was documented in both MDM as applicable and the Disposition within this note       Time User Action Codes Description Comment    9/26/2024  5:24 PM Oracio Lundy [R51.9] Headache     9/26/2024  5:24 PM Oracio Lundy [R10.9] Abdominal pain                  Oracio Lundy MD  09/26/24 7225

## 2024-09-26 NOTE — Clinical Note
Lorrie Jones was seen and treated in our emergency department on 9/26/2024.                Diagnosis:     Lorrie  .    She may return on this date: 09/28/2024         If you have any questions or concerns, please don't hesitate to call.      Oracio Lundy MD    ______________________________           _______________          _______________  Hospital Representative                              Date                                Time

## 2024-09-27 ENCOUNTER — OFFICE VISIT (OUTPATIENT)
Dept: OBGYN CLINIC | Facility: CLINIC | Age: 39
End: 2024-09-27

## 2024-09-27 VITALS
WEIGHT: 155.4 LBS | HEIGHT: 62 IN | HEART RATE: 116 BPM | BODY MASS INDEX: 28.6 KG/M2 | SYSTOLIC BLOOD PRESSURE: 125 MMHG | DIASTOLIC BLOOD PRESSURE: 83 MMHG

## 2024-09-27 DIAGNOSIS — B37.9 YEAST INFECTION: ICD-10-CM

## 2024-09-27 DIAGNOSIS — B96.89 BACTERIAL VAGINOSIS: Primary | ICD-10-CM

## 2024-09-27 DIAGNOSIS — R10.2 PELVIC PAIN: ICD-10-CM

## 2024-09-27 DIAGNOSIS — N94.10 PAIN IN FEMALE GENITALIA ON INTERCOURSE: ICD-10-CM

## 2024-09-27 DIAGNOSIS — N76.0 BACTERIAL VAGINOSIS: Primary | ICD-10-CM

## 2024-09-27 LAB
BV WHIFF TEST VAG QL: POSITIVE
CLUE CELLS SPEC QL WET PREP: POSITIVE
PH SMN: 5.5 [PH]
SL AMB POCT WET MOUNT: ABNORMAL
T VAGINALIS VAG QL WET PREP: NEGATIVE
YEAST VAG QL WET PREP: POSITIVE

## 2024-09-27 PROCEDURE — 87210 SMEAR WET MOUNT SALINE/INK: CPT | Performed by: NURSE PRACTITIONER

## 2024-09-27 PROCEDURE — 99214 OFFICE O/P EST MOD 30 MIN: CPT | Performed by: NURSE PRACTITIONER

## 2024-09-27 RX ORDER — METRONIDAZOLE 500 MG/1
500 TABLET ORAL EVERY 12 HOURS SCHEDULED
Qty: 14 TABLET | Refills: 0 | Status: SHIPPED | OUTPATIENT
Start: 2024-09-27 | End: 2024-10-04

## 2024-09-27 RX ORDER — MICONAZOLE NITRATE 2 %
1 CREAM WITH APPLICATOR VAGINAL
Qty: 45 G | Refills: 1 | Status: SHIPPED | OUTPATIENT
Start: 2024-09-27

## 2024-09-27 NOTE — PATIENT INSTRUCTIONS
Schedule pelvic U/S  Schedule Physical therapy  Take Metronidazole as directed  Use Monistat 7 as directed  Wear loose clthes and cotton underwear  Return for U/S with doctor and to discuss pain with intercourse since birth of last child 15 years ago  Schedule annual exam after U/S results are discussed and Physical therapy has started

## 2024-09-27 NOTE — PROGRESS NOTES
Assessment/Plan:     Diagnoses and all orders for this visit:    Bacterial vaginosis  -     POCT wet mount  -     metroNIDAZOLE (FLAGYL) 500 mg tablet; Take 1 tablet (500 mg total) by mouth every 12 (twelve) hours for 7 days    Yeast infection  -     POCT wet mount  -     miconazole (MONISTAT-7) 2 % vaginal cream; Insert 1 applicator into the vagina daily at bedtime    Pelvic pain  -     US pelvis complete non OB; Future  -     Ambulatory Referral to Physical Therapy; Future    Pain in female genitalia on intercourse  -     US pelvis complete non OB; Future  -     Ambulatory Referral to Physical Therapy; Future      Plan  Schedule pelvic U/S  Schedule Physical therapy  Take Metronidazole as directed  Use Monistat 7 as directed  Wear loose clthes and cotton underwear  Return for U/S with doctor and to discuss pain with intercourse since birth of last child 15 years ago  Schedule annual exam after U/S results are discussed and Physical therapy has started   Pt verbalized understanding of all discussed.      Subjective:      Patient ID: Lorrie Jones is a 39 y.o. female.    HPI  Pt was seen 9/5/2024 for a yeast infection and was treated with Fluconazole  Pt states she still has vaginal discharge with an odor  Pt also states she has had pain with intercourse for the past 15 years since the birth of her last child  Pt has a Hx of ARUNA    Explained wet mount was positive for BV yeast, safe and effective use of Monistat 7  Fluconazole was provided, discussed comfort measures  Explained a pelvic U/S would be ordered as well as Physical therapy  Explained an appointment woud be made to discuss results and a plan    The following portions of the patient's history were reviewed and updated as appropriate: allergies, current medications, past family history, past medical history, past social history, past surgical history and problem list.    Review of Systems    .Pertinent items are note in the  "HPI      Objective:      /83 (BP Location: Right arm, Patient Position: Sitting)   Pulse (!) 116   Ht 5' 2\" (1.575 m)   Wt 70.5 kg (155 lb 6.4 oz)   BMI 28.42 kg/m²          Physical Exam    Alert and oriented  WNL respiratory effort, negative cough or SOB  Vulva negative lesions positive erythema  Vagina positive thin gray/white discharge and adherent white discharge on the walls of the vagina  Cervix Surgically absent  Wet mount was positive for BV and yeast  "

## 2024-09-30 ENCOUNTER — HOSPITAL ENCOUNTER (EMERGENCY)
Facility: HOSPITAL | Age: 39
Discharge: HOME | End: 2024-09-30
Attending: EMERGENCY MEDICINE
Payer: COMMERCIAL

## 2024-09-30 ENCOUNTER — APPOINTMENT (EMERGENCY)
Dept: CT IMAGING | Facility: HOSPITAL | Age: 39
End: 2024-09-30
Payer: COMMERCIAL

## 2024-09-30 ENCOUNTER — TELEPHONE (OUTPATIENT)
Dept: OBGYN CLINIC | Facility: CLINIC | Age: 39
End: 2024-09-30

## 2024-09-30 VITALS
HEART RATE: 96 BPM | RESPIRATION RATE: 18 BRPM | DIASTOLIC BLOOD PRESSURE: 56 MMHG | TEMPERATURE: 98.7 F | OXYGEN SATURATION: 98 % | BODY MASS INDEX: 29.23 KG/M2 | SYSTOLIC BLOOD PRESSURE: 108 MMHG | WEIGHT: 159.83 LBS

## 2024-09-30 DIAGNOSIS — R10.9 ABDOMINAL PAIN: Primary | ICD-10-CM

## 2024-09-30 LAB
ALBUMIN SERPL BCG-MCNC: 4.3 G/DL (ref 3.5–5)
ALP SERPL-CCNC: 60 U/L (ref 34–104)
ALT SERPL W P-5'-P-CCNC: 38 U/L (ref 7–52)
ANION GAP SERPL CALCULATED.3IONS-SCNC: 7 MMOL/L (ref 4–13)
AST SERPL W P-5'-P-CCNC: 21 U/L (ref 13–39)
BACTERIA UR QL AUTO: ABNORMAL /HPF
BASOPHILS # BLD AUTO: 0.03 THOUSANDS/ÂΜL (ref 0–0.1)
BASOPHILS NFR BLD AUTO: 0 % (ref 0–1)
BILIRUB SERPL-MCNC: 0.43 MG/DL (ref 0.2–1)
BILIRUB UR QL STRIP: NEGATIVE
BUN SERPL-MCNC: 12 MG/DL (ref 5–25)
CALCIUM SERPL-MCNC: 9.5 MG/DL (ref 8.4–10.2)
CAOX CRY URNS QL MICRO: ABNORMAL /HPF
CHLORIDE SERPL-SCNC: 105 MMOL/L (ref 96–108)
CLARITY UR: ABNORMAL
CO2 SERPL-SCNC: 27 MMOL/L (ref 21–32)
COLOR UR: ABNORMAL
CREAT SERPL-MCNC: 0.79 MG/DL (ref 0.6–1.3)
EOSINOPHIL # BLD AUTO: 0.21 THOUSAND/ÂΜL (ref 0–0.61)
EOSINOPHIL NFR BLD AUTO: 3 % (ref 0–6)
ERYTHROCYTE [DISTWIDTH] IN BLOOD BY AUTOMATED COUNT: 12.6 % (ref 11.6–15.1)
GFR SERPL CREATININE-BSD FRML MDRD: 95 ML/MIN/1.73SQ M
GLUCOSE SERPL-MCNC: 102 MG/DL (ref 65–140)
GLUCOSE UR STRIP-MCNC: NEGATIVE MG/DL
HCT VFR BLD AUTO: 39.7 % (ref 34.8–46.1)
HGB BLD-MCNC: 13.2 G/DL (ref 11.5–15.4)
HGB UR QL STRIP.AUTO: 10
IMM GRANULOCYTES # BLD AUTO: 0.05 THOUSAND/UL (ref 0–0.2)
IMM GRANULOCYTES NFR BLD AUTO: 1 % (ref 0–2)
KETONES UR STRIP-MCNC: ABNORMAL MG/DL
LEUKOCYTE ESTERASE UR QL STRIP: 100
LIPASE SERPL-CCNC: 15 U/L (ref 11–82)
LYMPHOCYTES # BLD AUTO: 1.76 THOUSANDS/ÂΜL (ref 0.6–4.47)
LYMPHOCYTES NFR BLD AUTO: 22 % (ref 14–44)
MCH RBC QN AUTO: 31.1 PG (ref 26.8–34.3)
MCHC RBC AUTO-ENTMCNC: 33.2 G/DL (ref 31.4–37.4)
MCV RBC AUTO: 93 FL (ref 82–98)
MONOCYTES # BLD AUTO: 0.55 THOUSAND/ÂΜL (ref 0.17–1.22)
MONOCYTES NFR BLD AUTO: 7 % (ref 4–12)
NEUTROPHILS # BLD AUTO: 5.47 THOUSANDS/ÂΜL (ref 1.85–7.62)
NEUTS SEG NFR BLD AUTO: 67 % (ref 43–75)
NITRITE UR QL STRIP: NEGATIVE
NON-SQ EPI CELLS URNS QL MICRO: ABNORMAL /HPF
NRBC BLD AUTO-RTO: 0 /100 WBCS
PH UR STRIP.AUTO: 6 [PH]
PLATELET # BLD AUTO: 278 THOUSANDS/UL (ref 149–390)
PMV BLD AUTO: 11 FL (ref 8.9–12.7)
POTASSIUM SERPL-SCNC: 3.8 MMOL/L (ref 3.5–5.3)
PROT SERPL-MCNC: 7 G/DL (ref 6.4–8.4)
PROT UR STRIP-MCNC: ABNORMAL MG/DL
RBC # BLD AUTO: 4.25 MILLION/UL (ref 3.81–5.12)
RBC #/AREA URNS AUTO: ABNORMAL /HPF
SODIUM SERPL-SCNC: 139 MMOL/L (ref 135–147)
SP GR UR STRIP.AUTO: 1.02 (ref 1–1.04)
UROBILINOGEN UA: NEGATIVE MG/DL
WBC # BLD AUTO: 8.07 THOUSAND/UL (ref 4.31–10.16)
WBC #/AREA URNS AUTO: ABNORMAL /HPF

## 2024-09-30 PROCEDURE — 99284 EMERGENCY DEPT VISIT MOD MDM: CPT

## 2024-09-30 PROCEDURE — 81001 URINALYSIS AUTO W/SCOPE: CPT | Performed by: EMERGENCY MEDICINE

## 2024-09-30 PROCEDURE — 83690 ASSAY OF LIPASE: CPT | Performed by: EMERGENCY MEDICINE

## 2024-09-30 PROCEDURE — 99285 EMERGENCY DEPT VISIT HI MDM: CPT | Performed by: EMERGENCY MEDICINE

## 2024-09-30 PROCEDURE — 36415 COLL VENOUS BLD VENIPUNCTURE: CPT | Performed by: EMERGENCY MEDICINE

## 2024-09-30 PROCEDURE — 85025 COMPLETE CBC W/AUTO DIFF WBC: CPT | Performed by: EMERGENCY MEDICINE

## 2024-09-30 PROCEDURE — 74177 CT ABD & PELVIS W/CONTRAST: CPT

## 2024-09-30 PROCEDURE — 80053 COMPREHEN METABOLIC PANEL: CPT | Performed by: EMERGENCY MEDICINE

## 2024-09-30 RX ADMIN — IOHEXOL 90 ML: 350 INJECTION, SOLUTION INTRAVENOUS at 10:08

## 2024-09-30 NOTE — ED PROVIDER NOTES
Final diagnoses:   Abdominal pain     ED Disposition       ED Disposition   Discharge    Condition   Stable    Date/Time   Mon Sep 30, 2024 10:37 AM    Comment   Lorrie Jones discharge to home/self care.                   Assessment & Plan       Medical Decision Making  Amount and/or Complexity of Data Reviewed  Labs: ordered.  Radiology: ordered.    39 year old female with abdominal pain.   Will assess for possible appendicitis, diverticulitis, hepaatitis, pancreatitis, colitis, cystitis   Abdominal labs and urinalysis obtained  No acute intra abdominal pathology found   Patient discharged with return precaution provided         Medications       ED Risk Strat Scores                                               History of Present Illness       Chief Complaint   Patient presents with    Abdominal Pain     Pt c/o periumbilical pain and LLQ pain. Pt states she is being treated for a fungal infection. Pt reports she was prescribed a pill (Flagyl) and a cream. Pt reports she has been taking the pill but not the cream because she is unsure how to use it. Pt reports she also suffers from constipation.        Past Medical History:   Diagnosis Date    Abnormal Pap smear of cervix     2015 cryosurgery; 6/2019 NILM pap/neg HPV    Allergic     Depression     Headache(784.0)     Migraine with aura       Past Surgical History:   Procedure Laterality Date    CHOLECYSTECTOMY LAPAROSCOPIC N/A 10/27/2020    Procedure: CHOLECYSTECTOMY LAPAROSCOPIC;  Surgeon: Lina aNrayan MD;  Location:  MAIN OR;  Service: General    COLONOSCOPY      FL LUMBAR PUNCTURE DIAGNOSTIC  11/17/2023    GYNECOLOGIC CRYOSURGERY  2015    HYSTERECTOMY  2016    supracervical    LAPAROSCOPY  2018    [place] LAPAROSCOPY W/LYSIS OF ADHESIONS N/A 03/03/2020    Procedure: L.O.A.;  Surgeon: Noy Paz MD;  Location: [facility];  Service: Gynecology    [place] LAPS ABD PRTM&OMENTUM DX W/WO SPEC BR/WA SPX N/A 08/09/2019    Procedure: LAPAROSCOPY  DIAGNOSTIC; LYSIS OF ADHESIONS;  Surgeon: Abebe Walls MD;  Location: [facility] OR;  Service: Gynecology    TUBAL LIGATION      UPPER GASTROINTESTINAL ENDOSCOPY        Family History   Problem Relation Age of Onset    Cancer Paternal Grandmother         uterine    Cancer Paternal Aunt         uterine     Migraines Mother     Arthritis Mother     Hypertension Father     Breast cancer Neg Hx     Colon cancer Neg Hx     Ovarian cancer Neg Hx       Social History     Tobacco Use    Smoking status: Every Day     Current packs/day: 0.50     Average packs/day: 0.5 packs/day for 15.0 years (7.5 ttl pk-yrs)     Types: Cigarettes     Passive exposure: Current    Smokeless tobacco: Never   Vaping Use    Vaping status: Never Used   Substance Use Topics    Alcohol use: Never    Drug use: Never      E-Cigarette/Vaping    E-Cigarette Use Never User       E-Cigarette/Vaping Substances    Nicotine No     THC No     CBD No     Flavoring No     Other No     Unknown No       I have reviewed and agree with the history as documented.     HPI  Patient is a 39-year-old female with left lower quadrant abdominal pain.  Patient has a history of hysterectomy, cholecystectomy, gastritis.  States that the left lower quadrant abdominal pain has been ongoing for the past couple days.  Patient has been on Flagyl due to recent BV diagnosis and is also being treated for yeast infection.  Denies any fever, chills, nausea, vomiting, diarrhea.  Patient also reports no urinary symptoms including hematuria, dysuria.  Patient has no other complaints.      Review of Systems   Constitutional:  Negative for chills, diaphoresis, fever and unexpected weight change.   HENT:  Negative for ear pain and sore throat.    Eyes:  Negative for visual disturbance.   Respiratory:  Negative for cough, chest tightness and shortness of breath.    Cardiovascular:  Negative for chest pain and leg swelling.   Gastrointestinal:  Positive for abdominal pain. Negative for  abdominal distention, constipation, diarrhea, nausea and vomiting.   Endocrine: Negative.    Genitourinary:  Negative for difficulty urinating and dysuria.   Musculoskeletal: Negative.    Skin: Negative.    Allergic/Immunologic: Negative.    Neurological: Negative.    Hematological: Negative.    Psychiatric/Behavioral: Negative.     All other systems reviewed and are negative.          Objective       ED Triage Vitals [09/30/24 0837]   Temperature Pulse Blood Pressure Respirations SpO2 Patient Position - Orthostatic VS   98.7 °F (37.1 °C) 96 108/56 18 98 % Lying      Temp Source Heart Rate Source BP Location FiO2 (%) Pain Score    Oral Monitor Left arm -- --      Vitals      Date and Time Temp Pulse SpO2 Resp BP Pain Score FACES Pain Rating User   09/30/24 0837 98.7 °F (37.1 °C) 96 98 % 18 108/56 -- -- ES            Physical Exam  Vitals and nursing note reviewed.   Constitutional:       General: She is not in acute distress.     Appearance: Normal appearance. She is not ill-appearing.   HENT:      Head: Normocephalic and atraumatic.      Right Ear: External ear normal.      Left Ear: External ear normal.      Nose: Nose normal.      Mouth/Throat:      Mouth: Mucous membranes are moist.      Pharynx: Oropharynx is clear.   Eyes:      General: No scleral icterus.        Right eye: No discharge.         Left eye: No discharge.      Extraocular Movements: Extraocular movements intact.      Conjunctiva/sclera: Conjunctivae normal.      Pupils: Pupils are equal, round, and reactive to light.   Cardiovascular:      Rate and Rhythm: Normal rate and regular rhythm.      Pulses: Normal pulses.      Heart sounds: Normal heart sounds.   Pulmonary:      Effort: Pulmonary effort is normal.      Breath sounds: Normal breath sounds.   Abdominal:      General: Abdomen is flat. Bowel sounds are normal. There is no distension.      Palpations: Abdomen is soft.      Tenderness: There is abdominal tenderness in the left lower quadrant.  There is no guarding or rebound.   Musculoskeletal:         General: Normal range of motion.      Cervical back: Normal range of motion and neck supple.   Skin:     General: Skin is warm and dry.      Capillary Refill: Capillary refill takes less than 2 seconds.   Neurological:      General: No focal deficit present.      Mental Status: She is alert and oriented to person, place, and time. Mental status is at baseline.   Psychiatric:         Mood and Affect: Mood normal.         Behavior: Behavior normal.         Thought Content: Thought content normal.         Judgment: Judgment normal.         Results Reviewed       Procedure Component Value Units Date/Time    Urine Microscopic [[id number]]  (Abnormal) Collected: 09/30/24 0905    Lab Status: Final result Specimen: Urine, Clean Catch Updated: 09/30/24 1037     RBC, UA 1-2 /hpf      WBC, UA 2-4 /hpf      Epithelial Cells Occasional /hpf      Bacteria, UA Occasional /hpf      Ca Oxalate Priti, UA Occasional /hpf     Comprehensive metabolic panel [[id number]] Collected: 09/30/24 0905    Lab Status: Final result Specimen: Blood from Arm, Left Updated: 09/30/24 0935     Sodium 139 mmol/L      Potassium 3.8 mmol/L      Chloride 105 mmol/L      CO2 27 mmol/L      ANION GAP 7 mmol/L      BUN 12 mg/dL      Creatinine 0.79 mg/dL      Glucose 102 mg/dL      Calcium 9.5 mg/dL      AST 21 U/L      ALT 38 U/L      Alkaline Phosphatase 60 U/L      Total Protein 7.0 g/dL      Albumin 4.3 g/dL      Total Bilirubin 0.43 mg/dL      eGFR 95 ml/min/1.73sq m     Narrative:      National Kid[employer] Disease Foundation guidelines for Chronic Kidney Disease (CKD):     Stage 1 with normal or high GFR (GFR > 90 mL/min/1.73 square meters)    Stage 2 Mild CKD (GFR = 60-89 mL/min/1.73 square meters)    Stage 3A Moderate CKD (GFR = 45-59 mL/min/1.73 square meters)    Stage 3B Moderate CKD (GFR = 30-44 mL/min/1.73 square meters)    Stage 4 Severe CKD (GFR = 15-29 mL/min/1.73 square meters)    Stage 5 End  Stage CKD (GFR <15 mL/min/1.73 square meters)  Note: GFR calculation is accurate only with a steady state creatinine    Lipase [[id number]]  (Normal) Collected: 09/30/24 0905    Lab Status: Final result Specimen: Blood from Arm, Left Updated: 09/30/24 0935     Lipase 15 u/L     UA (URINE) with reflex to Scope [[id number]]  (Abnormal) Collected: 09/30/24 0905    Lab Status: Final result Specimen: Urine, Clean Catch Updated: 09/30/24 0931     Color, UA Mary     Clarity, UA Slightly Cloudy     Specific Gravity, UA 1.025     pH, UA 6.0     Leukocytes, .0     Nitrite, UA Negative     Protein, UA 15 (Trace) mg/dl      Glucose, UA Negative mg/dl      Ketones, UA 5 (Trace) mg/dl      Bilirubin, UA Negative     Occult Blood, UA 10.0     UROBILINOGEN UA Negative mg/dL     CBC and differential [[id number]] Collected: 09/30/24 0905    Lab Status: Final result Specimen: Blood from Arm, Left Updated: 09/30/24 0917     WBC 8.07 Thousand/uL      RBC 4.25 Million/uL      Hemoglobin 13.2 g/dL      Hematocrit 39.7 %      MCV 93 fL      MCH 31.1 pg      MCHC 33.2 g/dL      RDW 12.6 %      MPV 11.0 fL      Platelets 278 Thousands/uL      nRBC 0 /100 WBCs      Segmented % 67 %      Immature Grans % 1 %      Lymphocytes % 22 %      Monocytes % 7 %      Eosinophils Relative 3 %      Basophils Relative 0 %      Absolute Neutrophils 5.47 Thousands/µL      Absolute Immature Grans 0.05 Thousand/uL      Absolute Lymphocytes 1.76 Thousands/µL      Absolute Monocytes 0.55 Thousand/µL      Eosinophils Absolute 0.21 Thousand/µL      Basophils Absolute 0.03 Thousands/µL             CT abdomen pelvis with contrast   Final Interpretation by Artemio Rodriguez MD (09/30 1018)      No acute abdominopelvic process.      Right adnexal cyst, similar or slightly larger compared to prior from 5/25/2024.         Workstation performed: [id number]             Procedures    ED Medication and Procedure Management   Prior to Admission Medications   Prescriptions  Last Dose Informant Patient Reported? Taking?   Acetaminophen Extra Strength 500 MG TABS   Yes No   Patient not taking: Reported on 2024   Diclofenac Sodium (VOLTAREN) 1 %  Self No No   Sig: Apply 2 g topically 4 (four) times a day   SUMAtriptan (IMITREX) 100 mg tablet  Self No No   Sig: Take 1 tablet (100 mg total) by mouth daily as needed for migraine   Senna Plus 8.6-50 MG per tablet   No No   Sig: TAKE 1 TABLET BY MOUTH DAILY   ascorbic acid (VITAMIN C) 500 mg tablet   No No   Sig: Take 1 tablet (500 mg total) by mouth daily   bisacodyl 5 MG EC tablet   No No   Sig: TAKE 2 TABLETS (10 MG TOTAL) BY MOUTH 2 (TWO) TIMES A DAY AS NEEDED FOR CONSTIPATION (CONSTIPATION) COLONOSCOPY PREP   celecoxib (CeleBREX) 100 mg capsule   No No   Sig: TAKE 1 CAPSULE (100 MG TOTAL) BY MOUTH 2 (TWO) TIMES A DAY AS NEEDED FOR MILD PAIN   dexamethasone (DECADRON) 2 mg tablet   No No   Sig: TAKE 1 TABLET (2 MG TOTAL) BY MOUTH DAILY WITH BREAKFAST   dicyclomine (BENTYL) 20 mg tablet   No No   Sig: Take 1 tablet (20 mg total) by mouth 2 (two) times a day as needed (abd pain) for up to 2 days   ergocalciferol (VITAMIN D2) 50,000 units   No No   Sig: Take 1 capsule (50,000 Units total) by mouth once a week   famotidine (PEPCID) 20 mg tablet   No No   Sig: Take 1 tablet (20 mg total) by mouth daily for 14 days   fluocinolone acetonide (DERMOTIC) 0.01 % otic oil  Self No No   Sig: INSTILL 3 DROPS IN BOTH EARS DAILY   fluticasone (FLONASE) 50 mcg/act nasal spray  Self No No   Si spray into each nostril 2 (two) times a day   Patient not taking: Reported on 3/20/2024   lidocaine (LIDODERM) 5 %  Self No No   Sig: Apply 1 patch topically over 12 hours every 24 hours Remove & Discard patch within 12 hours or as directed by MD   methocarbamol (ROBAXIN) 500 mg tablet   No No   Sig: Take 1 tablet (500 mg total) by mouth 2 (two) times a day as needed for muscle spasms   metroNIDAZOLE (FLAGYL) 500 mg tablet   No No   Sig: Take 1 tablet (500 mg  total) by mouth every 12 (twelve) hours for 7 days   miconazole (MONISTAT-7) 2 % vaginal cream   No No   Sig: Insert 1 applicator into the vagina daily at bedtime   naproxen (EC NAPROSYN) 500 MG EC tablet  Self Yes No   Sig: Take 500 mg by mouth if needed for mild pain   nicotine (NICODERM CQ) 14 mg/24hr TD 24 hr patch   No No   Sig: PLACE 1 PATCH ON THE SKIN OVER 24 HOURS EVERY 24 HOURS   nystatin-triamcinolone (MYCOLOG-II) cream  Self No No   Sig: Apply topically 2 (two) times a day   omeprazole (PriLOSEC) 40 MG capsule   No No   Sig: Take 1 capsule (40 mg total) by mouth daily   Patient not taking: Reported on 9/4/2024   oxyCODONE-acetaminophen (Percocet) 5-325 mg per tablet   No No   Sig: Take 1 tablet by mouth every 6 (six) hours as needed for moderate pain for up to 10 doses Max Daily Amount: 4 tablets   Patient not taking: Reported on 9/4/2024   pantoprazole (PROTONIX) 20 mg tablet   No No   Sig: TAKE 1 TABLET (20 MG TOTAL) BY MOUTH DAILY   polyethylene glycol (MIRALAX) 17 g packet  Self No No   Sig: Take 17 g by mouth daily   polyethylene glycol (MIRALAX) 17 g packet  Self No No   Sig: Take 17 g by mouth daily   prochlorperazine (COMPAZINE) 10 mg tablet  Self No No   Sig: Take 1 tablet (10 mg total) by mouth every 6 (six) hours as needed for nausea or vomiting   sodium chloride 0.9 % nebulizer solution  Self No No   Sig: TAKE 3ML BY BEBULIZATION EVERY 6 HOURS AS NEEDED FOR WHEEZING OR SHORTNESS OF BREATH   Patient not taking: Reported on 9/4/2024   sucralfate (CARAFATE) 1 g/10 mL suspension   No No   Sig: Take 10 mL (1 g total) by mouth 3 (three) times a day for 7 days   vitamin B-12 (VITAMIN B-12) 1,000 mcg tablet   No No   Sig: Take 1 tablet (1,000 mcg total) by mouth daily      Facility-Administered Medications: None     Discharge Medication List as of 9/30/2024 10:37 AM        CONTINUE these medications which have NOT CHANGED    Details   Acetaminophen Extra Strength 500 MG TABS Historical Med       ascorbic acid (VITAMIN C) 500 mg tablet Take 1 tablet (500 mg total) by mouth daily, Starting Thu 8/1/2024, Normal      bisacodyl 5 MG EC tablet TAKE 2 TABLETS (10 MG TOTAL) BY MOUTH 2 (TWO) TIMES A DAY AS NEEDED FOR CONSTIPATION (CONSTIPATION) COLONOSCOPY PREP, Normal      celecoxib (CeleBREX) 100 mg capsule TAKE 1 CAPSULE (100 MG TOTAL) BY MOUTH 2 (TWO) TIMES A DAY AS NEEDED FOR MILD PAIN, Starting Wed 8/28/2024, Normal      dexamethasone (DECADRON) 2 mg tablet TAKE 1 TABLET (2 MG TOTAL) BY MOUTH DAILY WITH BREAKFAST, Starting Fri 5/3/2024, Normal      Diclofenac Sodium (VOLTAREN) 1 % Apply 2 g topically 4 (four) times a day, Starting Wed 11/22/2023, Normal      dicyclomine (BENTYL) 20 mg tablet Take 1 tablet (20 mg total) by mouth 2 (two) times a day as needed (abd pain) for up to 2 days, Starting Sun 5/26/2024, Until Tue 5/28/2024 at 2359, Normal      ergocalciferol (VITAMIN D2) 50,000 units Take 1 capsule (50,000 Units total) by mouth once a week, Starting Thu 8/1/2024, Normal      famotidine (PEPCID) 20 mg tablet Take 1 tablet (20 mg total) by mouth daily for 14 days, Starting Sun 5/26/2024, Until Sun 6/9/2024, Normal      fluocinolone acetonide (DERMOTIC) 0.01 % otic oil INSTILL 3 DROPS IN BOTH EARS DAILY, Normal      fluticasone (FLONASE) 50 mcg/act nasal spray 1 spray into each nostril 2 (two) times a day, Starting Thu 10/19/2023, Normal      lidocaine (LIDODERM) 5 % Apply 1 patch topically over 12 hours every 24 hours Remove & Discard patch within 12 hours or as directed by MD, Starting Wed 11/22/2023, Print      methocarbamol (ROBAXIN) 500 mg tablet Take 1 tablet (500 mg total) by mouth 2 (two) times a day as needed for muscle spasms, Starting Thu 8/1/2024, Normal      metroNIDAZOLE (FLAGYL) 500 mg tablet Take 1 tablet (500 mg total) by mouth every 12 (twelve) hours for 7 days, Starting Fri 9/27/2024, Until Fri 10/4/2024, Normal      miconazole (MONISTAT-7) 2 % vaginal cream Insert 1 applicator into the  vagina daily at bedtime, Starting Fri 9/27/2024, Normal      naproxen (EC NAPROSYN) 500 MG EC tablet Take 500 mg by mouth if needed for mild pain, Historical Med      nicotine (NICODERM CQ) 14 mg/24hr TD 24 hr patch PLACE 1 PATCH ON THE SKIN OVER 24 HOURS EVERY 24 HOURS, Starting Mon 8/5/2024, Normal      nystatin-triamcinolone (MYCOLOG-II) cream Apply topically 2 (two) times a day, Starting Thu 9/14/2023, Normal      omeprazole (PriLOSEC) 40 MG capsule Take 1 capsule (40 mg total) by mouth daily, Starting Thu 8/1/2024, Normal      oxyCODONE-acetaminophen (Percocet) 5-325 mg per tablet Take 1 tablet by mouth every 6 (six) hours as needed for moderate pain for up to 10 doses Max Daily Amount: 4 tablets, Starting Sat 5/18/2024, Normal      pantoprazole (PROTONIX) 20 mg tablet TAKE 1 TABLET (20 MG TOTAL) BY MOUTH DAILY, Starting Mon 8/26/2024, Until Sat 2/22/2025, Normal      polyethylene glycol (MIRALAX) 17 g packet Take 17 g by mouth daily, Starting Tue 1/2/2024, Until Sun 6/30/2024, Normal      polyethylene glycol (MIRALAX) 17 g packet Take 17 g by mouth daily, Starting Tue 1/30/2024, Until Sun 7/28/2024, Normal      prochlorperazine (COMPAZINE) 10 mg tablet Take 1 tablet (10 mg total) by mouth every 6 (six) hours as needed for nausea or vomiting, Starting Wed 9/6/2023, Normal      Senna Plus 8.6-50 MG per tablet TAKE 1 TABLET BY MOUTH DAILY, Starting Mon 7/22/2024, Normal      sodium chloride 0.9 % nebulizer solution TAKE 3ML BY BEBULIZATION EVERY 6 HOURS AS NEEDED FOR WHEEZING OR SHORTNESS OF BREATH, Normal      sucralfate (CARAFATE) 1 g/10 mL suspension Take 10 mL (1 g total) by mouth 3 (three) times a day for 7 days, Starting Sun 5/26/2024, Until Sun 6/2/2024, Normal      SUMAtriptan (IMITREX) 100 mg tablet Take 1 tablet (100 mg total) by mouth daily as needed for migraine, Starting Thu 11/2/2023, Normal      vitamin B-12 (VITAMIN B-12) 1,000 mcg tablet Take 1 tablet (1,000 mcg total) by mouth daily, Starting  Thu 8/1/2024, Normal      Ex-Lax 15 MG CHEW CHEW 1 TABLET BY MOUTH TWICE DAILY AS NEEDED FOR CONSTIPATION, Normal           No discharge procedures on file.  ED SEPSIS DOCUMENTATION   Time reflects when diagnosis was documented in both MDM as applicable and the Disposition within this note       Time User Action Codes Description Comment    9/30/2024 10:37 AM Layo Munoz Add [R10.9] Abdominal pain                  Layo Munoz MD  10/01/24 6933

## 2024-10-01 DIAGNOSIS — K59.00 ACUTE CONSTIPATION: ICD-10-CM

## 2024-10-01 RX ORDER — SENNOSIDES 15 MG/1
TABLET, CHEWABLE ORAL
Qty: 60 TABLET | Refills: 1 | Status: SHIPPED | OUTPATIENT
Start: 2024-10-01

## 2024-10-02 ENCOUNTER — HOSPITAL ENCOUNTER (OUTPATIENT)
Dept: ULTRASOUND IMAGING | Facility: HOSPITAL | Age: 39
Discharge: HOME | End: 2024-10-02
Payer: COMMERCIAL

## 2024-10-02 DIAGNOSIS — R10.2 PELVIC PAIN: ICD-10-CM

## 2024-10-02 DIAGNOSIS — N94.10 PAIN IN FEMALE GENITALIA ON INTERCOURSE: ICD-10-CM

## 2024-10-02 PROCEDURE — 76856 US EXAM PELVIC COMPLETE: CPT

## 2024-10-02 PROCEDURE — 76830 TRANSVAGINAL US NON-OB: CPT

## 2024-10-10 ENCOUNTER — TELEPHONE (OUTPATIENT)
Age: 39
End: 2024-10-10

## 2024-10-10 NOTE — TELEPHONE ENCOUNTER
Patients GI provider:  KD Mosley    Number to return call: [phone]    Reason for call: Pt called asking for transportation for her appt on 10/14/24. Please set up the transportation and reach out to pt once completed.     Scheduled procedure/appointment date if applicable: Apt/procedure 10/14/24

## 2024-10-11 NOTE — PROGRESS NOTES
OB/GYN VISIT  Lorrie Jones  10/15/2024  12:53 PM    Subjective:     Lorrie Jones is a 39 y.o.  female who presents to discuss ultrasound results and chronic pelvic pain. Patient has been having pelvic pain since the birth of her last child 15 years ago and has undergone multiple gynecologic surgeries including:     Bilateral tubal ligation ( [place])  Supracervical hysterectomy ( [place])  Right salpingectomy ( [place], [place]) - likely right hydrosalpinx  Extensive lysis of adhesions in  and  - unable to visualize left ovary and tube for removal of left hydrosalpinx    Most recently, on 24, she underwent robotic assisted extensive lysis of adhesions and left salpingo-oophorectomy with [facility] due to left cystic mass. Per op note, 8cm benign-appearing left adnexal cystic mass was densely adherent to rectosigmoid pelvic sidewall, pelvic vasculature, and left ureter. It was noted that her right ovary was surgically absent.    She re-presented to the ED with pain and underwent CT guided aspiration of adnexal cystic mass on 24 for 80cc of clear yellow fluid. Patient states her pain basically resolved after drainage of the mass and has now come back over the past month.    Since then she has been seen multiple times for vaginal odor and found to have BV and yeast infections with continued pain.     TVUS on 10/2/24 showed 2.8 x 2.7 x 2.2cm right adnexal multiseptated cystic lesion, which had decreased in size since 24 where it was 3.1 x 2.7cm. Per chart review at [facility], in  of this year this was 8.2cm and then 3.8 x 1.7cm on 6/3/24.        Past Medical History:   Diagnosis Date    Abnormal Pap smear of cervix     2015 cryosurgery; 2019 NILM pap/neg HPV    Allergic     Depression     Headache(784.0)     Migraine with aura      Past Surgical History:   Procedure Laterality Date    CHOLECYSTECTOMY LAPAROSCOPIC N/A 10/27/2020    Procedure:  "CHOLECYSTECTOMY LAPAROSCOPIC;  Surgeon: Lina Narayan MD;  Location:  MAIN OR;  Service: General    COLONOSCOPY      FL LUMBAR PUNCTURE DIAGNOSTIC  11/17/2023    GYNECOLOGIC CRYOSURGERY  2015    HYSTERECTOMY  2016    supracervical    LAPAROSCOPY  2018    [place] LAPAROSCOPY W/LYSIS OF ADHESIONS N/A 03/03/2020    Procedure: L.O.A.;  Surgeon: Noy Paz MD;  [facility];  Service: Gynecology    [place] LAPS ABD PRTM&OMENTUM DX W/WO SPEC BR/WA SPX N/A 08/09/2019    Procedure: LAPAROSCOPY DIAGNOSTIC; LYSIS OF ADHESIONS;  Surgeon: Abebe Walls MD;  Location[facility];  Service: Gynecology    TUBAL LIGATION      UPPER GASTROINTESTINAL ENDOSCOPY         Objective:    Vitals: Blood pressure 107/74, pulse 103, height 5' 2\" (1.575 m), weight 72.8 kg (160 lb 6.4 oz), not currently breastfeeding.Body mass index is 29.34 kg/m².    Physical Exam  Constitutional:       General: She is not in acute distress.  HENT:      Head: Normocephalic and atraumatic.   Eyes:      Extraocular Movements: Extraocular movements intact.   Cardiovascular:      Rate and Rhythm: Normal rate.   Pulmonary:      Effort: Pulmonary effort is normal. No respiratory distress.   Abdominal:      General: Abdomen is flat.   Musculoskeletal:         General: No swelling.   Skin:     General: Skin is warm and dry.   Neurological:      General: No focal deficit present.      Mental Status: She is alert.   Psychiatric:         Mood and Affect: Mood normal.       Assessment/Plan:  1. Chronic pelvic pain in female  Assessment & Plan:  - Reviewed results of ultrasound with patient showing 2.8 x 2.7 x 2.2cm right adnexal multiseptated cystic lesion which had decreased in size since 9/30/24  - Discussed with patient that this cystic lesion could potentially go away on its own, but that if she is interested in surgical removal, [facility] did her most recent surgery and were able to remove her left ovary " and fallopian tube which we could previously not visualize due to extensive adhesive disease  - Patient reports that after IR drainage of the cystic mass noted a month following her surgery, her pain was much improved. She is interested in drainage again, although the mass is not as large as it was prior to her previous drainage  - Plan to message IR to request reviewing her most recent CT on 9/30/24 to determine if drainage is a possibility for her  - Will call patient with updates from conversation with IR  2. Cervical cancer screening  -     Liquid-based pap, screening  3. Screening for STDs (sexually transmitted diseases)  -     Chlamydia/GC amplified DNA by PCR        Beatriz Tay MD  10/15/2024  12:53 PM

## 2024-10-14 ENCOUNTER — OFFICE VISIT (OUTPATIENT)
Dept: GASTROENTEROLOGY | Facility: CLINIC | Age: 39
End: 2024-10-14
Payer: COMMERCIAL

## 2024-10-14 VITALS
HEIGHT: 62 IN | SYSTOLIC BLOOD PRESSURE: 114 MMHG | BODY MASS INDEX: 29 KG/M2 | WEIGHT: 157.6 LBS | DIASTOLIC BLOOD PRESSURE: 72 MMHG | TEMPERATURE: 98.4 F

## 2024-10-14 DIAGNOSIS — G89.29 CHRONIC PELVIC PAIN IN FEMALE: Chronic | ICD-10-CM

## 2024-10-14 DIAGNOSIS — K58.1 IRRITABLE BOWEL SYNDROME WITH CONSTIPATION: Primary | ICD-10-CM

## 2024-10-14 DIAGNOSIS — R10.2 CHRONIC PELVIC PAIN IN FEMALE: Chronic | ICD-10-CM

## 2024-10-14 DIAGNOSIS — R10.13 DYSPEPSIA: ICD-10-CM

## 2024-10-14 PROCEDURE — 99214 OFFICE O/P EST MOD 30 MIN: CPT | Performed by: PHYSICIAN ASSISTANT

## 2024-10-14 RX ORDER — IBUPROFEN 800 MG/1
800 TABLET, FILM COATED ORAL EVERY 8 HOURS PRN
COMMUNITY
Start: 2024-06-06

## 2024-10-14 RX ORDER — DICYCLOMINE HCL 20 MG
20 TABLET ORAL 2 TIMES DAILY PRN
Qty: 30 TABLET | Refills: 2 | Status: SHIPPED | OUTPATIENT
Start: 2024-10-14

## 2024-10-14 RX ORDER — MECLIZINE HYDROCHLORIDE 25 MG/1
25 TABLET ORAL 3 TIMES DAILY PRN
COMMUNITY
Start: 2024-06-03 | End: 2025-06-03

## 2024-10-14 NOTE — ASSESSMENT & PLAN NOTE
She underwent surgery due to persistent left adnexal cyst in April 2024 including robotic assisted laparoscopic extensive lysis of adhesion, left salpingo-oophorectomy, cystoscopy. Follow-up with OB/GYN.     Follow-up in 6 months

## 2024-10-14 NOTE — ASSESSMENT & PLAN NOTE
Improved. Patient having regular bowel movements and less bloating/abdominal distention since left salpingo-oophorectomy. Continue bisacodyl 10 mg as needed for constipation. Sent refill for dicyclomine as needed for abdominal pain but encouraged use of this sparingly, since sometimes it can make constipation worse. Recommend drinking 6 to 8 glasses of water daily.    Orders:    dicyclomine (BENTYL) 20 mg tablet; Take 1 tablet (20 mg total) by mouth 2 (two) times a day as needed (abdominal pain)

## 2024-10-14 NOTE — TELEPHONE ENCOUNTER
Patient calling in regards to transportation for appointment today. Do not see arrival time. Has the transportation been set?

## 2024-10-14 NOTE — PROGRESS NOTES
Ambulatory Visit  Name: Lorrie Jones      : 1985      MRN: [id number]  Encounter Provider: Tamika Senior PA-C  Encounter Date: 10/14/2024   Encounter department: [facility] [place]    Assessment & Plan  Irritable bowel syndrome with constipation  Improved. Patient having regular bowel movements and less bloating/abdominal distention since left salpingo-oophorectomy. Continue bisacodyl 10 mg as needed for constipation. Sent refill for dicyclomine as needed for abdominal pain but encouraged use of this sparingly, since sometimes it can make constipation worse. Recommend drinking 6 to 8 glasses of water daily.    Orders:    dicyclomine (BENTYL) 20 mg tablet; Take 1 tablet (20 mg total) by mouth 2 (two) times a day as needed (abdominal pain)    Dyspepsia  She reports some fullness with eating but denies weight loss. EGD earlier this year negative for H. pylori and ulceration. Continue pantoprazole 20 mg daily. Encourage eating small, frequent meals.  If symptoms persist, could consider gastric emptying study as the next step.       Chronic pelvic pain in female  She underwent surgery due to persistent left adnexal cyst in 2024 including robotic assisted laparoscopic extensive lysis of adhesion, left salpingo-oophorectomy, cystoscopy. Follow-up with OB/GYN.     Follow-up in 6 months    History of Present Illness     Lorrie Jones is a 39 y.o. female with history of irritable bowel syndrome and cholecystectomy who presents for follow-up.    Patient is O[place]i-speaking so [employer]  [id number] was used to communicate.    She has history of chronic pelvic pain status post multiple surgeries including hysterectomy.  She underwent surgery due to persistent left adnexal cyst in 2024 including robotic assisted laparoscopic extensive lysis of adhesion, left salpingo-oophorectomy, cystoscopy.    Since surgery, she is doing OK. She still reports  "left sided abdominal pain associated with bowel movements which gets better with a bowel movement. She is now having regular soft bowel movements. She is taking bisacodyl 10 mg as needed - last use about 2 weeks ago. Her abdomen is less bloated and distended. When she eats, she feels very full. This is new since the surgery. Sometimes she can have toast and coffee and feel very full. She denies weight loss. She denies nausea and vomiting. She takes protonix 20 mg once daily. She reports some periumbilical pain which feels like muscle spasms. She is taking cyclobenzprine which alleviates the pain.     She underwent EGD and colonoscopy in January 2024.  EGD showed normal esophagus, mild erythema in the stomach, normal duodenum, benign polyp in the duodenum.  Biopsies from the stomach negative for H. pylori.  Colonoscopy showed poor prep.    History obtained from : patient  Review of Systems        Objective     /72 (BP Location: Left arm, Patient Position: Sitting, Cuff Size: Standard)   Temp 98.4 °F (36.9 °C) (Tympanic)   Ht 5' 2\" (1.575 m)   Wt 71.5 kg (157 lb 9.6 oz)   BMI 28.83 kg/m²     Physical Exam  Vitals and nursing note reviewed.   Constitutional:       General: She is not in acute distress.     Appearance: She is well-developed.   HENT:      Head: Normocephalic and atraumatic.   Eyes:      Conjunctiva/sclera: Conjunctivae normal.   Cardiovascular:      Rate and Rhythm: Normal rate and regular rhythm.      Heart sounds: No murmur heard.  Pulmonary:      Effort: Pulmonary effort is normal. No respiratory distress.      Breath sounds: Normal breath sounds.   Abdominal:      Palpations: Abdomen is soft.      Tenderness: There is no abdominal tenderness.   Musculoskeletal:         General: No swelling.      Cervical back: Neck supple.   Skin:     General: Skin is warm and dry.   Neurological:      Mental Status: She is alert.   Psychiatric:         Mood and Affect: Mood normal.       Administrative " Statements   I have spent a total time of 20 minutes in caring for this patient on the day of the visit/encounter including Diagnostic results, Patient and family education, Impressions, Documenting in the medical record, and Reviewing / ordering tests, medicine, procedures  .    Lab Results   Component Value Date    WBC 8.07 09/30/2024    HGB 13.2 09/30/2024    HCT 39.7 09/30/2024    MCV 93 09/30/2024     09/30/2024     Lab Results   Component Value Date    SODIUM 139 09/30/2024    K 3.8 09/30/2024     09/30/2024    CO2 27 09/30/2024    AGAP 7 09/30/2024    BUN 12 09/30/2024    CREATININE 0.79 09/30/2024    GLUC 102 09/30/2024    GLUF 89 04/01/2024    CALCIUM 9.5 09/30/2024    AST 21 09/30/2024    ALT 38 09/30/2024    ALKPHOS 60 09/30/2024    TP 7.0 09/30/2024    TBILI 0.43 09/30/2024    EGFR 95 09/30/2024     I personally reviewed recent blood work as listed above and CT A/P with IV contrast from 9/30/2024 which shows no acute abdominal pelvic process and chronic right adnexal cyst.

## 2024-10-15 ENCOUNTER — OFFICE VISIT (OUTPATIENT)
Dept: OBGYN CLINIC | Facility: CLINIC | Age: 39
End: 2024-10-15

## 2024-10-15 VITALS
WEIGHT: 160.4 LBS | BODY MASS INDEX: 29.52 KG/M2 | DIASTOLIC BLOOD PRESSURE: 74 MMHG | HEART RATE: 103 BPM | HEIGHT: 62 IN | SYSTOLIC BLOOD PRESSURE: 107 MMHG

## 2024-10-15 DIAGNOSIS — Z12.4 CERVICAL CANCER SCREENING: ICD-10-CM

## 2024-10-15 DIAGNOSIS — R10.2 CHRONIC PELVIC PAIN IN FEMALE: Primary | Chronic | ICD-10-CM

## 2024-10-15 DIAGNOSIS — G89.29 CHRONIC PELVIC PAIN IN FEMALE: Primary | Chronic | ICD-10-CM

## 2024-10-15 DIAGNOSIS — Z11.3 SCREENING FOR STDS (SEXUALLY TRANSMITTED DISEASES): ICD-10-CM

## 2024-10-15 PROCEDURE — G0145 SCR C/V CYTO,THINLAYER,RESCR: HCPCS

## 2024-10-15 PROCEDURE — 87491 CHLMYD TRACH DNA AMP PROBE: CPT

## 2024-10-15 PROCEDURE — 99213 OFFICE O/P EST LOW 20 MIN: CPT | Performed by: OBSTETRICS & GYNECOLOGY

## 2024-10-15 PROCEDURE — 87591 N.GONORRHOEAE DNA AMP PROB: CPT

## 2024-10-15 PROCEDURE — G0476 HPV COMBO ASSAY CA SCREEN: HCPCS

## 2024-10-15 NOTE — ASSESSMENT & PLAN NOTE
- Reviewed results of ultrasound with patient showing 2.8 x 2.7 x 2.2cm right adnexal multiseptated cystic lesion which had decreased in size since 9/30/24  - Discussed with patient that this cystic lesion could potentially go away on its own, but that if she is interested in surgical removal, [facility] did her most recent surgery and were able to remove her left ovary and fallopian tube which we could previously not visualize due to extensive adhesive disease  - Patient reports that after IR drainage of the cystic mass noted a month following her surgery, her pain was much improved. She is interested in drainage again, although the mass is not as large as it was prior to her previous drainage  - Plan to message IR to request reviewing her most recent CT on 9/30/24 to determine if drainage is a possibility for her  - Will call patient with updates from conversation with IR

## 2024-10-17 ENCOUNTER — TELEPHONE (OUTPATIENT)
Dept: OBGYN CLINIC | Facility: CLINIC | Age: 39
End: 2024-10-17

## 2024-10-17 ENCOUNTER — HOSPITAL ENCOUNTER (EMERGENCY)
Facility: HOSPITAL | Age: 39
Discharge: HOME | End: 2024-10-17
Attending: EMERGENCY MEDICINE
Payer: COMMERCIAL

## 2024-10-17 VITALS
TEMPERATURE: 98 F | OXYGEN SATURATION: 98 % | SYSTOLIC BLOOD PRESSURE: 120 MMHG | BODY MASS INDEX: 29.26 KG/M2 | RESPIRATION RATE: 18 BRPM | WEIGHT: 160 LBS | DIASTOLIC BLOOD PRESSURE: 83 MMHG | HEART RATE: 92 BPM

## 2024-10-17 DIAGNOSIS — N94.89 PELVIC CYST IN FEMALE: Primary | ICD-10-CM

## 2024-10-17 DIAGNOSIS — G89.29 CHRONIC PELVIC PAIN IN FEMALE: Primary | ICD-10-CM

## 2024-10-17 DIAGNOSIS — R10.2 CHRONIC PELVIC PAIN IN FEMALE: Primary | ICD-10-CM

## 2024-10-17 LAB
C TRACH DNA SPEC QL NAA+PROBE: NEGATIVE
N GONORRHOEA DNA SPEC QL NAA+PROBE: NEGATIVE

## 2024-10-17 PROCEDURE — 99284 EMERGENCY DEPT VISIT MOD MDM: CPT | Performed by: EMERGENCY MEDICINE

## 2024-10-17 PROCEDURE — 99283 EMERGENCY DEPT VISIT LOW MDM: CPT

## 2024-10-17 NOTE — ED PROVIDER NOTES
"Time reflects when diagnosis was documented in both MDM as applicable and the Disposition within this note       Time User Action Codes Description Comment    10/17/2024  7:05 PM Eddie Martínez Add [R10.2,  G89.29] Chronic pelvic pain in female           ED Disposition       ED Disposition   Discharge    Condition   Stable    Date/Time   Thu Oct 17, 2024  7:05 PM    Comment   Lorrie Jones discharge to home/self care.                   Assessment & Plan       Medical Decision Making  Problems Addressed:  Chronic pelvic pain in female: chronic illness or injury with exacerbation, progression, or side effects of treatment     Details: Chronic abdominal pain, non surgical abdomin.  Just saw OB for same.  No associated symptoms.  Declining any US and pain medications here despite repeated offers.     Amount and/or Complexity of Data Reviewed  Independent Historian:      Details: Lucky Antshauna [id number]  External Data Reviewed: notes.             Medications - No data to display    ED Risk Strat Scores                                               History of Present Illness       Chief Complaint   Patient presents with    Abdominal Pain     Us last week \"or something they say I have cyst.\" Pt has LLQ pain denies n/v/d.        Past Medical History:   Diagnosis Date    Abnormal Pap smear of cervix     2015 cryosurgery; 6/2019 NILM pap/neg HPV    Allergic     Depression     Headache(784.0)     Migraine with aura       Past Surgical History:   Procedure Laterality Date    CHOLECYSTECTOMY LAPAROSCOPIC N/A 10/27/2020    Procedure: CHOLECYSTECTOMY LAPAROSCOPIC;  Surgeon: Lina Narayan MD;  Location:  MAIN OR;  Service: General    COLONOSCOPY      FL LUMBAR PUNCTURE DIAGNOSTIC  11/17/2023    GYNECOLOGIC CRYOSURGERY  2015    HYSTERECTOMY  2016    supracervical    LAPAROSCOPY  2018    MA LAPAROSCOPY W/LYSIS OF ADHESIONS N/A 03/03/2020    Procedure: L.O.A.;  Surgeon: Noy Paz MD;  Location: [facility];  Service: " Gynecology    ME LAPS ABD PRTM&OMENTUM DX W/WO SPEC BR/WA SPX N/A 08/09/2019    Procedure: LAPAROSCOPY DIAGNOSTIC; LYSIS OF ADHESIONS;  Surgeon: Abebe Walls MD;  Location: [facility];  Service: Gynecology    TUBAL LIGATION      UPPER GASTROINTESTINAL ENDOSCOPY        Family History   Problem Relation Age of Onset    Cancer Paternal Grandmother         uterine    Cancer Paternal Aunt         uterine     Migraines Mother     Arthritis Mother     Hypertension Father     Breast cancer Neg Hx     Colon cancer Neg Hx     Ovarian cancer Neg Hx       Social History     Tobacco Use    Smoking status: Every Day     Current packs/day: 0.50     Average packs/day: 0.5 packs/day for 15.0 years (7.5 ttl pk-yrs)     Types: Cigarettes     Passive exposure: Current    Smokeless tobacco: Never   Vaping Use    Vaping status: Never Used   Substance Use Topics    Alcohol use: Never    Drug use: Never      E-Cigarette/Vaping    E-Cigarette Use Never User       E-Cigarette/Vaping Substances    Nicotine No     THC No     CBD No     Flavoring No     Other No     Unknown No       I have reviewed and agree with the history as documented.     Patient is a 39-year-old female with a h/o chronic abdominal pain, multiple abdominal surgeries including robotic assisted ANA and drainage of a cyst earlier this year.  States having pain in the left lq for the last 3 days.  Just saw OB for it.  Followed up with decreasing sized cyst on right.  Given motrin. States no relief.  No dysuria, no nvd.  No bleeding.  Offered patient another US and pain medications.  When patient asked if if will figure out what is causing her pain, explained could not, possible continued scar tissue.  At that point patient asked for discharge paperwork and to go home to contact her OBGYN tomorrow.  Again asked if I could provide anything for patient, declined.          Review of Systems   Constitutional: Negative.    HENT: Negative.     Eyes: Negative.     Respiratory: Negative.     Cardiovascular: Negative.    Gastrointestinal:  Positive for abdominal pain.   Endocrine: Negative.    Genitourinary: Negative.    Musculoskeletal: Negative.    Skin: Negative.    Allergic/Immunologic: Negative.    Neurological: Negative.    Hematological: Negative.    Psychiatric/Behavioral: Negative.     All other systems reviewed and are negative.          Objective       ED Triage Vitals [10/17/24 1829]   Temperature Pulse Blood Pressure Respirations SpO2 Patient Position - Orthostatic VS   98 °F (36.7 °C) 92 120/83 18 98 % Sitting      Temp Source Heart Rate Source BP Location FiO2 (%) Pain Score    Oral -- Left arm -- 7      Vitals      Date and Time Temp Pulse SpO2 Resp BP Pain Score FACES Pain Rating User   10/17/24 1829 98 °F (36.7 °C) 92 98 % 18 120/83 7 -- SN            Physical Exam  Vitals and nursing note reviewed.   Constitutional:       Appearance: She is well-developed. She is obese.   HENT:      Head: Normocephalic and atraumatic.   Cardiovascular:      Heart sounds: Normal heart sounds.   Pulmonary:      Effort: Pulmonary effort is normal.      Breath sounds: Normal breath sounds.   Abdominal:      General: Abdomen is flat. Bowel sounds are normal.      Palpations: Abdomen is soft.      Tenderness: There is abdominal tenderness in the left lower quadrant. There is no right CVA tenderness, left CVA tenderness, guarding or rebound. Negative signs include Lanza's sign, Rovsing's sign and McBurney's sign.      Hernia: No hernia is present.   Skin:     General: Skin is warm and dry.      Capillary Refill: Capillary refill takes less than 2 seconds.   Neurological:      General: No focal deficit present.      Mental Status: She is alert and oriented to person, place, and time.         Results Reviewed       None            No orders to display       Procedures    ED Medication and Procedure Management   Prior to Admission Medications   Prescriptions Last Dose Informant  Patient Reported? Taking?   Acetaminophen Extra Strength 500 MG TABS   Yes No   Patient not taking: Reported on 2024   Diclofenac Sodium (VOLTAREN) 1 %  Self No No   Sig: Apply 2 g topically 4 (four) times a day   Ex-Lax 15 MG CHEW   No No   Sig: CHEW 1 TABLET BY MOUTH TWICE DAILY AS NEEDED FOR CONSTIPATION   SUMAtriptan (IMITREX) 100 mg tablet  Self No No   Sig: Take 1 tablet (100 mg total) by mouth daily as needed for migraine   Senna Plus 8.6-50 MG per tablet   No No   Sig: TAKE 1 TABLET BY MOUTH DAILY   ascorbic acid (VITAMIN C) 500 mg tablet   No No   Sig: Take 1 tablet (500 mg total) by mouth daily   bisacodyl 5 MG EC tablet   No No   Sig: TAKE 2 TABLETS (10 MG TOTAL) BY MOUTH 2 (TWO) TIMES A DAY AS NEEDED FOR CONSTIPATION (CONSTIPATION) COLONOSCOPY PREP   Patient not taking: Reported on 10/14/2024   celecoxib (CeleBREX) 100 mg capsule   No No   Sig: TAKE 1 CAPSULE (100 MG TOTAL) BY MOUTH 2 (TWO) TIMES A DAY AS NEEDED FOR MILD PAIN   dexamethasone (DECADRON) 2 mg tablet   No No   Sig: TAKE 1 TABLET (2 MG TOTAL) BY MOUTH DAILY WITH BREAKFAST   dicyclomine (BENTYL) 20 mg tablet   No No   Sig: Take 1 tablet (20 mg total) by mouth 2 (two) times a day as needed (abdominal pain)   ergocalciferol (VITAMIN D2) 50,000 units   No No   Sig: Take 1 capsule (50,000 Units total) by mouth once a week   famotidine (PEPCID) 20 mg tablet   No No   Sig: Take 1 tablet (20 mg total) by mouth daily for 14 days   fluocinolone acetonide (DERMOTIC) 0.01 % otic oil  Self No No   Sig: INSTILL 3 DROPS IN BOTH EARS DAILY   fluticasone (FLONASE) 50 mcg/act nasal spray  Self No No   Si spray into each nostril 2 (two) times a day   Patient not taking: Reported on 3/20/2024   ibuprofen (MOTRIN) 800 mg tablet   Yes No   Sig: Take 800 mg by mouth every 8 (eight) hours as needed   lidocaine (LIDODERM) 5 %  Self No No   Sig: Apply 1 patch topically over 12 hours every 24 hours Remove & Discard patch within 12 hours or as directed by MD    meclizine (ANTIVERT) 25 mg tablet   Yes No   Sig: Take 25 mg by mouth Three times daily as needed   methocarbamol (ROBAXIN) 500 mg tablet   No No   Sig: Take 1 tablet (500 mg total) by mouth 2 (two) times a day as needed for muscle spasms   miconazole (MONISTAT-7) 2 % vaginal cream   No No   Sig: Insert 1 applicator into the vagina daily at bedtime   Patient not taking: Reported on 10/14/2024   naproxen (EC NAPROSYN) 500 MG EC tablet  Self Yes No   Sig: Take 500 mg by mouth if needed for mild pain   nicotine (NICODERM CQ) 14 mg/24hr TD 24 hr patch   No No   Sig: PLACE 1 PATCH ON THE SKIN OVER 24 HOURS EVERY 24 HOURS   nystatin-triamcinolone (MYCOLOG-II) cream  Self No No   Sig: Apply topically 2 (two) times a day   omeprazole (PriLOSEC) 40 MG capsule   No No   Sig: Take 1 capsule (40 mg total) by mouth daily   Patient not taking: Reported on 9/4/2024   oxyCODONE-acetaminophen (Percocet) 5-325 mg per tablet   No No   Sig: Take 1 tablet by mouth every 6 (six) hours as needed for moderate pain for up to 10 doses Max Daily Amount: 4 tablets   Patient not taking: Reported on 9/4/2024   pantoprazole (PROTONIX) 20 mg tablet   No No   Sig: TAKE 1 TABLET (20 MG TOTAL) BY MOUTH DAILY   polyethylene glycol (MIRALAX) 17 g packet  Self No No   Sig: Take 17 g by mouth daily   polyethylene glycol (MIRALAX) 17 g packet  Self No No   Sig: Take 17 g by mouth daily   prochlorperazine (COMPAZINE) 10 mg tablet  Self No No   Sig: Take 1 tablet (10 mg total) by mouth every 6 (six) hours as needed for nausea or vomiting   sodium chloride 0.9 % nebulizer solution  Self No No   Sig: TAKE 3ML BY BEBULIZATION EVERY 6 HOURS AS NEEDED FOR WHEEZING OR SHORTNESS OF BREATH   Patient not taking: Reported on 9/4/2024   sucralfate (CARAFATE) 1 g/10 mL suspension   No No   Sig: Take 10 mL (1 g total) by mouth 3 (three) times a day for 7 days   vitamin B-12 (VITAMIN B-12) 1,000 mcg tablet   No No   Sig: Take 1 tablet (1,000 mcg total) by mouth daily       Facility-Administered Medications: None     Discharge Medication List as of 10/17/2024  7:06 PM        CONTINUE these medications which have NOT CHANGED    Details   Acetaminophen Extra Strength 500 MG TABS Historical Med      ascorbic acid (VITAMIN C) 500 mg tablet Take 1 tablet (500 mg total) by mouth daily, Starting Thu 8/1/2024, Normal      bisacodyl 5 MG EC tablet TAKE 2 TABLETS (10 MG TOTAL) BY MOUTH 2 (TWO) TIMES A DAY AS NEEDED FOR CONSTIPATION (CONSTIPATION) COLONOSCOPY PREP, Normal      celecoxib (CeleBREX) 100 mg capsule TAKE 1 CAPSULE (100 MG TOTAL) BY MOUTH 2 (TWO) TIMES A DAY AS NEEDED FOR MILD PAIN, Starting Wed 8/28/2024, Normal      dexamethasone (DECADRON) 2 mg tablet TAKE 1 TABLET (2 MG TOTAL) BY MOUTH DAILY WITH BREAKFAST, Starting Fri 5/3/2024, Normal      Diclofenac Sodium (VOLTAREN) 1 % Apply 2 g topically 4 (four) times a day, Starting Wed 11/22/2023, Normal      dicyclomine (BENTYL) 20 mg tablet Take 1 tablet (20 mg total) by mouth 2 (two) times a day as needed (abdominal pain), Starting Mon 10/14/2024, Normal      ergocalciferol (VITAMIN D2) 50,000 units Take 1 capsule (50,000 Units total) by mouth once a week, Starting Thu 8/1/2024, Normal      Ex-Lax 15 MG CHEW CHEW 1 TABLET BY MOUTH TWICE DAILY AS NEEDED FOR CONSTIPATION, Normal      famotidine (PEPCID) 20 mg tablet Take 1 tablet (20 mg total) by mouth daily for 14 days, Starting Sun 5/26/2024, Until Sun 6/9/2024, Normal      fluocinolone acetonide (DERMOTIC) 0.01 % otic oil INSTILL 3 DROPS IN BOTH EARS DAILY, Normal      fluticasone (FLONASE) 50 mcg/act nasal spray 1 spray into each nostril 2 (two) times a day, Starting Thu 10/19/2023, Normal      ibuprofen (MOTRIN) 800 mg tablet Take 800 mg by mouth every 8 (eight) hours as needed, Starting Thu 6/6/2024, Historical Med      lidocaine (LIDODERM) 5 % Apply 1 patch topically over 12 hours every 24 hours Remove & Discard patch within 12 hours or as directed by MD, Starting Wed  11/22/2023, Print      meclizine (ANTIVERT) 25 mg tablet Take 25 mg by mouth Three times daily as needed, Starting Mon 6/3/2024, Until Tue 6/3/2025 at 2359, Historical Med      methocarbamol (ROBAXIN) 500 mg tablet Take 1 tablet (500 mg total) by mouth 2 (two) times a day as needed for muscle spasms, Starting Thu 8/1/2024, Normal      miconazole (MONISTAT-7) 2 % vaginal cream Insert 1 applicator into the vagina daily at bedtime, Starting Fri 9/27/2024, Normal      naproxen (EC NAPROSYN) 500 MG EC tablet Take 500 mg by mouth if needed for mild pain, Historical Med      nicotine (NICODERM CQ) 14 mg/24hr TD 24 hr patch PLACE 1 PATCH ON THE SKIN OVER 24 HOURS EVERY 24 HOURS, Starting Mon 8/5/2024, Normal      nystatin-triamcinolone (MYCOLOG-II) cream Apply topically 2 (two) times a day, Starting Thu 9/14/2023, Normal      omeprazole (PriLOSEC) 40 MG capsule Take 1 capsule (40 mg total) by mouth daily, Starting Thu 8/1/2024, Normal      oxyCODONE-acetaminophen (Percocet) 5-325 mg per tablet Take 1 tablet by mouth every 6 (six) hours as needed for moderate pain for up to 10 doses Max Daily Amount: 4 tablets, Starting Sat 5/18/2024, Normal      pantoprazole (PROTONIX) 20 mg tablet TAKE 1 TABLET (20 MG TOTAL) BY MOUTH DAILY, Starting Mon 8/26/2024, Until Sat 2/22/2025, Normal      polyethylene glycol (MIRALAX) 17 g packet Take 17 g by mouth daily, Starting Tue 1/2/2024, Until Sun 6/30/2024, Normal      polyethylene glycol (MIRALAX) 17 g packet Take 17 g by mouth daily, Starting Tue 1/30/2024, Until Sun 7/28/2024, Normal      prochlorperazine (COMPAZINE) 10 mg tablet Take 1 tablet (10 mg total) by mouth every 6 (six) hours as needed for nausea or vomiting, Starting Wed 9/6/2023, Normal      Senna Plus 8.6-50 MG per tablet TAKE 1 TABLET BY MOUTH DAILY, Starting Mon 7/22/2024, Normal      sodium chloride 0.9 % nebulizer solution TAKE 3ML BY BEBULIZATION EVERY 6 HOURS AS NEEDED FOR WHEEZING OR SHORTNESS OF BREATH, Normal       sucralfate (CARAFATE) 1 g/10 mL suspension Take 10 mL (1 g total) by mouth 3 (three) times a day for 7 days, Starting Sun 5/26/2024, Until Sun 6/2/2024, Normal      SUMAtriptan (IMITREX) 100 mg tablet Take 1 tablet (100 mg total) by mouth daily as needed for migraine, Starting Thu 11/2/2023, Normal      vitamin B-12 (VITAMIN B-12) 1,000 mcg tablet Take 1 tablet (1,000 mcg total) by mouth daily, Starting Thu 8/1/2024, Normal           No discharge procedures on file.  ED SEPSIS DOCUMENTATION   Time reflects when diagnosis was documented in both MDM as applicable and the Disposition within this note       Time User Action Codes Description Comment    10/17/2024  7:05 PM Eddie Martínez Add [R10.2,  G89.29] Chronic pelvic pain in female                  Eddie Martínez MD  10/17/24 1914

## 2024-10-17 NOTE — TELEPHONE ENCOUNTER
Veronique and I spoke to pt states she Is having a lot of left sided pain due to the cyst they told her she had. Per Terra Brooks she was advised that nothing is Gyn related and she should follow up with her pcp.

## 2024-10-21 DIAGNOSIS — M54.9 BACK PAIN: ICD-10-CM

## 2024-10-21 RX ORDER — CELECOXIB 100 MG/1
100 CAPSULE ORAL 2 TIMES DAILY PRN
Qty: 60 CAPSULE | Refills: 2 | Status: SHIPPED | OUTPATIENT
Start: 2024-10-21

## 2024-10-22 LAB
LAB AP GYN PRIMARY INTERPRETATION: NORMAL
Lab: NORMAL

## 2024-10-25 ENCOUNTER — APPOINTMENT (OUTPATIENT)
Dept: LAB | Facility: HOSPITAL | Age: 39
End: 2024-10-25
Payer: COMMERCIAL

## 2024-10-25 DIAGNOSIS — N73.9 FEMALE PELVIC INFLAMMATION: ICD-10-CM

## 2024-10-25 PROCEDURE — 87591 N.GONORRHOEAE DNA AMP PROB: CPT

## 2024-10-25 PROCEDURE — 87661 TRICHOMONAS VAGINALIS AMPLIF: CPT

## 2024-10-25 PROCEDURE — 87491 CHLMYD TRACH DNA AMP PROBE: CPT

## 2024-10-27 LAB — T VAGINALIS RRNA SPEC QL NAA+PROBE: NEGATIVE

## 2024-10-28 LAB
C TRACH DNA SPEC QL NAA+PROBE: NEGATIVE
N GONORRHOEA DNA SPEC QL NAA+PROBE: NEGATIVE

## 2024-11-07 ENCOUNTER — OFFICE VISIT (OUTPATIENT)
Dept: NEUROLOGY | Facility: CLINIC | Age: 39
End: 2024-11-07
Payer: COMMERCIAL

## 2024-11-07 VITALS
TEMPERATURE: 98 F | WEIGHT: 157.5 LBS | OXYGEN SATURATION: 99 % | BODY MASS INDEX: 28.98 KG/M2 | HEART RATE: 95 BPM | HEIGHT: 62 IN | SYSTOLIC BLOOD PRESSURE: 116 MMHG | DIASTOLIC BLOOD PRESSURE: 78 MMHG

## 2024-11-07 DIAGNOSIS — E53.8 VITAMIN B 12 DEFICIENCY: Chronic | ICD-10-CM

## 2024-11-07 DIAGNOSIS — G43.109 MIGRAINE WITH AURA AND WITHOUT STATUS MIGRAINOSUS, NOT INTRACTABLE: Primary | Chronic | ICD-10-CM

## 2024-11-07 DIAGNOSIS — R93.89 ABNORMAL MRI: ICD-10-CM

## 2024-11-07 PROCEDURE — 99214 OFFICE O/P EST MOD 30 MIN: CPT | Performed by: PSYCHIATRY & NEUROLOGY

## 2024-11-07 RX ORDER — METOCLOPRAMIDE 10 MG/1
10 TABLET ORAL
Status: DISCONTINUED | OUTPATIENT
Start: 2024-11-07 | End: 2024-11-22

## 2024-11-07 NOTE — ASSESSMENT & PLAN NOTE
Patient with a history of migraine with aura secondary to stress with good response to sumatriptan in the past.  Today patient reports having up to 3 migraines in a month each lasting up to 4 days.  She has been taking Reglan alongside sumatriptan    Plan:  Continue sumatriptan 100 mg  Reglan 10 mg prescribed    Orders:    metoclopramide (Reglan) 10 mg tablet; Take 1 tablet (10 mg total) by mouth 3 (three) times a day as needed (migraine)

## 2024-11-07 NOTE — ASSESSMENT & PLAN NOTE
MRI brain with and without contrast that showed a few scattered foci of T2/FLAIR hyperintensity in the periventricular and subcortical white matter, however there was an unusual left parietal white matter change that was larger with a T1 weighted correlate.  I ordered a follow-up MRI C-spine to evaluate for any potential T2 layer changes of the spine.  This was normal.  Follow-up 6 months MRI brain MS protocol without any new findings, stable.     1 year follow-up ordered  Orders:    MRI brain MS wo and w contrast; Future

## 2024-11-07 NOTE — PROGRESS NOTES
Ambulatory Visit  Name: Lorrie Jones      : 1985      MRN: [id number]  Encounter Provider: Aye Guevara MD  Encounter Date: 2024   [facility]    Assessment & Plan  Migraine with aura and without status migrainosus, not intractable  Patient with a history of migraine with aura secondary to stress with good response to sumatriptan in the past.  Today patient reports having up to 3 migraines in a month each lasting up to 4 days.  She has been taking Reglan alongside sumatriptan    Plan:  Continue sumatriptan 100 mg  Reglan 10 mg prescribed    Orders:    metoclopramide (Reglan) 10 mg tablet; Take 1 tablet (10 mg total) by mouth 3 (three) times a day as needed (migraine)    Abnormal MRI  MRI brain with and without contrast that showed a few scattered foci of T2/FLAIR hyperintensity in the periventricular and subcortical white matter, however there was an unusual left parietal white matter change that was larger with a T1 weighted correlate.  I ordered a follow-up MRI C-spine to evaluate for any potential T2 layer changes of the spine.  This was normal.  Follow-up 6 months MRI brain MS protocol without any new findings, stable.     1 year follow-up ordered  Orders:    MRI brain MS wo and w contrast; Future    Vitamin B 12 deficiency  Repeat B12 ordered  Orders:    Vitamin B12; Future    cyanocobalamin injection 1,000 mcg        History of Present Illness   HPI    Lorrie Jones is a very pleasant  39 y.o. female with a past medical history that includes choledocholithiasis s/p cholescystectomy, hydrosalpinx, GERD, gastritis, hypoglycemia who presents for follow up of headache and abnormal MRI.     Initial visit (2023):    She reports having a unilateral throbbing pain with migrainous features that has become more severe over the past six months. Prior imaging includes a normal CTH done in february. I did note percocet being  part of her medication list. She reports she uses it sparingly. I reviewed PDMP and she does not get frequent refills of this therefore less suspicion for overuse headache. She was prescribed sumatriptan by other provider which does help her headaches. At this time she does not have more than 15 headaches days in a month so does not warrant PPX. Will focus on ruling out reversible etiologies and abortive regimen. Given change in nature of headache I ordered an MRI brain with and without contrast         Prior visits:     10/5/23: I called and I discussed MRI results with patient after review with MS specialist.  Left parietal region flair changes with unusual characteristics for microangiopathic changes.  It also has corresponding T1 weighted black hole lesions.  Further work-up suggested by MS specialist including work-up for inflammatory causes, ischemia and vasculitis.  Patient denies any focal weakness, ambulatory difficulties, urinary incontinence for visual disturbances. At this time I will order blood work to rule out other diseases that can cause T2 flair changes.  I will order an MRI C-spine with and without contrast to evaluate for spinal cord lesions.  I will have patient follow-up with me sooner, after MRI C-spine is done to review available work-up together. Will discuss with patient B 12 injections given recent level of 189. Future plan is to repeat MRI brain MS protocol with and without contrast in 6 months.  Will consider ordering a CTA to screen for vasculitis.  Pending work-up, may consider a lumbar puncture.      11/2/23:  Today she reports sumatriptan helps but at times can have headaches that last up to 4 days. Also reports a newer headache that is stabbing in nature, lasting 20-30 minutes occurring 2-3 times in a day. She has been undergoing significant stress since I last saw her which is likely contributing to her headaches. She dose report is still is headache free most days of the month.   Repeat MRI brain MS protocol w/wo 6 months from prior to evaluate for any evolution of signal. LP ordered. EMG ordered for neck pain.  B12 189.  Plan for repletion.     11/20/23: Spoke to patient on the phone reports having a stabbing pain in lower back radiating down L3-L4 distribution on the L. Reports she felt sx immediately after needle insertion for the lumbar puncture on Friday. Patient likely with irritation of the nerve from procedure. Sent decadron 2mg X 5 days to treat radiculitis. Advised her to let me know how she is doing next week    3/20/24: Today patient reports that she currently has a headache. She had a breakthrough headache after her MRI was done but otherwise she has been stable. She reports decreased stress as she was able to find a place to move.      Headache frequency:  9/19/2023: Unsure, can be many weeks without, but when she gets them, can last several days    11/2/2023: She reports sumatriptan helps however as of late has been having headaches that can last up to 4 days. She has been getting episodes of a stabbing pain that lasts 20-30 minutes. This can occur 2-3 times in a day  3/20/2024:Breakthrough headache after MRI done. Other wise her headaches are under control   11/7/2024: Can have up to 3 headaches in a month, each lasting up to 4 days      Workup:     MRI brain with and without contrast (9/28/2023: A few scattered foci of T2/FLAIR hyperintensity noted in the periventricular and subcortical white matter. Finding is nonspecific with broad differential considerations (given patient's young age). Main differentials include: complex migraines,   precocious microangiopathy, demyelinating disease, sequela of remote infectious or inflammatory etiology (such as Lyme's disease).         MRI C-spine with and without contrast (10/29/2023):  No intrinsic cord signal abnormality identified. No abnormal enhancement within the cervical spinal canal.    C4-C5: Mild bulge. No significant canal  stenosis or foraminal narrowing.  C5-C6: Central disc protrusion. Mild mass effect on the thecal sac. No significant foraminal narrowing.  C6-C7: Central disc protrusion. Mild mass effect on the thecal sac. No significant foraminal narrowing.  Probable atypical hemangioma in the C6 vertebral body     MRI brain MS protocol with and without contrast (3/13/2024):Stable scattered foci of supratentorial T2/FLAIR hyperintensities without associated enhancement or restricted diffusion. Differential remains the same including complex migraines, precocious microangiopathic, demyelinating disease,   infectious/inflammatory process.     NMO, MOG negative      Lumbar puncture (11/17/2023):    Latest Reference Range & Units 11/17/23 11:11   APPEARANCE CSF   clear and colorless   TUBE NUMBER CSF   4   XANTHOCHROMIA No  No   WBC CSF 0 - 5 /uL 0   GLUCOSE CSF 40 - 70 mg/dL 72 (H)   PROTEIN CSF 15 - 45 mg/dL 20   RBC CSF 0 - 10 uL 0   MYELIN BASIC PROTEIN CSF 0.0 - 3.7 ng/mL 1.5   IgG, CSF 0.0 - 6.7 mg/dL 1.0   IgG Index, CSF 0.0 - 0.7  0.6   IgG Synthetic Rate -9.9 TO +3.3 mg/day   -2.8   CSF/SERUM ALBUMIN ALB.INDEX 0 - 8  2   OLIGOCLONAL BANDS   0   CSF ALBUMIN 7 - 29 mg/dL 8   CONCHA, BODY FLUID   Rpt   (H): Data is abnormally high  Rpt: View report in Results Review for more information     EMG (3/8/22):  1) Normal right median and ulnar motor conduction studies; normal left median motor conduction study   2) Normal right median and ulnar F waves   3) Normal right median, ulnar, and superficial radial sensory conduction studies; normal left median sensory conduction study   4) EMG of the right arm reveals evidence of minimal chronic denervation most in keeping with a right C7 root distribution      Interval history:  Taking Reglan together with sumatriptan   No new FND   Nerivio not coverred by insurance         Review of Systems  I have personally reviewed the MA's review of systems and made changes as necessary.      Objective  "    /78   Pulse 95   Temp 98 °F (36.7 °C) (Temporal)   Ht 5' 2\" (1.575 m)   Wt 71.4 kg (157 lb 8 oz)   SpO2 99%   BMI 28.81 kg/m²     Physical Exam  Neurologic Exam          "

## 2024-11-11 ENCOUNTER — OFFICE VISIT (OUTPATIENT)
Dept: FAMILY MEDICINE CLINIC | Facility: CLINIC | Age: 39
End: 2024-11-11

## 2024-11-11 VITALS
WEIGHT: 155.8 LBS | DIASTOLIC BLOOD PRESSURE: 70 MMHG | BODY MASS INDEX: 28.67 KG/M2 | TEMPERATURE: 96.8 F | HEART RATE: 109 BPM | OXYGEN SATURATION: 99 % | HEIGHT: 62 IN | SYSTOLIC BLOOD PRESSURE: 102 MMHG | RESPIRATION RATE: 16 BRPM

## 2024-11-11 DIAGNOSIS — G89.29 CHRONIC PAIN OF LEFT KNEE: Primary | Chronic | ICD-10-CM

## 2024-11-11 DIAGNOSIS — M25.562 CHRONIC PAIN OF LEFT KNEE: Primary | Chronic | ICD-10-CM

## 2024-11-11 DIAGNOSIS — E53.8 VITAMIN B 12 DEFICIENCY: Chronic | ICD-10-CM

## 2024-11-11 DIAGNOSIS — R10.2 CHRONIC PELVIC PAIN IN FEMALE: Chronic | ICD-10-CM

## 2024-11-11 DIAGNOSIS — Z00.00 HEALTHCARE MAINTENANCE: ICD-10-CM

## 2024-11-11 DIAGNOSIS — K58.1 IRRITABLE BOWEL SYNDROME WITH CONSTIPATION: ICD-10-CM

## 2024-11-11 DIAGNOSIS — K21.9 GASTROESOPHAGEAL REFLUX DISEASE WITHOUT ESOPHAGITIS: Chronic | ICD-10-CM

## 2024-11-11 DIAGNOSIS — R93.89 ABNORMAL MRI: ICD-10-CM

## 2024-11-11 DIAGNOSIS — G89.29 CHRONIC PELVIC PAIN IN FEMALE: Chronic | ICD-10-CM

## 2024-11-11 DIAGNOSIS — Z13.1 SCREENING FOR DIABETES MELLITUS: ICD-10-CM

## 2024-11-11 DIAGNOSIS — E55.9 VITAMIN D DEFICIENCY: Chronic | ICD-10-CM

## 2024-11-11 DIAGNOSIS — G43.109 MIGRAINE WITH AURA AND WITHOUT STATUS MIGRAINOSUS, NOT INTRACTABLE: Chronic | ICD-10-CM

## 2024-11-11 PROCEDURE — 99214 OFFICE O/P EST MOD 30 MIN: CPT | Performed by: PHYSICIAN ASSISTANT

## 2024-11-11 NOTE — ASSESSMENT & PLAN NOTE
-Much improved since undergoing surgery in April 2024.  -Reviewed gynecology oncology note from 6/6/2024. Patient has had multiple abdominal surgeries done in the past.  Most recently underwent surgery due to persistent left adnexal cyst.  Patient underwent robotic assisted laparoscopic extensive lysis of adhesion, left salpingo-oophorectomy, cystoscopy with placement of bilateral ureteral catheters on 4/23/2024.  Surgery was performed along with colorectal surgery as well as urology given complexity of the case.  -Postoperatively patient had a pelvic fluid collection which likely represented seroma/peritoneal inclusion cyst.  Conservative management with CT-guided aspiration was successfully performed on 5/20/2024.  - Repeat CT scan on 6/3/2024 shows no acute abnormality.  - At this time, gynecology oncology does not recommend any additional surgical intervention and should follow-up regularly with gynecology.  - Patient now experiencing some recurrent pelvic pain.   -Reviewed OB/GYN notes from 10/15/2024 and 10/24/2024.  Patient with benign appearing right adnexal cystic mass, decrease in size.  Pain mostly on the left.  Abnormal granulation tissue in the cuff likely contributor.  This was treated with silver nitrate at time of visit.  Patient referred to Dr. Cleary pelvic pain program.

## 2024-11-11 NOTE — PATIENT INSTRUCTIONS
[facility]   Please contact us at [phone] to schedule your appointment.    Please complete your blood work at earliest convenience. Thanks!

## 2024-11-11 NOTE — ASSESSMENT & PLAN NOTE
-Reviewed gastroenterology note from 10/14/2024.  - Patient having regular bowel movements and less bloating/abdominal distention since undergoing left salpingo-oophorectomy.  - Continue bisacodyl 10 mg as needed for constipation.  - Continue dicyclomine as needed for abdominal pain.

## 2024-11-11 NOTE — ASSESSMENT & PLAN NOTE
-Reviewed neurology note from 11/7/2024.  Continue taking Reglan and sumatriptan together as needed for headaches.  - Continue nerivio, although patient reports her insurance does not cover it.   - Continue follow-up with neurology as scheduled.

## 2024-11-11 NOTE — PROGRESS NOTES
Ambulatory Visit  Name: Lorrie Jones      : 1985      MRN: [id number]  Encounter Provider: Margarita Walker PA-C  Encounter Date: 2024   Encounter department: [facility]    Assessment & Plan  Chronic pain of left knee  - Reviewed MRI left knee without contrast (3/1/23) which showed: No meniscal tear or other internal derangement.  -Patient had previously established with orthopedics and underwent CSI on 3/23/2023 with no relief.  Patient then completed physical therapy for about 3 sessions.  -Pain has now returned.  Recommend schedule follow-up appointment with orthopedics.  - Advised to take Tylenol or ibuprofen as needed for pain.   - Advised to apply ice/ heating pad as needed to the affected area.     Orders:    Ambulatory Referral to Orthopedic Surgery; Future    Vitamin D deficiency    Orders:    Vitamin D 25 hydroxy; Future    Screening for diabetes mellitus    Orders:    Hemoglobin A1C; Future    Healthcare maintenance    Orders:    CBC and differential; Future    Comprehensive metabolic panel; Future    Lipid panel; Future    TSH, 3rd generation with Free T4 reflex; Future    Ferritin; Future    TIBC Panel (incl. Iron, TIBC, % Iron Saturation); Future    Abnormal MRI  - Reviewed neurology note from 2024.  MRI brain MS with and without contrast ordered for 1 year follow up for white matter abnormality.          Migraine with aura and without status migrainosus, not intractable  -Reviewed neurology note from 2024.  Continue taking Reglan and sumatriptan together as needed for headaches.  - Continue nerivio, although patient reports her insurance does not cover it.   - Continue follow-up with neurology as scheduled.         Vitamin B 12 deficiency  -Previously completed vitamin B12 injections through neurology.  - Continue vitamin B12 daily supplement.         Irritable bowel syndrome with constipation  -Reviewed gastroenterology note from  10/14/2024.  - Patient having regular bowel movements and less bloating/abdominal distention since undergoing left salpingo-oophorectomy.  - Continue bisacodyl 10 mg as needed for constipation.  - Continue dicyclomine as needed for abdominal pain.         Gastroesophageal reflux disease without esophagitis  -Patient underwent EGD in January 2024.  Biopsies are positive for acid irritation.  -Reviewed gastroenterology note from 10/14/2024.  - Continue pantoprazole 20 mg daily.  - Continue to avoid trigger foods.         Chronic pelvic pain in female  -Much improved since undergoing surgery in April 2024.  -Reviewed gynecology oncology note from 6/6/2024. Patient has had multiple abdominal surgeries done in the past.  Most recently underwent surgery due to persistent left adnexal cyst.  Patient underwent robotic assisted laparoscopic extensive lysis of adhesion, left salpingo-oophorectomy, cystoscopy with placement of bilateral ureteral catheters on 4/23/2024.  Surgery was performed along with colorectal surgery as well as urology given complexity of the case.  -Postoperatively patient had a pelvic fluid collection which likely represented seroma/peritoneal inclusion cyst.  Conservative management with CT-guided aspiration was successfully performed on 5/20/2024.  - Repeat CT scan on 6/3/2024 shows no acute abnormality.  - At this time, gynecology oncology does not recommend any additional surgical intervention and should follow-up regularly with gynecology.  - Patient now experiencing some recurrent pelvic pain.   -Reviewed OB/GYN notes from 10/15/2024 and 10/24/2024.  Patient with benign appearing right adnexal cystic mass, decrease in size.  Pain mostly on the left.  Abnormal granulation tissue in the cuff likely contributor.  This was treated with silver nitrate at time of visit.  Patient referred to Dr. Cleary pelvic pain program.         BMI Counseling: Body mass index is 28.5 kg/m². The BMI is above normal.  "Nutrition recommendations include decreasing portion sizes, encouraging healthy choices of fruits and vegetables, consuming healthier snacks, limiting drinks that contain sugar, moderation in carbohydrate intake, increasing intake of lean protein and reducing intake of cholesterol. Exercise recommendations include moderate physical activity 150 minutes/week. No pharmacotherapy was ordered. Rationale for BMI follow-up plan is due to patient being overweight or obese.       History of Present Illness         Patient is a 39 y.o. female whom  has a past medical history of Abnormal Pap smear of cervix, Allergic, Depression, Headache(784.0), and Migraine with aura. who is seen today in office for knee pain.    -Patient notes recently her left knee pain has returned.  Patient notes the pain is located \"inside\" the left knee and radiates up into her left thigh.  She denies any recent injury or trauma to the area.  Patient notes ice makes it worse, but using a heating pad and other topical therapies has been helpful.  Patient notes she previously went to physical therapy but this made the pain worse.    -Patient notes she does experience some shakiness when she does not eat for several hours.  Patient notes she was diagnosed with hypoglycemia Aleshia Rico in the past.      History obtained from : patient  Review of Systems   Constitutional:  Negative for chills and fever.   HENT:  Negative for congestion, ear pain and sore throat.    Eyes:  Negative for pain.   Respiratory:  Negative for cough, chest tightness, shortness of breath and wheezing.    Cardiovascular:  Negative for chest pain and palpitations.   Gastrointestinal:  Negative for abdominal pain, constipation, diarrhea, nausea and vomiting.   Genitourinary:  Negative for difficulty urinating, dysuria and pelvic pain.   Musculoskeletal:  Positive for arthralgias (left knee pain) and back pain. Negative for gait problem.   Skin:  Negative for rash.   Neurological:  " Positive for headaches (occasionally). Negative for dizziness and numbness.        Shakiness of hands when not eating for several hours     Pertinent Medical History     Medical History Reviewed by provider this encounter:  Tobacco  Allergies  Meds  Problems  Med Hx  Surg Hx  Fam Hx       Past Medical History   Past Medical History:   Diagnosis Date    Abnormal Pap smear of cervix     2015 cryosurgery; 6/2019 NILM pap/neg HPV    Allergic     Depression     Headache(784.0)     Migraine with aura      Past Surgical History:   Procedure Laterality Date    CHOLECYSTECTOMY LAPAROSCOPIC N/A 10/27/2020    Procedure: CHOLECYSTECTOMY LAPAROSCOPIC;  Surgeon: Lina Narayan MD;  Location:  MAIN OR;  Service: General    COLONOSCOPY      FL LUMBAR PUNCTURE DIAGNOSTIC  11/17/2023    GYNECOLOGIC CRYOSURGERY  2015    HYSTERECTOMY  2016    supracervical    LAPAROSCOPY  2018    [place] LAPAROSCOPY W/LYSIS OF ADHESIONS N/A 03/03/2020    Procedure: L.O.A.;  Surgeon: Noy Paz MD;  Location: [facility];  Service: Gynecology    [place] LAPS ABD PRTM&OMENTUM DX W/WO SPEC BR/WA SPX N/A 08/09/2019    Procedure: LAPAROSCOPY DIAGNOSTIC; LYSIS OF ADHESIONS;  Surgeon: Abebe Walls MD;  Location: [facility];  Service: Gynecology    TUBAL LIGATION      UPPER GASTROINTESTINAL ENDOSCOPY       Family History   Problem Relation Age of Onset    Cancer Paternal Grandmother         uterine    Cancer Paternal Aunt         uterine     Migraines Mother     Arthritis Mother     Hypertension Father     Breast cancer Neg Hx     Colon cancer Neg Hx     Ovarian cancer Neg Hx      Current Outpatient Medications on File Prior to Visit   Medication Sig Dispense Refill    ascorbic acid (VITAMIN C) 500 mg tablet Take 1 tablet (500 mg total) by mouth daily 90 tablet 1    celecoxib (CeleBREX) 100 mg capsule TAKE 1 CAPSULE (100 MG TOTAL) BY MOUTH 2 (TWO) TIMES A DAY AS NEEDED FOR MILD PAIN 60 capsule 2    dexamethasone (DECADRON) 2 mg tablet  TAKE 1 TABLET (2 MG TOTAL) BY MOUTH DAILY WITH BREAKFAST 5 tablet 0    Diclofenac Sodium (VOLTAREN) 1 % Apply 2 g topically 4 (four) times a day 50 g 0    dicyclomine (BENTYL) 20 mg tablet Take 1 tablet (20 mg total) by mouth 2 (two) times a day as needed (abdominal pain) 30 tablet 2    ergocalciferol (VITAMIN D2) 50,000 units Take 1 capsule (50,000 Units total) by mouth once a week 16 capsule 1    Ex-Lax 15 MG CHEW CHEW 1 TABLET BY MOUTH TWICE DAILY AS NEEDED FOR CONSTIPATION 60 tablet 1    fluocinolone acetonide (DERMOTIC) 0.01 % otic oil INSTILL 3 DROPS IN BOTH EARS DAILY 20 mL 0    ibuprofen (MOTRIN) 800 mg tablet Take 800 mg by mouth every 8 (eight) hours as needed      lidocaine (LIDODERM) 5 % Apply 1 patch topically over 12 hours every 24 hours Remove & Discard patch within 12 hours or as directed by MD 15 patch 0    meclizine (ANTIVERT) 25 mg tablet Take 25 mg by mouth Three times daily as needed      methocarbamol (ROBAXIN) 500 mg tablet Take 1 tablet (500 mg total) by mouth 2 (two) times a day as needed for muscle spasms 60 tablet 1    naproxen (EC NAPROSYN) 500 MG EC tablet Take 500 mg by mouth if needed for mild pain      nicotine (NICODERM CQ) 14 mg/24hr TD 24 hr patch PLACE 1 PATCH ON THE SKIN OVER 24 HOURS EVERY 24 HOURS 28 patch 2    nystatin-triamcinolone (MYCOLOG-II) cream Apply topically 2 (two) times a day 15 g 0    pantoprazole (PROTONIX) 20 mg tablet TAKE 1 TABLET (20 MG TOTAL) BY MOUTH DAILY 90 tablet 0    Senna Plus 8.6-50 MG per tablet TAKE 1 TABLET BY MOUTH DAILY 30 tablet 5    SUMAtriptan (IMITREX) 100 mg tablet Take 1 tablet (100 mg total) by mouth daily as needed for migraine 9 tablet 12    vitamin B-12 (VITAMIN B-12) 1,000 mcg tablet Take 1 tablet (1,000 mcg total) by mouth daily 90 tablet 1    Acetaminophen Extra Strength 500 MG TABS  (Patient not taking: Reported on 9/4/2024)      bisacodyl 5 MG EC tablet TAKE 2 TABLETS (10 MG TOTAL) BY MOUTH 2 (TWO) TIMES A DAY AS NEEDED FOR  "CONSTIPATION (CONSTIPATION) COLONOSCOPY PREP (Patient not taking: Reported on 10/14/2024) 60 tablet 0    famotidine (PEPCID) 20 mg tablet Take 1 tablet (20 mg total) by mouth daily for 14 days 14 tablet 0    fluticasone (FLONASE) 50 mcg/act nasal spray 1 spray into each nostril 2 (two) times a day (Patient not taking: Reported on 3/20/2024) 16 g 1    miconazole (MONISTAT-7) 2 % vaginal cream Insert 1 applicator into the vagina daily at bedtime (Patient not taking: Reported on 10/14/2024) 45 g 1    omeprazole (PriLOSEC) 40 MG capsule Take 1 capsule (40 mg total) by mouth daily (Patient not taking: Reported on 9/4/2024) 90 capsule 1    oxyCODONE-acetaminophen (Percocet) 5-325 mg per tablet Take 1 tablet by mouth every 6 (six) hours as needed for moderate pain for up to 10 doses Max Daily Amount: 4 tablets (Patient not taking: Reported on 9/4/2024) 10 tablet 0    polyethylene glycol (MIRALAX) 17 g packet Take 17 g by mouth daily 510 g 5    polyethylene glycol (MIRALAX) 17 g packet Take 17 g by mouth daily 510 g 5    sodium chloride 0.9 % nebulizer solution TAKE 3ML BY BEBULIZATION EVERY 6 HOURS AS NEEDED FOR WHEEZING OR SHORTNESS OF BREATH (Patient not taking: Reported on 9/4/2024) 60 mL 1    sucralfate (CARAFATE) 1 g/10 mL suspension Take 10 mL (1 g total) by mouth 3 (three) times a day for 7 days 210 mL 0     Current Facility-Administered Medications on File Prior to Visit   Medication Dose Route Frequency Provider Last Rate Last Admin    metoclopramide (REGLAN) tablet 10 mg  10 mg Oral TID AC          Allergies   Allergen Reactions    Shellfish-Derived Products - Food Allergy Itching and Swelling     \"seafood \"    Ibuprofen Other (See Comments)     Patient states she gets hematoma    Other Itching     Adhesive tape    Pregabalin Anxiety      Current Outpatient Medications on File Prior to Visit   Medication Sig Dispense Refill    ascorbic acid (VITAMIN C) 500 mg tablet Take 1 tablet (500 mg total) by mouth daily 90 " tablet 1    celecoxib (CeleBREX) 100 mg capsule TAKE 1 CAPSULE (100 MG TOTAL) BY MOUTH 2 (TWO) TIMES A DAY AS NEEDED FOR MILD PAIN 60 capsule 2    dexamethasone (DECADRON) 2 mg tablet TAKE 1 TABLET (2 MG TOTAL) BY MOUTH DAILY WITH BREAKFAST 5 tablet 0    Diclofenac Sodium (VOLTAREN) 1 % Apply 2 g topically 4 (four) times a day 50 g 0    dicyclomine (BENTYL) 20 mg tablet Take 1 tablet (20 mg total) by mouth 2 (two) times a day as needed (abdominal pain) 30 tablet 2    ergocalciferol (VITAMIN D2) 50,000 units Take 1 capsule (50,000 Units total) by mouth once a week 16 capsule 1    Ex-Lax 15 MG CHEW CHEW 1 TABLET BY MOUTH TWICE DAILY AS NEEDED FOR CONSTIPATION 60 tablet 1    fluocinolone acetonide (DERMOTIC) 0.01 % otic oil INSTILL 3 DROPS IN BOTH EARS DAILY 20 mL 0    ibuprofen (MOTRIN) 800 mg tablet Take 800 mg by mouth every 8 (eight) hours as needed      lidocaine (LIDODERM) 5 % Apply 1 patch topically over 12 hours every 24 hours Remove & Discard patch within 12 hours or as directed by MD 15 patch 0    meclizine (ANTIVERT) 25 mg tablet Take 25 mg by mouth Three times daily as needed      methocarbamol (ROBAXIN) 500 mg tablet Take 1 tablet (500 mg total) by mouth 2 (two) times a day as needed for muscle spasms 60 tablet 1    naproxen (EC NAPROSYN) 500 MG EC tablet Take 500 mg by mouth if needed for mild pain      nicotine (NICODERM CQ) 14 mg/24hr TD 24 hr patch PLACE 1 PATCH ON THE SKIN OVER 24 HOURS EVERY 24 HOURS 28 patch 2    nystatin-triamcinolone (MYCOLOG-II) cream Apply topically 2 (two) times a day 15 g 0    pantoprazole (PROTONIX) 20 mg tablet TAKE 1 TABLET (20 MG TOTAL) BY MOUTH DAILY 90 tablet 0    Senna Plus 8.6-50 MG per tablet TAKE 1 TABLET BY MOUTH DAILY 30 tablet 5    SUMAtriptan (IMITREX) 100 mg tablet Take 1 tablet (100 mg total) by mouth daily as needed for migraine 9 tablet 12    vitamin B-12 (VITAMIN B-12) 1,000 mcg tablet Take 1 tablet (1,000 mcg total) by mouth daily 90 tablet 1     Acetaminophen Extra Strength 500 MG TABS  (Patient not taking: Reported on 9/4/2024)      bisacodyl 5 MG EC tablet TAKE 2 TABLETS (10 MG TOTAL) BY MOUTH 2 (TWO) TIMES A DAY AS NEEDED FOR CONSTIPATION (CONSTIPATION) COLONOSCOPY PREP (Patient not taking: Reported on 10/14/2024) 60 tablet 0    famotidine (PEPCID) 20 mg tablet Take 1 tablet (20 mg total) by mouth daily for 14 days 14 tablet 0    fluticasone (FLONASE) 50 mcg/act nasal spray 1 spray into each nostril 2 (two) times a day (Patient not taking: Reported on 3/20/2024) 16 g 1    miconazole (MONISTAT-7) 2 % vaginal cream Insert 1 applicator into the vagina daily at bedtime (Patient not taking: Reported on 10/14/2024) 45 g 1    omeprazole (PriLOSEC) 40 MG capsule Take 1 capsule (40 mg total) by mouth daily (Patient not taking: Reported on 9/4/2024) 90 capsule 1    oxyCODONE-acetaminophen (Percocet) 5-325 mg per tablet Take 1 tablet by mouth every 6 (six) hours as needed for moderate pain for up to 10 doses Max Daily Amount: 4 tablets (Patient not taking: Reported on 9/4/2024) 10 tablet 0    polyethylene glycol (MIRALAX) 17 g packet Take 17 g by mouth daily 510 g 5    polyethylene glycol (MIRALAX) 17 g packet Take 17 g by mouth daily 510 g 5    sodium chloride 0.9 % nebulizer solution TAKE 3ML BY BEBULIZATION EVERY 6 HOURS AS NEEDED FOR WHEEZING OR SHORTNESS OF BREATH (Patient not taking: Reported on 9/4/2024) 60 mL 1    sucralfate (CARAFATE) 1 g/10 mL suspension Take 10 mL (1 g total) by mouth 3 (three) times a day for 7 days 210 mL 0     Current Facility-Administered Medications on File Prior to Visit   Medication Dose Route Frequency Provider Last Rate Last Admin    metoclopramide (REGLAN) tablet 10 mg  10 mg Oral TID AC           Social History     Tobacco Use    Smoking status: Every Day     Current packs/day: 0.50     Average packs/day: 0.5 packs/day for 15.0 years (7.5 ttl pk-yrs)     Types: Cigarettes     Passive exposure: Current    Smokeless tobacco:  "Never   Vaping Use    Vaping status: Never Used   Substance and Sexual Activity    Alcohol use: Never    Drug use: Never    Sexual activity: Yes     Partners: Male     Birth control/protection: Female Sterilization, None         Objective     /70 (BP Location: Left arm, Patient Position: Sitting, Cuff Size: Standard)   Pulse (!) 109   Temp (!) 96.8 °F (36 °C) (Temporal)   Resp 16   Ht 5' 2\" (1.575 m)   Wt 70.7 kg (155 lb 12.8 oz)   SpO2 99%   BMI 28.50 kg/m²     Physical Exam  Vitals and nursing note reviewed.   Constitutional:       General: She is not in acute distress.     Appearance: She is well-developed.   HENT:      Head: Normocephalic and atraumatic.      Right Ear: External ear normal.      Left Ear: External ear normal.      Nose: Nose normal.      Mouth/Throat:      Pharynx: Uvula midline.   Eyes:      Conjunctiva/sclera: Conjunctivae normal.   Cardiovascular:      Rate and Rhythm: Normal rate and regular rhythm.      Pulses: Normal pulses.      Heart sounds: Normal heart sounds. No murmur heard.  Pulmonary:      Effort: Pulmonary effort is normal. No respiratory distress.      Breath sounds: Normal breath sounds. No wheezing.   Musculoskeletal:      Cervical back: Normal range of motion and neck supple.   Skin:     General: Skin is warm.   Neurological:      Mental Status: She is alert and oriented to person, place, and time.   Psychiatric:         Speech: Speech normal.         Behavior: Behavior normal.       Administrative Statements   I have spent a total time of 30 minutes in caring for this patient on the day of the visit/encounter including Diagnostic results, Prognosis, Risks and benefits of tx options, Instructions for management, Patient and family education, Importance of tx compliance, Risk factor reductions, Impressions, Counseling / Coordination of care, Documenting in the medical record, Reviewing / ordering tests, medicine, procedures  , and Obtaining or reviewing history  " .

## 2024-11-11 NOTE — ASSESSMENT & PLAN NOTE
- Reviewed neurology note from 11/7/2024.  MRI brain MS with and without contrast ordered for 1 year follow up for white matter abnormality.

## 2024-11-11 NOTE — ASSESSMENT & PLAN NOTE
-Patient underwent EGD in January 2024.  Biopsies are positive for acid irritation.  -Reviewed gastroenterology note from 10/14/2024.  - Continue pantoprazole 20 mg daily.  - Continue to avoid trigger foods.

## 2024-11-11 NOTE — ASSESSMENT & PLAN NOTE
-Previously completed vitamin B12 injections through neurology.  - Continue vitamin B12 daily supplement.

## 2024-11-11 NOTE — ASSESSMENT & PLAN NOTE
- Reviewed MRI left knee without contrast (3/1/23) which showed: No meniscal tear or other internal derangement.  -Patient had previously established with orthopedics and underwent CSI on 3/23/2023 with no relief.  Patient then completed physical therapy for about 3 sessions.  -Pain has now returned.  Recommend schedule follow-up appointment with orthopedics.  - Advised to take Tylenol or ibuprofen as needed for pain.   - Advised to apply ice/ heating pad as needed to the affected area.     Orders:    Ambulatory Referral to Orthopedic Surgery; Future

## 2024-11-12 ENCOUNTER — APPOINTMENT (OUTPATIENT)
Dept: LAB | Facility: HOSPITAL | Age: 39
End: 2024-11-12
Payer: COMMERCIAL

## 2024-11-12 DIAGNOSIS — E53.8 VITAMIN B 12 DEFICIENCY: Chronic | ICD-10-CM

## 2024-11-12 DIAGNOSIS — Z13.1 SCREENING FOR DIABETES MELLITUS: ICD-10-CM

## 2024-11-12 DIAGNOSIS — E55.9 VITAMIN D DEFICIENCY: Chronic | ICD-10-CM

## 2024-11-12 DIAGNOSIS — Z00.00 HEALTHCARE MAINTENANCE: ICD-10-CM

## 2024-11-12 LAB
25(OH)D3 SERPL-MCNC: 32.6 NG/ML (ref 30–100)
ALBUMIN SERPL BCG-MCNC: 4.4 G/DL (ref 3.5–5)
ALP SERPL-CCNC: 67 U/L (ref 34–104)
ALT SERPL W P-5'-P-CCNC: 29 U/L (ref 7–52)
ANION GAP SERPL CALCULATED.3IONS-SCNC: 9 MMOL/L (ref 4–13)
AST SERPL W P-5'-P-CCNC: 19 U/L (ref 13–39)
BASOPHILS # BLD AUTO: 0.03 THOUSANDS/ÂΜL (ref 0–0.1)
BASOPHILS NFR BLD AUTO: 0 % (ref 0–1)
BILIRUB SERPL-MCNC: 0.56 MG/DL (ref 0.2–1)
BUN SERPL-MCNC: 15 MG/DL (ref 5–25)
CALCIUM SERPL-MCNC: 9.5 MG/DL (ref 8.4–10.2)
CHLORIDE SERPL-SCNC: 105 MMOL/L (ref 96–108)
CHOLEST SERPL-MCNC: 214 MG/DL
CO2 SERPL-SCNC: 27 MMOL/L (ref 21–32)
CREAT SERPL-MCNC: 0.85 MG/DL (ref 0.6–1.3)
EOSINOPHIL # BLD AUTO: 0.27 THOUSAND/ÂΜL (ref 0–0.61)
EOSINOPHIL NFR BLD AUTO: 4 % (ref 0–6)
ERYTHROCYTE [DISTWIDTH] IN BLOOD BY AUTOMATED COUNT: 12.5 % (ref 11.6–15.1)
EST. AVERAGE GLUCOSE BLD GHB EST-MCNC: 103 MG/DL
FERRITIN SERPL-MCNC: 115 NG/ML (ref 11–307)
GFR SERPL CREATININE-BSD FRML MDRD: 87 ML/MIN/1.73SQ M
GLUCOSE P FAST SERPL-MCNC: 91 MG/DL (ref 65–99)
HBA1C MFR BLD: 5.2 %
HCT VFR BLD AUTO: 41.2 % (ref 34.8–46.1)
HDLC SERPL-MCNC: 38 MG/DL
HGB BLD-MCNC: 14 G/DL (ref 11.5–15.4)
IMM GRANULOCYTES # BLD AUTO: 0.03 THOUSAND/UL (ref 0–0.2)
IMM GRANULOCYTES NFR BLD AUTO: 0 % (ref 0–2)
IRON SATN MFR SERPL: 44 % (ref 15–50)
IRON SERPL-MCNC: 151 UG/DL (ref 50–212)
LDLC SERPL CALC-MCNC: 148 MG/DL (ref 0–100)
LYMPHOCYTES # BLD AUTO: 1.94 THOUSANDS/ÂΜL (ref 0.6–4.47)
LYMPHOCYTES NFR BLD AUTO: 27 % (ref 14–44)
MCH RBC QN AUTO: 31 PG (ref 26.8–34.3)
MCHC RBC AUTO-ENTMCNC: 34 G/DL (ref 31.4–37.4)
MCV RBC AUTO: 91 FL (ref 82–98)
MONOCYTES # BLD AUTO: 0.55 THOUSAND/ÂΜL (ref 0.17–1.22)
MONOCYTES NFR BLD AUTO: 8 % (ref 4–12)
NEUTROPHILS # BLD AUTO: 4.51 THOUSANDS/ÂΜL (ref 1.85–7.62)
NEUTS SEG NFR BLD AUTO: 61 % (ref 43–75)
NONHDLC SERPL-MCNC: 176 MG/DL
NRBC BLD AUTO-RTO: 0 /100 WBCS
PLATELET # BLD AUTO: 303 THOUSANDS/UL (ref 149–390)
PMV BLD AUTO: 11.3 FL (ref 8.9–12.7)
POTASSIUM SERPL-SCNC: 4.4 MMOL/L (ref 3.5–5.3)
PROT SERPL-MCNC: 7.2 G/DL (ref 6.4–8.4)
RBC # BLD AUTO: 4.52 MILLION/UL (ref 3.81–5.12)
SODIUM SERPL-SCNC: 141 MMOL/L (ref 135–147)
TIBC SERPL-MCNC: 346 UG/DL (ref 250–450)
TRIGL SERPL-MCNC: 139 MG/DL
TSH SERPL DL<=0.05 MIU/L-ACNC: 1.25 UIU/ML (ref 0.45–4.5)
UIBC SERPL-MCNC: 195 UG/DL (ref 155–355)
VIT B12 SERPL-MCNC: 367 PG/ML (ref 180–914)
WBC # BLD AUTO: 7.33 THOUSAND/UL (ref 4.31–10.16)

## 2024-11-12 PROCEDURE — 85025 COMPLETE CBC W/AUTO DIFF WBC: CPT

## 2024-11-12 PROCEDURE — 83540 ASSAY OF IRON: CPT

## 2024-11-12 PROCEDURE — 80061 LIPID PANEL: CPT

## 2024-11-12 PROCEDURE — 82306 VITAMIN D 25 HYDROXY: CPT

## 2024-11-12 PROCEDURE — 84443 ASSAY THYROID STIM HORMONE: CPT

## 2024-11-12 PROCEDURE — 83550 IRON BINDING TEST: CPT

## 2024-11-12 PROCEDURE — 80053 COMPREHEN METABOLIC PANEL: CPT

## 2024-11-12 PROCEDURE — 82728 ASSAY OF FERRITIN: CPT

## 2024-11-12 PROCEDURE — 82607 VITAMIN B-12: CPT

## 2024-11-12 PROCEDURE — 36415 COLL VENOUS BLD VENIPUNCTURE: CPT

## 2024-11-12 PROCEDURE — 83036 HEMOGLOBIN GLYCOSYLATED A1C: CPT

## 2024-11-15 ENCOUNTER — RESULTS FOLLOW-UP (OUTPATIENT)
Dept: NEUROLOGY | Facility: CLINIC | Age: 39
End: 2024-11-15

## 2024-11-15 ENCOUNTER — PATIENT MESSAGE (OUTPATIENT)
Dept: NEUROLOGY | Facility: CLINIC | Age: 39
End: 2024-11-15

## 2024-11-22 RX ORDER — METOCLOPRAMIDE 10 MG/1
10 TABLET ORAL 3 TIMES DAILY PRN
Qty: 90 TABLET | Refills: 3 | Status: SHIPPED | OUTPATIENT
Start: 2024-11-22

## 2024-11-22 RX ORDER — CYANOCOBALAMIN 1000 UG/ML
1000 INJECTION, SOLUTION INTRAMUSCULAR; SUBCUTANEOUS
Status: SHIPPED | OUTPATIENT
Start: 2024-11-25 | End: 2025-01-05

## 2024-11-25 ENCOUNTER — HOSPITAL ENCOUNTER (EMERGENCY)
Facility: HOSPITAL | Age: 39
Discharge: HOME | End: 2024-11-25
Attending: EMERGENCY MEDICINE
Payer: COMMERCIAL

## 2024-11-25 VITALS
HEART RATE: 71 BPM | RESPIRATION RATE: 18 BRPM | DIASTOLIC BLOOD PRESSURE: 72 MMHG | WEIGHT: 156 LBS | SYSTOLIC BLOOD PRESSURE: 135 MMHG | TEMPERATURE: 98.4 F | OXYGEN SATURATION: 100 % | BODY MASS INDEX: 28.53 KG/M2

## 2024-11-25 DIAGNOSIS — G43.909 MIGRAINE HEADACHE: Primary | ICD-10-CM

## 2024-11-25 PROCEDURE — 96365 THER/PROPH/DIAG IV INF INIT: CPT

## 2024-11-25 PROCEDURE — 99284 EMERGENCY DEPT VISIT MOD MDM: CPT | Performed by: EMERGENCY MEDICINE

## 2024-11-25 PROCEDURE — 99283 EMERGENCY DEPT VISIT LOW MDM: CPT

## 2024-11-25 PROCEDURE — 96375 TX/PRO/DX INJ NEW DRUG ADDON: CPT

## 2024-11-25 RX ORDER — ACETAMINOPHEN 10 MG/ML
1000 INJECTION, SOLUTION INTRAVENOUS ONCE
Status: COMPLETED | OUTPATIENT
Start: 2024-11-25 | End: 2024-11-25

## 2024-11-25 RX ORDER — DROPERIDOL 2.5 MG/ML
0.62 INJECTION, SOLUTION INTRAMUSCULAR; INTRAVENOUS ONCE
Status: COMPLETED | OUTPATIENT
Start: 2024-11-25 | End: 2024-11-25

## 2024-11-25 RX ADMIN — ACETAMINOPHEN 1000 MG: 10 INJECTION INTRAVENOUS at 18:18

## 2024-11-25 RX ADMIN — SODIUM CHLORIDE 1000 ML: 0.9 INJECTION, SOLUTION INTRAVENOUS at 18:22

## 2024-11-25 RX ADMIN — DROPERIDOL 0.62 MG: 2.5 INJECTION, SOLUTION INTRAMUSCULAR; INTRAVENOUS at 18:22

## 2024-11-25 NOTE — ED PROVIDER NOTES
Time reflects when diagnosis was documented in both MDM as applicable and the Disposition within this note       Time User Action Codes Description Comment    11/25/2024  6:55 PM Tammy Layo Add [G43.909] Migraine headache           ED Disposition       ED Disposition   Discharge    Condition   Stable    Date/Time   Mon Nov 25, 2024  6:54 PM    Comment   Lorrie Jones discharge to home/self care.                   Assessment & Plan       Medical Decision Making  39-year-old female presents with migraine headaches  Headache is similar to her prior headaches which includes photophobia and nausea  Will give migraine cocktail and reassess  Patient due to recently taking Reglan will not give Reglan  Patient also has an allergy to NSAIDs so we will opt to give alternative treatment  Patient without any neurologic deficits not concerning for intracranial bleed or intracranial mass  Upon treatment Will reassess and determine patient's disposition      Risk  Prescription drug management.             Medications   sodium chloride 0.9 % bolus 1,000 mL (0 mL Intravenous Stopped 11/25/24 1901)   droperidol (INAPSINE) injection 0.625 mg (0.625 mg Intravenous Given 11/25/24 1822)   acetaminophen (Ofirmev) injection 1,000 mg (0 mg Intravenous Stopped 11/25/24 1902)       ED Risk Strat Scores                                               History of Present Illness       Chief Complaint   Patient presents with    Migraine     Pt c/o migraine x 3 hours. Pt reports n/v, photophobia, sensitivity to noises. Pt reports she took reglan and sumatriptan.        Past Medical History:   Diagnosis Date    Abnormal Pap smear of cervix     2015 cryosurgery; 6/2019 NILM pap/neg HPV    Allergic     Depression     Headache(784.0)     Migraine with aura       Past Surgical History:   Procedure Laterality Date    CHOLECYSTECTOMY LAPAROSCOPIC N/A 10/27/2020    Procedure: CHOLECYSTECTOMY LAPAROSCOPIC;  Surgeon: Lina Narayan MD;  Location:   MAIN OR;  Service: General    COLONOSCOPY      FL LUMBAR PUNCTURE DIAGNOSTIC  11/17/2023    GYNECOLOGIC CRYOSURGERY  2015    HYSTERECTOMY  2016    supracervical    LAPAROSCOPY  2018    DC LAPAROSCOPY W/LYSIS OF ADHESIONS N/A 03/03/2020    Procedure: L.O.A.;  Surgeon: Noy Paz MD;  Location: [facility];  Service: Gynecology    DC LAPS ABD PRTM&OMENTUM DX W/WO SPEC BR/WA SPX N/A 08/09/2019    Procedure: LAPAROSCOPY DIAGNOSTIC; LYSIS OF ADHESIONS;  Surgeon: Abebe Walls MD;  Location: [facility];  Service: Gynecology    TUBAL LIGATION      UPPER GASTROINTESTINAL ENDOSCOPY        Family History   Problem Relation Age of Onset    Cancer Paternal Grandmother         uterine    Cancer Paternal Aunt         uterine     Migraines Mother     Arthritis Mother     Hypertension Father     Breast cancer Neg Hx     Colon cancer Neg Hx     Ovarian cancer Neg Hx       Social History     Tobacco Use    Smoking status: Every Day     Current packs/day: 0.50     Average packs/day: 0.5 packs/day for 15.0 years (7.5 ttl pk-yrs)     Types: Cigarettes     Passive exposure: Current    Smokeless tobacco: Never   Vaping Use    Vaping status: Never Used   Substance Use Topics    Alcohol use: Never    Drug use: Never      E-Cigarette/Vaping    E-Cigarette Use Never User       E-Cigarette/Vaping Substances    Nicotine No     THC No     CBD No     Flavoring No     Other No     Unknown No       I have reviewed and agree with the history as documented.     HPI  39-year-old female presents with migraine headaches.  Patient states that she has left-sided headache accompanied by nausea, vomiting, photophobia.  States that her headache is her usual migraine headaches and she is taken her sumatriptan as well as Reglan with minimal relief so decided to come in for evaluation.  Patient reports no unilateral weakness, numbness, vertigo, vision change.  Patient denies any other complaints.    Review of Systems   Constitutional:   Negative for chills, diaphoresis, fever and unexpected weight change.   HENT:  Negative for ear pain and sore throat.    Eyes:  Positive for photophobia. Negative for visual disturbance.   Respiratory:  Negative for cough, chest tightness and shortness of breath.    Cardiovascular:  Negative for chest pain and leg swelling.   Gastrointestinal:  Positive for nausea and vomiting. Negative for abdominal distention, abdominal pain, constipation and diarrhea.   Endocrine: Negative.    Genitourinary:  Negative for difficulty urinating and dysuria.   Musculoskeletal: Negative.    Skin: Negative.    Allergic/Immunologic: Negative.    Neurological:  Positive for headaches.   Hematological: Negative.    Psychiatric/Behavioral: Negative.     All other systems reviewed and are negative.          Objective       ED Triage Vitals [11/25/24 1651]   Temperature Pulse Blood Pressure Respirations SpO2 Patient Position - Orthostatic VS   98.4 °F (36.9 °C) 71 135/72 18 100 % Sitting      Temp Source Heart Rate Source BP Location FiO2 (%) Pain Score    Oral Monitor Right arm -- --      Vitals      Date and Time Temp Pulse SpO2 Resp BP Pain Score FACES Pain Rating User   11/25/24 1651 98.4 °F (36.9 °C) 71 100 % 18 135/72 -- -- AS            Physical Exam  Vitals and nursing note reviewed.   Constitutional:       General: She is not in acute distress.     Appearance: Normal appearance. She is not ill-appearing.   HENT:      Head: Normocephalic and atraumatic.      Right Ear: External ear normal.      Left Ear: External ear normal.      Nose: Nose normal.      Mouth/Throat:      Mouth: Mucous membranes are moist.      Pharynx: Oropharynx is clear.   Eyes:      General: No scleral icterus.        Right eye: No discharge.         Left eye: No discharge.      Extraocular Movements: Extraocular movements intact.      Conjunctiva/sclera: Conjunctivae normal.      Pupils: Pupils are equal, round, and reactive to light.   Cardiovascular:       Rate and Rhythm: Normal rate and regular rhythm.      Pulses: Normal pulses.      Heart sounds: Normal heart sounds.   Pulmonary:      Effort: Pulmonary effort is normal.      Breath sounds: Normal breath sounds.   Abdominal:      General: Abdomen is flat. Bowel sounds are normal. There is no distension.      Palpations: Abdomen is soft.      Tenderness: There is no abdominal tenderness. There is no guarding or rebound.   Musculoskeletal:         General: Normal range of motion.      Cervical back: Normal range of motion and neck supple.   Skin:     General: Skin is warm and dry.      Capillary Refill: Capillary refill takes less than 2 seconds.   Neurological:      General: No focal deficit present.      Mental Status: She is alert and oriented to person, place, and time. Mental status is at baseline.      Cranial Nerves: No cranial nerve deficit.      Sensory: No sensory deficit.      Motor: No weakness.      Gait: Gait normal.   Psychiatric:         Mood and Affect: Mood normal.         Behavior: Behavior normal.         Thought Content: Thought content normal.         Judgment: Judgment normal.         Results Reviewed       None            No orders to display       Procedures    ED Medication and Procedure Management   Prior to Admission Medications   Prescriptions Last Dose Informant Patient Reported? Taking?   Acetaminophen Extra Strength 500 MG TABS   Yes No   Patient not taking: Reported on 9/4/2024   Diclofenac Sodium (VOLTAREN) 1 %  Self No No   Sig: Apply 2 g topically 4 (four) times a day   Ex-Lax 15 MG CHEW   No No   Sig: CHEW 1 TABLET BY MOUTH TWICE DAILY AS NEEDED FOR CONSTIPATION   SUMAtriptan (IMITREX) 100 mg tablet  Self No No   Sig: Take 1 tablet (100 mg total) by mouth daily as needed for migraine   Senna Plus 8.6-50 MG per tablet   No No   Sig: TAKE 1 TABLET BY MOUTH DAILY   ascorbic acid (VITAMIN C) 500 mg tablet   No No   Sig: Take 1 tablet (500 mg total) by mouth daily   bisacodyl 5 MG EC  tablet   No No   Sig: TAKE 2 TABLETS (10 MG TOTAL) BY MOUTH 2 (TWO) TIMES A DAY AS NEEDED FOR CONSTIPATION (CONSTIPATION) COLONOSCOPY PREP   Patient not taking: Reported on 10/14/2024   celecoxib (CeleBREX) 100 mg capsule   No No   Sig: TAKE 1 CAPSULE (100 MG TOTAL) BY MOUTH 2 (TWO) TIMES A DAY AS NEEDED FOR MILD PAIN   dexamethasone (DECADRON) 2 mg tablet   No No   Sig: TAKE 1 TABLET (2 MG TOTAL) BY MOUTH DAILY WITH BREAKFAST   dicyclomine (BENTYL) 20 mg tablet   No No   Sig: Take 1 tablet (20 mg total) by mouth 2 (two) times a day as needed (abdominal pain)   ergocalciferol (VITAMIN D2) 50,000 units   No No   Sig: Take 1 capsule (50,000 Units total) by mouth once a week   famotidine (PEPCID) 20 mg tablet   No No   Sig: Take 1 tablet (20 mg total) by mouth daily for 14 days   fluocinolone acetonide (DERMOTIC) 0.01 % otic oil  Self No No   Sig: INSTILL 3 DROPS IN BOTH EARS DAILY   fluticasone (FLONASE) 50 mcg/act nasal spray  Self No No   Si spray into each nostril 2 (two) times a day   Patient not taking: Reported on 3/20/2024   ibuprofen (MOTRIN) 800 mg tablet   Yes No   Sig: Take 800 mg by mouth every 8 (eight) hours as needed   lidocaine (LIDODERM) 5 %  Self No No   Sig: Apply 1 patch topically over 12 hours every 24 hours Remove & Discard patch within 12 hours or as directed by MD   meclizine (ANTIVERT) 25 mg tablet   Yes No   Sig: Take 25 mg by mouth Three times daily as needed   methocarbamol (ROBAXIN) 500 mg tablet   No No   Sig: Take 1 tablet (500 mg total) by mouth 2 (two) times a day as needed for muscle spasms   metoclopramide (Reglan) 10 mg tablet   No No   Sig: Take 1 tablet (10 mg total) by mouth 3 (three) times a day as needed (migraine)   miconazole (MONISTAT-7) 2 % vaginal cream   No No   Sig: Insert 1 applicator into the vagina daily at bedtime   Patient not taking: Reported on 10/14/2024   naproxen (EC NAPROSYN) 500 MG EC tablet  Self Yes No   Sig: Take 500 mg by mouth if needed for mild pain    nicotine (NICODERM CQ) 14 mg/24hr TD 24 hr patch   No No   Sig: PLACE 1 PATCH ON THE SKIN OVER 24 HOURS EVERY 24 HOURS   nystatin-triamcinolone (MYCOLOG-II) cream  Self No No   Sig: Apply topically 2 (two) times a day   omeprazole (PriLOSEC) 40 MG capsule   No No   Sig: Take 1 capsule (40 mg total) by mouth daily   Patient not taking: Reported on 9/4/2024   oxyCODONE-acetaminophen (Percocet) 5-325 mg per tablet   No No   Sig: Take 1 tablet by mouth every 6 (six) hours as needed for moderate pain for up to 10 doses Max Daily Amount: 4 tablets   Patient not taking: Reported on 9/4/2024   pantoprazole (PROTONIX) 20 mg tablet   No No   Sig: TAKE 1 TABLET (20 MG TOTAL) BY MOUTH DAILY   polyethylene glycol (MIRALAX) 17 g packet  Self No No   Sig: Take 17 g by mouth daily   polyethylene glycol (MIRALAX) 17 g packet  Self No No   Sig: Take 17 g by mouth daily   sodium chloride 0.9 % nebulizer solution  Self No No   Sig: TAKE 3ML BY BEBULIZATION EVERY 6 HOURS AS NEEDED FOR WHEEZING OR SHORTNESS OF BREATH   Patient not taking: Reported on 9/4/2024   sucralfate (CARAFATE) 1 g/10 mL suspension   No No   Sig: Take 10 mL (1 g total) by mouth 3 (three) times a day for 7 days   vitamin B-12 (VITAMIN B-12) 1,000 mcg tablet   No No   Sig: Take 1 tablet (1,000 mcg total) by mouth daily      Facility-Administered Medications Last Administration Doses Remaining   cyanocobalamin injection 1,000 mcg None recorded 6        Discharge Medication List as of 11/25/2024  6:55 PM        CONTINUE these medications which have NOT CHANGED    Details   Acetaminophen Extra Strength 500 MG TABS Historical Med      ascorbic acid (VITAMIN C) 500 mg tablet Take 1 tablet (500 mg total) by mouth daily, Starting Thu 8/1/2024, Normal      bisacodyl 5 MG EC tablet TAKE 2 TABLETS (10 MG TOTAL) BY MOUTH 2 (TWO) TIMES A DAY AS NEEDED FOR CONSTIPATION (CONSTIPATION) COLONOSCOPY PREP, Normal      celecoxib (CeleBREX) 100 mg capsule TAKE 1 CAPSULE (100 MG TOTAL)  BY MOUTH 2 (TWO) TIMES A DAY AS NEEDED FOR MILD PAIN, Starting Mon 10/21/2024, Normal      dexamethasone (DECADRON) 2 mg tablet TAKE 1 TABLET (2 MG TOTAL) BY MOUTH DAILY WITH BREAKFAST, Starting Fri 5/3/2024, Normal      Diclofenac Sodium (VOLTAREN) 1 % Apply 2 g topically 4 (four) times a day, Starting Wed 11/22/2023, Normal      dicyclomine (BENTYL) 20 mg tablet Take 1 tablet (20 mg total) by mouth 2 (two) times a day as needed (abdominal pain), Starting Mon 10/14/2024, Normal      ergocalciferol (VITAMIN D2) 50,000 units Take 1 capsule (50,000 Units total) by mouth once a week, Starting Thu 8/1/2024, Normal      Ex-Lax 15 MG CHEW CHEW 1 TABLET BY MOUTH TWICE DAILY AS NEEDED FOR CONSTIPATION, Normal      famotidine (PEPCID) 20 mg tablet Take 1 tablet (20 mg total) by mouth daily for 14 days, Starting Sun 5/26/2024, Until Sun 6/9/2024, Normal      fluocinolone acetonide (DERMOTIC) 0.01 % otic oil INSTILL 3 DROPS IN BOTH EARS DAILY, Normal      fluticasone (FLONASE) 50 mcg/act nasal spray 1 spray into each nostril 2 (two) times a day, Starting Thu 10/19/2023, Normal      ibuprofen (MOTRIN) 800 mg tablet Take 800 mg by mouth every 8 (eight) hours as needed, Starting Thu 6/6/2024, Historical Med      lidocaine (LIDODERM) 5 % Apply 1 patch topically over 12 hours every 24 hours Remove & Discard patch within 12 hours or as directed by MD, Starting Wed 11/22/2023, Print      meclizine (ANTIVERT) 25 mg tablet Take 25 mg by mouth Three times daily as needed, Starting Mon 6/3/2024, Until Tue 6/3/2025 at 2359, Historical Med      methocarbamol (ROBAXIN) 500 mg tablet Take 1 tablet (500 mg total) by mouth 2 (two) times a day as needed for muscle spasms, Starting Thu 8/1/2024, Normal      metoclopramide (Reglan) 10 mg tablet Take 1 tablet (10 mg total) by mouth 3 (three) times a day as needed (migraine), Starting Fri 11/22/2024, Normal      miconazole (MONISTAT-7) 2 % vaginal cream Insert 1 applicator into the vagina daily at  bedtime, Starting Fri 9/27/2024, Normal      naproxen (EC NAPROSYN) 500 MG EC tablet Take 500 mg by mouth if needed for mild pain, Historical Med      nicotine (NICODERM CQ) 14 mg/24hr TD 24 hr patch PLACE 1 PATCH ON THE SKIN OVER 24 HOURS EVERY 24 HOURS, Starting Mon 8/5/2024, Normal      nystatin-triamcinolone (MYCOLOG-II) cream Apply topically 2 (two) times a day, Starting Thu 9/14/2023, Normal      omeprazole (PriLOSEC) 40 MG capsule Take 1 capsule (40 mg total) by mouth daily, Starting Thu 8/1/2024, Normal      oxyCODONE-acetaminophen (Percocet) 5-325 mg per tablet Take 1 tablet by mouth every 6 (six) hours as needed for moderate pain for up to 10 doses Max Daily Amount: 4 tablets, Starting Sat 5/18/2024, Normal      pantoprazole (PROTONIX) 20 mg tablet TAKE 1 TABLET (20 MG TOTAL) BY MOUTH DAILY, Starting Mon 8/26/2024, Until Sat 2/22/2025, Normal      polyethylene glycol (MIRALAX) 17 g packet Take 17 g by mouth daily, Starting Tue 1/2/2024, Until Sun 6/30/2024, Normal      polyethylene glycol (MIRALAX) 17 g packet Take 17 g by mouth daily, Starting Tue 1/30/2024, Until Sun 7/28/2024, Normal      Senna Plus 8.6-50 MG per tablet TAKE 1 TABLET BY MOUTH DAILY, Starting Mon 7/22/2024, Normal      sodium chloride 0.9 % nebulizer solution TAKE 3ML BY BEBULIZATION EVERY 6 HOURS AS NEEDED FOR WHEEZING OR SHORTNESS OF BREATH, Normal      sucralfate (CARAFATE) 1 g/10 mL suspension Take 10 mL (1 g total) by mouth 3 (three) times a day for 7 days, Starting Sun 5/26/2024, Until Sun 6/2/2024, Normal      SUMAtriptan (IMITREX) 100 mg tablet Take 1 tablet (100 mg total) by mouth daily as needed for migraine, Starting u 11/2/2023, Normal      vitamin B-12 (VITAMIN B-12) 1,000 mcg tablet Take 1 tablet (1,000 mcg total) by mouth daily, Starting u 8/1/2024, Normal           No discharge procedures on file.  ED SEPSIS DOCUMENTATION   Time reflects when diagnosis was documented in both MDM as applicable and the Disposition  within this note       Time User Action Codes Description Comment    11/25/2024  6:55 PM Layo Munoz Add [G43.909] Migraine headache                  Layo Munoz MD  11/25/24 7840

## 2024-11-26 ENCOUNTER — TELEPHONE (OUTPATIENT)
Dept: NEUROLOGY | Facility: CLINIC | Age: 39
End: 2024-11-26

## 2024-11-28 DIAGNOSIS — E53.8 VITAMIN B 12 DEFICIENCY: ICD-10-CM

## 2024-11-29 DIAGNOSIS — G43.109 MIGRAINE HEADACHE WITH AURA: Primary | Chronic | ICD-10-CM

## 2024-11-29 RX ORDER — ASCORBIC ACID 500 MG
500 TABLET ORAL DAILY
Qty: 90 TABLET | Refills: 1 | Status: SHIPPED | OUTPATIENT
Start: 2024-11-29

## 2024-11-29 RX ORDER — RIZATRIPTAN BENZOATE 10 MG/1
10 TABLET ORAL AS NEEDED
Qty: 9 TABLET | Refills: 3 | Status: SHIPPED | OUTPATIENT
Start: 2024-11-29

## 2024-12-02 ENCOUNTER — RESULTS FOLLOW-UP (OUTPATIENT)
Dept: FAMILY MEDICINE CLINIC | Facility: CLINIC | Age: 39
End: 2024-12-02

## 2024-12-02 ENCOUNTER — CLINICAL SUPPORT (OUTPATIENT)
Dept: NEUROLOGY | Facility: CLINIC | Age: 39
End: 2024-12-02
Payer: COMMERCIAL

## 2024-12-02 DIAGNOSIS — E53.8 B12 DEFICIENCY: Primary | ICD-10-CM

## 2024-12-02 PROCEDURE — 96372 THER/PROPH/DIAG INJ SC/IM: CPT | Performed by: PSYCHIATRY & NEUROLOGY

## 2024-12-02 RX ADMIN — CYANOCOBALAMIN 1000 MCG: 1000 INJECTION, SOLUTION INTRAMUSCULAR; SUBCUTANEOUS at 07:50

## 2024-12-02 NOTE — RESULT ENCOUNTER NOTE
Please call patient with lab results:  Piyush Andrew,   Your blood work shows your cholesterol is slightly above goal. This can be managed with some lifestyle changes. I recommend that you exercise for 30-45 minutes 5 days a week. Focus on strength training as well as activities that increase your heart rate like walking, dancing, jogging, running, etc.   I also recommend a balanced diet with 4-5 servings of fruits and vegetables every day, lean meats, and whole grains, Try to avoid fried foods and sugary drinks like sweetened sodas, teas, or juice. The DASH diet (you can find this online) is a great example of this.  Please call the office with any questions.

## 2024-12-09 ENCOUNTER — CLINICAL SUPPORT (OUTPATIENT)
Dept: NEUROLOGY | Facility: CLINIC | Age: 39
End: 2024-12-09
Payer: COMMERCIAL

## 2024-12-09 DIAGNOSIS — E53.8 B12 DEFICIENCY: Primary | ICD-10-CM

## 2024-12-09 PROCEDURE — 96372 THER/PROPH/DIAG INJ SC/IM: CPT | Performed by: STUDENT IN AN ORGANIZED HEALTH CARE EDUCATION/TRAINING PROGRAM

## 2024-12-09 RX ADMIN — CYANOCOBALAMIN 1000 MCG: 1000 INJECTION, SOLUTION INTRAMUSCULAR; SUBCUTANEOUS at 08:26

## 2024-12-09 NOTE — PROGRESS NOTES
Pt tolerated b12 inj well, no swelling noted, pt stated she felt well and was informed to call the office with any concerns.

## 2024-12-10 ENCOUNTER — OFFICE VISIT (OUTPATIENT)
Dept: OBGYN CLINIC | Facility: MEDICAL CENTER | Age: 39
End: 2024-12-10
Payer: COMMERCIAL

## 2024-12-10 ENCOUNTER — APPOINTMENT (OUTPATIENT)
Dept: RADIOLOGY | Facility: MEDICAL CENTER | Age: 39
End: 2024-12-10
Payer: COMMERCIAL

## 2024-12-10 VITALS
SYSTOLIC BLOOD PRESSURE: 112 MMHG | DIASTOLIC BLOOD PRESSURE: 68 MMHG | HEART RATE: 84 BPM | BODY MASS INDEX: 28.71 KG/M2 | HEIGHT: 62 IN | WEIGHT: 156 LBS

## 2024-12-10 DIAGNOSIS — M22.2X2 PATELLOFEMORAL DISORDER OF LEFT KNEE: ICD-10-CM

## 2024-12-10 DIAGNOSIS — G89.29 CHRONIC PAIN OF LEFT KNEE: Chronic | ICD-10-CM

## 2024-12-10 DIAGNOSIS — M25.552 PAIN IN LEFT HIP: ICD-10-CM

## 2024-12-10 DIAGNOSIS — M25.562 CHRONIC PAIN OF LEFT KNEE: Primary | Chronic | ICD-10-CM

## 2024-12-10 DIAGNOSIS — M25.562 CHRONIC PAIN OF LEFT KNEE: Chronic | ICD-10-CM

## 2024-12-10 DIAGNOSIS — G89.29 CHRONIC PAIN OF LEFT KNEE: Primary | Chronic | ICD-10-CM

## 2024-12-10 PROCEDURE — 73564 X-RAY EXAM KNEE 4 OR MORE: CPT

## 2024-12-10 PROCEDURE — 73502 X-RAY EXAM HIP UNI 2-3 VIEWS: CPT

## 2024-12-10 PROCEDURE — 99203 OFFICE O/P NEW LOW 30 MIN: CPT | Performed by: EMERGENCY MEDICINE

## 2024-12-10 NOTE — PROGRESS NOTES
"    Assessment/Plan:    Diagnoses and all orders for this visit:    Chronic pain of left knee  -     Ambulatory Referral to Orthopedic Surgery  -     XR knee 4+ vw left injury; Future  -     Ambulatory Referral to Physical Therapy; Future  -     Durable Medical Equipment    Patellofemoral disorder of left knee  -     XR knee 4+ vw left injury; Future  -     Ambulatory Referral to Physical Therapy; Future  -     Durable Medical Equipment    Pain in left hip  -     XR hip/pelv 2-3 vws left if performed; Future    Chronic left anterior knee pain previously treated with CSI and several sessions of physical therapy.  We discussed the nature of her pain and the treatment options.  We also reviewed her prior MRI of the left knee and the x-rays obtained today with are essentially normal.  Recommend patellar stabilizing brace which we have provided today referred back to physical therapy.  Patient did inquire about repeat injection of the knee which I did not recommend.  We did discuss that this can be a frustrating chronic condition     (104370) used for entire encounter    Return in about 2 months (around 2/10/2025).      Subjective:   Patient ID: Lorrie Jones is a 39 y.o. female.    NP presents for chronic left anterior knee pain.  She has been evaluated previously for this with Xrays and MRI, attended several sessions of PT which she states she couldn't tolerate.  She has tried NSAIDs.  No benefit from CSI.          Review of Systems    The following portions of the patient's chart were reviewed and updated as appropriate:   Allergy:    Allergies   Allergen Reactions    Shellfish-Derived Products - Food Allergy Itching and Swelling     \"seafood \"    Ibuprofen Other (See Comments)     Patient states she gets hematoma    Other Itching     Adhesive tape    Pregabalin Anxiety       Medications:    Current Outpatient Medications:     ascorbic acid (VITAMIN C) 500 mg tablet, TAKE 1 TABLET (500 MG TOTAL) BY " MOUTH DAILY, Disp: 90 tablet, Rfl: 1    celecoxib (CeleBREX) 100 mg capsule, TAKE 1 CAPSULE (100 MG TOTAL) BY MOUTH 2 (TWO) TIMES A DAY AS NEEDED FOR MILD PAIN, Disp: 60 capsule, Rfl: 2    dexamethasone (DECADRON) 2 mg tablet, TAKE 1 TABLET (2 MG TOTAL) BY MOUTH DAILY WITH BREAKFAST, Disp: 5 tablet, Rfl: 0    Diclofenac Sodium (VOLTAREN) 1 %, Apply 2 g topically 4 (four) times a day, Disp: 50 g, Rfl: 0    dicyclomine (BENTYL) 20 mg tablet, Take 1 tablet (20 mg total) by mouth 2 (two) times a day as needed (abdominal pain), Disp: 30 tablet, Rfl: 2    ergocalciferol (VITAMIN D2) 50,000 units, Take 1 capsule (50,000 Units total) by mouth once a week, Disp: 16 capsule, Rfl: 1    Ex-Lax 15 MG CHEW, CHEW 1 TABLET BY MOUTH TWICE DAILY AS NEEDED FOR CONSTIPATION, Disp: 60 tablet, Rfl: 1    fluocinolone acetonide (DERMOTIC) 0.01 % otic oil, INSTILL 3 DROPS IN BOTH EARS DAILY, Disp: 20 mL, Rfl: 0    ibuprofen (MOTRIN) 800 mg tablet, Take 800 mg by mouth every 8 (eight) hours as needed, Disp: , Rfl:     lidocaine (LIDODERM) 5 %, Apply 1 patch topically over 12 hours every 24 hours Remove & Discard patch within 12 hours or as directed by MD, Disp: 15 patch, Rfl: 0    meclizine (ANTIVERT) 25 mg tablet, Take 25 mg by mouth Three times daily as needed, Disp: , Rfl:     methocarbamol (ROBAXIN) 500 mg tablet, Take 1 tablet (500 mg total) by mouth 2 (two) times a day as needed for muscle spasms, Disp: 60 tablet, Rfl: 1    metoclopramide (Reglan) 10 mg tablet, Take 1 tablet (10 mg total) by mouth 3 (three) times a day as needed (migraine), Disp: 90 tablet, Rfl: 3    naproxen (EC NAPROSYN) 500 MG EC tablet, Take 500 mg by mouth if needed for mild pain, Disp: , Rfl:     nicotine (NICODERM CQ) 14 mg/24hr TD 24 hr patch, PLACE 1 PATCH ON THE SKIN OVER 24 HOURS EVERY 24 HOURS, Disp: 28 patch, Rfl: 2    nystatin-triamcinolone (MYCOLOG-II) cream, Apply topically 2 (two) times a day, Disp: 15 g, Rfl: 0    pantoprazole (PROTONIX) 20 mg tablet,  TAKE 1 TABLET (20 MG TOTAL) BY MOUTH DAILY, Disp: 90 tablet, Rfl: 0    rizatriptan (Maxalt) 10 mg tablet, Take 1 tablet (10 mg total) by mouth as needed for migraine Take at the onset of migraine; if symptoms continue or return, may take another dose at least 2 hours after first dose. Take no more than 2 doses in a day., Disp: 9 tablet, Rfl: 3    Senna Plus 8.6-50 MG per tablet, TAKE 1 TABLET BY MOUTH DAILY, Disp: 30 tablet, Rfl: 5    vitamin B-12 (VITAMIN B-12) 1,000 mcg tablet, Take 1 tablet (1,000 mcg total) by mouth daily, Disp: 90 tablet, Rfl: 1    Acetaminophen Extra Strength 500 MG TABS, , Disp: , Rfl:     bisacodyl 5 MG EC tablet, TAKE 2 TABLETS (10 MG TOTAL) BY MOUTH 2 (TWO) TIMES A DAY AS NEEDED FOR CONSTIPATION (CONSTIPATION) COLONOSCOPY PREP (Patient not taking: Reported on 12/10/2024), Disp: 60 tablet, Rfl: 0    famotidine (PEPCID) 20 mg tablet, Take 1 tablet (20 mg total) by mouth daily for 14 days, Disp: 14 tablet, Rfl: 0    fluticasone (FLONASE) 50 mcg/act nasal spray, 1 spray into each nostril 2 (two) times a day (Patient not taking: Reported on 3/20/2024), Disp: 16 g, Rfl: 1    miconazole (MONISTAT-7) 2 % vaginal cream, Insert 1 applicator into the vagina daily at bedtime (Patient not taking: Reported on 12/10/2024), Disp: 45 g, Rfl: 1    omeprazole (PriLOSEC) 40 MG capsule, Take 1 capsule (40 mg total) by mouth daily (Patient not taking: Reported on 12/10/2024), Disp: 90 capsule, Rfl: 1    oxyCODONE-acetaminophen (Percocet) 5-325 mg per tablet, Take 1 tablet by mouth every 6 (six) hours as needed for moderate pain for up to 10 doses Max Daily Amount: 4 tablets (Patient not taking: Reported on 12/10/2024), Disp: 10 tablet, Rfl: 0    polyethylene glycol (MIRALAX) 17 g packet, Take 17 g by mouth daily, Disp: 510 g, Rfl: 5    polyethylene glycol (MIRALAX) 17 g packet, Take 17 g by mouth daily, Disp: 510 g, Rfl: 5    sodium chloride 0.9 % nebulizer solution, TAKE 3ML BY BEBULIZATION EVERY 6 HOURS AS  "NEEDED FOR WHEEZING OR SHORTNESS OF BREATH (Patient not taking: No sig reported), Disp: 60 mL, Rfl: 1    sucralfate (CARAFATE) 1 g/10 mL suspension, Take 10 mL (1 g total) by mouth 3 (three) times a day for 7 days, Disp: 210 mL, Rfl: 0    Current Facility-Administered Medications:     cyanocobalamin injection 1,000 mcg, 1,000 mcg, Intramuscular, Q7 Days, , 1,000 mcg at 12/09/24 0826    Patient Active Problem List   Diagnosis    Migraine headache with aura    Chronic pelvic pain in female    Elevated LFTs    Gastroesophageal reflux disease    Choledocholithiasis    Gastritis    Tobacco use    Chronic pain of left knee    Plantar fasciitis of left foot    Chronic bilateral low back pain with left-sided sciatica    Irritable bowel syndrome with constipation    Patellofemoral disorder of left knee    Hydrosalpinx    Continuous LLQ abdominal pain    Vitamin D deficiency    Neck pain    Flu-like symptoms    Abnormal MRI    Chronic pansinusitis    Vitamin B 12 deficiency    Itching of ear    Cervical radiculopathy       Objective:  /68   Pulse 84   Ht 5' 2\" (1.575 m)   Wt 70.8 kg (156 lb)   BMI 28.53 kg/m²     Left Knee Exam     Range of Motion   Extension:  normal     Comments:  Patient declined Exam            Physical Exam      Neurologic Exam    Procedures    I have personally reviewed pertinent films in PACS and my interpretation is Xrays Left knee: No acute findings, no significant degenerative changes or effusion        2023  MRI LEFT KNEE     INDICATION:   M25.562: Pain in left knee  G89.29: Other chronic pain.     COMPARISON: Plain film 1/24/2023.     TECHNIQUE:    Multiplanar/multisequence MR of the left knee was performed.         FINDINGS:     SUBCUTANEOUS TISSUES: Normal     JOINT EFFUSION: None.     BAKER'S CYST: None.     MENISCI: Intact.     CRUCIATE LIGAMENTS: Intact.     EXTENSOR APPARATUS: Intact.     COLLATERAL LIGAMENTS: Intact.     ARTICULAR SURFACES: Normal.     BONES: Normal.   "   MUSCULATURE:  Intact.     IMPRESSION:  No meniscal tear or other internal derangement.               Past Medical History:   Diagnosis Date    Abnormal Pap smear of cervix     2015 cryosurgery; 6/2019 NILM pap/neg HPV    Allergic     Depression     Headache(784.0)     Migraine with aura        Past Surgical History:   Procedure Laterality Date    CHOLECYSTECTOMY LAPAROSCOPIC N/A 10/27/2020    Procedure: CHOLECYSTECTOMY LAPAROSCOPIC;  Surgeon: Lina Narayan MD;  Location:  MAIN OR;  Service: General    COLONOSCOPY      [place] LUMBAR PUNCTURE DIAGNOSTIC  11/17/2023    GYNECOLOGIC CRYOSURGERY  2015    HYSTERECTOMY  2016    supracervical    LAPAROSCOPY  2018    [place] LAPAROSCOPY W/LYSIS OF ADHESIONS N/A 03/03/2020    Procedure: L.O.A.;  Surgeon: Noy Paz MD;  Location: [facility];  Service: Gynecology    [place] LAPS ABD PRTM&OMENTUM DX W/WO SPEC BR/WA SPX N/A 08/09/2019    Procedure: LAPAROSCOPY DIAGNOSTIC; LYSIS OF ADHESIONS;  Surgeon: Abebe Walls MD;  Location: [facility];  Service: Gynecology    TUBAL LIGATION      UPPER GASTROINTESTINAL ENDOSCOPY         Social History     Socioeconomic History    Marital status: Single     Spouse name: Not on file    Number of children: Not on file    Years of education: Not on file    Highest education level: Not on file   Occupational History    Not on file   Tobacco Use    Smoking status: Every Day     Current packs/day: 0.50     Average packs/day: 0.5 packs/day for 15.0 years (7.5 ttl pk-yrs)     Types: Cigarettes     Passive exposure: Current    Smokeless tobacco: Never   Vaping Use    Vaping status: Never Used   Substance and Sexual Activity    Alcohol use: Never    Drug use: Never    Sexual activity: Yes     Partners: Male     Birth control/protection: Female Sterilization, None   Other Topics Concern    Not on file   Social History Narrative    Not on file     Social Drivers of Health     Financial Resource Strain: Low Risk  (10/15/2024)    Overall  Financial Resource Strain (CARDIA)     Difficulty of Paying Living Expenses: Not hard at all   Food Insecurity: No Food Insecurity (10/15/2024)    Nursing - Inadequate Food Risk Classification     Worried About Running Out of Food in the Last Year: Never true     Ran Out of Food in the Last Year: Never true     Ran Out of Food in the Last Year: Not on file   Transportation Needs: No Transportation Needs (10/15/2024)    PRAPARE - Transportation     Lack of Transportation (Medical): No     Lack of Transportation (Non-Medical): No   Physical Activity: Insufficiently Active (6/23/2021)    Exercise Vital Sign     Days of Exercise per Week: 5 days     Minutes of Exercise per Session: 20 min   Stress: No Stress Concern Present (6/23/2021)    [place] [place] [employer] - Occupational Stress Questionnaire     Feeling of Stress : Only a little   Social Connections: Moderately Isolated (6/23/2021)    Social Connection and Isolation Panel [NHANES]     Frequency of Communication with Friends and Family: More than three times a week     Frequency of Social Gatherings with Friends and Family: Twice a week     Attends Cheondoism Services: Never     Active Member of Clubs or Organizations: No     Attends Club or Organization Meetings: Never     Marital Status: Living with partner   Intimate Partner Violence: Not At Risk (6/4/2024)    Received from [facility]    Humiliation, Afraid, Rape, and Kick questionnaire     Fear of Current or Ex-Partner: No     Emotionally Abused: No     Physically Abused: No     Sexually Abused: No   Housing Stability: Low Risk  (10/15/2024)    Housing Stability Vital Sign     Unable to Pay for Housing in the Last Year: No     Number of Times Moved in the Last Year: 0     Homeless in the Last Year: No       Family History   Problem Relation Age of Onset    Cancer Paternal Grandmother         uterine    Cancer Paternal Aunt         uterine     Migraines Mother     Arthritis  Mother     Hypertension Father     Breast cancer Neg Hx     Colon cancer Neg Hx     Ovarian cancer Neg Hx

## 2024-12-10 NOTE — PATIENT INSTRUCTIONS
"While taking Celebrex (celecoxib) do not take any other NSAIDs such as Meloxicam, Advil, Motrin, ibuprofen, naproxen, diclofenac or Aleve.  However you may take Tylenol.  You may also take Tylenol 500mg every 4-6 hours as needed OR max 1,000mg per dose up to 3 times per day for a total of 3,000mg per day      Patient Education     Instrucciones para el luis manuel en emily de dolor rotulofemoral   Acerca de beka shola   La rodilla es jonathan articulación buzz y compleja. Está compuesta por 4 huesos: el hueso del muslo, los dos huesos de la parte baja de la pierna y la rótula. La rótula también se conoce divina patela. El dolor de la parte anterior de la rodilla muchas veces se conoce divina rodilla de dung. Los médicos pueden denominar esto dolor femororrotuliano. Es un problema frecuente en personas que corren o practican deportes que exigen correr. La gente que camina o matthew en bicicleta también puede tener beka problema. El término \"rodilla de dung\" se refiere a diversos problemas de la parte anterior de la rodilla. Algunos de estos incluyen:  Alineación incorrecta de la rótula en la hendidura del hueso del muslo. [place] se conoce divina desalineación rótulo-femoral.  Rotura del cartílago de la parte posterior de la rótula. [place] se conoce divina condromalacia rotuliana.       ¿Qué cuidados se necesitan en casa?   Pregúntele a raymundo médico qué debe hacer al irse a casa. Asegúrese de hacer preguntas si no entiende lo que el médico explica. Así sabrá qué debe hacer.  Descanse. Deje que raymundo lesión sane antes de realizar movimientos lentos.  Coloque jonathan compresa de hielo o jonathan bolsa de guisantes congelados envueltos en jonathan toalla sobre la parte que le duela. Nunca coloque el hielo directamente sobre la piel. No deje el hielo más de 10 a 15 minutos por vez. Colocar hielo después de la actividad física puede disminuir el dolor y la inflamación. Nunca coloque hielo antes de estirar.  Levante la rodilla sobre almohadones para ayudar a " aliviar la inflamación.  Use un soporte para la rodilla cuando lo recomiende el médico.  Aplique cinta adhesiva en la rótula si el terapeuta o entrenador le enseña a hacerlo.  Use zapatos que tengan buen soporte. Use plantillas para queta zapatos si tiene pie plano.  Eddie ejercicios de estiramiento y fortalecimiento.  Si tiene sobrepeso, adelgace. El sobrepeso implica un esfuerzo adicional para las rodillas.  ¿Qué cuidados se necesitan en la etapa de seguimiento?   Es posible que los médicos le soliciten que visite el consultorio para evaluar raymundo progreso. Asegúrese de asistir.  Ruperto vez necesite también acudir a un fisioterapeuta (FT). El FT le enseñará ejercicios para recuperar la fuerza y el movimiento que tenía.  ¿Qué medicamentos pueden ser necesarios?   Es posible que el médico le recete medicamentos para conseguir lo siguiente:  Aliviar el dolor y la inflamación  ¿Estará restringida la actividad física?   Es posible que deba descansar la rodilla cierto tiempo. No debe realizar actividad física que empeore raymundo problema de vivian. Si corre, hace actividad física o practica deportes, es posible que no pueda realizar estas actividades hasta que raymundo problema de vivian mejore.  ¿Qué problemas podrían surgir?   Lesiones del cartílago que pueden causar artritis si no se tratan  El dolor dawn puede causar movimiento limitado de la rodillas y sobrepeso si no se trata.  ¿Cómo puede prevenirse beka problema de vivian?   Manténgase activo y eddie ejercicio para mantener queta músculos abel y flexibles.  [place] lentamente y estire los músculos antes de hacer actividad física. Aplique jonathan buena técnica para hacer ejercicio; por ejemplo, vaya aumentando poco a poco la distancia que corre. No eddie actividad física si está demasiado cansado. Tenga sumo cuidado cuando eddie actividad física en clima frío.  Tómese descansos frecuentes cuando realice actividades que requieren movimientos repetidos.  Evite correr sobre superficies  duras o irregulares.  Use zapatos que den buen soporte. No ángel descalzo.  Mantenga un peso saludable para que no haya jonathan sobrecarga adicional en las articulaciones. Coma jonathan dieta saludable para mantener queta músculos sanos.  ¿Cuándo agusto llamar al médico?   Más dificultad para levantarse de jonathan silla, subir o bajar escaleras o caminar  El problema de vivian no mejora o se siente peor  Consejos útiles   Para ejercitarse, procure nadar para tener un impacto rossana sobre las rodillas.  Evite correr josef abajo. Es preferible caminar o correr en zigzag para disminuir el esfuerzo de la parte anterior de la rodilla.  Si siente dolor al subir y bajar escaleras, procure subir o bajar de lado hasta que el dolor disminuya.  Consulte al médico o fisioterapeuta sobre los soportes para rodilla antes de usarlos.  Repita la enseñanza con queta propias palabras (Teach Back): Ayudándolo a comprender   El método de enseñanza recíproca lo ayuda a comprender la información que le proporcionamos. Después de hablar con el personal, cuente con queta propias palabras lo que acaba de aprender. [place] ayuda a asegurar que el personal haya descrito cada cosa con claridad. También ayuda a explicar cosas que pueden christal sido confusas. Antes de irse a casa, asegúrese de poder hacer lo siguiente:  Hablar sobre raymundo afección.  Decir qué ayuda a aliviar el dolor.  Decir qué hará en emily de tener dificultad para pararse de jonathan silla, subir o bajar escaleras o caminar.  ¿Dónde puedo obtener más información?   Be my eyessHTasqe  http://kidshealth.org/teen/food_fitness/sports/runners_knee.html   Exención de responsabilidad y uso de la información del consumidor   Esta información general es un resumen limitado de la información sobre el diagnóstico, el tratamiento y/o la medicación. No pretende ser exhaustivo y debe utilizarse divina jonathan herramienta para ayudar al usuario a comprender y/o evaluar las posibles opciones de diagnóstico y tratamiento. NO incluye toda la  información sobre las enfermedades, los tratamientos, los medicamentos, los efectos secundarios o los riesgos que pueden aplicarse a un paciente específico. No tiene por objeto ser un consejo médico ni un sustituto del consejo médico. [place] pretende reemplazar al diagnóstico o el tratamiento proporcionados por un proveedor de atención médica con base en el examen y la evaluación por parte de beka proveedor de las circunstancias específicas y únicas de un paciente. Los pacientes deben hablar con un proveedor de atención médica para obtener información completa sobre raymundo vivian, preguntas médicas y opciones de tratamiento, incluidos los riesgos o beneficios relacionados con el uso de medicamentos. Esta información no respalda ningún tratamiento o medicamento divina seguro, eficaz o aprobado para tratar a un paciente específico. [employer] y queta afiliados renuncian a cualquier garantía o responsabilidad relacionada con esta información o con el uso que se eddie de esta. El uso de esta información se rige por las Condiciones de uso, disponibles en https://www.wolAvalon Healthcare Holdingsuwer.com/en/know/clinical-effectiveness-terms   Copyright   Copyright © 2024 [employer] y queta licenciantes y/o afiliados. Todos los derechos reservados.

## 2024-12-11 ENCOUNTER — OFFICE VISIT (OUTPATIENT)
Dept: FAMILY MEDICINE CLINIC | Facility: CLINIC | Age: 39
End: 2024-12-11

## 2024-12-11 VITALS
RESPIRATION RATE: 16 BRPM | DIASTOLIC BLOOD PRESSURE: 70 MMHG | TEMPERATURE: 97.5 F | SYSTOLIC BLOOD PRESSURE: 100 MMHG | OXYGEN SATURATION: 98 % | WEIGHT: 157 LBS | HEART RATE: 97 BPM | HEIGHT: 62 IN | BODY MASS INDEX: 28.89 KG/M2

## 2024-12-11 DIAGNOSIS — E53.8 VITAMIN B 12 DEFICIENCY: Chronic | ICD-10-CM

## 2024-12-11 DIAGNOSIS — G43.109 MIGRAINE WITH AURA AND WITHOUT STATUS MIGRAINOSUS, NOT INTRACTABLE: Chronic | ICD-10-CM

## 2024-12-11 DIAGNOSIS — K58.1 IRRITABLE BOWEL SYNDROME WITH CONSTIPATION: ICD-10-CM

## 2024-12-11 DIAGNOSIS — M25.562 CHRONIC PAIN OF LEFT KNEE: Chronic | ICD-10-CM

## 2024-12-11 DIAGNOSIS — G89.29 CHRONIC PAIN OF LEFT KNEE: Chronic | ICD-10-CM

## 2024-12-11 DIAGNOSIS — E55.9 VITAMIN D DEFICIENCY: ICD-10-CM

## 2024-12-11 DIAGNOSIS — R82.90 ABNORMAL URINE ODOR: Primary | ICD-10-CM

## 2024-12-11 DIAGNOSIS — K21.9 GASTROESOPHAGEAL REFLUX DISEASE WITHOUT ESOPHAGITIS: Chronic | ICD-10-CM

## 2024-12-11 LAB
SL AMB  POCT GLUCOSE, UA: NEGATIVE
SL AMB LEUKOCYTE ESTERASE,UA: NEGATIVE
SL AMB POCT BILIRUBIN,UA: NEGATIVE
SL AMB POCT BLOOD,UA: NEGATIVE
SL AMB POCT CLARITY,UA: NORMAL
SL AMB POCT COLOR,UA: NORMAL
SL AMB POCT KETONES,UA: NEGATIVE
SL AMB POCT NITRITE,UA: NEGATIVE
SL AMB POCT PH,UA: 6
SL AMB POCT SPECIFIC GRAVITY,UA: 1.02
SL AMB POCT URINE PROTEIN: NEGATIVE
SL AMB POCT UROBILINOGEN: NORMAL

## 2024-12-11 PROCEDURE — 99214 OFFICE O/P EST MOD 30 MIN: CPT | Performed by: PHYSICIAN ASSISTANT

## 2024-12-11 PROCEDURE — 81003 URINALYSIS AUTO W/O SCOPE: CPT | Performed by: PHYSICIAN ASSISTANT

## 2024-12-11 RX ORDER — ERGOCALCIFEROL 1.25 MG/1
50000 CAPSULE, LIQUID FILLED ORAL WEEKLY
Qty: 16 CAPSULE | Refills: 1 | Status: SHIPPED | OUTPATIENT
Start: 2024-12-11

## 2024-12-11 NOTE — ASSESSMENT & PLAN NOTE
- Reviewed vitamin D level from November 2024.  - Continue vitamin D 50,000 units once weekly.    Orders:    ergocalciferol (VITAMIN D2) 50,000 units; Take 1 capsule (50,000 Units total) by mouth once a week

## 2024-12-11 NOTE — ASSESSMENT & PLAN NOTE
-Reviewed gastroenterology note from 10/14/2024.  - Patient having regular bowel movements and less bloating/abdominal distention since undergoing left salpingo-oophorectomy.  - Continue bisacodyl 10 mg as needed for constipation.  - Continue dicyclomine as needed for abdominal pain.  -Patient has also been drinking coffee which helps to relieve constipation.

## 2024-12-11 NOTE — ASSESSMENT & PLAN NOTE
-Reviewed vitamin B12 level from November 2024.  Level improved, but still below goal.  Neurology has set up patient to receive once weekly vitamin B12 injections.

## 2024-12-11 NOTE — ASSESSMENT & PLAN NOTE
- Reviewed MRI left knee without contrast (3/1/23) which showed: No meniscal tear or other internal derangement.  -Patient had previously established with orthopedics and underwent CSI on 3/23/2023 with no relief.  Patient then completed physical therapy for about 3 sessions.  -Reviewed orthopedic note from 12/10/2024.  Patient provided patellar stabilizing brace and referred back to physical therapy.  - Patient scheduled to start physical therapy next week.  - Advised to take Tylenol or ibuprofen as needed for pain.   - Advised to apply ice/ heating pad as needed to the affected area.

## 2024-12-11 NOTE — PROGRESS NOTES
Name: Lorrie Jones      : 1985      MRN: [id number]  Encounter Provider: Margarita Walker PA-C  Encounter Date: 2024   Encounter department: [facility]  :  Assessment & Plan  Abnormal urine odor  - POCT urine dip negative for infection.  - Recommend increased hydration.   -Recommend schedule follow-up if symptoms persist or new symptoms arise such as pain or burning with urination, urinary frequency, urgency, flank pain.  Orders:    POCT urine dip auto non-scope    Vitamin D deficiency  - Reviewed vitamin D level from 2024.  - Continue vitamin D 50,000 units once weekly.    Orders:    ergocalciferol (VITAMIN D2) 50,000 units; Take 1 capsule (50,000 Units total) by mouth once a week    Gastroesophageal reflux disease without esophagitis  -Patient underwent EGD in 2024.  Biopsies are positive for acid irritation.  -Reviewed gastroenterology note from 10/14/2024.  - Continue pantoprazole 20 mg daily.  - Continue to avoid trigger foods.         Vitamin B 12 deficiency  -Reviewed vitamin B12 level from 2024.  Level improved, but still below goal.  Neurology has set up patient to receive once weekly vitamin B12 injections.         Migraine with aura and without status migrainosus, not intractable  -Reviewed neurology note from 2024.  Continue taking Reglan and sumatriptan together as needed for headaches.  - Continue nerivio, although patient reports her insurance does not cover it.   - Continue follow-up with neurology as scheduled.         Chronic pain of left knee  - Reviewed MRI left knee without contrast (3/1/23) which showed: No meniscal tear or other internal derangement.  -Patient had previously established with orthopedics and underwent CSI on 3/23/2023 with no relief.  Patient then completed physical therapy for about 3 sessions.  -Reviewed orthopedic note from 12/10/2024.  Patient provided patellar stabilizing brace and  referred back to physical therapy.  - Patient scheduled to start physical therapy next week.  - Advised to take Tylenol or ibuprofen as needed for pain.   - Advised to apply ice/ heating pad as needed to the affected area.            Irritable bowel syndrome with constipation  -Reviewed gastroenterology note from 10/14/2024.  - Patient having regular bowel movements and less bloating/abdominal distention since undergoing left salpingo-oophorectomy.  - Continue bisacodyl 10 mg as needed for constipation.  - Continue dicyclomine as needed for abdominal pain.  -Patient has also been drinking coffee which helps to relieve constipation.           -Reviewed CBC, CMP, TSH, lipid panel, iron, ferritin, hemoglobin A1c, vitamin D, vitamin B12 from 11/12/2024.       History of Present Illness     Patient is a 39 y.o. female whom  has a past medical history of Abnormal Pap smear of cervix, Allergic, Depression, Headache(784.0), and Migraine with aura. who is seen today in office for knee pain follow up.    -Patient notes it has been frustrating because she has had many interviews for multiple jobs but she has been unable to secure a job.  Patient notes in the meantime she has been doing [employer].  Patient notes she does eat a lot of fast food when she is working this job.  Patient notes she does drink soda often because if she does not drink it, then she gets headaches.  Patient notes she has been drinking coffee because this helps with her constipation.  She reports taking the constipation medications as needed which help.    -Patient notes she saw orthopedics yesterday and is now going to try physical therapy again.    -Patient notes for the past few days she has been experiencing a strong odor of her urine.  She denies any associated urinary frequency, urgency, pain or burning with urination, increased abdominal pain or pelvic pain, flank pain, fevers, chills, nausea, vomiting, blood in the urine.  Patient reports moderate  "water intake.      Review of Systems   Constitutional:  Negative for chills and fever.   HENT:  Negative for congestion, ear pain and sore throat.    Eyes:  Negative for pain.   Respiratory:  Negative for cough, chest tightness, shortness of breath and wheezing.    Cardiovascular:  Negative for chest pain and palpitations.   Gastrointestinal:  Negative for abdominal pain, constipation, diarrhea, nausea and vomiting.   Genitourinary:  Negative for difficulty urinating, dysuria and pelvic pain.   Musculoskeletal:  Positive for arthralgias (left knee pain) and back pain. Negative for gait problem.   Skin:  Negative for rash.   Neurological:  Positive for headaches (occasionally). Negative for dizziness and numbness.       Objective   /70 (BP Location: Left arm, Patient Position: Sitting, Cuff Size: Standard)   Pulse 97   Temp 97.5 °F (36.4 °C) (Temporal)   Resp 16   Ht 5' 2\" (1.575 m)   Wt 71.2 kg (157 lb)   SpO2 98%   BMI 28.72 kg/m²      Physical Exam  Vitals and nursing note reviewed.   Constitutional:       General: She is not in acute distress.     Appearance: She is well-developed.   HENT:      Head: Normocephalic and atraumatic.      Right Ear: External ear normal.      Left Ear: External ear normal.      Nose: Nose normal.      Mouth/Throat:      Pharynx: Uvula midline.   Eyes:      Conjunctiva/sclera: Conjunctivae normal.   Cardiovascular:      Rate and Rhythm: Normal rate and regular rhythm.      Pulses: Normal pulses.      Heart sounds: Normal heart sounds. No murmur heard.  Pulmonary:      Effort: Pulmonary effort is normal. No respiratory distress.      Breath sounds: Normal breath sounds. No wheezing.   Musculoskeletal:      Cervical back: Normal range of motion and neck supple.   Skin:     General: Skin is warm.   Neurological:      Mental Status: She is alert and oriented to person, place, and time.   Psychiatric:         Speech: Speech normal.         Behavior: Behavior normal.         "

## 2024-12-16 ENCOUNTER — CLINICAL SUPPORT (OUTPATIENT)
Dept: NEUROLOGY | Facility: CLINIC | Age: 39
End: 2024-12-16
Payer: COMMERCIAL

## 2024-12-16 DIAGNOSIS — E53.8 B12 DEFICIENCY: Primary | ICD-10-CM

## 2024-12-16 PROCEDURE — 96372 THER/PROPH/DIAG INJ SC/IM: CPT | Performed by: PSYCHIATRY & NEUROLOGY

## 2024-12-16 RX ADMIN — CYANOCOBALAMIN 1000 MCG: 1000 INJECTION, SOLUTION INTRAMUSCULAR; SUBCUTANEOUS at 08:00

## 2024-12-16 NOTE — PROGRESS NOTES
Patient tolerated injection of 1mL of B12 in left upper deltoid. Patient did not complain of any adverse side effects.

## 2024-12-17 DIAGNOSIS — R68.81 EARLY SATIETY: ICD-10-CM

## 2024-12-17 DIAGNOSIS — K59.00 ACUTE CONSTIPATION: ICD-10-CM

## 2024-12-17 DIAGNOSIS — R14.0 BLOATING: ICD-10-CM

## 2024-12-17 DIAGNOSIS — R10.84 GENERALIZED ABDOMINAL PAIN: ICD-10-CM

## 2024-12-17 DIAGNOSIS — R11.0 NAUSEA: ICD-10-CM

## 2024-12-17 RX ORDER — ASPIRIN 81 MG
1 TABLET, DELAYED RELEASE (ENTERIC COATED) ORAL DAILY
Qty: 30 TABLET | Refills: 5 | Status: SHIPPED | OUTPATIENT
Start: 2024-12-17

## 2024-12-18 ENCOUNTER — EVALUATION (OUTPATIENT)
Dept: PHYSICAL THERAPY | Facility: CLINIC | Age: 39
End: 2024-12-18
Payer: COMMERCIAL

## 2024-12-18 DIAGNOSIS — M22.2X2 PATELLOFEMORAL DISORDER OF LEFT KNEE: Primary | ICD-10-CM

## 2024-12-18 DIAGNOSIS — G89.29 CHRONIC PAIN OF LEFT KNEE: Chronic | ICD-10-CM

## 2024-12-18 DIAGNOSIS — M25.562 CHRONIC PAIN OF LEFT KNEE: Chronic | ICD-10-CM

## 2024-12-18 PROCEDURE — 97161 PT EVAL LOW COMPLEX 20 MIN: CPT

## 2024-12-18 PROCEDURE — 97112 NEUROMUSCULAR REEDUCATION: CPT
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
## 2024-12-20 ENCOUNTER — TELEPHONE (OUTPATIENT)
Dept: NEUROLOGY | Facility: CLINIC | Age: 39
End: 2024-12-20

## 2024-12-20 ENCOUNTER — TELEPHONE (OUTPATIENT)
Age: 39
End: 2024-12-20

## 2024-12-20 ENCOUNTER — CLINICAL SUPPORT (OUTPATIENT)
Dept: NEUROLOGY | Facility: CLINIC | Age: 39
End: 2024-12-20
Payer: COMMERCIAL

## 2024-12-20 VITALS — TEMPERATURE: 97.7 F

## 2024-12-20 DIAGNOSIS — E53.8 VITAMIN B 12 DEFICIENCY: Primary | ICD-10-CM

## 2024-12-20 PROCEDURE — 96372 THER/PROPH/DIAG INJ SC/IM: CPT | Performed by: PSYCHIATRY & NEUROLOGY

## 2024-12-20 RX ADMIN — CYANOCOBALAMIN 1000 MCG: 1000 INJECTION, SOLUTION INTRAMUSCULAR; SUBCUTANEOUS at 11:18

## 2024-12-20 NOTE — TELEPHONE ENCOUNTER
Spoke with Pt alongside  in regards to her B12 Injection. Rescheduled from 12/23 to 12/20 at 11:30 due to job requirements.

## 2024-12-20 NOTE — TELEPHONE ENCOUNTER
Patient called in and is requesting to R/s the B12 injection appt on 12/23/24 at 8:15 am -[facility].     Pt has 1 day of starting her new job and needs to r/s for another date.     Please assist.

## 2024-12-27 ENCOUNTER — TELEPHONE (OUTPATIENT)
Age: 39
End: 2024-12-27

## 2024-12-27 NOTE — TELEPHONE ENCOUNTER
12/27/24    Patient called office today and requested for NURSE Appt to be reschedule for 01/03/24 or 01/13/24 if possible or any day that can be offered.     Patient stated that she is not able to attend appt due that she is sick.     Please contact patient with new appt date.   Thank You.       12/30/24 Appt Canceled per Patient Request.

## 2025-01-06 ENCOUNTER — CLINICAL SUPPORT (OUTPATIENT)
Dept: NEUROLOGY | Facility: CLINIC | Age: 40
End: 2025-01-06
Payer: COMMERCIAL

## 2025-01-06 DIAGNOSIS — E53.8 B12 DEFICIENCY: Primary | ICD-10-CM

## 2025-01-06 PROCEDURE — 96372 THER/PROPH/DIAG INJ SC/IM: CPT | Performed by: PSYCHIATRY & NEUROLOGY

## 2025-01-06 RX ADMIN — CYANOCOBALAMIN 1000 MCG: 1000 INJECTION, SOLUTION INTRAMUSCULAR; SUBCUTANEOUS at 08:29

## 2025-01-07 ENCOUNTER — APPOINTMENT (EMERGENCY)
Dept: ULTRASOUND IMAGING | Facility: HOSPITAL | Age: 40
End: 2025-01-07
Payer: COMMERCIAL

## 2025-01-07 ENCOUNTER — HOSPITAL ENCOUNTER (EMERGENCY)
Facility: HOSPITAL | Age: 40
Discharge: HOME | End: 2025-01-07
Attending: EMERGENCY MEDICINE
Payer: COMMERCIAL

## 2025-01-07 VITALS
SYSTOLIC BLOOD PRESSURE: 116 MMHG | OXYGEN SATURATION: 99 % | HEART RATE: 79 BPM | TEMPERATURE: 98 F | RESPIRATION RATE: 18 BRPM | WEIGHT: 157.85 LBS | BODY MASS INDEX: 28.87 KG/M2 | DIASTOLIC BLOOD PRESSURE: 70 MMHG

## 2025-01-07 DIAGNOSIS — R10.2 PELVIC PAIN: Primary | ICD-10-CM

## 2025-01-07 DIAGNOSIS — G43.909 MIGRAINE: ICD-10-CM

## 2025-01-07 LAB
ALBUMIN SERPL BCG-MCNC: 4.1 G/DL (ref 3.5–5)
ALP SERPL-CCNC: 60 U/L (ref 34–104)
ALT SERPL W P-5'-P-CCNC: 34 U/L (ref 7–52)
ANION GAP SERPL CALCULATED.3IONS-SCNC: 8 MMOL/L (ref 4–13)
AST SERPL W P-5'-P-CCNC: 25 U/L (ref 13–39)
BASOPHILS # BLD AUTO: 0.04 THOUSANDS/ΜL (ref 0–0.1)
BASOPHILS NFR BLD AUTO: 0 % (ref 0–1)
BILIRUB SERPL-MCNC: 0.38 MG/DL (ref 0.2–1)
BILIRUB UR QL STRIP: NEGATIVE
BUN SERPL-MCNC: 14 MG/DL (ref 5–25)
CALCIUM SERPL-MCNC: 8.7 MG/DL (ref 8.4–10.2)
CHLORIDE SERPL-SCNC: 107 MMOL/L (ref 96–108)
CLARITY UR: CLEAR
CO2 SERPL-SCNC: 22 MMOL/L (ref 21–32)
COLOR UR: YELLOW
CREAT SERPL-MCNC: 0.68 MG/DL (ref 0.6–1.3)
EOSINOPHIL # BLD AUTO: 0.13 THOUSAND/ΜL (ref 0–0.61)
EOSINOPHIL NFR BLD AUTO: 1 % (ref 0–6)
ERYTHROCYTE [DISTWIDTH] IN BLOOD BY AUTOMATED COUNT: 12.3 % (ref 11.6–15.1)
EXT PREGNANCY TEST URINE: NEGATIVE
EXT. CONTROL: NORMAL
GFR SERPL CREATININE-BSD FRML MDRD: 111 ML/MIN/1.73SQ M
GLUCOSE SERPL-MCNC: 91 MG/DL (ref 65–140)
GLUCOSE UR STRIP-MCNC: NEGATIVE MG/DL
HCT VFR BLD AUTO: 37.1 % (ref 34.8–46.1)
HGB BLD-MCNC: 12.4 G/DL (ref 11.5–15.4)
HGB UR QL STRIP.AUTO: NEGATIVE
IMM GRANULOCYTES # BLD AUTO: 0.04 THOUSAND/UL (ref 0–0.2)
IMM GRANULOCYTES NFR BLD AUTO: 0 % (ref 0–2)
KETONES UR STRIP-MCNC: NEGATIVE MG/DL
LEUKOCYTE ESTERASE UR QL STRIP: NEGATIVE
LYMPHOCYTES # BLD AUTO: 2.53 THOUSANDS/ΜL (ref 0.6–4.47)
LYMPHOCYTES NFR BLD AUTO: 26 % (ref 14–44)
MCH RBC QN AUTO: 30.6 PG (ref 26.8–34.3)
MCHC RBC AUTO-ENTMCNC: 33.4 G/DL (ref 31.4–37.4)
MCV RBC AUTO: 92 FL (ref 82–98)
MONOCYTES # BLD AUTO: 0.64 THOUSAND/ΜL (ref 0.17–1.22)
MONOCYTES NFR BLD AUTO: 6 % (ref 4–12)
NEUTROPHILS # BLD AUTO: 6.55 THOUSANDS/ΜL (ref 1.85–7.62)
NEUTS SEG NFR BLD AUTO: 67 % (ref 43–75)
NITRITE UR QL STRIP: NEGATIVE
NRBC BLD AUTO-RTO: 0 /100 WBCS
PH UR STRIP.AUTO: 7 [PH]
PLATELET # BLD AUTO: 260 THOUSANDS/UL (ref 149–390)
PMV BLD AUTO: 10.6 FL (ref 8.9–12.7)
POTASSIUM SERPL-SCNC: 4.5 MMOL/L (ref 3.5–5.3)
PROT SERPL-MCNC: 6.8 G/DL (ref 6.4–8.4)
PROT UR STRIP-MCNC: NEGATIVE MG/DL
RBC # BLD AUTO: 4.05 MILLION/UL (ref 3.81–5.12)
SODIUM SERPL-SCNC: 137 MMOL/L (ref 135–147)
SP GR UR STRIP.AUTO: 1.01 (ref 1–1.04)
UROBILINOGEN UA: 1 MG/DL
WBC # BLD AUTO: 9.93 THOUSAND/UL (ref 4.31–10.16)

## 2025-01-07 PROCEDURE — 36415 COLL VENOUS BLD VENIPUNCTURE: CPT | Performed by: EMERGENCY MEDICINE

## 2025-01-07 PROCEDURE — 96374 THER/PROPH/DIAG INJ IV PUSH: CPT

## 2025-01-07 PROCEDURE — 99284 EMERGENCY DEPT VISIT MOD MDM: CPT

## 2025-01-07 PROCEDURE — 96361 HYDRATE IV INFUSION ADD-ON: CPT

## 2025-01-07 PROCEDURE — 76856 US EXAM PELVIC COMPLETE: CPT

## 2025-01-07 PROCEDURE — 85025 COMPLETE CBC W/AUTO DIFF WBC: CPT | Performed by: EMERGENCY MEDICINE

## 2025-01-07 PROCEDURE — 96375 TX/PRO/DX INJ NEW DRUG ADDON: CPT

## 2025-01-07 PROCEDURE — 76830 TRANSVAGINAL US NON-OB: CPT

## 2025-01-07 PROCEDURE — 81003 URINALYSIS AUTO W/O SCOPE: CPT | Performed by: EMERGENCY MEDICINE

## 2025-01-07 PROCEDURE — 81025 URINE PREGNANCY TEST: CPT | Performed by: EMERGENCY MEDICINE

## 2025-01-07 PROCEDURE — 99284 EMERGENCY DEPT VISIT MOD MDM: CPT | Performed by: EMERGENCY MEDICINE

## 2025-01-07 PROCEDURE — 80053 COMPREHEN METABOLIC PANEL: CPT | Performed by: EMERGENCY MEDICINE

## 2025-01-07 RX ORDER — DIPHENHYDRAMINE HYDROCHLORIDE 50 MG/ML
25 INJECTION INTRAMUSCULAR; INTRAVENOUS ONCE
Status: COMPLETED | OUTPATIENT
Start: 2025-01-07 | End: 2025-01-07

## 2025-01-07 RX ORDER — METOCLOPRAMIDE HYDROCHLORIDE 5 MG/ML
10 INJECTION INTRAMUSCULAR; INTRAVENOUS ONCE
Status: COMPLETED | OUTPATIENT
Start: 2025-01-07 | End: 2025-01-07

## 2025-01-07 RX ORDER — ONDANSETRON 4 MG/1
4 TABLET, ORALLY DISINTEGRATING ORAL ONCE
Status: COMPLETED | OUTPATIENT
Start: 2025-01-07 | End: 2025-01-07

## 2025-01-07 RX ORDER — DICYCLOMINE HCL 20 MG
20 TABLET ORAL ONCE
Status: COMPLETED | OUTPATIENT
Start: 2025-01-07 | End: 2025-01-07

## 2025-01-07 RX ORDER — KETOROLAC TROMETHAMINE 30 MG/ML
15 INJECTION, SOLUTION INTRAMUSCULAR; INTRAVENOUS ONCE
Status: COMPLETED | OUTPATIENT
Start: 2025-01-07 | End: 2025-01-07

## 2025-01-07 RX ADMIN — DIPHENHYDRAMINE HYDROCHLORIDE 25 MG: 50 INJECTION, SOLUTION INTRAMUSCULAR; INTRAVENOUS at 16:40

## 2025-01-07 RX ADMIN — ONDANSETRON 4 MG: 4 TABLET, ORALLY DISINTEGRATING ORAL at 18:46

## 2025-01-07 RX ADMIN — DICYCLOMINE HYDROCHLORIDE 20 MG: 20 TABLET ORAL at 18:46

## 2025-01-07 RX ADMIN — KETOROLAC TROMETHAMINE 15 MG: 30 INJECTION, SOLUTION INTRAMUSCULAR; INTRAVENOUS at 16:40

## 2025-01-07 RX ADMIN — SODIUM CHLORIDE 1000 ML: 0.9 INJECTION, SOLUTION INTRAVENOUS at 16:40

## 2025-01-07 RX ADMIN — METOCLOPRAMIDE 10 MG: 5 INJECTION, SOLUTION INTRAMUSCULAR; INTRAVENOUS at 16:41

## 2025-01-07 NOTE — Clinical Note
Lorrie Jones was seen and treated in our emergency department on 1/7/2025.                Diagnosis:     Lorrie  may return to work on return date.    She may return on this date: 01/09/2025         If you have any questions or concerns, please don't hesitate to call.      Howard Spear MD    ______________________________           _______________          _______________  Hospital Representative                              Date                                Time

## 2025-01-07 NOTE — ED PROVIDER NOTES
Time reflects when diagnosis was documented in both MDM as applicable and the Disposition within this note       Time User Action Codes Description Comment    1/7/2025  6:33 PM Howard Spear Add [R10.2] Pelvic pain     1/7/2025  6:33 PM Howard Spear Add [G43.909] Migraine           ED Disposition       ED Disposition   Discharge    Condition   Stable    Date/Time   Tue Jan 7, 2025  6:33 PM    Comment   Lorrie Jones discharge to home/self care.                   Assessment & Plan       Medical Decision Making  39-year-old female with headache, similar to her chronic migraines.  Will give migraine cocktail for this.  Left pelvic pain differential includes ovarian torsion, ectopic pregnancy.  Pelvic ultrasound pending.  Pelvic ultrasound shows left oophorectomy status as well as hysterectomy status.  There is no acute findings.  Did not know prior to ultrasound there was no ovary on the left pelvis.  Migraine resolved after migraine cocktail.    Amount and/or Complexity of Data Reviewed  Labs: ordered. Decision-making details documented in ED Course.  Radiology: ordered.    Risk  Prescription drug management.        ED Course as of 01/07/25 1909   Tue Jan 07, 2025   1719 Platelet Count: 260       Medications   sodium chloride 0.9 % bolus 1,000 mL (0 mL Intravenous Stopped 1/7/25 1849)   ketorolac (TORADOL) injection 15 mg (15 mg Intravenous Given 1/7/25 1640)   diphenhydrAMINE (BENADRYL) injection 25 mg (25 mg Intravenous Given 1/7/25 1640)   metoclopramide (REGLAN) injection 10 mg (10 mg Intravenous Given 1/7/25 1641)   dicyclomine (BENTYL) tablet 20 mg (20 mg Oral Given 1/7/25 1846)   ondansetron (ZOFRAN-ODT) dispersible tablet 4 mg (4 mg Oral Given 1/7/25 1846)       ED Risk Strat Scores                          SBIRT 22yo+      Flowsheet Row Most Recent Value   Initial Alcohol Screen: US AUDIT-C     1. How often do you have a drink containing alcohol? 0 Filed at: 01/07/2025 1606   2. How many drinks  containing alcohol do you have on a typical day you are drinking?  0 Filed at: 01/07/2025 1606   3a. Male UNDER 65: How often do you have five or more drinks on one occasion? 0 Filed at: 01/07/2025 1606   3b. FEMALE Any Age, or MALE 65+: How often do you have 4 or more drinks on one occassion? 0 Filed at: 01/07/2025 1606   Audit-C Score 0 Filed at: 01/07/2025 1606   LISBETH: How many times in the past year have you...    Used an illegal drug or used a prescription medication for non-medical reasons? Never Filed at: 01/07/2025 1606                            History of Present Illness       Chief Complaint   Patient presents with    Pelvic Pain     Patient reports lower left pelvic pain, taking tylenol and ibuprofen with no relief. Denies any bleeding.     Migraine     Took sumatriptan 4 hours ago, still in pain, top of her head.        Past Medical History:   Diagnosis Date    Abnormal Pap smear of cervix     2015 cryosurgery; 6/2019 NILM pap/neg HPV    Allergic     Depression     Headache(784.0)     Migraine with aura       Past Surgical History:   Procedure Laterality Date    CHOLECYSTECTOMY LAPAROSCOPIC N/A 10/27/2020    Procedure: CHOLECYSTECTOMY LAPAROSCOPIC;  Surgeon: Lina Narayan MD;  Location:  MAIN OR;  Service: General    COLONOSCOPY      FL LUMBAR PUNCTURE DIAGNOSTIC  11/17/2023    GYNECOLOGIC CRYOSURGERY  2015    HYSTERECTOMY  2016    supracervical    LAPAROSCOPY  2018    VT LAPAROSCOPY W/LYSIS OF ADHESIONS N/A 03/03/2020    Procedure: L.O.A.;  Surgeon: Noy Paz MD;  Location: [facility];  Service: Gynecology    VT LAPS ABD PRTM&OMENTUM DX W/WO SPEC BR/WA SPX N/A 08/09/2019    Procedure: LAPAROSCOPY DIAGNOSTIC; LYSIS OF ADHESIONS;  Surgeon: Abebe Walls MD;  Location: [facility];  Service: Gynecology    TUBAL LIGATION      UPPER GASTROINTESTINAL ENDOSCOPY        Family History   Problem Relation Age of Onset    Cancer Paternal Grandmother         uterine    Cancer Paternal  Aunt         uterine     Migraines Mother     Arthritis Mother     Hypertension Father     Breast cancer Neg Hx     Colon cancer Neg Hx     Ovarian cancer Neg Hx       Social History     Tobacco Use    Smoking status: Every Day     Current packs/day: 0.50     Average packs/day: 0.5 packs/day for 15.0 years (7.5 ttl pk-yrs)     Types: Cigarettes     Passive exposure: Current    Smokeless tobacco: Never   Vaping Use    Vaping status: Never Used   Substance Use Topics    Alcohol use: Never    Drug use: Never      E-Cigarette/Vaping    E-Cigarette Use Never User       E-Cigarette/Vaping Substances    Nicotine No     THC No     CBD No     Flavoring No     Other No     Unknown No       I have reviewed and agree with the history as documented.     39-year-old female present emerged department with 4 hours of headache as well as few days of left pelvic pain that is cramping in nature.  Patient notes that it is different than her previous pains.  Of note patient has had multiple CAT scans and ultrasounds of the pelvis over the past year for similar pain both on the right side as well as the left side.  Nausea but no vomiting.        Review of Systems   Constitutional:  Negative for chills and fever.   HENT:  Negative for ear pain and sore throat.    Eyes:  Negative for pain and visual disturbance.   Respiratory:  Negative for cough and shortness of breath.    Cardiovascular:  Negative for chest pain and palpitations.   Gastrointestinal:  Positive for nausea. Negative for abdominal pain and vomiting.   Genitourinary:  Positive for pelvic pain. Negative for dysuria and hematuria.   Musculoskeletal:  Negative for arthralgias and back pain.   Skin:  Negative for color change and rash.   Neurological:  Negative for seizures and syncope.   All other systems reviewed and are negative.          Objective       ED Triage Vitals   Temperature Pulse Blood Pressure Respirations SpO2 Patient Position - Orthostatic VS   01/07/25 1600  01/07/25 1603 01/07/25 1603 01/07/25 1603 01/07/25 1603 01/07/25 1603   99.1 °F (37.3 °C) 82 126/73 18 98 % Lying      Temp Source Heart Rate Source BP Location FiO2 (%) Pain Score    01/07/25 1600 01/07/25 1817 01/07/25 1603 -- 01/07/25 1604    Oral Monitor Left arm  8      Vitals      Date and Time Temp Pulse SpO2 Resp BP Pain Score FACES Pain Rating User   01/07/25 1817 98 °F (36.7 °C) 79 99 % 18 116/70 1 -- SN   01/07/25 1604 -- -- -- -- -- 8 -- SA   01/07/25 1603 -- 82 98 % 18 126/73 -- -- CO   01/07/25 1600 99.1 °F (37.3 °C) -- -- -- -- -- -- CO            Physical Exam  Vitals and nursing note reviewed.   Constitutional:       General: She is not in acute distress.     Appearance: She is well-developed.   HENT:      Head: Normocephalic and atraumatic.   Eyes:      Conjunctiva/sclera: Conjunctivae normal.   Cardiovascular:      Rate and Rhythm: Normal rate and regular rhythm.      Heart sounds: No murmur heard.  Pulmonary:      Effort: Pulmonary effort is normal. No respiratory distress.      Breath sounds: Normal breath sounds.   Abdominal:      Palpations: Abdomen is soft.      Tenderness: There is abdominal tenderness. There is no right CVA tenderness or left CVA tenderness.      Comments: Left lower abdominal/pelvic tenderness to palpation   Musculoskeletal:         General: No swelling.      Cervical back: Neck supple.   Skin:     General: Skin is warm and dry.      Capillary Refill: Capillary refill takes less than 2 seconds.   Neurological:      Mental Status: She is alert.   Psychiatric:         Mood and Affect: Mood normal.         Results Reviewed       Procedure Component Value Units Date/Time    UA w Reflex to Microscopic w Reflex to Culture [[id number]]  (Normal) Collected: 01/07/25 1644    Lab Status: Final result Specimen: Urine, Clean Catch Updated: 01/07/25 1675     Color, UA Yellow     Clarity, UA Clear     Specific Gravity, UA 1.010     pH, UA 7.0     Leukocytes, UA Negative     Nitrite, UA  Negative     Protein, UA Negative mg/dl      Glucose, UA Negative mg/dl      Ketones, UA Negative mg/dl      Bilirubin, UA Negative     Occult Blood, UA Negative     UROBILINOGEN UA 1.0 mg/dL     POCT pregnancy, urine [[id number]]  (Normal) Collected: 01/07/25 1738    Lab Status: Final result Updated: 01/07/25 1738     EXT Preg Test, Ur Negative     Control Valid    Comprehensive metabolic panel [[id number]] Collected: 01/07/25 1647    Lab Status: Final result Specimen: Blood from Arm, Right Updated: 01/07/25 1717     Sodium 137 mmol/L      Potassium 4.5 mmol/L      Chloride 107 mmol/L      CO2 22 mmol/L      ANION GAP 8 mmol/L      BUN 14 mg/dL      Creatinine 0.68 mg/dL      Glucose 91 mg/dL      Calcium 8.7 mg/dL      AST 25 U/L      ALT 34 U/L      Alkaline Phosphatase 60 U/L      Total Protein 6.8 g/dL      Albumin 4.1 g/dL      Total Bilirubin 0.38 mg/dL      eGFR 111 ml/min/1.73sq m     Narrative:      [employer] guidelines for Chronic Kidney Disease (CKD):     Stage 1 with normal or high GFR (GFR > 90 mL/min/1.73 square meters)    Stage 2 Mild CKD (GFR = 60-89 mL/min/1.73 square meters)    Stage 3A Moderate CKD (GFR = 45-59 mL/min/1.73 square meters)    Stage 3B Moderate CKD (GFR = 30-44 mL/min/1.73 square meters)    Stage 4 Severe CKD (GFR = 15-29 mL/min/1.73 square meters)    Stage 5 End Stage CKD (GFR <15 mL/min/1.73 square meters)  Note: GFR calculation is accurate only with a steady state creatinine    CBC and differential [[id number]] Collected: 01/07/25 1647    Lab Status: Final result Specimen: Blood from Arm, Right Updated: 01/07/25 1654     WBC 9.93 Thousand/uL      RBC 4.05 Million/uL      Hemoglobin 12.4 g/dL      Hematocrit 37.1 %      MCV 92 fL      MCH 30.6 pg      MCHC 33.4 g/dL      RDW 12.3 %      MPV 10.6 fL      Platelets 260 Thousands/uL      nRBC 0 /100 WBCs      Segmented % 67 %      Immature Grans % 0 %      Lymphocytes % 26 %      Monocytes % 6 %      Eosinophils  Relative 1 %      Basophils Relative 0 %      Absolute Neutrophils 6.55 Thousands/µL      Absolute Immature Grans 0.04 Thousand/uL      Absolute Lymphocytes 2.53 Thousands/µL      Absolute Monocytes 0.64 Thousand/µL      Eosinophils Absolute 0.13 Thousand/µL      Basophils Absolute 0.04 Thousands/µL             US pelvis complete w transvaginal   Final Interpretation by Karin Calderon MD (01/07 1845)      Status post hysterectomy and left oophorectomy. No acute findings in the pelvis. No free fluid or fluid collection.      Resolution of right adnexal cystic lesion noted on the prior pelvic ultrasound exam.                           Workstation performed: [id number]             Procedures    ED Medication and Procedure Management   Prior to Admission Medications   Prescriptions Last Dose Informant Patient Reported? Taking?   Diclofenac Sodium (VOLTAREN) 1 %  Self No No   Sig: Apply 2 g topically 4 (four) times a day   Ex-Lax 15 MG CHEW   No No   Sig: CHEW 1 TABLET BY MOUTH TWICE DAILY AS NEEDED FOR CONSTIPATION   Senna Plus 8.6-50 MG per tablet   No No   Sig: TAKE 1 TABLET BY MOUTH DAILY   ascorbic acid (VITAMIN C) 500 mg tablet   No No   Sig: TAKE 1 TABLET (500 MG TOTAL) BY MOUTH DAILY   bisacodyl 5 MG EC tablet   No No   Sig: TAKE 2 TABLETS (10 MG TOTAL) BY MOUTH 2 (TWO) TIMES A DAY AS NEEDED FOR CONSTIPATION (CONSTIPATION) COLONOSCOPY PREP   Patient not taking: Reported on 12/11/2024   celecoxib (CeleBREX) 100 mg capsule   No No   Sig: TAKE 1 CAPSULE (100 MG TOTAL) BY MOUTH 2 (TWO) TIMES A DAY AS NEEDED FOR MILD PAIN   dexamethasone (DECADRON) 2 mg tablet   No No   Sig: TAKE 1 TABLET (2 MG TOTAL) BY MOUTH DAILY WITH BREAKFAST   dicyclomine (BENTYL) 20 mg tablet   No No   Sig: Take 1 tablet (20 mg total) by mouth 2 (two) times a day as needed (abdominal pain)   ergocalciferol (VITAMIN D2) 50,000 units   No No   Sig: Take 1 capsule (50,000 Units total) by mouth once a week   famotidine (PEPCID) 20 mg tablet   No  No   Sig: Take 1 tablet (20 mg total) by mouth daily for 14 days   fluocinolone acetonide (DERMOTIC) 0.01 % otic oil  Self No No   Sig: INSTILL 3 DROPS IN BOTH EARS DAILY   fluticasone (FLONASE) 50 mcg/act nasal spray  Self No No   Si spray into each nostril 2 (two) times a day   Patient not taking: Reported on 3/20/2024   ibuprofen (MOTRIN) 800 mg tablet   Yes No   Sig: Take 800 mg by mouth every 8 (eight) hours as needed   meclizine (ANTIVERT) 25 mg tablet   Yes No   Sig: Take 25 mg by mouth Three times daily as needed   methocarbamol (ROBAXIN) 500 mg tablet   No No   Sig: Take 1 tablet (500 mg total) by mouth 2 (two) times a day as needed for muscle spasms   metoclopramide (Reglan) 10 mg tablet   No No   Sig: Take 1 tablet (10 mg total) by mouth 3 (three) times a day as needed (migraine)   miconazole (MONISTAT-7) 2 % vaginal cream   No No   Sig: Insert 1 applicator into the vagina daily at bedtime   Patient not taking: Reported on 10/14/2024   naproxen (EC NAPROSYN) 500 MG EC tablet  Self Yes No   Sig: Take 500 mg by mouth if needed for mild pain   nicotine (NICODERM CQ) 14 mg/24hr TD 24 hr patch   No No   Sig: PLACE 1 PATCH ON THE SKIN OVER 24 HOURS EVERY 24 HOURS   nystatin-triamcinolone (MYCOLOG-II) cream  Self No No   Sig: Apply topically 2 (two) times a day   pantoprazole (PROTONIX) 20 mg tablet   No No   Sig: TAKE 1 TABLET (20 MG TOTAL) BY MOUTH DAILY   polyethylene glycol (MIRALAX) 17 g packet  Self No No   Sig: Take 17 g by mouth daily   polyethylene glycol (MIRALAX) 17 g packet  Self No No   Sig: Take 17 g by mouth daily   rizatriptan (Maxalt) 10 mg tablet   No No   Sig: Take 1 tablet (10 mg total) by mouth as needed for migraine Take at the onset of migraine; if symptoms continue or return, may take another dose at least 2 hours after first dose. Take no more than 2 doses in a day.   sodium chloride 0.9 % nebulizer solution  Self No No   Sig: TAKE 3ML BY BEBULIZATION EVERY 6 HOURS AS NEEDED FOR  WHEEZING OR SHORTNESS OF BREATH   Patient not taking: No sig reported   vitamin B-12 (VITAMIN B-12) 1,000 mcg tablet   No No   Sig: Take 1 tablet (1,000 mcg total) by mouth daily      Facility-Administered Medications: None     Discharge Medication List as of 1/7/2025  6:33 PM        CONTINUE these medications which have NOT CHANGED    Details   ascorbic acid (VITAMIN C) 500 mg tablet TAKE 1 TABLET (500 MG TOTAL) BY MOUTH DAILY, Starting Fri 11/29/2024, Normal      bisacodyl 5 MG EC tablet TAKE 2 TABLETS (10 MG TOTAL) BY MOUTH 2 (TWO) TIMES A DAY AS NEEDED FOR CONSTIPATION (CONSTIPATION) COLONOSCOPY PREP, Normal      celecoxib (CeleBREX) 100 mg capsule TAKE 1 CAPSULE (100 MG TOTAL) BY MOUTH 2 (TWO) TIMES A DAY AS NEEDED FOR MILD PAIN, Starting Mon 10/21/2024, Normal      dexamethasone (DECADRON) 2 mg tablet TAKE 1 TABLET (2 MG TOTAL) BY MOUTH DAILY WITH BREAKFAST, Starting Fri 5/3/2024, Normal      Diclofenac Sodium (VOLTAREN) 1 % Apply 2 g topically 4 (four) times a day, Starting Wed 11/22/2023, Normal      dicyclomine (BENTYL) 20 mg tablet Take 1 tablet (20 mg total) by mouth 2 (two) times a day as needed (abdominal pain), Starting Mon 10/14/2024, Normal      ergocalciferol (VITAMIN D2) 50,000 units Take 1 capsule (50,000 Units total) by mouth once a week, Starting Wed 12/11/2024, Normal      Ex-Lax 15 MG CHEW CHEW 1 TABLET BY MOUTH TWICE DAILY AS NEEDED FOR CONSTIPATION, Normal      famotidine (PEPCID) 20 mg tablet Take 1 tablet (20 mg total) by mouth daily for 14 days, Starting Sun 5/26/2024, Until Sun 6/9/2024, Normal      fluocinolone acetonide (DERMOTIC) 0.01 % otic oil INSTILL 3 DROPS IN BOTH EARS DAILY, Normal      fluticasone (FLONASE) 50 mcg/act nasal spray 1 spray into each nostril 2 (two) times a day, Starting Thu 10/19/2023, Normal      ibuprofen (MOTRIN) 800 mg tablet Take 800 mg by mouth every 8 (eight) hours as needed, Starting Thu 6/6/2024, Historical Med      meclizine (ANTIVERT) 25 mg tablet Take  25 mg by mouth Three times daily as needed, Starting Mon 6/3/2024, Until Tue 6/3/2025 at 2359, Historical Med      methocarbamol (ROBAXIN) 500 mg tablet Take 1 tablet (500 mg total) by mouth 2 (two) times a day as needed for muscle spasms, Starting Thu 8/1/2024, Normal      metoclopramide (Reglan) 10 mg tablet Take 1 tablet (10 mg total) by mouth 3 (three) times a day as needed (migraine), Starting Fri 11/22/2024, Normal      miconazole (MONISTAT-7) 2 % vaginal cream Insert 1 applicator into the vagina daily at bedtime, Starting Fri 9/27/2024, Normal      naproxen (EC NAPROSYN) 500 MG EC tablet Take 500 mg by mouth if needed for mild pain, Historical Med      nicotine (NICODERM CQ) 14 mg/24hr TD 24 hr patch PLACE 1 PATCH ON THE SKIN OVER 24 HOURS EVERY 24 HOURS, Starting Mon 8/5/2024, Normal      nystatin-triamcinolone (MYCOLOG-II) cream Apply topically 2 (two) times a day, Starting Thu 9/14/2023, Normal      pantoprazole (PROTONIX) 20 mg tablet TAKE 1 TABLET (20 MG TOTAL) BY MOUTH DAILY, Starting Mon 8/26/2024, Until Sat 2/22/2025, Normal      polyethylene glycol (MIRALAX) 17 g packet Take 17 g by mouth daily, Starting Tue 1/2/2024, Until Sun 6/30/2024, Normal      polyethylene glycol (MIRALAX) 17 g packet Take 17 g by mouth daily, Starting Tue 1/30/2024, Until Sun 7/28/2024, Normal      rizatriptan (Maxalt) 10 mg tablet Take 1 tablet (10 mg total) by mouth as needed for migraine Take at the onset of migraine; if symptoms continue or return, may take another dose at least 2 hours after first dose. Take no more than 2 doses in a day., Starting Fri 11/29/2024, Normal      Senna Plus 8.6-50 MG per tablet TAKE 1 TABLET BY MOUTH DAILY, Starting Tue 12/17/2024, Normal      sodium chloride 0.9 % nebulizer solution TAKE 3ML BY BEBULIZATION EVERY 6 HOURS AS NEEDED FOR WHEEZING OR SHORTNESS OF BREATH, Normal      vitamin B-12 (VITAMIN B-12) 1,000 mcg tablet Take 1 tablet (1,000 mcg total) by mouth daily, Starting Thu  8/1/2024, Normal           No discharge procedures on file.  ED SEPSIS DOCUMENTATION   Time reflects when diagnosis was documented in both MDM as applicable and the Disposition within this note       Time User Action Codes Description Comment    1/7/2025  6:33 PM Howard Spear [R10.2] Pelvic pain     1/7/2025  6:33 PM Howard Spear [G43.909] Migraine                  Virat Trev Spear MD  01/07/25 1909

## 2025-01-07 NOTE — ED NOTES
Urine label signed off in error. UA still needs to be collected     Maite Olmstead, RN  01/07/25 0656

## 2025-01-20 DIAGNOSIS — G43.109 MIGRAINE HEADACHE WITH AURA: Chronic | ICD-10-CM

## 2025-01-20 RX ORDER — RIZATRIPTAN BENZOATE 10 MG/1
10 TABLET ORAL AS NEEDED
Qty: 9 TABLET | Refills: 3 | Status: SHIPPED | OUTPATIENT
Start: 2025-01-20

## 2025-01-23 ENCOUNTER — APPOINTMENT (EMERGENCY)
Dept: RADIOLOGY | Facility: HOSPITAL | Age: 40
End: 2025-01-23
Payer: COMMERCIAL

## 2025-01-23 ENCOUNTER — HOSPITAL ENCOUNTER (EMERGENCY)
Facility: HOSPITAL | Age: 40
Discharge: HOME | End: 2025-01-23
Attending: EMERGENCY MEDICINE
Payer: COMMERCIAL

## 2025-01-23 VITALS
WEIGHT: 154.8 LBS | DIASTOLIC BLOOD PRESSURE: 77 MMHG | HEART RATE: 96 BPM | OXYGEN SATURATION: 98 % | TEMPERATURE: 98.5 F | SYSTOLIC BLOOD PRESSURE: 128 MMHG | BODY MASS INDEX: 28.31 KG/M2 | RESPIRATION RATE: 20 BRPM

## 2025-01-23 DIAGNOSIS — B34.9 VIRAL SYNDROME: Primary | ICD-10-CM

## 2025-01-23 LAB
ALBUMIN SERPL BCG-MCNC: 4.4 G/DL (ref 3.5–5)
ALP SERPL-CCNC: 64 U/L (ref 34–104)
ALT SERPL W P-5'-P-CCNC: 29 U/L (ref 7–52)
ANION GAP SERPL CALCULATED.3IONS-SCNC: 9 MMOL/L (ref 4–13)
AST SERPL W P-5'-P-CCNC: 21 U/L (ref 13–39)
BASOPHILS # BLD AUTO: 0.05 THOUSANDS/ΜL (ref 0–0.1)
BASOPHILS NFR BLD AUTO: 1 % (ref 0–1)
BILIRUB SERPL-MCNC: 0.46 MG/DL (ref 0.2–1)
BUN SERPL-MCNC: 12 MG/DL (ref 5–25)
CALCIUM SERPL-MCNC: 9.4 MG/DL (ref 8.4–10.2)
CHLORIDE SERPL-SCNC: 103 MMOL/L (ref 96–108)
CO2 SERPL-SCNC: 23 MMOL/L (ref 21–32)
CREAT SERPL-MCNC: 0.74 MG/DL (ref 0.6–1.3)
EOSINOPHIL # BLD AUTO: 0.01 THOUSAND/ΜL (ref 0–0.61)
EOSINOPHIL NFR BLD AUTO: 0 % (ref 0–6)
ERYTHROCYTE [DISTWIDTH] IN BLOOD BY AUTOMATED COUNT: 12.2 % (ref 11.6–15.1)
FLUAV AG UPPER RESP QL IA.RAPID: NEGATIVE
FLUBV AG UPPER RESP QL IA.RAPID: NEGATIVE
GFR SERPL CREATININE-BSD FRML MDRD: 103 ML/MIN/1.73SQ M
GLUCOSE SERPL-MCNC: 104 MG/DL (ref 65–140)
HCT VFR BLD AUTO: 37.4 % (ref 34.8–46.1)
HGB BLD-MCNC: 12.5 G/DL (ref 11.5–15.4)
IMM GRANULOCYTES # BLD AUTO: 0.04 THOUSAND/UL (ref 0–0.2)
IMM GRANULOCYTES NFR BLD AUTO: 1 % (ref 0–2)
LYMPHOCYTES # BLD AUTO: 0.68 THOUSANDS/ΜL (ref 0.6–4.47)
LYMPHOCYTES NFR BLD AUTO: 10 % (ref 14–44)
MCH RBC QN AUTO: 30.3 PG (ref 26.8–34.3)
MCHC RBC AUTO-ENTMCNC: 33.4 G/DL (ref 31.4–37.4)
MCV RBC AUTO: 91 FL (ref 82–98)
MONOCYTES # BLD AUTO: 0.76 THOUSAND/ΜL (ref 0.17–1.22)
MONOCYTES NFR BLD AUTO: 11 % (ref 4–12)
NEUTROPHILS # BLD AUTO: 5.58 THOUSANDS/ΜL (ref 1.85–7.62)
NEUTS SEG NFR BLD AUTO: 77 % (ref 43–75)
NRBC BLD AUTO-RTO: 0 /100 WBCS
PLATELET # BLD AUTO: 212 THOUSANDS/UL (ref 149–390)
PMV BLD AUTO: 11.4 FL (ref 8.9–12.7)
POTASSIUM SERPL-SCNC: 4 MMOL/L (ref 3.5–5.3)
PROT SERPL-MCNC: 7.3 G/DL (ref 6.4–8.4)
RBC # BLD AUTO: 4.12 MILLION/UL (ref 3.81–5.12)
SARS-COV+SARS-COV-2 AG RESP QL IA.RAPID: NEGATIVE
SODIUM SERPL-SCNC: 135 MMOL/L (ref 135–147)
WBC # BLD AUTO: 7.12 THOUSAND/UL (ref 4.31–10.16)

## 2025-01-23 PROCEDURE — 80053 COMPREHEN METABOLIC PANEL: CPT

## 2025-01-23 PROCEDURE — 87811 SARS-COV-2 COVID19 W/OPTIC: CPT

## 2025-01-23 PROCEDURE — 96375 TX/PRO/DX INJ NEW DRUG ADDON: CPT

## 2025-01-23 PROCEDURE — 99284 EMERGENCY DEPT VISIT MOD MDM: CPT

## 2025-01-23 PROCEDURE — 96374 THER/PROPH/DIAG INJ IV PUSH: CPT

## 2025-01-23 PROCEDURE — 71045 X-RAY EXAM CHEST 1 VIEW: CPT

## 2025-01-23 PROCEDURE — 85025 COMPLETE CBC W/AUTO DIFF WBC: CPT

## 2025-01-23 PROCEDURE — 96361 HYDRATE IV INFUSION ADD-ON: CPT

## 2025-01-23 PROCEDURE — 36415 COLL VENOUS BLD VENIPUNCTURE: CPT

## 2025-01-23 PROCEDURE — 87804 INFLUENZA ASSAY W/OPTIC: CPT

## 2025-01-23 RX ORDER — ACETAMINOPHEN 325 MG/1
650 TABLET ORAL ONCE
Status: COMPLETED | OUTPATIENT
Start: 2025-01-23 | End: 2025-01-23

## 2025-01-23 RX ORDER — DIPHENHYDRAMINE HYDROCHLORIDE 50 MG/ML
25 INJECTION INTRAMUSCULAR; INTRAVENOUS ONCE
Status: COMPLETED | OUTPATIENT
Start: 2025-01-23 | End: 2025-01-23

## 2025-01-23 RX ORDER — DROPERIDOL 2.5 MG/ML
1.25 INJECTION, SOLUTION INTRAMUSCULAR; INTRAVENOUS ONCE
Status: COMPLETED | OUTPATIENT
Start: 2025-01-23 | End: 2025-01-23

## 2025-01-23 RX ORDER — ACETAMINOPHEN 325 MG/1
325 TABLET ORAL ONCE
Status: DISCONTINUED | OUTPATIENT
Start: 2025-01-23 | End: 2025-01-23 | Stop reason: HOSPADM

## 2025-01-23 RX ORDER — ONDANSETRON 2 MG/ML
4 INJECTION INTRAMUSCULAR; INTRAVENOUS ONCE
Status: COMPLETED | OUTPATIENT
Start: 2025-01-23 | End: 2025-01-23

## 2025-01-23 RX ADMIN — DROPERIDOL 1.25 MG: 2.5 INJECTION, SOLUTION INTRAMUSCULAR; INTRAVENOUS at 19:08

## 2025-01-23 RX ADMIN — ONDANSETRON 4 MG: 2 INJECTION INTRAMUSCULAR; INTRAVENOUS at 18:01

## 2025-01-23 RX ADMIN — SODIUM CHLORIDE 1000 ML: 0.9 INJECTION, SOLUTION INTRAVENOUS at 18:05

## 2025-01-23 RX ADMIN — ACETAMINOPHEN 650 MG: 325 TABLET, FILM COATED ORAL at 17:19

## 2025-01-23 RX ADMIN — DIPHENHYDRAMINE HYDROCHLORIDE 25 MG: 50 INJECTION, SOLUTION INTRAMUSCULAR; INTRAVENOUS at 19:08

## 2025-01-23 NOTE — Clinical Note
Lorrie TrotterRiyascotty was seen and treated in our emergency department on 1/23/2025.    No restrictions            Diagnosis:     Lorrie  .    She may return on this date: 01/24/2025         If you have any questions or concerns, please don't hesitate to call.      Mohit Rivas PA-C    ______________________________           _______________          _______________  Hospital Representative                              Date                                Time

## 2025-01-24 NOTE — ED PROVIDER NOTES
Time reflects when diagnosis was documented in both MDM as applicable and the Disposition within this note       Time User Action Codes Description Comment    1/23/2025  6:47 PM Mohit Rivas Add [B34.9] Viral syndrome           ED Disposition       ED Disposition   Discharge    Condition   Stable    Date/Time   u Jan 23, 2025  6:47 PM    Comment   Lorrie Jones discharge to home/self care.                   Assessment & Plan       Medical Decision Making  Patient is a 39-year-old female coming in for viral-like symptoms.  Is in no acute distress this time.  Lab work is overall within normal limits.  Chest x-ray as read by me as well as final read shows no sign of acute cardiopulmonary disease.  Patient is of her vitals are better after fluids and Tylenol.  Flu and COVID were negative, however resp is a suspect that this is a false negative based on patient's symptoms.  Most likely this is viral symptom, potentially influenza    Amount and/or Complexity of Data Reviewed  Labs: ordered.  Radiology: ordered. Decision-making details documented in ED Course.    Risk  OTC drugs.  Prescription drug management.        ED Course as of 01/23/25 1932   Thu Jan 23, 2025   1845 XR chest 1 view portable  No acute cardiopulmonary disease.       Medications   acetaminophen (TYLENOL) tablet 325 mg (325 mg Oral Not Given 1/23/25 1850)   acetaminophen (TYLENOL) tablet 650 mg (650 mg Oral Given 1/23/25 1719)   sodium chloride 0.9 % bolus 1,000 mL (0 mL Intravenous Stopped 1/23/25 1916)   ondansetron (ZOFRAN) injection 4 mg (4 mg Intravenous Given 1/23/25 1801)   droperidol (INAPSINE) injection 1.25 mg (1.25 mg Intravenous Given 1/23/25 1908)   diphenhydrAMINE (BENADRYL) injection 25 mg (25 mg Intravenous Given 1/23/25 1908)       ED Risk Strat Scores                                              History of Present Illness       Chief Complaint   Patient presents with    Generalized Body Aches     Body aches, fever,  headache since last night       Past Medical History:   Diagnosis Date    Abnormal Pap smear of cervix     2015 cryosurgery; 6/2019 NILM pap/neg HPV    Allergic     Depression     Headache(784.0)     Migraine with aura       Past Surgical History:   Procedure Laterality Date    CHOLECYSTECTOMY LAPAROSCOPIC N/A 10/27/2020    Procedure: CHOLECYSTECTOMY LAPAROSCOPIC;  Surgeon: Lina Narayan MD;  Location:  MAIN OR;  Service: General    COLONOSCOPY      FL LUMBAR PUNCTURE DIAGNOSTIC  11/17/2023    GYNECOLOGIC CRYOSURGERY  2015    HYSTERECTOMY  2016    supracervical    LAPAROSCOPY  2018    DE LAPAROSCOPY W/LYSIS OF ADHESIONS N/A 03/03/2020    Procedure: L.O.A.;  Surgeon: Noy Paz MD;  Location: [facility];  Service: Gynecology    DE LAPS ABD PRTM&OMENTUM DX W/WO SPEC BR/WA SPX N/A 08/09/2019    Procedure: LAPAROSCOPY DIAGNOSTIC; LYSIS OF ADHESIONS;  Surgeon: Abebe Walls MD;  Location: [facility];  Service: Gynecology    TUBAL LIGATION      UPPER GASTROINTESTINAL ENDOSCOPY        Family History   Problem Relation Age of Onset    Cancer Paternal Grandmother         uterine    Cancer Paternal Aunt         uterine     Migraines Mother     Arthritis Mother     Hypertension Father     Breast cancer Neg Hx     Colon cancer Neg Hx     Ovarian cancer Neg Hx       Social History     Tobacco Use    Smoking status: Every Day     Current packs/day: 0.50     Average packs/day: 0.5 packs/day for 15.0 years (7.5 ttl pk-yrs)     Types: Cigarettes     Passive exposure: Current    Smokeless tobacco: Never   Vaping Use    Vaping status: Never Used   Substance Use Topics    Alcohol use: Never    Drug use: Never      E-Cigarette/Vaping    E-Cigarette Use Never User       E-Cigarette/Vaping Substances    Nicotine No     THC No     CBD No     Flavoring No     Other No     Unknown No       I have reviewed and agree with the history as documented.     Patient is a 39-year-old female coming in for evaluation of  headache, nausea, fever, dizziness, and generalized bodyaches.  Patient appears to be in moderate distress, but nontoxic at this time.  Took some Tylenol yesterday, but nothing today.  No other symptoms at this time      Generalized Body Aches  Associated symptoms: fatigue, fever, myalgias and nausea    Associated symptoms: no abdominal pain, no chest pain, no shortness of breath and no vomiting        Review of Systems   Constitutional:  Positive for chills, fatigue and fever.   Respiratory:  Negative for chest tightness and shortness of breath.    Cardiovascular:  Negative for chest pain.   Gastrointestinal:  Positive for nausea. Negative for abdominal pain and vomiting.   Musculoskeletal:  Positive for myalgias.           Objective       ED Triage Vitals   Temperature Pulse Blood Pressure Respirations SpO2 Patient Position - Orthostatic VS   01/23/25 1649 01/23/25 1649 01/23/25 1649 01/23/25 1649 01/23/25 1649 01/23/25 1649   (!) 102.1 °F (38.9 °C) (!) 120 128/77 20 98 % Sitting      Temp Source Heart Rate Source BP Location FiO2 (%) Pain Score    01/23/25 1649 01/23/25 1649 01/23/25 1649 -- 01/23/25 1719    Oral Monitor Right arm  10 - Worst Possible Pain      Vitals      Date and Time Temp Pulse SpO2 Resp BP Pain Score FACES Pain Rating User   01/23/25 1810 -- 96 -- -- -- -- -- SN   01/23/25 1805 98.5 °F (36.9 °C) -- -- -- -- -- -- SN   01/23/25 1719 -- -- -- -- -- 10 - Worst Possible Pain -- LB   01/23/25 1649 102.1 °F (38.9 °C) 120 98 % 20 128/77 -- -- CO            Physical Exam  Vitals reviewed.   Constitutional:       Appearance: She is ill-appearing. She is not toxic-appearing.   HENT:      Head: Normocephalic and atraumatic.      Right Ear: External ear normal.      Left Ear: External ear normal.      Nose: Nose normal.   Eyes:      Conjunctiva/sclera: Conjunctivae normal.   Cardiovascular:      Rate and Rhythm: Tachycardia present.   Pulmonary:      Effort: Pulmonary effort is normal.   Abdominal:       Palpations: Abdomen is soft.      Tenderness: There is no abdominal tenderness. There is no guarding.   Musculoskeletal:         General: Normal range of motion.      Cervical back: Normal range of motion.   Skin:     General: Skin is warm and dry.   Neurological:      Mental Status: She is alert.         Results Reviewed       Procedure Component Value Units Date/Time    Comprehensive metabolic panel [[id number]] Collected: 01/23/25 1805    Lab Status: Final result Specimen: Blood from Arm, Left Updated: 01/23/25 1846     Sodium 135 mmol/L      Potassium 4.0 mmol/L      Chloride 103 mmol/L      CO2 23 mmol/L      ANION GAP 9 mmol/L      BUN 12 mg/dL      Creatinine 0.74 mg/dL      Glucose 104 mg/dL      Calcium 9.4 mg/dL      AST 21 U/L      ALT 29 U/L      Alkaline Phosphatase 64 U/L      Total Protein 7.3 g/dL      Albumin 4.4 g/dL      Total Bilirubin 0.46 mg/dL      eGFR 103 ml/min/1.73sq m     Narrative:      National Kidney Disease Foundation guidelines for Chronic Kidney Disease (CKD):     Stage 1 with normal or high GFR (GFR > 90 mL/min/1.73 square meters)    Stage 2 Mild CKD (GFR = 60-89 mL/min/1.73 square meters)    Stage 3A Moderate CKD (GFR = 45-59 mL/min/1.73 square meters)    Stage 3B Moderate CKD (GFR = 30-44 mL/min/1.73 square meters)    Stage 4 Severe CKD (GFR = 15-29 mL/min/1.73 square meters)    Stage 5 End Stage CKD (GFR <15 mL/min/1.73 square meters)  Note: GFR calculation is accurate only with a steady state creatinine    FLU/COVID Rapid Antigen (30 min. TAT) - Preferred screening test in ED [[id number]]  (Normal) Collected: 01/23/25 1736    Lab Status: Final result Specimen: Nares from Nose Updated: 01/23/25 1838     SARS COV Rapid Antigen Negative     Influenza A Rapid Antigen Negative     Influenza B Rapid Antigen Negative    Narrative:      This test has been performed using the [employer] [facility] 2 FLU+SARS Antigen test under the Emergency Use Authorization (EUA). This test has been validated by  the  and verified by the performing laboratory. The Olinda uses lateral flow immunofluorescent sandwich assay to detect SARS-COV, Influenza A and Influenza B Antigen.     The [employer] Olinda 2 SARS Antigen test does not differentiate between SARS-CoV and SARS-CoV-2.     Negative results are presumptive and may be confirmed with a molecular assay, if necessary, for patient management. Negative results do not rule out SARS-CoV-2 or influenza infection and should not be used as the sole basis for treatment or patient management decisions. A negative test result may occur if the level of antigen in a sample is below the limit of detection of this test.     Positive results are indicative of the presence of viral antigens, but do not rule out bacterial infection or co-infection with other viruses.     All test results should be used as an adjunct to clinical observations and other information available to the provider.    FOR PEDIATRIC PATIENTS - copy/paste COVID Guidelines URL to browser: [email or web address]    CBC and differential [[id number]]  (Abnormal) Collected: 01/23/25 1805    Lab Status: Final result Specimen: Blood from Arm, Left Updated: 01/23/25 1821     WBC 7.12 Thousand/uL      RBC 4.12 Million/uL      Hemoglobin 12.5 g/dL      Hematocrit 37.4 %      MCV 91 fL      MCH 30.3 pg      MCHC 33.4 g/dL      RDW 12.2 %      MPV 11.4 fL      Platelets 212 Thousands/uL      nRBC 0 /100 WBCs      Segmented % 77 %      Immature Grans % 1 %      Lymphocytes % 10 %      Monocytes % 11 %      Eosinophils Relative 0 %      Basophils Relative 1 %      Absolute Neutrophils 5.58 Thousands/µL      Absolute Immature Grans 0.04 Thousand/uL      Absolute Lymphocytes 0.68 Thousands/µL      Absolute Monocytes 0.76 Thousand/µL      Eosinophils Absolute 0.01 Thousand/µL      Basophils Absolute 0.05 Thousands/µL             XR chest 1 view portable   Final Interpretation  by Soraya Lopez MD ([id number])      No acute cardiopulmonary disease.            Workstation performed: [id number]             Procedures    ED Medication and Procedure Management   Prior to Admission Medications   Prescriptions Last Dose Informant Patient Reported? Taking?   Diclofenac Sodium (VOLTAREN) 1 %  Self No No   Sig: Apply 2 g topically 4 (four) times a day   Ex-Lax 15 MG CHEW   No No   Sig: CHEW 1 TABLET BY MOUTH TWICE DAILY AS NEEDED FOR CONSTIPATION   Senna Plus 8.6-50 MG per tablet   No No   Sig: TAKE 1 TABLET BY MOUTH DAILY   ascorbic acid (VITAMIN C) 500 mg tablet   No No   Sig: TAKE 1 TABLET (500 MG TOTAL) BY MOUTH DAILY   bisacodyl 5 MG EC tablet   No No   Sig: TAKE 2 TABLETS (10 MG TOTAL) BY MOUTH 2 (TWO) TIMES A DAY AS NEEDED FOR CONSTIPATION (CONSTIPATION) COLONOSCOPY PREP   Patient not taking: Reported on 2024   celecoxib (CeleBREX) 100 mg capsule   No No   Sig: TAKE 1 CAPSULE (100 MG TOTAL) BY MOUTH 2 (TWO) TIMES A DAY AS NEEDED FOR MILD PAIN   dexamethasone (DECADRON) 2 mg tablet   No No   Sig: TAKE 1 TABLET (2 MG TOTAL) BY MOUTH DAILY WITH BREAKFAST   dicyclomine (BENTYL) 20 mg tablet   No No   Sig: Take 1 tablet (20 mg total) by mouth 2 (two) times a day as needed (abdominal pain)   ergocalciferol (VITAMIN D2) 50,000 units   No No   Sig: Take 1 capsule (50,000 Units total) by mouth once a week   famotidine (PEPCID) 20 mg tablet   No No   Sig: Take 1 tablet (20 mg total) by mouth daily for 14 days   fluocinolone acetonide (DERMOTIC) 0.01 % otic oil  Self No No   Sig: INSTILL 3 DROPS IN BOTH EARS DAILY   fluticasone (FLONASE) 50 mcg/act nasal spray  Self No No   Si spray into each nostril 2 (two) times a day   Patient not taking: Reported on 3/20/2024   ibuprofen (MOTRIN) 800 mg tablet   Yes No   Sig: Take 800 mg by mouth every 8 (eight) hours as needed   meclizine (ANTIVERT) 25 mg tablet   Yes No   Sig: Take 25 mg by mouth Three times daily as needed   methocarbamol  (ROBAXIN) 500 mg tablet   No No   Sig: Take 1 tablet (500 mg total) by mouth 2 (two) times a day as needed for muscle spasms   metoclopramide (Reglan) 10 mg tablet   No No   Sig: Take 1 tablet (10 mg total) by mouth 3 (three) times a day as needed (migraine)   miconazole (MONISTAT-7) 2 % vaginal cream   No No   Sig: Insert 1 applicator into the vagina daily at bedtime   Patient not taking: Reported on 10/14/2024   naproxen (EC NAPROSYN) 500 MG EC tablet  Self Yes No   Sig: Take 500 mg by mouth if needed for mild pain   nicotine (NICODERM CQ) 14 mg/24hr TD 24 hr patch   No No   Sig: PLACE 1 PATCH ON THE SKIN OVER 24 HOURS EVERY 24 HOURS   nystatin-triamcinolone (MYCOLOG-II) cream  Self No No   Sig: Apply topically 2 (two) times a day   pantoprazole (PROTONIX) 20 mg tablet   No No   Sig: TAKE 1 TABLET (20 MG TOTAL) BY MOUTH DAILY   polyethylene glycol (MIRALAX) 17 g packet  Self No No   Sig: Take 17 g by mouth daily   polyethylene glycol (MIRALAX) 17 g packet  Self No No   Sig: Take 17 g by mouth daily   rizatriptan (MAXALT) 10 mg tablet   No No   Sig: TAKE 1 TABLET (10 MG TOTAL) BY MOUTH AS NEEDED FOR MIGRAINE TAKE AT THE ONSET OF MIGRAINE; IF SYMPTOMS CONTINUE OR RETURN, MAY TAKE ANOTHER DOSE AT LEAST 2 HOURS AFTER FIRST DOSE. TAKE NO MORE THAN 2 DOSES IN A DAY.   sodium chloride 0.9 % nebulizer solution  Self No No   Sig: TAKE 3ML BY BEBULIZATION EVERY 6 HOURS AS NEEDED FOR WHEEZING OR SHORTNESS OF BREATH   Patient not taking: No sig reported   vitamin B-12 (VITAMIN B-12) 1,000 mcg tablet   No No   Sig: Take 1 tablet (1,000 mcg total) by mouth daily      Facility-Administered Medications: None     Discharge Medication List as of 1/23/2025  6:47 PM        CONTINUE these medications which have NOT CHANGED    Details   ascorbic acid (VITAMIN C) 500 mg tablet TAKE 1 TABLET (500 MG TOTAL) BY MOUTH DAILY, Starting Fri 11/29/2024, Normal      bisacodyl 5 MG EC tablet TAKE 2 TABLETS (10 MG TOTAL) BY MOUTH 2 (TWO) TIMES A  DAY AS NEEDED FOR CONSTIPATION (CONSTIPATION) COLONOSCOPY PREP, Normal      celecoxib (CeleBREX) 100 mg capsule TAKE 1 CAPSULE (100 MG TOTAL) BY MOUTH 2 (TWO) TIMES A DAY AS NEEDED FOR MILD PAIN, Starting Mon 10/21/2024, Normal      dexamethasone (DECADRON) 2 mg tablet TAKE 1 TABLET (2 MG TOTAL) BY MOUTH DAILY WITH BREAKFAST, Starting Fri 5/3/2024, Normal      Diclofenac Sodium (VOLTAREN) 1 % Apply 2 g topically 4 (four) times a day, Starting Wed 11/22/2023, Normal      dicyclomine (BENTYL) 20 mg tablet Take 1 tablet (20 mg total) by mouth 2 (two) times a day as needed (abdominal pain), Starting Mon 10/14/2024, Normal      ergocalciferol (VITAMIN D2) 50,000 units Take 1 capsule (50,000 Units total) by mouth once a week, Starting Wed 12/11/2024, Normal      Ex-Lax 15 MG CHEW CHEW 1 TABLET BY MOUTH TWICE DAILY AS NEEDED FOR CONSTIPATION, Normal      famotidine (PEPCID) 20 mg tablet Take 1 tablet (20 mg total) by mouth daily for 14 days, Starting Sun 5/26/2024, Until Sun 6/9/2024, Normal      fluocinolone acetonide (DERMOTIC) 0.01 % otic oil INSTILL 3 DROPS IN BOTH EARS DAILY, Normal      fluticasone (FLONASE) 50 mcg/act nasal spray 1 spray into each nostril 2 (two) times a day, Starting Thu 10/19/2023, Normal      ibuprofen (MOTRIN) 800 mg tablet Take 800 mg by mouth every 8 (eight) hours as needed, Starting Thu 6/6/2024, Historical Med      meclizine (ANTIVERT) 25 mg tablet Take 25 mg by mouth Three times daily as needed, Starting Mon 6/3/2024, Until Tue 6/3/2025 at 2359, Historical Med      methocarbamol (ROBAXIN) 500 mg tablet Take 1 tablet (500 mg total) by mouth 2 (two) times a day as needed for muscle spasms, Starting Thu 8/1/2024, Normal      metoclopramide (Reglan) 10 mg tablet Take 1 tablet (10 mg total) by mouth 3 (three) times a day as needed (migraine), Starting Fri 11/22/2024, Normal      miconazole (MONISTAT-7) 2 % vaginal cream Insert 1 applicator into the vagina daily at bedtime, Starting Fri  9/27/2024, Normal      naproxen (EC NAPROSYN) 500 MG EC tablet Take 500 mg by mouth if needed for mild pain, Historical Med      nicotine (NICODERM CQ) 14 mg/24hr TD 24 hr patch PLACE 1 PATCH ON THE SKIN OVER 24 HOURS EVERY 24 HOURS, Starting Mon 8/5/2024, Normal      nystatin-triamcinolone (MYCOLOG-II) cream Apply topically 2 (two) times a day, Starting Thu 9/14/2023, Normal      pantoprazole (PROTONIX) 20 mg tablet TAKE 1 TABLET (20 MG TOTAL) BY MOUTH DAILY, Starting Mon 8/26/2024, Until Sat 2/22/2025, Normal      polyethylene glycol (MIRALAX) 17 g packet Take 17 g by mouth daily, Starting Tue 1/2/2024, Until Sun 6/30/2024, Normal      polyethylene glycol (MIRALAX) 17 g packet Take 17 g by mouth daily, Starting Tue 1/30/2024, Until Sun 7/28/2024, Normal      rizatriptan (MAXALT) 10 mg tablet TAKE 1 TABLET (10 MG TOTAL) BY MOUTH AS NEEDED FOR MIGRAINE TAKE AT THE ONSET OF MIGRAINE; IF SYMPTOMS CONTINUE OR RETURN, MAY TAKE ANOTHER DOSE AT LEAST 2 HOURS AFTER FIRST DOSE. TAKE NO MORE THAN 2 DOSES IN A DAY., Starting Mon 1/20/2025, Normal      Senna Plus 8.6-50 MG per tablet TAKE 1 TABLET BY MOUTH DAILY, Starting Tue 12/17/2024, Normal      sodium chloride 0.9 % nebulizer solution TAKE 3ML BY BEBULIZATION EVERY 6 HOURS AS NEEDED FOR WHEEZING OR SHORTNESS OF BREATH, Normal      vitamin B-12 (VITAMIN B-12) 1,000 mcg tablet Take 1 tablet (1,000 mcg total) by mouth daily, Starting Thu 8/1/2024, Normal           No discharge procedures on file.  ED SEPSIS DOCUMENTATION   Time reflects when diagnosis was documented in both MDM as applicable and the Disposition within this note       Time User Action Codes Description Comment    1/23/2025  6:47 PM Mohit Rivas Add [B34.9] Viral syndrome                  Mohit Rivas PA-C  01/23/25 1932

## 2025-02-17 DIAGNOSIS — R10.84 GENERALIZED ABDOMINAL PAIN: ICD-10-CM

## 2025-02-17 DIAGNOSIS — R11.0 NAUSEA: ICD-10-CM

## 2025-02-17 DIAGNOSIS — R14.0 BLOATING: ICD-10-CM

## 2025-02-17 DIAGNOSIS — R68.81 EARLY SATIETY: ICD-10-CM

## 2025-02-17 DIAGNOSIS — G43.109 MIGRAINE WITH AURA AND WITHOUT STATUS MIGRAINOSUS, NOT INTRACTABLE: Chronic | ICD-10-CM

## 2025-02-17 DIAGNOSIS — K59.00 ACUTE CONSTIPATION: ICD-10-CM

## 2025-02-18 RX ORDER — ASPIRIN 81 MG
1 TABLET, DELAYED RELEASE (ENTERIC COATED) ORAL DAILY
Qty: 30 TABLET | Refills: 5 | Status: SHIPPED | OUTPATIENT
Start: 2025-02-18

## 2025-02-18 RX ORDER — METOCLOPRAMIDE 10 MG/1
10 TABLET ORAL 3 TIMES DAILY PRN
Qty: 90 TABLET | Refills: 4 | Status: SHIPPED | OUTPATIENT
Start: 2025-02-18

## 2025-02-20 DIAGNOSIS — M54.9 BACK PAIN: ICD-10-CM

## 2025-02-20 RX ORDER — CELECOXIB 100 MG/1
100 CAPSULE ORAL 2 TIMES DAILY PRN
Qty: 60 CAPSULE | Refills: 2 | Status: SHIPPED | OUTPATIENT
Start: 2025-02-20

## 2025-02-25 ENCOUNTER — TELEPHONE (OUTPATIENT)
Age: 40
End: 2025-02-25

## 2025-02-25 NOTE — TELEPHONE ENCOUNTER
Pt called to advise she spoke with her insurance who stated that Dr. Guevara has until this afternoon to call them and do a peer to peer or pt's MRI currently scheduled for March 3rd will be denied and will have to be appealed. Pt does not want to push out the MRI and is requesting our office call her before the end of today to follow up.

## 2025-02-27 DIAGNOSIS — Z72.0 TOBACCO USE: ICD-10-CM

## 2025-02-27 DIAGNOSIS — K59.00 ACUTE CONSTIPATION: ICD-10-CM

## 2025-02-27 RX ORDER — BISACODYL 5 MG
TABLET, DELAYED RELEASE (ENTERIC COATED) ORAL
Qty: 60 TABLET | Refills: 0 | Status: SHIPPED | OUTPATIENT
Start: 2025-02-27

## 2025-02-28 DIAGNOSIS — K59.00 ACUTE CONSTIPATION: ICD-10-CM

## 2025-02-28 RX ORDER — SENNOSIDES 15 MG/1
TABLET, CHEWABLE ORAL
Qty: 60 TABLET | Refills: 1 | Status: SHIPPED | OUTPATIENT
Start: 2025-02-28

## 2025-02-28 RX ORDER — NICOTINE 21 MG/24HR
1 PATCH, TRANSDERMAL 24 HOURS TRANSDERMAL EVERY 24 HOURS
Qty: 28 PATCH | Refills: 0 | Status: SHIPPED | OUTPATIENT
Start: 2025-02-28

## 2025-03-03 ENCOUNTER — HOSPITAL ENCOUNTER (OUTPATIENT)
Dept: MRI IMAGING | Facility: HOSPITAL | Age: 40
Discharge: HOME | End: 2025-03-03
Attending: PSYCHIATRY & NEUROLOGY
Payer: COMMERCIAL

## 2025-03-03 DIAGNOSIS — R93.89 ABNORMAL MRI: ICD-10-CM

## 2025-03-03 PROCEDURE — A9585 GADOBUTROL INJECTION: HCPCS | Performed by: PSYCHIATRY & NEUROLOGY

## 2025-03-03 PROCEDURE — 70553 MRI BRAIN STEM W/O & W/DYE: CPT

## 2025-03-03 RX ORDER — GADOBUTROL 604.72 MG/ML
6 INJECTION INTRAVENOUS
Status: COMPLETED | OUTPATIENT
Start: 2025-03-03 | End: 2025-03-03

## 2025-03-03 RX ADMIN — GADOBUTROL 6 ML: 604.72 INJECTION INTRAVENOUS at 20:16

## 2025-03-12 ENCOUNTER — TELEPHONE (OUTPATIENT)
Dept: FAMILY MEDICINE CLINIC | Facility: CLINIC | Age: 40
End: 2025-03-12

## 2025-03-12 ENCOUNTER — OFFICE VISIT (OUTPATIENT)
Dept: FAMILY MEDICINE CLINIC | Facility: CLINIC | Age: 40
End: 2025-03-12

## 2025-03-12 VITALS
HEIGHT: 62 IN | RESPIRATION RATE: 16 BRPM | BODY MASS INDEX: 28.89 KG/M2 | WEIGHT: 157 LBS | SYSTOLIC BLOOD PRESSURE: 102 MMHG | TEMPERATURE: 95.9 F | DIASTOLIC BLOOD PRESSURE: 60 MMHG | OXYGEN SATURATION: 98 % | HEART RATE: 103 BPM

## 2025-03-12 DIAGNOSIS — R10.2 CHRONIC PELVIC PAIN IN FEMALE: Chronic | ICD-10-CM

## 2025-03-12 DIAGNOSIS — Z23 ENCOUNTER FOR IMMUNIZATION: ICD-10-CM

## 2025-03-12 DIAGNOSIS — G43.109 MIGRAINE WITH AURA AND WITHOUT STATUS MIGRAINOSUS, NOT INTRACTABLE: Chronic | ICD-10-CM

## 2025-03-12 DIAGNOSIS — L29.9 ITCHING OF EAR: ICD-10-CM

## 2025-03-12 DIAGNOSIS — H60.543 ECZEMA OF BOTH EXTERNAL EARS: Primary | ICD-10-CM

## 2025-03-12 DIAGNOSIS — Z72.0 TOBACCO USE: ICD-10-CM

## 2025-03-12 DIAGNOSIS — G89.29 CHRONIC PELVIC PAIN IN FEMALE: Chronic | ICD-10-CM

## 2025-03-12 PROCEDURE — 90471 IMMUNIZATION ADMIN: CPT | Performed by: PHYSICIAN ASSISTANT

## 2025-03-12 PROCEDURE — 99214 OFFICE O/P EST MOD 30 MIN: CPT | Performed by: PHYSICIAN ASSISTANT

## 2025-03-12 PROCEDURE — 90656 IIV3 VACC NO PRSV 0.5 ML IM: CPT | Performed by: PHYSICIAN ASSISTANT

## 2025-03-12 RX ORDER — LANOLIN ALCOHOL/MO/W.PET/CERES
400 CREAM (GRAM) TOPICAL DAILY
Qty: 90 TABLET | Refills: 1 | Status: SHIPPED | OUTPATIENT
Start: 2025-03-12

## 2025-03-12 RX ORDER — NICOTINE 21 MG/24HR
1 PATCH, TRANSDERMAL 24 HOURS TRANSDERMAL EVERY 24 HOURS
Qty: 28 PATCH | Refills: 2 | Status: SHIPPED | OUTPATIENT
Start: 2025-03-12

## 2025-03-12 RX ORDER — BENZOCAINE/MENTHOL 6 MG-10 MG
LOZENGE MUCOUS MEMBRANE 4 TIMES DAILY PRN
Qty: 48 G | Refills: 1 | Status: SHIPPED | OUTPATIENT
Start: 2025-03-12

## 2025-03-12 NOTE — TELEPHONE ENCOUNTER
Patient is calling for a script to be called to the pharmacy for her ears.      Fluocinolone acitonede oil 0.1%    This script is not on her med list  Please advise

## 2025-03-12 NOTE — ASSESSMENT & PLAN NOTE
-Reviewed neurology note from 11/7/2024.  Continue taking Reglan and sumatriptan together as needed for headaches.  - Continue nerivio, although patient reports her insurance does not cover it.   - Continue follow-up with neurology as scheduled.   -Patient wishes to start magnesium supplement.  Prescription provided today.  Orders:    magnesium Oxide (MAG-OX) 400 mg TABS; Take 1 tablet (400 mg total) by mouth daily

## 2025-03-12 NOTE — PATIENT INSTRUCTIONS
[facility]   [address], [place] [address]   [phone]    [facility]   [address], [place], [place] [address] [phone]     Replaced by [facility] - [facility]   [address], [place] [address] ·   [phone]     [facility]   [address], [place], [place] [address] [phone]    [facility]  [address] [place] [address]  [phone]

## 2025-03-12 NOTE — PROGRESS NOTES
Name: Lorrie Jones      : 1985      MRN: [id number]  Encounter Provider: Margarita Walker PA-C  Encounter Date: 3/12/2025   Encounter department: [facility]  :  Assessment & Plan  Eczema of both external ears  -Patient has found relief of itchiness with fluocinonide acetonide otic oil.  Refill provided today.  - Will prescribe hydrocortisone 1% cream, to apply as needed to the outer ears.  - Patient requesting referral to dermatology.  Referral placed today.  Orders:    Ambulatory Referral to Dermatology; Future    hydrocortisone 1 % cream; Apply topically 4 (four) times a day as needed for irritation    Tobacco use  - Currently smoking about half pack per day.  - Patient requesting to restart nicotine patches.   - Refill placed for nicotine patches 14 mg.     Orders:    nicotine (NICODERM CQ) 14 mg/24hr TD 24 hr patch; Place 1 patch on the skin over 24 hours every 24 hours    Migraine with aura and without status migrainosus, not intractable  -Reviewed neurology note from 2024.  Continue taking Reglan and sumatriptan together as needed for headaches.  - Continue nerivio, although patient reports her insurance does not cover it.   - Continue follow-up with neurology as scheduled.   -Patient wishes to start magnesium supplement.  Prescription provided today.  Orders:    magnesium Oxide (MAG-OX) 400 mg TABS; Take 1 tablet (400 mg total) by mouth daily    Encounter for immunization    Orders:    influenza vaccine preservative-free 0.5 mL IM (Fluzone, Afluria, Fluarix, Flulaval)    Itching of ear    Orders:    fluocinolone acetonide (DERMOTIC) 0.01 % otic oil; Administer into both ears 2 (two) times a day    Chronic pelvic pain in female  -Much improved since undergoing surgery in 2024.  -Reviewed gynecology oncology note from 2024. Patient has had multiple abdominal surgeries done in the past.  Most recently underwent surgery due to persistent left  adnexal cyst.  Patient underwent robotic assisted laparoscopic extensive lysis of adhesion, left salpingo-oophorectomy, cystoscopy with placement of bilateral ureteral catheters on 4/23/2024.  Surgery was performed along with colorectal surgery as well as urology given complexity of the case.  -Postoperatively patient had a pelvic fluid collection which likely represented seroma/peritoneal inclusion cyst.  Conservative management with CT-guided aspiration was successfully performed on 5/20/2024.  - Repeat CT scan on 6/3/2024 shows no acute abnormality.  - Patient now experiencing some recurrent pelvic pain.   -Reviewed OB/GYN notes from 10/15/2024 and 10/24/2024.  Patient with benign appearing right adnexal cystic mass, decrease in size.  Pain mostly on the left.  Abnormal granulation tissue in the cuff likely contributor.  This was treated with silver nitrate at time of visit.  Patient referred to Dr. Cleary pelvic pain program.  -Reviewed OB/GYN note from 2/17/2025.  Patient referred to pelvic floor therapy.  - Continue pelvic floor therapy as scheduled at [facility].  - Continue follow-up with [facility] OB[facility] as scheduled.  - Continue tizanidine and Valium suppositories as prescribed through OB/GYN.             BMI Counseling: Body mass index is 28.72 kg/m². The BMI is above normal. Nutrition recommendations include decreasing portion sizes, encouraging healthy choices of fruits and vegetables, consuming healthier snacks, limiting drinks that contain sugar, moderation in carbohydrate intake, increasing intake of lean protein and reducing intake of cholesterol. Exercise recommendations include moderate physical activity 150 minutes/week. No pharmacotherapy was ordered. Rationale for BMI follow-up plan is due to patient being overweight or obese.     Depression Screening and Follow-up Plan: Patient was screened for depression during today's encounter. They screened negative with a PHQ-2 score of 0.        History  "of Present Illness     Patient is a 39 y.o. female whom  has a past medical history of Abnormal Pap smear of cervix, Allergic, Depression, Headache(784.0), and Migraine with aura. who is seen today in office for itchiness of ears.    -Patient notes she is currently smoking about 10 cigarettes a day.  Patient notes she would like to quit smoking.  Patient would like a refill of nicotine patches.  Patient notes she does have some itchiness on her arm from the patches when she wears them.    -Patient notes she has finally found some relief from the itching of her ears with fluocinolone acetonide otic oil.  Patient notes she does have dry skin of her outer ears.    -Patient notes she has started going to pelvic floor therapy for her pelvic pain.  Patient notes that she is also using Valium vaginal suppositories and tizanidine to help.  Patient notes her migraines have been stable.      Review of Systems   Constitutional:  Negative for chills and fever.   HENT:  Negative for congestion, ear pain and sore throat.    Eyes:  Negative for pain.   Respiratory:  Negative for cough, chest tightness, shortness of breath and wheezing.    Cardiovascular:  Negative for chest pain and palpitations.   Gastrointestinal:  Negative for abdominal pain, constipation, diarrhea, nausea and vomiting.   Genitourinary:  Positive for pelvic pain. Negative for difficulty urinating and dysuria.   Musculoskeletal:  Positive for arthralgias (left knee pain) and back pain. Negative for gait problem.   Skin:  Negative for rash.   Neurological:  Positive for headaches (occasionally). Negative for dizziness and numbness.       Objective   /60 (BP Location: Left arm, Patient Position: Sitting, Cuff Size: Large)   Pulse 103   Temp (!) 95.9 °F (35.5 °C) (Temporal)   Resp 16   Ht 5' 2\" (1.575 m)   Wt 71.2 kg (157 lb)   SpO2 98%   BMI 28.72 kg/m²      Physical Exam  Vitals and nursing note reviewed.   Constitutional:       General: She is not " in acute distress.     Appearance: She is well-developed.   HENT:      Head: Normocephalic and atraumatic.      Right Ear: External ear normal.      Left Ear: External ear normal.      Nose: Nose normal.      Mouth/Throat:      Pharynx: Uvula midline.   Eyes:      Conjunctiva/sclera: Conjunctivae normal.   Cardiovascular:      Rate and Rhythm: Normal rate and regular rhythm.      Pulses: Normal pulses.      Heart sounds: Normal heart sounds. No murmur heard.  Pulmonary:      Effort: Pulmonary effort is normal. No respiratory distress.      Breath sounds: Normal breath sounds. No wheezing.   Musculoskeletal:      Cervical back: Normal range of motion and neck supple.   Skin:     General: Skin is warm.   Neurological:      Mental Status: She is alert and oriented to person, place, and time.   Psychiatric:         Speech: Speech normal.         Behavior: Behavior normal.

## 2025-03-14 ENCOUNTER — HOSPITAL ENCOUNTER (EMERGENCY)
Facility: HOSPITAL | Age: 40
Discharge: HOME | End: 2025-03-14
Attending: EMERGENCY MEDICINE
Payer: COMMERCIAL

## 2025-03-14 VITALS
TEMPERATURE: 98.3 F | DIASTOLIC BLOOD PRESSURE: 75 MMHG | SYSTOLIC BLOOD PRESSURE: 114 MMHG | BODY MASS INDEX: 28.86 KG/M2 | HEART RATE: 83 BPM | OXYGEN SATURATION: 98 % | RESPIRATION RATE: 18 BRPM | WEIGHT: 157.8 LBS

## 2025-03-14 DIAGNOSIS — G43.909 MIGRAINE HEADACHE: Primary | ICD-10-CM

## 2025-03-14 PROCEDURE — 99284 EMERGENCY DEPT VISIT MOD MDM: CPT | Performed by: EMERGENCY MEDICINE

## 2025-03-14 PROCEDURE — 96375 TX/PRO/DX INJ NEW DRUG ADDON: CPT

## 2025-03-14 PROCEDURE — 96374 THER/PROPH/DIAG INJ IV PUSH: CPT

## 2025-03-14 PROCEDURE — 99283 EMERGENCY DEPT VISIT LOW MDM: CPT

## 2025-03-14 RX ORDER — DEXAMETHASONE SODIUM PHOSPHATE 10 MG/ML
10 INJECTION, SOLUTION INTRAMUSCULAR; INTRAVENOUS ONCE
Status: COMPLETED | OUTPATIENT
Start: 2025-03-14 | End: 2025-03-14

## 2025-03-14 RX ORDER — DROPERIDOL 2.5 MG/ML
0.62 INJECTION, SOLUTION INTRAMUSCULAR; INTRAVENOUS ONCE
Status: COMPLETED | OUTPATIENT
Start: 2025-03-14 | End: 2025-03-14

## 2025-03-14 RX ORDER — MAGNESIUM SULFATE HEPTAHYDRATE 40 MG/ML
2 INJECTION, SOLUTION INTRAVENOUS ONCE
Status: DISCONTINUED | OUTPATIENT
Start: 2025-03-14 | End: 2025-03-14 | Stop reason: HOSPADM

## 2025-03-14 RX ORDER — ACETAMINOPHEN 325 MG/1
650 TABLET ORAL ONCE
Status: COMPLETED | OUTPATIENT
Start: 2025-03-14 | End: 2025-03-14

## 2025-03-14 RX ADMIN — DEXAMETHASONE SODIUM PHOSPHATE 10 MG: 10 INJECTION INTRAMUSCULAR; INTRAVENOUS at 18:57

## 2025-03-14 RX ADMIN — DROPERIDOL 0.62 MG: 2.5 INJECTION, SOLUTION INTRAMUSCULAR; INTRAVENOUS at 18:58

## 2025-03-14 RX ADMIN — ACETAMINOPHEN 650 MG: 325 TABLET, FILM COATED ORAL at 18:50

## 2025-03-14 RX ADMIN — SODIUM CHLORIDE 1000 ML: 0.9 INJECTION, SOLUTION INTRAVENOUS at 18:56

## 2025-03-14 NOTE — DISCHARGE INSTRUCTIONS
Recommend Tylenol 500 mg every 6 hours as needed  You may also take naproxen 500 mg every 12 hours  Continue with your Reglan and sumatriptan as previously prescribed as well as your magnesium  Follow-up with neurology, return for any worsening symptoms    Patient Education     Migrañas en adultos   Conceptos Básicos   Redactado por los médicos y editores de [employer]   ¿Qué son las migrañas? -- Las migrañas son un tipo de dolor de hal que también puede presentar otros síntomas. Las migrañas pueden afectar a adultos y niños. Son más frecuentes en las personas de sexo femenino que en las de sexo masculino. Con frecuencia son leves al principio y luego empeoran.  ¿Cuáles son los síntomas de las migrañas en adultos? -- Estos pueden ser algunos de los síntomas:   Dolor de hal - El dolor de hal empeora en el transcurso de varias horas y por lo general se trata de un dolor palpitante. A menudo afecta un lado de la hal.   Náuseas y a veces vómitos   Sensibilidad a la keyanna y el ruido - Acostarse en jonathan habitación silenciosa y oscura a menudo produce alivio.   Aura - Algunas personas tienen algo llamado “aura” de la migraña. Un aura es un síntoma o sensación que se produce antes de la migraña o veronica esta. El aura de cada persona es distinta, nikia en la mayoría de los casos afecta la visión. Si tiene un aura podría david luces intermitentes, puntos brillantes, líneas en zigzag o perder parte de la visión. O joelle puede sentir adormecimiento y hormigueo en los labios, la parte inferior de la pola y los dedos de jonathan mano. Algunas personas escuchan sonidos o tienen un zumbido en los oídos divina parte del aura. El aura generalmente dura de unos minutos a jonathan hora, y luego desaparece, nikia lo más frecuente es que dure de 15 a 30 minutos.  Las personas que tienen migrañas con aura por lo general no pueden jamilah píldoras anticonceptivas, ya que estas podrían aumentar el riesgo de accidente cerebrovascular (derrame).  Muchas  "personas tienen otros síntomas de migraña que aparecen varias horas antes o hasta un día antes del dolor de hal. Los médicos los llaman síntomas \"premonitorios\" o \"prodrómicos\". Algunos de ellos pueden ser bostezos, depresión, irritabilidad, antojos de comida, estreñimiento y rigidez en el dipak.  ¿Existe alguna prueba para detectar las migrañas? -- No. No existe ninguna prueba. Sin embargo, raymundo médico debería poder determinar si tiene migrañas al hacerle un examen y preguntar sobre queta síntomas.   ¿Tuyet consultar a un médico o enfermero? -- Sí. Si luis que tiene migrañas, debe hablar con raymundo médico o enfermero. También debe consultar al médico o enfermero si queta migrañas empeoran o se vuelven más frecuentes, o si tiene nuevos síntomas.  ¿Hay algo que pueda hacer para evitar las migrañas? -- Sí. Algunas personas se devika cuenta de que queta migrañas se desencadenan por ciertas cosas. Si puede evitar algunas de ellas, puede reducir queta probabilidades de tener migrañas.  También puede llevar un \"diario de juan carlos de hal\". En el diario, escriba cada vez que tenga jonathan migraña, lo que comió antes y lo que estaba haciendo antes de que apareciera. De lisbet manera, puede determinar si hay algo que debe evitar comer o hacer. También puede anotar qué medicina tomó y si sintió alivio o no.  Algunos desencadenantes comunes de las migrañas incluyen los siguientes:   Estrés   Cambios hormonales   Saltear comidas o no comer lo suficiente   Cambios climáticos   Dormir demasiado o muy poco   Luces muy brillantes o intermitentes   Beber alcohol   Rives ciertos alimentos, divina queso curado y salchichas   Fumar o estar con gente que fuma  Si las migrañas son frecuentes o intensas, el médico puede sugerir otras maneras de ayudar a prevenirlas. Por ejemplo, aprender técnicas de relajación y métodos para manejar el estrés podría ser de ayuda. También hay medicinas que pueden resultar útiles.  Algunas personas tienen migrañas viola antes de " que les venga el periodo o veronica beka. También hay medicinas que pueden ayudar en donavan emily.  ¿Cómo se tratan las migrañas? -- Existen muchas medicinas que pueden aliviar las migrañas. El médico puede ayudarle a encontrar el mejor tratamiento para raymundo emily.  Para migrañas leves, es posible que el médico sugiera jonathan medicina de venta sin receta divina el paracetamol (acetaminofén) (ejemplo de thompson comercial: Tylenol), el el ibuprofeno (ejemplos de marcas comerciales: Advil, Motrin) o el naproxeno (ejemplo de thompson comercial: Aleve). También existe jonathan medicina que combina el paracetamol (acetaminofén), la aspirinay  cafeína (ejemplo de thompson comercial: Excedrin).  Para migrañas más intensas, existen medicinas de venta con receta que pueden ayudar. Algunas de ellas, por ejemplo unas medicinas llamadas triptanos, ayudan a aliviar el dolor que producen los ataques de migraña. Otras medicinas de venta con receta pueden ayudar a que los ataques de migraña ocurran con menos frecuencia. Si la migraña viene acompañada de náuseas o vómitos muy intensos, también hay medicinas que pueden ser útiles para donavan emily.  No intente tratar las migrañas frecuentes por raymundo cuenta con medicinas para el dolor de venta sin receta. De hecho, jamilah esas medicinas con mucha frecuencia puede causar más juan carlos de hal en el futuro.  ¿Hay algo que pueda hacer por mi cuenta para sentirme mejor? -- Sí. Puede probar lo siguiente:   Descansar en jonathan habitación tranquila y oscura con un paño frío sobre la frente   Dormir   Usar la medicina o las medicinas de las que haya hablado con el médico - No debe usar medicinas que no se hayan abordado con el médico.  ¿Qué sucede si quiero quedar en embarazo? -- Si quiere quedar en embarazo, hable con raymundo médico o enfermero antes de comenzar a intentarlo. Algunas medicinas utilizadas para tratar y prevenir las migrañas no son seguras veronica el embarazo, por lo que podría ser necesario que cambie de medicina  antes de quedar en embarazo.  Algunas personas observan que uqeta migrañas mejoran veronica el embarazo y el amamantamiento, lo cual está relacionado con los cambios hormonales que ocurren en el cuerpo.  Todos los artículos se actualizan a medida que se descubre nueva evidencia y culmina nuestro proceso de evaluación por homólogos   Mary artículo se recuperó de [employer] el: May 18, 2024.  Artículo [id number] Versión [id number]  Release: 32.4.3 - C32.137  © 2024 [employer], Inc. Todos los derechos reservados.  Exención de responsabilidad y uso de la información del consumidor   Descargo de responsabilidad: esta información generalizada es un resumen limitado de información sobre el diagnóstico, el tratamiento y/o los medicamentos. No pretende ser exhaustiva y se debe utilizar divina herramienta para ayudar al usuario a comprender y/o evaluar las posibles opciones de diagnóstico y tratamiento. No incluye toda la información sobre afecciones, tratamientos, medicamentos, efectos secundarios o riesgos puedan ser aplicables a un paciente específico. No tiene el propósito de servir divina recomendación médica ni de sustituir la recomendación médica, el diagnóstico o el tratamiento de un profesional de atención médica que se base en el examen y la evaluación de mary profesional de la vivian respecto a las circunstancias específicas y únicas del paciente. Los pacientes deben hablar con un profesional de atención médica para obtener información completa sobre raymundo vivian, cuestiones médicas y opciones de tratamiento, incluidos los riesgos o los beneficios relacionados con el uso de medicamentos. Esta información no certifica que los tratamientos o medicamentos niharika seguros, eficaces o estén aprobados para tratar a un paciente específico. [employer] Inc. y queta afiliados renuncian a cualquier garantía o responsabilidad relacionada con esta información o el uso de la misma.El uso de esta información está sujeto a las Condiciones de uso,  disponibles en [email or web address]. 2024© [employer] y queta afiliados y/o licenciantes. Todos los derechos reservados.  Copyright   © 2024 [employer], [employer]. Todos los derechos reservados.

## 2025-03-14 NOTE — ED PROVIDER NOTES
Time reflects when diagnosis was documented in both MDM as applicable and the Disposition within this note       Time User Action Codes Description Comment    3/14/2025  6:42 PM Check, Farhan Add [G43.909] Migraine headache           ED Disposition       ED Disposition   Discharge    Condition   Stable    Date/Time   Fri Mar 14, 2025  7:18 PM    Comment   Lorrie Jones discharge to home/self care.                   Assessment & Plan       Medical Decision Making  39-year-old man with a history of migraine headaches presents to the emergency department for evaluation of headache that is similar to prior headaches.  On exam she is uncomfortable appearing, but in no acute distress.  No focal neurologic deficits present.  Suspect symptoms likely secondary to migraine headache, unlikely to represent intracranial hemorrhage, no infectious symptoms.  Plan to treat with migraine cocktail reassess.    Symptoms improved following medications.  Patient stable for discharge with outpatient neurology follow-up.  She was given strict turn precautions and was in agreement with the plan.    Problems Addressed:  Migraine headache: acute illness or injury    Amount and/or Complexity of Data Reviewed  External Data Reviewed: notes.    Risk  OTC drugs.  Prescription drug management.        ED Course as of 03/14/25 1919   Fri Mar 14, 2025   1918 Patient feels better, requesting discharge       Medications   magnesium sulfate 2 g/50 mL IVPB (premix) 2 g (has no administration in time range)   sodium chloride 0.9 % bolus 1,000 mL (1,000 mL Intravenous New Bag 3/14/25 1856)   acetaminophen (TYLENOL) tablet 650 mg (650 mg Oral Given 3/14/25 1850)   dexamethasone (PF) (DECADRON) injection 10 mg (10 mg Intravenous Given 3/14/25 1857)   droperidol (INAPSINE) injection 0.625 mg (0.625 mg Intravenous Given 3/14/25 1858)       ED Risk Strat Scores                                                History of Present Illness       Chief  Complaint   Patient presents with    Headache     Reports headache that is not responding to meds she took at home. + Nausea.        Past Medical History:   Diagnosis Date    Abnormal Pap smear of cervix     2015 cryosurgery; 6/2019 NILM pap/neg HPV    Allergic     Depression     Headache(784.0)     Migraine with aura       Past Surgical History:   Procedure Laterality Date    CHOLECYSTECTOMY LAPAROSCOPIC N/A 10/27/2020    Procedure: CHOLECYSTECTOMY LAPAROSCOPIC;  Surgeon: Lina Narayan MD;  Location:  MAIN OR;  Service: General    COLONOSCOPY      FL LUMBAR PUNCTURE DIAGNOSTIC  11/17/2023    GYNECOLOGIC CRYOSURGERY  2015    HYSTERECTOMY  2016    supracervical    LAPAROSCOPY  2018    DE LAPAROSCOPY W/LYSIS OF ADHESIONS N/A 03/03/2020    Procedure: L.O.A.;  Surgeon: Noy Paz MD;  Location: [facility];  Service: Gynecology    DE LAPS ABD PRTM&OMENTUM DX W/WO SPEC BR/WA SPX N/A 08/09/2019    Procedure: LAPAROSCOPY DIAGNOSTIC; LYSIS OF ADHESIONS;  Surgeon: Abebe Walls MD;  Location: [facility];  Service: Gynecology    TUBAL LIGATION      UPPER GASTROINTESTINAL ENDOSCOPY        Family History   Problem Relation Age of Onset    Cancer Paternal Grandmother         uterine    Cancer Paternal Aunt         uterine     Migraines Mother     Arthritis Mother     Hypertension Father     Breast cancer Neg Hx     Colon cancer Neg Hx     Ovarian cancer Neg Hx       Social History     Tobacco Use    Smoking status: Every Day     Current packs/day: 0.50     Average packs/day: 0.5 packs/day for 15.0 years (7.5 ttl pk-yrs)     Types: Cigarettes     Passive exposure: Current    Smokeless tobacco: Never   Vaping Use    Vaping status: Never Used   Substance Use Topics    Alcohol use: Never    Drug use: Never      E-Cigarette/Vaping    E-Cigarette Use Never User       E-Cigarette/Vaping Substances    Nicotine No     THC No     CBD No     Flavoring No     Other No     Unknown No       I have reviewed and agree  with the history as documented.     39-year-old female presents to the emergency department for evaluation of headache.  Headache is similar to prior migraine headaches.  Is associated with some nausea and photophobia.  No vomiting.  No head injury.  No blurry vision or double vision.  No fevers or chills.  Patient also states that her ears are itchy, denies ear pain.  Tried Reglan and sumatriptan prior to arrival without improvement.  This headache started 2 hours ago.  Was not maximal at onset.            Review of Systems   Constitutional:  Negative for chills and fever.   HENT:  Negative for ear pain and sore throat.    Eyes:  Positive for photophobia. Negative for pain and visual disturbance.   Respiratory:  Negative for cough and shortness of breath.    Cardiovascular:  Negative for chest pain and palpitations.   Gastrointestinal:  Positive for nausea. Negative for abdominal pain and vomiting.   Genitourinary:  Negative for dysuria and hematuria.   Musculoskeletal:  Negative for arthralgias and back pain.   Skin:  Negative for color change and rash.   Neurological:  Positive for headaches. Negative for seizures and syncope.   All other systems reviewed and are negative.          Objective       ED Triage Vitals   Temperature Pulse Blood Pressure Respirations SpO2 Patient Position - Orthostatic VS   03/14/25 1826 03/14/25 1826 03/14/25 1826 03/14/25 1826 03/14/25 1826 03/14/25 1826   98.3 °F (36.8 °C) 83 114/75 18 98 % Sitting      Temp Source Heart Rate Source BP Location FiO2 (%) Pain Score    03/14/25 1826 03/14/25 1826 03/14/25 1826 -- 03/14/25 1840    Oral Monitor Left arm  4      Vitals      Date and Time Temp Pulse SpO2 Resp BP Pain Score FACES Pain Rating User   03/14/25 1850 -- -- -- -- -- 7 -- EC   03/14/25 1840 -- -- -- -- -- 4 -- EC   03/14/25 1826 98.3 °F (36.8 °C) 83 98 % 18 114/75 -- -- CO            Physical Exam  Vitals and nursing note reviewed.   Constitutional:       General: She is not in  acute distress.  HENT:      Head: Normocephalic and atraumatic.      Right Ear: Tympanic membrane, ear canal and external ear normal.      Left Ear: Tympanic membrane, ear canal and external ear normal.      Nose: Nose normal.      Mouth/Throat:      Mouth: Mucous membranes are moist.   Eyes:      Extraocular Movements: Extraocular movements intact.      Conjunctiva/sclera: Conjunctivae normal.      Pupils: Pupils are equal, round, and reactive to light.   Cardiovascular:      Rate and Rhythm: Normal rate and regular rhythm.      Pulses: Normal pulses.   Pulmonary:      Effort: Pulmonary effort is normal. No respiratory distress.      Breath sounds: No stridor.   Musculoskeletal:         General: No deformity. Normal range of motion.      Cervical back: Normal range of motion and neck supple.   Skin:     General: Skin is warm and dry.      Capillary Refill: Capillary refill takes less than 2 seconds.   Neurological:      General: No focal deficit present.      Mental Status: She is alert and oriented to person, place, and time.      Cranial Nerves: No cranial nerve deficit.   Psychiatric:         Mood and Affect: Mood normal.         Behavior: Behavior normal.         Results Reviewed       None            No orders to display       Procedures    ED Medication and Procedure Management   Prior to Admission Medications   Prescriptions Last Dose Informant Patient Reported? Taking?   Diclofenac Sodium (VOLTAREN) 1 %  Self No No   Sig: Apply 2 g topically 4 (four) times a day   Ex-Lax 15 MG CHEW   No No   Sig: CHEW 1 TABLET BY MOUTH TWICE DAILY AS NEEDED FOR CONSTIPATION   Senna Plus 8.6-50 MG per tablet   No No   Sig: TAKE 1 TABLET BY MOUTH DAILY   ascorbic acid (VITAMIN C) 500 mg tablet   No No   Sig: TAKE 1 TABLET (500 MG TOTAL) BY MOUTH DAILY   bisacodyl 5 MG EC tablet   No No   Sig: TAKE 2 TABLETS (10 MG TOTAL) BY MOUTH 2 (TWO) TIMES A DAY AS NEEDED FOR CONSTIPATION (CONSTIPATION) COLONOSCOPY PREP   celecoxib  (CeleBREX) 100 mg capsule   No No   Sig: TAKE 1 CAPSULE (100 MG TOTAL) BY MOUTH 2 (TWO) TIMES A DAY AS NEEDED FOR MILD PAIN   dexamethasone (DECADRON) 2 mg tablet   No No   Sig: TAKE 1 TABLET (2 MG TOTAL) BY MOUTH DAILY WITH BREAKFAST   dicyclomine (BENTYL) 20 mg tablet   No No   Sig: Take 1 tablet (20 mg total) by mouth 2 (two) times a day as needed (abdominal pain)   ergocalciferol (VITAMIN D2) 50,000 units   No No   Sig: Take 1 capsule (50,000 Units total) by mouth once a week   famotidine (PEPCID) 20 mg tablet   No No   Sig: Take 1 tablet (20 mg total) by mouth daily for 14 days   fluocinolone acetonide (DERMOTIC) 0.01 % otic oil  Self No No   Sig: INSTILL 3 DROPS IN BOTH EARS DAILY   fluticasone (FLONASE) 50 mcg/act nasal spray  Self No No   Si spray into each nostril 2 (two) times a day   Patient not taking: Reported on 3/20/2024   hydrocortisone 1 % cream   No No   Sig: Apply topically 4 (four) times a day as needed for irritation   ibuprofen (MOTRIN) 800 mg tablet   Yes No   Sig: Take 800 mg by mouth every 8 (eight) hours as needed   magnesium Oxide (MAG-OX) 400 mg TABS   No No   Sig: Take 1 tablet (400 mg total) by mouth daily   meclizine (ANTIVERT) 25 mg tablet   Yes No   Sig: Take 25 mg by mouth Three times daily as needed   methocarbamol (ROBAXIN) 500 mg tablet   No No   Sig: Take 1 tablet (500 mg total) by mouth 2 (two) times a day as needed for muscle spasms   metoclopramide (REGLAN) 10 mg tablet   No No   Sig: TAKE 1 TABLET (10 MG TOTAL) BY MOUTH 3 (THREE) TIMES A DAY AS NEEDED (MIGRAINE)   miconazole (MONISTAT-7) 2 % vaginal cream   No No   Sig: Insert 1 applicator into the vagina daily at bedtime   Patient not taking: Reported on 10/14/2024   naproxen (EC NAPROSYN) 500 MG EC tablet  Self Yes No   Sig: Take 500 mg by mouth if needed for mild pain   nicotine (NICODERM CQ) 14 mg/24hr TD 24 hr patch   No No   Sig: Place 1 patch on the skin over 24 hours every 24 hours   nystatin-triamcinolone  (MYCOLOG-II) cream  Self No No   Sig: Apply topically 2 (two) times a day   pantoprazole (PROTONIX) 20 mg tablet   No No   Sig: TAKE 1 TABLET (20 MG TOTAL) BY MOUTH DAILY   polyethylene glycol (MIRALAX) 17 g packet  Self No No   Sig: Take 17 g by mouth daily   polyethylene glycol (MIRALAX) 17 g packet  Self No No   Sig: Take 17 g by mouth daily   rizatriptan (MAXALT) 10 mg tablet   No No   Sig: TAKE 1 TABLET (10 MG TOTAL) BY MOUTH AS NEEDED FOR MIGRAINE TAKE AT THE ONSET OF MIGRAINE; IF SYMPTOMS CONTINUE OR RETURN, MAY TAKE ANOTHER DOSE AT LEAST 2 HOURS AFTER FIRST DOSE. TAKE NO MORE THAN 2 DOSES IN A DAY.   sodium chloride 0.9 % nebulizer solution  Self No No   Sig: TAKE 3ML BY BEBULIZATION EVERY 6 HOURS AS NEEDED FOR WHEEZING OR SHORTNESS OF BREATH   Patient not taking: No sig reported   vitamin B-12 (VITAMIN B-12) 1,000 mcg tablet   No No   Sig: Take 1 tablet (1,000 mcg total) by mouth daily      Facility-Administered Medications: None     Patient's Medications   Discharge Prescriptions    No medications on file     No discharge procedures on file.  ED SEPSIS DOCUMENTATION   Time reflects when diagnosis was documented in both MDM as applicable and the Disposition within this note       Time User Action Codes Description Comment    3/14/2025  6:42 PM Farhan Lazo [G43.909] Migraine headache                  Farhan Lazo MD  03/14/25 9939

## 2025-03-16 RX ORDER — FLUOCINOLONE ACETONIDE 0.11 MG/ML
OIL AURICULAR (OTIC) 2 TIMES DAILY
Qty: 20 ML | Refills: 0 | Status: SHIPPED | OUTPATIENT
Start: 2025-03-16

## 2025-03-16 NOTE — ASSESSMENT & PLAN NOTE
Orders:    fluocinolone acetonide (DERMOTIC) 0.01 % otic oil; Administer into both ears 2 (two) times a day

## 2025-03-16 NOTE — ASSESSMENT & PLAN NOTE
-Much improved since undergoing surgery in April 2024.  -Reviewed gynecology oncology note from 6/6/2024. Patient has had multiple abdominal surgeries done in the past.  Most recently underwent surgery due to persistent left adnexal cyst.  Patient underwent robotic assisted laparoscopic extensive lysis of adhesion, left salpingo-oophorectomy, cystoscopy with placement of bilateral ureteral catheters on 4/23/2024.  Surgery was performed along with colorectal surgery as well as urology given complexity of the case.  -Postoperatively patient had a pelvic fluid collection which likely represented seroma/peritoneal inclusion cyst.  Conservative management with CT-guided aspiration was successfully performed on 5/20/2024.  - Repeat CT scan on 6/3/2024 shows no acute abnormality.  - Patient now experiencing some recurrent pelvic pain.   -Reviewed OB/GYN notes from 10/15/2024 and 10/24/2024.  Patient with benign appearing right adnexal cystic mass, decrease in size.  Pain mostly on the left.  Abnormal granulation tissue in the cuff likely contributor.  This was treated with silver nitrate at time of visit.  Patient referred to Dr. Cleary pelvic pain program.  -Reviewed OB/GYN note from 2/17/2025.  Patient referred to pelvic floor therapy.  - Continue pelvic floor therapy as scheduled at [facility].  - Continue follow-up with [facility] OB[facility] as scheduled.  - Continue tizanidine and Valium suppositories as prescribed through OB/GYN.

## 2025-03-24 DIAGNOSIS — M54.9 BACK PAIN: ICD-10-CM

## 2025-03-24 RX ORDER — METHOCARBAMOL 500 MG/1
500 TABLET, FILM COATED ORAL 2 TIMES DAILY PRN
Qty: 60 TABLET | Refills: 1 | Status: SHIPPED | OUTPATIENT
Start: 2025-03-24

## 2025-03-27 DIAGNOSIS — M54.9 BACK PAIN: ICD-10-CM

## 2025-03-27 RX ORDER — METHOCARBAMOL 500 MG/1
500 TABLET, FILM COATED ORAL 2 TIMES DAILY PRN
Qty: 60 TABLET | Refills: 1 | OUTPATIENT
Start: 2025-03-27

## 2025-03-28 ENCOUNTER — OFFICE VISIT (OUTPATIENT)
Dept: NEUROLOGY | Facility: CLINIC | Age: 40
End: 2025-03-28
Payer: COMMERCIAL

## 2025-03-28 VITALS
BODY MASS INDEX: 28.71 KG/M2 | OXYGEN SATURATION: 99 % | HEIGHT: 62 IN | WEIGHT: 156 LBS | HEART RATE: 102 BPM | DIASTOLIC BLOOD PRESSURE: 84 MMHG | SYSTOLIC BLOOD PRESSURE: 128 MMHG | TEMPERATURE: 98 F

## 2025-03-28 DIAGNOSIS — E55.9 VITAMIN D DEFICIENCY: Chronic | ICD-10-CM

## 2025-03-28 DIAGNOSIS — E53.8 VITAMIN B 12 DEFICIENCY: Primary | Chronic | ICD-10-CM

## 2025-03-28 DIAGNOSIS — G43.109 MIGRAINE HEADACHE WITH AURA: Chronic | ICD-10-CM

## 2025-03-28 PROCEDURE — 99213 OFFICE O/P EST LOW 20 MIN: CPT | Performed by: PSYCHIATRY & NEUROLOGY

## 2025-03-28 RX ORDER — TIZANIDINE 2 MG/1
TABLET ORAL
COMMUNITY
Start: 2025-02-28

## 2025-03-28 RX ORDER — OMEPRAZOLE 40 MG/1
1 CAPSULE, DELAYED RELEASE ORAL DAILY
COMMUNITY
Start: 2025-02-27

## 2025-03-28 RX ORDER — OMEPRAZOLE 40 MG/1
1 CAPSULE, DELAYED RELEASE ORAL DAILY
COMMUNITY
Start: 2025-03-24

## 2025-03-28 RX ORDER — TIZANIDINE 2 MG/1
TABLET ORAL
COMMUNITY
Start: 2025-03-11

## 2025-03-28 RX ORDER — SALICYLIC ACID 2 %
KIT TOPICAL
COMMUNITY
Start: 2025-03-16

## 2025-03-28 NOTE — PROGRESS NOTES
Name: Lorrie Jones      : 1985      MRN: [id number]  Encounter Provider: Aye Guevara MD  Encounter Date: 3/28/2025   Encounter department: [facility]  :  Assessment & Plan  Vitamin B 12 deficiency    Orders:    Vitamin B12; Future    Vitamin D deficiency    Orders:    Vitamin D 25 hydroxy; Future    Migraine headache with aura  Patient with a history of migraine headaches. She reports she has been getting on average 3 headaches in a month. Will cget updated lab work and continue current management:    - magnesium oxide 400 mg daily  - reglan 10 mg  prn   - Maxalt 10 mg prn   - Tizanidine 4 mg     Orders:    Magnesium; Future          History of Present Illness   HPI     Lorrie Jones is a very pleasant  39 y.o. female with a past medical history that includes choledocholithiasis s/p cholescystectomy, hydrosalpinx, GERD, gastritis, hypoglycemia who presents for follow up of headache and abnormal MRI.     Initial visit (2023):     She reports having a unilateral throbbing pain with migrainous features that has become more severe over the past six months. Prior imaging includes a normal CTH done in february. I did note percocet being part of her medication list. She reports she uses it sparingly. I reviewed PDMP and she does not get frequent refills of this therefore less suspicion for overuse headache. She was prescribed sumatriptan by other provider which does help her headaches. At this time she does not have more than 15 headaches days in a month so does not warrant PPX. Will focus on ruling out reversible etiologies and abortive regimen. Given change in nature of headache I ordered an MRI brain with and without contrast      Prior visits:     10/5/23: I called and I discussed MRI results with patient after review with MS specialist.  Left parietal region flair changes with unusual characteristics for microangiopathic changes.  It also has  corresponding T1 weighted black hole lesions.  Further work-up suggested by MS specialist including work-up for inflammatory causes, ischemia and vasculitis.  Patient denies any focal weakness, ambulatory difficulties, urinary incontinence for visual disturbances. At this time I will order blood work to rule out other diseases that can cause T2 flair changes.  I will order an MRI C-spine with and without contrast to evaluate for spinal cord lesions.  I will have patient follow-up with me sooner, after MRI C-spine is done to review available work-up together. Will discuss with patient B 12 injections given recent level of 189. Future plan is to repeat MRI brain MS protocol with and without contrast in 6 months.  Will consider ordering a CTA to screen for vasculitis.  Pending work-up, may consider a lumbar puncture.      11/2/23:  Today she reports sumatriptan helps but at times can have headaches that last up to 4 days. Also reports a newer headache that is stabbing in nature, lasting 20-30 minutes occurring 2-3 times in a day. She has been undergoing significant stress since I last saw her which is likely contributing to her headaches. She dose report is still is headache free most days of the month.  Repeat MRI brain MS protocol w/wo 6 months from prior to evaluate for any evolution of signal. LP ordered. EMG ordered for neck pain.  B12 189.  Plan for repletion.     11/20/23: Spoke to patient on the phone reports having a stabbing pain in lower back radiating down L3-L4 distribution on the L. Reports she felt sx immediately after needle insertion for the lumbar puncture on Friday. Patient likely with irritation of the nerve from procedure. Sent decadron 2mg X 5 days to treat radiculitis. Advised her to let me know how she is doing next week     3/20/24: Today patient reports that she currently has a headache. She had a breakthrough headache after her MRI was done but otherwise she has been stable. She reports  decreased stress as she was able to find a place to move.     11/7/2024:     Headache frequency:  9/19/2023: Unsure, can be many weeks without, but when she gets them, can last several days    11/2/2023: She reports sumatriptan helps however as of late has been having headaches that can last up to 4 days. She has been getting episodes of a stabbing pain that lasts 20-30 minutes. This can occur 2-3 times in a day  3/20/2024:Breakthrough headache after MRI done. Other wise her headaches are under control   11/7/2024: Can have up to 3 headaches in a month, each lasting up to 4 days     3/28/2025:  Can have up to 3 headaches in a month, each lasting up to 4 days      Workup:     MRI brain MS wo and w contrast (3/3/2025): Mild nonspecific white matter disease, appears more numerous in right cerebral hemisphere since 3/13/2024 - this may be due to worsening mild chronic microangiopathy or demyelinating disease (no specific features). No enhancing lesion. No acute intracranial abnormality.    MRI brain with and without contrast (9/28/2023: A few scattered foci of T2/FLAIR hyperintensity noted in the periventricular and subcortical white matter. Finding is nonspecific with broad differential considerations (given patient's young age). Main differentials include: complex migraines,   precocious microangiopathy, demyelinating disease, sequela of remote infectious or inflammatory etiology (such as Lyme's disease).      MRI C-spine with and without contrast (10/29/2023):  No intrinsic cord signal abnormality identified. No abnormal enhancement within the cervical spinal canal.    C4-C5: Mild bulge. No significant canal stenosis or foraminal narrowing.  C5-C6: Central disc protrusion. Mild mass effect on the thecal sac. No significant foraminal narrowing.  C6-C7: Central disc protrusion. Mild mass effect on the thecal sac. No significant foraminal narrowing.  Probable atypical hemangioma in the C6 vertebral body     MRI brain MS  protocol with and without contrast (3/13/2024):Stable scattered foci of supratentorial T2/FLAIR hyperintensities without associated enhancement or restricted diffusion. Differential remains the same including complex migraines, precocious microangiopathic, demyelinating disease,   infectious/inflammatory process.     NMO, MOG negative      Lumbar puncture (11/17/2023):    Latest Reference Range & Units 11/17/23 11:11   APPEARANCE CSF   clear and colorless   TUBE NUMBER CSF   4   XANTHOCHROMIA No  No   WBC CSF 0 - 5 /uL 0   GLUCOSE CSF 40 - 70 mg/dL 72 (H)   PROTEIN CSF 15 - 45 mg/dL 20   RBC CSF 0 - 10 uL 0   MYELIN BASIC PROTEIN CSF 0.0 - 3.7 ng/mL 1.5   IgG, CSF 0.0 - 6.7 mg/dL 1.0   IgG Index, CSF 0.0 - 0.7  0.6   IgG Synthetic Rate -9.9 TO +3.3 mg/day   -2.8   CSF/SERUM ALBUMIN ALB.INDEX 0 - 8  2   OLIGOCLONAL BANDS   0   CSF ALBUMIN 7 - 29 mg/dL 8   CONCHA, BODY FLUID   Rpt   (H): Data is abnormally high  Rpt: View report in Results Review for more information     EMG (3/8/22):  1) Normal right median and ulnar motor conduction studies; normal left median motor conduction study   2) Normal right median and ulnar F waves   3) Normal right median, ulnar, and superficial radial sensory conduction studies; normal left median sensory conduction study   4) EMG of the right arm reveals evidence of minimal chronic denervation most in keeping with a right C7 root distribution      Prior treatment:   Abortive:   Toradol 10 mg   Sumatriptan- Has helped   Ibuprofen- bruises  Prochlorperazine- slightly helps      Preventative:  Pregabalin- panic attacks   Amitriptyline- used to work, excessive somnolence   Tizanidine 2 mg- taking for vaginal spasms. Makes her sleepy      Interval history:  Not working   Still getting approximately 3 headaches in a month       Review of Systems   Constitutional:  Negative for appetite change, fatigue and fever.   HENT: Negative.  Negative for hearing loss, tinnitus, trouble swallowing and voice  "change.    Eyes: Negative.  Negative for photophobia, pain and visual disturbance.   Respiratory: Negative.  Negative for shortness of breath.    Cardiovascular: Negative.  Negative for palpitations.   Gastrointestinal: Negative.  Negative for nausea and vomiting.   Endocrine: Negative.  Negative for cold intolerance.   Genitourinary: Negative.  Negative for dysuria, frequency and urgency.   Musculoskeletal:  Negative for back pain, gait problem, myalgias, neck pain and neck stiffness.   Skin: Negative.  Negative for rash.   Allergic/Immunologic: Negative.    Neurological:  Positive for headaches. Negative for dizziness, tremors, seizures, syncope, facial asymmetry, speech difficulty, weakness, light-headedness and numbness.        Pt states her MI's have been the same since the last visit. She is getting 16 MI's monthly currently.   Hematological: Negative.  Does not bruise/bleed easily.   Psychiatric/Behavioral: Negative.  Negative for confusion, hallucinations and sleep disturbance.    All other systems reviewed and are negative.   I have personally reviewed the MA's review of systems and made changes as necessary.         Objective   Ht 5' 2\" (1.575 m)   BMI 28.86 kg/m²     Physical Exam  Neurological Exam          "

## 2025-03-28 NOTE — ASSESSMENT & PLAN NOTE
Patient with a history of migraine headaches. She reports she has been getting on average 3 headaches in a month. Will cget updated lab work and continue current management:    - magnesium oxide 400 mg daily  - reglan 10 mg  prn   - Maxalt 10 mg prn   - Tizanidine 4 mg     Orders:    Magnesium; Future

## 2025-03-31 ENCOUNTER — APPOINTMENT (OUTPATIENT)
Dept: LAB | Facility: CLINIC | Age: 40
End: 2025-03-31
Payer: COMMERCIAL

## 2025-03-31 DIAGNOSIS — E55.9 VITAMIN D DEFICIENCY: Chronic | ICD-10-CM

## 2025-03-31 DIAGNOSIS — E53.8 VITAMIN B 12 DEFICIENCY: Chronic | ICD-10-CM

## 2025-03-31 DIAGNOSIS — G43.109 MIGRAINE HEADACHE WITH AURA: Chronic | ICD-10-CM

## 2025-03-31 LAB
25(OH)D3 SERPL-MCNC: 28.8 NG/ML (ref 30–100)
MAGNESIUM SERPL-MCNC: 1.9 MG/DL (ref 1.9–2.7)
VIT B12 SERPL-MCNC: 400 PG/ML (ref 180–914)

## 2025-03-31 PROCEDURE — 83735 ASSAY OF MAGNESIUM: CPT

## 2025-03-31 PROCEDURE — 82306 VITAMIN D 25 HYDROXY: CPT

## 2025-03-31 PROCEDURE — 36415 COLL VENOUS BLD VENIPUNCTURE: CPT

## 2025-03-31 PROCEDURE — 82607 VITAMIN B-12: CPT

## 2025-04-16 DIAGNOSIS — L29.9 ITCHING OF EAR: ICD-10-CM

## 2025-04-16 DIAGNOSIS — E53.8 VITAMIN B 12 DEFICIENCY: ICD-10-CM

## 2025-04-16 DIAGNOSIS — K59.00 ACUTE CONSTIPATION: ICD-10-CM

## 2025-04-17 DIAGNOSIS — Z72.0 TOBACCO USE: ICD-10-CM

## 2025-04-17 RX ORDER — SENNOSIDES 15 MG/1
TABLET, CHEWABLE ORAL
Qty: 60 TABLET | Refills: 1 | Status: SHIPPED | OUTPATIENT
Start: 2025-04-17

## 2025-04-17 RX ORDER — LANOLIN ALCOHOL/MO/W.PET/CERES
1000 CREAM (GRAM) TOPICAL DAILY
Qty: 90 TABLET | Refills: 1 | Status: SHIPPED | OUTPATIENT
Start: 2025-04-17

## 2025-04-17 RX ORDER — NICOTINE 21 MG/24HR
1 PATCH, TRANSDERMAL 24 HOURS TRANSDERMAL EVERY 24 HOURS
Qty: 28 PATCH | Refills: 0 | Status: SHIPPED | OUTPATIENT
Start: 2025-04-17

## 2025-04-17 RX ORDER — FLUOCINOLONE ACETONIDE 0.11 MG/ML
OIL AURICULAR (OTIC) 2 TIMES DAILY
Qty: 20 ML | Refills: 0 | Status: SHIPPED | OUTPATIENT
Start: 2025-04-17

## 2025-04-18 ENCOUNTER — CONSULT (OUTPATIENT)
Dept: DERMATOLOGY | Facility: CLINIC | Age: 40
End: 2025-04-18
Payer: COMMERCIAL

## 2025-04-18 VITALS — TEMPERATURE: 98 F | BODY MASS INDEX: 28.9 KG/M2 | WEIGHT: 158 LBS

## 2025-04-18 DIAGNOSIS — H60.543 ECZEMA OF BOTH EXTERNAL EARS: ICD-10-CM

## 2025-04-18 PROCEDURE — 99244 OFF/OP CNSLTJ NEW/EST MOD 40: CPT

## 2025-04-18 RX ORDER — CLOBETASOL PROPIONATE 0.5 MG/G
OINTMENT TOPICAL
Qty: 30 G | Refills: 0 | Status: SHIPPED | OUTPATIENT
Start: 2025-04-18

## 2025-04-18 NOTE — PROGRESS NOTES
"[facility] Dermatology Clinic Note     Patient Name: Lorrie Jones  Encounter Date: 4/18/25       Have you been cared for by a [facility] Dermatologist in the last 3 years and, if so, which description applies to you? NO. I am considered a \"new\" patient and must complete all patient intake questions. I am of child-bearing potential.     REVIEW OF SYSTEMS:  Have you recently had or currently have any of the following? Recent fever or chills? No  Any non-healing wound? No  Are you pregnant or planning to become pregnant? No  Are you currently or planning to be nursing or breast feeding? No   PAST MEDICAL HISTORY:  Have you personally ever had or currently have any of the following?  If \"YES,\" then please provide more detail. Skin cancer (such as Melanoma, Basal Cell Carcinoma, Squamous Cell Carcinoma?  No  Tuberculosis, HIV/AIDS, Hepatitis B or C: No  Radiation Treatment No   HISTORY OF IMMUNOSUPPRESSION:   Do you have a history of any of the following:  Systemic Immunosuppression such as Diabetes, Biologic or Immunotherapy, Chemotherapy, Organ Transplantation, Bone Marrow Transplantation or Prednsione?  No    Answering \"YES\" requires the addition of the dotphrase \"IMMUNOSUPPRESSED\" as the first diagnosis of the patient's visit.   FAMILY HISTORY:  Any \"first degree relatives\" (parent, brother, sister, or child) with the following?    Skin Cancer, Pancreatic or Other Cancer? No   PATIENT EXPERIENCE:    Do you want the Dermatologist to perform a COMPLETE skin exam today including a clinical examination under the \"bra and underwear\" areas?  NO  If necessary, do we have your permission to call and leave a detailed message on your Preferred Phone number that includes your specific medical information?  Yes      Allergies   Allergen Reactions    Shellfish-Derived Products - Food Allergy Itching and Swelling     \"seafood \"    Ibuprofen Other (See Comments)     Patient states she gets hematoma    Other Itching     " Adhesive tape    Pregabalin Anxiety      Current Outpatient Medications:     nicotine (NICODERM CQ) 14 mg/24hr TD 24 hr patch, PLACE 1 PATCH ON THE SKIN OVER 24 HOURS EVERY 24 HOURS, Disp: 28 patch, Rfl: 0    ascorbic acid (VITAMIN C) 500 mg tablet, TAKE 1 TABLET (500 MG TOTAL) BY MOUTH DAILY, Disp: 90 tablet, Rfl: 1    Atrix System 1 2 % KIT, APPLY TO AFFECTED AREA, Disp: , Rfl:     bisacodyl 5 MG EC tablet, TAKE 2 TABLETS (10 MG TOTAL) BY MOUTH 2 (TWO) TIMES A DAY AS NEEDED FOR CONSTIPATION (CONSTIPATION) COLONOSCOPY PREP, Disp: 60 tablet, Rfl: 0    celecoxib (CeleBREX) 100 mg capsule, TAKE 1 CAPSULE (100 MG TOTAL) BY MOUTH 2 (TWO) TIMES A DAY AS NEEDED FOR MILD PAIN, Disp: 60 capsule, Rfl: 2    dexamethasone (DECADRON) 2 mg tablet, TAKE 1 TABLET (2 MG TOTAL) BY MOUTH DAILY WITH BREAKFAST, Disp: 5 tablet, Rfl: 0    Diclofenac Sodium (VOLTAREN) 1 %, Apply 2 g topically 4 (four) times a day, Disp: 50 g, Rfl: 0    dicyclomine (BENTYL) 20 mg tablet, Take 1 tablet (20 mg total) by mouth 2 (two) times a day as needed (abdominal pain), Disp: 30 tablet, Rfl: 2    ergocalciferol (VITAMIN D2) 50,000 units, Take 1 capsule (50,000 Units total) by mouth once a week, Disp: 16 capsule, Rfl: 1    Ex-Lax 15 MG CHEW, CHEW 1 TABLET BY MOUTH TWICE DAILY AS NEEDED FOR CONSTIPATION, Disp: 60 tablet, Rfl: 1    famotidine (PEPCID) 20 mg tablet, Take 1 tablet (20 mg total) by mouth daily for 14 days, Disp: 14 tablet, Rfl: 0    fluocinolone acetonide (DERMOTIC) 0.01 % otic oil, ADMINISTER INTO BOTH EARS 2 (TWO) TIMES A DAY, Disp: 20 mL, Rfl: 0    fluticasone (FLONASE) 50 mcg/act nasal spray, 1 spray into each nostril 2 (two) times a day (Patient not taking: Reported on 3/20/2024), Disp: 16 g, Rfl: 1    hydrocortisone 1 % cream, Apply topically 4 (four) times a day as needed for irritation, Disp: 48 g, Rfl: 1    ibuprofen (MOTRIN) 800 mg tablet, Take 800 mg by mouth every 8 (eight) hours as needed, Disp: , Rfl:     magnesium Oxide (MAG-OX)  400 mg TABS, Take 1 tablet (400 mg total) by mouth daily, Disp: 90 tablet, Rfl: 1    meclizine (ANTIVERT) 25 mg tablet, Take 25 mg by mouth Three times daily as needed, Disp: , Rfl:     methocarbamol (ROBAXIN) 500 mg tablet, TAKE 1 TABLET (500 MG TOTAL) BY MOUTH 2 (TWO) TIMES A DAY AS NEEDED FOR MUSCLE SPASMS, Disp: 60 tablet, Rfl: 1    metoclopramide (REGLAN) 10 mg tablet, TAKE 1 TABLET (10 MG TOTAL) BY MOUTH 3 (THREE) TIMES A DAY AS NEEDED (MIGRAINE), Disp: 90 tablet, Rfl: 4    miconazole (MONISTAT-7) 2 % vaginal cream, Insert 1 applicator into the vagina daily at bedtime (Patient not taking: Reported on 10/14/2024), Disp: 45 g, Rfl: 1    naproxen (EC NAPROSYN) 500 MG EC tablet, Take 500 mg by mouth if needed for mild pain, Disp: , Rfl:     nystatin-triamcinolone (MYCOLOG-II) cream, Apply topically 2 (two) times a day, Disp: 15 g, Rfl: 0    omeprazole (PriLOSEC) 40 MG capsule, Take 1 capsule by mouth in the morning, Disp: , Rfl:     omeprazole (PriLOSEC) 40 MG capsule, Take 1 capsule by mouth in the morning, Disp: , Rfl:     pantoprazole (PROTONIX) 20 mg tablet, TAKE 1 TABLET (20 MG TOTAL) BY MOUTH DAILY, Disp: 90 tablet, Rfl: 0    polyethylene glycol (MIRALAX) 17 g packet, Take 17 g by mouth daily, Disp: 510 g, Rfl: 5    polyethylene glycol (MIRALAX) 17 g packet, Take 17 g by mouth daily, Disp: 510 g, Rfl: 5    rizatriptan (MAXALT) 10 mg tablet, TAKE 1 TABLET (10 MG TOTAL) BY MOUTH AS NEEDED FOR MIGRAINE TAKE AT THE ONSET OF MIGRAINE; IF SYMPTOMS CONTINUE OR RETURN, MAY TAKE ANOTHER DOSE AT LEAST 2 HOURS AFTER FIRST DOSE. TAKE NO MORE THAN 2 DOSES IN A DAY., Disp: 9 tablet, Rfl: 3    Senna Plus 8.6-50 MG per tablet, TAKE 1 TABLET BY MOUTH DAILY, Disp: 30 tablet, Rfl: 5    sodium chloride 0.9 % nebulizer solution, TAKE 3ML BY BEBULIZATION EVERY 6 HOURS AS NEEDED FOR WHEEZING OR SHORTNESS OF BREATH (Patient not taking: No sig reported), Disp: 60 mL, Rfl: 1    tiZANidine (ZANAFLEX) 2 mg tablet, take 1 tablet (2 mg  total) by mouth every 6 (six) hours as needed for muscle spasms., Disp: , Rfl:     tiZANidine (ZANAFLEX) 2 mg tablet, take 1 tablet (2 mg total) by mouth every 6 (six) hours as needed for muscle spasms., Disp: , Rfl:     vitamin B-12 (VITAMIN B-12) 1,000 mcg tablet, TAKE 1 TABLET (1,000 MCG TOTAL) BY MOUTH DAILY, Disp: 90 tablet, Rfl: 1        Whom besides the patient is providing clinical information about today's encounter?   NO ADDITIONAL HISTORIAN (patient alone provided history)  Other:  : [id number]    Physical Exam and Assessment/Plan by Diagnosis:    ECZEMATOUS DERMATITIS    Physical Exam:  Anatomic Location Affected: POSTAURICULAR AREA, OUT EAR INCLUDING CONCHAL BOWL, EAR CANAL   Morphological Description:  THICKENING OF SKIN OF CONCHAL BOWL, POST AURICULAR ECZEMATOUS MACULES AND PATCHES, wax noted in external auditory canals.     Additional History of Present Condition:  Patient presents for itchiness of the inner and outer ears that has been going on for around a year. She has tried OTC creams and fluocinolone acetonide 0.01% otic oil. She notes that the fluocinolone acetonide 0.01% otic oil has helped a bit. She reports that her son has eczema. She denies a personal or family history of eczema or psoriasis.     Assessment and Plan:  Based on a thorough discussion of this condition and the management approach to it (including a comprehensive discussion of the known risks, side effects and potential benefits of treatment), the patient (family) agrees to implement the following specific plan:  START applying clobetasol 0.05% ointment to the OUTER EAR twice daily for 2 weeks for flares, and then switch back to using the fluocinolone acetonide 0.01% otic oil to the INNER EAR for 2 weeks. Continue to rotate between these 2 medications every 2 weeks.  Recommend following up with ENT if issue does not resolve.  Follow up with dermatology as needed.       Scribe Attestation      I,:  Shy Schwartz MA am  acting as a scribe while in the presence of the attending physician.:       I,:  Chelita Champion PA-C personally performed the services described in this documentation    as scribed in my presence.:

## 2025-04-18 NOTE — PATIENT INSTRUCTIONS
ATOPIC DERMATITIS    Assessment and Plan:  Based on a thorough discussion of this condition and the management approach to it (including a comprehensive discussion of the known risks, side effects and potential benefits of treatment), the patient (family) agrees to implement the following specific plan:  START applying clobetasol 0.05% ointment to the OUTER twice daily for 2 weeks for flares, and then switch back to using the fluocinolone acetonide 0.01% otic oil to the INNER EAR for 2 weeks. Continue to rotate between these 2 medications every 2 weeks.  Recommend following up with ENT if issue does not resolve.  Follow up with dermatology as needed.

## 2025-04-23 NOTE — TELEPHONE ENCOUNTER
Continued Stay SW/CM Assessment/Plan of Care Note       Active Substitute Decision Maker (SDM)    There are no active Substitute Decision Maker (SDM) on file.           Progress note:  SOB.   Pulm-CT worsening R sided pleural effusion, thoracentesis x2. S/p chest tube placement on 4/21. IV steroids.  IR drain pleural effusion 4/18.  4L O2 (baseline 2L QHS).   IV ABX: cefepime and doxycycline, Vanco started 4/23 (no ID consult)     L1 compress fx no kypho.     PT rec [facility]; declined.     From home w sp & son; O2 Integrated, in process of getting cpap  Declining SNF  Watch for ABX plan    DCP: Home w [facility] RN/PT/OT (need order).    See SW/CM flowsheets for other objective data.    Disposition Recommendations:  Preliminary discharge destination: Planned Discharge Destination: Home with services/support  SW/CM recommendation for discharge: Home, Home care    Destination Pharmacy:        Discharge Plan/Needs:     Continued Care and Services - Admitted Since 4/17/2025       Home Medical Care Coordination complete.      Service Provider Request Status Services Address Phone Fax    [facility]  Selected Home Health Services [address] [facility] [id number] [phone]                      Devices/ Equipment that need to be arranged for discharge:     Accepted   Pending insurance authorization   Others:    Anticipated date of DME availability:     Prior To Hospitalization:    Living Situation: Spouse and residing at Apartment    .  Support Systems: Family members, Spouse   Home Devices/Equipment: Shower chair, Home Oxygen (T) Integrated-2L as needed      [phone]   Mobility Assist Devices: Front-wheeled walker, Wheelchair   Type of Service Prior to Hospitalization: None               Patient/Family discharge goal (s):  Home Care     Resources provided:           Prior Function  Bed Mobility: Modified Independent (04/19/25 1204 : Ameena Romero, OT)  Transfers: Modified Independent  Placed call to central scheduling, spoke to Laura Dominguez. She is scheduled on 10/24 @ 4:15 in [facility] location. Placed call to patient, I informed her of the MRI fate/time and location. I r/s her 10/23 appointment, per LOV AVS pt to be scheduled 1 wk after MRI is scheduled. Patient is scheduled to come in office on 11/2 at [facility] as she was unable to come in earlier that week. (04/19/25 1204 : Ameena Romero, OT)  Ambulation in the Home: Modified  Independent (04/19/25 1204 : Ameena Romero OT)  Ambulation in the Community: Modified Independent (04/19/25 1204 : Ameena Romero OT)    Current Function  Last Filed Values         Value Time User    Current Function  slightly below baseline level of function 4/21/2025 12:10 PM Azalia Leiva, PT    Therapy Impairments  activity tolerance; balance; strength 4/21/2025 12:10 PM Azalia Leiva, PT    ADLs Requiring Support  bed mobility; transfers; ambulation 4/21/2025 12:10 PM Azalia Leiva, PT            Therapy Recommendations for Discharge:   PT:      Last Filed Values         Value Time User    PT Discharge Needs  therapy 5 or more times per week 4/21/2025 12:10 PM Azalia Leiva, PT          OT:       Last Filed Values         Value Time User    OT Discharge Needs  therapy 5 or more times per week 4/19/2025  1:48 PM Ameena Romero OT          SLP:    Last Filed Values       None            Mobility Equipment Recommended for Discharge: TBA      Barriers to Discharge  Identified Barriers to Discharge/Transition Planning: Medical necessity for acute care, Consult Pending/Clearance, Pending diagnostic results/procedure

## 2025-04-25 DIAGNOSIS — E53.8 VITAMIN B 12 DEFICIENCY: ICD-10-CM

## 2025-04-25 RX ORDER — ASCORBIC ACID 500 MG
500 TABLET ORAL DAILY
Qty: 90 TABLET | Refills: 1 | Status: SHIPPED | OUTPATIENT
Start: 2025-04-25

## 2025-04-30 ENCOUNTER — TELEPHONE (OUTPATIENT)
Dept: FAMILY MEDICINE CLINIC | Facility: CLINIC | Age: 40
End: 2025-04-30

## 2025-04-30 DIAGNOSIS — R09.81 NASAL CONGESTION: ICD-10-CM

## 2025-04-30 DIAGNOSIS — K21.9 GASTROESOPHAGEAL REFLUX DISEASE WITHOUT ESOPHAGITIS: ICD-10-CM

## 2025-04-30 DIAGNOSIS — U07.1 COVID-19: ICD-10-CM

## 2025-04-30 RX ORDER — SODIUM CHLORIDE FOR INHALATION 0.9 %
3 VIAL, NEBULIZER (ML) INHALATION EVERY 6 HOURS PRN
Qty: 60 ML | Refills: 0 | Status: SHIPPED | OUTPATIENT
Start: 2025-04-30

## 2025-04-30 RX ORDER — PANTOPRAZOLE SODIUM 20 MG/1
20 TABLET, DELAYED RELEASE ORAL DAILY
Qty: 90 TABLET | Refills: 0 | Status: SHIPPED | OUTPATIENT
Start: 2025-04-30 | End: 2025-10-27

## 2025-04-30 NOTE — TELEPHONE ENCOUNTER
Patient ask to remove following medication     omeprazole (PriLOSEC) 40 MG capsule     Due to her insurance is not covering following medication     pantoprazole (PROTONIX) 20 mg tablet   Patient prefer this medication because is working for her

## 2025-05-04 DIAGNOSIS — H60.543 ECZEMA OF BOTH EXTERNAL EARS: ICD-10-CM

## 2025-05-05 RX ORDER — BENZOCAINE/MENTHOL 6 MG-10 MG
LOZENGE MUCOUS MEMBRANE 4 TIMES DAILY PRN
Qty: 56.7 G | Refills: 0 | Status: SHIPPED | OUTPATIENT
Start: 2025-05-05

## 2025-05-09 DIAGNOSIS — M54.9 BACK PAIN: ICD-10-CM

## 2025-05-09 RX ORDER — CELECOXIB 100 MG/1
100 CAPSULE ORAL 2 TIMES DAILY PRN
Qty: 60 CAPSULE | Refills: 2 | Status: SHIPPED | OUTPATIENT
Start: 2025-05-09

## 2025-05-19 DIAGNOSIS — G43.109 MIGRAINE WITH AURA AND WITHOUT STATUS MIGRAINOSUS, NOT INTRACTABLE: Chronic | ICD-10-CM

## 2025-05-19 RX ORDER — METOCLOPRAMIDE 10 MG/1
10 TABLET ORAL 3 TIMES DAILY PRN
Qty: 90 TABLET | Refills: 1 | Status: SHIPPED | OUTPATIENT
Start: 2025-05-19

## 2025-05-25 ENCOUNTER — RESULTS FOLLOW-UP (OUTPATIENT)
Dept: NEUROLOGY | Facility: CLINIC | Age: 40
End: 2025-05-25

## 2025-05-27 DIAGNOSIS — E53.8 VITAMIN B 12 DEFICIENCY: ICD-10-CM

## 2025-05-27 DIAGNOSIS — E55.9 VITAMIN D DEFICIENCY: Primary | Chronic | ICD-10-CM

## 2025-05-27 RX ORDER — LANOLIN ALCOHOL/MO/W.PET/CERES
1000 CREAM (GRAM) TOPICAL DAILY
Qty: 90 TABLET | Refills: 1 | Status: SHIPPED | OUTPATIENT
Start: 2025-05-27

## 2025-06-05 DIAGNOSIS — Z72.0 TOBACCO USE: ICD-10-CM

## 2025-06-05 RX ORDER — NICOTINE 21 MG/24HR
1 PATCH, TRANSDERMAL 24 HOURS TRANSDERMAL EVERY 24 HOURS
Qty: 28 PATCH | Refills: 2 | Status: SHIPPED | OUTPATIENT
Start: 2025-06-05

## 2025-06-10 ENCOUNTER — HOSPITAL ENCOUNTER (OUTPATIENT)
Dept: RADIOLOGY | Facility: HOSPITAL | Age: 40
Discharge: HOME | End: 2025-06-10
Payer: COMMERCIAL

## 2025-06-10 ENCOUNTER — TELEPHONE (OUTPATIENT)
Age: 40
End: 2025-06-10

## 2025-06-10 ENCOUNTER — OFFICE VISIT (OUTPATIENT)
Dept: FAMILY MEDICINE CLINIC | Facility: CLINIC | Age: 40
End: 2025-06-10

## 2025-06-10 VITALS
SYSTOLIC BLOOD PRESSURE: 110 MMHG | HEART RATE: 100 BPM | OXYGEN SATURATION: 98 % | DIASTOLIC BLOOD PRESSURE: 70 MMHG | TEMPERATURE: 98 F | BODY MASS INDEX: 29.26 KG/M2 | RESPIRATION RATE: 16 BRPM | WEIGHT: 159 LBS | HEIGHT: 62 IN

## 2025-06-10 DIAGNOSIS — G89.29 CHRONIC BILATERAL LOW BACK PAIN WITH LEFT-SIDED SCIATICA: Chronic | ICD-10-CM

## 2025-06-10 DIAGNOSIS — M54.12 CERVICAL RADICULOPATHY: ICD-10-CM

## 2025-06-10 DIAGNOSIS — M54.2 NECK PAIN: Primary | ICD-10-CM

## 2025-06-10 DIAGNOSIS — L29.9 ITCHING OF EAR: ICD-10-CM

## 2025-06-10 DIAGNOSIS — M54.2 NECK PAIN: ICD-10-CM

## 2025-06-10 DIAGNOSIS — E53.8 VITAMIN B 12 DEFICIENCY: Chronic | ICD-10-CM

## 2025-06-10 DIAGNOSIS — M54.42 CHRONIC BILATERAL LOW BACK PAIN WITH LEFT-SIDED SCIATICA: Chronic | ICD-10-CM

## 2025-06-10 DIAGNOSIS — E55.9 VITAMIN D DEFICIENCY: Chronic | ICD-10-CM

## 2025-06-10 DIAGNOSIS — K21.9 GASTROESOPHAGEAL REFLUX DISEASE WITHOUT ESOPHAGITIS: Chronic | ICD-10-CM

## 2025-06-10 PROCEDURE — 99214 OFFICE O/P EST MOD 30 MIN: CPT | Performed by: PHYSICIAN ASSISTANT

## 2025-06-10 PROCEDURE — 72050 X-RAY EXAM NECK SPINE 4/5VWS: CPT

## 2025-06-10 RX ORDER — OMEPRAZOLE 40 MG/1
40 CAPSULE, DELAYED RELEASE ORAL DAILY
Qty: 90 CAPSULE | Refills: 1 | OUTPATIENT
Start: 2025-06-10

## 2025-06-10 NOTE — ASSESSMENT & PLAN NOTE
- Reviewed vitamin D level from March 2025.  Neurology recommended for patient to start taking vitamin D 1000 units daily.

## 2025-06-10 NOTE — ASSESSMENT & PLAN NOTE
-Reviewed vitamin B12 level from March 2025.  Neurology recommended start taking vitamin B12 supplement daily.

## 2025-06-10 NOTE — TELEPHONE ENCOUNTER
The original prescription was discontinued on 12/11/2024 by Margarita Walker PA-C for the following reason: Alternate therapy. Renewing this prescription may not be appropriate.

## 2025-06-10 NOTE — TELEPHONE ENCOUNTER
liaPatients GI provider:  Dr. Stroud    Number to return call: [phone]    Reason for call: Pt calling to ask for transportation for her 6/18/25 appt. Please arrange for transportation    Scheduled procedure/appointment date if applicable: Apt/6/18/25

## 2025-06-10 NOTE — ASSESSMENT & PLAN NOTE
-Will obtain x-ray of cervical spine for further evaluation and management.  - Patient requesting referral to chiropractor.  Referral placed today.  -Recommend using neck pillow for increase support of the neck.  Orders:    Ambulatory Referral to Chiropractic; Future    XR spine cervical complete 4 or 5 vw non injury; Future

## 2025-06-10 NOTE — PROGRESS NOTES
Name: Lorrie Jones      : 1985      MRN: [id number]  Encounter Provider: Margarita Walker PA-C  Encounter Date: 6/10/2025   Encounter department: [facility]  :  Assessment & Plan  Neck pain  -Will obtain x-ray of cervical spine for further evaluation and management.  - Patient requesting referral to chiropractor.  Referral placed today.  -Recommend using neck pillow for increase support of the neck.  Orders:    Ambulatory Referral to Chiropractic; Future    XR spine cervical complete 4 or 5 vw non injury; Future    Chronic bilateral low back pain with left-sided sciatica  - Lumbar spine x-ray which shows mild scoliosis, otherwise unremarkable study.  - Advised to apply ice/heating pad/topical therapies as needed to affected area.  - Continue Robaxin 500 mg, twice daily as needed.  Discussed possible side effect of drowsiness and to avoid operating heavy machinery or driving when taking medication.  - Continue Celebrex 100 mg, twice daily as needed.  - Patient requesting referral to chiropractor.  Referral placed today.  Orders:    Ambulatory Referral to Chiropractic; Future     Cervical radiculopathy    Orders:    Ambulatory Referral to Chiropractic; Future    XR spine cervical complete 4 or 5 vw non injury; Future    Itching of ear  -Reviewed dermatology note from 2025.  Clobetasol ointment prescribed x 2 weeks, then advised to return to using fluocinolone acetonide otic oil. Advised to rotate between these 2 modifications every 2 weeks.  Recommend following up with ENT if issue does not resolve.  - Patient reports mild relief with current regimen, but is still requesting second opinion with ENT.  Updated referral placed today.  Orders:    Ambulatory Referral to Otolaryngology; Future    Vitamin B 12 deficiency  -Reviewed vitamin B12 level from 2025.  Neurology recommended start taking vitamin B12 supplement daily.           Vitamin D deficiency  -  Reviewed vitamin D level from March 2025.  Neurology recommended for patient to start taking vitamin D 1000 units daily.                 Depression Screening and Follow-up Plan: Patient was screened for depression during today's encounter. They screened negative with a PHQ-2 score of 0.        History of Present Illness     Patient is a 40 y.o. female whom  has a past medical history of Abnormal Pap smear of cervix, Allergic, Depression, Headache(784.0), and Migraine with aura. who is seen today in office for vitamin D deficiency.    -Patient notes for the past few weeks she has been experiencing neck pain.  She denies any direct injury or trauma to the area.  Patient notes the pain generally occurs in the morning when she wakes up and will last for about 1 hour.  Patient notes sometimes the pain does run down her left arm into the shoulder.    -Patient notes she did try the clobetasol ointment from dermatology for the itchiness of her ears but it did not help much.  Patient notes the fluocinolone oil does provide some relief.  Patient is requesting to see a different ENT for further evaluation.      Review of Systems   Constitutional:  Negative for chills and fever.   HENT:  Negative for congestion, ear pain and sore throat.         Itchiness b/l ears   Eyes:  Negative for pain.   Respiratory:  Negative for cough, chest tightness, shortness of breath and wheezing.    Cardiovascular:  Negative for chest pain and palpitations.   Gastrointestinal:  Negative for abdominal pain, constipation, diarrhea, nausea and vomiting.   Genitourinary:  Negative for difficulty urinating and dysuria.   Musculoskeletal:  Positive for arthralgias (left knee pain), back pain and neck pain. Negative for gait problem.   Skin:  Negative for rash.   Neurological:  Positive for headaches (occasionally). Negative for dizziness and numbness.       Objective   /70 (BP Location: Right arm, Patient Position: Sitting, Cuff Size: Standard)    "Pulse 100   Temp 98 °F (36.7 °C) (Temporal)   Resp 16   Ht 5' 2\" (1.575 m)   Wt 72.1 kg (159 lb)   SpO2 98%   BMI 29.08 kg/m²      Physical Exam  Vitals and nursing note reviewed.   Constitutional:       General: She is not in acute distress.     Appearance: She is well-developed.   HENT:      Head: Normocephalic and atraumatic.      Right Ear: External ear normal.      Left Ear: External ear normal.      Nose: Nose normal.      Mouth/Throat:      Pharynx: Uvula midline.     Eyes:      Conjunctiva/sclera: Conjunctivae normal.       Cardiovascular:      Rate and Rhythm: Normal rate and regular rhythm.      Pulses: Normal pulses.      Heart sounds: Normal heart sounds. No murmur heard.  Pulmonary:      Effort: Pulmonary effort is normal. No respiratory distress.      Breath sounds: Normal breath sounds. No wheezing.     Musculoskeletal:      Cervical back: Normal range of motion and neck supple. No edema. Muscular tenderness present. Normal range of motion.     Skin:     General: Skin is warm.     Neurological:      Mental Status: She is alert and oriented to person, place, and time.     Psychiatric:         Speech: Speech normal.         Behavior: Behavior normal.         "

## 2025-06-10 NOTE — PATIENT INSTRUCTIONS
[facility] Chiropractor  Please contact us at [phone] to schedule your appointment.         [facility]  [address], [address] [place], [place] [id number] [phone]    [facility]  [address] [place] [address]  [phone]    [facility]   [address] [place] [address] [address] [place] [address]  [phone]

## 2025-06-10 NOTE — ASSESSMENT & PLAN NOTE
- Lumbar spine x-ray which shows mild scoliosis, otherwise unremarkable study.  - Advised to apply ice/heating pad/topical therapies as needed to affected area.  - Continue Robaxin 500 mg, twice daily as needed.  Discussed possible side effect of drowsiness and to avoid operating heavy machinery or driving when taking medication.  - Continue Celebrex 100 mg, twice daily as needed.  - Patient requesting referral to chiropractor.  Referral placed today.  Orders:    Ambulatory Referral to Chiropractic; Future

## 2025-06-10 NOTE — TELEPHONE ENCOUNTER
Spoke with pt with .  Mailed transportation form.  Pt will try to walk into local office to sign original form.  Pt will ask that transportation be set up after form is received.

## 2025-06-10 NOTE — ASSESSMENT & PLAN NOTE
Orders:    Ambulatory Referral to Chiropractic; Future    XR spine cervical complete 4 or 5 vw non injury; Future

## 2025-06-10 NOTE — ASSESSMENT & PLAN NOTE
-Reviewed dermatology note from 4/18/2025.  Clobetasol ointment prescribed x 2 weeks, then advised to return to using fluocinolone acetonide otic oil. Advised to rotate between these 2 modifications every 2 weeks.  Recommend following up with ENT if issue does not resolve.  - Patient reports mild relief with current regimen, but is still requesting second opinion with ENT.  Updated referral placed today.  Orders:    Ambulatory Referral to Otolaryngology; Future

## 2025-06-23 DIAGNOSIS — K59.00 ACUTE CONSTIPATION: ICD-10-CM

## 2025-06-23 RX ORDER — SENNOSIDES 15 MG/1
TABLET, CHEWABLE ORAL
Qty: 60 TABLET | Refills: 1 | Status: SHIPPED | OUTPATIENT
Start: 2025-06-23

## 2025-06-24 ENCOUNTER — APPOINTMENT (OUTPATIENT)
Dept: URGENT CARE | Facility: MEDICAL CENTER | Age: 40
End: 2025-06-24

## 2025-06-24 DIAGNOSIS — E55.9 VITAMIN D DEFICIENCY: ICD-10-CM

## 2025-06-24 RX ORDER — ERGOCALCIFEROL 1.25 MG/1
50000 CAPSULE, LIQUID FILLED ORAL WEEKLY
Qty: 16 CAPSULE | Refills: 1 | Status: SHIPPED | OUTPATIENT
Start: 2025-06-24

## 2025-06-25 DIAGNOSIS — G43.109 MIGRAINE WITH AURA AND WITHOUT STATUS MIGRAINOSUS, NOT INTRACTABLE: Chronic | ICD-10-CM

## 2025-06-25 RX ORDER — LANOLIN ALCOHOL/MO/W.PET/CERES
400 CREAM (GRAM) TOPICAL DAILY
Qty: 90 TABLET | Refills: 1 | Status: SHIPPED | OUTPATIENT
Start: 2025-06-25

## 2025-06-26 ENCOUNTER — TELEPHONE (OUTPATIENT)
Dept: FAMILY MEDICINE CLINIC | Facility: CLINIC | Age: 40
End: 2025-06-26

## 2025-06-26 DIAGNOSIS — E55.9 VITAMIN D DEFICIENCY: Chronic | ICD-10-CM

## 2025-06-26 DIAGNOSIS — K21.9 GASTROESOPHAGEAL REFLUX DISEASE WITHOUT ESOPHAGITIS: ICD-10-CM

## 2025-06-26 RX ORDER — CHOLECALCIFEROL (VITAMIN D3) 25 MCG
1000 TABLET ORAL DAILY
Qty: 30 TABLET | Refills: 2 | Status: SHIPPED | OUTPATIENT
Start: 2025-06-26

## 2025-06-26 RX ORDER — PANTOPRAZOLE SODIUM 20 MG/1
20 TABLET, DELAYED RELEASE ORAL DAILY
Qty: 90 TABLET | Refills: 0 | Status: SHIPPED | OUTPATIENT
Start: 2025-06-26 | End: 2025-12-23

## 2025-06-26 NOTE — TELEPHONE ENCOUNTER
PATIENT IS CALLING TO PLEASE SEND OVER TO PHARMACY:  pantoprazole (PROTONIX) 20 mg tablet , THE OTHER MEDICATION IS NOT WORKING FOR HER.

## 2025-06-27 ENCOUNTER — TELEPHONE (OUTPATIENT)
Dept: FAMILY MEDICINE CLINIC | Facility: CLINIC | Age: 40
End: 2025-06-27

## 2025-06-27 NOTE — TELEPHONE ENCOUNTER
PCP SIGNATURE NEEDED FOR [facility], [facility]  FORM RECEIVED VIA FAX AND PLACED IN PCP FOLDER TO BE DELIVERED AT ASSIGNED TIMES.    Prior Authorization Request:  Pantoprazole Sod.

## 2025-07-01 ENCOUNTER — CONSULT (OUTPATIENT)
Age: 40
End: 2025-07-01
Payer: COMMERCIAL

## 2025-07-01 VITALS
SYSTOLIC BLOOD PRESSURE: 110 MMHG | OXYGEN SATURATION: 98 % | WEIGHT: 161 LBS | HEART RATE: 78 BPM | HEIGHT: 62 IN | BODY MASS INDEX: 29.63 KG/M2 | DIASTOLIC BLOOD PRESSURE: 70 MMHG

## 2025-07-01 DIAGNOSIS — M54.16 LEFT LUMBAR RADICULITIS: ICD-10-CM

## 2025-07-01 DIAGNOSIS — M79.18 MYOFASCIAL PAIN: ICD-10-CM

## 2025-07-01 DIAGNOSIS — M99.01 SEGMENTAL DYSFUNCTION OF CERVICAL REGION: ICD-10-CM

## 2025-07-01 DIAGNOSIS — G89.29 CHRONIC PAIN OF LEFT KNEE: Primary | ICD-10-CM

## 2025-07-01 DIAGNOSIS — M99.03 SEGMENTAL DYSFUNCTION OF LUMBAR REGION: ICD-10-CM

## 2025-07-01 DIAGNOSIS — M53.3 SACROILIAC JOINT DYSFUNCTION: ICD-10-CM

## 2025-07-01 DIAGNOSIS — M25.562 CHRONIC PAIN OF LEFT KNEE: Primary | ICD-10-CM

## 2025-07-01 DIAGNOSIS — M54.10 BRACHIAL NEURITIS OF LEFT UPPER EXTREMITY: ICD-10-CM

## 2025-07-01 DIAGNOSIS — M99.02 SEGMENTAL DYSFUNCTION OF THORACIC REGION: ICD-10-CM

## 2025-07-01 PROCEDURE — 99203 OFFICE O/P NEW LOW 30 MIN: CPT | Performed by: CHIROPRACTOR

## 2025-07-01 NOTE — PROGRESS NOTES
"     1. Chronic pain of left knee  Ambulatory Referral to Orthopedic Surgery      2. Brachial neuritis of left upper extremity        3. Myofascial pain        4. Segmental dysfunction of cervical region        5. Segmental dysfunction of thoracic region        6. Segmental dysfunction of lumbar region        7. Sacroiliac joint dysfunction        8. Left lumbar radiculitis          Assessment/Plan:    Review of Diagnostic Studies and Office Notes:    The patient's Kimberlyn 10, 2025 x-ray report of the cervical spine, March 3, 2025 MRI report of the brain and as without and with contrast, January 7, 2025 ultrasound report of the pelvis complete with transvaginal, December 10, 2024 x-ray report of the left hip/pelvis, October 23, 2024 CTA abdomen pelvis with and without contrast, October 24, 2023 MRI report of the cervical spine with and without contrast, September 29, 2023 MRI report of the pelvis, January 24, 2023 x-ray report of the lumbar spine, Margarita Walker PA-C January 10, 2025 family medicine office note (neck pain, chronic bilateral low back pain with left-sided sciatica, cervical radiculopathy, itching of the ear, vitamin B12 deficiency, vitamin D deficiency, \"Will obtain x-ray of cervical spine for further evaluation and management. - Patient requesting referral to chiropractor.  Referral placed today. Recommend using neck pillow for increase support of the neck. Orders:   Ambulatory Referral to Chiropractic; Future   XR spine cervical complete 4 or 5 vw non injury; Future\" \"Chronic bilateral low back pain with left-sided sciatica Lumbar spine x-ray which shows mild scoliosis, otherwise unremarkable study. Advised to apply ice/heating pad/topical therapies as needed to affected area. Continue Robaxin 500 mg, twice daily as needed.  Discussed possible side effect of drowsiness and to avoid operating heavy machinery or driving when taking medication. - Continue Celebrex 100 mg, twice daily as needed. Patient " "requesting referral to chiropractor.  Referral placed today. Orders:   Ambulatory Referral to Chiropractic; Future\" \"40 y.o. female whom  has a past medical history of Abnormal Pap smear of cervix, Allergic, Depression, Headache(784.0), and Migraine with aura. who is seen today in office for vitamin D deficiency. Patient notes for the past few weeks she has been experiencing neck pain.  She denies any direct injury or trauma to the area.  Patient notes the pain generally occurs in the morning when she wakes up and will last for about 1 hour.  Patient notes sometimes the pain does run down her left arm into the shoulder. Patient notes she did try the clobetasol ointment from dermatology for the itchiness of her ears but it did not help much.  Patient notes the fluocinolone oil does provide some relief.  Patient is requesting to see a different ENT for further evaluation.\"), Aye Smith MD March 28, 2025 neurology office note (vitamin B12 deficiency, vitamin D deficiency, migraine headache with aura, \"39 y.o. female with a past medical history that includes choledocholithiasis s/p cholescystectomy, hydrosalpinx, GERD, gastritis, hypoglycemia who presents for follow up of headache and abnormal MRI.\"  \"She reports having a unilateral throbbing pain with migrainous features that has become more severe over the past six months. Prior imaging includes a normal CTH done in february. I did note percocet being part of her medication list. She reports she uses it sparingly. I reviewed PDMP and she does not get frequent refills of this therefore less suspicion for overuse headache. She was prescribed sumatriptan by other provider which does help her headaches. At this time she does not have more than 15 headaches days in a month so does not warrant PPX. Will focus on ruling out reversible etiologies and abortive regimen. Given change in nature of headache I ordered an MRI brain with and without contrast\") were " reviewed prior to the chiropractic evaluation today.    Narrative & Impression         CERVICAL SPINE     INDICATION:   Cervicalgia. Radiculopathy, cervical region.      COMPARISON: None.      VIEWS:  XR SPINE CERVICAL COMPLETE 4 OR 5 VW NON INJURY      FINDINGS:     No displaced fracture.      Straightening of the normally expected cervical lordosis.     The intervertebral disc spaces are preserved.      The neuroforamina are patent.     The prevertebral soft tissues are within normal limits.       The lung apices are clear.     IMPRESSION:        No acute osseous abnormality.     Electronically signed: 06/10/2025 07:56 PM Marlo Benson MD     Narrative & Impression   MRI BRAIN WITH AND WITHOUT CONTRAST     INDICATION:  R93.89: Abnormal findings on diagnostic imaging of other specified body structures.  Follow up white matter abnormality 1 year     COMPARISON: MRI brain with and without contrast 9/28/2023. CT head without contrast 2/24/2023.     TECHNIQUE:  Multiplanar, multisequence imaging of the brain was performed before and after gadolinium administration.        IV Contrast:  6 mL of Gadobutrol injection     IMAGE QUALITY:  Diagnostic.     FINDINGS:     BRAIN PARENCHYMA: Small scattered nonspecific white matter lesions in periventricular, deep white matter, and subcortical white matter distribution, appears more numerous in right cerebral hemisphere since 3/13/2024. There is no involvement of   callososeptal interface. No enhancing lesion.     Postcontrast imaging of the brain demonstrates no abnormal enhancement.     There is no discrete mass, mass effect or midline shift. No acute intracranial hemorrhage.  There is no evidence of acute infarction. Small simple pineal cyst, unchanged.     VENTRICLES:  Normal for the patient's age.     SELLA AND PITUITARY GLAND:  Normal.     ORBITS:  Normal.     PARANASAL SINUSES: Trace mucosal thickening, improved.     VASCULATURE:  Evaluation of the major intracranial  vasculature demonstrates appropriate flow voids.     CALVARIUM AND SKULL BASE:  Normal.     EXTRACRANIAL SOFT TISSUES:  Normal.     IMPRESSION:     Mild nonspecific white matter disease, appears more numerous in right cerebral hemisphere since 3/13/2024 - this may be due to worsening mild chronic microangiopathy or demyelinating disease (no specific features). No enhancing lesion.     No acute intracranial abnormality.     The study was marked in EPIC for significant notification.     Workstation performed: [id number]          Narrative & Impression   PELVIC ULTRASOUND, COMPLETE     INDICATION: The patient is 39 years old. left pelvic pain r/o torsion.     COMPARISON: Comparison is made to a prior ultrasound of the pelvis dated October 2, 2024.     TECHNIQUE: Transabdominal pelvic ultrasound was performed in sagittal and transverse planes with a curvilinear transducer. Additional transvaginal imaging was performed to better evaluate the endometrium and ovaries. Imaging included volumetric sweeps as   well as traditional still imaging technique.     FINDINGS:     UTERUS:  Hysterectomy     OVARIES/ADNEXA:  Right ovary: 2.8 x 2.0 x 1.7 cm. 5.0 mL.  Ovarian Doppler flow is within normal limits.  No suspicious ovarian or adnexal abnormality. Previously noted cystic structure in the right adnexa has resolved.     Left ovary:  Status post left oophorectomy     OTHER:  No free fluid or loculated fluid collections.     IMPRESSION:     Status post hysterectomy and left oophorectomy. No acute findings in the pelvis. No free fluid or fluid collection.     Resolution of right adnexal cystic lesion noted on the prior pelvic ultrasound exam.                          Workstation performed: [id number]       Narrative & Impression         LEFT HIP     INDICATION:   Pain in left hip.      COMPARISON:  None.     VIEWS:  XR HIP/PELV 2-3 VWS LEFT  W PELVIS IF PERFORMED   Images: 4     FINDINGS:     There is no acute fracture or  dislocation.     No significant hip degenerative changes.     No lytic or blastic osseous lesion.     Soft tissues are unremarkable.     The visualized lumbar spine is unremarkable.     IMPRESSION:        No acute osseous abnormality.        Electronically signed: 12/10/2024 04:43 PM Leroy Ortiz MD       Impression:  Suspected right ovarian cyst measures 3.3 x 2.5 cm. No other acute abnormality  in the abdomen or pelvis.      CT examination performed with dose lowering protocol in accordance with [facility].          Workstation:[id number]  Narrative    History: Lower abdominal pain and bilateral flank pain.     Exam: CT of the abdomen and pelvis with IV contrast.     Technique: Using helical technique, axial images were obtained through the  abdomen and pelvis following administration of 90 cc of Omnipaque 350  intravenous contrast. Coronal and sagittal reformations were performed.     Comparison: 6/3/2024 and 4/16/2024.       Abdomen:  Lung Bases: Mild dependent atelectasis is noted in the lower lobes.    Liver: Normal.    Gallbladder/Bile ducts: Gallbladder is surgically absent. No intra or  extrahepatic biliary ductal dilation.    Spleen: Normal.    Pancreas: Normal.    Adrenal glands: Normal.    Kidneys/Ureters: Unremarkable.    Bowel/Mesentery: Normal. The appendix appears normal.    Lymph nodes: Normal.    Vessels: Normal.    Abdominal Wall: Normal.     Pelvis:  Uterus is surgically absent. Cystic lesion in the right adnexa measures 3.3 x  2.5 cm (image 142/series 2). This likely indicates a right ovarian cyst.    Urinary Bladder: Normal.    Lymph nodes:  Normal.     Bones: No acute osseous abnormality.     Exam End: 10/23/24 11:52 AM    Specimen Collected: 10/23/24 12:24 PM Last Resulted: 10/23/24 12:27 PM   Received From: [facility]  Result Received: 10/25/24  7:47 AM       Narrative & Impression   MRI CERVICAL SPINE WITH AND WITHOUT CONTRAST     INDICATION: R93.89: Abnormal findings on  diagnostic imaging of other specified body structures.     COMPARISON:  None.     TECHNIQUE:  Multiplanar, multisequence imaging of the cervical spine was performed before and after gadolinium administration. .        IV Contrast:  7 mL of Gadobutrol injection (SINGLE-DOSE)     IMAGE QUALITY:  Diagnostic.     FINDINGS:     ALIGNMENT:  Normal alignment of the cervical spine.  No compression fracture.  No subluxation.  No scoliosis.     MARROW SIGNAL:  Normal marrow signal is identified within the visualized bony structures.  No discrete marrow lesion. Probable atypical hemangioma in the C6 vertebral body.     CERVICAL AND VISUALIZED UPPER THORACIC CORD:  Normal signal within the visualized cord.     PREVERTEBRAL AND PARASPINAL SOFT TISSUES:  Normal.     VISUALIZED POSTERIOR FOSSA:  The visualized posterior fossa demonstrates no abnormal signal.     CERVICAL DISC SPACES:     C2-C3:  Normal.     C3-C4:  Normal.     C4-C5: Mild bulge. No significant canal stenosis or foraminal narrowing.     C5-C6: Central disc protrusion. Mild mass effect on the thecal sac. No significant foraminal narrowing.     C6-C7: Central disc protrusion. Mild mass effect on the thecal sac. No significant foraminal narrowing.     C7-T1:  Normal.     UPPER THORACIC DISC SPACES:  Normal.     POSTCONTRAST IMAGING:  Normal.     OTHER FINDINGS:  None.     IMPRESSION:     No intrinsic cord signal abnormality identified. No abnormal enhancement within the cervical spinal canal.              Workstation performed: [id number]       Impression:    1. 5.0 x 2.8 x 3.2 cm left posterior pelvic multiloculated cystic structure.  This is felt most consistent with hydrosalpinx. Other etiologies are felt to be  unlikely. Follow-up MRI in 3-6 months is recommended to ensure stability.            Workstation:[id number]  Narrative    History: Soft tissue mass. Left ovarian remnant. Left lower quadrant pain.    Technique: Multiplanar, multisequence pre-and-post 14 cc  intravenous Dotarem  contrast-enhanced MRI of the pelvis.    Findings:    Postsurgical changes seen status post hysterectomy.    There is a 5.0 x 2.8 x 3.2 cm multiloculated cystic structure in the left  posterior pelvis. No internal nodularity is seen within this cystic structure.    The right ovary is not visualized.  Exam End: 09/29/23  8:05 AM    Specimen Collected: 09/29/23  2:59 PM Last Resulted: 09/29/23  3:05 PM   Received From: [facility]  Result Received: 10/02/23 11:03 AM       Narrative & Impression   LUMBAR SPINE     INDICATION:   M54.42: Lumbago with sciatica, left side  G89.29: Other chronic pain.     COMPARISON:  None     VIEWS:  XR SPINE LUMBAR MINIMUM 4 VIEWS NON INJURY        FINDINGS:     There are 5 non rib bearing lumbar vertebral bodies.      There is no evidence of acute fracture or destructive osseous lesion.     Mild scoliotic deformity is noted.  Alignment is otherwise unremarkable.      No significant lumbar degenerative change noted.     The pedicles appear intact. No pars defects.     Soft tissues are unremarkable.     IMPRESSION:     Mild scoliotic deformity.  Otherwise unremarkable study.        Workstation performed: [id number]     Medical Decision Making and Treatment Plan:    The patient indicates experiencing paresthesias in the left lateral upper extremity (along the course of the left C6 dermatome) but into all digits of the left hand (brachial plexus/neurovascular sleeve distribution).  The patient's MRI report of the cervical spine did not indicate any significant neuroforaminal narrowing at the C5-C6 or C6-C7 intervertebral disc levels. The patient has a rounded shoulder posture with a forward head carriage placing abnormal stress on the cervical/upper thoracic musculature and shortening of the pectoralis musculature.  Potential entrapment of the brachial plexus at the left coracoid pectoral tunnel.  The patient was taught a pectoralis/subscapularis stretch  for home use.  The patient may be prescribed therapeutic exercises to address the weakness in the rhomboids, mid trapezius, and inferior trapezius on a future chiropractic visit. The patient has myofascial findings as well as cervical facet restrictions and upper to mid thoracic facet restrictions which can contribute to limited range of motion and pain.  The patient indicates experiencing pain along the course of the left anterior thigh and anterior lower leg which is along the course of the left L4 (left anterior thigh) and L5 dermatomes (left anterior lower leg).  The patient did not exhibit positive nerve root tension findings on orthopedic testing.  The patient has a long history of left knee pain and therefore may have compensation in the left thigh and left lower leg musculature which can also generate pain.  The patient was referred for a orthopedic reevaluation with Robbie Jade MD (orthopedic sports medicine).  Otherwise the patient is prone to abnormal gait patterns which can negatively affect her lower back condition/progress.  The patient has limitation in hamstring flexibility which may place abnormal stress on the lumbar region with bending type movements.  The patient has myofascial findings as well as lumbar facet restrictions and sacroiliac joint restriction which can contribute to limited range of motion and pain.    The patient will be treated with conservative chiropractic care including myofascial release, joint mobilization, and a home stretching and icing program.    The patient was taught lower back, assisted hamstring, assisted and unassisted gluteus medius/piriformis, and pectoralis/subscapularis stretces today. The patient was advised to do the stretch for 3 sets of 15-20 seconds several times per day.The need to stretch to the point of tension only was discussed. ROM was measured with an [employer] digital dual inclinometer.    The patient will initially be seen 2 times per week and the  frequency of treatment will be reduced to 1 time per week as there are decreased symptoms and improvement in objective findings. The patient will then be discharged.    Patient Instructions:    The patient was advised to apply ice to the region in 15-minute intervals a minimum of 3 times per day.   The patient was advised to avoid sleeping in the prone and left side lying positions. Proper sleeping postures and use of pillows were discussed.    Goals:    JMLGOALS: Improve patient's ability to tolerate sleeping, Improve patient's ability to tolerate prolonged walking, Improve patient's ability to tolerate prolonged physical activity, Improve cervical range of motion, and improve patient's ability to static positions    TIME/Reviewed with Patient:  At least 30 minutes of time was spent with the patient during the consultation including the patient's history/current complaints, physical exam, reviewing the diagnostic reports/office notes, reviewing home instruction/reducing risk factors with the patient, reviewing my findings/diagnostic reports with the patient, and discussing the treatment/treatment plan with the patient.    Subjective:     [id number]) acted as a  through the translating mikel today.  Lorrie Jones is a 40 y.o. female who presents today with pain in the midline lower cervical/cervicothoracic region and bilateral lower lumbar/lumbosacral region.  The patient confirmed that she had an evaluation with Margarita Walker PA-C (general, family medicine) and was referred for a chiropractic evaluation today.  The patient stated the midline mid to lower cervical/cervicothoracic pain began 2 and half months ago.  She states she woke up with the pain.  She continues to have difficulty sleeping due to the pain in the midline lower cervical/cervicothoracic region.  The patient stated she sleeps in the prone position.  She stated the pain in the midline lower cervical/cervicothoracic  "region is constant but intensifies with reading and left cervical rotation movements.  The patient also described experiencing paresthesias in the left lateral upper arm, left posterior lateral forearm, and occasionally into all digits of the left hand.  She denied experiencing pain in the left upper extremity.  The patient stated she has not found any form of intervention that provides her relief in the midline lower cervical/cervicothoracic region.  The patient describes the midline lower cervical/cervicothoracic region pain as a 5 on a 0-10 pain scale to my assistant today.    The patient also described experiencing pain in the bilateral lower lumbar/lumbosacral region.  The patient stated the pain in the bilateral lower lumbar/lumbosacral region began 10 to 15 years ago.  She stated she woke up with the pain in the bilateral lower lumbar/lumbosacral region.  Describes it as a \"muscular spasm.\"  She stated she experiences pain into the left anterior thigh and anterior lower leg but it \"intensifies\" in the left anterior knee region.  She stated the pain feels as though it is \"inside\" the left knee. The patient stated she had been under the care of an orthopedist for her left knee.  In review of the patient's records she was under the care of Robbie Jade MD (orthopedic sports medicine).  She stated she had physical therapy for the knee as well as an injection for the left knee.  She stated she had worsened pain in the left knee with the physical therapy and did not notice relief with the injection procedure.  She stated the left lower extremity pain is constant.  She stated the left lower extremity pain is worsened with sleeping, walking, bending the left knee, and standing.  The patient denied experiencing paresthesias in the left lower extremity.  The patient denied having changes in bowel or bladder function.  The patient described the lower lumbar/lumbosacral pain as a 7 on a 0-10 pain scale today.  On the " "patient's intake paperwork she described her pain level as a 5 on a 0-10 pain scale.  She described her pain level as a 5 on a 0-10 pain scale at its worst.  It is unclear as to what body parts she was referring to when providing the pain levels on her pain diagram.     Objective:    Vitals:    07/01/25 0900   BP: 110/70   Pulse: 78   SpO2: 98%   Weight: 73 kg (161 lb)   Height: 5' 2\" (1.575 m)       Appearance: Well developed, well nourished, and in no acute distress    Alert and oriented x 3    Mood/Affect: calm and pleasant    Gait/Posture:    Gait: Normal    Antalgic posture: None    Lordosis: Cervical- decreased    Lordosis: Lumbar- normal    Kyphosis: Thoracic- normal    Upper extremity reflexes:    Deep tendon reflexes were normal and symmetrical in the upper extremities.    Upper Extremity Muscle Testing:    Muscle testing of the bilateral upper extremities was normal and graded +5/5.    Sensory:    Sensation to light touch was normal and symmetrical in the bilateral upper extremities.    Ferguson's was negative bilaterally.    Cervical Orthopedic Tests:    Maximal Foraminal Compression was was negative  bilaterally.    Active Cervical Ranges of Motion:    Flexion: 31 degrees    Extension: 26 degrees    Right lateral flexion: 28 degrees    Left Lateral flexion: 34 degrees    Right Rotation: 55 degrees    Left Rotation: 59 degrees    Lower extremity reflexes:    Deep tendon reflexes were normal and symmetrical in the bilateral lower extremities.    Lower Extremity Muscle Testing:    Muscle testing of the bilateral lower extremities was normal and graded +5/5.    Sensory:    Sensation to light touch was normal and symmetrical in the bilateral lower extremities.    Babinski was negative bilaterally.    Lumbar Orthopedic Tests:    Seated Straight Leg Raise was negative  bilaterally. Significant finding of left anterior knee pain when the patient performed seated straight leg raise on the left.    Supine Straight " Leg Raise was negative  on the Right.    Supine Straight Leg Raise was negative  on the Left. Mild to moderate inflexibility is noted in the bilateral hamstrings.    Maik Test was positive bilaterally.    Hip evaluation:    Passive internal rotation of the Right/left: left femoroacetabular joint was negative for limited range of motion or reproduction of pain. Significant finding of report of some discomfort in the left knee only.    Passive external rotation of the Right/left: left femoroacetabular joint was negative for limited range of motion or reproduction of pain.    Active Lumbar Ranges of Motion:    Flexion: Allowed the patient to touch the ankles    Extension: 27 degrees    Right lateral flexion: 25 degrees    Left Lateral flexion: 28 degrees    Palpation:    Negative De[place]ield on the left. Active myofascial trigger points were present in the bilateral superior trapezius, levator scapulae, semispinalis capitis, splenius capitis, and splenius cervicis. Active myofascial trigger points were present in the bilateral lumbar erector spinae, quadratus lumborum, and gluteus medius. Pressure to the above listed trigger points reproduced some of the pain described in today's chief complaint. Intersegmental motion was decreased in the cervical spine including joint dysfunctions at C1-C2, C5-C6, and C6-C7. Intersegmental motion was decreased in the thoracic spine including joint dysfunctions at T2-3, T3-4, T6-7, T7-8, and T10-11. Intersegmental motion was decreased in the lumbar spine including joint dysfunctions at L1-2, L4-5, and L5-S1. Intersegmental motion was decreased in the left sacroiliac joint.        Dictation voice to text software has been used in the creation of this document. Please consider this in light of any contextual or grammatical errors.

## 2025-07-01 NOTE — PATIENT INSTRUCTIONS
The patient was advised to apply ice to the region in 15-minute intervals a minimum of 3 times per day.   The patient was advised to avoid sleeping in the prone and left side lying positions. Proper sleeping postures and use of pillows were discussed.

## 2025-07-02 ENCOUNTER — PROCEDURE VISIT (OUTPATIENT)
Age: 40
End: 2025-07-02

## 2025-07-02 VITALS — SYSTOLIC BLOOD PRESSURE: 114 MMHG | DIASTOLIC BLOOD PRESSURE: 68 MMHG

## 2025-07-02 DIAGNOSIS — M54.16 LEFT LUMBAR RADICULITIS: ICD-10-CM

## 2025-07-02 DIAGNOSIS — M99.02 SEGMENTAL DYSFUNCTION OF THORACIC REGION: ICD-10-CM

## 2025-07-02 DIAGNOSIS — M79.18 MYOFASCIAL PAIN: ICD-10-CM

## 2025-07-02 DIAGNOSIS — M53.3 SACROILIAC JOINT DYSFUNCTION: ICD-10-CM

## 2025-07-02 DIAGNOSIS — M54.10 BRACHIAL NEURITIS OF LEFT UPPER EXTREMITY: Primary | ICD-10-CM

## 2025-07-02 DIAGNOSIS — M99.01 SEGMENTAL DYSFUNCTION OF CERVICAL REGION: ICD-10-CM

## 2025-07-02 DIAGNOSIS — M99.03 SEGMENTAL DYSFUNCTION OF LUMBAR REGION: ICD-10-CM

## 2025-07-02 NOTE — PROGRESS NOTES
Assessment:  Today is the patient's first chiropractic treatment visit.  The patient was informed she may experience additional soreness following the today's treatment visit. The need to continue with the stretching exercises and apply ice in 15-minute intervals was discussed with the patient.    1. Brachial neuritis of left upper extremity        2. Myofascial pain        3. Segmental dysfunction of cervical region        4. Segmental dysfunction of thoracic region        5. Segmental dysfunction of lumbar region        6. Sacroiliac joint dysfunction        7. Left lumbar radiculitis          Plan:  Treatment today consisted of myofascial release via Graston Technique to the left upper thoracic musculature including the levator scapula at the superior angle of the scapula, bilateral lumbar erector spinae, and bilateral quadratus lumborum (superficial). GT 3, GT 4, and GT 5 were used.     Manipulation was performed to the left sacroiliac joint via Wolff low force drop technique. Contact was made to the left ischial tuberosity and the right lateral aspect of the apex of the sacrum. Manipulation was performed to the lumbar restrictions via manual lumbar flexion distraction technique. Manipulation was performed to the lower thoracic restrictions via grade 1 mobilization techniques in the prone position.    Subjective:  The patient stated she did not need a  today.  The patient reported feeling the same as her initial chiropractic evaluation.  She has pain in the midline lower cervical/cervicothoracic region and bilateral lower lumbar/lumbosacral region. The pain in the midline lower cervical/cervicothoracic region is constant but intensifies with left cervical rotation.  The patient has paresthesias in the left lateral upper arm, left posterior lateral forearm, and occasionally into all digits of the left hand.  She has not found any form of intervention that provides her relief in the midline  lower cervical/cervicothoracic region.  The patient describes the midline lower cervical/cervicothoracic region pain as a 5 on a 0-10 pain scale to my assistant today.    The patient has pain in the bilateral lower lumbar/lumbosacral region.  She has pain into the left anterior thigh and anterior lower leg and left anterior knee region. The left lower extremity pain is constant.  The left lower extremity pain is increased with sleeping, walking, bending the left knee, and standing. The patient described the lower lumbar/lumbosacral pain as a 5 on a 0-10 pain scale today.    Objective:  Negative Derefield on the left. Active myofascial trigger points were present in the bilateral superior trapezius, levator scapulae, semispinalis capitis, splenius capitis, and splenius cervicis. Active myofascial trigger points were present in the bilateral lumbar erector spinae, quadratus lumborum, and gluteus medius.  Intersegmental motion was decreased in the cervical spine including joint dysfunctions at C1-C2, C5-C6, and C6-C7. Intersegmental motion was decreased in the thoracic spine including joint dysfunctions at T2-3, T3-4, T6-7, T7-8, and T10-11. Intersegmental motion was decreased in the lumbar spine including joint dysfunctions at L1-2, L4-5, and L5-S1. Intersegmental motion was decreased in the left sacroiliac joint.      I have used the Epic copy/forward function to compose this note.  I have reviewed my current note to ensure it reflects the current patient status, exam, assessment and plan.    Dictation voice to text software has been used in the creation of this document. Please consider this in light of any contextual or grammatical errors.

## 2025-07-07 ENCOUNTER — PROCEDURE VISIT (OUTPATIENT)
Age: 40
End: 2025-07-07
Payer: COMMERCIAL

## 2025-07-07 VITALS
HEIGHT: 62 IN | SYSTOLIC BLOOD PRESSURE: 106 MMHG | DIASTOLIC BLOOD PRESSURE: 64 MMHG | OXYGEN SATURATION: 99 % | HEART RATE: 96 BPM | BODY MASS INDEX: 29.63 KG/M2 | WEIGHT: 161 LBS

## 2025-07-07 DIAGNOSIS — M99.01 SEGMENTAL DYSFUNCTION OF CERVICAL REGION: ICD-10-CM

## 2025-07-07 DIAGNOSIS — M99.02 SEGMENTAL DYSFUNCTION OF THORACIC REGION: ICD-10-CM

## 2025-07-07 DIAGNOSIS — M79.18 MYOFASCIAL PAIN: ICD-10-CM

## 2025-07-07 DIAGNOSIS — M54.10 BRACHIAL NEURITIS OF LEFT UPPER EXTREMITY: Primary | ICD-10-CM

## 2025-07-07 DIAGNOSIS — M53.3 SACROILIAC JOINT DYSFUNCTION: ICD-10-CM

## 2025-07-07 DIAGNOSIS — M99.03 SEGMENTAL DYSFUNCTION OF LUMBAR REGION: ICD-10-CM

## 2025-07-07 DIAGNOSIS — M54.16 LEFT LUMBAR RADICULITIS: ICD-10-CM

## 2025-07-07 PROCEDURE — 98941 CHIROPRACT MANJ 3-4 REGIONS: CPT | Performed by: CHIROPRACTOR

## 2025-07-07 NOTE — PROGRESS NOTES
Assessment:  The patient reported feeling relief in the cervical/upper thoracic region and lumbar region with the last chiropractic treatment.  Therefore I will continue with the same chiropractic treatment plan.    1. Brachial neuritis of left upper extremity        2. Myofascial pain        3. Segmental dysfunction of cervical region        4. Segmental dysfunction of thoracic region        5. Segmental dysfunction of lumbar region        6. Sacroiliac joint dysfunction        7. Left lumbar radiculitis          Plan:  Treatment today consisted of myofascial release via Graston Technique to the left upper thoracic musculature including the levator scapula at the superior angle of the scapula, bilateral lumbar erector spinae, and bilateral quadratus lumborum (superficial). GT 3, GT 4, and GT 5 were used.     Manipulation was performed to the left sacroiliac joint via Wolff low force drop technique. Contact was made to the left ischial tuberosity and the right lateral aspect of the apex of the sacrum. Manipulation was performed to the lumbar restrictions via manual lumbar flexion distraction technique. Manipulation was performed to the lower thoracic restrictions via grade 1 mobilization techniques in the prone position.    Subjective:  Once again the patient stated she did not need a  today.  The patient reported she feels decrease symptoms in the cervical/upper thoracic region when compared to her initial chiropractic evaluation.  She has pain in the midline lower cervical/cervicothoracic region and bilateral lower lumbar/lumbosacral region. The pain in the midline lower cervical/cervicothoracic region is constant but intensifies with left cervical rotation.  The patient has paresthesias in the left lateral upper arm, left posterior lateral forearm, and occasionally into all digits of the left hand.  She has not found any form of intervention that provides her relief in the midline lower  cervical/cervicothoracic region.  Despite reporting feeling better the patient describes the midline lower cervical/cervicothoracic region pain as a 6 on a 0-10 pain scale to my assistant today which is higher than the last chiropractic visit.    The patient reports she has decreased pain in the bilateral lower lumbar/lumbosacral region when compared to the last chiropractic visit.  She has pain into the left anterior thigh and anterior lower leg and left anterior knee region. The left lower extremity pain is constant.  The left lower extremity pain is increased with sleeping, walking, bending the left knee, and standing. The patient described the lower lumbar/lumbosacral pain as a 4 on a 0-10 pain scale today.    Objective:  Negative Derefield on the left. Active myofascial trigger points were present in the bilateral superior trapezius, levator scapulae, semispinalis capitis, splenius capitis, and splenius cervicis. Active myofascial trigger points were present in the bilateral lumbar erector spinae, quadratus lumborum, and gluteus medius.  Intersegmental motion was decreased in the cervical spine including joint dysfunctions at C1-C2, C5-C6, and C6-C7. Intersegmental motion was decreased in the thoracic spine including joint dysfunctions at T2-3, T3-4, T6-7, T7-8, and T10-11. Intersegmental motion was decreased in the lumbar spine including joint dysfunctions at L4-5 and L5-S1. Intersegmental motion was decreased in the left sacroiliac joint.      I have used the Epic copy/forward function to compose this note.  I have reviewed my current note to ensure it reflects the current patient status, exam, assessment and plan.    Dictation voice to text software has been used in the creation of this document. Please consider this in light of any contextual or grammatical errors.

## 2025-07-09 ENCOUNTER — PROCEDURE VISIT (OUTPATIENT)
Age: 40
End: 2025-07-09
Payer: COMMERCIAL

## 2025-07-09 DIAGNOSIS — K59.00 ACUTE CONSTIPATION: ICD-10-CM

## 2025-07-09 DIAGNOSIS — R14.0 BLOATING: ICD-10-CM

## 2025-07-09 DIAGNOSIS — M53.3 SACROILIAC JOINT DYSFUNCTION: ICD-10-CM

## 2025-07-09 DIAGNOSIS — M99.01 SEGMENTAL DYSFUNCTION OF CERVICAL REGION: ICD-10-CM

## 2025-07-09 DIAGNOSIS — M54.10 BRACHIAL NEURITIS OF LEFT UPPER EXTREMITY: Primary | ICD-10-CM

## 2025-07-09 DIAGNOSIS — M79.18 MYOFASCIAL PAIN: ICD-10-CM

## 2025-07-09 DIAGNOSIS — M99.02 SEGMENTAL DYSFUNCTION OF THORACIC REGION: ICD-10-CM

## 2025-07-09 DIAGNOSIS — R68.81 EARLY SATIETY: ICD-10-CM

## 2025-07-09 DIAGNOSIS — R11.0 NAUSEA: ICD-10-CM

## 2025-07-09 DIAGNOSIS — M54.16 LEFT LUMBAR RADICULITIS: ICD-10-CM

## 2025-07-09 DIAGNOSIS — M99.03 SEGMENTAL DYSFUNCTION OF LUMBAR REGION: ICD-10-CM

## 2025-07-09 DIAGNOSIS — R10.84 GENERALIZED ABDOMINAL PAIN: ICD-10-CM

## 2025-07-09 PROCEDURE — 98941 CHIROPRACT MANJ 3-4 REGIONS: CPT | Performed by: CHIROPRACTOR

## 2025-07-09 RX ORDER — ASPIRIN 81 MG
1 TABLET, DELAYED RELEASE (ENTERIC COATED) ORAL DAILY
Qty: 90 TABLET | Refills: 1 | Status: SHIPPED | OUTPATIENT
Start: 2025-07-09

## 2025-07-09 NOTE — PROGRESS NOTES
"Assessment:  Graston Technique was performed to the cervical/upper thoracic musculature and was just performed on Monday.  Therefore the patient may experience additional soreness associated with Graston Technique to the region. The patient was informed they may see redness or possible bruising in the region of Graston technique treatment.  I began addressing the iliopsoas with active release technique today.    1. Brachial neuritis of left upper extremity        2. Myofascial pain        3. Segmental dysfunction of cervical region        4. Segmental dysfunction of thoracic region        5. Segmental dysfunction of lumbar region        6. Sacroiliac joint dysfunction        7. Left lumbar radiculitis          Plan:  Treatment today consisted of myofascial release via Graston Technique to the bilateral medial upper thoracic musculature including the levator scapula at the superior angle of the scapula (superficial, limited time). GT 3, GT 4, and GT 5 were used. Myofascial release via ischemic compression was performed to the bilateral lumbar erector spinae, and bilateral quadratus lumborum.  Myofascial release via active release technique was performed to the bilateral iliopsoas (patient performed active movements).    Manipulation was performed to the left sacroiliac joint via Wolff low force drop technique. Contact was made to the left ischial tuberosity and the right lateral aspect of the apex of the sacrum. Manipulation was performed to the lumbar restrictions via manual lumbar flexion distraction technique. Manipulation was performed to the lower thoracic restrictions via grade 1 mobilization techniques in the prone position.    Subjective:  The patient reported she feels \"a little better\" in the cervical/upper thoracic region when compared to last chiropractic visit.  She has pain in the midline lower cervical/cervicothoracic region and bilateral lower lumbar/lumbosacral region. The pain in the midline " lower cervical/cervicothoracic region is constant but intensifies with left cervical rotation.  The patient has paresthesias in the left lateral upper arm, left posterior lateral forearm, and occasionally into all digits of the left hand.  She has not found any form of intervention that provides her relief in the midline lower cervical/cervicothoracic region. The patient describes the midline lower cervical/cervicothoracic region pain as a 5 on a 0-10 pain scale today.  The patient reports she feels the same intensity of pain in the bilateral lower lumbar/lumbosacral region as the last chiropractic visit.  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  The patient stated she works 4 hours/day.  She has pain into the left anterior thigh and anterior lower leg and left anterior knee region. The left lower extremity pain is constant.  The left lower extremity pain is increased with sleeping, walking, bending the left knee, and standing. The patient described the lower lumbar/lumbosacral pain as a 6 on a 0-10 pain scale today.    Objective:  Negative Derefield on the left. Active myofascial trigger points were present in the bilateral superior trapezius, levator scapulae, semispinalis capitis, splenius capitis, and splenius cervicis. Active myofascial trigger points were present in the bilateral lumbar erector spinae, quadratus lumborum, and gluteus medius.  Intersegmental motion was decreased in the cervical spine including joint dysfunctions at C1-C2, C5-C6, and C6-C7. Intersegmental motion was decreased in the thoracic spine including joint dysfunctions at T2-3, T3-4, T6-7, T7-8, and T10-11. Intersegmental motion was decreased in the lumbar spine including joint dysfunctions at L4-5 and L5-S1. Intersegmental motion was decreased in the left sacroiliac joint.      I have used the Epic copy/forward function to compose this note.  I have reviewed my  current note to ensure it reflects the current patient status, exam, assessment and plan.    Dictation voice to text software has been used in the creation of this document. Please consider this in light of any contextual or grammatical errors.

## 2025-07-15 ENCOUNTER — OFFICE VISIT (OUTPATIENT)
Dept: FAMILY MEDICINE CLINIC | Facility: CLINIC | Age: 40
End: 2025-07-15

## 2025-07-15 VITALS
BODY MASS INDEX: 29.44 KG/M2 | OXYGEN SATURATION: 98 % | HEIGHT: 62 IN | WEIGHT: 160 LBS | RESPIRATION RATE: 18 BRPM | HEART RATE: 99 BPM | TEMPERATURE: 98 F | SYSTOLIC BLOOD PRESSURE: 118 MMHG | DIASTOLIC BLOOD PRESSURE: 70 MMHG

## 2025-07-15 DIAGNOSIS — M54.2 NECK PAIN: Primary | Chronic | ICD-10-CM

## 2025-07-15 DIAGNOSIS — K21.9 GASTROESOPHAGEAL REFLUX DISEASE WITHOUT ESOPHAGITIS: ICD-10-CM

## 2025-07-15 DIAGNOSIS — G43.109 MIGRAINE WITH AURA AND WITHOUT STATUS MIGRAINOSUS, NOT INTRACTABLE: Chronic | ICD-10-CM

## 2025-07-15 PROCEDURE — 99213 OFFICE O/P EST LOW 20 MIN: CPT | Performed by: PHYSICIAN ASSISTANT

## 2025-07-15 RX ORDER — PANTOPRAZOLE SODIUM 20 MG/1
20 TABLET, DELAYED RELEASE ORAL DAILY
Qty: 90 TABLET | Refills: 1 | Status: SHIPPED | OUTPATIENT
Start: 2025-07-15 | End: 2026-01-11

## 2025-07-18 ENCOUNTER — PROCEDURE VISIT (OUTPATIENT)
Age: 40
End: 2025-07-18
Payer: COMMERCIAL

## 2025-07-18 VITALS — WEIGHT: 160 LBS | BODY MASS INDEX: 29.44 KG/M2 | HEIGHT: 62 IN

## 2025-07-18 DIAGNOSIS — M99.03 SEGMENTAL DYSFUNCTION OF LUMBAR REGION: ICD-10-CM

## 2025-07-18 DIAGNOSIS — M79.18 MYOFASCIAL PAIN: ICD-10-CM

## 2025-07-18 DIAGNOSIS — M99.02 SEGMENTAL DYSFUNCTION OF THORACIC REGION: ICD-10-CM

## 2025-07-18 DIAGNOSIS — M54.16 LEFT LUMBAR RADICULITIS: ICD-10-CM

## 2025-07-18 DIAGNOSIS — M99.01 SEGMENTAL DYSFUNCTION OF CERVICAL REGION: ICD-10-CM

## 2025-07-18 DIAGNOSIS — M53.3 SACROILIAC JOINT DYSFUNCTION: ICD-10-CM

## 2025-07-18 DIAGNOSIS — M54.10 BRACHIAL NEURITIS OF LEFT UPPER EXTREMITY: Primary | ICD-10-CM

## 2025-07-18 PROCEDURE — 98941 CHIROPRACT MANJ 3-4 REGIONS: CPT | Performed by: CHIROPRACTOR

## 2025-07-18 NOTE — PROGRESS NOTES
Assessment:  This is an exacerbation of a chronic condition.  The patient was advised to bring her pillow to the next appointment to determine if it is contributing to worsened neck symptoms.  I began addressing the upper thoracic restriction via grade 2 mobilization techniques and low force diversified maneuvers to determine if the patient has relief of the midline upper thoracic symptoms with this form of mobilization/manipulation.    1. Brachial neuritis of left upper extremity        2. Myofascial pain        3. Segmental dysfunction of cervical region        4. Segmental dysfunction of thoracic region        5. Segmental dysfunction of lumbar region        6. Sacroiliac joint dysfunction        7. Left lumbar radiculitis          Plan:  Treatment today consisted of myofascial release via Graston Technique to the bilateral upper thoracic musculature including the levator scapula at the superior angle of the scapula and upper thoracic portion bilaterally psoriasis. GT 3, GT 4, and GT 5 were used. Myofascial release via ischemic compression was performed to the bilateral lumbar erector spinae.  Myofascial release via active release technique was performed to the bilateral iliopsoas (patient performed active movements).    Manipulation was performed to the left sacroiliac joint via Wolff low force drop technique. Contact was made to the left ischial tuberosity and the right lateral aspect of the apex of the sacrum. Manipulation was performed to the lumbar restrictions via manual lumbar flexion distraction technique. Manipulation was performed to the lower thoracic restrictions via grade 1 mobilization techniques in the prone position.  Manipulation was performed to the upper thoracic restrictions via grade 2 mobilization techniques and low force diversified maneuvers.  No high velocity thrust was applied.  An audible release occurred and well-tolerated.    Subjective:  The patient reported she feels increased  pain in the midline cervical//midline upper thoracic region when compared to last chiropractic visit.  The patient stated that she woke up this morning with worsening pain in the cervical/upper thoracic region.  The patient stated she is not sleeping in the prone position.  She stated she oftentimes wakes up with more pronounced cervical symptoms in the morning.  She has pain in the midline lower cervical/cervicothoracic region and bilateral lower lumbar/lumbosacral region. The pain in the midline lower cervical/cervicothoracic region is constant but intensifies with left cervical rotation.  The patient has paresthesias in the left lateral upper arm, left posterior lateral forearm, and occasionally into all digits of the left hand.  She has not found any form of intervention that provides her relief in the midline lower cervical/cervicothoracic region. The patient describes the midline lower cervical/cervicothoracic region pain as a 7 on a 0-10 pain scale today.  The patient reports she feels the same intensity of pain in the bilateral lower lumbar/lumbosacral region as the last chiropractic visit. She has pain into the left anterior thigh and anterior lower leg and left anterior knee region. The left lower extremity pain is constant.  The left lower extremity pain is increased with sleeping, walking, bending the left knee, and standing.    Objective:  Negative Derefield on the left. Active myofascial trigger points were present in the bilateral superior trapezius, levator scapulae, semispinalis capitis, splenius capitis, and splenius cervicis. Active myofascial trigger points were present in the bilateral lumbar erector spinae, quadratus lumborum, and gluteus medius.  Intersegmental motion was decreased in the cervical spine including joint dysfunctions at C1-C2, C5-C6, and C6-C7. Intersegmental motion was decreased in the thoracic spine including joint dysfunctions at T2-3, T3-4, T6-7, T7-8, and T10-11.  Intersegmental motion was decreased in the lumbar spine including joint dysfunctions at L2-3 and L5-S1. Intersegmental motion was decreased in the left sacroiliac joint.      I have used the Epic copy/forward function to compose this note.  I have reviewed my current note to ensure it reflects the current patient status, exam, assessment and plan.    Dictation voice to text software has been used in the creation of this document. Please consider this in light of any contextual or grammatical errors.

## 2025-07-18 NOTE — PATIENT INSTRUCTIONS
The patient was advised to continue with the icing and stretching instructions.   The patient was advised to bring her pillow to the next appointment to determine if it is contributing to worsened neck symptoms.  The patient was informed that she may experience additional soreness following the today's treatment visit. The need to continue with the stretching exercises and apply ice in 15-minute intervals was discussed with the patient.

## 2025-07-20 DIAGNOSIS — Z72.0 TOBACCO USE: ICD-10-CM

## 2025-07-21 RX ORDER — NICOTINE 21 MG/24HR
1 PATCH, TRANSDERMAL 24 HOURS TRANSDERMAL EVERY 24 HOURS
Qty: 28 PATCH | Refills: 0 | Status: SHIPPED | OUTPATIENT
Start: 2025-07-21

## 2025-07-25 DIAGNOSIS — M54.9 BACK PAIN: ICD-10-CM

## 2025-07-25 RX ORDER — CELECOXIB 100 MG/1
100 CAPSULE ORAL 2 TIMES DAILY PRN
Qty: 60 CAPSULE | Refills: 2 | Status: SHIPPED | OUTPATIENT
Start: 2025-07-25

## 2025-08-15 ENCOUNTER — OFFICE VISIT (OUTPATIENT)
Age: 40
End: 2025-08-15
Payer: COMMERCIAL

## (undated) DEVICE — TROCAR: Brand: KII FIOS FIRST ENTRY

## (undated) DEVICE — BETHLEHEM UNIVERSAL GYN LAP PK: Brand: CARDINAL HEALTH

## (undated) DEVICE — INTENDED FOR TISSUE SEPARATION, AND OTHER PROCEDURES THAT REQUIRE A SHARP SURGICAL BLADE TO PUNCTURE OR CUT.: Brand: BARD-PARKER SAFETY BLADES SIZE 11, STERILE

## (undated) DEVICE — SYRINGE 30ML LL

## (undated) DEVICE — SUT VICRYL 0 UR-6 27 IN J603H

## (undated) DEVICE — PREMIUM DRY TRAY LF: Brand: MEDLINE INDUSTRIES, INC.

## (undated) DEVICE — ADHESIVE SKN CLSR HISTOACRYL FLEX 0.5ML LF

## (undated) DEVICE — IRRIG ENDO FLO TUBING

## (undated) DEVICE — TROCAR: Brand: KII® SLEEVE

## (undated) DEVICE — GLOVE PI ULTRA TOUCH SZ.7.0

## (undated) DEVICE — TUBING SET W. FILTER, GAS FLOW 30 L/MIN: Brand: N.A.

## (undated) DEVICE — 3M™ STERI-STRIP™ REINFORCED ADHESIVE SKIN CLOSURES, R1547, 1/2 IN X 4 IN (12 MM X 100 MM), 6 STRIPS/ENVELOPE: Brand: 3M™ STERI-STRIP™

## (undated) DEVICE — SUT MONOCRYL 4-0 PS-2 27 IN Y426H

## (undated) DEVICE — PVC URETHRAL CATHETER: Brand: DOVER

## (undated) DEVICE — Device: Brand: OMNICLOSE TROCAR SITE CLOSURE DEVICE

## (undated) DEVICE — NEEDLE BLUNT 18 G X 1 1/2IN

## (undated) DEVICE — ENDOPATH XCEL UNIVERSAL TROCAR STABLILITY SLEEVES: Brand: ENDOPATH XCEL

## (undated) DEVICE — STERILE POLYISOPRENE POWDER-FREE SURGICAL GLOVES: Brand: PROTEXIS

## (undated) DEVICE — GLOVE PI ULTRA TOUCH SZ 6

## (undated) DEVICE — GLOVE PI ULTRA TOUCH SZ.7.5

## (undated) DEVICE — VIAL DECANTER

## (undated) DEVICE — SCD SEQUENTIAL COMPRESSION COMFORT SLEEVE MEDIUM KNEE LENGTH: Brand: KENDALL SCD

## (undated) DEVICE — CHLORAPREP HI-LITE 26ML ORANGE

## (undated) DEVICE — TRAY FOLEY 16FR URIMETER SILICONE SURESTEP

## (undated) DEVICE — [HIGH FLOW INSUFFLATOR,  DO NOT USE IF PACKAGE IS DAMAGED,  KEEP DRY,  KEEP AWAY FROM SUNLIGHT,  PROTECT FROM HEAT AND RADIOACTIVE SOURCES.]: Brand: PNEUMOSURE

## (undated) DEVICE — SKIN MARKER DUAL TIP WITH RULER CAP, FLEXIBLE RULER AND LABELS: Brand: DEVON

## (undated) DEVICE — DRAPE EQUIPMENT RF WAND

## (undated) DEVICE — STERILE POLYISOPRENE POWDER-FREE SURGICAL GLOVES WITH EMOLLIENT COATING: Brand: PROTEXIS

## (undated) DEVICE — LIGACLIP 10-M/L, 10MM ENDOSCOPIC ROTATING MULTIPLE CLIP APPLIERS: Brand: LIGACLIP

## (undated) DEVICE — GLOVE INDICATOR PI UNDERGLOVE SZ 7.5 BLUE

## (undated) DEVICE — ENDOPATH 5MM CURVED SCISSORS WITH MONOPOLAR CAUTERY: Brand: ENDOPATH

## (undated) DEVICE — ENDOPOUCH RETRIEVER SPECIMEN RETRIEVAL BAGS: Brand: ENDOPOUCH RETRIEVER

## (undated) DEVICE — STANDARD SURGICAL GOWN, L: Brand: CONVERTORS

## (undated) DEVICE — INTENDED FOR TISSUE SEPARATION, AND OTHER PROCEDURES THAT REQUIRE A SHARP SURGICAL BLADE TO PUNCTURE OR CUT.: Brand: BARD-PARKER SAFETY BLADES SIZE 15, STERILE

## (undated) DEVICE — GLOVE INDICATOR PI UNDERGLOVE SZ 6.5 BLUE

## (undated) DEVICE — ADHESIVE SKIN HIGH VISCOSITY EXOFIN 1ML

## (undated) DEVICE — BLUE HEAT SCOPE WARMER

## (undated) DEVICE — ALLENTOWN LAP CHOLE APP PACK: Brand: CARDINAL HEALTH

## (undated) DEVICE — SINGLE PORT MANIFOLD: Brand: NEPTUNE 2

## (undated) DEVICE — LIGASURE LAP SLR/DIV MARYLAND 5MM

## (undated) DEVICE — ENDOPATH PNEUMONEEDLE INSUFFLATION NEEDLES WITH LUER LOCK CONNECTORS 120MM: Brand: ENDOPATH

## (undated) DEVICE — ENDOPATH XCEL BLADELESS TROCARS WITH STABILITY SLEEVES: Brand: ENDOPATH XCEL